# Patient Record
Sex: MALE | Race: WHITE | NOT HISPANIC OR LATINO | Employment: UNEMPLOYED | URBAN - METROPOLITAN AREA
[De-identification: names, ages, dates, MRNs, and addresses within clinical notes are randomized per-mention and may not be internally consistent; named-entity substitution may affect disease eponyms.]

---

## 2017-08-23 ENCOUNTER — ALLSCRIPTS OFFICE VISIT (OUTPATIENT)
Dept: OTHER | Facility: OTHER | Age: 62
End: 2017-08-23

## 2017-10-25 ENCOUNTER — ALLSCRIPTS OFFICE VISIT (OUTPATIENT)
Dept: OTHER | Facility: OTHER | Age: 62
End: 2017-10-25

## 2017-10-26 NOTE — PROGRESS NOTES
Assessment  1  Acquired ankle/foot deformity (736 70) (M21 969)   2  Type 2 diabetes mellitus with diabetic polyneuropathy, with long-term current use of   insulin (250 60,357 2,V58 67) (E11 42,Z79  4)    Plan   · *VB - Foot Exam; Status:Complete;   Done: 50BKK3417 11:44AM   · Follow-up visit in 9 weeks Evaluation and Treatment  Follow-up  Status: Hold For -  Scheduling  Requested for: 73QSC4734   · Diabetes Foot Exam Performed; Status:Complete;   Done: 00DAP8988 11:44AM   · Inspect your feet daily ; Status:Complete;   Done: 22OMV9251 11:44AM   · It is important to take good care of your feet if you have diabetes ; Status:Complete;    Done: 61AUY8443 11:44AM    Discussion/Summary  The patient, patient's family was counseled regarding diagnostic results,-- instructions for management,-- prognosis,-- patient and family education,-- risks and benefits of treatment options,-- importance of compliance with treatment  Patient is able to Self-Care  Possible side effects of new medications were reviewed with the patient/guardian today  The treatment plan was reviewed with the patient/guardian  The patient/guardian understands and agrees with the treatment plan      Chief Complaint  Routine nail care, Right big toe ingrown      History of Present Illness  HPI: Patient is a diabetic who presents for pedal evaluation  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  Patient is concerned with the nails and skin of the right foot  Active Problems  1  Acquired ankle/foot deformity (736 70) (M21 969)   2  Atherosclerosis of arteries of extremities (440 20) (I70 209)   3  Diabetic Ulcer Of The Left Fifth Toe (250 80)   4  Hyperlipidemia (272 4) (E78 5)   5  Intermittent claudication (443 9) (I73 9)   6  Onychomycosis (110 1) (B35 1)   7  Tinea pedis (110 4) (B35 3)   8  Type 2 diabetes mellitus (250 00) (E11 9)   9   Type 2 diabetes mellitus with diabetic polyneuropathy, with long-term current use of insulin (250 60,357 2,V58 67) (E11 42,Z79  4)    Past Medical History   · History of transient cerebral ischemia (V12 54) (Z86 73)   · History of Prostate Cancer (V10 46)   · History of Trigger finger (727 03) (M65 30)    Surgical History   · History of Gallbladder Surgery   · History of Prostate Surgery    The surgical history was reviewed and updated today  Family History  Mother    · Family history of Diabetes Mellitus (V18 0)  Father    · Family history of Colon Cancer (V16 0)   · Family history of Diabetes Mellitus (V18 0)    The family history was reviewed and updated today  Social History   · Never A Smoker  The social history was reviewed and updated today  Current Meds   1  Atorvastatin Calcium 40 MG Oral Tablet; TAKE 1 TABLET AT BEDTIME; Therapy: (Elvia Tatum) to Recorded   2  Ciclopirox Olamine 0 77 % External Cream; APPLY  AND RUB  IN A THIN FILM TO   AFFECTED AREAS TWICE DAILY  (AM AND PM); Therapy: 28Foh2399 to [phone]  Requested for: 24Fce3105; Last   Rx:[id number] Ordered   3  Clopidogrel Bisulfate 75 MG Oral Tablet; TAKE 1 TABLET DAILY; Therapy: (Elvia Diver) to Recorded   4  GlyBURIDE 5 MG Oral Tablet; TAKE 1 TABLET TWICE DAILY BEFORE MEALS; Therapy: (Elvia Diver) to Recorded   5  NovoLIN 70/30 (70-30) 100 UNIT/ML Subcutaneous Suspension; Therapy: (Elvia Diver) to Recorded   6  Vitamin B12 TABS; Therapy: (Recorded:09Jan2014) to Recorded    The medication list was reviewed and updated today  Allergies  1  No Known Drug Allergies    Vitals   Recorded: 32EOD7579 11:26AM   Heart Rate 89   Respiration 17   Systolic 585   Diastolic 83   Height 5 ft 5 in   Weight 197 lb    BMI Calculated 32 78   BSA Calculated 1 97     Physical Exam  Left Foot: Transmetatarsal amputation noted  Right Foot: Appearance: Normal except as noted: excessive pronation  Great toe deformities include a bunion     Left Ankle: ROM: limited ROM in all planes Right Ankle: ROM: limited ROM in all planes   Neurological Exam: performed  Light touch was decreased bilaterally  Response to monofilament test was absent bilaterally  Deep tendon reflexes: achilles reflex absent bilateraly  Vascular Exam: performed Dorsalis pedis pulses were diminished bilaterally  Posterior tibial pulses were diminished bilaterally  Elevation Pallor: present bilaterally  Dependence rubor was present bilaterally  Capillary refill time was Negative digital hair noted  , but-- between 1-3 seconds bilaterally  Edema: mild bilaterally  Toenails: All nails of right foot are mycotic and thickened  Early ingrown toenail fibular groove right hallux noted  Socks and shoes removed, Right Foot Findings: erythematous and dry  The sensory exam showed diminished vibratory sensation at the level of the toes  Diminished tactile sensation with monofilament testing throughout the right foot  Socks and shoes removed, Left Foot Findings: erythematous and dry  The sensory exam showed diminished vibratory sensation at the level of the toes  Diminished tactile sensation with monofilament testing throughout the left foot  Hyperkeratosis: present on the right first toe-- and-- Xerosis of skin consistent with moccasin tinea pedis  Shoe Gear Evaluation: performed ()  Recommendation(s): extra depth diabetic shoes  Procedure  Diabetic foot exam performed  Patient wife educated on condition  Mycotic nails debrided  Patient will be started on topical antifungal  Patient will need new diabetic shoes in the new year  We will order a vascular testing        Signatures   Electronically signed by : Joshua Vidal DPM; Oct 25 2017 11:45AM EST                       (Author)

## 2017-12-27 ENCOUNTER — ALLSCRIPTS OFFICE VISIT (OUTPATIENT)
Dept: OTHER | Facility: OTHER | Age: 62
End: 2017-12-27

## 2017-12-28 NOTE — PROGRESS NOTES
Assessment   1  Acquired ankle/foot deformity (736 70) (M21 969)   2  Atherosclerosis of arteries of extremities (440 20) (I70 209)   3  Onychomycosis (110 1) (B35 1)   4  Type 2 diabetes mellitus with diabetic polyneuropathy, with long-term current use of     insulin (250 60,357 2,V58 67) (E11 42,Z79 4)   5  Callus (700) (L84)    Plan    · *VB - Foot Exam; Status:Complete;   Done: 98ZAI1468 01:57PM   · Follow-up visit in 9 weeks Evaluation and Treatment  Follow-up  Status: Hold For -    Scheduling  Requested for: 79Ari2048   · Diabetes Foot Exam Performed; Status:Complete;   Done: 66ANV6636 01:58PM   · Inspect your feet daily ; Status:Complete;   Done: 57SJX3381 01:58PM   · It is important to take good care of your feet if you have diabetes ; Status:Complete;      Done: 31GVS6039 01:58PM    Discussion/Summary   The patient, patient's family was counseled regarding instructions for management,-- prognosis,-- patient and family education,-- risks and benefits of treatment options,-- importance of compliance with treatment  Patient is able to Self-Care  Possible side effects of new medications were reviewed with the patient/guardian today  The treatment plan was reviewed with the patient/guardian  The patient/guardian understands and agrees with the treatment plan      Chief Complaint   Routine nail care, Right big toe ingrown      History of Present Illness   HPI: Patient is a diabetic who presents for pedal evaluation  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  Patient is concerned with the nails and skin of the right foot  Active Problems   1  Acquired ankle/foot deformity (736 70) (M21 969)   2  Atherosclerosis of arteries of extremities (440 20) (I70 209)   3  Diabetic Ulcer Of The Left Fifth Toe (250 80)   4  Hyperlipidemia (272 4) (E78 5)   5  Intermittent claudication (443 9) (I73 9)   6  Onychomycosis (110 1) (B35 1)   7  Tinea pedis (110 4) (B35 3)   8   Type 2 diabetes mellitus (250 00) (E11 9)   9  Type 2 diabetes mellitus with diabetic polyneuropathy, with long-term current use of     insulin (250 60,357 2,V58 67) (E11 42,Z79  4)    Past Medical History    · History of transient cerebral ischemia (V12 54) (Z86 73)   · History of Prostate Cancer (V10 46)   · History of Trigger finger (727 03) (M65 30)    Surgical History    · History of Gallbladder Surgery   · History of Prostate Surgery     The surgical history was reviewed and updated today  Family History   Mother    · Family history of Diabetes Mellitus (V18 0)  Father    · Family history of Colon Cancer (V16 0)   · Family history of Diabetes Mellitus (V18 0)     The family history was reviewed and updated today  Social History    · Never A Smoker  The social history was reviewed and updated today  Current Meds    1  Atorvastatin Calcium 40 MG Oral Tablet; TAKE 1 TABLET AT BEDTIME; Therapy: (Mounika Batista) to Recorded   2  Ciclopirox Olamine 0 77 % External Cream; APPLY  AND RUB  IN A THIN FILM TO     AFFECTED AREAS TWICE DAILY  (AM AND PM); Therapy: 32Vdr7720 to ([phone])  Requested for: 09Onl4419; Last     Rx:[id number] Ordered   3  Clopidogrel Bisulfate 75 MG Oral Tablet; TAKE 1 TABLET DAILY; Therapy: (Mounika Batista) to Recorded   4  GlyBURIDE 5 MG Oral Tablet; TAKE 1 TABLET TWICE DAILY BEFORE MEALS; Therapy: (Mounika Batista) to Recorded   5  NovoLIN 70/30 (70-30) 100 UNIT/ML Subcutaneous Suspension; Therapy: (Mounika Batista) to Recorded   6  Vitamin B12 TABS; Therapy: (Recorded:09Jan2014) to Recorded     The medication list was reviewed and updated today  Allergies   1  No Known Drug Allergies    Vitals    Recorded: 84YMA6980 01:46PM   Heart Rate 84   Respiration 16   Systolic 330   Diastolic 87   Height 5 ft 5 in   Weight 197 lb    BMI Calculated 32 78   BSA Calculated 1 97     Physical Exam   Left Foot: Transmetatarsal amputation noted      Right Foot: Appearance: Normal except as noted: excessive pronation  Great toe deformities include a bunion  Left Ankle: ROM: limited ROM in all planes    Right Ankle: ROM: limited ROM in all planes    Neurological Exam: performed  Light touch was decreased bilaterally  Response to monofilament test was absent bilaterally  Deep tendon reflexes: achilles reflex absent bilateraly  Vascular Exam: performed Dorsalis pedis pulses were diminished bilaterally  Posterior tibial pulses were diminished bilaterally  Elevation Pallor: present bilaterally  Dependence rubor was present bilaterally  Capillary refill time was Negative digital hair noted  , but-- between 1-3 seconds bilaterally  Edema: mild bilaterally  Toenails: All nails of right foot are mycotic and thickened  Early ingrown toenail fibular groove right hallux noted  Socks and shoes removed, Right Foot Findings: erythematous and dry  The sensory exam showed diminished vibratory sensation at the level of the toes  Diminished tactile sensation with monofilament testing throughout the right foot  Socks and shoes removed, Left Foot Findings: erythematous and dry  The sensory exam showed diminished vibratory sensation at the level of the toes  Diminished tactile sensation with monofilament testing throughout the left foot  Hyperkeratosis: present on the right first toe-- and-- Xerosis of skin consistent with moccasin tinea pedis  Shoe Gear Evaluation: performed ()  Recommendation(s): extra depth diabetic shoes  Procedure   Diabetic foot exam performed  Patient wife educated on condition  Mycotic nails debrided  Patient will be started on topical antifungal  Patient will need new diabetic shoes in the new year  We will order a vascular testing        Signatures    Electronically signed by : Jovan Plasencia DPM; Dec 27 2017  1:58PM EST                       (Author)

## 2018-01-12 VITALS
SYSTOLIC BLOOD PRESSURE: 132 MMHG | DIASTOLIC BLOOD PRESSURE: 86 MMHG | WEIGHT: 197 LBS | HEIGHT: 65 IN | HEART RATE: 78 BPM | RESPIRATION RATE: 16 BRPM | BODY MASS INDEX: 32.82 KG/M2

## 2018-01-14 VITALS
HEART RATE: 89 BPM | WEIGHT: 197 LBS | HEIGHT: 65 IN | BODY MASS INDEX: 32.82 KG/M2 | SYSTOLIC BLOOD PRESSURE: 141 MMHG | DIASTOLIC BLOOD PRESSURE: 83 MMHG | RESPIRATION RATE: 17 BRPM

## 2018-01-22 VITALS
BODY MASS INDEX: 32.82 KG/M2 | WEIGHT: 197 LBS | DIASTOLIC BLOOD PRESSURE: 87 MMHG | HEART RATE: 84 BPM | SYSTOLIC BLOOD PRESSURE: 129 MMHG | RESPIRATION RATE: 16 BRPM | HEIGHT: 65 IN

## 2018-02-15 ENCOUNTER — OFFICE VISIT (OUTPATIENT)
Dept: PODIATRY | Facility: CLINIC | Age: 63
End: 2018-02-15
Payer: COMMERCIAL

## 2018-02-15 VITALS — BODY MASS INDEX: 31.99 KG/M2 | HEIGHT: 65 IN | RESPIRATION RATE: 17 BRPM | WEIGHT: 192 LBS

## 2018-02-15 DIAGNOSIS — M79.671 PAIN IN BOTH FEET: ICD-10-CM

## 2018-02-15 DIAGNOSIS — E11.42 DIABETIC POLYNEUROPATHY ASSOCIATED WITH TYPE 2 DIABETES MELLITUS (HCC): ICD-10-CM

## 2018-02-15 DIAGNOSIS — M21.969 ACQUIRED DEFORMITY OF FOOT, UNSPECIFIED LATERALITY: Primary | ICD-10-CM

## 2018-02-15 DIAGNOSIS — I70.209 PERIPHERAL ARTERIOSCLEROSIS (HCC): ICD-10-CM

## 2018-02-15 DIAGNOSIS — M79.672 PAIN IN BOTH FEET: ICD-10-CM

## 2018-02-15 PROCEDURE — 99213 OFFICE O/P EST LOW 20 MIN: CPT | Performed by: PODIATRIST

## 2018-02-15 RX ORDER — UBIDECARENONE 75 MG
CAPSULE ORAL
COMMUNITY
End: 2020-12-11

## 2018-02-15 RX ORDER — GLYBURIDE 5 MG/1
1 TABLET ORAL 2 TIMES DAILY
COMMUNITY
End: 2020-12-11

## 2018-02-15 RX ORDER — CLOPIDOGREL BISULFATE 75 MG/1
1 TABLET ORAL DAILY
COMMUNITY
End: 2020-10-08

## 2018-02-15 RX ORDER — ATORVASTATIN CALCIUM 40 MG/1
1 TABLET, FILM COATED ORAL
COMMUNITY
End: 2021-01-17 | Stop reason: SDUPTHER

## 2018-02-15 RX ORDER — OXYCODONE HYDROCHLORIDE AND ACETAMINOPHEN 5; 325 MG/1; MG/1
TABLET ORAL
COMMUNITY
Start: 2018-01-17 | End: 2020-05-05 | Stop reason: SDUPTHER

## 2018-02-15 NOTE — PROGRESS NOTES
Assessment/Plan:   Patient is diabetic with foot deformity  Patient has pain upon ambulation  Plan  Diabetic foot exam performed  Left foot plantar callus debrided  Mycotic nails of right foot debrided  Patient urged to get vascular testing as directed    There are no diagnoses linked to this encounter  Subjective:     Patient ID: Malissa Garcia is a 60 y o  male  HPI    Review of Systems      Objective:  Discussion/Summary   The patient, patient's family was counseled regarding instructions for management,-- prognosis,-- patient and family education,-- risks and benefits of treatment options,-- importance of compliance with treatment  Patient is able to Self-Care  Possible side effects of new medications were reviewed with the patient/guardian today  The treatment plan was reviewed with the patient/guardian  The patient/guardian understands and agrees with the treatment plan      Chief Complaint   Routine nail care, Right big toe ingrown      History of Present Illness   HPI: Patient is a diabetic who presents for pedal evaluation  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  Patient is concerned with the nails and skin of the right foot  Active Problems   1  Acquired ankle/foot deformity (736 70) (M21 969)   2  Atherosclerosis of arteries of extremities (440 20) (I70 209)   3  Diabetic Ulcer Of The Left Fifth Toe (250 80)   4  Hyperlipidemia (272 4) (E78 5)   5  Intermittent claudication (443 9) (I73 9)   6  Onychomycosis (110 1) (B35 1)   7  Tinea pedis (110 4) (B35 3)   8  Type 2 diabetes mellitus (250 00) (E11 9)   9  Type 2 diabetes mellitus with diabetic polyneuropathy, with long-term current use of     insulin (250 60,357 2,V58 67) (E11 42,Z79  4)     Past Medical History    · History of transient cerebral ischemia (V12 54) (Z86 73)   · History of Prostate Cancer (V10 46)   · History of Trigger finger (727 03) (M65 30)     Surgical History    · History of Gallbladder Surgery   · History of Prostate Surgery     The surgical history was reviewed and updated today  Family History   Mother    · Family history of Diabetes Mellitus (V18 0)  Father    · Family history of Colon Cancer (V16 0)   · Family history of Diabetes Mellitus (V18 0)     The family history was reviewed and updated today  Social History    · Never A Smoker  The social history was reviewed and updated today  Current Meds    1  Atorvastatin Calcium 40 MG Oral Tablet; TAKE 1 TABLET AT BEDTIME; Therapy: (Ivonne Fuchs) to Recorded   2  Ciclopirox Olamine 0 77 % External Cream; APPLY  AND RUB  IN A THIN FILM TO     AFFECTED AREAS TWICE DAILY  (AM AND PM); Therapy: 31Zto1977 to [phone])  Requested for: 23Aug2017; Last     Rx:[id number] Ordered   3  Clopidogrel Bisulfate 75 MG Oral Tablet; TAKE 1 TABLET DAILY; Therapy: (Ivonne Fuchs) to Recorded   4  GlyBURIDE 5 MG Oral Tablet; TAKE 1 TABLET TWICE DAILY BEFORE MEALS; Therapy: (Ivonne Fuchs) to Recorded   5  NovoLIN 70/30 (70-30) 100 UNIT/ML Subcutaneous Suspension; Therapy: (Ivonne Fuchs) to Recorded   6  Vitamin B12 TABS; Therapy: (Recorded:09Jan2014) to Recorded     The medication list was reviewed and updated today  Allergies   1  No Known Drug Allergies     Vitals     Recorded: 63JNE8192 01:46PM   Heart Rate 84   Respiration 16   Systolic 366   Diastolic 87   Height 5 ft 5 in   Weight 197 lb    BMI Calculated 32 78   BSA Calculated 1 97      Physical Exam   Left Foot: Transmetatarsal amputation noted  Right Foot: Appearance: Normal except as noted: excessive pronation  Great toe deformities include a bunion  Left Ankle: ROM: limited ROM in all planes    Right Ankle: ROM: limited ROM in all planes    Neurological Exam: performed  Light touch was decreased bilaterally  Response to monofilament test was absent bilaterally   Deep tendon reflexes: achilles reflex absent bilateraly  Vascular Exam: performed Dorsalis pedis pulses were diminished bilaterally  Posterior tibial pulses were diminished bilaterally  Elevation Pallor: present bilaterally  Dependence rubor was present bilaterally  Capillary refill time was Negative digital hair noted  , but-- between 1-3 seconds bilaterally  Edema: mild bilaterally  Toenails: All nails of right foot are mycotic and thickened  Early ingrown toenail fibular groove right hallux noted  Socks and shoes removed, Right Foot Findings: erythematous and dry  The sensory exam showed diminished vibratory sensation at the level of the toes  Diminished tactile sensation with monofilament testing throughout the right foot  Socks and shoes removed, Left Foot Findings: erythematous and dry  The sensory exam showed diminished vibratory sensation at the level of the toes  Diminished tactile sensation with monofilament testing throughout the left foot  Hyperkeratosis: present on the right first toe-- and-- Xerosis of skin consistent with moccasin tinea pedis  Shoe Gear Evaluation: performed ()  Recommendation(s): extra depth diabetic shoes  Procedure   Diabetic foot exam performed  Patient wife educated on condition  Mycotic nails debrided  Patient will be started on topical antifungal  Patient will need new diabetic shoes in the new year   We will order a vascular testing     Foot ExamPhysical Exam

## 2018-03-15 ENCOUNTER — OFFICE VISIT (OUTPATIENT)
Dept: PODIATRY | Facility: CLINIC | Age: 63
End: 2018-03-15
Payer: COMMERCIAL

## 2018-03-15 VITALS
SYSTOLIC BLOOD PRESSURE: 152 MMHG | BODY MASS INDEX: 31.99 KG/M2 | WEIGHT: 192 LBS | DIASTOLIC BLOOD PRESSURE: 88 MMHG | HEIGHT: 65 IN

## 2018-03-15 DIAGNOSIS — B35.1 ONYCHOMYCOSIS: ICD-10-CM

## 2018-03-15 DIAGNOSIS — M21.969 ACQUIRED DEFORMITY OF FOOT, UNSPECIFIED LATERALITY: Primary | ICD-10-CM

## 2018-03-15 DIAGNOSIS — E11.42 DIABETIC POLYNEUROPATHY ASSOCIATED WITH TYPE 2 DIABETES MELLITUS (HCC): ICD-10-CM

## 2018-03-15 DIAGNOSIS — I70.209 PERIPHERAL ARTERIOSCLEROSIS (HCC): ICD-10-CM

## 2018-03-15 DIAGNOSIS — M79.672 PAIN IN BOTH FEET: ICD-10-CM

## 2018-03-15 DIAGNOSIS — M79.671 PAIN IN BOTH FEET: ICD-10-CM

## 2018-03-15 DIAGNOSIS — B35.3 TINEA PEDIS OF BOTH FEET: ICD-10-CM

## 2018-03-15 PROCEDURE — 99213 OFFICE O/P EST LOW 20 MIN: CPT | Performed by: PODIATRIST

## 2018-03-15 NOTE — PROGRESS NOTES
Assessment/Plan:    No problem-specific Assessment & Plan notes found for this encounter  Assessment/Plan:   Patient is diabetic with foot deformity  Patient has pain upon ambulation      Plan  Diabetic foot exam performed  Left foot plantar callus debrided  Mycotic nails of right foot debrided  Patient urged to get vascular testing as directed    There are no diagnoses linked to this encounter        Subjective:      Patient ID: Lalitha Wheat is a 60 y o  male      HPI     Review of Systems       Objective:  Discussion/Summary   The patient, patient's family was counseled regarding instructions for management,-- prognosis,-- patient and family education,-- risks and benefits of treatment options,-- importance of compliance with treatment     Patient is able to Self-Care     Possible side effects of new medications were reviewed with the patient/guardian today  The treatment plan was reviewed with the patient/guardian  The patient/guardian understands and agrees with the treatment plan      Chief Complaint   Routine nail care, Right big toe ingrown      History of Present Illness   HPI: Patient is a diabetic who presents for pedal evaluation  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  Patient is concerned with the nails and skin of the right foot       Active Problems   1  Acquired ankle/foot deformity (736 70) (M21 969)   2  Atherosclerosis of arteries of extremities (440 20) (I70 209)   3  Diabetic Ulcer Of The Left Fifth Toe (250 80)   4  Hyperlipidemia (272 4) (E78 5)   5  Intermittent claudication (443 9) (I73 9)   6  Onychomycosis (110 1) (B35 1)   7  Tinea pedis (110 4) (B35 3)   8  Type 2 diabetes mellitus (250 00) (E11 9)   9  Type 2 diabetes mellitus with diabetic polyneuropathy, with long-term current use of     insulin (250 60,357 2,V58 67) (E11 42,Z79  4)     Past Medical History    · History of transient cerebral ischemia (V12 54) (Z86 73)   · History of Prostate Cancer (V10 46)   · History of Trigger finger (727 03) (M65 30)     Surgical History    · History of Gallbladder Surgery   · History of Prostate Surgery     The surgical history was reviewed and updated today        Family History   Mother    · Family history of Diabetes Mellitus (V18 0)  Father    · Family history of Colon Cancer (V16 0)   · Family history of Diabetes Mellitus (V18 0)     The family history was reviewed and updated today        Social History    · Never A Smoker  The social history was reviewed and updated today       Current Meds    1  Atorvastatin Calcium 40 MG Oral Tablet; TAKE 1 TABLET AT BEDTIME;     Therapy: (Recorded:31Jce7192) to Recorded   2  Ciclopirox Olamine 0 77 % External Cream; APPLY  AND RUB  IN A THIN FILM TO     AFFECTED AREAS TWICE DAILY  (AM AND PM);     Therapy: 71Ptw3728 to [phone])  Requested for: 95Fwg0418; Last     Rx:[id number] Ordered   3  Clopidogrel Bisulfate 75 MG Oral Tablet; TAKE 1 TABLET DAILY;     Therapy: (Recorded:91Ucr4938) to Recorded   4  GlyBURIDE 5 MG Oral Tablet; TAKE 1 TABLET TWICE DAILY BEFORE MEALS;     Therapy: (Josiane Boateng) to Recorded   5  NovoLIN 70/30 (70-30) 100 UNIT/ML Subcutaneous Suspension;     Therapy: (Recorded:56Oxg6009) to Recorded   6  Vitamin B12 TABS;     Therapy: (Recorded:09Jan2014) to Recorded     The medication list was reviewed and updated today        Allergies   1  No Known Drug Allergies     Vitals     Recorded: 75Nnv3899 01:46PM   Heart Rate 84   Respiration 16   Systolic 597   Diastolic 87   Height 5 ft 5 in   Weight 197 lb    BMI Calculated 32 78   BSA Calculated 1 97      Physical Exam   Left Foot: Transmetatarsal amputation noted     Right Foot: Appearance: Normal except as noted: excessive pronation  Great toe deformities include a bunion     Left Ankle: ROM: limited ROM in all planes    Right Ankle: ROM: limited ROM in all planes    Neurological Exam: performed  Light touch was decreased bilaterally   Response to monofilament test was absent bilaterally  Deep tendon reflexes: achilles reflex absent bilateraly     Vascular Exam: performed Dorsalis pedis pulses were diminished bilaterally  Posterior tibial pulses were diminished bilaterally  Elevation Pallor: present bilaterally  Dependence rubor was present bilaterally  Capillary refill time was Negative digital hair noted  , but-- between 1-3 seconds bilaterally  Edema: mild bilaterally     Toenails: All nails of right foot are mycotic and thickened  Early ingrown toenail fibular groove right hallux noted          Socks and shoes removed, Right Foot Findings: erythematous and dry       The sensory exam showed diminished vibratory sensation at the level of the toes  Diminished tactile sensation with monofilament testing throughout the right foot       Socks and shoes removed, Left Foot Findings: erythematous and dry       The sensory exam showed diminished vibratory sensation at the level of the toes  Diminished tactile sensation with monofilament testing throughout the left foot     Hyperkeratosis: present on the right first toe-- and-- Xerosis of skin consistent with moccasin tinea pedis     Shoe Gear Evaluation: performed ()  Recommendation(s): extra depth diabetic shoes       Procedure   Diabetic foot exam performed  Patient wife educated on condition  Mycotic nails debrided  Patient will be started on topical antifungal         There are no diagnoses linked to this encounter  Subjective:      Patient ID: Luly Dover is a 60 y o  male  HPI    The following portions of the patient's history were reviewed and updated as appropriate: allergies, current medications, past family history, past medical history, past social history, past surgical history and problem list     Review of Systems      Objective:      Foot Exam    Right Foot/Ankle     Inspection and Palpation  Skin Exam: callus and dry skin;     Neurovascular  Dorsalis pedis: 0  Posterior tibial: 0      Left Foot/Ankle      Inspection and Palpation  Skin Exam: callus and dry skin;     Neurovascular  Dorsalis pedis: 0  Posterior tibial: 0        Physical Exam   Cardiovascular: Pulses are weak pulses  Pulses:       Dorsalis pedis pulses are 0 on the right side, and 0 on the left side  Posterior tibial pulses are 0 on the right side, and 0 on the left side  Feet:   Right Foot:   Skin Integrity: Positive for callus and dry skin  Left Foot:   Skin Integrity: Positive for callus and dry skin  Patient's shoes and socks removed  Right Foot/Ankle   Right Foot Inspection  Skin Exam: dry skin, callus and callus                          Toe Exam: swelling and erythema  Sensory   Vibration: absent  Proprioception: absent   Monofilament testing: absent  Vascular  Capillary refills: elevated  The right DP pulse is 0  The right PT pulse is 0  Left Foot/Ankle  Left Foot Inspection  Skin Exam: dry skin and callus                                         Sensory   Vibration: absent  Proprioception: absent  Monofilament: absent  Vascular  Capillary refills: elevated  The left DP pulse is 0  The left PT pulse is 0  Assign Risk Category:  Deformity present;  Loss of protective sensation; Weak pulses       Risk: 2

## 2018-03-15 NOTE — PATIENT INSTRUCTIONS
Patient has severe peripheral artery disease  He is being seen by vascular surgery  He will be undergoing arteriogram with possible stenting  He is aware of this  He will watch for signs of infection

## 2018-05-09 ENCOUNTER — OFFICE VISIT (OUTPATIENT)
Dept: PODIATRY | Facility: CLINIC | Age: 63
End: 2018-05-09
Payer: COMMERCIAL

## 2018-05-09 VITALS
RESPIRATION RATE: 17 BRPM | DIASTOLIC BLOOD PRESSURE: 85 MMHG | BODY MASS INDEX: 31.99 KG/M2 | HEART RATE: 78 BPM | HEIGHT: 65 IN | WEIGHT: 192 LBS | SYSTOLIC BLOOD PRESSURE: 133 MMHG

## 2018-05-09 DIAGNOSIS — B35.1 ONYCHOMYCOSIS: ICD-10-CM

## 2018-05-09 DIAGNOSIS — B35.3 TINEA PEDIS OF BOTH FEET: ICD-10-CM

## 2018-05-09 DIAGNOSIS — M79.672 PAIN IN BOTH FEET: ICD-10-CM

## 2018-05-09 DIAGNOSIS — M79.671 PAIN IN BOTH FEET: ICD-10-CM

## 2018-05-09 DIAGNOSIS — I70.209 PERIPHERAL ARTERIOSCLEROSIS (HCC): ICD-10-CM

## 2018-05-09 DIAGNOSIS — M21.969 ACQUIRED DEFORMITY OF FOOT, UNSPECIFIED LATERALITY: Primary | ICD-10-CM

## 2018-05-09 DIAGNOSIS — E11.42 DIABETIC POLYNEUROPATHY ASSOCIATED WITH TYPE 2 DIABETES MELLITUS (HCC): ICD-10-CM

## 2018-05-09 PROCEDURE — 11720 DEBRIDE NAIL 1-5: CPT | Performed by: PODIATRIST

## 2018-05-09 PROCEDURE — 99212 OFFICE O/P EST SF 10 MIN: CPT | Performed by: PODIATRIST

## 2018-05-09 RX ORDER — TERBINAFINE HYDROCHLORIDE 250 MG/1
TABLET ORAL
Qty: 15 TABLET | Refills: 0 | Status: SHIPPED | OUTPATIENT
Start: 2018-05-09 | End: 2018-06-09

## 2018-05-09 NOTE — PROGRESS NOTES
Assessment/Plan:  Pain  Paronychia  Mycosis  Diabetic neuropathy  Foot deformity  Plan  Nails debrided  Terbinafine ordered  Feet measured  Patient will need diabetic shoes  Heel take medication as prescribed  He will watch for signs of infection  Diabetic foot exam performed    No problem-specific Assessment & Plan notes found for this encounter  Patient is diabetic with foot deformity  Patient has pain upon ambulation      Plan   Diabetic foot exam performed   Left foot plantar callus debrided   Mycotic nails of right foot debrided   Patient urged to get vascular testing as directed    There are no diagnoses linked to this encounter        Subjective:      Patient ID: Kan Juan is a 60 y  o  male      HPI     Review of Systems       Objective:  Discussion/Summary   The patient, patient's family was counseled regarding instructions for management,-- prognosis,-- patient and family education,-- risks and benefits of treatment options,-- importance of compliance with treatment     Patient is able to Self-Care     Possible side effects of new medications were reviewed with the patient/guardian today  The treatment plan was reviewed with the patient/guardian  The patient/guardian understands and agrees with the treatment plan      Chief Complaint   Routine nail care, Right big toe ingrown      History of Present Illness   HPI: Patient is a diabetic who presents for pedal evaluation  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   Patient is concerned with the nails and skin of the right foot       Active Problems   1  Acquired ankle/foot deformity (736 70) (M21 969)   2  Atherosclerosis of arteries of extremities (440 20) (I70 209)   3  Diabetic Ulcer Of The Left Fifth Toe (250 80)   4  Hyperlipidemia (272 4) (E78 5)   5  Intermittent claudication (443 9) (I73 9)   6  Onychomycosis (110 1) (B35 1)   7  Tinea pedis (110 4) (B35 3)   8  Type 2 diabetes mellitus (250 00) (E11 9)   9  Type 2 diabetes mellitus with diabetic polyneuropathy, with long-term current use of     insulin (250 60,357 2,V58 67) (E11 42,Z79  4)     Past Medical History    · History of transient cerebral ischemia (V12 54) (Z86 73)   · History of Prostate Cancer (V10 46)   · History of Trigger finger (727 03) (M65 30)     Surgical History    · History of Gallbladder Surgery   · History of Prostate Surgery     The surgical history was reviewed and updated today        Family History   Mother    · Family history of Diabetes Mellitus (V18 0)  Father    · Family history of Colon Cancer (V16 0)   · Family history of Diabetes Mellitus (V18 0)     The family history was reviewed and updated today        Social History    · Never A Smoker  The social history was reviewed and updated today       Current Meds    1  Atorvastatin Calcium 40 MG Oral Tablet; TAKE 1 TABLET AT BEDTIME;     Therapy: (Recorded:[id number]) to Recorded   2  Ciclopirox Olamine 0 77 % External Cream; APPLY  AND RUB  IN A THIN FILM TO     AFFECTED AREAS TWICE DAILY  (AM AND PM);     Therapy: [id number] to ([id number])  Requested for: [id number]; Last     Rx:[id number] Ordered   3  Clopidogrel Bisulfate 75 MG Oral Tablet; TAKE 1 TABLET DAILY;     Therapy: (Recorded:59Pfe6148) to Recorded   4  GlyBURIDE 5 MG Oral Tablet; TAKE 1 TABLET TWICE DAILY BEFORE MEALS;     Therapy: (Hai Jacobs) to Recorded   5  NovoLIN 70/30 (70-30) 100 UNIT/ML Subcutaneous Suspension;     Therapy: (Recorded:58Koo6819) to Recorded   6  Vitamin B12 TABS;     Therapy: (Recorded:09Jan2014) to Recorded     The medication list was reviewed and updated today        Allergies   1  No Known Drug Allergies     Vitals        Heart Rate 84   Respiration 16   Systolic 655   Diastolic 87   Height 5 ft 5 in   Weight 197 lb    BMI Calculated 32 78   BSA Calculated 1 97      Physical Exam   Left Foot: Transmetatarsal amputation noted     Right Foot: Appearance: Normal except as noted: excessive pronation  Great toe deformities include a bunion     Left Ankle: ROM: limited ROM in all planes    Right Ankle: ROM: limited ROM in all planes    Neurological Exam: performed  Light touch was decreased bilaterally  Response to monofilament test was absent bilaterally  Deep tendon reflexes: achilles reflex absent bilateraly     Vascular Exam: performed Dorsalis pedis pulses were diminished bilaterally  Posterior tibial pulses were diminished bilaterally  Elevation Pallor: present bilaterally  Dependence rubor was present bilaterally  Capillary refill time was Negative digital hair noted  , but-- between 1-3 seconds bilaterally  Edema: mild bilaterally     Toenails: All nails of right foot are mycotic and thickened  Early ingrown toenail fibular groove right hallux noted          Socks and shoes removed, Right Foot Findings: erythematous and dry       The sensory exam showed diminished vibratory sensation at the level of the toes  Diminished tactile sensation with monofilament testing throughout the right foot       Socks and shoes removed, Left Foot Findings: erythematous and dry       The sensory exam showed diminished vibratory sensation at the level of the toes  Diminished tactile sensation with monofilament testing throughout the left foot     Hyperkeratosis: present on the right first toe-- and-- Xerosis of skin consistent with moccasin tinea pedis     Shoe Gear Evaluation: performed ()  Recommendation(s): extra depth diabetic shoes       Procedure   Diabetic foot exam performed  Patient wife educated on condition  Mycotic nails debrided   Patient will be started on topical antifungal           There are no diagnoses linked to this encounter        Subjective:       Patient ID: Deysi Fatima is a 60 y o  male      HPI     The following portions of the patient's history were reviewed and updated as appropriate: allergies, current medications, past family history, past medical history, past social history, past surgical history and problem list      Review of Systems       Objective:      Foot Exam     Right Foot/Ankle      Inspection and Palpation  Skin Exam: callus and dry skin;      Neurovascular  Dorsalis pedis: 0  Posterior tibial: 0        Left Foot/Ankle       Inspection and Palpation  Skin Exam: callus and dry skin;      Neurovascular  Dorsalis pedis: 0  Posterior tibial: 0        Physical Exam   Cardiovascular: Pulses are weak pulses  Pulses:       Dorsalis pedis pulses are 0 on the right side, and 0 on the left side  Posterior tibial pulses are 0 on the right side, and 0 on the left side  Feet:   Right Foot:   Skin Integrity: Positive for callus and dry skin  Left Foot:   Skin Integrity: Positive for callus and dry skin  Patient's shoes and socks removed  Right Foot/Ankle   Right Foot Inspection  Skin Exam: dry skin, callus and callus                           Toe Exam: swelling and erythema  Sensory   Vibration: absent  Proprioception: absent   Monofilament testing: absent  Vascular  Capillary refills: elevated  The right DP pulse is 0  The right PT pulse is 0       Left Foot/Ankle  Left Foot Inspection  Skin Exam: dry skin and callus                                          Sensory   Vibration: absent  Proprioception: absent  Monofilament: absent  Vascular  Capillary refills: elevated  The left DP pulse is 0  The left PT pulse is 0  Assign Risk Category:  Deformity present; Loss of protective sensation; Weak pulses       Risk: 2       There are no diagnoses linked to this encounter  Subjective:      Patient ID: Dane Perez is a 60 y o  male  HPI    The following portions of the patient's history were reviewed and updated as appropriate: allergies, current medications, past family history, past medical history, past social history, past surgical history and problem list     Review of Systems      Objective:      Foot ExamPhysical Exam

## 2018-07-11 ENCOUNTER — OFFICE VISIT (OUTPATIENT)
Dept: PODIATRY | Facility: CLINIC | Age: 63
End: 2018-07-11
Payer: COMMERCIAL

## 2018-07-11 VITALS
BODY MASS INDEX: 31.99 KG/M2 | HEIGHT: 65 IN | RESPIRATION RATE: 17 BRPM | HEART RATE: 67 BPM | WEIGHT: 192 LBS | SYSTOLIC BLOOD PRESSURE: 132 MMHG | DIASTOLIC BLOOD PRESSURE: 77 MMHG

## 2018-07-11 DIAGNOSIS — E11.42 DIABETIC POLYNEUROPATHY ASSOCIATED WITH TYPE 2 DIABETES MELLITUS (HCC): ICD-10-CM

## 2018-07-11 DIAGNOSIS — B35.1 ONYCHOMYCOSIS: ICD-10-CM

## 2018-07-11 DIAGNOSIS — M21.969 ACQUIRED DEFORMITY OF FOOT, UNSPECIFIED LATERALITY: ICD-10-CM

## 2018-07-11 DIAGNOSIS — I70.209 PERIPHERAL ARTERIOSCLEROSIS (HCC): Primary | ICD-10-CM

## 2018-07-11 DIAGNOSIS — B35.9 DERMATOPHYTOSIS: ICD-10-CM

## 2018-07-11 DIAGNOSIS — M79.672 PAIN IN BOTH FEET: ICD-10-CM

## 2018-07-11 DIAGNOSIS — M79.671 PAIN IN BOTH FEET: ICD-10-CM

## 2018-07-11 PROCEDURE — 99213 OFFICE O/P EST LOW 20 MIN: CPT | Performed by: PODIATRIST

## 2018-07-11 NOTE — PROGRESS NOTES
Procedures   Foot Exam     Assessment/Plan:  Pain  Paronychia  Mycosis  Diabetic neuropathy  Foot deformity      Plan  Nails debrided  Terbinafine ordered  Feet measured  Patient will need diabetic shoes  Heel take medication as prescribed  He will watch for signs of infection  Diabetic foot exam performed     No problem-specific Assessment & Plan notes found for this encounter  Patient is diabetic with foot deformity  Patient has pain upon ambulation      Plan   Diabetic foot exam performed   Left foot plantar callus debrided   Mycotic nails of right foot debrided   Patient urged to get vascular testing as directed    There are no diagnoses linked to this encounter        Subjective:      Patient ID: Kan Juan is a 60 y  o  male      HPI     Review of Systems       Objective:  Discussion/Summary   The patient, patient's family was counseled regarding instructions for management,-- prognosis,-- patient and family education,-- risks and benefits of treatment options,-- importance of compliance with treatment     Patient is able to Self-Care     Possible side effects of new medications were reviewed with the patient/guardian today  The treatment plan was reviewed with the patient/guardian  The patient/guardian understands and agrees with the treatment plan      Chief Complaint   Routine nail care, Right big toe ingrown      History of Present Illness   HPI: Patient is a diabetic who presents for pedal evaluation  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   Patient is concerned with the nails and skin of the right foot       Active Problems   1  Acquired ankle/foot deformity (736 70) (M21 969)   2  Atherosclerosis of arteries of extremities (440 20) (I70 209)   3  Diabetic Ulcer Of The Left Fifth Toe (250 80)   4  Hyperlipidemia (272 4) (E78 5)   5  Intermittent claudication (443 9) (I73 9)   6  Onychomycosis (110 1) (B35 1)   7  Tinea pedis (110 4) (B35 3)   8  Type 2 diabetes mellitus (250 00) (E11 9)   9  Type 2 diabetes mellitus with diabetic polyneuropathy, with long-term current use of     insulin (250 60,357 2,V58 67) (E11 42,Z79  4)     Past Medical History    · History of transient cerebral ischemia (V12 54) (Z86 73)   · History of Prostate Cancer (V10 46)   · History of Trigger finger (727 03) (M65 30)     Surgical History    · History of Gallbladder Surgery   · History of Prostate Surgery     The surgical history was reviewed and updated today        Family History   Mother    · Family history of Diabetes Mellitus (V18 0)  Father    · Family history of Colon Cancer (V16 0)   · Family history of Diabetes Mellitus (V18 0)     The family history was reviewed and updated today        Social History    · Never A Smoker  The social history was reviewed and updated today       Current Meds    1  Atorvastatin Calcium 40 MG Oral Tablet; TAKE 1 TABLET AT BEDTIME;     Therapy: (Recorded:18Lkz3055) to Recorded   2  Ciclopirox Olamine 0 77 % External Cream; APPLY  AND RUB  IN A THIN FILM TO     AFFECTED AREAS TWICE DAILY  (AM AND PM);     Therapy: [id number] to ([id number])  Requested for: 48Zww2830; Last     Rx:[id number] Ordered   3  Clopidogrel Bisulfate 75 MG Oral Tablet; TAKE 1 TABLET DAILY;     Therapy: (Recorded:74Mai0715) to Recorded   4  GlyBURIDE 5 MG Oral Tablet; TAKE 1 TABLET TWICE DAILY BEFORE MEALS;     Therapy: (Amita Murrieta) to Recorded   5  NovoLIN 70/30 (70-30) 100 UNIT/ML Subcutaneous Suspension;     Therapy: (Recorded:53Rda1991) to Recorded   6  Vitamin B12 TABS;     Therapy: (Recorded:09Jan2014) to Recorded     The medication list was reviewed and updated today        Allergies   1  No Known Drug Allergies     Vitals         Heart Rate 84   Respiration 16   Systolic 446   Diastolic 87   Height 5 ft 5 in   Weight 197 lb    BMI Calculated 32 78   BSA Calculated 1 97      Physical Exam   Left Foot: Transmetatarsal amputation noted     Right Foot: Appearance: Normal except as noted: excessive pronation  Great toe deformities include a bunion     Left Ankle: ROM: limited ROM in all planes    Right Ankle: ROM: limited ROM in all planes    Neurological Exam: performed  Light touch was decreased bilaterally  Response to monofilament test was absent bilaterally  Deep tendon reflexes: achilles reflex absent bilateraly     Vascular Exam: performed Dorsalis pedis pulses were diminished bilaterally  Posterior tibial pulses were diminished bilaterally  Elevation Pallor: present bilaterally  Dependence rubor was present bilaterally  Capillary refill time was Negative digital hair noted  , but-- between 1-3 seconds bilaterally  Edema: mild bilaterally     Toenails: All nails of right foot are mycotic and thickened  Early ingrown toenail fibular groove right hallux noted          Socks and shoes removed, Right Foot Findings: erythematous and dry       The sensory exam showed diminished vibratory sensation at the level of the toes  Diminished tactile sensation with monofilament testing throughout the right foot       Socks and shoes removed, Left Foot Findings: erythematous and dry       The sensory exam showed diminished vibratory sensation at the level of the toes  Diminished tactile sensation with monofilament testing throughout the left foot     Hyperkeratosis: present on the right first toe-- and-- Xerosis of skin consistent with moccasin tinea pedis     Shoe Gear Evaluation: performed ()  Recommendation(s): extra depth diabetic shoes       Procedure   Diabetic foot exam performed  Patient wife educated on condition  Mycotic nails debrided  Patient will be started on topical antifungal           There are no diagnoses linked to this encounter        Subjective:   Patient is diabetic  He has pain in his feet with ambulation  Presents for evaluation  No history of trauma      Patient ID: Kan Juan is a 60 y  o  male         Objective:      Foot Exam     Right Foot/Ankle      Inspection and Palpation  Skin Exam: callus and dry skin;      Neurovascular  Dorsalis pedis: 0  Posterior tibial: 0        Left Foot/Ankle       Inspection and Palpation  Skin Exam: callus and dry skin;      Neurovascular  Dorsalis pedis: 0  Posterior tibial: 0        Physical Exam   Cardiovascular: Pulses are weak pulses  Pulses:       Dorsalis pedis pulses are 0 on the right side, and 0 on the left side         Posterior tibial pulses are 0 on the right side, and 0 on the left side  Feet:   Right Foot:   Skin Integrity: Positive for callus and dry skin  Left Foot:   Skin Integrity: Positive for callus and dry skin  Patient's shoes and socks removed  Right Foot/Ankle   Right Foot Inspection  Skin Exam: dry skin, callus and callus               Toe Exam: swelling and erythema  Sensory   Vibration: absent  Proprioception: absent   Monofilament testing: absent  Vascular  Capillary refills: elevated  The right DP pulse is 0  The right PT pulse is 0       Left Foot/Ankle  Left Foot Inspection  Skin Exam: dry skin and callus                 Sensory   Vibration: absent  Proprioception: absent  Monofilament: absent  Vascular  Capillary refills: elevated  The left DP pulse is 0  The left PT pulse is 0  Assign Risk Category:  Deformity present;  Loss of protective sensation; Weak pulses       Risk: 2

## 2018-07-26 ENCOUNTER — OFFICE VISIT (OUTPATIENT)
Dept: PODIATRY | Facility: CLINIC | Age: 63
End: 2018-07-26
Payer: COMMERCIAL

## 2018-07-26 VITALS
WEIGHT: 192 LBS | RESPIRATION RATE: 16 BRPM | HEART RATE: 83 BPM | HEIGHT: 65 IN | SYSTOLIC BLOOD PRESSURE: 139 MMHG | DIASTOLIC BLOOD PRESSURE: 83 MMHG | BODY MASS INDEX: 31.99 KG/M2

## 2018-07-26 DIAGNOSIS — M79.672 PAIN IN BOTH FEET: ICD-10-CM

## 2018-07-26 DIAGNOSIS — I70.209 PERIPHERAL ARTERIOSCLEROSIS (HCC): Primary | ICD-10-CM

## 2018-07-26 DIAGNOSIS — L60.0 INGROWN TOENAIL: ICD-10-CM

## 2018-07-26 DIAGNOSIS — M79.671 PAIN IN BOTH FEET: ICD-10-CM

## 2018-07-26 DIAGNOSIS — E11.42 DIABETIC POLYNEUROPATHY ASSOCIATED WITH TYPE 2 DIABETES MELLITUS (HCC): ICD-10-CM

## 2018-07-26 DIAGNOSIS — L03.031 PARONYCHIA OF TOENAIL OF RIGHT FOOT: ICD-10-CM

## 2018-07-26 PROCEDURE — 99213 OFFICE O/P EST LOW 20 MIN: CPT | Performed by: PODIATRIST

## 2018-07-26 NOTE — PROGRESS NOTES
Assessment/Plan:  Pain  Paronychia  Ingrown toenail  Peripheral artery disease  Diabetic neuropathy  Plan  Partial nail avulsion done of right hallux  Patient may need nail procedure  We will prescribe topical antibiotic     There are no diagnoses linked to this encounter  Subjective:  Patient has pain in his right big toe  Patient ID: Nicolle Rosas is a 60 y o  male  No past medical history on file  No past surgical history on file  Allergies   Allergen Reactions    Shellfish-Derived Products GI Intolerance    Sulfamethoxazole-Trimethoprim Rash         Current Outpatient Prescriptions:     atorvastatin (LIPITOR) 40 mg tablet, Take 1 tablet by mouth, Disp: , Rfl:     ciclopirox (LOPROX) 0 77 % cream, Apply topically Twice daily, Disp: , Rfl:     ciclopirox (LOPROX) 0 77 % cream, Apply topically 2 (two) times a day for 20 days, Disp: 45 g, Rfl: 0    clopidogrel (PLAVIX) 75 mg tablet, Take 1 tablet by mouth daily, Disp: , Rfl:     cyanocobalamin (VITAMIN B-12) 100 mcg tablet, Take by mouth, Disp: , Rfl:     glyBURIDE (DIABETA) 5 mg tablet, Take 1 tablet by mouth Twice daily, Disp: , Rfl:     insulin NPH-insulin regular (NOVOLIN 70/30) 100 units/mL subcutaneous injection, Inject under the skin, Disp: , Rfl:     oxyCODONE-acetaminophen (PERCOCET) 5-325 mg per tablet, , Disp: , Rfl:     Patient Active Problem List   Diagnosis    Acquired deformity of foot    Diabetic polyneuropathy associated with type 2 diabetes mellitus (HCC)    Pain in both feet    Peripheral arteriosclerosis (HCC)    Onychomycosis    Tinea pedis of both feet    Dermatophytosis       HPI    The following portions of the patient's history were reviewed and updated as appropriate: allergies, current medications, past family history, past medical history, past social history, past surgical history and problem list     Review of Systems      Historical Information   No past medical history on file    No past surgical history on file  Social History   History   Alcohol use Not on file     History   Drug use: Unknown     Family History: No family history on file  Meds/Allergies   Allergies   Allergen Reactions    Shellfish-Derived Products GI Intolerance    Sulfamethoxazole-Trimethoprim Rash       Current Outpatient Prescriptions on File Prior to Visit   Medication Sig Dispense Refill    atorvastatin (LIPITOR) 40 mg tablet Take 1 tablet by mouth      ciclopirox (LOPROX) 0 77 % cream Apply topically Twice daily      ciclopirox (LOPROX) 0 77 % cream Apply topically 2 (two) times a day for 20 days 45 g 0    clopidogrel (PLAVIX) 75 mg tablet Take 1 tablet by mouth daily      cyanocobalamin (VITAMIN B-12) 100 mcg tablet Take by mouth      glyBURIDE (DIABETA) 5 mg tablet Take 1 tablet by mouth Twice daily      insulin NPH-insulin regular (NOVOLIN 70/30) 100 units/mL subcutaneous injection Inject under the skin      oxyCODONE-acetaminophen (PERCOCET) 5-325 mg per tablet        No current facility-administered medications on file prior to visit  Objective:  Patient's shoes and socks removed  Foot ExamPhysical Exam   Constitutional: He appears well-developed and well-nourished  Cardiovascular: Normal rate and regular rhythm  Skin:   Right hallux demonstrates paronychia of the fibular nail groove  Incurvated toenail noted              Physical Exam   Left Foot: Transmetatarsal amputation noted     Right Foot: Appearance: Normal except as noted: excessive pronation  Great toe deformities include a bunion     Left Ankle: ROM: limited ROM in all planes    Right Ankle: ROM: limited ROM in all planes    Neurological Exam: performed  Light touch was decreased bilaterally  Response to monofilament test was absent bilaterally  Deep tendon reflexes: achilles reflex absent bilateraly     Vascular Exam: performed Dorsalis pedis pulses were diminished bilaterally   Posterior tibial pulses were diminished bilaterally  Elevation Pallor: present bilaterally  Dependence rubor was present bilaterally  Capillary refill time was Negative digital hair noted  , but-- between 1-3 seconds bilaterally  Edema: mild bilaterally     Toenails: All nails of right foot are mycotic and thickened  Early ingrown toenail fibular groove right hallux noted          Socks and shoes removed, Right Foot Findings: erythematous and dry       The sensory exam showed diminished vibratory sensation at the level of the toes  Diminished tactile sensation with monofilament testing throughout the right foot       Socks and shoes removed, Left Foot Findings: erythematous and dry       The sensory exam showed diminished vibratory sensation at the level of the toes  Diminished tactile sensation with monofilament testing throughout the left foot     Hyperkeratosis: present on the right first toe-- and-- Xerosis of skin consistent with moccasin tinea pedis     Shoe Gear Evaluation: performed ()  Recommendation(s): extra depth diabetic shoes

## 2018-09-20 ENCOUNTER — OFFICE VISIT (OUTPATIENT)
Dept: PODIATRY | Facility: CLINIC | Age: 63
End: 2018-09-20
Payer: COMMERCIAL

## 2018-09-20 VITALS
HEIGHT: 65 IN | DIASTOLIC BLOOD PRESSURE: 91 MMHG | WEIGHT: 192 LBS | SYSTOLIC BLOOD PRESSURE: 165 MMHG | BODY MASS INDEX: 31.99 KG/M2

## 2018-09-20 DIAGNOSIS — M79.672 PAIN IN BOTH FEET: ICD-10-CM

## 2018-09-20 DIAGNOSIS — M79.671 PAIN IN BOTH FEET: ICD-10-CM

## 2018-09-20 DIAGNOSIS — E11.42 DIABETIC POLYNEUROPATHY ASSOCIATED WITH TYPE 2 DIABETES MELLITUS (HCC): ICD-10-CM

## 2018-09-20 DIAGNOSIS — I70.209 PERIPHERAL ARTERIOSCLEROSIS (HCC): Primary | ICD-10-CM

## 2018-09-20 DIAGNOSIS — L03.031 PARONYCHIA OF TOENAIL OF RIGHT FOOT: ICD-10-CM

## 2018-09-20 DIAGNOSIS — M21.969 ACQUIRED DEFORMITY OF FOOT, UNSPECIFIED LATERALITY: ICD-10-CM

## 2018-09-20 PROCEDURE — 99213 OFFICE O/P EST LOW 20 MIN: CPT | Performed by: PODIATRIST

## 2018-09-20 NOTE — PROGRESS NOTES
Assessment/Plan:   Diabetic neuropathy  Status post transmetatarsal amputation left foot  Mycosis of nail  Pain upon ambulation  Callus formation  Tinea pedis  Plan  Diabetic foot exam performed  All mycotic nails debrided without pain or complication  Skin debrided  We will add topical antifungal      There are no diagnoses linked to this encounter  Subjective:  Patient is diabetic  He presents for foot care  He is concerned with dry scaly skin of his feet   Patient ID: Stacey Wilder is a 60 y o  male  No past medical history on file  No past surgical history on file      Allergies   Allergen Reactions    Shellfish-Derived Products GI Intolerance    Sulfamethoxazole-Trimethoprim Rash         Current Outpatient Prescriptions:     atorvastatin (LIPITOR) 40 mg tablet, Take 1 tablet by mouth, Disp: , Rfl:     ciclopirox (LOPROX) 0 77 % cream, Apply topically Twice daily, Disp: , Rfl:     ciclopirox (LOPROX) 0 77 % cream, Apply topically 2 (two) times a day for 20 days, Disp: 45 g, Rfl: 0    clopidogrel (PLAVIX) 75 mg tablet, Take 1 tablet by mouth daily, Disp: , Rfl:     cyanocobalamin (VITAMIN B-12) 100 mcg tablet, Take by mouth, Disp: , Rfl:     glyBURIDE (DIABETA) 5 mg tablet, Take 1 tablet by mouth Twice daily, Disp: , Rfl:     insulin NPH-insulin regular (NOVOLIN 70/30) 100 units/mL subcutaneous injection, Inject under the skin, Disp: , Rfl:     neomycin-colistin-hydrocortisone-thonzonium (CORTISPORIN TC) 0 33-0 3-1-0 05 % otic suspension, Administer 3 drops to the right ear 4 (four) times a day for 10 days Applied as directed with bandage, Disp: 10 mL, Rfl: 0    oxyCODONE-acetaminophen (PERCOCET) 5-325 mg per tablet, , Disp: , Rfl:     Patient Active Problem List   Diagnosis    Acquired deformity of foot    Diabetic polyneuropathy associated with type 2 diabetes mellitus (HCC)    Pain in both feet    Peripheral arteriosclerosis (HCC)    Onychomycosis    Tinea pedis of both feet    Dermatophytosis    Ingrown toenail    Paronychia of toenail of right foot       HPI    The following portions of the patient's history were reviewed and updated as appropriate: allergies, current medications, past family history, past medical history, past social history, past surgical history and problem list     Review of Systems      Historical Information   No past medical history on file  No past surgical history on file  Social History   History   Alcohol use Not on file     History   Drug use: Unknown     Family History: No family history on file  Meds/Allergies   Allergies   Allergen Reactions    Shellfish-Derived Products GI Intolerance    Sulfamethoxazole-Trimethoprim Rash       Current Outpatient Prescriptions on File Prior to Visit   Medication Sig Dispense Refill    atorvastatin (LIPITOR) 40 mg tablet Take 1 tablet by mouth      ciclopirox (LOPROX) 0 77 % cream Apply topically Twice daily      ciclopirox (LOPROX) 0 77 % cream Apply topically 2 (two) times a day for 20 days 45 g 0    clopidogrel (PLAVIX) 75 mg tablet Take 1 tablet by mouth daily      cyanocobalamin (VITAMIN B-12) 100 mcg tablet Take by mouth      glyBURIDE (DIABETA) 5 mg tablet Take 1 tablet by mouth Twice daily      insulin NPH-insulin regular (NOVOLIN 70/30) 100 units/mL subcutaneous injection Inject under the skin      neomycin-colistin-hydrocortisone-thonzonium (CORTISPORIN TC) 0 33-0 3-1-0 05 % otic suspension Administer 3 drops to the right ear 4 (four) times a day for 10 days Applied as directed with bandage 10 mL 0    oxyCODONE-acetaminophen (PERCOCET) 5-325 mg per tablet        No current facility-administered medications on file prior to visit  Objective:            Physical Exam   Left Foot: Transmetatarsal amputation noted     Right Foot: Appearance: Normal except as noted: excessive pronation   Great toe deformities include a bunion     Left Ankle: ROM: limited ROM in all planes    Right Ankle: ROM: limited ROM in all planes    Neurological Exam: performed  Light touch was decreased bilaterally  Response to monofilament test was absent bilaterally  Deep tendon reflexes: achilles reflex absent bilateraly     Vascular Exam: performed Dorsalis pedis pulses were diminished bilaterally  Posterior tibial pulses were diminished bilaterally  Elevation Pallor: present bilaterally  Dependence rubor was present bilaterally  Capillary refill time was Negative digital hair noted  , but-- between 1-3 seconds bilaterally  Edema: mild bilaterally     Toenails: All nails of right foot are mycotic and thickened  Early ingrown toenail fibular groove right hallux noted          Socks and shoes removed, Right Foot Findings: erythematous and dry       The sensory exam showed diminished vibratory sensation at the level of the toes  Diminished tactile sensation with monofilament testing throughout the right foot       Socks and shoes removed, Left Foot Findings: erythematous and dry       The sensory exam showed diminished vibratory sensation at the level of the toes  Diminished tactile sensation with monofilament testing throughout the left foot     Hyperkeratosis: present on the right first toe-- and-- Xerosis of skin consistent with moccasin tinea pedis     Shoe Gear Evaluation: performed ()  Recommendation(s): extra depth diabetic shoes  Right Foot/Ankle   Right Foot Inspection  Skin Exam: dry skin, callus, pre-ulcer and callus                          Toe Exam: swelling and erythema  Sensory   Vibration: diminished  Proprioception: diminished   Monofilament testing: diminished  Vascular    The right DP pulse is 1+  The right PT pulse is 1+       Left Foot/Ankle  Left Foot Inspection  Skin Exam: dry skin, pre-ulcer and callus                         Toe Exam: swelling and erythema                   Sensory   Vibration: diminished  Proprioception: diminished  Monofilament: diminished  Vascular  Capillary refills: elevated  The left DP pulse is 1+  The left PT pulse is 1+  Assign Risk Category:  Deformity present; Loss of protective sensation; Weak pulses       Risk: 2   diabetic    Patient's shoes and socks removed  Foot Exam    Right Foot/Ankle     Inspection and Palpation  Skin Exam: callus and dry skin;     Neurovascular  Dorsalis pedis: 1+  Posterior tibial: 1+      Left Foot/Ankle      Inspection and Palpation  Skin Exam: callus and dry skin;     Neurovascular  Dorsalis pedis: 1+  Posterior tibial: 1+        Physical Exam   Constitutional: He appears well-developed and well-nourished  Cardiovascular: Normal rate and regular rhythm  Pulses are weak pulses  Pulses:       Dorsalis pedis pulses are 1+ on the right side, and 1+ on the left side  Posterior tibial pulses are 1+ on the right side, and 1+ on the left side  Feet:   Right Foot:   Skin Integrity: Positive for callus and dry skin  Left Foot:   Skin Integrity: Positive for callus and dry skin  Skin:   Right foot demonstrates mycotic toenail  All nails thickened and demonstrate debride  Plantar keratosis submetatarsal 3 left foot  Bilateral moccasin tinea pedis with dry scaly skin    Dorsum minor fissures on the plantar aspect of the foot bilateral

## 2018-11-06 ENCOUNTER — OFFICE VISIT (OUTPATIENT)
Dept: PODIATRY | Facility: CLINIC | Age: 63
End: 2018-11-06
Payer: COMMERCIAL

## 2018-11-06 VITALS
HEART RATE: 74 BPM | WEIGHT: 192 LBS | DIASTOLIC BLOOD PRESSURE: 88 MMHG | RESPIRATION RATE: 17 BRPM | SYSTOLIC BLOOD PRESSURE: 138 MMHG | BODY MASS INDEX: 31.99 KG/M2 | HEIGHT: 65 IN

## 2018-11-06 DIAGNOSIS — B35.1 ONYCHOMYCOSIS: ICD-10-CM

## 2018-11-06 DIAGNOSIS — E11.42 DIABETIC POLYNEUROPATHY ASSOCIATED WITH TYPE 2 DIABETES MELLITUS (HCC): ICD-10-CM

## 2018-11-06 DIAGNOSIS — B35.3 TINEA PEDIS OF BOTH FEET: ICD-10-CM

## 2018-11-06 DIAGNOSIS — M79.671 PAIN IN BOTH FEET: ICD-10-CM

## 2018-11-06 DIAGNOSIS — I70.209 PERIPHERAL ARTERIOSCLEROSIS (HCC): ICD-10-CM

## 2018-11-06 DIAGNOSIS — M21.969 ACQUIRED DEFORMITY OF FOOT, UNSPECIFIED LATERALITY: Primary | ICD-10-CM

## 2018-11-06 DIAGNOSIS — M79.672 PAIN IN BOTH FEET: ICD-10-CM

## 2018-11-06 PROCEDURE — 99213 OFFICE O/P EST LOW 20 MIN: CPT | Performed by: PODIATRIST

## 2018-11-06 NOTE — PROGRESS NOTES
Procedures   Foot Exam       Assessment/Plan:   Diabetic neuropathy  Status post transmetatarsal amputation left foot  Mycosis of nail  Pain upon ambulation  Callus formation  Tinea pedis      Plan  Diabetic foot exam performed  All mycotic nails debrided without pain or complication  Skin debrided  We will add topical antifungal       There are no diagnoses linked to this encounter        Subjective:  Patient is diabetic  He presents for foot care    He is concerned with dry scaly skin of his feet   Patient ID: Malissa Garcia is a 60 y o  male      No past medical history on file      No past surgical history on file           Allergies   Allergen Reactions    Shellfish-Derived Products GI Intolerance    Sulfamethoxazole-Trimethoprim Rash            Current Outpatient Prescriptions:     atorvastatin (LIPITOR) 40 mg tablet, Take 1 tablet by mouth, Disp: , Rfl:     ciclopirox (LOPROX) 0 77 % cream, Apply topically Twice daily, Disp: , Rfl:     ciclopirox (LOPROX) 0 77 % cream, Apply topically 2 (two) times a day for 20 days, Disp: 45 g, Rfl: 0    clopidogrel (PLAVIX) 75 mg tablet, Take 1 tablet by mouth daily, Disp: , Rfl:     cyanocobalamin (VITAMIN B-12) 100 mcg tablet, Take by mouth, Disp: , Rfl:     glyBURIDE (DIABETA) 5 mg tablet, Take 1 tablet by mouth Twice daily, Disp: , Rfl:     insulin NPH-insulin regular (NOVOLIN 70/30) 100 units/mL subcutaneous injection, Inject under the skin, Disp: , Rfl:     neomycin-colistin-hydrocortisone-thonzonium (CORTISPORIN TC) 0 33-0 3-1-0 05 % otic suspension, Administer 3 drops to the right ear 4 (four) times a day for 10 days Applied as directed with bandage, Disp: 10 mL, Rfl: 0    oxyCODONE-acetaminophen (PERCOCET) 5-325 mg per tablet, , Disp: , Rfl:          Patient Active Problem List   Diagnosis    Acquired deformity of foot    Diabetic polyneuropathy associated with type 2 diabetes mellitus (HCC)    Pain in both feet    Peripheral arteriosclerosis ([address] )    Onychomycosis    Tinea pedis of both feet    Dermatophytosis    Ingrown toenail    Paronychia of toenail of right foot         HPI     The following portions of the patient's history were reviewed and updated as appropriate: allergies, current medications, past family history, past medical history, past social history, past surgical history and problem list      Review of Systems       Historical Information   No past medical history on file  No past surgical history on file  Social History       History   Alcohol use Not on file      History   Drug use: Unknown      Family History: No family history on file      Meds/Allergies        Allergies   Allergen Reactions    Shellfish-Derived Products GI Intolerance    Sulfamethoxazole-Trimethoprim Rash                Current Outpatient Prescriptions on File Prior to Visit   Medication Sig Dispense Refill    atorvastatin (LIPITOR) 40 mg tablet Take 1 tablet by mouth        ciclopirox (LOPROX) 0 77 % cream Apply topically Twice daily        ciclopirox (LOPROX) 0 77 % cream Apply topically 2 (two) times a day for 20 days 45 g 0    clopidogrel (PLAVIX) 75 mg tablet Take 1 tablet by mouth daily        cyanocobalamin (VITAMIN B-12) 100 mcg tablet Take by mouth        glyBURIDE (DIABETA) 5 mg tablet Take 1 tablet by mouth Twice daily        insulin NPH-insulin regular (NOVOLIN 70/30) 100 units/mL subcutaneous injection Inject under the skin        neomycin-colistin-hydrocortisone-thonzonium (CORTISPORIN TC) 0 33-0 3-1-0 05 % otic suspension Administer 3 drops to the right ear 4 (four) times a day for 10 days Applied as directed with bandage 10 mL 0    oxyCODONE-acetaminophen (PERCOCET) 5-325 mg per tablet            No current facility-administered medications on file prior to visit                 Objective:              Physical Exam   Left Foot: Transmetatarsal amputation noted     Right Foot: Appearance: Normal except as noted: excessive pronation  Great toe deformities include a bunion     Left Ankle: ROM: limited ROM in all planes    Right Ankle: ROM: limited ROM in all planes    Neurological Exam: performed  Light touch was decreased bilaterally  Response to monofilament test was absent bilaterally  Deep tendon reflexes: achilles reflex absent bilateraly     Vascular Exam: performed Dorsalis pedis pulses were diminished bilaterally  Posterior tibial pulses were diminished bilaterally  Elevation Pallor: present bilaterally  Dependence rubor was present bilaterally  Capillary refill time was Negative digital hair noted  , but-- between 1-3 seconds bilaterally  Edema: mild bilaterally     Toenails: All nails of right foot are mycotic and thickened  Early ingrown toenail fibular groove right hallux noted          Socks and shoes removed, Right Foot Findings: erythematous and dry       The sensory exam showed diminished vibratory sensation at the level of the toes  Diminished tactile sensation with monofilament testing throughout the right foot       Socks and shoes removed, Left Foot Findings: erythematous and dry       The sensory exam showed diminished vibratory sensation at the level of the toes  Diminished tactile sensation with monofilament testing throughout the left foot     Hyperkeratosis: present on the right first toe-- and-- Xerosis of skin consistent with moccasin tinea pedis     Shoe Gear Evaluation: performed ()  Recommendation(s): extra depth diabetic shoes  Right Foot/Ankle   Right Foot Inspection  Skin Exam: dry skin, callus, pre-ulcer and callus                           Toe Exam: swelling and erythema  Sensory   Vibration: diminished  Proprioception: diminished   Monofilament testing: diminished  Vascular     The right DP pulse is 1+   The right PT pulse is 1+       Left Foot/Ankle  Left Foot Inspection  Skin Exam: dry skin, pre-ulcer and callus                          Toe Exam: swelling and erythema                   Sensory Vibration: diminished  Proprioception: diminished  Monofilament: diminished  Vascular  Capillary refills: elevated  The left DP pulse is 1+  The left PT pulse is 1+  Assign Risk Category:  Deformity present; Loss of protective sensation; Weak pulses       Risk: 2   diabetic     Patient's shoes and socks removed  Foot Exam     Right Foot/Ankle      Inspection and Palpation  Skin Exam: callus and dry skin;      Neurovascular  Dorsalis pedis: 1+  Posterior tibial: 1+        Left Foot/Ankle       Inspection and Palpation  Skin Exam: callus and dry skin;      Neurovascular  Dorsalis pedis: 1+  Posterior tibial: 1+        Physical Exam   Constitutional: He appears well-developed and well-nourished  Cardiovascular: Normal rate and regular rhythm  Pulses are weak pulses  Pulses:       Dorsalis pedis pulses are 1+ on the right side, and 1+ on the left side  Posterior tibial pulses are 1+ on the right side, and 1+ on the left side  Feet:   Right Foot:   Skin Integrity: Positive for callus and dry skin  Left Foot:   Skin Integrity: Positive for callus and dry skin  Skin:   Right foot demonstrates mycotic toenail  All nails thickened and demonstrate debride  Plantar keratosis submetatarsal 3 left foot  Bilateral moccasin tinea pedis with dry scaly skin    Dorsum minor fissures on the plantar aspect of the foot bilateral

## 2018-12-17 ENCOUNTER — OFFICE VISIT (OUTPATIENT)
Dept: PODIATRY | Facility: CLINIC | Age: 63
End: 2018-12-17
Payer: COMMERCIAL

## 2018-12-17 VITALS
WEIGHT: 192 LBS | HEIGHT: 65 IN | SYSTOLIC BLOOD PRESSURE: 142 MMHG | BODY MASS INDEX: 31.99 KG/M2 | DIASTOLIC BLOOD PRESSURE: 87 MMHG | RESPIRATION RATE: 16 BRPM

## 2018-12-17 DIAGNOSIS — L03.031 PARONYCHIA OF TOENAIL OF RIGHT FOOT: Primary | ICD-10-CM

## 2018-12-17 DIAGNOSIS — L60.0 INGROWN TOENAIL: ICD-10-CM

## 2018-12-17 DIAGNOSIS — B35.3 TINEA PEDIS OF BOTH FEET: ICD-10-CM

## 2018-12-17 DIAGNOSIS — I70.209 PERIPHERAL ARTERIOSCLEROSIS (HCC): ICD-10-CM

## 2018-12-17 DIAGNOSIS — E11.42 DIABETIC POLYNEUROPATHY ASSOCIATED WITH TYPE 2 DIABETES MELLITUS (HCC): ICD-10-CM

## 2018-12-17 PROCEDURE — 99213 OFFICE O/P EST LOW 20 MIN: CPT | Performed by: PODIATRIST

## 2018-12-17 NOTE — PROGRESS NOTES
Assessment/Plan:  Paronychia  Pain  Diabetic neuropathy  Plan  Right hallux debrided  Nail groove packed  Patient will start item wet to dry  No problem-specific Assessment & Plan notes found for this encounter  There are no diagnoses linked to this encounter  Subjective:  Urgent visit  Patient has pain in his right big toe  It has been this way for several days  No history of trauma  Patient is diabetic    No past medical history on file  No past surgical history on file      Allergies   Allergen Reactions    Shellfish-Derived Products GI Intolerance    Sulfamethoxazole-Trimethoprim Rash         Current Outpatient Prescriptions:     atorvastatin (LIPITOR) 40 mg tablet, Take 1 tablet by mouth, Disp: , Rfl:     ciclopirox (LOPROX) 0 77 % cream, Apply topically Twice daily, Disp: , Rfl:     ciclopirox (LOPROX) 0 77 % cream, Apply topically 2 (two) times a day for 20 days, Disp: 45 g, Rfl: 0    ciclopirox (LOPROX) 0 77 % cream, Apply topically 2 (two) times a day for 20 days, Disp: 45 g, Rfl: 0    clopidogrel (PLAVIX) 75 mg tablet, Take 1 tablet by mouth daily, Disp: , Rfl:     cyanocobalamin (VITAMIN B-12) 100 mcg tablet, Take by mouth, Disp: , Rfl:     glyBURIDE (DIABETA) 5 mg tablet, Take 1 tablet by mouth Twice daily, Disp: , Rfl:     insulin NPH-insulin regular (NOVOLIN 70/30) 100 units/mL subcutaneous injection, Inject under the skin, Disp: , Rfl:     neomycin-colistin-hydrocortisone-thonzonium (CORTISPORIN TC) 0 33-0 3-1-0 05 % otic suspension, Administer 3 drops to the right ear 4 (four) times a day for 10 days Applied as directed with bandage, Disp: 10 mL, Rfl: 0    oxyCODONE-acetaminophen (PERCOCET) 5-325 mg per tablet, , Disp: , Rfl:     Patient Active Problem List   Diagnosis    Acquired deformity of foot    Diabetic polyneuropathy associated with type 2 diabetes mellitus (HCC)    Pain in both feet    Peripheral arteriosclerosis (HCC)    Onychomycosis    Tinea pedis of both feet    Dermatophytosis    Ingrown toenail    Paronychia of toenail of right foot          Patient ID: Robert Walker is a 58 y o  male  HPI    The following portions of the patient's history were reviewed and updated as appropriate: allergies, current medications, past family history, past medical history, past social history, past surgical history and problem list     Review of Systems      Objective:  Patient's shoes and socks removed  Foot ExamPhysical Exam   Skin:   Patient has paronychia of the right hallux  Fibular nail groove demonstrates onychogryphosis  Mild moccasin tinea pedis noted in the area as well  Negative occult ingrown toenail  No evidence of pus               Physical Exam   Left Foot: Transmetatarsal amputation noted     Right Foot: Appearance: Normal except as noted: excessive pronation  Great toe deformities include a bunion     Left Ankle: ROM: limited ROM in all planes    Right Ankle: ROM: limited ROM in all planes    Neurological Exam: performed  Light touch was decreased bilaterally  Response to monofilament test was absent bilaterally  Deep tendon reflexes: achilles reflex absent bilateraly     Vascular Exam: performed Dorsalis pedis pulses were diminished bilaterally  Posterior tibial pulses were diminished bilaterally  Elevation Pallor: present bilaterally  Dependence rubor was present bilaterally  Capillary refill time was Negative digital hair noted  , but-- between 1-3 seconds bilaterally  Edema: mild bilaterally     Toenails: All nails of right foot are mycotic and thickened  Early ingrown toenail fibular groove right hallux noted          Socks and shoes removed, Right Foot Findings: erythematous and dry       The sensory exam showed diminished vibratory sensation at the level of the toes   Diminished tactile sensation with monofilament testing throughout the right foot       Socks and shoes removed, Left Foot Findings: erythematous and dry       The sensory exam showed diminished vibratory sensation at the level of the toes  Diminished tactile sensation with monofilament testing throughout the left foot     Hyperkeratosis: present on the right first toe-- and-- Xerosis of skin consistent with moccasin tinea pedis     Shoe Gear Evaluation: performed ()  Recommendation(s): extra depth diabetic shoes  Right Foot/Ankle   Right Foot Inspection  Skin Exam: dry skin, callus, pre-ulcer and callus               Toe Exam: swelling and erythema  Sensory   Vibration: diminished  Proprioception: diminished   Monofilament testing: diminished  Vascular     The right DP pulse is 1+  The right PT pulse is 1+       Left Foot/Ankle  Left Foot Inspection  Skin Exam: dry skin, pre-ulcer and callus              Toe Exam: swelling and erythema                   Sensory   Vibration: diminished  Proprioception: diminished  Monofilament: diminished  Vascular  Capillary refills: elevated  The left DP pulse is 1+  The left PT pulse is 1+  Assign Risk Category:  Deformity present; Loss of protective sensation; Weak pulses       Risk: 2   diabetic     Patient's shoes and socks removed    Foot Exam     Right Foot/Ankle      Inspection and Palpation  Skin Exam: callus and dry skin;      Neurovascular  Dorsalis pedis: 1+  Posterior tibial: 1+        Left Foot/Ankle       Inspection and Palpation  Skin Exam: callus and dry skin;      Neurovascular  Dorsalis pedis: 1+  Posterior tibial: 1+        Physical Exam   Constitutional: He appears well-developed and well-nourished  Cardiovascular: Normal rate and regular rhythm   Pulses are weak pulses  Pulses:       Dorsalis pedis pulses are 1+ on the right side, and 1+ on the left side         Posterior tibial pulses are 1+ on the right side, and 1+ on the left side  Feet:   Right Foot:   Skin Integrity: Positive for callus and dry skin  Left Foot:   Skin Integrity: Positive for callus and dry skin     Skin:   Right foot demonstrates mycotic toenail   All nails thickened and demonstrate debride  Plantar keratosis submetatarsal 3 left foot      Bilateral moccasin tinea pedis with dry scaly skin   Dorsum minor fissures on the plantar aspect of the foot bilateral

## 2019-01-03 ENCOUNTER — OFFICE VISIT (OUTPATIENT)
Dept: PODIATRY | Facility: CLINIC | Age: 64
End: 2019-01-03
Payer: COMMERCIAL

## 2019-01-03 VITALS
DIASTOLIC BLOOD PRESSURE: 81 MMHG | HEART RATE: 79 BPM | WEIGHT: 192 LBS | HEIGHT: 65 IN | BODY MASS INDEX: 31.99 KG/M2 | RESPIRATION RATE: 17 BRPM | SYSTOLIC BLOOD PRESSURE: 139 MMHG

## 2019-01-03 DIAGNOSIS — L03.031 PARONYCHIA OF TOENAIL OF RIGHT FOOT: ICD-10-CM

## 2019-01-03 DIAGNOSIS — E11.42 DIABETIC POLYNEUROPATHY ASSOCIATED WITH TYPE 2 DIABETES MELLITUS (HCC): ICD-10-CM

## 2019-01-03 DIAGNOSIS — L60.0 INGROWN TOENAIL: Primary | ICD-10-CM

## 2019-01-03 PROCEDURE — 11730 AVULSION NAIL PLATE SIMPLE 1: CPT | Performed by: PODIATRIST

## 2019-01-03 RX ORDER — CLINDAMYCIN HYDROCHLORIDE 300 MG/1
300 CAPSULE ORAL 4 TIMES DAILY
Qty: 40 CAPSULE | Refills: 0 | Status: SHIPPED | OUTPATIENT
Start: 2019-01-03 | End: 2019-01-13

## 2019-01-03 NOTE — PROGRESS NOTES
Nail removal  Date/Time: 1/3/2019 6:18 PM  Performed by: Freddy Martin by: Wanda Beauchamp     Patient location:  Clinic  Other Assisting Provider: No    Consent:     Consent obtained:  Verbal    Consent given by:  Patient    Risks discussed:  Bleeding, incomplete removal, infection, pain and permanent nail deformity    Alternatives discussed:  No treatment, delayed treatment and alternative treatment  Universal protocol:     Procedure explained and questions answered to patient or proxy's satisfaction: yes      Site/side marked: yes      Immediately prior to procedure a time out was called: yes      Patient identity confirmed:  Verbally with patient  Location:     Foot:  L big toe  Pre-procedure details:     Skin preparation:  Betadine  Anesthesia (see MAR for exact dosages): Anesthesia method: Digital nerve block consisting of 6 cc of 2% lidocaine plain  Nail Removal:     Nail removed:  1/5    Nail bed sutured: no      Removed nail replaced and anchored: no    Trephination:     Subungual hematoma drained: no    Ingrown nail:     Wedge excision of skin: no      Nail matrix removed or ablated:  None  Post-procedure details:     Dressing:  4x4 sterile gauze, antibiotic ointment and gauze roll    Patient tolerance of procedure: Tolerated well, no immediate complications       Foot Exam       Patient has chronic in continued pain in the right big toe  It hurts when he applies sheet and shoe pressure  No history of trauma  0  Right hallux demonstrates wide incurvated toenail  There is irritation of the fibular nail groove  No evidence of occult pus or cellulitis  Nail plate appears to be impinging upon the skin  Plan  Partial nail avulsion  We will add oral antibiotic  Patient has been given written instructions

## 2019-01-24 ENCOUNTER — OFFICE VISIT (OUTPATIENT)
Dept: PODIATRY | Facility: CLINIC | Age: 64
End: 2019-01-24
Payer: COMMERCIAL

## 2019-01-24 VITALS
HEART RATE: 87 BPM | SYSTOLIC BLOOD PRESSURE: 145 MMHG | HEIGHT: 65 IN | RESPIRATION RATE: 16 BRPM | DIASTOLIC BLOOD PRESSURE: 64 MMHG | WEIGHT: 192 LBS | BODY MASS INDEX: 31.99 KG/M2

## 2019-01-24 DIAGNOSIS — S91.209A WOUND OF TOENAIL, INITIAL ENCOUNTER: Primary | ICD-10-CM

## 2019-01-24 DIAGNOSIS — E11.42 DIABETIC POLYNEUROPATHY ASSOCIATED WITH TYPE 2 DIABETES MELLITUS (HCC): ICD-10-CM

## 2019-01-24 DIAGNOSIS — M21.969 ACQUIRED DEFORMITY OF FOOT, UNSPECIFIED LATERALITY: ICD-10-CM

## 2019-01-24 DIAGNOSIS — I70.209 PERIPHERAL ARTERIOSCLEROSIS (HCC): ICD-10-CM

## 2019-01-24 DIAGNOSIS — B35.9 DERMATOPHYTOSIS: ICD-10-CM

## 2019-01-24 PROCEDURE — 99213 OFFICE O/P EST LOW 20 MIN: CPT | Performed by: PODIATRIST

## 2019-01-24 NOTE — PROGRESS NOTES
Assessment/Plan:  Paronychia  Pain right big toe  Peripheral artery disease  Diabetic neuropathy  Plan  Wound debrided  Gentian violet applied  Patient will bandage daily  He will finish antibiotic  No problem-specific Assessment & Plan notes found for this encounter  There are no diagnoses linked to this encounter  Subjective:   Patient is doing better status post procedure  Patient has not bandaging wound as directed  Patient is taking antibiotic  No past medical history on file  No past surgical history on file      Allergies   Allergen Reactions    Shellfish-Derived Products GI Intolerance    Sulfamethoxazole-Trimethoprim Rash         Current Outpatient Prescriptions:     atorvastatin (LIPITOR) 40 mg tablet, Take 1 tablet by mouth, Disp: , Rfl:     ciclopirox (LOPROX) 0 77 % cream, Apply topically Twice daily, Disp: , Rfl:     ciclopirox (LOPROX) 0 77 % cream, Apply topically 2 (two) times a day for 20 days, Disp: 45 g, Rfl: 0    ciclopirox (LOPROX) 0 77 % cream, Apply topically 2 (two) times a day for 20 days, Disp: 45 g, Rfl: 0    clopidogrel (PLAVIX) 75 mg tablet, Take 1 tablet by mouth daily, Disp: , Rfl:     cyanocobalamin (VITAMIN B-12) 100 mcg tablet, Take by mouth, Disp: , Rfl:     glyBURIDE (DIABETA) 5 mg tablet, Take 1 tablet by mouth Twice daily, Disp: , Rfl:     insulin NPH-insulin regular (NOVOLIN 70/30) 100 units/mL subcutaneous injection, Inject under the skin, Disp: , Rfl:     neomycin-colistin-hydrocortisone-thonzonium (CORTISPORIN TC) 0 33-0 3-1-0 05 % otic suspension, Administer 3 drops to the right ear 4 (four) times a day for 10 days Applied as directed with bandage, Disp: 10 mL, Rfl: 0    oxyCODONE-acetaminophen (PERCOCET) 5-325 mg per tablet, , Disp: , Rfl:     Patient Active Problem List   Diagnosis    Acquired deformity of foot    Diabetic polyneuropathy associated with type 2 diabetes mellitus (HCC)    Pain in both feet    Peripheral arteriosclerosis ([address] )    Onychomycosis    Tinea pedis of both feet    Dermatophytosis    Ingrown toenail    Paronychia of toenail of right foot          Patient ID: Annalise Hurt is a 58 y o  male  HPI    The following portions of the patient's history were reviewed and updated as appropriate: allergies, current medications, past family history, past medical history, past social history, past surgical history and problem list     Review of Systems      Objective:  Patient's shoes and socks removed  Right Foot/Ankle   Right Foot Inspection  Skin Exam: dry skin, callus and callus                            Sensory   Vibration: diminished  Proprioception: diminished     Vascular  Capillary refills: elevated  The right DP pulse is 1+  The right PT pulse is 2+  Right Toe  - Comprehensive Exam  Ecchymosis: none  Arch: pes planus  Hammertoes: fifth toe  Claw Toes: absent  Hallux valgus: no  Hallux limitus: yes  Swelling: dorsum and metatarsals         Left Foot/Ankle  Left Foot Inspection  Skin Exam: dry skin and callus                                         Sensory   Vibration: diminished  Proprioception: diminished    Vascular    The left DP pulse is 1+  The left PT pulse is 2+  Left Toe  - Comprehensive Exam  Ecchymosis: none  Swelling: dorsum and metatarsals       Assign Risk Category:  Deformity present; Loss of protective sensation; Weak pulses       Risk: 2    Patient's shoes and socks removed     Foot Exam    General  General Appearance: appears stated age and healthy   Orientation: alert and oriented to person, place, and time   Affect: appropriate       Right Foot/Ankle     Inspection and Palpation  Ecchymosis: none  Swelling: dorsum and metatarsals   Arch: pes planus  Hammertoes: fifth toe  Claw Toes: absent  Hallux valgus: no  Hallux limitus: yes  Skin Exam: callus and dry skin;     Neurovascular  Dorsalis pedis: 1+  Posterior tibial: 2+  Superficial peroneal nerve sensation: diminished  Deep peroneal nerve sensation: diminished  Sural nerve sensation: diminished  Achilles reflex: 1+      Left Foot/Ankle      Inspection and Palpation  Ecchymosis: none  Swelling: dorsum and metatarsals   Skin Exam: callus and dry skin;     Neurovascular  Dorsalis pedis: 1+  Posterior tibial: 2+  Superficial peroneal nerve sensation: diminished  Deep peroneal nerve sensation: diminished  Sural nerve sensation: diminished  Achilles reflex: 1+        Physical Exam   Constitutional: He appears well-developed and well-nourished  Cardiovascular: Normal rate and regular rhythm  Pulses are weak pulses  Pulses:       Dorsalis pedis pulses are 1+ on the right side, and 1+ on the left side  Posterior tibial pulses are 2+ on the right side, and 2+ on the left side  Feet:   Right Foot:   Skin Integrity: Positive for callus and dry skin  Left Foot:   Skin Integrity: Positive for callus and dry skin  Neurological:   Reflex Scores:       Achilles reflexes are 1+ on the right side and 1+ on the left side  Skin:     Right hallux nail plate is not bandage  There is foreign debris in fibular nail groove  This was debrided  Mild paronychia noted  No evidence of sinus or ascending  Cellulitis  Vitals reviewed

## 2019-03-13 ENCOUNTER — OFFICE VISIT (OUTPATIENT)
Dept: PODIATRY | Facility: CLINIC | Age: 64
End: 2019-03-13
Payer: COMMERCIAL

## 2019-03-13 VITALS
WEIGHT: 192 LBS | HEART RATE: 83 BPM | SYSTOLIC BLOOD PRESSURE: 143 MMHG | HEIGHT: 65 IN | RESPIRATION RATE: 17 BRPM | BODY MASS INDEX: 31.99 KG/M2 | DIASTOLIC BLOOD PRESSURE: 79 MMHG

## 2019-03-13 DIAGNOSIS — E11.42 DIABETIC POLYNEUROPATHY ASSOCIATED WITH TYPE 2 DIABETES MELLITUS (HCC): ICD-10-CM

## 2019-03-13 DIAGNOSIS — I70.209 PERIPHERAL ARTERIOSCLEROSIS (HCC): ICD-10-CM

## 2019-03-13 DIAGNOSIS — L97.511 DIABETIC ULCER OF TOE OF RIGHT FOOT ASSOCIATED WITH TYPE 2 DIABETES MELLITUS, LIMITED TO BREAKDOWN OF SKIN (HCC): Primary | ICD-10-CM

## 2019-03-13 DIAGNOSIS — M89.8X9 BONY EXOSTOSIS: ICD-10-CM

## 2019-03-13 DIAGNOSIS — E11.621 DIABETIC ULCER OF TOE OF RIGHT FOOT ASSOCIATED WITH TYPE 2 DIABETES MELLITUS, LIMITED TO BREAKDOWN OF SKIN (HCC): Primary | ICD-10-CM

## 2019-03-13 PROCEDURE — 99214 OFFICE O/P EST MOD 30 MIN: CPT | Performed by: PODIATRIST

## 2019-03-13 PROCEDURE — 73620 X-RAY EXAM OF FOOT: CPT | Performed by: PODIATRIST

## 2019-03-13 RX ORDER — CLINDAMYCIN HYDROCHLORIDE 300 MG/1
300 CAPSULE ORAL 4 TIMES DAILY
Qty: 40 CAPSULE | Refills: 0 | Status: SHIPPED | OUTPATIENT
Start: 2019-03-13 | End: 2019-03-23

## 2019-03-13 NOTE — PROGRESS NOTES
Assessment/Plan:  Wound right hallux  Early cellulitis right hallux  Diabetic neuropathy  Peripheral artery disease  Plan  X-rays taken  Foot exam performed  Culture sensitivity of wound done  Patient be started on antibiotic  We will add topical antibiotic  He will bandage daily  He will return for follow-up  No problem-specific Assessment & Plan notes found for this encounter  There are no diagnoses linked to this encounter  Subjective:  Urgent visit  Patient has sore spot on the inside of his right big toe  It has been there for approximately a week  No history of trauma  No past medical history on file  No past surgical history on file      Allergies   Allergen Reactions    Shellfish-Derived Products GI Intolerance    Sulfamethoxazole-Trimethoprim Rash         Current Outpatient Medications:     atorvastatin (LIPITOR) 40 mg tablet, Take 1 tablet by mouth, Disp: , Rfl:     ciclopirox (LOPROX) 0 77 % cream, Apply topically Twice daily, Disp: , Rfl:     ciclopirox (LOPROX) 0 77 % cream, Apply topically 2 (two) times a day for 20 days, Disp: 45 g, Rfl: 0    ciclopirox (LOPROX) 0 77 % cream, Apply topically 2 (two) times a day for 20 days, Disp: 45 g, Rfl: 0    clopidogrel (PLAVIX) 75 mg tablet, Take 1 tablet by mouth daily, Disp: , Rfl:     cyanocobalamin (VITAMIN B-12) 100 mcg tablet, Take by mouth, Disp: , Rfl:     glyBURIDE (DIABETA) 5 mg tablet, Take 1 tablet by mouth Twice daily, Disp: , Rfl:     insulin NPH-insulin regular (NOVOLIN 70/30) 100 units/mL subcutaneous injection, Inject under the skin, Disp: , Rfl:     neomycin-colistin-hydrocortisone-thonzonium (CORTISPORIN TC) 0 33-0 3-1-0 05 % otic suspension, Administer 3 drops to the right ear 4 (four) times a day for 10 days Applied as directed with bandage, Disp: 10 mL, Rfl: 0    oxyCODONE-acetaminophen (PERCOCET) 5-325 mg per tablet, , Disp: , Rfl:     Patient Active Problem List   Diagnosis    Acquired deformity of foot    Diabetic polyneuropathy associated with type 2 diabetes mellitus (HCC)    Pain in both feet    Peripheral arteriosclerosis (HCC)    Onychomycosis    Tinea pedis of both feet    Dermatophytosis    Ingrown toenail    Paronychia of toenail of right foot          Patient ID: Jessica Crocker is a 58 y o  male  HPI    The following portions of the patient's history were reviewed and updated as appropriate: He  has no past medical history on file  He   Patient Active Problem List    Diagnosis Date Noted    Ingrown toenail 07/26/2018    Paronychia of toenail of right foot 07/26/2018    Dermatophytosis 07/11/2018    Onychomycosis 03/15/2018    Tinea pedis of both feet 03/15/2018    Acquired deformity of foot 02/15/2018    Diabetic polyneuropathy associated with type 2 diabetes mellitus ([facility] [address] ) 02/15/2018    Pain in both feet 02/15/2018    Peripheral arteriosclerosis ([facility] ) 02/15/2018     He  has no past surgical history on file  His family history is not on file  He  reports that he has never smoked  He has never used smokeless tobacco  His alcohol and drug histories are not on file    Current Outpatient Medications   Medication Sig Dispense Refill    atorvastatin (LIPITOR) 40 mg tablet Take 1 tablet by mouth      ciclopirox (LOPROX) 0 77 % cream Apply topically Twice daily      ciclopirox (LOPROX) 0 77 % cream Apply topically 2 (two) times a day for 20 days 45 g 0    ciclopirox (LOPROX) 0 77 % cream Apply topically 2 (two) times a day for 20 days 45 g 0    clopidogrel (PLAVIX) 75 mg tablet Take 1 tablet by mouth daily      cyanocobalamin (VITAMIN B-12) 100 mcg tablet Take by mouth      glyBURIDE (DIABETA) 5 mg tablet Take 1 tablet by mouth Twice daily      insulin NPH-insulin regular (NOVOLIN 70/30) 100 units/mL subcutaneous injection Inject under the skin      neomycin-colistin-hydrocortisone-thonzonium (CORTISPORIN TC) 0 33-0 3-1-0 05 % otic suspension Administer 3 drops to the right ear 4 (four) times a day for 10 days Applied as directed with bandage 10 mL 0    oxyCODONE-acetaminophen (PERCOCET) 5-325 mg per tablet        No current facility-administered medications for this visit  Current Outpatient Medications on File Prior to Visit   Medication Sig    atorvastatin (LIPITOR) 40 mg tablet Take 1 tablet by mouth    ciclopirox (LOPROX) 0 77 % cream Apply topically Twice daily    ciclopirox (LOPROX) 0 77 % cream Apply topically 2 (two) times a day for 20 days    ciclopirox (LOPROX) 0 77 % cream Apply topically 2 (two) times a day for 20 days    clopidogrel (PLAVIX) 75 mg tablet Take 1 tablet by mouth daily    cyanocobalamin (VITAMIN B-12) 100 mcg tablet Take by mouth    glyBURIDE (DIABETA) 5 mg tablet Take 1 tablet by mouth Twice daily    insulin NPH-insulin regular (NOVOLIN 70/30) 100 units/mL subcutaneous injection Inject under the skin    neomycin-colistin-hydrocortisone-thonzonium (CORTISPORIN TC) 0 33-0 3-1-0 05 % otic suspension Administer 3 drops to the right ear 4 (four) times a day for 10 days Applied as directed with bandage    oxyCODONE-acetaminophen (PERCOCET) 5-325 mg per tablet      No current facility-administered medications on file prior to visit  He is allergic to shellfish-derived products and sulfamethoxazole-trimethoprim       Vitals:    03/13/19 1659   BP: 143/79   Pulse: 83   Resp: 17       Review of Systems      Objective:  Patient's shoes and socks removed  Foot ExamPhysical Exam   Cardiovascular: Pulses are weak pulses     Feet:   Left Foot: amputated         Foot Exam     General  General Appearance: appears stated age and healthy   Orientation: alert and oriented to person, place, and time   Affect: appropriate         Right Foot/Ankle      Inspection and Palpation  Ecchymosis: none  Swelling: dorsum and metatarsals   Arch: pes planus  Hammertoes: fifth toe  Claw Toes: absent  Hallux valgus: no  Hallux limitus: yes  Skin Exam: callus and dry skin;      Neurovascular  Dorsalis pedis: 1+  Posterior tibial: 2+  Superficial peroneal nerve sensation: diminished  Deep peroneal nerve sensation: diminished  Sural nerve sensation: diminished  Achilles reflex: 1+        Left Foot/Ankle       Inspection and Palpation  Ecchymosis: none  Swelling: dorsum and metatarsals   Skin Exam: callus and dry skin;      Neurovascular  Dorsalis pedis: 1+  Posterior tibial: 2+  Superficial peroneal nerve sensation: diminished  Deep peroneal nerve sensation: diminished  Sural nerve sensation: diminished  Achilles reflex: 1+           Physical Exam   Constitutional: He appears well-developed and well-nourished  Cardiovascular: Normal rate and regular rhythm  Pulses are weak pulses  Pulses:       Dorsalis pedis pulses are 1+ on the right side, and 1+ on the left side  Posterior tibial pulses are 2+ on the right side, and 2+ on the left side  Feet:   Right Foot:   Skin Integrity: Positive for callus and dry skin  Left Foot:   Skin Integrity: Positive for callus and dry skin  Neurological:   Reflex Scores:       Achilles reflexes are 1+ on the right side and 1+ on the left side  Skin:  medial aspect right hallux IPJ demonstrates superficial ulcer  Is approximately 0 5 centimeter squared  Mild surrounding erythema  Negative occult pus  X-ray  No evidence of osteomyelitis of right hallux however there is a very large medial tubercle of the distal phalanx  This would be consistent with the area of the wound  Patient's shoes and socks removed  Right Foot/Ankle   Right Foot Inspection      Sensory   Vibration: absent  Proprioception: absent   Monofilament testing: diminished  Vascular  Capillary refills: elevated      Left Foot/Ankle  Left Foot Inspection                         Amputation: amputation left foot                       Assign Risk Category:  Deformity present;  Loss of protective sensation; Weak pulses       Risk: 2

## 2019-03-20 ENCOUNTER — OFFICE VISIT (OUTPATIENT)
Dept: PODIATRY | Facility: CLINIC | Age: 64
End: 2019-03-20
Payer: COMMERCIAL

## 2019-03-20 VITALS
WEIGHT: 192 LBS | BODY MASS INDEX: 31.99 KG/M2 | HEIGHT: 65 IN | DIASTOLIC BLOOD PRESSURE: 79 MMHG | SYSTOLIC BLOOD PRESSURE: 143 MMHG | HEART RATE: 83 BPM | RESPIRATION RATE: 17 BRPM

## 2019-03-20 DIAGNOSIS — L97.511 DIABETIC ULCER OF TOE OF RIGHT FOOT ASSOCIATED WITH TYPE 2 DIABETES MELLITUS, LIMITED TO BREAKDOWN OF SKIN (HCC): Primary | ICD-10-CM

## 2019-03-20 DIAGNOSIS — M89.8X9 BONY EXOSTOSIS: ICD-10-CM

## 2019-03-20 DIAGNOSIS — E11.621 DIABETIC ULCER OF TOE OF RIGHT FOOT ASSOCIATED WITH TYPE 2 DIABETES MELLITUS, LIMITED TO BREAKDOWN OF SKIN (HCC): Primary | ICD-10-CM

## 2019-03-20 DIAGNOSIS — E11.42 DIABETIC POLYNEUROPATHY ASSOCIATED WITH TYPE 2 DIABETES MELLITUS (HCC): ICD-10-CM

## 2019-03-20 DIAGNOSIS — I70.209 PERIPHERAL ARTERIOSCLEROSIS (HCC): ICD-10-CM

## 2019-03-20 PROCEDURE — 99213 OFFICE O/P EST LOW 20 MIN: CPT | Performed by: PODIATRIST

## 2019-03-20 NOTE — PROGRESS NOTES
Procedures   Foot Exam       Signed  Encounter Date: 3/13/2019   Assessment/Plan:  Wound right hallux  healing without infection  Diabetic neuropathy  Peripheral artery disease      Plan  Foot exam performed  We will add topical antibiotic  He will bandage daily  He will return for follow-up  Wound debrided in bandage  Patient will bandage daily for 1 week  He will watch for signs of infection     No problem-specific Assessment & Plan notes found for this encounter          There are no diagnoses linked to this encounter        Subjective:  Urgent visit  Patient has sore spot on the inside of his right big toe  It has been there for approximately a week    No history of trauma      No past medical history on file      No past surgical history on file           Allergies   Allergen Reactions    Shellfish-Derived Products GI Intolerance    Sulfamethoxazole-Trimethoprim Rash            Current Outpatient Medications:     atorvastatin (LIPITOR) 40 mg tablet, Take 1 tablet by mouth, Disp: , Rfl:     ciclopirox (LOPROX) 0 77 % cream, Apply topically Twice daily, Disp: , Rfl:     ciclopirox (LOPROX) 0 77 % cream, Apply topically 2 (two) times a day for 20 days, Disp: 45 g, Rfl: 0    ciclopirox (LOPROX) 0 77 % cream, Apply topically 2 (two) times a day for 20 days, Disp: 45 g, Rfl: 0    clopidogrel (PLAVIX) 75 mg tablet, Take 1 tablet by mouth daily, Disp: , Rfl:     cyanocobalamin (VITAMIN B-12) 100 mcg tablet, Take by mouth, Disp: , Rfl:     glyBURIDE (DIABETA) 5 mg tablet, Take 1 tablet by mouth Twice daily, Disp: , Rfl:     insulin NPH-insulin regular (NOVOLIN 70/30) 100 units/mL subcutaneous injection, Inject under the skin, Disp: , Rfl:     neomycin-colistin-hydrocortisone-thonzonium (CORTISPORIN TC) 0 33-0 3-1-0 05 % otic suspension, Administer 3 drops to the right ear 4 (four) times a day for 10 days Applied as directed with bandage, Disp: 10 mL, Rfl: 0    oxyCODONE-acetaminophen (PERCOCET) 5-325 mg per tablet, , Disp: , Rfl:          Patient Active Problem List   Diagnosis    Acquired deformity of foot    Diabetic polyneuropathy associated with type 2 diabetes mellitus ([facility] [facility]    Pain in both feet    Peripheral arteriosclerosis ([facility]    Onychomycosis    Tinea pedis of both feet    Dermatophytosis    Ingrown toenail    Paronychia of toenail of right foot             Patient ID: Indy Victor is a 58 y o  male      HPI     The following portions of the patient's history were reviewed and updated as appropriate: He  has no past medical history on file  He        Patient Active Problem List     Diagnosis Date Noted    Ingrown toenail 07/26/2018    Paronychia of toenail of right foot 07/26/2018    Dermatophytosis 07/11/2018    Onychomycosis 03/15/2018    Tinea pedis of both feet 03/15/2018    Acquired deformity of foot 02/15/2018    Diabetic polyneuropathy associated with type 2 diabetes mellitus ([facility] ) 02/15/2018    Pain in both feet 02/15/2018    Peripheral arteriosclerosis ([facility] ) 02/15/2018      He  has no past surgical history on file  His family history is not on file  He  reports that he has never smoked  He has never used smokeless tobacco  His alcohol and drug histories are not on file           Current Outpatient Medications   Medication Sig Dispense Refill    atorvastatin (LIPITOR) 40 mg tablet Take 1 tablet by mouth        ciclopirox (LOPROX) 0 77 % cream Apply topically Twice daily        ciclopirox (LOPROX) 0 77 % cream Apply topically 2 (two) times a day for 20 days 45 g 0    ciclopirox (LOPROX) 0 77 % cream Apply topically 2 (two) times a day for 20 days 45 g 0    clopidogrel (PLAVIX) 75 mg tablet Take 1 tablet by mouth daily        cyanocobalamin (VITAMIN B-12) 100 mcg tablet Take by mouth        glyBURIDE (DIABETA) 5 mg tablet Take 1 tablet by mouth Twice daily        insulin NPH-insulin regular (NOVOLIN 70/30) 100 units/mL subcutaneous injection Inject under the skin        neomycin-colistin-hydrocortisone-thonzonium (CORTISPORIN TC) 0 33-0 3-1-0 05 % otic suspension Administer 3 drops to the right ear 4 (four) times a day for 10 days Applied as directed with bandage 10 mL 0    oxyCODONE-acetaminophen (PERCOCET) 5-325 mg per tablet            No current facility-administered medications for this visit             Current Outpatient Medications on File Prior to Visit   Medication Sig    atorvastatin (LIPITOR) 40 mg tablet Take 1 tablet by mouth    ciclopirox (LOPROX) 0 77 % cream Apply topically Twice daily    ciclopirox (LOPROX) 0 77 % cream Apply topically 2 (two) times a day for 20 days    ciclopirox (LOPROX) 0 77 % cream Apply topically 2 (two) times a day for 20 days    clopidogrel (PLAVIX) 75 mg tablet Take 1 tablet by mouth daily    cyanocobalamin (VITAMIN B-12) 100 mcg tablet Take by mouth    glyBURIDE (DIABETA) 5 mg tablet Take 1 tablet by mouth Twice daily    insulin NPH-insulin regular (NOVOLIN 70/30) 100 units/mL subcutaneous injection Inject under the skin    neomycin-colistin-hydrocortisone-thonzonium (CORTISPORIN TC) 0 33-0 3-1-0 05 % otic suspension Administer 3 drops to the right ear 4 (four) times a day for 10 days Applied as directed with bandage    oxyCODONE-acetaminophen (PERCOCET) 5-325 mg per tablet        No current facility-administered medications on file prior to visit        He is allergic to shellfish-derived products and sulfamethoxazole-trimethoprim            Vitals:     03/13/19 1659   BP: 143/79   Pulse: 83   Resp: 17         Review of Systems       Objective:  Patient's shoes and socks removed  Foot ExamPhysical Exam   Cardiovascular: Pulses are weak pulses     Feet:   Left Foot: amputated          Foot Exam     General  General Appearance: appears stated age and healthy   Orientation: alert and oriented to person, place, and time   Affect: appropriate         Right Foot/Ankle      Inspection and Palpation  Ecchymosis: none  Swelling: dorsum and metatarsals   Arch: pes planus  Hammertoes: fifth toe  Claw Toes: absent  Hallux valgus: no  Hallux limitus: yes  Skin Exam: callus and dry skin;      Neurovascular  Dorsalis pedis: 1+  Posterior tibial: 2+  Superficial peroneal nerve sensation: diminished  Deep peroneal nerve sensation: diminished  Sural nerve sensation: diminished  Achilles reflex: 1+        Left Foot/Ankle       Inspection and Palpation  Ecchymosis: none  Swelling: dorsum and metatarsals   Skin Exam: callus and dry skin;      Neurovascular  Dorsalis pedis: 1+  Posterior tibial: 2+  Superficial peroneal nerve sensation: diminished  Deep peroneal nerve sensation: diminished  Sural nerve sensation: diminished  Achilles reflex: 1+           Physical Exam   Constitutional: He appears well-developed and well-nourished  Cardiovascular: Normal rate and regular rhythm   Pulses are weak pulses  Pulses:       Dorsalis pedis pulses are 1+ on the right side, and 1+ on the left side         Posterior tibial pulses are 2+ on the right side, and 2+ on the left side  Feet:   Right Foot:   Skin Integrity: Positive for callus and dry skin  Left Foot:   Skin Integrity: Positive for callus and dry skin  Neurological:   Reflex Scores:       Achilles reflexes are 1+ on the right side and 1+ on the left side  Skin:  medial aspect right hallux IPJ demonstrates superficial ulcer  Is approximately 0 5 centimeter squared  Mild surrounding erythema  Negative occult pus      X-ray  No evidence of osteomyelitis of right hallux however there is a very large medial tubercle of the distal phalanx  This would be consistent with the area of the wound       Patient's shoes and socks removed  Right Foot/Ankle   Right Foot Inspection        Sensory   Vibration: absent  Proprioception: absent   Monofilament testing: diminished  Vascular  Capillary refills: elevated        Left Foot/Ankle  Left Foot Inspection    Amputation: amputation left foot                         Assign Risk Category:  Deformity present;  Loss of protective sensation; Weak pulses       Risk: 2

## 2020-05-05 DIAGNOSIS — M54.9 CHRONIC BACK PAIN, UNSPECIFIED BACK LOCATION, UNSPECIFIED BACK PAIN LATERALITY: Primary | ICD-10-CM

## 2020-05-05 DIAGNOSIS — G89.29 CHRONIC BACK PAIN, UNSPECIFIED BACK LOCATION, UNSPECIFIED BACK PAIN LATERALITY: Primary | ICD-10-CM

## 2020-05-05 RX ORDER — OXYCODONE HYDROCHLORIDE AND ACETAMINOPHEN 5; 325 MG/1; MG/1
1 TABLET ORAL EVERY 6 HOURS PRN
Qty: 120 TABLET | Refills: 0 | Status: SHIPPED | OUTPATIENT
Start: 2020-05-05 | End: 2020-05-11 | Stop reason: SDUPTHER

## 2020-05-11 RX ORDER — OXYCODONE HYDROCHLORIDE AND ACETAMINOPHEN 5; 325 MG/1; MG/1
1 TABLET ORAL EVERY 6 HOURS PRN
Qty: 120 TABLET | Refills: 0 | Status: SHIPPED | OUTPATIENT
Start: 2020-05-11 | End: 2020-06-11 | Stop reason: SDUPTHER

## 2020-05-18 ENCOUNTER — OFFICE VISIT (OUTPATIENT)
Dept: PODIATRY | Facility: CLINIC | Age: 65
End: 2020-05-18
Payer: COMMERCIAL

## 2020-05-18 ENCOUNTER — LAB REQUISITION (OUTPATIENT)
Dept: LAB | Facility: HOSPITAL | Age: 65
End: 2020-05-18
Payer: COMMERCIAL

## 2020-05-18 DIAGNOSIS — B35.1 ONYCHOMYCOSIS: ICD-10-CM

## 2020-05-18 DIAGNOSIS — E11.42 DIABETIC POLYNEUROPATHY ASSOCIATED WITH TYPE 2 DIABETES MELLITUS (HCC): ICD-10-CM

## 2020-05-18 DIAGNOSIS — M79.671 PAIN IN BOTH FEET: ICD-10-CM

## 2020-05-18 DIAGNOSIS — L97.516 DIABETIC ULCER OF TOE OF RIGHT FOOT ASSOCIATED WITH TYPE 2 DIABETES MELLITUS, WITH BONE INVOLVEMENT WITHOUT EVIDENCE OF NECROSIS (HCC): Primary | ICD-10-CM

## 2020-05-18 DIAGNOSIS — L97.511 NON-PRESSURE CHRONIC ULCER OF OTHER PART OF RIGHT FOOT LIMITED TO BREAKDOWN OF SKIN (HCC): ICD-10-CM

## 2020-05-18 DIAGNOSIS — E11.621 TYPE 2 DIABETES MELLITUS WITH FOOT ULCER (CODE) (HCC): ICD-10-CM

## 2020-05-18 DIAGNOSIS — M79.672 PAIN IN BOTH FEET: ICD-10-CM

## 2020-05-18 DIAGNOSIS — E11.621 DIABETIC ULCER OF TOE OF RIGHT FOOT ASSOCIATED WITH TYPE 2 DIABETES MELLITUS, WITH BONE INVOLVEMENT WITHOUT EVIDENCE OF NECROSIS (HCC): Primary | ICD-10-CM

## 2020-05-18 DIAGNOSIS — M89.8X9 BONY EXOSTOSIS: ICD-10-CM

## 2020-05-18 DIAGNOSIS — I70.209 PERIPHERAL ARTERIOSCLEROSIS (HCC): ICD-10-CM

## 2020-05-18 PROCEDURE — 11042 DBRDMT SUBQ TIS 1ST 20SQCM/<: CPT | Performed by: PODIATRIST

## 2020-05-18 PROCEDURE — 87070 CULTURE OTHR SPECIMN AEROBIC: CPT | Performed by: PODIATRIST

## 2020-05-18 PROCEDURE — 87205 SMEAR GRAM STAIN: CPT | Performed by: PODIATRIST

## 2020-05-18 PROCEDURE — 73630 X-RAY EXAM OF FOOT: CPT | Performed by: PODIATRIST

## 2020-05-18 PROCEDURE — 11720 DEBRIDE NAIL 1-5: CPT | Performed by: PODIATRIST

## 2020-05-18 RX ORDER — CLINDAMYCIN HYDROCHLORIDE 300 MG/1
300 CAPSULE ORAL 4 TIMES DAILY
Qty: 40 CAPSULE | Refills: 0 | Status: SHIPPED | OUTPATIENT
Start: 2020-05-18 | End: 2020-06-11 | Stop reason: SDUPTHER

## 2020-05-21 LAB
BACTERIA WND AEROBE CULT: ABNORMAL
GRAM STN SPEC: ABNORMAL

## 2020-05-28 ENCOUNTER — OFFICE VISIT (OUTPATIENT)
Dept: PODIATRY | Facility: CLINIC | Age: 65
End: 2020-05-28
Payer: COMMERCIAL

## 2020-05-28 VITALS
RESPIRATION RATE: 17 BRPM | HEART RATE: 70 BPM | BODY MASS INDEX: 31.58 KG/M2 | SYSTOLIC BLOOD PRESSURE: 120 MMHG | HEIGHT: 64 IN | DIASTOLIC BLOOD PRESSURE: 50 MMHG | WEIGHT: 185 LBS

## 2020-05-28 DIAGNOSIS — E11.42 DIABETIC POLYNEUROPATHY ASSOCIATED WITH TYPE 2 DIABETES MELLITUS (HCC): ICD-10-CM

## 2020-05-28 DIAGNOSIS — M86.271 SUBACUTE OSTEOMYELITIS OF RIGHT FOOT (HCC): ICD-10-CM

## 2020-05-28 DIAGNOSIS — L97.516 DIABETIC ULCER OF TOE OF RIGHT FOOT ASSOCIATED WITH TYPE 2 DIABETES MELLITUS, WITH BONE INVOLVEMENT WITHOUT EVIDENCE OF NECROSIS (HCC): Primary | ICD-10-CM

## 2020-05-28 DIAGNOSIS — E11.621 DIABETIC ULCER OF TOE OF RIGHT FOOT ASSOCIATED WITH TYPE 2 DIABETES MELLITUS, WITH BONE INVOLVEMENT WITHOUT EVIDENCE OF NECROSIS (HCC): Primary | ICD-10-CM

## 2020-05-28 DIAGNOSIS — M89.8X9 BONY EXOSTOSIS: ICD-10-CM

## 2020-05-28 PROCEDURE — 97597 DBRDMT OPN WND 1ST 20 CM/<: CPT | Performed by: PODIATRIST

## 2020-05-28 PROCEDURE — 99213 OFFICE O/P EST LOW 20 MIN: CPT | Performed by: PODIATRIST

## 2020-05-28 PROCEDURE — 73620 X-RAY EXAM OF FOOT: CPT | Performed by: PODIATRIST

## 2020-06-08 ENCOUNTER — TELEPHONE (OUTPATIENT)
Dept: FAMILY MEDICINE CLINIC | Facility: CLINIC | Age: 65
End: 2020-06-08

## 2020-06-08 DIAGNOSIS — L30.9 DERMATITIS: Primary | ICD-10-CM

## 2020-06-08 DIAGNOSIS — M54.9 CHRONIC BACK PAIN, UNSPECIFIED BACK LOCATION, UNSPECIFIED BACK PAIN LATERALITY: ICD-10-CM

## 2020-06-08 DIAGNOSIS — G89.29 CHRONIC BACK PAIN, UNSPECIFIED BACK LOCATION, UNSPECIFIED BACK PAIN LATERALITY: ICD-10-CM

## 2020-06-08 RX ORDER — OXYCODONE HYDROCHLORIDE AND ACETAMINOPHEN 5; 325 MG/1; MG/1
1 TABLET ORAL EVERY 6 HOURS PRN
Qty: 120 TABLET | Refills: 0 | Status: CANCELLED | OUTPATIENT
Start: 2020-06-08

## 2020-06-11 ENCOUNTER — OFFICE VISIT (OUTPATIENT)
Dept: PODIATRY | Facility: CLINIC | Age: 65
End: 2020-06-11
Payer: COMMERCIAL

## 2020-06-11 VITALS
DIASTOLIC BLOOD PRESSURE: 98 MMHG | RESPIRATION RATE: 16 BRPM | WEIGHT: 185 LBS | HEART RATE: 74 BPM | HEIGHT: 64 IN | BODY MASS INDEX: 31.58 KG/M2 | SYSTOLIC BLOOD PRESSURE: 181 MMHG

## 2020-06-11 DIAGNOSIS — L97.516 DIABETIC ULCER OF TOE OF RIGHT FOOT ASSOCIATED WITH TYPE 2 DIABETES MELLITUS, WITH BONE INVOLVEMENT WITHOUT EVIDENCE OF NECROSIS (HCC): ICD-10-CM

## 2020-06-11 DIAGNOSIS — E11.42 DIABETIC POLYNEUROPATHY ASSOCIATED WITH TYPE 2 DIABETES MELLITUS (HCC): ICD-10-CM

## 2020-06-11 DIAGNOSIS — E11.621 DIABETIC ULCER OF TOE OF RIGHT FOOT ASSOCIATED WITH TYPE 2 DIABETES MELLITUS, WITH BONE INVOLVEMENT WITHOUT EVIDENCE OF NECROSIS (HCC): ICD-10-CM

## 2020-06-11 DIAGNOSIS — M86.271 SUBACUTE OSTEOMYELITIS OF RIGHT FOOT (HCC): ICD-10-CM

## 2020-06-11 DIAGNOSIS — M89.8X9 BONY EXOSTOSIS: Primary | ICD-10-CM

## 2020-06-11 DIAGNOSIS — I70.209 PERIPHERAL ARTERIOSCLEROSIS (HCC): ICD-10-CM

## 2020-06-11 PROCEDURE — 99213 OFFICE O/P EST LOW 20 MIN: CPT | Performed by: PODIATRIST

## 2020-06-11 PROCEDURE — 97597 DBRDMT OPN WND 1ST 20 CM/<: CPT | Performed by: PODIATRIST

## 2020-06-11 RX ORDER — CLINDAMYCIN HYDROCHLORIDE 300 MG/1
300 CAPSULE ORAL 4 TIMES DAILY
Qty: 40 CAPSULE | Refills: 0 | Status: SHIPPED | OUTPATIENT
Start: 2020-06-11 | End: 2020-06-19 | Stop reason: SDUPTHER

## 2020-06-11 RX ORDER — OXYCODONE HYDROCHLORIDE AND ACETAMINOPHEN 5; 325 MG/1; MG/1
1 TABLET ORAL EVERY 6 HOURS PRN
Qty: 120 TABLET | Refills: 0 | Status: SHIPPED | OUTPATIENT
Start: 2020-06-11 | End: 2020-07-22 | Stop reason: SDUPTHER

## 2020-06-12 ENCOUNTER — PREP FOR PROCEDURE (OUTPATIENT)
Dept: PODIATRY | Facility: CLINIC | Age: 65
End: 2020-06-12

## 2020-06-12 DIAGNOSIS — M89.8X9 BONY EXOSTOSIS: Primary | ICD-10-CM

## 2020-06-15 ENCOUNTER — TELEPHONE (OUTPATIENT)
Dept: FAMILY MEDICINE CLINIC | Facility: CLINIC | Age: 65
End: 2020-06-15

## 2020-06-19 ENCOUNTER — OFFICE VISIT (OUTPATIENT)
Dept: PODIATRY | Facility: CLINIC | Age: 65
End: 2020-06-19
Payer: COMMERCIAL

## 2020-06-19 ENCOUNTER — OFFICE VISIT (OUTPATIENT)
Dept: FAMILY MEDICINE CLINIC | Facility: CLINIC | Age: 65
End: 2020-06-19
Payer: COMMERCIAL

## 2020-06-19 VITALS
DIASTOLIC BLOOD PRESSURE: 76 MMHG | OXYGEN SATURATION: 98 % | RESPIRATION RATE: 18 BRPM | SYSTOLIC BLOOD PRESSURE: 130 MMHG | HEIGHT: 64 IN | TEMPERATURE: 98.8 F | HEART RATE: 95 BPM | WEIGHT: 190 LBS | BODY MASS INDEX: 32.44 KG/M2

## 2020-06-19 VITALS — RESPIRATION RATE: 17 BRPM | HEIGHT: 64 IN | BODY MASS INDEX: 31.58 KG/M2 | WEIGHT: 185 LBS

## 2020-06-19 DIAGNOSIS — E11.42 DIABETIC POLYNEUROPATHY ASSOCIATED WITH TYPE 2 DIABETES MELLITUS (HCC): ICD-10-CM

## 2020-06-19 DIAGNOSIS — M86.271 SUBACUTE OSTEOMYELITIS OF RIGHT FOOT (HCC): ICD-10-CM

## 2020-06-19 DIAGNOSIS — L30.9 DERMATITIS: Primary | ICD-10-CM

## 2020-06-19 DIAGNOSIS — M89.8X9 BONY EXOSTOSIS: ICD-10-CM

## 2020-06-19 DIAGNOSIS — E11.621 DIABETIC ULCER OF TOE OF RIGHT FOOT ASSOCIATED WITH TYPE 2 DIABETES MELLITUS, WITH BONE INVOLVEMENT WITHOUT EVIDENCE OF NECROSIS (HCC): Primary | ICD-10-CM

## 2020-06-19 DIAGNOSIS — L97.516 DIABETIC ULCER OF TOE OF RIGHT FOOT ASSOCIATED WITH TYPE 2 DIABETES MELLITUS, WITH BONE INVOLVEMENT WITHOUT EVIDENCE OF NECROSIS (HCC): Primary | ICD-10-CM

## 2020-06-19 DIAGNOSIS — I70.209 PERIPHERAL ARTERIOSCLEROSIS (HCC): ICD-10-CM

## 2020-06-19 PROCEDURE — 99213 OFFICE O/P EST LOW 20 MIN: CPT | Performed by: FAMILY MEDICINE

## 2020-06-19 PROCEDURE — 3288F FALL RISK ASSESSMENT DOCD: CPT | Performed by: FAMILY MEDICINE

## 2020-06-19 PROCEDURE — 73620 X-RAY EXAM OF FOOT: CPT | Performed by: PODIATRIST

## 2020-06-19 PROCEDURE — 1160F RVW MEDS BY RX/DR IN RCRD: CPT | Performed by: FAMILY MEDICINE

## 2020-06-19 PROCEDURE — 99213 OFFICE O/P EST LOW 20 MIN: CPT | Performed by: PODIATRIST

## 2020-06-19 PROCEDURE — 1101F PT FALLS ASSESS-DOCD LE1/YR: CPT | Performed by: FAMILY MEDICINE

## 2020-06-19 PROCEDURE — 3008F BODY MASS INDEX DOCD: CPT | Performed by: FAMILY MEDICINE

## 2020-06-19 PROCEDURE — 1036F TOBACCO NON-USER: CPT | Performed by: FAMILY MEDICINE

## 2020-06-19 RX ORDER — HYDROXYZINE HYDROCHLORIDE 25 MG/1
25 TABLET, FILM COATED ORAL 3 TIMES DAILY
Qty: 9 TABLET | Refills: 0 | Status: ON HOLD | OUTPATIENT
Start: 2020-06-19 | End: 2020-06-26 | Stop reason: ALTCHOICE

## 2020-06-19 RX ORDER — CLINDAMYCIN HYDROCHLORIDE 300 MG/1
300 CAPSULE ORAL 4 TIMES DAILY
Qty: 40 CAPSULE | Refills: 0 | Status: SHIPPED | OUTPATIENT
Start: 2020-06-19 | End: 2020-06-29

## 2020-06-19 RX ORDER — OXYCODONE HYDROCHLORIDE AND ACETAMINOPHEN 5; 325 MG/1; MG/1
1 TABLET ORAL EVERY 8 HOURS PRN
Qty: 15 TABLET | Refills: 0 | Status: SHIPPED | OUTPATIENT
Start: 2020-06-19 | End: 2020-06-24

## 2020-06-22 DIAGNOSIS — M89.8X9 BONY EXOSTOSIS: ICD-10-CM

## 2020-06-22 PROCEDURE — U0003 INFECTIOUS AGENT DETECTION BY NUCLEIC ACID (DNA OR RNA); SEVERE ACUTE RESPIRATORY SYNDROME CORONAVIRUS 2 (SARS-COV-2) (CORONAVIRUS DISEASE [COVID-19]), AMPLIFIED PROBE TECHNIQUE, MAKING USE OF HIGH THROUGHPUT TECHNOLOGIES AS DESCRIBED BY CMS-2020-01-R: HCPCS

## 2020-06-23 LAB — SARS-COV-2 RNA SPEC QL NAA+PROBE: NOT DETECTED

## 2020-06-24 ENCOUNTER — TELEPHONE (OUTPATIENT)
Dept: FAMILY MEDICINE CLINIC | Facility: CLINIC | Age: 65
End: 2020-06-24

## 2020-06-25 ENCOUNTER — TRANSCRIBE ORDERS (OUTPATIENT)
Dept: ADMINISTRATIVE | Facility: HOSPITAL | Age: 65
End: 2020-06-25

## 2020-06-25 ENCOUNTER — HOSPITAL ENCOUNTER (OUTPATIENT)
Dept: RADIOLOGY | Facility: HOSPITAL | Age: 65
Discharge: HOME | End: 2020-06-25
Attending: PODIATRIST
Payer: COMMERCIAL

## 2020-06-25 ENCOUNTER — ANESTHESIA EVENT (OUTPATIENT)
Dept: PERIOP | Facility: HOSPITAL | Age: 65
End: 2020-06-25
Payer: COMMERCIAL

## 2020-06-25 ENCOUNTER — APPOINTMENT (OUTPATIENT)
Dept: LAB | Facility: HOSPITAL | Age: 65
End: 2020-06-25
Attending: PODIATRIST
Payer: COMMERCIAL

## 2020-06-25 DIAGNOSIS — Z01.818 PRE-OP TESTING: ICD-10-CM

## 2020-06-25 DIAGNOSIS — Z01.818 PRE-OP TESTING: Primary | ICD-10-CM

## 2020-06-25 LAB
ANION GAP SERPL CALCULATED.3IONS-SCNC: 9 MMOL/L (ref 4–13)
BASOPHILS # BLD AUTO: 0.01 THOUSANDS/ΜL (ref 0–0.1)
BASOPHILS NFR BLD AUTO: 0 % (ref 0–1)
BUN SERPL-MCNC: 20 MG/DL (ref 5–25)
CALCIUM SERPL-MCNC: 8.8 MG/DL (ref 8.3–10.1)
CHLORIDE SERPL-SCNC: 98 MMOL/L (ref 100–108)
CO2 SERPL-SCNC: 28 MMOL/L (ref 21–32)
CREAT SERPL-MCNC: 1.05 MG/DL (ref 0.6–1.3)
EOSINOPHIL # BLD AUTO: 0.12 THOUSAND/ΜL (ref 0–0.61)
EOSINOPHIL NFR BLD AUTO: 2 % (ref 0–6)
ERYTHROCYTE [DISTWIDTH] IN BLOOD BY AUTOMATED COUNT: 12 % (ref 11.6–15.1)
GFR SERPL CREATININE-BSD FRML MDRD: 74 ML/MIN/1.73SQ M
GLUCOSE SERPL-MCNC: 284 MG/DL (ref 65–140)
HCT VFR BLD AUTO: 50.4 % (ref 36.5–49.3)
HGB BLD-MCNC: 17 G/DL (ref 12–17)
IMM GRANULOCYTES # BLD AUTO: 0.03 THOUSAND/UL (ref 0–0.2)
IMM GRANULOCYTES NFR BLD AUTO: 0 % (ref 0–2)
LYMPHOCYTES # BLD AUTO: 2.04 THOUSANDS/ΜL (ref 0.6–4.47)
LYMPHOCYTES NFR BLD AUTO: 26 % (ref 14–44)
MCH RBC QN AUTO: 32.4 PG (ref 26.8–34.3)
MCHC RBC AUTO-ENTMCNC: 33.7 G/DL (ref 31.4–37.4)
MCV RBC AUTO: 96 FL (ref 82–98)
MONOCYTES # BLD AUTO: 0.54 THOUSAND/ΜL (ref 0.17–1.22)
MONOCYTES NFR BLD AUTO: 7 % (ref 4–12)
NEUTROPHILS # BLD AUTO: 5.21 THOUSANDS/ΜL (ref 1.85–7.62)
NEUTS SEG NFR BLD AUTO: 65 % (ref 43–75)
NRBC BLD AUTO-RTO: 0 /100 WBCS
PLATELET # BLD AUTO: 154 THOUSANDS/UL (ref 149–390)
PMV BLD AUTO: 10.5 FL (ref 8.9–12.7)
POTASSIUM SERPL-SCNC: 5 MMOL/L (ref 3.5–5.3)
RBC # BLD AUTO: 5.25 MILLION/UL (ref 3.88–5.62)
SODIUM SERPL-SCNC: 135 MMOL/L (ref 136–145)
WBC # BLD AUTO: 7.95 THOUSAND/UL (ref 4.31–10.16)

## 2020-06-25 PROCEDURE — 85025 COMPLETE CBC W/AUTO DIFF WBC: CPT

## 2020-06-25 PROCEDURE — 93005 ELECTROCARDIOGRAM TRACING: CPT

## 2020-06-25 PROCEDURE — 71046 X-RAY EXAM CHEST 2 VIEWS: CPT

## 2020-06-25 PROCEDURE — 36415 COLL VENOUS BLD VENIPUNCTURE: CPT

## 2020-06-25 PROCEDURE — 80048 BASIC METABOLIC PNL TOTAL CA: CPT

## 2020-06-25 NOTE — PRE-PROCEDURE INSTRUCTIONS
My Surgical Experience    The following information was developed to assist you to prepare for your operation  What do I need to do before coming to the hospital?   Arrange for a responsible person to drive you to and from the hospital    Arrange care for your children at home  Children are not allowed in the recovery areas of the hospital   Plan to wear clothing that is easy to put on and take off  If you are having shoulder surgery, wear a shirt that buttons or zippers in the front  Bathing  o Shower the evening before and the morning of your surgery with an antibacterial soap  Please refer to the Pre Op Showering Instructions for Surgery Patients Sheet   o Remove nail polish and all body piercing jewelry  o Do not shave any body part for at least 24 hours before surgery-this includes face, arms, legs and upper body  Food  o Nothing to eat or drink after midnight the night before your surgery  This includes candy and chewing gum  o Exception: If your surgery is after 12:00pm (noon), you may have clear liquids such as 7-Up®, ginger ale, apple or cranberry juice, Jell-O®, water, or clear broth until 8:00 am  o Do not drink milk or juice with pulp on the morning before surgery  o Do not drink alcohol 24 hours before surgery  Medicine  o Follow instructions you received from your surgeon about which medicines you may take on the day of surgery  o If instructed to take medicine on the morning of surgery, take pills with just a small sip of water  Call your prescribing doctor for specific infroamtion on what to do if you take insulin    What should I bring to the hospital?    Bring:  Lincoln Conception or a walker, if you have them, for foot or knee surgery   A list of the daily medicines, vitamins, minerals, herbals and nutritional supplements you take   Include the dosages of medicines and the time you take them each day   Glasses, dentures or hearing aids   Minimal clothing; you will be wearing hospital sleepwear   Photo ID; required to verify your identity   If you have a Living Will or Power of , bring a copy of the documents   If you have an ostomy, bring an extra pouch and any supplies you use    Do not bring   Medicines or inhalers   Money, valuables or jewelry    What other information should I know about the day of surgery?  Notify your surgeons if you develop a cold, sore throat, cough, fever, rash or any other illness   Report to the Ambulatory Surgical/Same Day Surgery Unit   You will be instructed to stop at Registration only if you have not been pre-registered   Inform your  fi they do not stay that they will be asked by the staff to leave a phone number where they can be reached   Be available to be reached before surgery  In the event the operating room schedule changes, you may be asked to come in earlier or later than expected    *It is important to tell your doctor and others involved in your health care if you are taking or have been taking any non-prescription drugs, vitamins, minerals, herbals or other nutritional supplements  Any of these may interact with some food or medicines and cause a reaction      Pre-Surgery Instructions:   Medication Instructions    atorvastatin (LIPITOR) 40 mg tablet Instructed patient per Anesthesia Guidelines   ciclopirox (LOPROX) 0 77 % cream Instructed patient per Anesthesia Guidelines   clindamycin (CLEOCIN) 300 MG capsule Instructed patient per Anesthesia Guidelines   clopidogrel (PLAVIX) 75 mg tablet Instructed patient per Anesthesia Guidelines   glyBURIDE (DIABETA) 5 mg tablet Instructed patient per Anesthesia Guidelines   halobetasol (ULTRAVATE) 0 05 % cream Instructed patient per Anesthesia Guidelines   hydrOXYzine HCL (ATARAX) 25 mg tablet Instructed patient per Anesthesia Guidelines   insulin NPH-insulin regular (NOVOLIN 70/30) 100 units/mL subcutaneous injection Instructed patient per Anesthesia Guidelines   insulin regular (HumuLIN R,NovoLIN R) 100 units/mL injection Instructed patient per Anesthesia Guidelines   mupirocin (BACTROBAN) 2 % ointment Instructed patient per Anesthesia Guidelines   oxyCODONE-acetaminophen (PERCOCET) 5-325 mg per tablet Instructed patient per Anesthesia Guidelines   rivaroxaban (Xarelto) 2 5 mg tablet Instructed patient per Anesthesia Guidelines

## 2020-06-26 ENCOUNTER — HOSPITAL ENCOUNTER (OUTPATIENT)
Facility: HOSPITAL | Age: 65
Setting detail: OUTPATIENT SURGERY
Discharge: HOME | End: 2020-06-26
Attending: PODIATRIST | Admitting: PODIATRIST
Payer: COMMERCIAL

## 2020-06-26 ENCOUNTER — ANESTHESIA (OUTPATIENT)
Dept: PERIOP | Facility: HOSPITAL | Age: 65
End: 2020-06-26
Payer: COMMERCIAL

## 2020-06-26 VITALS
OXYGEN SATURATION: 95 % | BODY MASS INDEX: 31.1 KG/M2 | TEMPERATURE: 97.3 F | HEART RATE: 74 BPM | RESPIRATION RATE: 18 BRPM | WEIGHT: 182.2 LBS | HEIGHT: 64 IN | DIASTOLIC BLOOD PRESSURE: 70 MMHG | SYSTOLIC BLOOD PRESSURE: 120 MMHG

## 2020-06-26 DIAGNOSIS — M89.8X9 BONY EXOSTOSIS: ICD-10-CM

## 2020-06-26 DIAGNOSIS — M21.961 ACQUIRED DEFORMITY OF RIGHT FOOT: Primary | ICD-10-CM

## 2020-06-26 LAB — GLUCOSE SERPL-MCNC: 271 MG/DL (ref 65–140)

## 2020-06-26 PROCEDURE — 88311 DECALCIFY TISSUE: CPT | Performed by: PATHOLOGY

## 2020-06-26 PROCEDURE — 87186 SC STD MICRODIL/AGAR DIL: CPT | Performed by: PODIATRIST

## 2020-06-26 PROCEDURE — 88305 TISSUE EXAM BY PATHOLOGIST: CPT | Performed by: PATHOLOGY

## 2020-06-26 PROCEDURE — 82948 REAGENT STRIP/BLOOD GLUCOSE: CPT

## 2020-06-26 PROCEDURE — 28124 PARTIAL REMOVAL OF TOE: CPT | Performed by: PODIATRIST

## 2020-06-26 PROCEDURE — 87075 CULTR BACTERIA EXCEPT BLOOD: CPT | Performed by: PODIATRIST

## 2020-06-26 PROCEDURE — 87205 SMEAR GRAM STAIN: CPT | Performed by: PODIATRIST

## 2020-06-26 PROCEDURE — 87106 FUNGI IDENTIFICATION YEAST: CPT | Performed by: PODIATRIST

## 2020-06-26 PROCEDURE — 87070 CULTURE OTHR SPECIMN AEROBIC: CPT | Performed by: PODIATRIST

## 2020-06-26 RX ORDER — CLINDAMYCIN PHOSPHATE 600 MG/50ML
600 INJECTION INTRAVENOUS ONCE
Status: COMPLETED | OUTPATIENT
Start: 2020-06-26 | End: 2020-06-26

## 2020-06-26 RX ORDER — LIDOCAINE HYDROCHLORIDE 10 MG/ML
INJECTION, SOLUTION EPIDURAL; INFILTRATION; INTRACAUDAL; PERINEURAL AS NEEDED
Status: DISCONTINUED | OUTPATIENT
Start: 2020-06-26 | End: 2020-06-26 | Stop reason: SURG

## 2020-06-26 RX ORDER — PROPOFOL 10 MG/ML
INJECTION, EMULSION INTRAVENOUS CONTINUOUS PRN
Status: DISCONTINUED | OUTPATIENT
Start: 2020-06-26 | End: 2020-06-26 | Stop reason: SURG

## 2020-06-26 RX ORDER — ONDANSETRON 2 MG/ML
4 INJECTION INTRAMUSCULAR; INTRAVENOUS ONCE AS NEEDED
Status: DISCONTINUED | OUTPATIENT
Start: 2020-06-26 | End: 2020-06-26 | Stop reason: HOSPADM

## 2020-06-26 RX ORDER — SODIUM CHLORIDE, SODIUM LACTATE, POTASSIUM CHLORIDE, CALCIUM CHLORIDE 600; 310; 30; 20 MG/100ML; MG/100ML; MG/100ML; MG/100ML
75 INJECTION, SOLUTION INTRAVENOUS CONTINUOUS
Status: DISCONTINUED | OUTPATIENT
Start: 2020-06-26 | End: 2020-06-26 | Stop reason: HOSPADM

## 2020-06-26 RX ORDER — MAGNESIUM HYDROXIDE 1200 MG/15ML
LIQUID ORAL AS NEEDED
Status: DISCONTINUED | OUTPATIENT
Start: 2020-06-26 | End: 2020-06-26 | Stop reason: HOSPADM

## 2020-06-26 RX ORDER — PROPOFOL 10 MG/ML
INJECTION, EMULSION INTRAVENOUS AS NEEDED
Status: DISCONTINUED | OUTPATIENT
Start: 2020-06-26 | End: 2020-06-26 | Stop reason: SURG

## 2020-06-26 RX ORDER — SODIUM CHLORIDE, SODIUM LACTATE, POTASSIUM CHLORIDE, CALCIUM CHLORIDE 600; 310; 30; 20 MG/100ML; MG/100ML; MG/100ML; MG/100ML
125 INJECTION, SOLUTION INTRAVENOUS CONTINUOUS
Status: DISCONTINUED | OUTPATIENT
Start: 2020-06-26 | End: 2020-06-26

## 2020-06-26 RX ORDER — MIDAZOLAM HYDROCHLORIDE 2 MG/2ML
INJECTION, SOLUTION INTRAMUSCULAR; INTRAVENOUS AS NEEDED
Status: DISCONTINUED | OUTPATIENT
Start: 2020-06-26 | End: 2020-06-26 | Stop reason: SURG

## 2020-06-26 RX ORDER — FENTANYL CITRATE 50 UG/ML
INJECTION, SOLUTION INTRAMUSCULAR; INTRAVENOUS AS NEEDED
Status: DISCONTINUED | OUTPATIENT
Start: 2020-06-26 | End: 2020-06-26 | Stop reason: SURG

## 2020-06-26 RX ADMIN — SODIUM CHLORIDE, SODIUM LACTATE, POTASSIUM CHLORIDE, AND CALCIUM CHLORIDE 125 ML/HR: .6; .31; .03; .02 INJECTION, SOLUTION INTRAVENOUS at 08:37

## 2020-06-26 RX ADMIN — LIDOCAINE HYDROCHLORIDE 50 MG: 10 INJECTION, SOLUTION EPIDURAL; INFILTRATION; INTRACAUDAL; PERINEURAL at 09:57

## 2020-06-26 RX ADMIN — CLINDAMYCIN PHOSPHATE 600 MG: 600 INJECTION, SOLUTION INTRAVENOUS at 09:57

## 2020-06-26 RX ADMIN — FENTANYL CITRATE 50 MCG: 50 INJECTION, SOLUTION INTRAMUSCULAR; INTRAVENOUS at 09:57

## 2020-06-26 RX ADMIN — PROPOFOL 50 MCG/KG/MIN: 10 INJECTION, EMULSION INTRAVENOUS at 09:57

## 2020-06-26 RX ADMIN — FENTANYL CITRATE 50 MCG: 50 INJECTION, SOLUTION INTRAMUSCULAR; INTRAVENOUS at 10:12

## 2020-06-26 RX ADMIN — PROPOFOL 50 MG: 10 INJECTION, EMULSION INTRAVENOUS at 09:57

## 2020-06-26 RX ADMIN — MIDAZOLAM HYDROCHLORIDE 2 MG: 1 INJECTION, SOLUTION INTRAMUSCULAR; INTRAVENOUS at 09:50

## 2020-06-26 NOTE — DISCHARGE INSTRUCTIONS
PODIATRY DISCHARGE INSTRUCTIONS  DR Arias [address]es [address] Instructions    · No driving or operating machinery for 24 hours or while taking pain medication  · No alcoholic beverages for 24 hours or while taking pain medication  · DO NOT make any important personal or business decisions for 24 hours  Diet:  · Begin with liquids and light food and progress to your normal diet unless you are nauseated or otherwise instructed by your physician  Medication:  · Begin taking pain medication as directed and remember that narcotic medications may be constipating  Consider starting over the counter stool softeners  If constipation persists begin taking over the counter laxative  Dressing:  · Keep dressing dry   Do not remove dressing until seen by Dr Rudy Oneal:  · Apply ice to affected area 20 minutes on and 20 minutes off unless otherwise         Instructed     Elevation:  · Keep affected foot elevated on pillows    Weight Bearing status:  ___________________________  Wear blue surgical shoe when walking  Use crutches, cane or walker as directed    Keep regular scheduled appointment with Dr Fabi Michel    Call Dr Fabi Michel with any problems or questions at [phone]

## 2020-06-26 NOTE — OP NOTE
OPERATIVE REPORT  PATIENT NAME: Doug Nascimento    :  1955  MRN: [id number]  Pt Location: [facility] OR ROOM 03    SURGERY DATE: 2020    Surgeon(s) and Role:     Cyn Jade DPM - Primary    Preop Diagnosis:  Bony exostosis [M89 8X9]    Post-Op Diagnosis Codes:     * Bony exostosis [M89 8X9]    Procedure(s) (LRB):  exostosis of right big toe (Right)    Specimen(s):  ID Type Source Tests Collected by Time Destination   1 : BONE OF RIGHT GREAT TOE Tissue Bone TISSUE EXAM Vandana Segura DPM 2020 1006    A : BONE OF RIGHT GREAT TOE WOUND Wound Wound ANAEROBIC CULTURE AND GRAM STAIN, WOUND CULTURE Vandana Segura DPM 2020 1008    B : BONE OF RIGHT GREAT TOE WOUND Wound Wound WOUND CULTURE Vandana Segura DPM 2020 1009        Estimated Blood Loss:   Minimal    Drains:  * No LDAs found *    Anesthesia Type:   IV Sedation with Anesthesia    Operative Indications:  Bony exostosis [M89 8X9]  Chronic wound of right hallux  Wound on the medial aspect of the IPJ secondary to a large exostosis of the distal phalanx  Patient is diabetic with neuropathy and known peripheral artery disease  Patient has had wound for quite some time now  It would not heal   The wound was found to be in the medial aspect of the right hallux  It corresponded to area of exostosis  Enlarged medial tubercle distal phalanx noted on x-ray  Question of osteomyelitis  Patient newly need surgery to get rid of the nidus of pressure  He agreed to surgery understanding all the possible risks benefits alternatives and complications understand no guarantees have been given  Understands this may be a staged procedure whereby he may have chronic wound or need for treatment of osteomyelitis  He had been given preoperative clearance by vascular surgery that was circulation is borderline we proceeded  He understands that he may need vascular surgery intervention in the future as well as wound care  He is amenable to this      Operative Findings:  Cristina grade 3 ulcer medial aspect right hallux  Wound is approximately 0 5 centimeter squared in size  Wound is secondary to an enlarged pressure lesion/bone exostosis of the distal phalanx of the right hallux  Complications:   None    Procedure and Technique:  Patient was brought to the OR placed on the operating table in a supine position  After check wing for adequate anesthesia and without the use of any hemostasis device, attention was then directed to the medial aspect of the right hallux where by 2 semi elliptical converging incisions of approximately 2 cm in length were performed  They were done to circumscribe the ulcer which would allow for its excision  The bone wedge created was removed at this time  Bleeding was noted within the area  It became immediately obvious that there was a large exostosis tear  The bone did not appear to be destroyed  There was evidence of pus in the area  No evidence of undermining or abscess noted as well  Utilizing a rongeur or the bone exostosis was excised in toto  It was removed at this time  It was sectioned into which would allow for biopsy as well as culture and sensitivity  There was no pus noted within the bone  At this point the bone appeared to be grossly within normal limits  In the sharp edges were debrided at this time  Deep cultures performed  Both a robotic and anaerobic were done  It was felt that adequate reduction of deformity was done and therefore closure could be obtained  After copiously flushing large amounts of sterile saline closure was done  Should be noted that at all points within the procedure bleeding was noted within the area  Skin was reapproximated using with 3040 nylon  Steri-Strips applied  Dry sterile dressing applied  Patient returned recovery room with vital signs stable and neurovascular status intact  Patient is to fall written instructions and take prescribed medication    He will remain on antibiotic as directed  We will await biopsy results to see if patient needs referral to Infectious Disease  We will watch for healing and see if patient needs further intervention by vascular surgery     I was present for the entire procedure    Patient Disposition:  PACU     SIGNATURE: Rebecca Brothers DPM  DATE: June 26, 2020  TIME: 10:25 AM

## 2020-06-26 NOTE — PERIOPERATIVE NURSING NOTE
Padmini PARDO aware of patient   He will make Dr Angelica Rey aware  Patient also has necklace on and upper dentures that he refused to remove  Jewelry removal form in folder  Made OR aware

## 2020-06-26 NOTE — INTERIM OP NOTE
exostosis of right big toe  Postoperative Note  PATIENT NAME: Jessica Crocker  : 1955  MRN: [id number]  [address] [place] [address]    Surgery Date: 2020    Preop Diagnosis:  Bony exostosis [M89 8X9]    Post-Op Diagnosis Codes:     * Bony exostosis [M89 8X9]    Procedure(s) (LRB):  exostosis of right big toe (Right)    Surgeon(s) and Role:     Xiomara Elliott DPM - Primary    Specimens:  ID Type Source Tests Collected by Time Destination   1 : BONE OF RIGHT GREAT TOE Tissue Bone TISSUE EXAM Sowmya Serrato, [facility] 2020 1006    A : BONE OF RIGHT GREAT TOE WOUND Wound Wound ANAEROBIC CULTURE AND GRAM STAIN, WOUND CULTURE Sowmya Serrato, [facility] 2020 1008    B : BONE OF RIGHT GREAT TOE WOUND Wound Wound WOUND CULTURE Sowmya Serrato, [facility] 2020 1009        Estimated Blood Loss:   Minimal    Anesthesia Type:   IV Sedation with Anesthesia     Findings:    Exostosis medial aspect right hallux distal phalanx  Rule out osteomyelitis    Chronic wound right hallux  Complications:   None    SIGNATURE: Sowmya Serrato DPM   DATE: 2020   TIME: 10:21 AM

## 2020-06-27 LAB
ATRIAL RATE: 71 BPM
P AXIS: 8 DEGREES
PR INTERVAL: 200 MS
QRS AXIS: 0 DEGREES
QRSD INTERVAL: 82 MS
QT INTERVAL: 398 MS
QTC INTERVAL: 432 MS
T WAVE AXIS: 53 DEGREES
VENTRICULAR RATE: 71 BPM

## 2020-06-27 PROCEDURE — 93010 ELECTROCARDIOGRAM REPORT: CPT | Performed by: INTERNAL MEDICINE

## 2020-06-28 LAB — BACTERIA SPEC ANAEROBE CULT: NO GROWTH

## 2020-06-29 LAB
BACTERIA WND AEROBE CULT: ABNORMAL
GRAM STN SPEC: ABNORMAL

## 2020-06-30 ENCOUNTER — OFFICE VISIT (OUTPATIENT)
Dept: PODIATRY | Facility: CLINIC | Age: 65
End: 2020-06-30
Payer: COMMERCIAL

## 2020-06-30 VITALS
HEIGHT: 64 IN | BODY MASS INDEX: 31.07 KG/M2 | HEART RATE: 74 BPM | RESPIRATION RATE: 17 BRPM | WEIGHT: 182 LBS | SYSTOLIC BLOOD PRESSURE: 120 MMHG | DIASTOLIC BLOOD PRESSURE: 70 MMHG

## 2020-06-30 DIAGNOSIS — B35.9 DERMATOPHYTOSIS: Primary | ICD-10-CM

## 2020-06-30 DIAGNOSIS — E11.42 DIABETIC POLYNEUROPATHY ASSOCIATED WITH TYPE 2 DIABETES MELLITUS (HCC): ICD-10-CM

## 2020-06-30 PROCEDURE — 99212 OFFICE O/P EST SF 10 MIN: CPT | Performed by: PODIATRIST

## 2020-06-30 RX ORDER — TERBINAFINE HYDROCHLORIDE 250 MG/1
250 TABLET ORAL DAILY
Qty: 20 TABLET | Refills: 0 | Status: SHIPPED | OUTPATIENT
Start: 2020-06-30 | End: 2020-07-20

## 2020-07-08 ENCOUNTER — OFFICE VISIT (OUTPATIENT)
Dept: PODIATRY | Facility: CLINIC | Age: 65
End: 2020-07-08
Payer: COMMERCIAL

## 2020-07-08 VITALS
HEIGHT: 64 IN | WEIGHT: 182 LBS | RESPIRATION RATE: 17 BRPM | SYSTOLIC BLOOD PRESSURE: 120 MMHG | BODY MASS INDEX: 31.07 KG/M2 | HEART RATE: 74 BPM | DIASTOLIC BLOOD PRESSURE: 70 MMHG

## 2020-07-08 DIAGNOSIS — M89.8X9 BONY EXOSTOSIS: Primary | ICD-10-CM

## 2020-07-08 PROCEDURE — 73620 X-RAY EXAM OF FOOT: CPT | Performed by: PODIATRIST

## 2020-07-08 PROCEDURE — 99024 POSTOP FOLLOW-UP VISIT: CPT | Performed by: PODIATRIST

## 2020-07-08 NOTE — PROGRESS NOTES
Patient is doing well status post surgery  He now complains of pain in his leg at night  We will get relief of pain is by putting hang off the edge of the bed  Neurovascular status without change  Profound hypoesthesia of right foot noted  Positive peripheral artery disease  Right hallux demonstrates healing and coaptation of incision  No evidence of cellulitis  Of note, there is deep tissue injury on the lateral aspect of the right 5th MPJ  This is stage I  No evidence of infection  X-ray  Right hallux is within normal limits  No evidence of osteomyelitis or fracture    Plan  Patient can now get foot wet  He will bandage daily  He will watch for signs of infection  X-rays were taken today  He will return for stitch removal   He will continue to use crutches as directed  He will remain out of work

## 2020-07-09 ENCOUNTER — TELEPHONE (OUTPATIENT)
Dept: FAMILY MEDICINE CLINIC | Facility: CLINIC | Age: 65
End: 2020-07-09

## 2020-07-09 DIAGNOSIS — K21.9 GASTROESOPHAGEAL REFLUX DISEASE WITHOUT ESOPHAGITIS: Primary | ICD-10-CM

## 2020-07-09 RX ORDER — OMEPRAZOLE 40 MG/1
40 CAPSULE, DELAYED RELEASE ORAL
Qty: 30 CAPSULE | Refills: 0 | Status: SHIPPED | OUTPATIENT
Start: 2020-07-09 | End: 2020-08-10 | Stop reason: SDUPTHER

## 2020-07-09 NOTE — TELEPHONE ENCOUNTER
Pt  Called, he said he has been having some indigestion and his wifes Omeprazole seems to be working, he would like to know if you can prescribe some for him, he uses [facility] [address]

## 2020-07-15 ENCOUNTER — OFFICE VISIT (OUTPATIENT)
Dept: PODIATRY | Facility: CLINIC | Age: 65
End: 2020-07-15
Payer: COMMERCIAL

## 2020-07-15 VITALS
WEIGHT: 182 LBS | HEIGHT: 64 IN | SYSTOLIC BLOOD PRESSURE: 120 MMHG | HEART RATE: 74 BPM | RESPIRATION RATE: 17 BRPM | BODY MASS INDEX: 31.07 KG/M2 | DIASTOLIC BLOOD PRESSURE: 70 MMHG

## 2020-07-15 DIAGNOSIS — L97.411 DIABETIC ULCER OF RIGHT MIDFOOT ASSOCIATED WITH TYPE 2 DIABETES MELLITUS, LIMITED TO BREAKDOWN OF SKIN (HCC): Primary | ICD-10-CM

## 2020-07-15 DIAGNOSIS — E11.42 DIABETIC POLYNEUROPATHY ASSOCIATED WITH TYPE 2 DIABETES MELLITUS (HCC): ICD-10-CM

## 2020-07-15 DIAGNOSIS — E11.621 DIABETIC ULCER OF RIGHT MIDFOOT ASSOCIATED WITH TYPE 2 DIABETES MELLITUS, LIMITED TO BREAKDOWN OF SKIN (HCC): Primary | ICD-10-CM

## 2020-07-15 DIAGNOSIS — M89.8X9 BONY EXOSTOSIS: ICD-10-CM

## 2020-07-15 PROCEDURE — 73620 X-RAY EXAM OF FOOT: CPT | Performed by: PODIATRIST

## 2020-07-15 PROCEDURE — 99213 OFFICE O/P EST LOW 20 MIN: CPT | Performed by: PODIATRIST

## 2020-07-15 NOTE — PROGRESS NOTES
Assessment/Plan:  Healing incision in surgical site of right hallux  New diabetic foot wound  Lateral aspect 5th MPJ demonstrates superficial ulcer  No evidence of infection at this time  Daniel's kelli right foot  Peripheral artery disease  Diabetic neuropathy  Plan  All wounds debrided  Sutures removed from right hallux  X-rays taken  Right foot dressed  Maxorb Ag applied to 5th MPJ ulcer  Derm a g applied to the surgical incision of right hallux  Dry sterile dressing applied  Patient will leave this intact for 5 days  He will return for follow-up  Diagnoses and all orders for this visit:    Diabetic ulcer of right midfoot associated with type 2 diabetes mellitus, limited to breakdown of skin ([facility] [address] )  -     Ambulatory referral to Wound Care; Future    Diabetic polyneuropathy associated with type 2 diabetes mellitus (HCC)    Bony exostosis          Subjective:   Patient is diabetic  He had to undergo urgent angioplasty of his right lower extremity within the last 2 weeks  Patient has history of peripheral artery disease as required angioplasty  He has pain in the right foot  This is not the surgical site  This is on the outside of the right foot    Allergies   Allergen Reactions    Shellfish-Derived Products GI Intolerance    Sulfamethoxazole-Trimethoprim Rash         Current Outpatient Medications:     atorvastatin (LIPITOR) 40 mg tablet, Take 1 tablet by mouth, Disp: , Rfl:     ciclopirox (LOPROX) 0 77 % cream, Apply topically Twice daily, Disp: , Rfl:     clopidogrel (PLAVIX) 75 mg tablet, Take 1 tablet by mouth daily Last dose 6/21/20, Disp: , Rfl:     cyanocobalamin (VITAMIN B-12) 100 mcg tablet, Take by mouth, Disp: , Rfl:     glyBURIDE (DIABETA) 5 mg tablet, Take 1 tablet by mouth Twice daily, Disp: , Rfl:     halobetasol (ULTRAVATE) 0 05 % cream, Apply topically 2 (two) times a day, Disp: 50 g, Rfl: 5    insulin NPH-insulin regular (NOVOLIN 70/30) 100 units/mL subcutaneous injection, Inject under the skin Usually 20 units  A m , Disp: , Rfl:     insulin regular (HumuLIN R,NovoLIN R) 100 units/mL injection, Inject under the skin once According to scale, Disp: , Rfl:     mupirocin (BACTROBAN) 2 % ointment, Apply topically 3 (three) times a day, Disp: 22 g, Rfl: 0    omeprazole (PriLOSEC) 40 MG capsule, Take 1 capsule (40 mg total) by mouth daily before breakfast, Disp: 30 capsule, Rfl: 0    oxyCODONE-acetaminophen (PERCOCET) 5-325 mg per tablet, Take 1 tablet by mouth every 6 (six) hours as needed for moderate painMax Daily Amount: 4 tablets, Disp: 120 tablet, Rfl: 0    rivaroxaban (Xarelto) 2 5 mg tablet, Take 2 5 mg by mouth Last dose 6/21/20, Disp: , Rfl:     terbinafine (LamISIL) 250 mg tablet, Take 1 tablet (250 mg total) by mouth daily for 20 days, Disp: 20 tablet, Rfl: 0    Patient Active Problem List   Diagnosis    Acquired deformity of foot    Diabetic polyneuropathy associated with type 2 diabetes mellitus (HCC)    Pain in both feet    Peripheral arteriosclerosis (HCC)    Onychomycosis    Tinea pedis of both feet    Dermatophytosis    Ingrown toenail    Paronychia of toenail of right foot    Diabetic ulcer of toe of right foot associated with type 2 diabetes mellitus, limited to breakdown of skin ([facility] [address] )    Bony exostosis          Patient ID: Shiloh López is a 71 y o  male  HPI    The following portions of the patient's history were reviewed and updated as appropriate:     family history is not on file  reports that he has never smoked  He has never used smokeless tobacco  He reports that he drinks alcohol  He reports that he does not use drugs  Vitals:    07/15/20 1347   BP: 120/70   Pulse: 74   Resp: 17       Review of Systems      Objective:  Patient's shoes and socks removed     Foot Exam    General  General Appearance: appears stated age and healthy   Orientation: alert and oriented to person, place, and time   Affect: appropriate Gait: antalgic       Right Foot/Ankle     Inspection and Palpation  Tenderness: bony tenderness and lesser metatarsophalangeal joints   Swelling: dorsum   Arch: pes planus  Hallux limitus: yes  Skin Exam: dry skin, skin changes and ulcer;     Neurovascular  Dorsalis pedis: absent  Posterior tibial: absent  Saphenous nerve sensation: diminished  Tibial nerve sensation: diminished  Superficial peroneal nerve sensation: diminished  Deep peroneal nerve sensation: diminished  Sural nerve sensation: diminished  Achilles reflex: 0      Left Foot/Ankle      Inspection and Palpation  Swelling: dorsum   Arch: pes planus  Hallux limitus: yes    Neurovascular  Dorsalis pedis: 1+  Posterior tibial: 1+  Saphenous nerve sensation: diminished  Tibial nerve sensation: diminished  Superficial peroneal nerve sensation: diminished  Deep peroneal nerve sensation: diminished  Achilles reflex: 0        Physical Exam   Constitutional: He is oriented to person, place, and time  He appears well-developed and well-nourished  Cardiovascular: Normal rate and regular rhythm  Pulses:       Dorsalis pedis pulses are Absent on the right side, and 1+ on the left side  Posterior tibial pulses are Absent on the right side, and 1+ on the left side  Musculoskeletal: He exhibits tenderness and deformity  Right foot: There is bony tenderness  Feet:   Right Foot:   Skin Integrity: Positive for ulcer and dry skin  Neurological: He is alert and oriented to person, place, and time  Reflex Scores:       Achilles reflexes are 0 on the right side and 0 on the left side  Skin: Skin is warm and dry  Capillary refill takes less than 2 seconds  Incision of right hallux healed  X-ray  Hallux is stable without signs of osteomyelitis  5th ray demonstrates tailor's bunion  Superficial ulcer over lateral aspect 5th MPJ  No evidence of cellulitis at this time  Psychiatric: He has a normal mood and affect   His behavior is normal  Judgment and thought content normal    Vitals reviewed

## 2020-07-20 ENCOUNTER — OFFICE VISIT (OUTPATIENT)
Dept: PODIATRY | Facility: CLINIC | Age: 65
End: 2020-07-20
Payer: COMMERCIAL

## 2020-07-20 VITALS
SYSTOLIC BLOOD PRESSURE: 120 MMHG | HEIGHT: 64 IN | WEIGHT: 182 LBS | DIASTOLIC BLOOD PRESSURE: 70 MMHG | RESPIRATION RATE: 17 BRPM | HEART RATE: 74 BPM | BODY MASS INDEX: 31.07 KG/M2

## 2020-07-20 DIAGNOSIS — I70.209 PERIPHERAL ARTERIOSCLEROSIS (HCC): ICD-10-CM

## 2020-07-20 DIAGNOSIS — L97.411 DIABETIC ULCER OF RIGHT MIDFOOT ASSOCIATED WITH TYPE 2 DIABETES MELLITUS, LIMITED TO BREAKDOWN OF SKIN (HCC): Primary | ICD-10-CM

## 2020-07-20 DIAGNOSIS — E11.42 DIABETIC POLYNEUROPATHY ASSOCIATED WITH TYPE 2 DIABETES MELLITUS (HCC): ICD-10-CM

## 2020-07-20 DIAGNOSIS — E11.621 DIABETIC ULCER OF RIGHT MIDFOOT ASSOCIATED WITH TYPE 2 DIABETES MELLITUS, LIMITED TO BREAKDOWN OF SKIN (HCC): Primary | ICD-10-CM

## 2020-07-20 PROCEDURE — 99213 OFFICE O/P EST LOW 20 MIN: CPT | Performed by: PODIATRIST

## 2020-07-20 NOTE — PROGRESS NOTES
Assessment/Plan:  Incision of right hallux is healed  Patient has diabetic neuropathy  He has profound peripheral artery disease  Superficial ulcer lateral aspect right foot  No evidence of cellulitis  Plan  Continue wound care  Wound stress today with hydrophilic bandage  Patient has been referred to the wound healing Center  He will watch for signs of infection  He will use crutches remain nonweightbearing on the right side  Diagnoses and all orders for this visit:    Diabetic ulcer of right midfoot associated with type 2 diabetes mellitus, limited to breakdown of skin ([facility] [address] )    Diabetic polyneuropathy associated with type 2 diabetes mellitus ([facility] [address] )    Peripheral arteriosclerosis ([facility] [address] )          Subjective:  Patient has no pain at this time  Patient has history of wound of right foot  He recently had arterial stenting  He is aware that his circulation is not within normal limits      Allergies   Allergen Reactions    Shellfish-Derived Products GI Intolerance    Sulfamethoxazole-Trimethoprim Rash         Current Outpatient Medications:     atorvastatin (LIPITOR) 40 mg tablet, Take 1 tablet by mouth, Disp: , Rfl:     ciclopirox (LOPROX) 0 77 % cream, Apply topically Twice daily, Disp: , Rfl:     clopidogrel (PLAVIX) 75 mg tablet, Take 1 tablet by mouth daily Last dose 6/21/20, Disp: , Rfl:     cyanocobalamin (VITAMIN B-12) 100 mcg tablet, Take by mouth, Disp: , Rfl:     glyBURIDE (DIABETA) 5 mg tablet, Take 1 tablet by mouth Twice daily, Disp: , Rfl:     halobetasol (ULTRAVATE) 0 05 % cream, Apply topically 2 (two) times a day, Disp: 50 g, Rfl: 5    insulin NPH-insulin regular (NOVOLIN 70/30) 100 units/mL subcutaneous injection, Inject under the skin Usually 20 units  A m , Disp: , Rfl:     insulin regular (HumuLIN R,NovoLIN R) 100 units/mL injection, Inject under the skin once According to scale, Disp: , Rfl:     mupirocin (BACTROBAN) 2 % ointment, Apply topically 3 (three) times a day, Disp: 22 g, Rfl: 0    omeprazole (PriLOSEC) 40 MG capsule, Take 1 capsule (40 mg total) by mouth daily before breakfast, Disp: 30 capsule, Rfl: 0    oxyCODONE-acetaminophen (PERCOCET) 5-325 mg per tablet, Take 1 tablet by mouth every 6 (six) hours as needed for moderate painMax Daily Amount: 4 tablets, Disp: 120 tablet, Rfl: 0    rivaroxaban (Xarelto) 2 5 mg tablet, Take 2 5 mg by mouth Last dose 6/21/20, Disp: , Rfl:     terbinafine (LamISIL) 250 mg tablet, Take 1 tablet (250 mg total) by mouth daily for 20 days, Disp: 20 tablet, Rfl: 0    Patient Active Problem List   Diagnosis    Acquired deformity of foot    Diabetic polyneuropathy associated with type 2 diabetes mellitus (HCC)    Pain in both feet    Peripheral arteriosclerosis (HCC)    Onychomycosis    Tinea pedis of both feet    Dermatophytosis    Ingrown toenail    Paronychia of toenail of right foot    Diabetic ulcer of toe of right foot associated with type [facility] mellitus[facility]    Bony exostosis          Patient ID: Dimple Velasquez is a 71 y o  male  HPI    The following portions of the patient's history were reviewed and updated as appropriate:     family history is not on file  reports that he has never smoked  He has never used smokeless tobacco  He reports that he drinks alcohol  He reports that he does not use drugs  Vitals:    07/20/20 1204   BP: 120/70   Pulse: 74   Resp: 17       Review of Systems      Objective:  Patient's shoes and socks removed  Foot ExamPhysical Exam      Patient's shoes and socks removed     Foot Exam     General  General Appearance: appears stated age and healthy   Orientation: alert and oriented to person, place, and time   Affect: appropriate   Gait: antalgic         Right Foot/Ankle      Inspection and Palpation  Tenderness: bony tenderness and lesser metatarsophalangeal joints   Swelling: dorsum   Arch: pes planus  Hallux limitus: yes  Skin Exam: dry skin, skin changes and ulcer;      Neurovascular  Dorsalis pedis: absent  Posterior tibial: absent  Saphenous nerve sensation: diminished  Tibial nerve sensation: diminished  Superficial peroneal nerve sensation: diminished  Deep peroneal nerve sensation: diminished  Sural nerve sensation: diminished  Achilles reflex: 0        Left Foot/Ankle       Inspection and Palpation  Swelling: dorsum   Arch: pes planus  Hallux limitus: yes     Neurovascular  Dorsalis pedis: 1+  Posterior tibial: 1+  Saphenous nerve sensation: diminished  Tibial nerve sensation: diminished  Superficial peroneal nerve sensation: diminished  Deep peroneal nerve sensation: diminished  Achilles reflex: 0           Physical Exam   Constitutional: He is oriented to person, place, and time  He appears well-developed and well-nourished  Cardiovascular: Normal rate and regular rhythm  Pulses:       Dorsalis pedis pulses are Absent on the right side, and 1+ on the left side  Posterior tibial pulses are Absent on the right side, and 1+ on the left side  Musculoskeletal: He exhibits tenderness and deformity  Right foot: There is bony tenderness  Feet:   Right Foot:   Skin Integrity: Positive for ulcer and dry skin  Neurological: He is alert and oriented to person, place, and time  Reflex Scores:       Achilles reflexes are 0 on the right side and 0 on the left side  Skin: Skin is warm and dry  Capillary refill takes less than 2 seconds  Incision of right hallux healed  X-ray  Hallux is stable without signs of osteomyelitis  5th ray demonstrates tailor's bunion  Superficial ulcer over lateral aspect 5th MPJ  No evidence of cellulitis at this time

## 2020-07-22 ENCOUNTER — TELEPHONE (OUTPATIENT)
Dept: FAMILY MEDICINE CLINIC | Facility: CLINIC | Age: 65
End: 2020-07-22

## 2020-07-22 DIAGNOSIS — M54.9 CHRONIC BACK PAIN, UNSPECIFIED BACK LOCATION, UNSPECIFIED BACK PAIN LATERALITY: ICD-10-CM

## 2020-07-22 DIAGNOSIS — G89.29 CHRONIC BACK PAIN, UNSPECIFIED BACK LOCATION, UNSPECIFIED BACK PAIN LATERALITY: ICD-10-CM

## 2020-07-22 RX ORDER — OXYCODONE HYDROCHLORIDE AND ACETAMINOPHEN 5; 325 MG/1; MG/1
1 TABLET ORAL EVERY 6 HOURS PRN
Qty: 120 TABLET | Refills: 0 | Status: SHIPPED | OUTPATIENT
Start: 2020-07-22 | End: 2020-08-10 | Stop reason: SDUPTHER

## 2020-07-22 NOTE — TELEPHONE ENCOUNTER
Pt called stated he needs refill on his       oxyCODONE-acetaminophen (PERCOCET) 5-325 mg per tablet 1 tablet, Every 6 hours PRN       0 ordered                   Please call into [facility] on [facility]  Thank you [facility]

## 2020-07-24 ENCOUNTER — APPOINTMENT (OUTPATIENT)
Dept: WOUND CARE | Facility: HOSPITAL | Age: 65
End: 2020-07-24
Payer: COMMERCIAL

## 2020-07-24 DIAGNOSIS — E11.621 DIABETIC ULCER OF RIGHT MIDFOOT ASSOCIATED WITH TYPE 2 DIABETES MELLITUS, LIMITED TO BREAKDOWN OF SKIN (HCC): Primary | ICD-10-CM

## 2020-07-24 DIAGNOSIS — L97.411 DIABETIC ULCER OF RIGHT MIDFOOT ASSOCIATED WITH TYPE 2 DIABETES MELLITUS, LIMITED TO BREAKDOWN OF SKIN (HCC): Primary | ICD-10-CM

## 2020-07-24 PROCEDURE — 99213 OFFICE O/P EST LOW 20 MIN: CPT

## 2020-07-28 ENCOUNTER — HOSPITAL ENCOUNTER (OUTPATIENT)
Dept: RADIOLOGY | Facility: HOSPITAL | Age: 65
Discharge: HOME | End: 2020-07-28
Attending: PODIATRIST
Payer: COMMERCIAL

## 2020-07-28 ENCOUNTER — TRANSCRIBE ORDERS (OUTPATIENT)
Dept: ADMINISTRATIVE | Facility: HOSPITAL | Age: 65
End: 2020-07-28

## 2020-07-28 DIAGNOSIS — L97.411 DIABETIC ULCER OF RIGHT MIDFOOT ASSOCIATED WITH TYPE 2 DIABETES MELLITUS, LIMITED TO BREAKDOWN OF SKIN (HCC): ICD-10-CM

## 2020-07-28 DIAGNOSIS — E11.621 DIABETIC ULCER OF RIGHT MIDFOOT ASSOCIATED WITH TYPE 2 DIABETES MELLITUS, LIMITED TO BREAKDOWN OF SKIN (HCC): ICD-10-CM

## 2020-07-28 PROCEDURE — 73630 X-RAY EXAM OF FOOT: CPT

## 2020-07-31 ENCOUNTER — TRANSCRIBE ORDERS (OUTPATIENT)
Dept: ADMINISTRATIVE | Facility: HOSPITAL | Age: 65
End: 2020-07-31

## 2020-07-31 ENCOUNTER — APPOINTMENT (OUTPATIENT)
Dept: WOUND CARE | Facility: HOSPITAL | Age: 65
End: 2020-07-31
Payer: COMMERCIAL

## 2020-07-31 PROCEDURE — 97597 DBRDMT OPN WND 1ST 20 CM/<: CPT

## 2020-08-07 ENCOUNTER — OFFICE VISIT (OUTPATIENT)
Dept: WOUND CARE | Facility: HOSPITAL | Age: 65
End: 2020-08-07
Payer: COMMERCIAL

## 2020-08-07 DIAGNOSIS — I70.209 PERIPHERAL ARTERIOSCLEROSIS (HCC): Primary | ICD-10-CM

## 2020-08-07 PROCEDURE — 99213 OFFICE O/P EST LOW 20 MIN: CPT

## 2020-08-10 DIAGNOSIS — M54.9 CHRONIC BACK PAIN, UNSPECIFIED BACK LOCATION, UNSPECIFIED BACK PAIN LATERALITY: ICD-10-CM

## 2020-08-10 DIAGNOSIS — G89.29 CHRONIC BACK PAIN, UNSPECIFIED BACK LOCATION, UNSPECIFIED BACK PAIN LATERALITY: ICD-10-CM

## 2020-08-10 DIAGNOSIS — K21.9 GASTROESOPHAGEAL REFLUX DISEASE WITHOUT ESOPHAGITIS: ICD-10-CM

## 2020-08-10 RX ORDER — OMEPRAZOLE 40 MG/1
40 CAPSULE, DELAYED RELEASE ORAL
Qty: 30 CAPSULE | Refills: 2 | Status: SHIPPED | OUTPATIENT
Start: 2020-08-10 | End: 2020-11-04

## 2020-08-10 RX ORDER — OXYCODONE HYDROCHLORIDE AND ACETAMINOPHEN 5; 325 MG/1; MG/1
1 TABLET ORAL EVERY 6 HOURS PRN
Qty: 120 TABLET | Refills: 0 | Status: SHIPPED | OUTPATIENT
Start: 2020-08-10 | End: 2020-09-25 | Stop reason: SDUPTHER

## 2020-08-10 NOTE — TELEPHONE ENCOUNTER
Refill requested for omeprazole (PriLOSEC) 40 MG capsule and oxyCODONE-acetaminophen (PERCOCET) 5-325 mg per tablet to be sent to the [facility] in [place]

## 2020-08-12 ENCOUNTER — HOSPITAL ENCOUNTER (OUTPATIENT)
Dept: RADIOLOGY | Facility: MEDICAL CENTER | Age: 65
Discharge: HOME | End: 2020-08-12
Attending: PODIATRIST
Payer: COMMERCIAL

## 2020-08-12 DIAGNOSIS — E11.621 DIABETIC ULCER OF RIGHT MIDFOOT ASSOCIATED WITH TYPE 2 DIABETES MELLITUS, LIMITED TO BREAKDOWN OF SKIN (HCC): ICD-10-CM

## 2020-08-12 DIAGNOSIS — L97.411 DIABETIC ULCER OF RIGHT MIDFOOT ASSOCIATED WITH TYPE 2 DIABETES MELLITUS, LIMITED TO BREAKDOWN OF SKIN (HCC): ICD-10-CM

## 2020-08-12 PROCEDURE — 93923 UPR/LXTR ART STDY 3+ LVLS: CPT

## 2020-08-12 PROCEDURE — 93925 LOWER EXTREMITY STUDY: CPT

## 2020-08-13 PROCEDURE — 93925 LOWER EXTREMITY STUDY: CPT | Performed by: SURGERY

## 2020-08-13 PROCEDURE — 93922 UPR/L XTREMITY ART 2 LEVELS: CPT | Performed by: SURGERY

## 2020-08-14 ENCOUNTER — OFFICE VISIT (OUTPATIENT)
Dept: WOUND CARE | Facility: HOSPITAL | Age: 65
End: 2020-08-14
Payer: COMMERCIAL

## 2020-08-14 VITALS — DIASTOLIC BLOOD PRESSURE: 79 MMHG | SYSTOLIC BLOOD PRESSURE: 146 MMHG | TEMPERATURE: 98.6 F | HEART RATE: 76 BPM

## 2020-08-14 DIAGNOSIS — E11.8 DIABETIC FOOT (HCC): ICD-10-CM

## 2020-08-14 DIAGNOSIS — L97.512 DIABETIC ULCER OF TOE OF RIGHT FOOT ASSOCIATED WITH TYPE 2 DIABETES MELLITUS, WITH FAT LAYER EXPOSED (HCC): ICD-10-CM

## 2020-08-14 DIAGNOSIS — E11.42 DIABETIC POLYNEUROPATHY ASSOCIATED WITH TYPE 2 DIABETES MELLITUS (HCC): ICD-10-CM

## 2020-08-14 DIAGNOSIS — L97.512 RIGHT FOOT ULCER, WITH FAT LAYER EXPOSED (HCC): ICD-10-CM

## 2020-08-14 DIAGNOSIS — E11.621 DIABETIC ULCER OF TOE OF RIGHT FOOT ASSOCIATED WITH TYPE 2 DIABETES MELLITUS, WITH FAT LAYER EXPOSED (HCC): ICD-10-CM

## 2020-08-14 DIAGNOSIS — I73.9 PERIPHERAL VASCULAR DISEASE, UNSPECIFIED (HCC): Primary | ICD-10-CM

## 2020-08-14 PROCEDURE — 99213 OFFICE O/P EST LOW 20 MIN: CPT | Performed by: PODIATRIST

## 2020-08-14 PROCEDURE — 1036F TOBACCO NON-USER: CPT | Performed by: PODIATRIST

## 2020-08-14 RX ORDER — DOXYCYCLINE HYCLATE 100 MG/1
100 CAPSULE ORAL EVERY 12 HOURS SCHEDULED
Qty: 20 CAPSULE | Refills: 0 | Status: SHIPPED | OUTPATIENT
Start: 2020-08-14 | End: 2020-08-24

## 2020-08-14 NOTE — PATIENT INSTRUCTIONS
Orders Placed This Encounter   Procedures    Wound cleansing and dressings     Medial, Right Great Toe    Wash your hands with soap and water  Remove old dressing, discard into plastic bag and place in trash  Cleanse the wound with soap and water prior to applying a clean dressing  Do not use tissue or cotton balls  Do not scrub the wound  Pat dry using gauze  Shower NO  Apply skin prep to skin surrounding wound  Apply offloading egg  (Cut center of egg to shape of wound)  Apply Iodosorb Gel to the wound  Cover with gauze  Secure with sof-emily and medipore tape  Change dressing every other day  Lateral, Right Foot  Wash your hands with soap and water  Remove old dressing, discard into plastic bag and place in trash  Cleanse the wound with soap and water prior to applying a clean dressing  Do not use tissue or cotton balls  Do not scrub the wound  Pat dry using gauze  Shower NO  Apply skin prep to skin surrounding wound  Apply offloading egg  (Cut center of egg to shape of wound)  Apply Iodosorb Gel to the wound  Cover with gauze  Secure with sof-emily and medipore tape  Change dressing every other day  This was done today     Standing Status:   Future     Standing Expiration Date:   8/14/2021    Wound off loading     Right, Great Toe  Off-loading Instructions:    Keep weight and pressure off wound at all times  Apply offloading egg as directed  Lateral, Right Foot  Keep weight and pressure off wound at all times  Apply offloading egg as directed  This was done today       Standing Status:   Future     Standing Expiration Date:   8/14/2021

## 2020-08-14 NOTE — PROGRESS NOTES
Patient ID: Matteo Rivera is a 71 y o  male Date of Birth 1955     Chief Complaint  Chief Complaint   Patient presents with    Follow Up Wound Care Visit       Allergies  Shellfish-derived products and Sulfamethoxazole-trimethoprim    Assessment:         Diagnoses and all orders for this visit:    Peripheral vascular disease, unspecified ([address] )    Right foot ulcer, with fat layer exposed ([address] )    Diabetic foot ([facility] )    Diabetic ulcer of toe of right foot associated with type 2 diabetes mellitus, with fat layer exposed ([address] )    Diabetic polyneuropathy associated with type 2 diabetes mellitus ([address] )    Other orders  -     Wound cleansing and dressings; Future  -     Wound off loading; Future              Procedures    Plan:     Wound 07/24/20 Toe (Comment  which one) Anterior;Right;Medial (Active)       Wound 07/24/20 Foot Right;Lateral (Active)   Patient continue current dressing change regimen, patient to follow up with vascular surgery regarding the management of the severe PVD, unable to perform wound debridement due to the high risk of nonhealing, discussed with the patient the ABIs results, and the poor prognosis if there is no vascular intervention, patient continue offloading right foot wounds, patient to follow up at the [facility] in 2 weeks, ABX ordered for prophylaxis    Subjective:        Diabetic patient with chronic history of severe PVD presents to the office for the management of right foot ulceration, patient is compliant dressing changes using Iodosorb, patient had ABIs performed last week, patient scarring on the results, patient has appointment with vascular surgery       The following portions of the patient's history were reviewed and updated as appropriate: allergies, current medications, past family history, past medical history, past social history, past surgical history, and problem list     Review of Systems   Constitutional: Negative  Respiratory: Negative  Cardiovascular: Negative  Musculoskeletal: Positive for arthralgias  Skin: Positive for wound  Objective:       Wound 07/24/20 Toe (Comment  which one) Anterior;Right;Medial (Active)   Wound Image Images linked 08/14/20 1443   Wound Description Dry;Dark edges;Brown 08/14/20 1439   Dominga-wound Assessment Dry; Other (Comment) (callus) 08/14/20 1439   Wound Length (cm) 0 7 cm 08/14/20 1439   Wound Width (cm) 0 2 cm 08/14/20 1439   Wound Depth (cm) 0 cm (eschar covered) 08/14/20 1439   Wound Surface Area (cm^2) 0 14 cm^2 08/14/20 1439   Wound Volume (cm^3) 0 cm^3 08/14/20 1439   Calculated Wound Volume (cm^3) 0 cm^3 08/14/20 1439   Drainage Amount None 08/14/20 1439   Treatments Irrigation with NSS 08/14/20 1439       Wound 07/24/20 Foot Right;Lateral (Active)   Wound Image Images linked 08/14/20 1443   Wound Description Dark edges; Black 08/14/20 1434   Dominga-wound Assessment Dry;Erythema;Brown; Other (Comment) (callus) 08/14/20 1434   Wound Length (cm) 1 1 cm 08/14/20 1434   Wound Width (cm) 0 7 cm 08/14/20 1434   Wound Depth (cm) 0 cm (eschar cover) 08/14/20 1434   Wound Surface Area (cm^2) 0 77 cm^2 08/14/20 1434   Wound Volume (cm^3) 0 cm^3 08/14/20 1434   Calculated Wound Volume (cm^3) 0 cm^3 08/14/20 1434   Drainage Amount None 08/14/20 1434   Treatments Cleansed;Irrigation with NSS 08/14/20 1434       /79 (BP Location: Left arm, Patient Position: Sitting, Cuff Size: Standard)   Pulse 76   Temp 98 6 °F (37 °C) (Temporal)     Physical Exam  Constitutional:       General: He is not in acute distress  Appearance: He is well-developed  He is not ill-appearing, toxic-appearing or diaphoretic  HENT:      Head: Normocephalic  Cardiovascular:      Pulses:           Dorsalis pedis pulses are 0 on the right side and 0 on the left side  Posterior tibial pulses are 0 on the right side and 0 on the left side        Comments: Nonpalpable pedal pulse bilateral, temperature gradient within normal limit, a trophic skin changes noted with skin thinning in shiny, patient report occasional claudication to bilateral calf, localized edema foot and ankle Q8  Pulmonary:      Effort: Pulmonary effort is normal    Musculoskeletal:         General: Tenderness and deformity present  Comments: Transmetatarsal amputation left foot   Skin:     General: Skin is dry  Capillary Refill: Capillary refill takes 2 to 3 seconds  Comments: Wound descriptions and wound analysis   Neurological:      Mental Status: He is alert and oriented to person, place, and time  Sensory: Sensory deficit present  Motor: Atrophy present  Deep Tendon Reflexes: Reflexes abnormal            Wound Instructions:  No orders of the defined types were placed in this encounter

## 2020-08-14 NOTE — LETTER
[facility]  Aris Montes [address] [place] [id number]  Phone#  [phone]  Fax#  [phone]    Patient:  Kellen Hunt  YOB: 1955  Phone:  [phone]  Date of Visit:  8/14/2020    Orders Placed This Encounter   Procedures    Wound cleansing and dressings     Medial, Right Great Toe    Wash your hands with soap and water  Remove old dressing, discard into plastic bag and place in trash  Cleanse the wound with soap and water prior to applying a clean dressing  Do not use tissue or cotton balls  Do not scrub the wound  Pat dry using gauze  Shower NO  Apply skin prep to skin surrounding wound  Apply offloading egg  (Cut center of egg to shape of wound)  Apply Iodosorb Gel to the wound  Cover with gauze  Secure with sof-emily and medipore tape  Change dressing every other day  Lateral, Right Foot  Wash your hands with soap and water  Remove old dressing, discard into plastic bag and place in trash  Cleanse the wound with soap and water prior to applying a clean dressing  Do not use tissue or cotton balls  Do not scrub the wound  Pat dry using gauze  Shower NO  Apply skin prep to skin surrounding wound  Apply offloading egg  (Cut center of egg to shape of wound)  Apply Iodosorb Gel to the wound  Cover with gauze  Secure with sof-emily and medipore tape  Change dressing every other day  This was done today     Standing Status:   Future     Standing Expiration Date:   8/14/2021    Wound off loading     Right, Great Toe  Off-loading Instructions:    Keep weight and pressure off wound at all times  Apply offloading egg as directed  Lateral, Right Foot  Keep weight and pressure off wound at all times  Apply offloading egg as directed  This was done today       Standing Status:   Future     Standing Expiration Date:   8/14/2021         Electronically signed by Slava Lr DPM

## 2020-08-14 NOTE — LETTER
[facility]  [facility] [address] [place] [address]  Phone#  [phone]  Fax#  [phone]    Patient:  Xavier Schwartz  YOB: 1955  Phone:  [phone]  Date of Visit:  8/14/2020    Orders Placed This Encounter   Procedures    Wound cleansing and dressings     Medial, Right Great Toe    Wash your hands with soap and water  Remove old dressing, discard into plastic bag and place in trash  Cleanse the wound with soap and water prior to applying a clean dressing  Do not use tissue or cotton balls  Do not scrub the wound  Pat dry using gauze  Shower NO  Apply skin prep to skin surrounding wound  Apply offloading egg  (Cut center of egg to shape of wound)  Apply Iodosorb Gel to the wound  Cover with gauze  Secure with sof-emily and medipore tape  Change dressing every other day  Lateral, Right Foot  Wash your hands with soap and water  Remove old dressing, discard into plastic bag and place in trash  Cleanse the wound with soap and water prior to applying a clean dressing  Do not use tissue or cotton balls  Do not scrub the wound  Pat dry using gauze  Shower NO  Apply skin prep to skin surrounding wound  Apply offloading egg  (Cut center of egg to shape of wound)  Apply Iodosorb Gel to the wound  Cover with gauze  Secure with sof-emily and medipore tape  Change dressing every other day  This was done today     Standing Status:   Future     Standing Expiration Date:   8/14/2021    Wound off loading     Right, Great Toe  Off-loading Instructions:    Keep weight and pressure off wound at all times  Apply offloading egg as directed  Lateral, Right Foot  Keep weight and pressure off wound at all times  Apply offloading egg as directed  This was done today       Standing Status:   Future     Standing Expiration Date:   8/14/2021         Electronically signed by Sandra Baca DPM

## 2020-08-21 ENCOUNTER — OFFICE VISIT (OUTPATIENT)
Dept: PODIATRY | Facility: CLINIC | Age: 65
End: 2020-08-21
Payer: COMMERCIAL

## 2020-08-21 VITALS
WEIGHT: 182 LBS | RESPIRATION RATE: 17 BRPM | SYSTOLIC BLOOD PRESSURE: 146 MMHG | BODY MASS INDEX: 31.07 KG/M2 | DIASTOLIC BLOOD PRESSURE: 79 MMHG | HEART RATE: 76 BPM | HEIGHT: 64 IN

## 2020-08-21 DIAGNOSIS — I70.209 PERIPHERAL ARTERIOSCLEROSIS (HCC): Primary | ICD-10-CM

## 2020-08-21 DIAGNOSIS — Z89.432 HISTORY OF AMPUTATION OF LEFT FOOT THROUGH METATARSAL BONE (HCC): ICD-10-CM

## 2020-08-21 DIAGNOSIS — M79.672 PAIN IN BOTH FEET: ICD-10-CM

## 2020-08-21 DIAGNOSIS — E11.42 DIABETIC POLYNEUROPATHY ASSOCIATED WITH TYPE 2 DIABETES MELLITUS (HCC): ICD-10-CM

## 2020-08-21 DIAGNOSIS — E11.621 DIABETIC ULCER OF RIGHT MIDFOOT ASSOCIATED WITH TYPE 2 DIABETES MELLITUS, WITH FAT LAYER EXPOSED (HCC): ICD-10-CM

## 2020-08-21 DIAGNOSIS — B35.1 ONYCHOMYCOSIS: ICD-10-CM

## 2020-08-21 DIAGNOSIS — L97.412 DIABETIC ULCER OF RIGHT MIDFOOT ASSOCIATED WITH TYPE 2 DIABETES MELLITUS, WITH FAT LAYER EXPOSED (HCC): ICD-10-CM

## 2020-08-21 DIAGNOSIS — M79.671 PAIN IN BOTH FEET: ICD-10-CM

## 2020-08-21 PROCEDURE — 97597 DBRDMT OPN WND 1ST 20 CM/<: CPT | Performed by: PODIATRIST

## 2020-08-28 ENCOUNTER — OFFICE VISIT (OUTPATIENT)
Dept: WOUND CARE | Facility: HOSPITAL | Age: 65
End: 2020-08-28
Payer: COMMERCIAL

## 2020-08-28 VITALS
SYSTOLIC BLOOD PRESSURE: 146 MMHG | DIASTOLIC BLOOD PRESSURE: 82 MMHG | TEMPERATURE: 98.4 F | RESPIRATION RATE: 22 BRPM | HEART RATE: 81 BPM

## 2020-08-28 DIAGNOSIS — E11.8 DIABETIC FOOT (HCC): ICD-10-CM

## 2020-08-28 DIAGNOSIS — L97.512 DIABETIC ULCER OF TOE OF RIGHT FOOT ASSOCIATED WITH TYPE 2 DIABETES MELLITUS, WITH FAT LAYER EXPOSED (HCC): ICD-10-CM

## 2020-08-28 DIAGNOSIS — L97.512 RIGHT FOOT ULCER, WITH FAT LAYER EXPOSED (HCC): ICD-10-CM

## 2020-08-28 DIAGNOSIS — E11.42 DIABETIC POLYNEUROPATHY ASSOCIATED WITH TYPE 2 DIABETES MELLITUS (HCC): Primary | ICD-10-CM

## 2020-08-28 DIAGNOSIS — E11.621 DIABETIC ULCER OF TOE OF RIGHT FOOT ASSOCIATED WITH TYPE 2 DIABETES MELLITUS, WITH FAT LAYER EXPOSED (HCC): ICD-10-CM

## 2020-08-28 DIAGNOSIS — I73.9 PERIPHERAL VASCULAR DISEASE, UNSPECIFIED (HCC): ICD-10-CM

## 2020-08-28 PROCEDURE — 99213 OFFICE O/P EST LOW 20 MIN: CPT | Performed by: PODIATRIST

## 2020-08-28 PROCEDURE — 97597 DBRDMT OPN WND 1ST 20 CM/<: CPT | Performed by: PODIATRIST

## 2020-08-28 NOTE — PATIENT INSTRUCTIONS
Orders Placed This Encounter   Procedures    Wound cleansing and dressings     Right lateral foot wound  Wash your hands with soap and water  Remove old dressing, discard into plastic bag and place in trash  Cleanse the wound with soap and water prior to applying a clean dressing  Do not use tissue or cotton balls  Do not scrub the wound  Pat dry using gauze  Shower yes   Apply santyl to the base of wound    Hydrogel today Cover with gauze and foam egg  Secure with emily  Change dressing daily  This was done today     Standing Status:   Future     Standing Expiration Date:   8/28/2021

## 2020-08-28 NOTE — PROGRESS NOTES
Patient ID: Jeovanny Mitchell is a 71 y o  male Date of Birth 1955     Chief Complaint  Chief Complaint   Patient presents with    Follow Up Wound Care Visit     right foot wounds       Allergies  Shellfish-derived products and Sulfamethoxazole-trimethoprim    Assessment/ Plan    Patient evaluated, treatment options discussed with the patient patient continue follow-up with his vascular physician regarding his vascular status possible re-intervention of right lower extremity,   Patient to start Santyl therapy  Patient to ambulate in the forefoot offloading shoe gear  Pain management for the management of neuropathy  Excision debridement of wound using 15  Blade   Diagnoses and all orders for this visit:    Right foot ulcer, with fat layer exposed ([facility] )  -     Wound cleansing and dressings; Future    Peripheral vascular disease, unspecified ([facility] )    Diabetic polyneuropathy associated with type 2 diabetes mellitus ([facility] )    Diabetic foot ([facility] )    Diabetic ulcer of toe of right foot associated with type 2 diabetes mellitus, with fat layer exposed ([facility] )              Debridement   Wound 07/24/20 Foot Right;Lateral    Consent obtained? verbal  Consent given by: patient  Risks discussed? procedural risks discussed  Debridement type: selective    Post-debridement measurements  Length (cm): 0 7  Width (cm): 0 7  Depth (cm): 0 2  Percent debrided: 100%  Surface Area (cm^2): 0 49  Area debrided (cm^2): 0 49  Volume (cm^3): 0 1  Devitalized tissue debrided: biofilm, callus, clots, exudate, fibrin, necrotic debris and slough  Instrument(s) utilized: blade and curette  Bleeding: none  Procedural pain (0-10): 0  Post-procedural pain: 2   Response to treatment: procedure was tolerated well            Wound 07/24/20 Foot Right;Lateral (Active)       Subjective:            Diabetic patient, with history PVD, at the wound [facility] follow-up on right toe extremity ulceration, patient patient report improvement wound size, patient is compliant with dressing changes patient nitroglycerin to his foot      The following portions of the patient's history were reviewed and updated as appropriate: allergies, current medications, past family history, past medical history, past social history, past surgical history, and problem list     Review of Systems   Constitutional: Negative  Respiratory: Negative  Cardiovascular: Positive for leg swelling  Musculoskeletal: Positive for arthralgias and gait problem  Skin: Positive for rash and wound  Objective:       Wound 07/24/20 Foot Right;Lateral (Active)   Wound Image Images linked 08/28/20 1407   Wound Description Clean; Intact;Edema 08/28/20 1413   Dominga-wound Assessment Clean; Intact;Edema 08/28/20 1413   Wound Length (cm) 0 7 cm 08/28/20 1413   Wound Width (cm) 0 7 cm 08/28/20 1413   Wound Depth (cm) 0 cm 08/28/20 1413   Wound Surface Area (cm^2) 0 49 cm^2 08/28/20 1413   Wound Volume (cm^3) 0 cm^3 08/28/20 1413   Calculated Wound Volume (cm^3) 0 cm^3 08/28/20 1413       /82   Pulse 81   Temp 98 4 °F (36 9 °C)   Resp 22     Physical Exam  Constitutional:       General: He is not in acute distress  Appearance: He is well-developed  He is not ill-appearing, toxic-appearing or diaphoretic  HENT:      Head: Normocephalic  Cardiovascular:      Pulses:           Dorsalis pedis pulses are 0 on the right side and 1+ on the left side  Posterior tibial pulses are 0 on the right side and 0 on the left side  Comments: Nonpalpable pedal pulse, CFT is less than 3 seconds, a trophic skin changes noted with skin thinning in shiny, patient report occasional claudication to bilateral calf, localized edema foot and ankle Q9  Pulmonary:      Effort: Pulmonary effort is normal    Musculoskeletal:         General: Tenderness and deformity present        Comments: Pes planus type foot pain on palpation and range of motion of the 1st MPJ, metatarsal heads bilateral foot, pain on palpation on range of motion of the ST joint, crepitus noted in midfoot joint  Skin:     General: Skin is dry  Capillary Refill: Capillary refill takes 2 to 3 seconds  Comments: Wound description in wound analysis   Neurological:      Mental Status: He is alert and oriented to person, place, and time  Sensory: Sensory deficit present  Motor: Atrophy present  Deep Tendon Reflexes: Reflexes abnormal            Wound Instructions:  Orders Placed This Encounter   Procedures    Wound cleansing and dressings     Right lateral foot wound  Wash your hands with soap and water  Remove old dressing, discard into plastic bag and place in trash  Cleanse the wound with soap and water prior to applying a clean dressing  Do not use tissue or cotton balls  Do not scrub the wound  Pat dry using gauze  Shower yes   Apply santyl to the base of wound    Hydrogel today Cover with gauze and foam egg  Secure with emily  Change dressing daily  This was done today     Standing Status:   Future     Standing Expiration Date:   8/28/2021

## 2020-08-31 DIAGNOSIS — I48.91 ATRIAL FIBRILLATION, UNSPECIFIED TYPE (HCC): Primary | ICD-10-CM

## 2020-09-04 ENCOUNTER — OFFICE VISIT (OUTPATIENT)
Dept: WOUND CARE | Facility: HOSPITAL | Age: 65
End: 2020-09-04
Payer: COMMERCIAL

## 2020-09-04 VITALS — DIASTOLIC BLOOD PRESSURE: 85 MMHG | HEART RATE: 85 BPM | SYSTOLIC BLOOD PRESSURE: 173 MMHG | TEMPERATURE: 97.6 F

## 2020-09-04 DIAGNOSIS — L97.512 DIABETIC ULCER OF TOE OF RIGHT FOOT ASSOCIATED WITH TYPE 2 DIABETES MELLITUS, WITH FAT LAYER EXPOSED (HCC): ICD-10-CM

## 2020-09-04 DIAGNOSIS — E11.8 DIABETIC FOOT (HCC): ICD-10-CM

## 2020-09-04 DIAGNOSIS — I73.9 PERIPHERAL VASCULAR DISEASE, UNSPECIFIED (HCC): Primary | ICD-10-CM

## 2020-09-04 DIAGNOSIS — E11.42 DIABETIC POLYNEUROPATHY ASSOCIATED WITH TYPE 2 DIABETES MELLITUS (HCC): ICD-10-CM

## 2020-09-04 DIAGNOSIS — L97.512 RIGHT FOOT ULCER, WITH FAT LAYER EXPOSED (HCC): ICD-10-CM

## 2020-09-04 DIAGNOSIS — L97.512 NON-PRESSURE CHRONIC ULCER OF OTHER PART OF RIGHT FOOT WITH FAT LAYER EXPOSED: ICD-10-CM

## 2020-09-04 DIAGNOSIS — E11.621 DIABETIC ULCER OF TOE OF RIGHT FOOT ASSOCIATED WITH TYPE 2 DIABETES MELLITUS, WITH FAT LAYER EXPOSED (HCC): ICD-10-CM

## 2020-09-04 PROCEDURE — 99213 OFFICE O/P EST LOW 20 MIN: CPT | Performed by: PODIATRIST

## 2020-09-04 PROCEDURE — 97597 DBRDMT OPN WND 1ST 20 CM/<: CPT | Performed by: PODIATRIST

## 2020-09-04 RX ORDER — LIDOCAINE HYDROCHLORIDE 40 MG/ML
5 SOLUTION TOPICAL ONCE
Status: COMPLETED | OUTPATIENT
Start: 2020-09-04 | End: 2020-09-04

## 2020-09-04 RX ADMIN — LIDOCAINE HYDROCHLORIDE 5 ML: 40 SOLUTION TOPICAL at 14:56

## 2020-09-04 NOTE — PATIENT INSTRUCTIONS
Orders Placed This Encounter   Procedures    Wound cleansing and dressings     Wound cleansing and dressings   Right lateral foot wound  Wash your hands with soap and water  Remove old dressing, discard into plastic bag and place in trash  Cleanse the wound with soap and water prior to applying a clean dressing  Do not use tissue or cotton balls  Do not scrub the wound  Pat dry using gauze  Shower yes   Apply santyl to the base of wound  Hydrogel today Cover with gauze and foam egg  Secure with emily  Change dressing daily  This was done today  Standing Status:   Future     Standing Expiration Date:   9/4/2021    Wound off loading     Off-loading Instructions:    Keep weight and pressure off wound at all times      Use offloading egg to quita-wound area     Standing Status:   Future     Standing Expiration Date:   9/4/2021

## 2020-09-09 NOTE — PROGRESS NOTES
Patient ID: Robert Pineda is a 71 y o  male Date of Birth 1955     Chief Complaint  Chief Complaint   Patient presents with    Follow Up Wound Care Visit     Right foot       Allergies  Shellfish-derived products and Sulfamethoxazole-trimethoprim    Assessment/ Plan    Patient evaluated, treatment options discussed with the patient patient continue follow-up with his vascular physician regarding his vascular status possible re-intervention of right lower extremity,   Patient to continue Santyl therapy  Patient to ambulate in the forefoot offloading shoe gear  Pain management for the management of neuropathy  Excision debridement of wound using 15  Blade   Diagnoses and all orders for this visit:    Right foot ulcer, with fat layer exposed ([facility] )  -     lidocaine (XYLOCAINE) 4 % topical solution 5 mL  -     Wound cleansing and dressings; Future  -     Wound off loading; Future    Peripheral [facility] disease, unspecified [facility] )    Diabetic polyneuropathy associated with type 2 diabetes mellitus ([facility] )    Diabetic foot ([facility] )    Diabetic ulcer of toe of right foot associated with type 2 diabetes mellitus, with fat layer exposed ([facility] )    Other orders  -     nitroglycerin (NITRO-BID) 2 % ointment; Apply 1 inch topically once              Debridement   Performed by: physician  Debridement type: selective    Pre-debridement measurements  Length (cm): 1 1  Width (cm): 0 7  Depth (cm): 0 1  Surface Area (cm^2): 0 77  Volume (cm^3): 0 08    Post-debridement measurements  Length (cm): 1 2  Width (cm): 0 7  Depth (cm): 0 1  Percent debrided: 90%  Surface Area (cm^2): 0 84  Area debrided (cm^2): 0 76  Volume (cm^3): 0 08  Devitalized tissue debrided: biofilm, callus, clots, exudate and fibrin  Instrument(s) utilized: blade and curette  Procedural pain (0-10): 0  Post-procedural pain: 0   Response to treatment: procedure was tolerated well                 Subjective:            Diabetic patient, with history PVD, [facility] on right toe extremity ulceration, patient patient report improvement wound size, patient is compliant with dressing changes patient nitroglycerin to his foot    9/4 improvement in wound condition,       The following portions of the patient's history were reviewed and updated as appropriate: allergies, current medications, past family history, past medical history, past social history, past surgical history, and problem list     Review of Systems   Constitutional: Negative  Respiratory: Negative  Cardiovascular: Positive for leg swelling  Musculoskeletal: Positive for arthralgias and gait problem  Skin: Positive for rash and wound  Objective:            BP (!) 173/85 (BP Location: Left arm, Patient Position: Sitting, Cuff Size: Standard)   Pulse 85   Temp 97 6 °F (36 4 °C) (Temporal)     Physical Exam  Constitutional:       General: He is not in acute distress  Appearance: He is well-developed  He is not ill-appearing, toxic-appearing or diaphoretic  HENT:      Head: Normocephalic  Cardiovascular:      Pulses:           Dorsalis pedis pulses are 0 on the right side and 1+ on the left side  Posterior tibial pulses are 0 on the right side and 0 on the left side  Comments: Nonpalpable pedal pulse, CFT is less than 3 seconds, a trophic skin changes noted with skin thinning in shiny, patient report occasional claudication to bilateral calf, localized edema foot and ankle Q9  Pulmonary:      Effort: Pulmonary effort is normal    Musculoskeletal:         General: Tenderness and deformity present  Comments: Pes planus type foot pain on palpation and range of motion of the 1st MPJ, metatarsal heads bilateral foot, pain on palpation on range of motion of the ST joint, crepitus noted in midfoot joint  Skin:     General: Skin is dry  Capillary Refill: Capillary refill takes 2 to 3 seconds        Comments: Wound description in wound analysis Neurological:      Mental Status: He is alert and oriented to person, place, and time  Sensory: Sensory deficit present  Motor: Atrophy present  Deep Tendon Reflexes: Reflexes abnormal            Wound Instructions:  Orders Placed This Encounter   Procedures    Wound cleansing and dressings     Wound cleansing and dressings   Right lateral foot wound  Wash your hands with soap and water  Remove old dressing, discard into plastic bag and place in trash  Cleanse the wound with soap and water prior to applying a clean dressing  Do not use tissue or cotton balls  Do not scrub the wound  Pat dry using gauze  Shower yes   Apply santyl to the base of wound  Hydrogel today Cover with gauze and foam egg  Secure with emily  Change dressing daily  This was done today  Standing Status:   Future     Standing Expiration Date:   9/4/2021    Wound off loading     Off-loading Instructions:    Keep weight and pressure off wound at all times      Use offloading egg to quita-wound area     Standing Status:   Future     Standing Expiration Date:   9/4/2021

## 2020-09-10 ENCOUNTER — OFFICE VISIT (OUTPATIENT)
Dept: PODIATRY | Facility: CLINIC | Age: 65
End: 2020-09-10
Payer: COMMERCIAL

## 2020-09-10 VITALS — RESPIRATION RATE: 17 BRPM | BODY MASS INDEX: 31.07 KG/M2 | HEIGHT: 64 IN | WEIGHT: 182 LBS

## 2020-09-10 DIAGNOSIS — I70.209 PERIPHERAL ARTERIOSCLEROSIS (HCC): ICD-10-CM

## 2020-09-10 DIAGNOSIS — E11.621 DIABETIC ULCER OF RIGHT MIDFOOT ASSOCIATED WITH TYPE 2 DIABETES MELLITUS, WITH FAT LAYER EXPOSED (HCC): Primary | ICD-10-CM

## 2020-09-10 DIAGNOSIS — Z89.432 HISTORY OF AMPUTATION OF LEFT FOOT THROUGH METATARSAL BONE (HCC): ICD-10-CM

## 2020-09-10 DIAGNOSIS — L97.412 DIABETIC ULCER OF RIGHT MIDFOOT ASSOCIATED WITH TYPE 2 DIABETES MELLITUS, WITH FAT LAYER EXPOSED (HCC): Primary | ICD-10-CM

## 2020-09-10 PROCEDURE — 87070 CULTURE OTHR SPECIMN AEROBIC: CPT | Performed by: PODIATRIST

## 2020-09-10 PROCEDURE — 87147 CULTURE TYPE IMMUNOLOGIC: CPT | Performed by: PODIATRIST

## 2020-09-10 PROCEDURE — 99213 OFFICE O/P EST LOW 20 MIN: CPT | Performed by: PODIATRIST

## 2020-09-10 PROCEDURE — 87205 SMEAR GRAM STAIN: CPT | Performed by: PODIATRIST

## 2020-09-10 RX ORDER — DOXYCYCLINE HYCLATE 100 MG/1
100 CAPSULE ORAL EVERY 12 HOURS SCHEDULED
Qty: 20 CAPSULE | Refills: 0 | Status: SHIPPED | OUTPATIENT
Start: 2020-09-10 | End: 2020-09-20

## 2020-09-11 ENCOUNTER — LAB REQUISITION (OUTPATIENT)
Dept: LAB | Facility: HOSPITAL | Age: 65
End: 2020-09-11
Payer: COMMERCIAL

## 2020-09-11 DIAGNOSIS — L97.412 NON-PRESSURE CHRONIC ULCER OF RIGHT HEEL AND MIDFOOT WITH FAT LAYER EXPOSED (HCC): ICD-10-CM

## 2020-09-13 LAB
BACTERIA WND AEROBE CULT: ABNORMAL
GRAM STN SPEC: ABNORMAL
GRAM STN SPEC: ABNORMAL

## 2020-09-17 RX ORDER — CLOPIDOGREL BISULFATE 75 MG/1
75 TABLET ORAL DAILY
Qty: 90 TABLET | Refills: 0 | OUTPATIENT
Start: 2020-09-17

## 2020-09-18 ENCOUNTER — OFFICE VISIT (OUTPATIENT)
Dept: WOUND CARE | Facility: HOSPITAL | Age: 65
End: 2020-09-18
Payer: COMMERCIAL

## 2020-09-18 VITALS
TEMPERATURE: 98.1 F | SYSTOLIC BLOOD PRESSURE: 166 MMHG | DIASTOLIC BLOOD PRESSURE: 78 MMHG | HEART RATE: 78 BPM | RESPIRATION RATE: 18 BRPM

## 2020-09-18 DIAGNOSIS — E11.621 DIABETIC ULCER OF TOE OF RIGHT FOOT ASSOCIATED WITH TYPE 2 DIABETES MELLITUS, WITH FAT LAYER EXPOSED (HCC): ICD-10-CM

## 2020-09-18 DIAGNOSIS — I73.9 PERIPHERAL VASCULAR DISEASE, UNSPECIFIED (HCC): ICD-10-CM

## 2020-09-18 DIAGNOSIS — E11.42 DIABETIC POLYNEUROPATHY ASSOCIATED WITH TYPE 2 DIABETES MELLITUS (HCC): ICD-10-CM

## 2020-09-18 DIAGNOSIS — E11.8 DIABETIC FOOT (HCC): ICD-10-CM

## 2020-09-18 DIAGNOSIS — L97.512 DIABETIC ULCER OF TOE OF RIGHT FOOT ASSOCIATED WITH TYPE 2 DIABETES MELLITUS, WITH FAT LAYER EXPOSED (HCC): ICD-10-CM

## 2020-09-18 DIAGNOSIS — L97.512 RIGHT FOOT ULCER, WITH FAT LAYER EXPOSED (HCC): Primary | ICD-10-CM

## 2020-09-18 PROCEDURE — 11042 DBRDMT SUBQ TIS 1ST 20SQCM/<: CPT | Performed by: PODIATRIST

## 2020-09-18 RX ORDER — LIDOCAINE HYDROCHLORIDE 40 MG/ML
5 SOLUTION TOPICAL ONCE
Status: COMPLETED | OUTPATIENT
Start: 2020-09-18 | End: 2020-09-18

## 2020-09-18 RX ADMIN — LIDOCAINE HYDROCHLORIDE 5 ML: 40 SOLUTION TOPICAL at 13:28

## 2020-09-18 NOTE — PATIENT INSTRUCTIONS
Orders Placed This Encounter   Procedures    Wound cleansing and dressings     Right foot wound  Wash your hands with soap and water  Remove old dressing, discard into plastic bag and place in trash  Cleanse the wound with soap and water prior to applying a clean dressing  Do not use tissue or cotton balls  Do not scrub the wound  Pat dry using gauze  Shower yes with protection  Apply foam Venetie IRA to skin surrounding wound  Apply santyl to the  wound  Cover with gauze  Secure with emily and tape  Change dressing daily  This was done today    If Santyl starts to bother you , call Dr Gaby Sarmiento office to notify him and use betadine instead  Standing Status:   Future     Standing Expiration Date:   9/18/2021    Wound off loading     Off-loading Instructions:    Keep weight and pressure off wound at all times  , Wear off-loading device as directed by your physician  (Post Op Shoe) Put on immediately when rising in the morning and remove when going to bed  and Other - foam Venetie IRA around wound       Standing Status:   Future     Standing Expiration Date:   9/18/2021

## 2020-09-21 NOTE — PROGRESS NOTES
Patient ID: Lorna Arshad is a 71 y o  male Date of Birth 1955     Chief Complaint  Chief Complaint   Patient presents with    Follow Up Wound Care Visit     Right foot wound       Allergies  Shellfish-derived products and Sulfamethoxazole-trimethoprim    Assessment/ Plan    Patient evaluated, treatment options discussed with the patient patient continue follow-up with his vascular physician   Continue daily dressing changes  Hyperbaric oxygen consult the  Patient to ambulate in the forefoot offloading shoe gear  Pain management for the management of neuropathy  Excision debridement of wound using 15  Blade   Diagnoses and all orders for this visit:    Right foot ulcer, with fat layer exposed ([facility] )  -     lidocaine (XYLOCAINE) 4 % topical solution 5 mL  -     Wound cleansing and dressings; Future  -     Wound off loading; Future    Peripheral vascular disease, unspecified ([facility] )    Diabetic polyneuropathy associated with type 2 diabetes mellitus ([facility] )    Diabetic foot ([facility] )    Diabetic ulcer of toe of right foot associated with type 2 diabetes mellitus, with fat layer exposed ([facility] )              Debridement   Consent obtained? verbal  Consent given by: patient  Risks discussed? procedural risks discussed  Performed by: physician  Debridement type: surgical  Level of debridement: subcutaneous tissue    Post-debridement measurements  Length (cm): 0 6  Width (cm): 0 4  Depth (cm): 0 2  Percent debrided: 100%  Surface Area (cm^2): 0 24  Area debrided (cm^2): 0 24  Volume (cm^3): 0 05  Tissue and other material debrided: hypergranulation and subcutaneous tissue  Devitalized tissue debrided: biofilm, exudate, fibrin and slough  Instrument(s) utilized: blade and curette  Bleeding: small  Procedural pain (0-10): insensate  Post-procedural pain: 2   Response to treatment: procedure was tolerated well                 Subjective:            Diabetic patient, with history PVD, at the wound [facility] follow-up on right toe extremity ulceration, patient patient report improvement wound size, patient is compliant with dressing changes patient nitroglycerin to his foot    9/4 improvement in wound condition,     9/14 follow up       The following portions of the patient's history were reviewed and updated as appropriate: allergies, current medications, past family history, past medical history, past social history, past surgical history, and problem list     Review of Systems   Constitutional: Negative  Respiratory: Negative  Cardiovascular: Positive for leg swelling  Musculoskeletal: Positive for arthralgias and gait problem  Skin: Positive for rash and wound  Objective:            /78   Pulse 78   Temp 98 1 °F (36 7 °C)   Resp 18     Physical Exam  Constitutional:       General: He is not in acute distress  Appearance: He is well-developed  He is not ill-appearing, toxic-appearing or diaphoretic  HENT:      Head: Normocephalic  Cardiovascular:      Pulses:           Dorsalis pedis pulses are 0 on the right side and 1+ on the left side  Posterior tibial pulses are 0 on the right side and 0 on the left side  Comments: Nonpalpable pedal pulse, CFT is less than 3 seconds, a trophic skin changes noted with skin thinning in shiny, patient report occasional claudication to bilateral calf, localized edema foot and ankle Q9  Pulmonary:      Effort: Pulmonary effort is normal    Musculoskeletal:         General: Tenderness and deformity present  Comments: Pes planus type foot pain on palpation and range of motion of the 1st MPJ, metatarsal heads bilateral foot, pain on palpation on range of motion of the ST joint, crepitus noted in midfoot joint  Skin:     General: Skin is dry  Capillary Refill: Capillary refill takes 2 to 3 seconds  Comments: Wound description in wound analysis   Neurological:      Mental Status: He is alert and oriented to person, place, and time        Sensory: Sensory deficit present  Motor: Atrophy present  Deep Tendon Reflexes: Reflexes abnormal            Wound 07/24/20 Foot Right;Lateral (Active)   Wound Image   09/18/20 1344   Wound Description Slough;Brown;Black 09/18/20 1321   Dominga-wound Assessment Dry 09/18/20 1321   Wound Length (cm) 0 6 cm 09/18/20 1321   Wound Width (cm) 0 4 cm 09/18/20 1321   Wound Depth (cm) 0 cm 09/18/20 1321   Wound Surface Area (cm^2) 0 24 cm^2 09/18/20 1321   Wound Volume (cm^3) 0 cm^3 09/18/20 1321   Calculated Wound Volume (cm^3) 0 cm^3 09/18/20 1321   Drainage Amount None 09/18/20 1321   Non-staged Wound Description Full thickness 09/18/20 1321   Treatments Cleansed 09/18/20 1321   Dressing Status Dry; Intact 09/18/20 1321                       Wound Instructions:  Orders Placed This Encounter   Procedures    Wound cleansing and dressings     Right foot wound  Wash your hands with soap and water  Remove old dressing, discard into plastic bag and place in trash  Cleanse the wound with soap and water prior to applying a clean dressing  Do not use tissue or cotton balls  Do not scrub the wound  Pat dry using gauze  Shower yes with protection  Apply foam [place] to skin surrounding wound  Apply santyl to the  wound  Cover with gauze  Secure with emily and tape  Change dressing daily  This was done today    If Santyl starts to bother you , call Dr Jodi Oneil office to notify him and use betadine instead  Standing Status:   Future     Standing Expiration Date:   9/18/2021    Wound off loading     Off-loading Instructions:    Keep weight and pressure off wound at all times  , Wear off-loading device as directed by your physician  (Post Op Shoe) Put on immediately when rising in the morning and remove when going to bed  and Other - foam [place] around wound       Standing Status:   Future     Standing Expiration Date:   9/18/2021

## 2020-09-23 ENCOUNTER — OFFICE VISIT (OUTPATIENT)
Dept: WOUND CARE | Facility: HOSPITAL | Age: 65
End: 2020-09-23
Payer: COMMERCIAL

## 2020-09-23 VITALS
SYSTOLIC BLOOD PRESSURE: 161 MMHG | HEART RATE: 87 BPM | RESPIRATION RATE: 18 BRPM | TEMPERATURE: 98.7 F | DIASTOLIC BLOOD PRESSURE: 85 MMHG

## 2020-09-23 DIAGNOSIS — L97.512 RIGHT FOOT ULCER, WITH FAT LAYER EXPOSED (HCC): Primary | ICD-10-CM

## 2020-09-23 DIAGNOSIS — E11.42 DIABETIC POLYNEUROPATHY ASSOCIATED WITH TYPE 2 DIABETES MELLITUS (HCC): ICD-10-CM

## 2020-09-23 DIAGNOSIS — E11.8 DIABETIC FOOT (HCC): ICD-10-CM

## 2020-09-23 DIAGNOSIS — I73.9 PERIPHERAL VASCULAR DISEASE, UNSPECIFIED (HCC): ICD-10-CM

## 2020-09-23 PROCEDURE — 99215 OFFICE O/P EST HI 40 MIN: CPT | Performed by: PREVENTIVE MEDICINE

## 2020-09-23 PROCEDURE — 99213 OFFICE O/P EST LOW 20 MIN: CPT | Performed by: PREVENTIVE MEDICINE

## 2020-09-24 NOTE — PROGRESS NOTES
Patient ID: Cady Hussein is a 71 y o  male Date of Birth 1955       Chief Complaint   Patient presents with    HBO Consult       Allergies  Shellfish-derived products and Sulfamethoxazole-trimethoprim    Diagnosis:  1  Right foot ulcer, with fat layer exposed ([address] )    2  Diabetic polyneuropathy associated with type 2 diabetes mellitus ([address] )    3  Diabetic foot ([address] )    4  Peripheral vascular disease, unspecified ([address] )           Assessment & Plan:  Reviewed patients history  His DFU of R lateral foot is a Cristina 1 and has been since the start of care  He has also responded to treatment with appropriate dressings, infection control, offloading, and debridement  Has been optimized recently from a vascular standpoint  Wound continue current care  If he would become a Cristina 3 or 4, HBO would be an option, he has no contraindications  Subjective:   Presents for HBO consult from Dr Vj Powell  Patient states has been treated with HBO in the past for L DFU, tolerated well  Currently no pain  Follows with vascular surgery from [facility] and states had procedures on both legs over the last few weeks  No fever  Wearing offloading shoe         The following portions of the patient's history were reviewed and updated as appropriate:   Patient Active Problem List   Diagnosis    Acquired deformity of foot    Diabetic polyneuropathy associated with type 2 diabetes mellitus ([address] )    Pain in both feet    Peripheral arteriosclerosis ([address] )    Onychomycosis    Tinea pedis of both feet    Dermatophytosis    Ingrown toenail    Paronychia of toenail of right foot    Diabetic ulcer of toe of right foot associated with type 2 diabetes mellitus, limited to breakdown of skin ([address] )    Bony exostosis     Past Medical History:   Diagnosis Date    Diabetes mellitus ([address] )     Hypertension     PVD (peripheral vascular disease) ([address] )      Past Surgical History:   Procedure Laterality Date    CHOLECYSTECTOMY      COLONOSCOPY      REPLANTATION THUMB      TOE AMPUTATION      per [employer]    TOE OSTEOTOMY Right 2020    Procedure: exostosis of right big toe;  Surgeon: Wily Arrington DPM;  Location: [address] [place] [address];  Service: Podiatry   [address]      right     Social History     Socioeconomic History    Marital status: /Civil Union     Spouse name: None    Number of children: None    Years of education: None    Highest education level: None   Occupational History    Occupation: [employer]   Social Needs    Financial resource strain: None    Food insecurity     Worry: None     Inability: None    Transportation needs     Medical: None     Non-medical: None   Tobacco Use    Smoking status: Never Smoker    Smokeless tobacco: Never Used   Substance and Sexual Activity    Alcohol use: Yes     Frequency: Monthly or less     Drinks per session: 1 or 2     Binge frequency: Never     Comment: occasional    Drug use: Never    Sexual activity: None   Lifestyle    Physical activity     Days per week: None     Minutes per session: None    Stress: None   Relationships    Social connections     Talks on phone: None     Gets together: None     Attends [employer] witness service: None     Active member of club or organization: None     Attends meetings of clubs or organizations: None     Relationship status: None    Intimate partner violence     Fear of current or ex partner: None     Emotionally abused: None     Physically abused: None     Forced sexual activity: None   Other Topics Concern    None   Social History Narrative    · Most recent tobacco use screenin2019      · Do you currently or have you served in the [employer]:   No      · Were you activated, into active duty, as a member of the [employer] or as a Reservist:   No      · Diet:   Regular      · Alcohol intake:   Occasional      · Caffeine intake:   Occasional      · Seat belts used routinely:   Yes      · Smoke alarm in home:   Yes         Current Outpatient Medications:     atorvastatin (LIPITOR) 40 mg tablet, Take 1 tablet by mouth, Disp: , Rfl:     cadexomer iodine (IODOSORB) 0 9 % gel, Apply 1 application topically daily as needed for wound care, Disp: 40 g, Rfl: 0    ciclopirox (LOPROX) 0 77 % cream, Apply topically Twice daily, Disp: , Rfl:     clopidogrel (PLAVIX) 75 mg tablet, Take 1 tablet by mouth daily Last dose 6/21/20, Disp: , Rfl:     collagenase (SANTYL) ointment, Apply topically daily, Disp: 30 g, Rfl: 0    cyanocobalamin (VITAMIN B-12) 100 mcg tablet, Take by mouth, Disp: , Rfl:     glyBURIDE (DIABETA) 5 mg tablet, Take 1 tablet by mouth Twice daily, Disp: , Rfl:     halobetasol (ULTRAVATE) 0 05 % cream, Apply topically 2 (two) times a day, Disp: 50 g, Rfl: 5    insulin NPH-insulin regular (NOVOLIN 70/30) 100 units/mL subcutaneous injection, Inject under the skin Usually 20 units  A m , Disp: , Rfl:     insulin regular (HumuLIN R,NovoLIN R) 100 units/mL injection, Inject under the skin once According to scale, Disp: , Rfl:     mupirocin (BACTROBAN) 2 % ointment, Apply topically 3 (three) times a day, Disp: 22 g, Rfl: 0    nitroglycerin (NITRO-BID) 2 % ointment, Apply 1 inch topically once, Disp: , Rfl:     omeprazole (PriLOSEC) 40 MG capsule, Take 1 capsule (40 mg total) by mouth daily before breakfast, Disp: 30 capsule, Rfl: 2    oxyCODONE-acetaminophen (PERCOCET) 5-325 mg per tablet, Take 1 tablet by mouth every 6 (six) hours as needed for moderate painMax Daily Amount: 4 tablets, Disp: 120 tablet, Rfl: 0    rivaroxaban (Xarelto) 2 5 mg tablet, Take 1 tablet (2 5 mg total) by mouth daily with breakfast Last dose 6/21/20, Disp: 30 tablet, Rfl: 2    Review of Systems   Constitutional: Negative  HENT: Negative  Eyes: Negative  Respiratory: Negative  Cardiovascular: Negative  Gastrointestinal: Negative  Endocrine: Negative  Musculoskeletal: Negative  Skin: Negative for rash  Allergic/Immunologic: Negative  Neurological: Negative  Hematological: Negative  Psychiatric/Behavioral: Negative  Objective:  /85   Pulse 87   Temp 98 7 °F (37 1 °C)   Resp 18     Physical Exam  Vitals signs reviewed  Constitutional:       General: He is not in acute distress  Appearance: Normal appearance  HENT:      Head: Normocephalic and atraumatic  Right Ear: Tympanic membrane, ear canal and external ear normal       Left Ear: Tympanic membrane, ear canal and external ear normal    Eyes:      Extraocular Movements: Extraocular movements intact  Pupils: Pupils are equal, round, and reactive to light  Neck:      Musculoskeletal: Normal range of motion  Cardiovascular:      Rate and Rhythm: Normal rate and regular rhythm  Pulses: Normal pulses  Pulmonary:      Effort: Pulmonary effort is normal       Breath sounds: Normal breath sounds  Musculoskeletal: Normal range of motion  Skin:     General: Skin is warm and dry  Capillary Refill: Capillary refill takes less than 2 seconds  Neurological:      General: No focal deficit present  Mental Status: He is alert and oriented to person, place, and time  Gait: Gait abnormal    Psychiatric:         Mood and Affect: Mood normal          Behavior: Behavior normal          Thought Content: Thought content normal          Judgment: Judgment normal               Procedures     Results from last 6 Months   Lab Units 09/10/20  1700   WOUND CULTURE  Growth in Broth culture only Staphylococcus coagulase negative*      hbo  HBO Qualification & Assessment     Je Alejandra presents today for a consultation for HBO treatment      HBO Indication    Diabetic Lower Extremity Ulcer    Diabetes mellitus is documented as the primary or secondary diagnosis {YES/NO:[id number]    Lower extremity ulcer location R lateral foot    Cristina grade 1    Cristina grade sustaniated by: exam     Debridement has been performed and is documented Yes    Received standard wound care for 30 days prior to HBO Yes    Blood glucose optimized for this patient Yes    Assessment of patient vascular status assessed by     Offloading has been addressed/ordered Yes    Nutritional status optimized Yes     Infection addresses and treated Yes    Use of appropriate moist dressings to maintain clean wound Yes    Despite appropriate wound care, diabetic foot ulcer did not show healing in 4 consecutive weeks  Based on the information included Treva Roman has a medical necessity for HBO and meets the conditions for adjunctive treatment to wound care    No    Brief history of co-morbidities that may affect HBO treatment    Previously treated with No chemotherapy or medications which are contraindications to HBO    Patient reports s/s of No acute infections    Eyes    Patient has  No history of Myopia, Cataracts or Optic Nerve    Ears    Patient has a history of No ear abnormalities or conditions    Ears assessed for tympanic membrane or middle are abnormalities  wnl    Patient instructed on how to clear ears and demonstrate proper technique No    Right ear baseline TEEDS Grade 0    Left ear baseline TEEDS Grade 0    ENT consult for Myringotomy tube insertion due to No consult is needed    Cerumen removal performed N/A  ears clear of cerumen    Neurological    Patient torres a history of seizures No    Cardiovascular    Patient has a history of No cardiac history requiring work-up prior to hyperbaric therapy    EKG ordered No    Echocardiogram ordered No    Ejection fraction     Cardiovascular consult ordered No    Smoking  Social History     Tobacco Use   Smoking Status Never Smoker   Smokeless Tobacco Never Used       Respiratory    Patient has a history of pneumothorax No    Patient has a history of No respiratory conditions requiring further work-up prior to HBO    Lungs clear bilaterally Yes    Pulse ox     Chest x-ray ordered No    Psychiatric    Patient has confinement anxiety No    Patient education and consent    I discussed with and educated the patient on the risks and benefits of HBO, different treatment options, potential adverse side effects and side effects, HBO procedure, smoking/drug/alcohol policy, and itesm not allowed in the hyperbaric chamber  Yes    Written consent for HBO obtained No    A trained emergency response team and ICU is available in this facility to assist with complications if required  Yes    HBO treatment goal      Wound Instructions:  No orders of the defined types were placed in this encounter  Leigh Ann Levine DO    Portions of the record may have been created with voice recognition software  Occasional wrong word or "sound a like" substitutions may have occurred due to the inherent limitations of voice recognition software  Read the chart carefully and recognize, using context, where substitutions have occurred

## 2020-09-25 ENCOUNTER — OFFICE VISIT (OUTPATIENT)
Dept: WOUND CARE | Facility: HOSPITAL | Age: 65
End: 2020-09-25
Payer: COMMERCIAL

## 2020-09-25 VITALS
DIASTOLIC BLOOD PRESSURE: 89 MMHG | HEART RATE: 84 BPM | TEMPERATURE: 98.8 F | SYSTOLIC BLOOD PRESSURE: 137 MMHG | RESPIRATION RATE: 18 BRPM

## 2020-09-25 DIAGNOSIS — E11.42 DIABETIC POLYNEUROPATHY ASSOCIATED WITH TYPE 2 DIABETES MELLITUS (HCC): ICD-10-CM

## 2020-09-25 DIAGNOSIS — E11.8 DIABETIC FOOT (HCC): ICD-10-CM

## 2020-09-25 DIAGNOSIS — M54.9 CHRONIC BACK PAIN, UNSPECIFIED BACK LOCATION, UNSPECIFIED BACK PAIN LATERALITY: ICD-10-CM

## 2020-09-25 DIAGNOSIS — G89.29 CHRONIC BACK PAIN, UNSPECIFIED BACK LOCATION, UNSPECIFIED BACK PAIN LATERALITY: ICD-10-CM

## 2020-09-25 DIAGNOSIS — I73.9 PERIPHERAL VASCULAR DISEASE, UNSPECIFIED (HCC): ICD-10-CM

## 2020-09-25 DIAGNOSIS — L97.512 RIGHT FOOT ULCER, WITH FAT LAYER EXPOSED (HCC): Primary | ICD-10-CM

## 2020-09-25 PROCEDURE — 11042 DBRDMT SUBQ TIS 1ST 20SQCM/<: CPT | Performed by: PODIATRIST

## 2020-09-25 RX ORDER — OXYCODONE HYDROCHLORIDE AND ACETAMINOPHEN 5; 325 MG/1; MG/1
1 TABLET ORAL EVERY 6 HOURS PRN
Qty: 120 TABLET | Refills: 0 | Status: SHIPPED | OUTPATIENT
Start: 2020-09-25 | End: 2020-10-22 | Stop reason: SDUPTHER

## 2020-09-25 RX ADMIN — LIDOCAINE HYDROCHLORIDE 5 ML: 40 SOLUTION TOPICAL at 13:30

## 2020-09-25 NOTE — PATIENT INSTRUCTIONS
Orders Placed This Encounter   Procedures    Wound cleansing and dressings     Wash your hands with soap and water  Remove old dressing, discard into plastic bag and place in trash  Cleanse the wound with soap and water prior to applying a clean dressing  Do not use tissue or cotton balls  Do not scrub the wound  Pat dry using gauze  Shower yes   Apply lotion to skin surrounding wound  Apply medihoney today santyl at home  to the inside of the  wound    Cover with foam egg [place]  Secure with emily  Change dressing daily  This was done today     Standing Status:   Future     Standing Expiration Date:   9/25/2021

## 2020-09-28 RX ORDER — LIDOCAINE HYDROCHLORIDE 40 MG/ML
5 SOLUTION TOPICAL ONCE
Status: COMPLETED | OUTPATIENT
Start: 2020-09-28 | End: 2020-09-25

## 2020-09-28 NOTE — PROGRESS NOTES
Patient ID: Josh Chi is a 71 y o  male Date of Birth 1955     Chief Complaint  Chief Complaint   Patient presents with    Follow Up Wound Care Visit     right foot diabetic ulcer       Allergies  Shellfish-derived products and Sulfamethoxazole-trimethoprim    Assessment/ Plan    Patient evaluated, treatment options discussed with the patient patient continue follow-up with his vascular physician   Continue daily dressing changes  Hyperbaric oxygen consult noted, patient is not a candidate due to insurance coverage criteria  Patient to ambulate in the forefoot offloading shoe gear  Pain management for the management of neuropathy  Excision debridement of wound using 15  Blade   Diagnoses and all orders for this visit:    Right foot ulcer, with fat layer exposed ([facility] )  -     lidocaine (XYLOCAINE) 4 % topical solution 5 mL  -     Wound cleansing and dressings; Future    Diabetic polyneuropathy associated with type 2 diabetes mellitus ([facility] )    Diabetic foot ([facility] )    Peripheral vascular disease, unspecified ([place] [address] )              Debridement   Performed by: physician  Debridement type: surgical  Level of debridement: subcutaneous tissue    Post-debridement measurements  Length (cm): 0 5  Width (cm): 0 4  Depth (cm): 0 3  Percent debrided: 100%  Surface Area (cm^2): 0 2  Area debrided (cm^2): 0 2  Volume (cm^3): 0 06  Tissue and other material debrided: subcutaneous tissue  Devitalized tissue debrided: biofilm, callus, clots, exudate and fibrin  Instrument(s) utilized: blade and curette  Bleeding: medium  Hemostasis obtained with: pressure  Procedural pain (0-10): 0  Post-procedural pain: 2   Response to treatment: procedure was tolerated well                 Subjective:            Diabetic patient, with history PVD, at the [facility] follow-up on right toe extremity ulceration, patient patient report improvement wound size, patient is compliant with dressing changes patient nitroglycerin to his foot    9/4 improvement in wound condition,     9/14 follow up       The following portions of the patient's history were reviewed and updated as appropriate: allergies, current medications, past family history, past medical history, past social history, past surgical history, and problem list     Review of Systems   Constitutional: Negative  Respiratory: Negative  Cardiovascular: Positive for leg swelling  Musculoskeletal: Positive for arthralgias and gait problem  Skin: Positive for rash and wound  Objective:            /89   Pulse 84   Temp 98 8 °F (37 1 °C)   Resp 18     Physical Exam  Constitutional:       General: He is not in acute distress  Appearance: He is well-developed  He is not ill-appearing, toxic-appearing or diaphoretic  HENT:      Head: Normocephalic  Cardiovascular:      Pulses:           Dorsalis pedis pulses are 0 on the right side and 1+ on the left side  Posterior tibial pulses are 0 on the right side and 0 on the left side  Comments: Nonpalpable pedal pulse, CFT is less than 3 seconds, a trophic skin changes noted with skin thinning in shiny, patient report occasional claudication to bilateral calf, localized edema foot and ankle Q9  Pulmonary:      Effort: Pulmonary effort is normal    Musculoskeletal:         General: Tenderness and deformity present  Comments: Pes planus type foot pain on palpation and range of motion of the 1st MPJ, metatarsal heads bilateral foot, pain on palpation on range of motion of the ST joint, crepitus noted in midfoot joint  Skin:     General: Skin is dry  Capillary Refill: Capillary refill takes 2 to 3 seconds  Comments: Wound description in wound analysis   Neurological:      Mental Status: He is alert and oriented to person, place, and time  Sensory: Sensory deficit present  Motor: Atrophy present        Deep Tendon Reflexes: Reflexes abnormal            Wound 07/24/20 Foot Right;Lateral (Active) Wound Image   09/25/20 1335   Wound Description Slough;Granulation tissue 09/25/20 1335   Dominga-wound Assessment Intact;Dry;Clean 09/25/20 1335   Wound Length (cm) 0 5 cm 09/25/20 1335   Wound Width (cm) 0 4 cm 09/25/20 1335   Wound Depth (cm) 0 3 cm 09/25/20 1335   Wound Surface Area (cm^2) 0 2 cm^2 09/25/20 1335   Wound Volume (cm^3) 0 06 cm^3 09/25/20 1335   Calculated Wound Volume (cm^3) 0 06 cm^3 09/25/20 1335   Drainage Amount Small 09/25/20 1335   Drainage Description Clear 09/25/20 1335   Non-staged Wound Description Full thickness 09/25/20 1335   Treatments Cleansed 09/25/20 1335   Wound packed? No 09/25/20 1335   Patient Tolerance Tolerated well 09/25/20 1335   Dressing Status Dry; Intact 09/18/20 1321                       Wound 07/24/20 Foot Right;Lateral (Active)   Wound Image   09/18/20 1344   Wound Description Slough;Brown;Black 09/18/20 1321   Dominga-wound Assessment Dry 09/18/20 1321   Wound Length (cm) 0 6 cm 09/18/20 1321   Wound Width (cm) 0 4 cm 09/18/20 1321   Wound Depth (cm) 0 cm 09/18/20 1321   Wound Surface Area (cm^2) 0 24 cm^2 09/18/20 1321   Wound Volume (cm^3) 0 cm^3 09/18/20 1321   Calculated Wound Volume (cm^3) 0 cm^3 09/18/20 1321   Drainage Amount None 09/18/20 1321   Non-staged Wound Description Full thickness 09/18/20 1321   Treatments Cleansed 09/18/20 1321   Dressing Status Dry; Intact 09/18/20 1321                       Wound Instructions:  Orders Placed This Encounter   Procedures    Wound cleansing and dressings     Wash your hands with soap and water  Remove old dressing, discard into plastic bag and place in trash  Cleanse the wound with soap and water prior to applying a clean dressing  Do not use tissue or cotton balls  Do not scrub the wound  Pat dry using gauze  Shower yes   Apply lotion to skin surrounding wound  Apply medihoney today santyl at home  to the inside of the  wound    Cover with foam egg [place]  Secure with emily  Change dressing daily  This was done today     Standing Status:   Future     Standing Expiration Date:   9/25/2021

## 2020-10-02 ENCOUNTER — OFFICE VISIT (OUTPATIENT)
Dept: WOUND CARE | Facility: HOSPITAL | Age: 65
End: 2020-10-02
Payer: COMMERCIAL

## 2020-10-02 VITALS
RESPIRATION RATE: 18 BRPM | SYSTOLIC BLOOD PRESSURE: 168 MMHG | TEMPERATURE: 98.3 F | HEART RATE: 85 BPM | DIASTOLIC BLOOD PRESSURE: 87 MMHG

## 2020-10-02 DIAGNOSIS — E11.621 DIABETIC ULCER OF TOE OF RIGHT FOOT ASSOCIATED WITH TYPE 2 DIABETES MELLITUS, WITH FAT LAYER EXPOSED (HCC): ICD-10-CM

## 2020-10-02 DIAGNOSIS — I73.9 PERIPHERAL VASCULAR DISEASE, UNSPECIFIED (HCC): ICD-10-CM

## 2020-10-02 DIAGNOSIS — L97.512 RIGHT FOOT ULCER, WITH FAT LAYER EXPOSED (HCC): ICD-10-CM

## 2020-10-02 DIAGNOSIS — E11.8 DIABETIC FOOT (HCC): ICD-10-CM

## 2020-10-02 DIAGNOSIS — M77.51 CAPSULITIS OF METATARSOPHALANGEAL (MTP) JOINT OF RIGHT FOOT: Primary | ICD-10-CM

## 2020-10-02 DIAGNOSIS — E11.42 DIABETIC POLYNEUROPATHY ASSOCIATED WITH TYPE 2 DIABETES MELLITUS (HCC): ICD-10-CM

## 2020-10-02 DIAGNOSIS — L97.512 DIABETIC ULCER OF TOE OF RIGHT FOOT ASSOCIATED WITH TYPE 2 DIABETES MELLITUS, WITH FAT LAYER EXPOSED (HCC): ICD-10-CM

## 2020-10-02 PROCEDURE — 11042 DBRDMT SUBQ TIS 1ST 20SQCM/<: CPT | Performed by: PODIATRIST

## 2020-10-02 PROCEDURE — 99213 OFFICE O/P EST LOW 20 MIN: CPT | Performed by: PODIATRIST

## 2020-10-02 RX ORDER — LIDOCAINE HYDROCHLORIDE 40 MG/ML
5 SOLUTION TOPICAL ONCE
Status: COMPLETED | OUTPATIENT
Start: 2020-10-02 | End: 2020-10-02

## 2020-10-02 RX ADMIN — LIDOCAINE HYDROCHLORIDE 5 ML: 40 SOLUTION TOPICAL at 14:26

## 2020-10-08 DIAGNOSIS — I48.91 ATRIAL FIBRILLATION, UNSPECIFIED TYPE (HCC): Primary | ICD-10-CM

## 2020-10-08 DIAGNOSIS — I73.9 PVD (PERIPHERAL VASCULAR DISEASE) (HCC): ICD-10-CM

## 2020-10-08 RX ORDER — CLOPIDOGREL BISULFATE 75 MG/1
TABLET ORAL
Qty: 90 TABLET | Refills: 0 | Status: SHIPPED | OUTPATIENT
Start: 2020-10-08 | End: 2021-01-07 | Stop reason: SDUPTHER

## 2020-10-13 ENCOUNTER — TELEPHONE (OUTPATIENT)
Dept: PODIATRY | Facility: CLINIC | Age: 65
End: 2020-10-13

## 2020-10-13 DIAGNOSIS — L03.119 CELLULITIS OF FOOT: Primary | ICD-10-CM

## 2020-10-13 RX ORDER — CLINDAMYCIN HYDROCHLORIDE 300 MG/1
300 CAPSULE ORAL 4 TIMES DAILY
Qty: 40 CAPSULE | Refills: 0 | Status: SHIPPED | OUTPATIENT
Start: 2020-10-13 | End: 2020-10-23

## 2020-10-15 ENCOUNTER — OFFICE VISIT (OUTPATIENT)
Dept: PODIATRY | Facility: CLINIC | Age: 65
End: 2020-10-15
Payer: COMMERCIAL

## 2020-10-15 VITALS — HEIGHT: 64 IN | BODY MASS INDEX: 32.44 KG/M2 | RESPIRATION RATE: 16 BRPM | WEIGHT: 190 LBS

## 2020-10-15 DIAGNOSIS — L03.119 CELLULITIS OF FOOT: Primary | ICD-10-CM

## 2020-10-15 DIAGNOSIS — I70.209 PERIPHERAL ARTERIOSCLEROSIS (HCC): ICD-10-CM

## 2020-10-15 DIAGNOSIS — E11.42 DIABETIC POLYNEUROPATHY ASSOCIATED WITH TYPE 2 DIABETES MELLITUS (HCC): ICD-10-CM

## 2020-10-15 PROCEDURE — 99213 OFFICE O/P EST LOW 20 MIN: CPT | Performed by: PODIATRIST

## 2020-10-16 ENCOUNTER — OFFICE VISIT (OUTPATIENT)
Dept: WOUND CARE | Facility: HOSPITAL | Age: 65
End: 2020-10-16
Payer: COMMERCIAL

## 2020-10-16 VITALS
TEMPERATURE: 98.7 F | SYSTOLIC BLOOD PRESSURE: 151 MMHG | RESPIRATION RATE: 16 BRPM | HEART RATE: 84 BPM | DIASTOLIC BLOOD PRESSURE: 80 MMHG

## 2020-10-16 DIAGNOSIS — E11.8 DIABETIC FOOT (HCC): Primary | ICD-10-CM

## 2020-10-16 DIAGNOSIS — I73.9 PERIPHERAL VASCULAR DISEASE, UNSPECIFIED (HCC): ICD-10-CM

## 2020-10-16 DIAGNOSIS — E11.42 DIABETIC POLYNEUROPATHY ASSOCIATED WITH TYPE 2 DIABETES MELLITUS (HCC): ICD-10-CM

## 2020-10-16 DIAGNOSIS — L97.512 RIGHT FOOT ULCER, WITH FAT LAYER EXPOSED (HCC): ICD-10-CM

## 2020-10-16 DIAGNOSIS — L03.115 CELLULITIS OF RIGHT FOOT: ICD-10-CM

## 2020-10-16 PROCEDURE — 99213 OFFICE O/P EST LOW 20 MIN: CPT | Performed by: PODIATRIST

## 2020-10-16 RX ORDER — LIDOCAINE HYDROCHLORIDE 40 MG/ML
5 SOLUTION TOPICAL ONCE
Status: COMPLETED | OUTPATIENT
Start: 2020-10-16 | End: 2020-10-16

## 2020-10-16 RX ADMIN — LIDOCAINE HYDROCHLORIDE 5 ML: 40 SOLUTION TOPICAL at 13:54

## 2020-10-18 LAB
BACTERIA SPEC AEROBE CULT: ABNORMAL
Lab: ABNORMAL
SL AMB ANTIMICROBIAL SUSCEPTIBILITY: ABNORMAL

## 2020-10-20 ENCOUNTER — TELEPHONE (OUTPATIENT)
Dept: FAMILY MEDICINE CLINIC | Facility: CLINIC | Age: 65
End: 2020-10-20

## 2020-10-22 DIAGNOSIS — G89.29 CHRONIC BACK PAIN, UNSPECIFIED BACK LOCATION, UNSPECIFIED BACK PAIN LATERALITY: ICD-10-CM

## 2020-10-22 DIAGNOSIS — M54.9 CHRONIC BACK PAIN, UNSPECIFIED BACK LOCATION, UNSPECIFIED BACK PAIN LATERALITY: ICD-10-CM

## 2020-10-22 RX ORDER — OXYCODONE HYDROCHLORIDE AND ACETAMINOPHEN 5; 325 MG/1; MG/1
1 TABLET ORAL EVERY 6 HOURS PRN
Qty: 120 TABLET | Refills: 0 | Status: SHIPPED | OUTPATIENT
Start: 2020-10-22 | End: 2020-11-17 | Stop reason: SDUPTHER

## 2020-10-23 ENCOUNTER — OFFICE VISIT (OUTPATIENT)
Dept: WOUND CARE | Facility: HOSPITAL | Age: 65
End: 2020-10-23
Payer: COMMERCIAL

## 2020-10-23 VITALS
TEMPERATURE: 98.4 F | SYSTOLIC BLOOD PRESSURE: 168 MMHG | DIASTOLIC BLOOD PRESSURE: 89 MMHG | RESPIRATION RATE: 16 BRPM | HEART RATE: 85 BPM

## 2020-10-23 DIAGNOSIS — L97.512 RIGHT FOOT ULCER, WITH FAT LAYER EXPOSED (HCC): Primary | ICD-10-CM

## 2020-10-23 DIAGNOSIS — I73.9 PERIPHERAL VASCULAR DISEASE, UNSPECIFIED (HCC): ICD-10-CM

## 2020-10-23 DIAGNOSIS — E11.42 DIABETIC POLYNEUROPATHY ASSOCIATED WITH TYPE 2 DIABETES MELLITUS (HCC): ICD-10-CM

## 2020-10-23 DIAGNOSIS — L03.115 CELLULITIS OF RIGHT FOOT: ICD-10-CM

## 2020-10-23 PROCEDURE — 87070 CULTURE OTHR SPECIMN AEROBIC: CPT | Performed by: PODIATRIST

## 2020-10-23 PROCEDURE — 87205 SMEAR GRAM STAIN: CPT | Performed by: PODIATRIST

## 2020-10-23 PROCEDURE — 99213 OFFICE O/P EST LOW 20 MIN: CPT | Performed by: PODIATRIST

## 2020-10-23 PROCEDURE — 87147 CULTURE TYPE IMMUNOLOGIC: CPT | Performed by: PODIATRIST

## 2020-10-23 PROCEDURE — 87186 SC STD MICRODIL/AGAR DIL: CPT | Performed by: PODIATRIST

## 2020-10-23 RX ORDER — LIDOCAINE HYDROCHLORIDE 40 MG/ML
5 SOLUTION TOPICAL ONCE
Status: COMPLETED | OUTPATIENT
Start: 2020-10-23 | End: 2020-10-23

## 2020-10-23 RX ADMIN — LIDOCAINE HYDROCHLORIDE 5 ML: 40 SOLUTION TOPICAL at 13:42

## 2020-10-26 LAB
BACTERIA WND AEROBE CULT: ABNORMAL
GRAM STN SPEC: ABNORMAL

## 2020-11-04 DIAGNOSIS — K21.9 GASTROESOPHAGEAL REFLUX DISEASE WITHOUT ESOPHAGITIS: Primary | ICD-10-CM

## 2020-11-04 RX ORDER — PANTOPRAZOLE SODIUM 40 MG/1
40 TABLET, DELAYED RELEASE ORAL DAILY
Qty: 90 TABLET | Refills: 1 | Status: SHIPPED | OUTPATIENT
Start: 2020-11-04 | End: 2021-10-20 | Stop reason: SDUPTHER

## 2020-11-05 ENCOUNTER — TELEPHONE (OUTPATIENT)
Dept: FAMILY MEDICINE CLINIC | Facility: CLINIC | Age: 65
End: 2020-11-05

## 2020-11-05 ENCOUNTER — IMMUNIZATIONS (OUTPATIENT)
Dept: FAMILY MEDICINE CLINIC | Facility: CLINIC | Age: 65
End: 2020-11-05
Payer: COMMERCIAL

## 2020-11-05 VITALS
HEIGHT: 64 IN | BODY MASS INDEX: 33.12 KG/M2 | TEMPERATURE: 98.6 F | SYSTOLIC BLOOD PRESSURE: 160 MMHG | HEART RATE: 78 BPM | WEIGHT: 194 LBS | DIASTOLIC BLOOD PRESSURE: 80 MMHG | RESPIRATION RATE: 14 BRPM | OXYGEN SATURATION: 98 %

## 2020-11-05 DIAGNOSIS — Z23 ENCOUNTER FOR IMMUNIZATION: Primary | ICD-10-CM

## 2020-11-05 DIAGNOSIS — E11.52 TYPE 2 DIABETES MELLITUS WITH DIABETIC PERIPHERAL ANGIOPATHY AND GANGRENE, WITH LONG-TERM CURRENT USE OF INSULIN (HCC): Primary | ICD-10-CM

## 2020-11-05 DIAGNOSIS — Z79.4 TYPE 2 DIABETES MELLITUS WITH DIABETIC PERIPHERAL ANGIOPATHY AND GANGRENE, WITH LONG-TERM CURRENT USE OF INSULIN (HCC): Primary | ICD-10-CM

## 2020-11-05 PROCEDURE — 90662 IIV NO PRSV INCREASED AG IM: CPT

## 2020-11-05 PROCEDURE — G0008 ADMIN INFLUENZA VIRUS VAC: HCPCS

## 2020-11-05 RX ORDER — SYRINGE AND NEEDLE,INSULIN,1ML 31 GX5/16"
SYRINGE, EMPTY DISPOSABLE MISCELLANEOUS
COMMUNITY
Start: 2020-10-28 | End: 2021-06-02 | Stop reason: SDUPTHER

## 2020-11-05 RX ORDER — BLOOD-GLUCOSE METER
EACH MISCELLANEOUS 2 TIMES DAILY
Qty: 1 KIT | Refills: 1 | Status: SHIPPED | OUTPATIENT
Start: 2020-11-05

## 2020-11-05 RX ORDER — BLOOD SUGAR DIAGNOSTIC
STRIP MISCELLANEOUS
Qty: 100 EACH | Refills: 3 | Status: SHIPPED | OUTPATIENT
Start: 2020-11-05 | End: 2020-12-16 | Stop reason: SDUPTHER

## 2020-11-05 RX ORDER — LANCETS 30 GAUGE
1 EACH MISCELLANEOUS 2 TIMES DAILY
Qty: 100 EACH | Refills: 2 | Status: SHIPPED | OUTPATIENT
Start: 2020-11-05

## 2020-11-06 ENCOUNTER — OFFICE VISIT (OUTPATIENT)
Dept: WOUND CARE | Facility: HOSPITAL | Age: 65
End: 2020-11-06
Payer: COMMERCIAL

## 2020-11-06 VITALS
SYSTOLIC BLOOD PRESSURE: 163 MMHG | DIASTOLIC BLOOD PRESSURE: 80 MMHG | HEART RATE: 88 BPM | RESPIRATION RATE: 18 BRPM | TEMPERATURE: 99 F

## 2020-11-06 DIAGNOSIS — I73.9 PERIPHERAL VASCULAR DISEASE, UNSPECIFIED (HCC): ICD-10-CM

## 2020-11-06 DIAGNOSIS — L97.512 RIGHT FOOT ULCER, WITH FAT LAYER EXPOSED (HCC): Primary | ICD-10-CM

## 2020-11-06 DIAGNOSIS — E11.42 DIABETIC POLYNEUROPATHY ASSOCIATED WITH TYPE 2 DIABETES MELLITUS (HCC): ICD-10-CM

## 2020-11-06 PROCEDURE — 97597 DBRDMT OPN WND 1ST 20 CM/<: CPT | Performed by: PODIATRIST

## 2020-11-06 RX ORDER — LIDOCAINE HYDROCHLORIDE 40 MG/ML
5 SOLUTION TOPICAL ONCE
Status: COMPLETED | OUTPATIENT
Start: 2020-11-06 | End: 2020-11-06

## 2020-11-06 RX ADMIN — LIDOCAINE HYDROCHLORIDE 5 ML: 40 SOLUTION TOPICAL at 13:59

## 2020-11-16 ENCOUNTER — TRANSCRIBE ORDERS (OUTPATIENT)
Dept: ADMINISTRATIVE | Facility: HOSPITAL | Age: 65
End: 2020-11-16

## 2020-11-16 ENCOUNTER — HOSPITAL ENCOUNTER (OUTPATIENT)
Dept: RADIOLOGY | Facility: HOSPITAL | Age: 65
Discharge: HOME | End: 2020-11-16
Attending: PODIATRIST
Payer: COMMERCIAL

## 2020-11-16 DIAGNOSIS — L97.512 RIGHT FOOT ULCER, WITH FAT LAYER EXPOSED (HCC): ICD-10-CM

## 2020-11-16 PROCEDURE — 73630 X-RAY EXAM OF FOOT: CPT

## 2020-11-17 DIAGNOSIS — M54.9 CHRONIC BACK PAIN, UNSPECIFIED BACK LOCATION, UNSPECIFIED BACK PAIN LATERALITY: ICD-10-CM

## 2020-11-17 DIAGNOSIS — G89.29 CHRONIC BACK PAIN, UNSPECIFIED BACK LOCATION, UNSPECIFIED BACK PAIN LATERALITY: ICD-10-CM

## 2020-11-18 RX ORDER — OXYCODONE HYDROCHLORIDE AND ACETAMINOPHEN 5; 325 MG/1; MG/1
1 TABLET ORAL EVERY 6 HOURS PRN
Qty: 120 TABLET | Refills: 0 | Status: SHIPPED | OUTPATIENT
Start: 2020-11-18 | End: 2020-12-16 | Stop reason: SDUPTHER

## 2020-11-20 ENCOUNTER — OFFICE VISIT (OUTPATIENT)
Dept: WOUND CARE | Facility: HOSPITAL | Age: 65
End: 2020-11-20
Payer: COMMERCIAL

## 2020-11-20 VITALS
RESPIRATION RATE: 16 BRPM | TEMPERATURE: 97.4 F | HEART RATE: 77 BPM | DIASTOLIC BLOOD PRESSURE: 77 MMHG | SYSTOLIC BLOOD PRESSURE: 157 MMHG

## 2020-11-20 DIAGNOSIS — L97.512 RIGHT FOOT ULCER, WITH FAT LAYER EXPOSED (HCC): Primary | ICD-10-CM

## 2020-11-20 DIAGNOSIS — E11.8 DIABETIC FOOT (HCC): ICD-10-CM

## 2020-11-20 DIAGNOSIS — E11.42 DIABETIC POLYNEUROPATHY ASSOCIATED WITH TYPE 2 DIABETES MELLITUS (HCC): ICD-10-CM

## 2020-11-20 DIAGNOSIS — I73.9 PERIPHERAL VASCULAR DISEASE, UNSPECIFIED (HCC): ICD-10-CM

## 2020-11-20 PROCEDURE — 97597 DBRDMT OPN WND 1ST 20 CM/<: CPT | Performed by: PODIATRIST

## 2020-11-20 RX ORDER — LIDOCAINE HYDROCHLORIDE 40 MG/ML
5 SOLUTION TOPICAL ONCE
Status: COMPLETED | OUTPATIENT
Start: 2020-11-20 | End: 2020-11-20

## 2020-11-20 RX ADMIN — LIDOCAINE HYDROCHLORIDE 5 ML: 40 SOLUTION TOPICAL at 13:20

## 2020-12-04 ENCOUNTER — OFFICE VISIT (OUTPATIENT)
Dept: WOUND CARE | Facility: HOSPITAL | Age: 65
End: 2020-12-04
Payer: COMMERCIAL

## 2020-12-04 VITALS
DIASTOLIC BLOOD PRESSURE: 81 MMHG | RESPIRATION RATE: 17 BRPM | TEMPERATURE: 97.5 F | SYSTOLIC BLOOD PRESSURE: 156 MMHG | HEART RATE: 87 BPM

## 2020-12-04 DIAGNOSIS — E11.42 DIABETIC POLYNEUROPATHY ASSOCIATED WITH TYPE 2 DIABETES MELLITUS (HCC): ICD-10-CM

## 2020-12-04 DIAGNOSIS — L97.512 RIGHT FOOT ULCER, WITH FAT LAYER EXPOSED (HCC): Primary | ICD-10-CM

## 2020-12-04 DIAGNOSIS — I73.9 PERIPHERAL VASCULAR DISEASE, UNSPECIFIED (HCC): ICD-10-CM

## 2020-12-04 PROCEDURE — 97597 DBRDMT OPN WND 1ST 20 CM/<: CPT | Performed by: PODIATRIST

## 2020-12-04 PROCEDURE — 99212 OFFICE O/P EST SF 10 MIN: CPT | Performed by: PODIATRIST

## 2020-12-04 RX ORDER — LIDOCAINE HYDROCHLORIDE 40 MG/ML
5 SOLUTION TOPICAL ONCE
Status: COMPLETED | OUTPATIENT
Start: 2020-12-04 | End: 2020-12-04

## 2020-12-04 RX ADMIN — LIDOCAINE HYDROCHLORIDE 5 ML: 40 SOLUTION TOPICAL at 13:33

## 2020-12-08 ENCOUNTER — VBI (OUTPATIENT)
Dept: ADMINISTRATIVE | Facility: OTHER | Age: 65
End: 2020-12-08

## 2020-12-09 ENCOUNTER — VBI (OUTPATIENT)
Dept: ADMINISTRATIVE | Facility: OTHER | Age: 65
End: 2020-12-09

## 2020-12-10 ENCOUNTER — PREP FOR PROCEDURE (OUTPATIENT)
Dept: PODIATRY | Facility: CLINIC | Age: 65
End: 2020-12-10

## 2020-12-10 ENCOUNTER — TELEPHONE (OUTPATIENT)
Dept: FAMILY MEDICINE CLINIC | Facility: CLINIC | Age: 65
End: 2020-12-10

## 2020-12-10 DIAGNOSIS — L97.512 RIGHT FOOT ULCER, WITH FAT LAYER EXPOSED (HCC): Primary | ICD-10-CM

## 2020-12-11 ENCOUNTER — OFFICE VISIT (OUTPATIENT)
Dept: LAB | Facility: HOSPITAL | Age: 65
End: 2020-12-11
Attending: PODIATRIST
Payer: COMMERCIAL

## 2020-12-11 ENCOUNTER — HOSPITAL ENCOUNTER (OUTPATIENT)
Dept: RADIOLOGY | Facility: HOSPITAL | Age: 65
Discharge: HOME | End: 2020-12-11
Attending: PODIATRIST
Payer: COMMERCIAL

## 2020-12-11 ENCOUNTER — OFFICE VISIT (OUTPATIENT)
Dept: PODIATRY | Facility: CLINIC | Age: 65
End: 2020-12-11
Payer: COMMERCIAL

## 2020-12-11 ENCOUNTER — TRANSCRIBE ORDERS (OUTPATIENT)
Dept: ADMINISTRATIVE | Facility: HOSPITAL | Age: 65
End: 2020-12-11

## 2020-12-11 ENCOUNTER — APPOINTMENT (OUTPATIENT)
Dept: LAB | Facility: HOSPITAL | Age: 65
End: 2020-12-11
Attending: PODIATRIST
Payer: COMMERCIAL

## 2020-12-11 VITALS
BODY MASS INDEX: 31.92 KG/M2 | SYSTOLIC BLOOD PRESSURE: 144 MMHG | DIASTOLIC BLOOD PRESSURE: 85 MMHG | WEIGHT: 187 LBS | HEART RATE: 80 BPM | RESPIRATION RATE: 16 BRPM | HEIGHT: 64 IN

## 2020-12-11 DIAGNOSIS — L97.512 RIGHT FOOT ULCER, WITH FAT LAYER EXPOSED (HCC): ICD-10-CM

## 2020-12-11 DIAGNOSIS — I70.209 PERIPHERAL ARTERIOSCLEROSIS (HCC): ICD-10-CM

## 2020-12-11 DIAGNOSIS — E11.621 DIABETIC ULCER OF RIGHT MIDFOOT ASSOCIATED WITH TYPE 2 DIABETES MELLITUS, WITH FAT LAYER EXPOSED (HCC): Primary | ICD-10-CM

## 2020-12-11 DIAGNOSIS — M79.671 RIGHT FOOT PAIN: ICD-10-CM

## 2020-12-11 DIAGNOSIS — L97.412 DIABETIC ULCER OF RIGHT MIDFOOT ASSOCIATED WITH TYPE 2 DIABETES MELLITUS, WITH FAT LAYER EXPOSED (HCC): Primary | ICD-10-CM

## 2020-12-11 DIAGNOSIS — Z89.432 HISTORY OF AMPUTATION OF LEFT FOOT THROUGH METATARSAL BONE (HCC): ICD-10-CM

## 2020-12-11 LAB
ALBUMIN SERPL BCP-MCNC: 3.9 G/DL (ref 3.5–5)
ALP SERPL-CCNC: 65 U/L (ref 46–116)
ALT SERPL W P-5'-P-CCNC: 45 U/L (ref 12–78)
ANION GAP SERPL CALCULATED.3IONS-SCNC: 8 MMOL/L (ref 4–13)
APTT PPP: 33 SECONDS (ref 23–37)
AST SERPL W P-5'-P-CCNC: 10 U/L (ref 5–45)
BASOPHILS # BLD AUTO: 0.01 THOUSANDS/ΜL (ref 0–0.1)
BASOPHILS NFR BLD AUTO: 0 % (ref 0–1)
BILIRUB SERPL-MCNC: 0.4 MG/DL (ref 0.2–1)
BUN SERPL-MCNC: 12 MG/DL (ref 5–25)
CALCIUM SERPL-MCNC: 9 MG/DL (ref 8.3–10.1)
CHLORIDE SERPL-SCNC: 99 MMOL/L (ref 100–108)
CO2 SERPL-SCNC: 30 MMOL/L (ref 21–32)
CREAT SERPL-MCNC: 0.98 MG/DL (ref 0.6–1.3)
EOSINOPHIL # BLD AUTO: 0.15 THOUSAND/ΜL (ref 0–0.61)
EOSINOPHIL NFR BLD AUTO: 3 % (ref 0–6)
ERYTHROCYTE [DISTWIDTH] IN BLOOD BY AUTOMATED COUNT: 12.1 % (ref 11.6–15.1)
ERYTHROCYTE [SEDIMENTATION RATE] IN BLOOD: 7 MM/HOUR (ref 0–19)
GFR SERPL CREATININE-BSD FRML MDRD: 80 ML/MIN/1.73SQ M
GLUCOSE SERPL-MCNC: 206 MG/DL (ref 65–140)
HCT VFR BLD AUTO: 52.4 % (ref 36.5–49.3)
HGB BLD-MCNC: 16.9 G/DL (ref 12–17)
IMM GRANULOCYTES # BLD AUTO: 0.03 THOUSAND/UL (ref 0–0.2)
IMM GRANULOCYTES NFR BLD AUTO: 1 % (ref 0–2)
INR PPP: 1.07 (ref 0.84–1.19)
LYMPHOCYTES # BLD AUTO: 2.08 THOUSANDS/ΜL (ref 0.6–4.47)
LYMPHOCYTES NFR BLD AUTO: 37 % (ref 14–44)
MCH RBC QN AUTO: 31.1 PG (ref 26.8–34.3)
MCHC RBC AUTO-ENTMCNC: 32.3 G/DL (ref 31.4–37.4)
MCV RBC AUTO: 96 FL (ref 82–98)
MONOCYTES # BLD AUTO: 0.43 THOUSAND/ΜL (ref 0.17–1.22)
MONOCYTES NFR BLD AUTO: 8 % (ref 4–12)
NEUTROPHILS # BLD AUTO: 2.98 THOUSANDS/ΜL (ref 1.85–7.62)
NEUTS SEG NFR BLD AUTO: 51 % (ref 43–75)
NRBC BLD AUTO-RTO: 0 /100 WBCS
PLATELET # BLD AUTO: 174 THOUSANDS/UL (ref 149–390)
PMV BLD AUTO: 10.3 FL (ref 8.9–12.7)
POTASSIUM SERPL-SCNC: 4.5 MMOL/L (ref 3.5–5.3)
PROT SERPL-MCNC: 8.2 G/DL (ref 6.4–8.2)
PROTHROMBIN TIME: 13.8 SECONDS (ref 11.6–14.5)
RBC # BLD AUTO: 5.44 MILLION/UL (ref 3.88–5.62)
SODIUM SERPL-SCNC: 137 MMOL/L (ref 136–145)
WBC # BLD AUTO: 5.68 THOUSAND/UL (ref 4.31–10.16)

## 2020-12-11 PROCEDURE — 85610 PROTHROMBIN TIME: CPT

## 2020-12-11 PROCEDURE — 93005 ELECTROCARDIOGRAM TRACING: CPT

## 2020-12-11 PROCEDURE — 85652 RBC SED RATE AUTOMATED: CPT

## 2020-12-11 PROCEDURE — 71046 X-RAY EXAM CHEST 2 VIEWS: CPT

## 2020-12-11 PROCEDURE — U0003 INFECTIOUS AGENT DETECTION BY NUCLEIC ACID (DNA OR RNA); SEVERE ACUTE RESPIRATORY SYNDROME CORONAVIRUS 2 (SARS-COV-2) (CORONAVIRUS DISEASE [COVID-19]), AMPLIFIED PROBE TECHNIQUE, MAKING USE OF HIGH THROUGHPUT TECHNOLOGIES AS DESCRIBED BY CMS-2020-01-R: HCPCS | Performed by: PODIATRIST

## 2020-12-11 PROCEDURE — 36415 COLL VENOUS BLD VENIPUNCTURE: CPT

## 2020-12-11 PROCEDURE — 85025 COMPLETE CBC W/AUTO DIFF WBC: CPT

## 2020-12-11 PROCEDURE — 85730 THROMBOPLASTIN TIME PARTIAL: CPT

## 2020-12-11 PROCEDURE — 80053 COMPREHEN METABOLIC PANEL: CPT

## 2020-12-11 PROCEDURE — 99213 OFFICE O/P EST LOW 20 MIN: CPT | Performed by: PODIATRIST

## 2020-12-12 LAB — SARS-COV-2 RNA SPEC QL NAA+PROBE: NOT DETECTED

## 2020-12-14 ENCOUNTER — PREP FOR PROCEDURE (OUTPATIENT)
Dept: PODIATRY | Facility: CLINIC | Age: 65
End: 2020-12-14

## 2020-12-14 DIAGNOSIS — L97.512 RIGHT FOOT ULCER, WITH FAT LAYER EXPOSED (HCC): Primary | ICD-10-CM

## 2020-12-14 DIAGNOSIS — L97.512 RIGHT FOOT ULCER, WITH FAT LAYER EXPOSED (HCC): ICD-10-CM

## 2020-12-14 LAB
ATRIAL RATE: 69 BPM
P AXIS: 21 DEGREES
PR INTERVAL: 220 MS
QRS AXIS: 0 DEGREES
QRSD INTERVAL: 84 MS
QT INTERVAL: 408 MS
QTC INTERVAL: 437 MS
T WAVE AXIS: 71 DEGREES
VENTRICULAR RATE: 69 BPM

## 2020-12-14 PROCEDURE — U0003 INFECTIOUS AGENT DETECTION BY NUCLEIC ACID (DNA OR RNA); SEVERE ACUTE RESPIRATORY SYNDROME CORONAVIRUS 2 (SARS-COV-2) (CORONAVIRUS DISEASE [COVID-19]), AMPLIFIED PROBE TECHNIQUE, MAKING USE OF HIGH THROUGHPUT TECHNOLOGIES AS DESCRIBED BY CMS-2020-01-R: HCPCS | Performed by: PODIATRIST

## 2020-12-15 LAB — SARS-COV-2 RNA SPEC QL NAA+PROBE: NOT DETECTED

## 2020-12-16 ENCOUNTER — OFFICE VISIT (OUTPATIENT)
Dept: FAMILY MEDICINE CLINIC | Facility: CLINIC | Age: 65
End: 2020-12-16
Payer: COMMERCIAL

## 2020-12-16 VITALS
OXYGEN SATURATION: 98 % | HEART RATE: 74 BPM | SYSTOLIC BLOOD PRESSURE: 124 MMHG | HEIGHT: 64 IN | BODY MASS INDEX: 33.97 KG/M2 | RESPIRATION RATE: 16 BRPM | TEMPERATURE: 98 F | WEIGHT: 199 LBS | DIASTOLIC BLOOD PRESSURE: 78 MMHG

## 2020-12-16 DIAGNOSIS — G89.29 CHRONIC BACK PAIN, UNSPECIFIED BACK LOCATION, UNSPECIFIED BACK PAIN LATERALITY: ICD-10-CM

## 2020-12-16 DIAGNOSIS — M54.9 CHRONIC BACK PAIN, UNSPECIFIED BACK LOCATION, UNSPECIFIED BACK PAIN LATERALITY: ICD-10-CM

## 2020-12-16 DIAGNOSIS — E11.42 DIABETIC POLYNEUROPATHY ASSOCIATED WITH TYPE 2 DIABETES MELLITUS (HCC): Primary | ICD-10-CM

## 2020-12-16 DIAGNOSIS — M79.672 PAIN IN BOTH FEET: ICD-10-CM

## 2020-12-16 DIAGNOSIS — Z79.4 TYPE 2 DIABETES MELLITUS WITH DIABETIC PERIPHERAL ANGIOPATHY AND GANGRENE, WITH LONG-TERM CURRENT USE OF INSULIN (HCC): ICD-10-CM

## 2020-12-16 DIAGNOSIS — M79.671 PAIN IN BOTH FEET: ICD-10-CM

## 2020-12-16 DIAGNOSIS — E11.52 TYPE 2 DIABETES MELLITUS WITH DIABETIC PERIPHERAL ANGIOPATHY AND GANGRENE, WITH LONG-TERM CURRENT USE OF INSULIN (HCC): ICD-10-CM

## 2020-12-16 DIAGNOSIS — I70.209 PERIPHERAL ARTERIOSCLEROSIS (HCC): ICD-10-CM

## 2020-12-16 PROCEDURE — 99214 OFFICE O/P EST MOD 30 MIN: CPT | Performed by: FAMILY MEDICINE

## 2020-12-16 PROCEDURE — 1160F RVW MEDS BY RX/DR IN RCRD: CPT | Performed by: FAMILY MEDICINE

## 2020-12-16 PROCEDURE — 3008F BODY MASS INDEX DOCD: CPT | Performed by: FAMILY MEDICINE

## 2020-12-16 PROCEDURE — 1036F TOBACCO NON-USER: CPT | Performed by: FAMILY MEDICINE

## 2020-12-16 RX ORDER — BLOOD SUGAR DIAGNOSTIC
STRIP MISCELLANEOUS
Qty: 400 EACH | Refills: 3 | Status: SHIPPED | OUTPATIENT
Start: 2020-12-16 | End: 2022-04-08 | Stop reason: SDUPTHER

## 2020-12-16 RX ORDER — OXYCODONE HYDROCHLORIDE AND ACETAMINOPHEN 5; 325 MG/1; MG/1
1 TABLET ORAL EVERY 6 HOURS PRN
Qty: 120 TABLET | Refills: 0 | Status: SHIPPED | OUTPATIENT
Start: 2020-12-16 | End: 2021-01-11 | Stop reason: SDUPTHER

## 2020-12-17 ENCOUNTER — ANESTHESIA EVENT (OUTPATIENT)
Dept: PERIOP | Facility: HOSPITAL | Age: 65
End: 2020-12-17
Payer: COMMERCIAL

## 2020-12-18 ENCOUNTER — HOSPITAL ENCOUNTER (OUTPATIENT)
Facility: HOSPITAL | Age: 65
Setting detail: OUTPATIENT SURGERY
Discharge: HOME | End: 2020-12-18
Attending: PODIATRIST | Admitting: PODIATRIST
Payer: COMMERCIAL

## 2020-12-18 ENCOUNTER — ANESTHESIA (OUTPATIENT)
Dept: PERIOP | Facility: HOSPITAL | Age: 65
End: 2020-12-18
Payer: COMMERCIAL

## 2020-12-18 VITALS
TEMPERATURE: 97.8 F | RESPIRATION RATE: 18 BRPM | SYSTOLIC BLOOD PRESSURE: 123 MMHG | BODY MASS INDEX: 32.44 KG/M2 | DIASTOLIC BLOOD PRESSURE: 71 MMHG | HEART RATE: 70 BPM | OXYGEN SATURATION: 95 % | HEIGHT: 64 IN | WEIGHT: 190 LBS

## 2020-12-18 VITALS — HEART RATE: 74 BPM

## 2020-12-18 DIAGNOSIS — L97.512 RIGHT FOOT ULCER, WITH FAT LAYER EXPOSED (HCC): ICD-10-CM

## 2020-12-18 LAB — GLUCOSE SERPL-MCNC: 151 MG/DL (ref 65–140)

## 2020-12-18 PROCEDURE — 15275 SKIN SUB GRAFT FACE/NK/HF/G: CPT | Performed by: PODIATRIST

## 2020-12-18 PROCEDURE — 88312 SPECIAL STAINS GROUP 1: CPT | Performed by: PATHOLOGY

## 2020-12-18 PROCEDURE — 88313 SPECIAL STAINS GROUP 2: CPT | Performed by: PATHOLOGY

## 2020-12-18 PROCEDURE — 88304 TISSUE EXAM BY PATHOLOGIST: CPT | Performed by: PATHOLOGY

## 2020-12-18 PROCEDURE — 82948 REAGENT STRIP/BLOOD GLUCOSE: CPT

## 2020-12-18 DEVICE — INTEGRA® MESHED BILAYER WOUND MATRIX 2 IN*2 IN (5 CM*5 CM)
Type: IMPLANTABLE DEVICE | Status: FUNCTIONAL
Brand: INTEGRA®

## 2020-12-18 RX ORDER — FENTANYL CITRATE 50 UG/ML
INJECTION, SOLUTION INTRAMUSCULAR; INTRAVENOUS AS NEEDED
Status: DISCONTINUED | OUTPATIENT
Start: 2020-12-18 | End: 2020-12-18

## 2020-12-18 RX ORDER — FENTANYL CITRATE/PF 50 MCG/ML
25 SYRINGE (ML) INJECTION
Status: DISCONTINUED | OUTPATIENT
Start: 2020-12-18 | End: 2020-12-18 | Stop reason: HOSPADM

## 2020-12-18 RX ORDER — MIDAZOLAM HYDROCHLORIDE 2 MG/2ML
INJECTION, SOLUTION INTRAMUSCULAR; INTRAVENOUS AS NEEDED
Status: DISCONTINUED | OUTPATIENT
Start: 2020-12-18 | End: 2020-12-18

## 2020-12-18 RX ORDER — LIDOCAINE HYDROCHLORIDE 10 MG/ML
INJECTION, SOLUTION EPIDURAL; INFILTRATION; INTRACAUDAL; PERINEURAL AS NEEDED
Status: DISCONTINUED | OUTPATIENT
Start: 2020-12-18 | End: 2020-12-18

## 2020-12-18 RX ORDER — ONDANSETRON 2 MG/ML
4 INJECTION INTRAMUSCULAR; INTRAVENOUS ONCE AS NEEDED
Status: DISCONTINUED | OUTPATIENT
Start: 2020-12-18 | End: 2020-12-18 | Stop reason: HOSPADM

## 2020-12-18 RX ORDER — MEPERIDINE HYDROCHLORIDE 25 MG/ML
12.5 INJECTION INTRAMUSCULAR; INTRAVENOUS; SUBCUTANEOUS
Status: DISCONTINUED | OUTPATIENT
Start: 2020-12-18 | End: 2020-12-18 | Stop reason: HOSPADM

## 2020-12-18 RX ORDER — AZITHROMYCIN 250 MG/1
TABLET, FILM COATED ORAL
Qty: 6 TABLET | Refills: 0 | Status: SHIPPED | OUTPATIENT
Start: 2020-12-18 | End: 2020-12-22

## 2020-12-18 RX ORDER — LIDOCAINE HYDROCHLORIDE 20 MG/ML
INJECTION, SOLUTION EPIDURAL; INFILTRATION; INTRACAUDAL; PERINEURAL AS NEEDED
Status: DISCONTINUED | OUTPATIENT
Start: 2020-12-18 | End: 2020-12-18 | Stop reason: HOSPADM

## 2020-12-18 RX ORDER — PROPOFOL 10 MG/ML
INJECTION, EMULSION INTRAVENOUS CONTINUOUS PRN
Status: DISCONTINUED | OUTPATIENT
Start: 2020-12-18 | End: 2020-12-18

## 2020-12-18 RX ORDER — PROPOFOL 10 MG/ML
INJECTION, EMULSION INTRAVENOUS AS NEEDED
Status: DISCONTINUED | OUTPATIENT
Start: 2020-12-18 | End: 2020-12-18

## 2020-12-18 RX ORDER — CEFAZOLIN SODIUM 2 G/50ML
2000 SOLUTION INTRAVENOUS EVERY 8 HOURS
Status: DISCONTINUED | OUTPATIENT
Start: 2020-12-18 | End: 2020-12-18 | Stop reason: HOSPADM

## 2020-12-18 RX ORDER — HYDROMORPHONE HCL/PF 1 MG/ML
0.5 SYRINGE (ML) INJECTION
Status: DISCONTINUED | OUTPATIENT
Start: 2020-12-18 | End: 2020-12-18 | Stop reason: HOSPADM

## 2020-12-18 RX ORDER — SODIUM CHLORIDE, SODIUM LACTATE, POTASSIUM CHLORIDE, CALCIUM CHLORIDE 600; 310; 30; 20 MG/100ML; MG/100ML; MG/100ML; MG/100ML
125 INJECTION, SOLUTION INTRAVENOUS CONTINUOUS
Status: DISCONTINUED | OUTPATIENT
Start: 2020-12-18 | End: 2020-12-18 | Stop reason: HOSPADM

## 2020-12-18 RX ORDER — MAGNESIUM HYDROXIDE 1200 MG/15ML
LIQUID ORAL AS NEEDED
Status: DISCONTINUED | OUTPATIENT
Start: 2020-12-18 | End: 2020-12-18 | Stop reason: HOSPADM

## 2020-12-18 RX ADMIN — PROPOFOL 70 MG: 10 INJECTION, EMULSION INTRAVENOUS at 11:02

## 2020-12-18 RX ADMIN — LIDOCAINE HYDROCHLORIDE 50 MG: 10 INJECTION, SOLUTION EPIDURAL; INFILTRATION; INTRACAUDAL; PERINEURAL at 11:02

## 2020-12-18 RX ADMIN — PROPOFOL 100 MCG/KG/MIN: 10 INJECTION, EMULSION INTRAVENOUS at 11:02

## 2020-12-18 RX ADMIN — MIDAZOLAM 2 MG: 1 INJECTION INTRAMUSCULAR; INTRAVENOUS at 10:54

## 2020-12-18 RX ADMIN — SODIUM CHLORIDE, SODIUM LACTATE, POTASSIUM CHLORIDE, AND CALCIUM CHLORIDE: .6; .31; .03; .02 INJECTION, SOLUTION INTRAVENOUS at 10:54

## 2020-12-18 RX ADMIN — FENTANYL CITRATE 50 MCG: 50 INJECTION, SOLUTION INTRAMUSCULAR; INTRAVENOUS at 11:04

## 2020-12-24 ENCOUNTER — OFFICE VISIT (OUTPATIENT)
Dept: PODIATRY | Facility: CLINIC | Age: 65
End: 2020-12-24
Payer: COMMERCIAL

## 2020-12-24 VITALS
HEIGHT: 64 IN | WEIGHT: 190 LBS | HEART RATE: 90 BPM | RESPIRATION RATE: 18 BRPM | SYSTOLIC BLOOD PRESSURE: 155 MMHG | DIASTOLIC BLOOD PRESSURE: 84 MMHG | BODY MASS INDEX: 32.44 KG/M2

## 2020-12-24 DIAGNOSIS — E11.621 DIABETIC ULCER OF RIGHT MIDFOOT ASSOCIATED WITH TYPE 2 DIABETES MELLITUS, WITH FAT LAYER EXPOSED (HCC): Primary | ICD-10-CM

## 2020-12-24 DIAGNOSIS — L97.412 DIABETIC ULCER OF RIGHT MIDFOOT ASSOCIATED WITH TYPE 2 DIABETES MELLITUS, WITH FAT LAYER EXPOSED (HCC): Primary | ICD-10-CM

## 2020-12-24 DIAGNOSIS — I70.209 PERIPHERAL ARTERIOSCLEROSIS (HCC): ICD-10-CM

## 2020-12-24 DIAGNOSIS — E11.42 DIABETIC POLYNEUROPATHY ASSOCIATED WITH TYPE 2 DIABETES MELLITUS (HCC): ICD-10-CM

## 2020-12-24 PROCEDURE — 99212 OFFICE O/P EST SF 10 MIN: CPT | Performed by: PODIATRIST

## 2020-12-24 RX ORDER — OXYCODONE HYDROCHLORIDE AND ACETAMINOPHEN 5; 325 MG/1; MG/1
1 TABLET ORAL EVERY 4 HOURS PRN
Qty: 18 TABLET | Refills: 0 | Status: SHIPPED | OUTPATIENT
Start: 2020-12-24 | End: 2020-12-27

## 2020-12-24 RX ORDER — AMOXICILLIN AND CLAVULANATE POTASSIUM 875; 125 MG/1; MG/1
1 TABLET, FILM COATED ORAL EVERY 12 HOURS SCHEDULED
Qty: 20 TABLET | Refills: 0 | Status: SHIPPED | OUTPATIENT
Start: 2020-12-24 | End: 2021-01-03

## 2020-12-28 DIAGNOSIS — M79.89 RIGHT LEG SWELLING: Primary | ICD-10-CM

## 2020-12-29 ENCOUNTER — HOSPITAL ENCOUNTER (OUTPATIENT)
Dept: RADIOLOGY | Facility: HOSPITAL | Age: 65
Discharge: HOME | End: 2020-12-29
Payer: COMMERCIAL

## 2020-12-29 ENCOUNTER — HOSPITAL ENCOUNTER (OUTPATIENT)
Dept: VASCULAR ULTRASOUND | Facility: HOSPITAL | Age: 65
Discharge: HOME | End: 2020-12-29
Payer: COMMERCIAL

## 2020-12-29 ENCOUNTER — OFFICE VISIT (OUTPATIENT)
Dept: PODIATRY | Facility: CLINIC | Age: 65
End: 2020-12-29
Payer: COMMERCIAL

## 2020-12-29 VITALS
WEIGHT: 190 LBS | SYSTOLIC BLOOD PRESSURE: 157 MMHG | DIASTOLIC BLOOD PRESSURE: 89 MMHG | HEIGHT: 64 IN | BODY MASS INDEX: 32.44 KG/M2 | RESPIRATION RATE: 17 BRPM | HEART RATE: 84 BPM | TEMPERATURE: 98 F

## 2020-12-29 DIAGNOSIS — L03.115 CELLULITIS OF RIGHT LOWER EXTREMITY: ICD-10-CM

## 2020-12-29 DIAGNOSIS — I82.451 ACUTE DEEP VEIN THROMBOSIS (DVT) OF RIGHT PERONEAL VEIN (HCC): ICD-10-CM

## 2020-12-29 DIAGNOSIS — M86.171 OTHER ACUTE OSTEOMYELITIS OF RIGHT FOOT (HCC): ICD-10-CM

## 2020-12-29 DIAGNOSIS — I82.451 ACUTE DEEP VEIN THROMBOSIS (DVT) OF RIGHT PERONEAL VEIN (HCC): Primary | ICD-10-CM

## 2020-12-29 PROCEDURE — 93971 EXTREMITY STUDY: CPT | Performed by: SURGERY

## 2020-12-29 PROCEDURE — 93971 EXTREMITY STUDY: CPT

## 2020-12-29 PROCEDURE — 73630 X-RAY EXAM OF FOOT: CPT

## 2020-12-29 PROCEDURE — 99213 OFFICE O/P EST LOW 20 MIN: CPT | Performed by: PODIATRIST

## 2020-12-31 ENCOUNTER — OFFICE VISIT (OUTPATIENT)
Dept: PODIATRY | Facility: CLINIC | Age: 65
End: 2020-12-31
Payer: COMMERCIAL

## 2020-12-31 VITALS
HEIGHT: 64 IN | HEART RATE: 83 BPM | SYSTOLIC BLOOD PRESSURE: 161 MMHG | BODY MASS INDEX: 32.78 KG/M2 | RESPIRATION RATE: 17 BRPM | WEIGHT: 192 LBS | DIASTOLIC BLOOD PRESSURE: 82 MMHG

## 2020-12-31 DIAGNOSIS — Z89.432 HISTORY OF AMPUTATION OF LEFT FOOT THROUGH METATARSAL BONE (HCC): ICD-10-CM

## 2020-12-31 DIAGNOSIS — M79.89 RIGHT LEG SWELLING: ICD-10-CM

## 2020-12-31 DIAGNOSIS — E11.621 DIABETIC ULCER OF RIGHT MIDFOOT ASSOCIATED WITH TYPE 2 DIABETES MELLITUS, WITH FAT LAYER EXPOSED (HCC): ICD-10-CM

## 2020-12-31 DIAGNOSIS — L97.412 DIABETIC ULCER OF RIGHT MIDFOOT ASSOCIATED WITH TYPE 2 DIABETES MELLITUS, WITH FAT LAYER EXPOSED (HCC): ICD-10-CM

## 2020-12-31 DIAGNOSIS — I73.9 PERIPHERAL VASCULAR DISEASE, UNSPECIFIED (HCC): Primary | ICD-10-CM

## 2020-12-31 PROCEDURE — 97597 DBRDMT OPN WND 1ST 20 CM/<: CPT | Performed by: PODIATRIST

## 2020-12-31 RX ORDER — GABAPENTIN 300 MG/1
300 CAPSULE ORAL
Qty: 30 CAPSULE | Refills: 0 | Status: SHIPPED | OUTPATIENT
Start: 2020-12-31 | End: 2021-02-15 | Stop reason: SDUPTHER

## 2020-12-31 RX ORDER — OXYCODONE HYDROCHLORIDE AND ACETAMINOPHEN 5; 325 MG/1; MG/1
1 TABLET ORAL EVERY 4 HOURS PRN
Qty: 18 TABLET | Refills: 0 | Status: SHIPPED | OUTPATIENT
Start: 2020-12-31 | End: 2021-01-03

## 2021-01-01 LAB
BACTERIA SPEC AEROBE CULT: NORMAL
Lab: NORMAL

## 2021-01-03 LAB
BACTERIA SPEC AEROBE CULT: ABNORMAL
Lab: ABNORMAL
Lab: ABNORMAL

## 2021-01-05 DIAGNOSIS — L97.412 DIABETIC ULCER OF RIGHT MIDFOOT ASSOCIATED WITH TYPE 2 DIABETES MELLITUS, WITH FAT LAYER EXPOSED (HCC): Primary | ICD-10-CM

## 2021-01-05 DIAGNOSIS — E11.621 DIABETIC ULCER OF RIGHT MIDFOOT ASSOCIATED WITH TYPE 2 DIABETES MELLITUS, WITH FAT LAYER EXPOSED (HCC): Primary | ICD-10-CM

## 2021-01-05 RX ORDER — SODIUM HYPOCHLORITE 2.5 MG/ML
1 SOLUTION TOPICAL ONCE
Qty: 473 ML | Refills: 0 | Status: SHIPPED | OUTPATIENT
Start: 2021-01-05 | End: 2021-01-05

## 2021-01-07 DIAGNOSIS — I48.91 ATRIAL FIBRILLATION, UNSPECIFIED TYPE (HCC): ICD-10-CM

## 2021-01-07 DIAGNOSIS — I73.9 PVD (PERIPHERAL VASCULAR DISEASE) (HCC): ICD-10-CM

## 2021-01-08 ENCOUNTER — OFFICE VISIT (OUTPATIENT)
Dept: PODIATRY | Facility: CLINIC | Age: 66
End: 2021-01-08
Payer: COMMERCIAL

## 2021-01-08 VITALS
DIASTOLIC BLOOD PRESSURE: 90 MMHG | RESPIRATION RATE: 17 BRPM | WEIGHT: 192 LBS | HEART RATE: 95 BPM | SYSTOLIC BLOOD PRESSURE: 167 MMHG | HEIGHT: 64 IN | BODY MASS INDEX: 32.78 KG/M2

## 2021-01-08 DIAGNOSIS — I73.9 PERIPHERAL VASCULAR DISEASE, UNSPECIFIED (HCC): ICD-10-CM

## 2021-01-08 DIAGNOSIS — B37.2 SKIN YEAST INFECTION: Primary | ICD-10-CM

## 2021-01-08 DIAGNOSIS — L03.115 CELLULITIS OF RIGHT LOWER EXTREMITY: ICD-10-CM

## 2021-01-08 DIAGNOSIS — E11.621 DIABETIC ULCER OF RIGHT MIDFOOT ASSOCIATED WITH TYPE 2 DIABETES MELLITUS, WITH FAT LAYER EXPOSED (HCC): ICD-10-CM

## 2021-01-08 DIAGNOSIS — L97.412 DIABETIC ULCER OF RIGHT MIDFOOT ASSOCIATED WITH TYPE 2 DIABETES MELLITUS, WITH FAT LAYER EXPOSED (HCC): ICD-10-CM

## 2021-01-08 PROCEDURE — 97597 DBRDMT OPN WND 1ST 20 CM/<: CPT | Performed by: PODIATRIST

## 2021-01-08 PROCEDURE — 99213 OFFICE O/P EST LOW 20 MIN: CPT | Performed by: PODIATRIST

## 2021-01-08 RX ORDER — CLOPIDOGREL BISULFATE 75 MG/1
75 TABLET ORAL DAILY
Qty: 90 TABLET | Refills: 0 | Status: SHIPPED | OUTPATIENT
Start: 2021-01-08 | End: 2021-05-25 | Stop reason: SDUPTHER

## 2021-01-08 RX ORDER — FLUCONAZOLE 150 MG/1
150 TABLET ORAL EVERY OTHER DAY
Qty: 3 TABLET | Refills: 0 | Status: SHIPPED | OUTPATIENT
Start: 2021-01-08 | End: 2021-01-13

## 2021-01-11 ENCOUNTER — TELEPHONE (OUTPATIENT)
Dept: NON INVASIVE DIAGNOSTICS | Facility: CLINIC | Age: 66
End: 2021-01-11

## 2021-01-11 DIAGNOSIS — M54.9 CHRONIC BACK PAIN, UNSPECIFIED BACK LOCATION, UNSPECIFIED BACK PAIN LATERALITY: ICD-10-CM

## 2021-01-11 DIAGNOSIS — G89.29 CHRONIC BACK PAIN, UNSPECIFIED BACK LOCATION, UNSPECIFIED BACK PAIN LATERALITY: ICD-10-CM

## 2021-01-12 ENCOUNTER — CONSULT (OUTPATIENT)
Dept: VASCULAR SURGERY | Facility: CLINIC | Age: 66
End: 2021-01-12
Payer: COMMERCIAL

## 2021-01-12 VITALS
SYSTOLIC BLOOD PRESSURE: 152 MMHG | RESPIRATION RATE: 18 BRPM | HEART RATE: 82 BPM | BODY MASS INDEX: 32.96 KG/M2 | HEIGHT: 64 IN | DIASTOLIC BLOOD PRESSURE: 88 MMHG

## 2021-01-12 DIAGNOSIS — L97.511 DIABETIC ULCER OF TOE OF RIGHT FOOT ASSOCIATED WITH TYPE 2 DIABETES MELLITUS, LIMITED TO BREAKDOWN OF SKIN (HCC): ICD-10-CM

## 2021-01-12 DIAGNOSIS — I73.9 PERIPHERAL VASCULAR DISEASE, UNSPECIFIED (HCC): Primary | ICD-10-CM

## 2021-01-12 DIAGNOSIS — E11.621 DIABETIC ULCER OF TOE OF RIGHT FOOT ASSOCIATED WITH TYPE 2 DIABETES MELLITUS, LIMITED TO BREAKDOWN OF SKIN (HCC): ICD-10-CM

## 2021-01-12 PROCEDURE — 99204 OFFICE O/P NEW MOD 45 MIN: CPT | Performed by: SURGERY

## 2021-01-12 RX ORDER — OXYCODONE HYDROCHLORIDE AND ACETAMINOPHEN 5; 325 MG/1; MG/1
1 TABLET ORAL EVERY 6 HOURS PRN
Qty: 120 TABLET | Refills: 0 | Status: ON HOLD | OUTPATIENT
Start: 2021-01-12 | End: 2021-02-03 | Stop reason: SDUPTHER

## 2021-01-12 RX ORDER — NITROGLYCERIN 2 %
OINTMENT (GRAM) TRANSDERMAL
COMMUNITY
Start: 2021-01-06 | End: 2021-07-20 | Stop reason: HOSPADM

## 2021-01-12 NOTE — PROGRESS NOTES
Assessment/Plan:    Diabetic ulcer of toe of right foot associated with type 2 diabetes mellitus, limited to breakdown of skin ([facility] [address] )    Lab Results   Component Value Date    HGBA1C 8 1 (H) 02/09/2014       42KJ M with PMHx PAD, DM with previous Left TMA and active lateral foot wound for the past 6 months  Reports that he has undergone multiple angiograms over the last 6 months with Dr Valeria Harmon with persistence of his wound  Also sees podiatry and wound care  Most recently is s/p agram on 1/5/20 with sfa stent angioplasty and arthrectomy/PTA of tibial vessels, distal AT/DP was noted to be occluded  Presents for a second opinion  On exam has biphasic AT/PT  DP signals    Recommend obtaining post-procedure LEADs to determine if circulation is adequate for wound healing  If it is will benefit from surgical debridement with Podiatry  F/u in 1 week to discuss results  Problem List Items Addressed This Visit        Endocrine    Diabetic ulcer of toe of right foot associated with type 2 diabetes mellitus, limited to breakdown of skin ([facility] )       Lab Results   Component Value Date    HGBA1C 8 1 (H) 02/09/2014       44RP M with PMHx PAD, DM with previous Left TMA and active lateral foot wound for the past 6 months  Reports that he has undergone multiple angiograms over the last 6 months with Dr Valeria Harmon with persistence of his wound  Also sees podiatry and wound care  Most recently is s/p agram on 1/5/20 with sfa stent angioplasty and arthrectomy/PTA of tibial vessels, distal AT/DP was noted to be occluded  Presents for a second opinion  On exam has biphasic AT/PT  DP signals    Recommend obtaining post-procedure LEADs to determine if circulation is adequate for wound healing  If it is will benefit from surgical debridement with Podiatry  F/u in 1 week to discuss results              Other Visit Diagnoses     Peripheral vascular disease, unspecified ([facility] )    -  Primary    Relevant Orders VAS lower limb arterial duplex, limited/unilateral            Subjective:      Patient ID: Angel Luis Roberson is a 76 y o  male  Patient is here for a second option for a non-healing wound on the Rt  Foot  Pt has had the wound for the past 6 months  Pt had an Agram w/ RLE runoff by Dr Jose Armstrong on 1/5/21  Pt is experiencing pain, numbness, and tingling in the RLE  Pt states the pain is worse when elevating his legs  Pt also has pain when walking about 1 block with crutches  Pt is on Xarelto, Plavix, and a statin (unknow which one)  The following portions of the patient's history were reviewed and updated as appropriate: allergies, current medications, past family history, past medical history, past social history, past surgical history and problem list     Review of Systems   Constitutional: Negative  HENT: Negative  Eyes: Negative  Respiratory: Negative  Cardiovascular: Positive for leg swelling  Gastrointestinal: Negative  Endocrine: Negative  Genitourinary: Negative  Musculoskeletal: Positive for gait problem  Leg pain when walking  Leg pain at night   Skin: Positive for color change and wound  Allergic/Immunologic: Negative  Neurological: Positive for numbness  Negative for dizziness, speech difficulty and weakness  Hematological: Negative  Psychiatric/Behavioral: Negative  I have reviewed and updated the ROS as appropriate  Objective:      /88 (BP Location: Right arm, Patient Position: Sitting)   Pulse 82   Resp 18   Ht 5' 4" (1 626 m)   BMI 32 96 kg/m²          Physical Exam  Constitutional:       Appearance: Normal appearance  HENT:      Head: Normocephalic and atraumatic  Nose: Nose normal       Mouth/Throat:      Mouth: Mucous membranes are dry  Eyes:      Extraocular Movements: Extraocular movements intact  Pupils: Pupils are equal, round, and reactive to light     Neck:      Musculoskeletal: Normal range of motion and neck supple  Cardiovascular:      Rate and Rhythm: Normal rate and regular rhythm  Heart sounds: Normal heart sounds  Comments: +biphasic DP/PT/AT signal on the right    Left foot with TMA   Pulmonary:      Effort: Pulmonary effort is normal       Breath sounds: Normal breath sounds  Abdominal:      General: Abdomen is flat  Palpations: Abdomen is soft  Musculoskeletal: Normal range of motion  General: No swelling or tenderness  Skin:     General: Skin is warm and dry  Capillary Refill: Capillary refill takes less than 2 seconds  Neurological:      General: No focal deficit present  Mental Status: He is alert and oriented to person, place, and time  Psychiatric:         Mood and Affect: Mood normal          Behavior: Behavior normal          Thought Content:  Thought content normal          Judgment: Judgment normal

## 2021-01-12 NOTE — ASSESSMENT & PLAN NOTE
Lab Results   Component Value Date    HGBA1C 8 1 (H) 02/09/2014       81YA M with PMHx PAD, DM with previous Left TMA and active lateral foot wound for the past 6 months  Reports that he has undergone multiple angiograms over the last 6 months with Dr Didier Mosley with persistence of his wound  Also sees podiatry and wound care  Most recently is s/p agram on 1/5/20 with sfa stent angioplasty and arthrectomy/PTA of tibial vessels, distal AT/DP was noted to be occluded  Presents for a second opinion  On exam has biphasic AT/PT  DP signals    Recommend obtaining post-procedure LEADs to determine if circulation is adequate for wound healing  If it is will benefit from surgical debridement with Podiatry  F/u in 1 week to discuss results

## 2021-01-13 ENCOUNTER — HOSPITAL ENCOUNTER (OUTPATIENT)
Dept: NON INVASIVE DIAGNOSTICS | Facility: CLINIC | Age: 66
Discharge: HOME | End: 2021-01-13
Payer: COMMERCIAL

## 2021-01-13 DIAGNOSIS — I73.9 PERIPHERAL VASCULAR DISEASE, UNSPECIFIED (HCC): ICD-10-CM

## 2021-01-13 PROCEDURE — 93926 LOWER EXTREMITY STUDY: CPT

## 2021-01-14 PROCEDURE — 93922 UPR/L XTREMITY ART 2 LEVELS: CPT | Performed by: SURGERY

## 2021-01-14 PROCEDURE — 93926 LOWER EXTREMITY STUDY: CPT | Performed by: SURGERY

## 2021-01-15 ENCOUNTER — TELEPHONE (OUTPATIENT)
Dept: FAMILY MEDICINE CLINIC | Facility: CLINIC | Age: 66
End: 2021-01-15

## 2021-01-15 NOTE — TELEPHONE ENCOUNTER
Call pt I will reorder for him  Ask him what dosage he was taking, how many (30 or 90) and which pharmacy?

## 2021-01-15 NOTE — TELEPHONE ENCOUNTER
Patient called found his Atorvastatin bottle empty--cannot remember the last time he took it--is he even still on this--please review and advise as he wants a call back Thank you [phone]

## 2021-01-15 NOTE — TELEPHONE ENCOUNTER
Patient was taking   atorvastatin 40 mg tablet   Take 1 tablet every day by oral route  Please call in 90 day supply  Please Call into [employer] Thank you [facility]

## 2021-01-17 DIAGNOSIS — E78.2 MIXED HYPERLIPIDEMIA: Primary | ICD-10-CM

## 2021-01-17 RX ORDER — ATORVASTATIN CALCIUM 40 MG/1
40 TABLET, FILM COATED ORAL
Qty: 90 TABLET | Refills: 1 | Status: SHIPPED | OUTPATIENT
Start: 2021-01-17

## 2021-01-18 ENCOUNTER — TELEPHONE (OUTPATIENT)
Dept: ADMINISTRATIVE | Facility: HOSPITAL | Age: 66
End: 2021-01-18

## 2021-01-19 ENCOUNTER — TELEPHONE (OUTPATIENT)
Dept: VASCULAR SURGERY | Facility: CLINIC | Age: 66
End: 2021-01-19

## 2021-01-19 ENCOUNTER — TELEPHONE (OUTPATIENT)
Dept: FAMILY MEDICINE CLINIC | Facility: CLINIC | Age: 66
End: 2021-01-19

## 2021-01-19 ENCOUNTER — OFFICE VISIT (OUTPATIENT)
Dept: VASCULAR SURGERY | Facility: CLINIC | Age: 66
End: 2021-01-19
Payer: COMMERCIAL

## 2021-01-19 VITALS
SYSTOLIC BLOOD PRESSURE: 130 MMHG | BODY MASS INDEX: 31.99 KG/M2 | TEMPERATURE: 98.4 F | HEIGHT: 65 IN | HEART RATE: 82 BPM | DIASTOLIC BLOOD PRESSURE: 80 MMHG | WEIGHT: 192 LBS | RESPIRATION RATE: 17 BRPM

## 2021-01-19 DIAGNOSIS — I73.9 PERIPHERAL VASCULAR DISEASE, UNSPECIFIED (HCC): Primary | ICD-10-CM

## 2021-01-19 DIAGNOSIS — L97.511 DIABETIC ULCER OF TOE OF RIGHT FOOT ASSOCIATED WITH TYPE 2 DIABETES MELLITUS, LIMITED TO BREAKDOWN OF SKIN (HCC): ICD-10-CM

## 2021-01-19 DIAGNOSIS — E11.621 DIABETIC ULCER OF TOE OF RIGHT FOOT ASSOCIATED WITH TYPE 2 DIABETES MELLITUS, LIMITED TO BREAKDOWN OF SKIN (HCC): ICD-10-CM

## 2021-01-19 DIAGNOSIS — I70.209 PERIPHERAL ARTERIOSCLEROSIS (HCC): ICD-10-CM

## 2021-01-19 PROCEDURE — 1160F RVW MEDS BY RX/DR IN RCRD: CPT | Performed by: SURGERY

## 2021-01-19 PROCEDURE — 3008F BODY MASS INDEX DOCD: CPT | Performed by: SURGERY

## 2021-01-19 PROCEDURE — 99215 OFFICE O/P EST HI 40 MIN: CPT | Performed by: SURGERY

## 2021-01-19 PROCEDURE — 1036F TOBACCO NON-USER: CPT | Performed by: SURGERY

## 2021-01-19 RX ORDER — HYDROCODONE BITARTRATE AND ACETAMINOPHEN 5; 325 MG/1; MG/1
1 TABLET ORAL EVERY 6 HOURS PRN
Qty: 20 TABLET | Refills: 0 | Status: SHIPPED | OUTPATIENT
Start: 2021-01-19 | End: 2021-07-20 | Stop reason: HOSPADM

## 2021-01-19 RX ORDER — AMOXICILLIN AND CLAVULANATE POTASSIUM 875; 125 MG/1; MG/1
1 TABLET, FILM COATED ORAL EVERY 12 HOURS SCHEDULED
Qty: 14 TABLET | Refills: 0 | Status: SHIPPED | OUTPATIENT
Start: 2021-01-19 | End: 2021-01-26

## 2021-01-19 NOTE — PROGRESS NOTES
Assessment/Plan:    Diabetic ulcer of toe of right foot associated with type 2 diabetes mellitus, limited to breakdown of skin ([address] )    Lab Results   Component Value Date    HGBA1C 8 1 (H) 02/09/2014        Dillan STOUT with PMHx PAD, DM with previous Left TMA and active lateral foot wound for the past 6 months  Reports that he has undergone multiple angiograms over the last 6 months with Dr Valeria Harmon with persistence of his wound  Also sees podiatry and wound care       Most recently is s/p agram on 1/5/20 with sfa stent angioplasty and arthrectomy/PTA of tibial vessels, distal AT/DP was noted to be occluded    On exam has biphasic AT/PT  DP signals     Post-procedure LEADs demonstrate inadequate perfusion pressure for healing with met pressure of 33mg HG and great toe pressure of 13mmHG     Plan for urgent repeat RLE angiogram with possible intervention  Will admit patient with plans for operative debridement of the wound with podiatry (if successful revasc) versus may require surgical bypass  Had an extensive risks/benefits alternatives discussion  Including access site complication, atheroembolization, and the patient consented to proceed  Wound has mild drainage with surrounding erythema, will initiate antibiotics   Angiogram in upcoming days           Problem List Items Addressed This Visit        Endocrine    Diabetic ulcer of toe of right foot associated with type 2 diabetes mellitus, limited to breakdown of skin ([address] )       Lab Results   Component Value Date    HGBA1C 8 1 (H) 02/09/2014        Dillan STOUT with PMHx PAD, DM with previous Left TMA and active lateral foot wound for the past 6 months  Reports that he has undergone multiple angiograms over the last 6 months with Dr Valeria Harmon with persistence of his wound   Also sees podiatry and wound care       Most recently is s/p agram on 1/5/20 with sfa stent angioplasty and arthrectomy/PTA of tibial vessels, distal AT/DP was noted to be occluded    On exam has biphasic AT/PT  DP signals     Post-procedure LEADs demonstrate inadequate perfusion pressure for healing with met pressure of 33mg HG and great toe pressure of 13mmHG     Plan for urgent repeat RLE angiogram with possible intervention  Will admit patient with plans for operative debridement of the wound with podiatry (if successful revasc) versus may require surgical bypass  Had an extensive risks/benefits alternatives discussion  Including access site complication, atheroembolization, and the patient consented to proceed  Wound has mild drainage with surrounding erythema, will initiate antibiotics   Angiogram in upcoming days              Cardiovascular and Mediastinum    Peripheral arteriosclerosis ([facility] [address] )      Other Visit Diagnoses     Peripheral vascular disease, unspecified ([facility] [address] )    -  Primary    Relevant Orders    IR aortagram with run-off    Basic metabolic panel            Subjective:      Patient ID: Laurie Waite is a 76 y o  male  Patient presents in office today of results of GABBY done on 01/13/21  Patient is s/p of an Agram done 01/05/2021  Pt c/o of right foot pain and sore of the great toe  Patient describes the pain as being as being sharp that started about 5 months ago  Patient reports having discoloration in right toes that comes and goes  Patient is currently taking Atorvastatin, Plavix and Xarelto       HPI    The following portions of the patient's history were reviewed and updated as appropriate: allergies, current medications, past family history, past medical history, past social history, past surgical history and problem list     Review of Systems   Constitutional: Negative  HENT: Negative  Eyes: Negative  Respiratory: Negative  Cardiovascular: Negative  Gastrointestinal: Negative  Endocrine: Negative  Genitourinary: Negative  Musculoskeletal: Positive for gait problem  Skin: Positive for wound  Allergic/Immunologic: Negative  Hematological: Negative  Psychiatric/Behavioral: Negative  I have reviewed and updated the ROS as appropriate  Objective:      /80 (BP Location: Left arm, Patient Position: Sitting, Cuff Size: Adult)   Pulse 82   Temp 98 4 °F (36 9 °C) (Tympanic)   Resp 17   Ht 5' 5" (1 651 m)   Wt 87 1 kg (192 lb)   BMI 31 95 kg/m²          Physical Exam  Constitutional:       Appearance: Normal appearance  HENT:      Head: Normocephalic and atraumatic  Eyes:      Extraocular Movements: Extraocular movements intact  Pupils: Pupils are equal, round, and reactive to light  Cardiovascular:      Pulses:           Dorsalis pedis pulses are detected w/ Doppler on the right side  Posterior tibial pulses are detected w/ Doppler on the right side  Comments: Biphasic PT/DP  Pulmonary:      Effort: Pulmonary effort is normal       Breath sounds: Normal breath sounds  Abdominal:      General: Abdomen is flat  Bowel sounds are normal       Palpations: Abdomen is soft  Musculoskeletal:         General: Swelling, tenderness and deformity present  Skin:     General: Skin is warm and dry  Capillary Refill: Capillary refill takes 2 to 3 seconds  Neurological:      Mental Status: He is alert and oriented to person, place, and time  Mental status is at baseline  Sensory: Sensory deficit present  Motor: No weakness  Psychiatric:         Mood and Affect: Mood normal          Behavior: Behavior normal          Thought Content:  Thought content normal          Judgment: Judgment normal        Operative Scheduling Information:    Hospital:  [facility] or [facility]    Physician:  Me    Surgery: RLE angiogram with possible intervention, Surgery Admit    Urgency:  Urgent: ASAP    Level:  Level 2: Outpatients to be scheduled for surgery with time dependent medical necessity within 2 weeks   Within 1 week if possible    Case Length:  2 hours    Post-op Bed:  Floor without tele    OR Table:  IR    Equipment Needs:  Rep: [employer], possible arthrectomy    Medication Instructions:  Plavix:  Continue (do not hold)  Xarelto Hold 48 hours prior    Hydration:  No

## 2021-01-19 NOTE — TELEPHONE ENCOUNTER
BRANNON--Angelina from [facility] to inform pt will be requesting refill prior to needing it per your script---See Below---  From Dr Nan Becerra office    Josh Fenton pharmacist from [facility] called ? norco rx sent to pharmacy today by Dr Nan Becerra  She ? If he is aware pt is currently taking percocet, he was prescribed 120 tablets 1/12/21 by pcp  S/w Dr Nan Becerra and informed of same, he will cx norco rx, he request I instruct pt he may take 2 tablets of percocet q6h as needed for pain  Josh Fenton informed of same, she will cx norco rx and make note re: dose adjustment on percocet  I s/w pt and informed of same, pt verbalized understanding       Thank you [phone]

## 2021-01-19 NOTE — ASSESSMENT & PLAN NOTE
Lab Results   Component Value Date    HGBA1C 8 1 (H) 02/09/2014        Eben STOUT with PMHx PAD, DM with previous Left TMA and active lateral foot wound for the past 6 months  Reports that he has undergone multiple angiograms over the last 6 months with Dr Susanne Hough with persistence of his wound  Also sees podiatry and wound care       Most recently is s/p agram on 1/5/20 with sfa stent angioplasty and arthrectomy/PTA of tibial vessels, distal AT/DP was noted to be occluded    On exam has biphasic AT/PT  DP signals     Post-procedure LEADs demonstrate inadequate perfusion pressure for healing with met pressure of 33mg HG and great toe pressure of 13mmHG     Plan for urgent repeat RLE angiogram with possible intervention  Will admit patient with plans for operative debridement of the wound with podiatry (if successful revasc) versus may require surgical bypass  Had an extensive risks/benefits alternatives discussion  Including access site complication, atheroembolization, and the patient consented to proceed       Wound has mild drainage with surrounding erythema, will initiate antibiotics   Angiogram in upcoming days

## 2021-01-19 NOTE — TELEPHONE ENCOUNTER
Check out staff: Silva Kaur: Under Reason For Call, comments MUST be formatted as:   (Surgeon's Initials) / (Procedure)    PHYSICIAN / HOSPITAL: Arti Lopez (NPI: [id number]) / [facility] (Tax: [id number] / NPI: [id number])    PROCEDURE: RLE angiogram with possible intervention, Surgery Admit    LEVEL: 1    REP: Yes, [employer], possible arthrectomy  ASSISTANT SURGEON: No    ALLERGIES: Shellfish-derived products and Sulfamethoxazole-trimethoprim    INSTRUCTIONS GIVEN: AGRAM INSTRUCTIONS    BLOOD THINNERS / MED HOLD: Do not hold Rx - Plavix (Clopidogrel)   Hold Xarelto 48 hours prior to procedure     HYDRATION REQUIRED: Patient does not require hydration  DIALYSIS: Patient is not on dialysis  *Please ROUTE this encounter to [facility] Surgery Coordinator   AND   Send COMPLETE CONSENT to [facility] Surgery Schedulers e-mail group*    I certify that patient has signed, printed, timed, and dated their surgery consent  I certify that BOTH sides of the completed surgery consent have been scanned into the patient's Epic chart by myself on 1/19/2021  *Please ROUTE this encounter to [facility] Clearance Pool   AND   Send CLEARANCE FORM(S) to Vascular Nursing e-mail group*    Patient does not require any pre operative clearance

## 2021-01-19 NOTE — TELEPHONE ENCOUNTER
Angelina pharmacist from [facility] called ? norco rx sent to pharmacy today by Dr Nate Burnett  She ? If he is aware pt is currently taking percocet, he was prescribed 120 tablets 1/12/21 by pcp  S/w Dr Nate Burnett and informed of same, he will cx norco rx, he request I instruct pt he may take 2 tablets of percocet q6h as needed for pain  Sho Burnett informed of same, she will cx norco rx and make note re: dose adjustment on percocet  I s/w pt and informed of same, pt verbalized understanding

## 2021-01-21 ENCOUNTER — TELEPHONE (OUTPATIENT)
Dept: RADIOLOGY | Facility: HOSPITAL | Age: 66
End: 2021-01-21

## 2021-01-21 NOTE — PRE-PROCEDURE INSTRUCTIONS
Pre-procedure instructions reviewed with patient  NPO status confirmed, COVID screening negative at this time, Xarelto hold for 2 days beginning Monday,1/25/2021, Pt to obtain lab work this weekend, arrival time and location confirmed, as well as no visitation policy once admitted  Call back number given for any further questions

## 2021-01-22 ENCOUNTER — LAB (OUTPATIENT)
Dept: LAB | Facility: MEDICAL CENTER | Age: 66
End: 2021-01-22
Payer: COMMERCIAL

## 2021-01-22 DIAGNOSIS — I73.9 PERIPHERAL VASCULAR DISEASE, UNSPECIFIED (HCC): ICD-10-CM

## 2021-01-22 LAB
ANION GAP SERPL CALCULATED.3IONS-SCNC: 3 MMOL/L (ref 4–13)
BUN SERPL-MCNC: 9 MG/DL (ref 5–25)
CALCIUM SERPL-MCNC: 9.7 MG/DL (ref 8.3–10.1)
CHLORIDE SERPL-SCNC: 102 MMOL/L (ref 100–108)
CO2 SERPL-SCNC: 32 MMOL/L (ref 21–32)
CREAT SERPL-MCNC: 0.92 MG/DL (ref 0.6–1.3)
GFR SERPL CREATININE-BSD FRML MDRD: 86 ML/MIN/1.73SQ M
GLUCOSE P FAST SERPL-MCNC: 185 MG/DL (ref 65–99)
POTASSIUM SERPL-SCNC: 4 MMOL/L (ref 3.5–5.3)
SODIUM SERPL-SCNC: 137 MMOL/L (ref 136–145)

## 2021-01-22 PROCEDURE — 80048 BASIC METABOLIC PNL TOTAL CA: CPT

## 2021-01-22 PROCEDURE — 36415 COLL VENOUS BLD VENIPUNCTURE: CPT

## 2021-01-25 NOTE — TELEPHONE ENCOUNTER
Left message to patient to remind him that today is his last dose of Xarelto before his procedure on Thursday with Dr Jewels ARTHUR

## 2021-01-27 ENCOUNTER — TELEPHONE (OUTPATIENT)
Dept: INPATIENT UNIT | Facility: HOSPITAL | Age: 66
End: 2021-01-27

## 2021-01-28 ENCOUNTER — HOSPITAL ENCOUNTER (INPATIENT)
Dept: RADIOLOGY | Facility: HOSPITAL | Age: 66
LOS: 9 days | Discharge: HOME HEALTH | DRG: 240 | End: 2021-02-06
Attending: SURGERY | Admitting: SURGERY
Payer: COMMERCIAL

## 2021-01-28 DIAGNOSIS — I73.9 PAD (PERIPHERAL ARTERY DISEASE) (HCC): ICD-10-CM

## 2021-01-28 DIAGNOSIS — I73.9 PERIPHERAL VASCULAR DISEASE, UNSPECIFIED (HCC): ICD-10-CM

## 2021-01-28 DIAGNOSIS — E11.621 TYPE 2 DIABETES MELLITUS WITH FOOT ULCER, WITH LONG-TERM CURRENT USE OF INSULIN (HCC): ICD-10-CM

## 2021-01-28 DIAGNOSIS — L97.509 TYPE 2 DIABETES MELLITUS WITH FOOT ULCER, WITH LONG-TERM CURRENT USE OF INSULIN (HCC): ICD-10-CM

## 2021-01-28 DIAGNOSIS — E11.42 DIABETIC POLYNEUROPATHY ASSOCIATED WITH TYPE 2 DIABETES MELLITUS (HCC): ICD-10-CM

## 2021-01-28 DIAGNOSIS — E11.621 DIABETIC ULCER OF TOE OF RIGHT FOOT ASSOCIATED WITH TYPE 2 DIABETES MELLITUS, LIMITED TO BREAKDOWN OF SKIN (HCC): Primary | ICD-10-CM

## 2021-01-28 DIAGNOSIS — Z79.4 TYPE 2 DIABETES MELLITUS WITH FOOT ULCER, WITH LONG-TERM CURRENT USE OF INSULIN (HCC): ICD-10-CM

## 2021-01-28 DIAGNOSIS — L97.511 DIABETIC ULCER OF TOE OF RIGHT FOOT ASSOCIATED WITH TYPE 2 DIABETES MELLITUS, LIMITED TO BREAKDOWN OF SKIN (HCC): Primary | ICD-10-CM

## 2021-01-28 LAB
ATRIAL RATE: 77 BPM
GLUCOSE SERPL-MCNC: 149 MG/DL (ref 65–140)
GLUCOSE SERPL-MCNC: 178 MG/DL (ref 65–140)
GLUCOSE SERPL-MCNC: 195 MG/DL (ref 65–140)
P AXIS: 15 DEGREES
PR INTERVAL: 202 MS
QRS AXIS: 11 DEGREES
QRSD INTERVAL: 78 MS
QT INTERVAL: 400 MS
QTC INTERVAL: 452 MS
T WAVE AXIS: 27 DEGREES
VENTRICULAR RATE: 77 BPM

## 2021-01-28 PROCEDURE — 76937 US GUIDE VASCULAR ACCESS: CPT | Performed by: SURGERY

## 2021-01-28 PROCEDURE — C1769 GUIDE WIRE: HCPCS

## 2021-01-28 PROCEDURE — G9198 NO ORDER FOR CEPH NO REASON: HCPCS | Performed by: SURGERY

## 2021-01-28 PROCEDURE — 047P3ZZ DILATION OF RIGHT ANTERIOR TIBIAL ARTERY, PERCUTANEOUS APPROACH: ICD-10-PCS | Performed by: SURGERY

## 2021-01-28 PROCEDURE — 37228 HB TIB/PER REVASC W/TLA: CPT

## 2021-01-28 PROCEDURE — C1725 CATH, TRANSLUMIN NON-LASER: HCPCS

## 2021-01-28 PROCEDURE — 93010 ELECTROCARDIOGRAM REPORT: CPT | Performed by: INTERNAL MEDICINE

## 2021-01-28 PROCEDURE — 99152 MOD SED SAME PHYS/QHP 5/>YRS: CPT

## 2021-01-28 PROCEDURE — C1887 CATHETER, GUIDING: HCPCS

## 2021-01-28 PROCEDURE — 75710 ARTERY X-RAYS ARM/LEG: CPT | Performed by: SURGERY

## 2021-01-28 PROCEDURE — C1894 INTRO/SHEATH, NON-LASER: HCPCS

## 2021-01-28 PROCEDURE — 82948 REAGENT STRIP/BLOOD GLUCOSE: CPT

## 2021-01-28 PROCEDURE — 76937 US GUIDE VASCULAR ACCESS: CPT

## 2021-01-28 PROCEDURE — C1760 CLOSURE DEV, VASC: HCPCS

## 2021-01-28 PROCEDURE — 047V3ZZ DILATION OF RIGHT FOOT ARTERY, PERCUTANEOUS APPROACH: ICD-10-PCS | Performed by: SURGERY

## 2021-01-28 PROCEDURE — 99153 MOD SED SAME PHYS/QHP EA: CPT

## 2021-01-28 PROCEDURE — 99152 MOD SED SAME PHYS/QHP 5/>YRS: CPT | Performed by: SURGERY

## 2021-01-28 PROCEDURE — 75710 ARTERY X-RAYS ARM/LEG: CPT

## 2021-01-28 PROCEDURE — 99222 1ST HOSP IP/OBS MODERATE 55: CPT | Performed by: PODIATRIST

## 2021-01-28 PROCEDURE — 75630 X-RAY AORTA LEG ARTERIES: CPT

## 2021-01-28 PROCEDURE — 93005 ELECTROCARDIOGRAM TRACING: CPT

## 2021-01-28 PROCEDURE — NC001 PR NO CHARGE: Performed by: SURGERY

## 2021-01-28 PROCEDURE — 37228 PR REVASCULARIZE TIBIAL/PERON ARTERY,ANGIOPLASTY INITIAL: CPT | Performed by: SURGERY

## 2021-01-28 RX ORDER — ATORVASTATIN CALCIUM 40 MG/1
40 TABLET, FILM COATED ORAL
Status: DISCONTINUED | OUTPATIENT
Start: 2021-01-28 | End: 2021-02-06 | Stop reason: HOSPADM

## 2021-01-28 RX ORDER — MIDAZOLAM HYDROCHLORIDE 2 MG/2ML
INJECTION, SOLUTION INTRAMUSCULAR; INTRAVENOUS CODE/TRAUMA/SEDATION MEDICATION
Status: COMPLETED | OUTPATIENT
Start: 2021-01-28 | End: 2021-01-28

## 2021-01-28 RX ORDER — OXYCODONE HYDROCHLORIDE 10 MG/1
10 TABLET ORAL EVERY 4 HOURS PRN
Status: DISCONTINUED | OUTPATIENT
Start: 2021-01-28 | End: 2021-02-06 | Stop reason: HOSPADM

## 2021-01-28 RX ORDER — CLOPIDOGREL BISULFATE 75 MG/1
75 TABLET ORAL DAILY
Status: DISCONTINUED | OUTPATIENT
Start: 2021-01-29 | End: 2021-02-06 | Stop reason: HOSPADM

## 2021-01-28 RX ORDER — SODIUM CHLORIDE, SODIUM LACTATE, POTASSIUM CHLORIDE, CALCIUM CHLORIDE 600; 310; 30; 20 MG/100ML; MG/100ML; MG/100ML; MG/100ML
100 INJECTION, SOLUTION INTRAVENOUS CONTINUOUS
Status: DISCONTINUED | OUTPATIENT
Start: 2021-01-28 | End: 2021-01-29

## 2021-01-28 RX ORDER — PANTOPRAZOLE SODIUM 40 MG/1
40 TABLET, DELAYED RELEASE ORAL DAILY
Status: DISCONTINUED | OUTPATIENT
Start: 2021-01-28 | End: 2021-02-06 | Stop reason: HOSPADM

## 2021-01-28 RX ORDER — HEPARIN SODIUM 1000 [USP'U]/ML
INJECTION, SOLUTION INTRAVENOUS; SUBCUTANEOUS CODE/TRAUMA/SEDATION MEDICATION
Status: COMPLETED | OUTPATIENT
Start: 2021-01-28 | End: 2021-01-28

## 2021-01-28 RX ORDER — DOCUSATE SODIUM 100 MG/1
100 CAPSULE, LIQUID FILLED ORAL 2 TIMES DAILY
Status: DISCONTINUED | OUTPATIENT
Start: 2021-01-28 | End: 2021-02-06 | Stop reason: HOSPADM

## 2021-01-28 RX ORDER — ACETAMINOPHEN 325 MG/1
650 TABLET ORAL EVERY 6 HOURS PRN
Status: DISCONTINUED | OUTPATIENT
Start: 2021-01-28 | End: 2021-02-03

## 2021-01-28 RX ORDER — DIAZEPAM 5 MG/ML
INJECTION, SOLUTION INTRAMUSCULAR; INTRAVENOUS CODE/TRAUMA/SEDATION MEDICATION
Status: COMPLETED | OUTPATIENT
Start: 2021-01-28 | End: 2021-01-28

## 2021-01-28 RX ORDER — GABAPENTIN 300 MG/1
300 CAPSULE ORAL
Status: DISCONTINUED | OUTPATIENT
Start: 2021-01-28 | End: 2021-02-06 | Stop reason: HOSPADM

## 2021-01-28 RX ORDER — HYDROMORPHONE HYDROCHLORIDE 4 MG/ML
INJECTION, SOLUTION INTRAMUSCULAR; INTRAVENOUS; SUBCUTANEOUS CODE/TRAUMA/SEDATION MEDICATION
Status: COMPLETED | OUTPATIENT
Start: 2021-01-28 | End: 2021-01-28

## 2021-01-28 RX ORDER — SENNOSIDES 8.6 MG
1 TABLET ORAL DAILY
Status: DISCONTINUED | OUTPATIENT
Start: 2021-01-29 | End: 2021-02-06 | Stop reason: HOSPADM

## 2021-01-28 RX ORDER — FENTANYL CITRATE 50 UG/ML
INJECTION, SOLUTION INTRAMUSCULAR; INTRAVENOUS CODE/TRAUMA/SEDATION MEDICATION
Status: COMPLETED | OUTPATIENT
Start: 2021-01-28 | End: 2021-01-28

## 2021-01-28 RX ORDER — OXYCODONE HYDROCHLORIDE 5 MG/1
5 TABLET ORAL EVERY 4 HOURS PRN
Status: DISCONTINUED | OUTPATIENT
Start: 2021-01-28 | End: 2021-02-06 | Stop reason: HOSPADM

## 2021-01-28 RX ADMIN — COLLAGENASE SANTYL: 250 OINTMENT TOPICAL at 15:20

## 2021-01-28 RX ADMIN — FENTANYL CITRATE 50 MCG: 50 INJECTION INTRAMUSCULAR; INTRAVENOUS at 08:59

## 2021-01-28 RX ADMIN — FENTANYL CITRATE 50 MCG: 50 INJECTION INTRAMUSCULAR; INTRAVENOUS at 10:01

## 2021-01-28 RX ADMIN — ATORVASTATIN CALCIUM 40 MG: 40 TABLET, FILM COATED ORAL at 23:13

## 2021-01-28 RX ADMIN — RIVAROXABAN 2.5 MG: 2.5 TABLET, FILM COATED ORAL at 17:56

## 2021-01-28 RX ADMIN — OXYCODONE HYDROCHLORIDE 10 MG: 10 TABLET ORAL at 18:01

## 2021-01-28 RX ADMIN — HEPARIN SODIUM 6000 UNITS: 1000 INJECTION INTRAVENOUS; SUBCUTANEOUS at 09:03

## 2021-01-28 RX ADMIN — IODIXANOL 86 ML: 320 INJECTION, SOLUTION INTRAVASCULAR at 10:31

## 2021-01-28 RX ADMIN — HYDROMORPHONE HYDROCHLORIDE 0.5 MG: 4 INJECTION, SOLUTION INTRAMUSCULAR; INTRAVENOUS; SUBCUTANEOUS at 09:18

## 2021-01-28 RX ADMIN — HEPARIN SODIUM 2000 UNITS: 1000 INJECTION INTRAVENOUS; SUBCUTANEOUS at 10:02

## 2021-01-28 RX ADMIN — DIAZEPAM 5 MG: 10 INJECTION, SOLUTION INTRAMUSCULAR; INTRAVENOUS at 09:59

## 2021-01-28 RX ADMIN — FENTANYL CITRATE 50 MCG: 50 INJECTION INTRAMUSCULAR; INTRAVENOUS at 08:36

## 2021-01-28 RX ADMIN — FENTANYL CITRATE 50 MCG: 50 INJECTION INTRAMUSCULAR; INTRAVENOUS at 08:50

## 2021-01-28 RX ADMIN — INSULIN LISPRO 2 UNITS: 100 INJECTION, SOLUTION INTRAVENOUS; SUBCUTANEOUS at 17:56

## 2021-01-28 RX ADMIN — HYDROMORPHONE HYDROCHLORIDE 0.5 MG: 4 INJECTION, SOLUTION INTRAMUSCULAR; INTRAVENOUS; SUBCUTANEOUS at 09:47

## 2021-01-28 RX ADMIN — MIDAZOLAM 1 MG: 1 INJECTION INTRAMUSCULAR; INTRAVENOUS at 08:36

## 2021-01-28 RX ADMIN — PROTAMINE SULFATE 30 MG: 10 INJECTION, SOLUTION INTRAVENOUS at 10:27

## 2021-01-28 RX ADMIN — NITROGLYCERIN 100 MCG: 20 INJECTION INTRAVENOUS at 10:06

## 2021-01-28 RX ADMIN — MIDAZOLAM 1 MG: 1 INJECTION INTRAMUSCULAR; INTRAVENOUS at 08:50

## 2021-01-28 RX ADMIN — OXYCODONE HYDROCHLORIDE 10 MG: 10 TABLET ORAL at 23:13

## 2021-01-28 RX ADMIN — DIAZEPAM 5 MG: 10 INJECTION, SOLUTION INTRAMUSCULAR; INTRAVENOUS at 09:29

## 2021-01-28 RX ADMIN — SODIUM CHLORIDE, SODIUM LACTATE, POTASSIUM CHLORIDE, AND CALCIUM CHLORIDE 100 ML/HR: .6; .31; .03; .02 INJECTION, SOLUTION INTRAVENOUS at 16:07

## 2021-01-28 RX ADMIN — GABAPENTIN 300 MG: 300 CAPSULE ORAL at 23:13

## 2021-01-28 NOTE — QUICK NOTE
Post Op Check:    Saw patient at the bedside once they arrived to the floor  Patients pain is well controlled  They denied any nausea, chest pain, or shortness of breath  GEN: NAD, A&O  Chest: Normal work of breathing, no respiratory distress  Abd: S, ND, NT  Vasc: palpable R DP, motor and sensory intact  L groin access site dressing c/d/i no bleeding or hematoma         Plan:  Diet Regular; Regular House   Follow up podiatry recommendations in regards to foot wound  Continue to monitor  Pain and nausea control PRN    John Garcia DO  Surgery, PGY-1

## 2021-01-28 NOTE — SEDATION DOCUMENTATION
RLE arteriogram completed by Dr Mayela Duval and Dr Austin Duke without complications  Left groin access, closed with MYNX closure device  VSS throughout, tolerated well by patient  IR Procedure Bedrest Start Time is 1030 for 2hours  Pt to return to short stay

## 2021-01-28 NOTE — PROCEDURES
Procedures  OPERATIVE REPORT  PATIENT NAME: Boris Chao  :  1955  MRN: [id number]      PROCEDURE:   Ultrasound guided access of the Left CFA   Catheter Placement in the Aorta   3rd order selective catheter placement in the RLE   Distal AT and DP angioplasty 1 5x40mm PTA  [id number] and 2x20mm       ANESTHESIA: Conscious Sedation    ESTIMATED BLOOD LOSS: Minimal      INDICATION: 70yo Male with significant PAD and non-healing lateral foot ulcer x6 months with inadequate met and toe pressures  DESCRIPTION OF PROCEDURE: The patient's consent was verified  The patient was brought to the angiosuite and conscious sedation was established  Bilateral groins were prepped and draped in the usual sterile fashion  DIAGNOSTIC TECHNIQUE:  Retrograde access of the left common femoral artery was obtained utilizing ultrasound-guidance  A micropuncture kit was initially utilized to gain access which was then upsized to a short 5 [place] sheath  Diagnostic imaging was performed with an Omni Flush catheter placed in the aorta and then advanced to the right common femoral artery to obtain images of the lower extremity  Interventions were performed as described below  The conclusion of the case all wires catheters and sheaths were removed and hemostasis was obtained utilizing a [employer] closure device  DIAGNOSTIC SUPERVISION AND INTERPRETATION:  The terminal aorta bilateral DIOR, EIA, and IIA were patent without significant stenosis  The R CFA proximal SFA and profunda were patent  There was a previous SFA pop stent without significant stenosis  Below the knee the PT was occluded at its origin without reconstitution  The peroneal was patent proximally and occluded in the distal calf  Runoff was via the AT, there was a proximal stent within the AT which was patent  Distally the AT occluded at the level of the ankle, the DP reconstituted via collaterals  Patient had a near complete plantar arch       INTERVENTION:  The patient was heparinized with 6000 units of heparin  A 6 Cuban 90 cm destination sheath was placed and over to contralateral popliteal artery  We were able to successfully cross the lesion using a CXI catheter and an 0 014 GWA  Luminal position within the distal DP was confirmed with angiography  The lesion was treated with a 1 [id number] PTA balloon  Subsequent angiography demonstrated >50% residual stenosis  So we treated with a 2 x 100mm PTA balloon  100mcg Nitroglycerin was administered directly into the AT  Subsequent angiography demonstrated recanalization with improved flow  There was a very focal area of residual stenosis >70% which was successfully treated with a 2x20mm PTA balloon  A completion angiogram was now obtained, demonstrated excellent recanalization of the AT/DP without any residual stenosis with preserved runoff  The catheters, sheaths and wires were withdrawn as mentioned above  IMPRESSION: Successful percutaneous angioplasty of a distal R AT/DP occlusion for tissue loss       I was present for the entire duration of the procedure     Veronica Arnold MD 1/28/2021 1:45 PM

## 2021-01-28 NOTE — DISCHARGE INSTRUCTIONS
Insulin instructions: Insulin NPH twice daily, 4 units regular insulin with lunch, 8 units regular insulin at dinner  Check your blood sugar as instructed and keep a log  See your endocrinologist within a week  VNA instructions: See podiatry instructions at bottom of instructions           Moderate Sedation   WHAT YOU NEED TO KNOW:   Moderate sedation, or conscious sedation, is medicine used during procedures to help you feel relaxed and calm  You will be awake and able to follow directions without anxiety or pain  You will remember little to none of the procedure  You may feel tired, weak, or unsteady on your feet after you get sedation  You may also have trouble concentrating or short-term memory loss  These symptoms should go away in 24 hours or less  DISCHARGE INSTRUCTIONS:   Call 911 or have someone else call for any of the following:   · You have sudden trouble breathing  · You cannot be woken  Seek care immediately if:   · You have a severe headache or dizziness  · Your heart is beating faster than usual   Contact your healthcare provider if:   · You have a fever  · You have nausea or are vomiting for more than 8 hours after the procedure  · Your skin is itchy, swollen, or you have a rash  · You have questions or concerns about your condition or care  Self-care:   · Have someone stay with you for 24 hours  This person can drive you to errands and help you do things around the house  This person can also watch for problems  · Rest and do quiet activities for 24 hours  Do not exercise, ride a bike, or play sports  Stand up slowly to prevent dizziness and falls  Take short walks around the house with another person  Slowly return to your usual activities the next day  · Do not drive or use dangerous machines or tools for 24 hours  You may injure yourself or others  Examples include a lawnmower, saw, or drill   Do not return to work for 24 hours if you use dangerous machines or tools for work  · Do not make important decisions for 24 hours  For example, do not sign important papers or invest money  · Drink liquids as directed  Liquids help flush the sedation medicine out of your body  Ask how much liquid to drink each day and which liquids are best for you  · Eat small, frequent meals to prevent nausea and vomiting  Start with clear liquids such as juice or broth  If you do not vomit after clear liquids, you can eat your usual foods  · Do not drink alcohol or take medicines that make you drowsy  This includes medicines that help you sleep and anxiety medicines  Ask your healthcare provider if it is safe for you to take pain medicine  Follow up with your healthcare provider as directed: Write down your questions so you remember to ask them during your visits  © 2017 [facility] Information is for End User's use only and may not be sold, redistributed or otherwise used for commercial purposes  All illustrations and images included in CareNotes® are the copyrighted property of [employer]  or [facility]  The above information is an  only  It is not intended as medical advice for individual conditions or treatments  Talk to your doctor, nurse or pharmacist before following any medical regimen to see if it is safe and effective for you  ARTERIOGRAM    WHAT YOU SHOULD KNOW:   An angiogram is a procedure to look at arteries in your body  Arteries are the blood vessels that carry blood from your heart to your body  AFTER YOU LEAVE:     Self-care:   · Limit activity: Rest for the remainder of the day of your procedure  Have some one with you until the next morning  Keep your arm or leg straight as much as possible  Rest as much as possible, sitting lying or reclining  Walk only to go to the bathroom, to bed or to eat  If the angiogram catheter was put in your leg, use the stairs as little as possible  No driving  · Keep your wound clean and dry  You may shower 24 hours after your procedure  The bandage you have on should fall off in 2-3 days  If there is any drainage from the puncture site, you should put on a clean bandage  · Watch for bleeding and bruising: It is normal to have a bruise and soreness where the angiogram catheter went in  · Diet:   · You may resume your regular diet, Sips of flat soda will help with mild nausea  · Drink more liquids than usual for the next 24 hours      · IMMEDIATELY Contact Interventional Radiology at [phone] [place] PATIENTS: Contact Interventional Radiology at [phone]) Neenala Marcie PATIENTS: Contact Interventional Radiology at [phone]) if any of the following occur:  · If your bruise gets larger or if you notice any active bleeding  APPLY DIRECT PRESSURE TO THE BLEEDING SITE  · If you notice increased swelling or have increased pain at the puncture site   · If you have any numbness or pain in the extremity of the puncture site   · If that extremity seems cold or pale  · You have fever greater than 101  · Persistent nausea or vomitting    Follow up with your primary healthcare provider  as directed: Write down your questions so you remember to ask them during your visits      Discharge Instructions - Podiatry    Weight Bearing Status: weight-bearing as tolerated in a surgical shoe                       Pain: Continue analgesics as directed    Follow-up appointment instructions: Please make an appointment within one week of discharge with Dr Izaiah Ward at [facility]  Contact sooner if any increase in pain, or signs of infection occur    Wound Care: Please actiocat, 4x4 gauze to incision on right foot then wrap with kerlex every 3 days

## 2021-01-28 NOTE — CONSULTS
Podiatry - Consultation    Patient Information:   Gloria Jackson 76 y o  male MRN: [id number]  Unit/Bed#: [facility] 07-01 Encounter: [id number]  PCP: Tereza Emmanuel MD  Date of Admission:  1/28/2021  Date of Consultation: 01/28/21  Requesting Physician: Girma Campbell MD      ASSESSMENT:    Gloria Jackson is a 76 y o  male with:    1  Right lateral foot ulceration - Cristina 2 - POA  2  Left foot superficial fissures - POA  3  Hx of left foot lisfranc amputation - POA  4  PAD  5  T2DM    PLAN:    · Local wound care consisting of betadine, DSD applied to right foot  Jaquita Katelynn, DSD to the left foot at locations of fissures  Will begin daily wound care with santyl starting today  Wound care instructions placed  · Will continue to monitor wound closely  Patient may need future OR vs bedside debridement of right foot wound pending response to local wound care and results of x-ray  If x-ray is negative for osteomyelitis, this could likely be performed as an outpatient  · X-ray ordered and pending  · Rest of care per primary team   · Will discuss this plan with my attending and update as needed  Weightbearing status: WBAT    SUBJECTIVE:    History of Present Illness:    Gloria Jackson is a 76 y o  male who is originally admitted 1/28/2021 for a RLE arteriogram  Patient has a past medical history of chronic lower extremity ulcerations, T2DM, PAD  We are consulted for the ulceration on the patient's right foot  Patient states that this wound has been present for approximately 6-months  He states that the area is painful and about a 7/10 pain  He states that he sees a podiatrist named Dr Nithya Espinosa as an outpatient who has been performing serial debridements to the area  The patient endorses using a variety of products on his right foot ulceration site in the past including santyl, collagen powder, and betadine   He also mentions that the fissures to his left foot have been present for about 2-weeks and states that they cause him discomfort upon ambulation  He states that he has been applying moisturizer to the area at home  Review of Systems:    Constitutional: Negative  HENT: Negative  Eyes: Negative  Respiratory: Negative  Cardiovascular: Negative  Gastrointestinal: Negative  Musculoskeletal: bilateral foot pain, Hx of lisfranc amputation on left foot   Skin:ulcer right foot, fissures left heel   Neurological: peripheral neuropathy   Psych: Negative       Past Medical and Surgical History:     Past Medical History:   Diagnosis Date    Arthritis     fingers    [facility] mellitus [facility] )     First degree AV block     Full dentures     [facility]     Wound, open     right foot- by the 5th toe       Past Surgical History:   Procedure Laterality Date    CHOLECYSTECTOMY      COLONOSCOPY      DE DEBRIDEMENT, SKIN, SUB-Q TISSUE,MUSCLE,BONE,=<20 SQ CM Right 12/18/2020    Procedure: DEBRIDMENT OF ULCER , REMOVAL OF DEVITALIZED SKIN, SOFT TISSUE, BONE AND APPLICATION OF INTEGRA GRAFT RIGHT FOOT ;  Surgeon: Bg Aggarwal DPM;  Location: [address] [place] [address];  Service: Podiatry    REPLANTATION THUMB Right     right tip reconnected    SKIN GRAFT      right thumb    TOE AMPUTATION      left foot all 5 toes removed    TOE OSTEOTOMY Right 6/26/2020    Procedure: exostosis of right big toe;  Surgeon: Vandana Crook DPM;  Location: [facility];  Service: Podiatry    TRIGGER FINGER RELEASE      bilateral hands    VEIN LIGATION      right       Meds/Allergies:    Medications Prior to Admission   Medication    atorvastatin (LIPITOR) 40 mg tablet    gabapentin (NEURONTIN) 300 mg capsule    insulin NPH (HumuLIN N,NovoLIN N) 100 Units/mL subcutaneous injection    oxyCODONE-acetaminophen (PERCOCET) 5-325 mg per tablet    pantoprazole (PROTONIX) 40 mg tablet    Blood Glucose Monitoring Suppl (OneTouch Verio) w/Device KIT    clopidogrel (PLAVIX) 75 mg tablet    glucose blood (OneTouch Verio) test strip   Benjamin Luan halobetasol (ULTRAVATE) 0 05 % cream    HYDROcodone-acetaminophen (NORCO) 5-325 mg per tablet    Lancets (OneTouch Delica Plus BGIPLC74T) MISC    Nitro-Bid 2 % ointment    rivaroxaban (Xarelto) 2 5 mg tablet    UltiCare Insulin Syringe 31G X 5/16" 1 ML MISC       Allergies   Allergen Reactions    Shellfish-Derived Products Anaphylaxis     Throat closes    Sulfamethoxazole-Trimethoprim Rash       Social History:     Marital Status: /Civil Union    Substance Use History:   Social History     Substance and Sexual Activity   Alcohol Use Yes    Frequency: Monthly or less    Drinks per session: 1 or 2    Binge frequency: Never    Comment: occasional     Social History     Tobacco Use   Smoking Status Never Smoker   Smokeless Tobacco Never Used     Social History     Substance and Sexual Activity   Drug Use Never       Family History:    Family History   Problem Relation Age of Onset    Arthritis Mother     Cancer Father         colon    Alzheimer's disease Father          OBJECTIVE:    Vitals:   Blood Pressure: 141/73 (01/28/21 1230)  Pulse: 78 (01/28/21 1230)  Temperature: 98 7 °F (37 1 °C) (01/28/21 0700)  Respirations: 16 (01/28/21 1018)  SpO2: 99 % (01/28/21 1018)    Physical Exam:    General Appearance: Alert, cooperative, no distress  HEENT: Head normocephalic, atraumatic, without obvious abnormality  Heart: Normal rate and rhythm  Lungs: Non-labored breathing  No respiratory distress  Abdomen: Without distension  Psychiatric: AAOx3  Lower Extremity:  Vascular:   Right DP pulse palpable and PT pulse is monophasic by Doppler  Left DP and PT pulses are absent  CRT < 3 seconds at the digits  +1/4 edema noted at bilateral lower extremities  Pedal hair is absent  Skin temperature is WNL bilaterally  Musculoskeletal:  MMT is 5/5 in all muscle compartments bilaterally  ROM at the 1st MPJ and ankle joint are decreased at the right lower extremity   Pain on palpation of the patient's right forefoot near the location of his wound, as well as his left heel  Dermatological:  Lower extremity wound(s) as noted below:    Wound #: 1  Location: right lateral foot  Length 1 2cm: Width 1 6cm: Depth 0 5cm:   Deepest Tissue Noted in Base: subcutaneous tissue  Probe to Bone: No  Peripheral Skin Description: Attached  Granulation: 0% Fibrotic Tissue: 60% Necrotic Tissue: 40%   Drainage Amount: minimal, serosanguinous  Signs of Infection: No        Also of note, small superficial fissures are present to the patient's left heel  They are free from any local signs of infection and are superficial to the level of the dermis with no drainage present  Neurological:  Gross sensation is intact  Protective sensation is intact  Patient Reports numbness and/or paresthesias  Additional data:     Lab Results: I have personally reviewed pertinent labs including:        Results from last 7 days   Lab Units 01/22/21  1127   POTASSIUM mmol/L 4 0   CHLORIDE mmol/L 102   CO2 mmol/L 32   BUN mg/dL 9   CREATININE mg/dL 0 92   CALCIUM mg/dL 9 7           Cultures: I have personally reviewed pertinent cultures including:              Imaging: I have personally reviewed pertinent reports in PACS  EKG, Pathology, and Other Studies: I have personally reviewed pertinent reports  ** Please Note: Portions of the record may have been created with voice recognition software  Occasional wrong word or "sound a like" substitutions may have occurred due to the inherent limitations of voice recognition software  Read the chart carefully and recognize, using context, where substitutions have occurred   **

## 2021-01-29 LAB
ANION GAP SERPL CALCULATED.3IONS-SCNC: 3 MMOL/L (ref 4–13)
BUN SERPL-MCNC: 18 MG/DL (ref 5–25)
CALCIUM SERPL-MCNC: 9 MG/DL (ref 8.3–10.1)
CHLORIDE SERPL-SCNC: 109 MMOL/L (ref 100–108)
CO2 SERPL-SCNC: 28 MMOL/L (ref 21–32)
CREAT SERPL-MCNC: 0.78 MG/DL (ref 0.6–1.3)
ERYTHROCYTE [DISTWIDTH] IN BLOOD BY AUTOMATED COUNT: 12 % (ref 11.6–15.1)
EST. AVERAGE GLUCOSE BLD GHB EST-MCNC: 203 MG/DL
GFR SERPL CREATININE-BSD FRML MDRD: 94 ML/MIN/1.73SQ M
GLUCOSE SERPL-MCNC: 153 MG/DL (ref 65–140)
GLUCOSE SERPL-MCNC: 178 MG/DL (ref 65–140)
GLUCOSE SERPL-MCNC: 179 MG/DL (ref 65–140)
GLUCOSE SERPL-MCNC: 181 MG/DL (ref 65–140)
GLUCOSE SERPL-MCNC: 228 MG/DL (ref 65–140)
HBA1C MFR BLD: 8.7 %
HCT VFR BLD AUTO: 39.9 % (ref 36.5–49.3)
HGB BLD-MCNC: 13.1 G/DL (ref 12–17)
MCH RBC QN AUTO: 31.5 PG (ref 26.8–34.3)
MCHC RBC AUTO-ENTMCNC: 32.8 G/DL (ref 31.4–37.4)
MCV RBC AUTO: 96 FL (ref 82–98)
PLATELET # BLD AUTO: 143 THOUSANDS/UL (ref 149–390)
PMV BLD AUTO: 10.4 FL (ref 8.9–12.7)
POTASSIUM SERPL-SCNC: 4.1 MMOL/L (ref 3.5–5.3)
RBC # BLD AUTO: 4.16 MILLION/UL (ref 3.88–5.62)
SODIUM SERPL-SCNC: 140 MMOL/L (ref 136–145)
WBC # BLD AUTO: 7.12 THOUSAND/UL (ref 4.31–10.16)

## 2021-01-29 PROCEDURE — 99232 SBSQ HOSP IP/OBS MODERATE 35: CPT | Performed by: SURGERY

## 2021-01-29 PROCEDURE — 83036 HEMOGLOBIN GLYCOSYLATED A1C: CPT | Performed by: PHYSICIAN ASSISTANT

## 2021-01-29 PROCEDURE — 99232 SBSQ HOSP IP/OBS MODERATE 35: CPT | Performed by: INTERNAL MEDICINE

## 2021-01-29 PROCEDURE — 3052F HG A1C>EQUAL 8.0%<EQUAL 9.0%: CPT | Performed by: SURGERY

## 2021-01-29 PROCEDURE — 82948 REAGENT STRIP/BLOOD GLUCOSE: CPT

## 2021-01-29 PROCEDURE — NC001 PR NO CHARGE: Performed by: SURGERY

## 2021-01-29 PROCEDURE — 85027 COMPLETE CBC AUTOMATED: CPT | Performed by: STUDENT IN AN ORGANIZED HEALTH CARE EDUCATION/TRAINING PROGRAM

## 2021-01-29 PROCEDURE — 80048 BASIC METABOLIC PNL TOTAL CA: CPT | Performed by: STUDENT IN AN ORGANIZED HEALTH CARE EDUCATION/TRAINING PROGRAM

## 2021-01-29 PROCEDURE — 99232 SBSQ HOSP IP/OBS MODERATE 35: CPT | Performed by: PODIATRIST

## 2021-01-29 RX ORDER — INSULIN GLARGINE 100 [IU]/ML
20 INJECTION, SOLUTION SUBCUTANEOUS
Status: DISCONTINUED | OUTPATIENT
Start: 2021-01-29 | End: 2021-02-01

## 2021-01-29 RX ORDER — HEPARIN SODIUM 5000 [USP'U]/ML
5000 INJECTION, SOLUTION INTRAVENOUS; SUBCUTANEOUS EVERY 8 HOURS SCHEDULED
Status: DISCONTINUED | OUTPATIENT
Start: 2021-01-29 | End: 2021-02-03

## 2021-01-29 RX ADMIN — ACETAMINOPHEN 650 MG: 325 TABLET, FILM COATED ORAL at 10:24

## 2021-01-29 RX ADMIN — CLOPIDOGREL BISULFATE 75 MG: 75 TABLET ORAL at 08:51

## 2021-01-29 RX ADMIN — OXYCODONE HYDROCHLORIDE 10 MG: 10 TABLET ORAL at 05:48

## 2021-01-29 RX ADMIN — GABAPENTIN 300 MG: 300 CAPSULE ORAL at 21:29

## 2021-01-29 RX ADMIN — INSULIN GLARGINE 20 UNITS: 100 INJECTION, SOLUTION SUBCUTANEOUS at 21:29

## 2021-01-29 RX ADMIN — COLLAGENASE SANTYL: 250 OINTMENT TOPICAL at 10:26

## 2021-01-29 RX ADMIN — OXYCODONE HYDROCHLORIDE 10 MG: 10 TABLET ORAL at 16:15

## 2021-01-29 RX ADMIN — ACETAMINOPHEN 650 MG: 325 TABLET, FILM COATED ORAL at 21:32

## 2021-01-29 RX ADMIN — INSULIN LISPRO 1 UNITS: 100 INJECTION, SOLUTION INTRAVENOUS; SUBCUTANEOUS at 16:16

## 2021-01-29 RX ADMIN — INSULIN LISPRO 1 UNITS: 100 INJECTION, SOLUTION INTRAVENOUS; SUBCUTANEOUS at 08:26

## 2021-01-29 RX ADMIN — HEPARIN SODIUM 5000 UNITS: 5000 INJECTION INTRAVENOUS; SUBCUTANEOUS at 21:29

## 2021-01-29 RX ADMIN — ATORVASTATIN CALCIUM 40 MG: 40 TABLET, FILM COATED ORAL at 21:29

## 2021-01-29 RX ADMIN — PANTOPRAZOLE SODIUM 40 MG: 40 TABLET, DELAYED RELEASE ORAL at 08:50

## 2021-01-29 RX ADMIN — OXYCODONE HYDROCHLORIDE 10 MG: 10 TABLET ORAL at 10:23

## 2021-01-29 RX ADMIN — OXYCODONE HYDROCHLORIDE 10 MG: 10 TABLET ORAL at 21:29

## 2021-01-29 RX ADMIN — HEPARIN SODIUM 5000 UNITS: 5000 INJECTION INTRAVENOUS; SUBCUTANEOUS at 13:45

## 2021-01-29 RX ADMIN — INSULIN LISPRO 2 UNITS: 100 INJECTION, SOLUTION INTRAVENOUS; SUBCUTANEOUS at 12:12

## 2021-01-29 RX ADMIN — INSULIN LISPRO 7 UNITS: 100 INJECTION, SOLUTION INTRAVENOUS; SUBCUTANEOUS at 17:39

## 2021-01-29 RX ADMIN — RIVAROXABAN 2.5 MG: 2.5 TABLET, FILM COATED ORAL at 05:48

## 2021-01-29 NOTE — UTILIZATION REVIEW
Initial Clinical Review    Admission: Date/Time/Statement:   Admission Orders (From admission, onward)     Ordered        01/28/21 1048  INPATIENT ADMISSION  Once                   Orders Placed This Encounter   Procedures    INPATIENT ADMISSION     Standing Status:   Standing     Number of Occurrences:   1     Order Specific Question:   Level of Care     Answer:   Med Surg [16]     Order Specific Question:   Estimated length of stay     Answer:   More than 2 Midnights     Order Specific Question:   Certification     Answer:   I certify that inpatient services are medically necessary for this patient for a duration of greater than two midnights  See H&P and MD Progress Notes for additional information about the patient's course of treatment  ED Arrival Information     Patient not seen in ED                       Assessment/Plan: 75 yo male Admitted as Direct Inpatient admission for angiogram w/ R DP PTA  PMHx hx of prior interventions T2DM, of R pop stent, PAD, nonhealing R 5th met wound x 7 mos  Consult Podiatry due to painful  ulceration on right foot persisting for 6 months  OP serial debridements ongoing; w a variety of product on right ulcer incl santyl, collagen powder, & betadine  Notes fissure on left foot prent of 2 weeks causing discomfort w ambulation  Work up post OP Osteomyelitis   1/28/2021 PODIATRY Vascular:   Right DP pulse palpable and PT pulse is monophasic by Doppler  Left DP and PT pulses are absent  CRT < 3 seconds at the digits  +1/4 edema noted at bilateral lower extremities  Pedal hair is absent  Skin temperature is WNL bilaterally  Musculoskeletal:  MMT is 5/5 in all muscle compartments bilaterally  ROM at the 1st MPJ and ankle joint are decreased at the right lower extremity  Pain on palpation of the patient's right forefoot near the location of his wound, as well as his left heel     Dermatological:  Lower extremity wound(s) as noted below:   Wound #: 1  Location: right lateral foot  Length 1 2cm: Width 1 6cm: Depth 0 5cm:   Deepest Tissue Noted in Base: subcutaneous tissue  Probe to Bone: No  Peripheral Skin Description: Attached  Granulation: 0% Fibrotic Tissue: 60% Necrotic Tissue: 40%   Drainage Amount: minimal, serosanguinous  Signs of Infection: No  Also of note, small superficial fissures are present to the patient's left heel  They are free from any local signs of infection and are superficial to the level of the dermis with no drainage present     Neurological:  Gross sensation is intact  Protective sensation is intact  Patient Reports numbness and/or paresthesias  1/28/2021 OPERATIVE REPORT  PROCEDURE:   Ultrasound guided access of the Left CFA   Catheter Placement in the Aorta   3rd order selective catheter placement in the RLE   Distal AT and DP angioplasty 1 5x40mm PTA  1y420rx and 2x20mm     ANESTHESIA: Conscious Sedation   IMPRESSION: Successful percutaneous angioplasty of a distal R AT/DP occlusion for tissue loss  1/28/2021 Vascular Surgery PM   Post Op Check:   Patients pain is well controlled; Follow up podiatry recommendations in regards to foot wound  Continue to monitor  Pain and nausea control PRN  1/29/2021 Vascular Surgery   nonhealing R 5th met wound x 7 mos, now S/P Arteriogram, Right AT PTA 1/28  R foot warm, pink, motor intact  L fem access site C/D/I, no hematoma or ecchymosis  perfusion is optimized for podiatric intervention  -awaiting results of MRI to evaluate for Osteomyelitis  Cont ppx Xarelto, plavix, statin  Xarelto if needed Podiatric surgery  1/29/2021 PODIATRY  1  Right lateral foot ulceration - Cristina 2 - POA  2  Left foot superficial fissures - POA  3  Hx of left foot lisfranc amputation - POA  4  PAD  5  T2DM     PLAN:  · Dressings to bilateral feet left clean, dry, and intact with no strikethrough noted  Wound care instructions placed for daily dressing changes to right foot with HALIE Stoll     · X-ray reviewed: Possible focal cortical loss laterally at the 5th metatarsal head  · MRI ordered and pending to definitively rule out osteomyelitis  Will plan surgical vs conservative treatment pending result  · Patient optimized from a vascular point of view, appreciate their communication     · Elevation on green foam wedges or pillows when non-ambulatory    ED Triage Vitals   Temperature Pulse Respirations Blood Pressure SpO2   01/28/21 0700 01/28/21 0700 01/28/21 0700 01/28/21 0700 01/28/21 0700   98 7 °F (37 1 °C) 87 18 153/75 98 %      Temp Source Heart Rate Source Patient Position - Orthostatic VS BP Location FiO2 (%)   01/28/21 1500 -- 01/28/21 1500 01/28/21 1500 --   Oral  Lying Left arm       Pain Score       01/28/21 1524       4          Wt Readings from Last 1 Encounters:   01/19/21 87 1 kg (192 lb)     Additional Vital Signs:   Date/Time  Temp  Pulse  Resp  BP  MAP (mmHg)  SpO2  Calculated FIO2 (%) - Nasal Cannula  Nasal Cannula O2 Flow Rate (L/min)  O2 Device  Patient Position - Orthostatic VS   01/29/21 0700  98 °F (36 7 °C)  72  12  151/67  95  97 %      None (Room air)  Lying   01/28/21 2331  98 2 °F (36 8 °C)  76  16  122/58    93 %      None (Room air)  Lying   01/28/21 1901                  None (Room air)     01/28/21 1600                  None (Room air)     01/28/21 1500  99 °F (37 2 °C)  70  17  146/76  100  99 %      None (Room air)  Lying   01/28/21 1330    67    125/63               01/28/21 1230    78    141/73               01/28/21 1200    77    126/68               01/28/21 1130    76    118/78               01/28/21 1100    77    136/72               01/28/21 1018    79  16  152/68  98  99 %  28  2 L/min  Nasal cannula     01/28/21 1014    74  16  123/62  86  98 %           01/28/21 1008    76  16  150/60  93  99 %           01/28/21 1003    75  16  144/75  102  100 %  28  2 L/min  Nasal cannula     01/28/21 0958    76  16  138/71 98  100 %  28  2 L/min  Nasal cannula     01/28/21 0955    79  16  156/64  101  99 %  28  2 L/min  Nasal cannula     01/28/21 0953    82  16  184/79Abnormal   114  100 %  28  2 L/min  Nasal cannula     01/28/21 0948    80  16  150/72  104  99 %  28  2 L/min  Nasal cannula     01/28/21 0943    74  16  147/69  101  100 %  28  2 L/min  Nasal cannula     01/28/21 0938    80  16  168/79  116  100 %  28  2 L/min  Nasal cannula     01/28/21 0933    76  16  178/83Abnormal   119  100 %  28  2 L/min  Nasal cannula     01/28/21 0929    79  16  158/74  107  100 %  28  2 L/min  Nasal cannula     01/28/21 0923    76  16  158/72  108  100 %  28  2 L/min  Nasal cannula     01/28/21 0919    79  16  177/80Abnormal   115  100 %  28  2 L/min  Nasal cannula     01/28/21 0914    78  16  157/73  105  100 %  28  2 L/min  Nasal cannula     01/28/21 0908    74  16  145/65  99  100 %           01/28/21 0903    78  16  154/72  104  97 %  28  2 L/min  Nasal cannula     01/28/21 0858    79  16  146/77  103  96 %           01/28/21 0853    75  16  160/80  114  99 %           01/28/21 0848    79  18  174/82Abnormal   118  100 %      None (Room air)     01/28/21 0843    80  18  162/80  114  100 %           01/28/21 0838    78  18  164/80  114  100 %           01/28/21 0836    78  18  176/76Abnormal   118  99 %           01/28/21 0835    80  18  200/85Abnormal   122  99 %      None (Room air)     01/28/21 0700  98 7 °F (37 1 °C)  87  18  153/75    98 %              Weights (last 14 days)    Date/Time  Height   01/28/21 1524  5' 4" (1 626 m)       Pertinent Labs/Diagnostic Test Results:       Results from last 7 days   Lab Units 01/29/21  0559   WBC Thousand/uL 7 12   HEMOGLOBIN g/dL 13 1   HEMATOCRIT % 39 9   PLATELETS Thousands/uL 143*         Results from last 7 days   Lab Units 01/29/21  0559   SODIUM mmol/L 140   POTASSIUM mmol/L 4 1   CHLORIDE mmol/L 109*   CO2 mmol/L 28   ANION GAP mmol/L 3*   BUN mg/dL 18   CREATININE mg/dL 0 78   EGFR ml/min/1 73sq m 94   CALCIUM mg/dL 9 0         Results from last 7 days   Lab Units 01/29/21  1050 01/29/21  0635 01/28/21  2040 01/28/21  1607 01/28/21  1203   POC GLUCOSE mg/dl 228* 178* 178* 195* 149*     Results from last 7 days   Lab Units 01/29/21  0559   GLUCOSE RANDOM mg/dL 179*         Results from last 7 days   Lab Units 01/29/21  0559   HEMOGLOBIN A1C % 8 7*   EAG mg/dl 203                    Past Medical History:   Diagnosis Date    Arthritis     fingers    [facility] ([facility] )     First degree AV block     Full dentures     [facility]     [facility]VD [facility]peripheral vascular disease) [facility]     Wound, open     right foot- by the 5th toe     Present on Admission:   Diabetic ulcer of toe of right foot associated with type 2 diabetes mellitus, limited to breakdown of skin ([facility]   PAD (peripheral artery disease) ([facility])      Admitting Diagnosis: Peripheral vascular disease, unspecified ([place] [address] ) [I73 9]  Age/Sex: 76 y o  male  Admission Orders:  Mri  Neurovascular checks q4hr  IR aortagram    Scheduled Medications:  atorvastatin, 40 mg, Oral, HS  clopidogrel, 75 mg, Oral, Daily  collagenase, , Topical, Daily  docusate sodium, 100 mg, Oral, BID  gabapentin, 300 mg, Oral, HS  heparin (porcine), 5,000 Units, Subcutaneous, Q8H ALEXEI  insulin lispro, 1-6 Units, Subcutaneous, TID AC  pantoprazole, 40 mg, Oral, Daily  senna, 1 tablet, Oral, Daily    Continuous IV Infusions:     PRN Meds:  acetaminophen, 650 mg, Oral, Q6H PRN  oxyCODONE, 10 mg, Oral, Q4H PRN  oxyCODONE, 5 mg, Oral, Q4H PRN      IP CONSULT TO PODIATRY  IP CONSULT TO ENDOCRINOLOGY  Network Utilization Review Department  ATTENTION: Please call with any questions or concerns to [phone] and carefully listen to the prompts so that you are directed to the right person   All voicemails are confidential   Yue Kurtis all requests for admission clinical reviews, approved or denied determinations and any other requests to dedicated fax number below belonging to the campus where the patient is receiving treatment   List of dedicated fax numbers for the Facilities:  [address] [place] [address] DENIALS (Administrative/Medical Necessity) [phone]   [address] (Maternity/NICU/Pediatrics) [phone] [address] [place] [address] Dr Jazmin Leal [phone] (  [facility]) [address] [address] [place] [address] [place] [id number] [phone]

## 2021-01-29 NOTE — PLAN OF CARE
Problem: PAIN - ADULT  Goal: Verbalizes/displays adequate comfort level or baseline comfort level  Description: Interventions:  - Encourage patient to monitor pain and request assistance  - Assess pain using appropriate pain scale  - Administer analgesics based on type and severity of pain and evaluate response  - Implement non-pharmacological measures as appropriate and evaluate response  - Consider cultural and social influences on pain and pain management  - Notify physician/advanced practitioner if interventions unsuccessful or patient reports new pain  Outcome: Progressing     Problem: INFECTION - ADULT  Goal: Absence or prevention of progression during hospitalization  Description: INTERVENTIONS:  - Assess and monitor for signs and symptoms of infection  - Monitor lab/diagnostic results  - Monitor all insertion sites, i e  indwelling lines, tubes, and drains  - Monitor endotracheal if appropriate and nasal secretions for changes in amount and color  - [place] appropriate cooling/warming therapies per order  - Administer medications as ordered  - Instruct and encourage patient and family to use good hand hygiene technique  - Identify and instruct in appropriate isolation precautions for identified infection/condition  Outcome: Progressing  Goal: Absence of fever/infection during neutropenic period  Description: INTERVENTIONS:  - Monitor WBC    Outcome: Progressing     Problem: SAFETY ADULT  Goal: Patient will remain free of falls  Description: INTERVENTIONS:  - Assess patient frequently for physical needs  -  Identify cognitive and physical deficits and behaviors that affect risk of falls    -  [place] fall precautions as indicated by assessment   - Educate patient/family on patient safety including physical limitations  - Instruct patient to call for assistance with activity based on assessment  - Modify environment to reduce risk of injury  - Consider OT/PT consult to assist with strengthening/mobility  Outcome: Progressing  Goal: Maintain or return to baseline ADL function  Description: INTERVENTIONS:  -  Assess patient's ability to carry out ADLs; assess patient's baseline for ADL function and identify physical deficits which impact ability to perform ADLs (bathing, care of mouth/teeth, toileting, grooming, dressing, etc )  - Assess/evaluate cause of self-care deficits   - Assess range of motion  - Assess patient's mobility; develop plan if impaired  - Assess patient's need for assistive devices and provide as appropriate  - Encourage maximum independence but intervene and supervise when necessary  - Involve family in performance of ADLs  - Assess for home care needs following discharge   - Consider OT consult to assist with ADL evaluation and planning for discharge  - Provide patient education as appropriate  Outcome: Progressing  Goal: Maintain or return mobility status to optimal level  Description: INTERVENTIONS:  - Assess patient's baseline mobility status (ambulation, transfers, stairs, etc )    - Identify cognitive and physical deficits and behaviors that affect mobility  - Identify mobility aids required to assist with transfers and/or ambulation (gait belt, sit-to-stand, lift, walker, cane, etc )  - [place] fall precautions as indicated by assessment  - Record patient progress and toleration of activity level on Mobility SBAR; progress patient to next Phase/Stage  - Instruct patient to call for assistance with activity based on assessment  - Consider rehabilitation consult to assist with strengthening/weightbearing, etc   Outcome: Progressing     Problem: DISCHARGE PLANNING  Goal: Discharge to home or other facility with appropriate resources  Description: INTERVENTIONS:  - Identify barriers to discharge w/patient and caregiver  - Arrange for needed discharge resources and transportation as appropriate  - Identify discharge learning needs (meds, wound care, etc )  - Arrange for interpretive services to assist at discharge as needed  - Refer to Case Management Department for coordinating discharge planning if the patient needs post-hospital services based on physician/advanced practitioner order or complex needs related to functional status, cognitive ability, or social support system  Outcome: Progressing     Problem: Knowledge Deficit  Goal: Patient/family/caregiver demonstrates understanding of disease process, treatment plan, medications, and discharge instructions  Description: Complete learning assessment and assess knowledge base  Interventions:  - Provide teaching at level of understanding  - Provide teaching via preferred learning methods  Outcome: Progressing     Problem: Potential for Falls  Goal: Patient will remain free of falls  Description: INTERVENTIONS:  - Assess patient frequently for physical needs  -  Identify cognitive and physical deficits and behaviors that affect risk of falls  -  [place] fall precautions as indicated by assessment   - Educate patient/family on patient safety including physical limitations  - Instruct patient to call for assistance with activity based on assessment  - Modify environment to reduce risk of injury  - Consider OT/PT consult to assist with strengthening/mobility  Outcome: Progressing     Problem: Nutrition/Hydration-ADULT  Goal: Nutrient/Hydration intake appropriate for improving, restoring or maintaining nutritional needs  Description: Monitor and assess patient's nutrition/hydration status for malnutrition  Collaborate with interdisciplinary team and initiate plan and interventions as ordered  Monitor patient's weight and dietary intake as ordered or per policy  Utilize nutrition screening tool and intervene as necessary  Determine patient's food preferences and provide high-protein, high-caloric foods as appropriate       INTERVENTIONS:  - Monitor oral intake, urinary output, labs, and treatment plans  - Assess nutrition and hydration status and recommend course of action  - Evaluate amount of meals eaten  - Assist patient with eating if necessary   - Allow adequate time for meals  - Recommend/ encourage appropriate diets, oral nutritional supplements, and vitamin/mineral supplements  - Order, calculate, and assess calorie counts as needed  - Recommend, monitor, and adjust tube feedings and TPN/PPN based on assessed needs  - Assess need for intravenous fluids  - Provide specific nutrition/hydration education as appropriate  - Include patient/family/caregiver in decisions related to nutrition  Outcome: Progressing

## 2021-01-29 NOTE — PLAN OF CARE
IP Medical Nutrition Therapy  Suggest diet change to CCD-1, add supplements: Glucerna/strawberry flavor at Breakfast & dinner meals, gelatein/orange @ lunch meal      Problem: Nutrition/Hydration-ADULT  Goal: Nutrient/Hydration intake appropriate for improving, restoring or maintaining nutritional needs  Description: Monitor and assess patient's nutrition/hydration status for malnutrition  Collaborate with interdisciplinary team and initiate plan and interventions as ordered  Monitor patient's weight and dietary intake as ordered or per policy  Utilize nutrition screening tool and intervene as necessary  Determine patient's food preferences and provide high-protein, high-caloric foods as appropriate       INTERVENTIONS:  - Monitor oral intake, urinary output, labs, and treatment plans  - Assess nutrition and hydration status and recommend course of action  - Evaluate amount of meals eaten  - Assist patient with eating if necessary   - Allow adequate time for meals  - Recommend/ encourage appropriate diets, oral nutritional supplements, and vitamin/mineral supplements  - Order, calculate, and assess calorie counts as needed  - Recommend, monitor, and adjust tube feedings and TPN/PPN based on assessed needs  - Assess need for intravenous fluids  - Provide specific nutrition/hydration education as appropriate  - Include patient/family/caregiver in decisions related to nutrition  Outcome: Progressing

## 2021-01-29 NOTE — PROGRESS NOTES
Vascular Surgery    Progress Note - Greg Booth 1955, 76 y o  male MRN: [id number]    Unit/Bed#: [facility] 324-01 Encounter: [id number]    Primary Care Provider: Yazan Hammonds MD   Date and time admitted to hospital: 1/28/2021  7:09 AM      * PAD (peripheral artery disease) [facility])  Assessment & Plan  77 y/o M w/ PAD and Right foot tissue loss    S/P Arteriogram, Right AT PTA 1/28    Plan:  Continue Plavix, Xarelto, Lipitor  Podiatry eval in progress r/o OM, MRI pending, may require debridement  Continue local wound care      Subjective:  Pt doing well, no events overnight    Vitals:  /58 (BP Location: Right arm)   Pulse 76   Temp 98 2 °F (36 8 °C) (Oral)   Resp 16   Ht 5' 4" (1 626 m)   SpO2 93%   BMI 32 96 kg/m²     I/Os:  No intake/output data recorded  No intake/output data recorded      Lab Results and Cultures:   Lab Results   Component Value Date    WBC 5 68 12/11/2020    HGB 16 9 12/11/2020    HCT 52 4 (H) 12/11/2020    MCV 96 12/11/2020     12/11/2020     Lab Results   Component Value Date    GLUCOSE 135 02/10/2014    CALCIUM 9 0 01/29/2021     02/10/2014    K 4 1 01/29/2021    CO2 28 01/29/2021     (H) 01/29/2021    BUN 18 01/29/2021    CREATININE 0 78 01/29/2021     Lab Results   Component Value Date    INR 1 07 12/11/2020    INR 1 11 01/10/2014    PROTIME 13 8 12/11/2020    PROTIME 13 8 01/10/2014        Blood Culture: No results found for: BLOODCX,   Urinalysis: No results found for: Katharine Brandyn, SPECGRAV, PHUR, LEUKOCYTESUR, NITRITE, PROTEINUA, GLUCOSEU, KETONESU, BILIRUBINUR, BLOODU,   Urine Culture: No results found for: URINECX,   Wound Culure:   Lab Results   Component Value Date    WOUNDCULT 1 colony Staphylococcus aureus (A) 10/23/2020       Medications:  Current Facility-Administered Medications   Medication Dose Route Frequency    acetaminophen (TYLENOL) tablet 650 mg  650 mg Oral Q6H PRN    atorvastatin (LIPITOR) tablet 40 mg  40 mg Oral HS    clopidogrel (PLAVIX) tablet 75 mg  75 mg Oral Daily    collagenase (SANTYL) ointment   Topical Daily    docusate sodium (COLACE) capsule 100 mg  100 mg Oral BID    gabapentin (NEURONTIN) capsule 300 mg  300 mg Oral HS    insulin lispro (HumaLOG) 100 units/mL subcutaneous injection 1-6 Units  1-6 Units Subcutaneous TID AC    oxyCODONE (ROXICODONE) immediate release tablet 10 mg  10 mg Oral Q4H PRN    oxyCODONE (ROXICODONE) IR tablet 5 mg  5 mg Oral Q4H PRN    pantoprazole (PROTONIX) EC tablet 40 mg  40 mg Oral Daily    rivaroxaban (XARELTO) tablet 2 5 mg  2 5 mg Oral BID With Meals    senna (SENOKOT) tablet 8 6 mg  1 tablet Oral Daily       Physical Exam:    General appearance: alert and oriented, in no acute distress  Lungs: clear to auscultation bilaterally  Heart: regular rate and rhythm, S1, S2 normal, no murmur, click, rub or gallop  Abdomen: soft, non-tender; bowel sounds normal; no masses,  no organomegaly  Extremities: Right foot warm, pink, M/S intact  Left foot warm, M/S intact, TMA      Wound/Incision:  Left groin dressing clean, dry, and intact, no hematoma    Pulse exam:  DP: Right: 2+       Lázaro Borja PA-C  1/29/2021

## 2021-01-29 NOTE — ASSESSMENT & PLAN NOTE
75 y/o M w/ PAD and Right foot tissue loss    S/P Arteriogram, Right AT PTA 1/28    Plan:  Continue Plavix, Xarelto, Lipitor  Podiatry eval in progress r/o OM, MRI pending, may require debridement  Continue local wound care

## 2021-01-29 NOTE — CASE MANAGEMENT
PT IS NOT A 30 DAYS READMISSION  PT IS NOT A BUNDLE  CM met with pt to discuss DCP and cm role  Pt lives with spouse in a 2sh with 3ste  Prior to admission pt used crutches with ambulation and was independent with ADLS  No prior hx of VNA  Pt's preference for pharmacy is [facility] in [place] [employer]  Pt denies any hx of drugs/ETOH or inpt psych stays  CM reviewed d/c planning process including the following: identifying help at home, patient preference for d/c planning needs, Discharge Lounge, [facility] Meds to Bed program, availability of treatment team to discuss questions or concerns patient and/or family may have regarding understanding medications and recognizing signs and symptoms once discharged  CM also encouraged patient to follow up with all recommended appointments after discharge  Patient advised of importance for patient and family to participate in managing patients medical well being

## 2021-01-29 NOTE — H&P
Please refer to progress note by TAQUERIA Ramey from 1/29/2021 @ 7:07am and use as H&P for this admission        Luba Bahena PA-C  1/29/2021

## 2021-01-29 NOTE — PROGRESS NOTES
Podiatry - Progress Note  Patient: Zaynab Lucas 76 y o  male   MRN: [id number]  PCP: Chey Figueroa MD  Unit/Bed#: [facility] 324-01 Encounter: [id number]  Date Of Visit: 21    ASSESSMENT:    Zaynab Lucas is a 76 y o  male with:    1  Right lateral foot ulceration - Cristina 2 - POA  2  Left foot superficial fissures - POA  3  Hx of left foot lisfranc amputation - POA  4  PAD  5  T2DM    PLAN:    · Dressings to bilateral feet left clean, dry, and intact with no strikethrough noted  Wound care instructions placed for daily dressing changes to right foot with Santyl, DSD  · X-ray reviewed: Possible focal cortical loss laterally at the 5th metatarsal head  · MRI ordered and pending to definitively rule out osteomyelitis  Will plan surgical vs conservative treatment pending result  · Patient optimized from a vascular point of view, appreciate their communication  · Elevation on green foam wedges or pillows when non-ambulatory  · Rest of care per primary team      Weightbearing status: WBAT    SUBJECTIVE:     The patient was seen, evaluated, and assessed at bedside today  The patient was awake, alert, and in no acute distress  No acute events overnight  The patient reports mild pain and tenderness to his right foot and left heel  Patient denies N/V/F/chills/SOB/CP  OBJECTIVE:     Vitals:   /67 (BP Location: Right arm)   Pulse 72   Temp 98 °F (36 7 °C) (Oral)   Resp 12   Ht 5' 4" (1 626 m)   SpO2 97%   BMI 32 96 kg/m²   Temp (24hrs), Av 4 °F (36 9 °C), Min:98 °F (36 7 °C), Max:99 °F (37 2 °C)    Physical Exam:     General: Alert, cooperative and no distress  Lungs: Non labored breathing  Abdomen: Soft, non-tender  Lower extremity exam:  Cardiovascular status at baseline  Neurological status at baseline  Musculoskeletal status at baseline  No calf tenderness noted  Dressings to bilateral feet clean, dry, and intact with no strikethrough noted       Additional Data:     Labs:    Results from last 7 days   Lab Units 01/29/21  0559   WBC Thousand/uL 7 12   HEMOGLOBIN g/dL 13 1   HEMATOCRIT % 39 9   PLATELETS Thousands/uL 143*     Results from last 7 days   Lab Units 01/29/21  0559   POTASSIUM mmol/L 4 1   CHLORIDE mmol/L 109*   CO2 mmol/L 28   BUN mg/dL 18   CREATININE mg/dL 0 78   CALCIUM mg/dL 9 0           * I Have Reviewed All Lab Data Listed Above  Recent Cultures (last 7 days):               Imaging: I have personally reviewed pertinent films in PACS  EKG, Pathology, and Other Studies: I have personally reviewed pertinent reports  ** Please Note: Portions of the record may have been created with voice recognition software  Occasional wrong word or "sound a like" substitutions may have occurred due to the inherent limitations of voice recognition software  Read the chart carefully and recognize, using context, where substitutions have occurred   **

## 2021-01-29 NOTE — CONSULTS
Consultation - [address] [place] [address] Endocrinology    Patient Information: Gabbi Dee 76 y o  male MRN: [id number]  Unit/Bed#: [facility] 324-01 Encounter: [id number]  Admitting Physician: Brien Mcrae DO  PCP: Derick Tracy MD  Date of Consultation:  01/29/21     ASSESSMENT/PLAN:    1  Type 2 Diabetes Mellitus  Complicated by chronic lower extremity ulcers  Hemoglobin A1c 8 7%  Patient's total daily insulin requirement appears to be approximately 44 units  Will start Lantus 20 units qHS and Humalog 7 units TID AC with correctional scale insulin  Patient will need outpatient follow-up and will likely consider oral hypogylcemic agents at discharge  Reason for consultation: Type 2 Diabetes Mellitus Hgb A1c 8 7%    History of Present Illness:    Gabbi Dee is a 76 y o  male with a past medical history significant for peripheral artery disease, type 2 diabetes mellitus hemoglobin A1c 8 7% on correctional scale insulin in the outpatient setting who initially presented for right lower extremity arteriogram in the setting of diabetic foot ulcers  Podiatric intervention may be performed during this hospitalization     The patient has a history of foot ulcerations and is status post left foot Lisfranc amputation  Endocrinology was thus consulted for management of blood glucose levels in the setting of uncontrolled type 2 diabetes mellitus complicated by diabetic neuropathy  The patient is not on any oral hypoglycemic agents at home  He has been managing his type 2 diabetes mellitus with correctional scale insulin alone  Blood glucose levels have ranged from 149-228 so far during hospitalization  He has required 5 units of correctional scale insulin since admission  The patient self discontinued his oral hypoglycemic agents as he believes they have many side effects  His blood glucose levels at home range from 145-175  Review of Systems:    Review of Systems   Constitutional: Negative for chills and fever     HENT: Negative for ear pain and sore throat  Eyes: Negative for pain and visual disturbance  Respiratory: Negative for cough and shortness of breath  Cardiovascular: Negative for chest pain and palpitations  Gastrointestinal: Negative for abdominal pain and vomiting  Genitourinary: Negative for dysuria and hematuria  Musculoskeletal: Negative for arthralgias and back pain  Skin: Negative for color change and rash  Neurological: Negative for seizures and syncope  All other systems reviewed and are negative  Past Medical and Surgical History:     Past Medical History:   Diagnosis Date    Arthritis     fingers    Diabetes mellitus ([place] [address] )     First degree AV block     Full dentures     PAD (peripheral artery disease) ([place] [address] )     [facility]     Wound, open     right foot- by the 5th toe       Past Surgical History:   Procedure Laterality Date    CHOLECYSTECTOMY      COLONOSCOPY      ND DEBRIDEMENT, SKIN, SUB-Q TISSUE,MUSCLE,BONE,=<20 SQ CM Right 12/18/2020    Procedure: DEBRIDMENT OF ULCER , REMOVAL OF DEVITALIZED SKIN, SOFT TISSUE, BONE AND APPLICATION OF INTEGRA GRAFT RIGHT FOOT ;  Surgeon: Alfredo Diaz DPM;  Location: [address] [place] [address];  Service: Podiatry    REPLANTATION THUMB Right     right tip reconnected    SKIN GRAFT      right thumb    TOE AMPUTATION      left foot all 5 toes removed    TOE OSTEOTOMY Right 6/26/2020    Procedure: exostosis of right big toe;  Surgeon: Luís Del Rio DPM;  Location: [facility];  Service: Podiatry    [facility]      bilateral hands    VEIN LIGATION      right       Meds/Allergies:    all medications and allergies reviewed    Allergies:    Allergies   Allergen Reactions    Shellfish-Derived Products Anaphylaxis     Throat closes    Sulfamethoxazole-Trimethoprim Rash     History:     Marital Status: /Civil Union   Occupation:   Substance Use History:   Social History     Substance and Sexual Activity   Alcohol Use Yes    Frequency: Monthly or less    Drinks per session: 1 or 2    Binge frequency: Never    Comment: occasional     Social History     Tobacco Use   Smoking Status Never Smoker   Smokeless Tobacco Never Used     Social History     Substance and Sexual Activity   Drug Use Never       Family History:    non-contributory    Physical Exam:     Vitals:   Blood Pressure: 151/67 (01/29/21 0700)  Pulse: 72 (01/29/21 0700)  Temperature: 98 °F (36 7 °C) (01/29/21 0700)  Temp Source: Oral (01/29/21 0700)  Respirations: 12 (01/29/21 0700)  Height: 5' 4" (162 6 cm) (01/28/21 1524)  SpO2: 97 % (01/29/21 0700)    Physical Exam      Lab and Imaging Results: I have personally reviewed pertinent reports        Results from last 7 days   Lab Units 01/29/21  0559   WBC Thousand/uL 7 12   HEMOGLOBIN g/dL 13 1   HEMATOCRIT % 39 9   PLATELETS Thousands/uL 143*     Results from last 7 days   Lab Units 01/29/21  0559   POTASSIUM mmol/L 4 1   CHLORIDE mmol/L 109*   CO2 mmol/L 28   BUN mg/dL 18   CREATININE mg/dL 0 78   CALCIUM mg/dL 9 0         POC Glucose (mg/dl)   Date Value   01/29/2021 228 (H)   01/29/2021 178 (H)   01/28/2021 178 (H)   01/28/2021 195 (H)   01/28/2021 149 (H)   12/18/2020 151 (H)   06/26/2020 271 (H)

## 2021-01-29 NOTE — NUTRITION
Suggest diet change to CCD-1, add supplements Glucerna BID strawberry flavor @ Breakfast & dinner   Also add orange gelatein @ lunch meal

## 2021-01-30 LAB
ANION GAP SERPL CALCULATED.3IONS-SCNC: 2 MMOL/L (ref 4–13)
BUN SERPL-MCNC: 17 MG/DL (ref 5–25)
CALCIUM SERPL-MCNC: 9.9 MG/DL (ref 8.3–10.1)
CHLORIDE SERPL-SCNC: 105 MMOL/L (ref 100–108)
CO2 SERPL-SCNC: 31 MMOL/L (ref 21–32)
CREAT SERPL-MCNC: 0.98 MG/DL (ref 0.6–1.3)
ERYTHROCYTE [DISTWIDTH] IN BLOOD BY AUTOMATED COUNT: 12 % (ref 11.6–15.1)
GFR SERPL CREATININE-BSD FRML MDRD: 80 ML/MIN/1.73SQ M
GLUCOSE SERPL-MCNC: 160 MG/DL (ref 65–140)
GLUCOSE SERPL-MCNC: 185 MG/DL (ref 65–140)
GLUCOSE SERPL-MCNC: 187 MG/DL (ref 65–140)
GLUCOSE SERPL-MCNC: 196 MG/DL (ref 65–140)
GLUCOSE SERPL-MCNC: 196 MG/DL (ref 65–140)
HCT VFR BLD AUTO: 45.3 % (ref 36.5–49.3)
HGB BLD-MCNC: 14.7 G/DL (ref 12–17)
MCH RBC QN AUTO: 31.1 PG (ref 26.8–34.3)
MCHC RBC AUTO-ENTMCNC: 32.5 G/DL (ref 31.4–37.4)
MCV RBC AUTO: 96 FL (ref 82–98)
PLATELET # BLD AUTO: 171 THOUSANDS/UL (ref 149–390)
PMV BLD AUTO: 10.2 FL (ref 8.9–12.7)
POTASSIUM SERPL-SCNC: 4 MMOL/L (ref 3.5–5.3)
RBC # BLD AUTO: 4.73 MILLION/UL (ref 3.88–5.62)
SODIUM SERPL-SCNC: 138 MMOL/L (ref 136–145)
WBC # BLD AUTO: 6.5 THOUSAND/UL (ref 4.31–10.16)

## 2021-01-30 PROCEDURE — 80048 BASIC METABOLIC PNL TOTAL CA: CPT | Performed by: PHYSICIAN ASSISTANT

## 2021-01-30 PROCEDURE — 85027 COMPLETE CBC AUTOMATED: CPT | Performed by: PHYSICIAN ASSISTANT

## 2021-01-30 PROCEDURE — 82948 REAGENT STRIP/BLOOD GLUCOSE: CPT

## 2021-01-30 PROCEDURE — 99232 SBSQ HOSP IP/OBS MODERATE 35: CPT | Performed by: SURGERY

## 2021-01-30 RX ORDER — HYDROMORPHONE HCL/PF 1 MG/ML
0.5 SYRINGE (ML) INJECTION
Status: DISCONTINUED | OUTPATIENT
Start: 2021-01-30 | End: 2021-02-06 | Stop reason: HOSPADM

## 2021-01-30 RX ADMIN — OXYCODONE HYDROCHLORIDE 10 MG: 10 TABLET ORAL at 18:33

## 2021-01-30 RX ADMIN — INSULIN LISPRO 7 UNITS: 100 INJECTION, SOLUTION INTRAVENOUS; SUBCUTANEOUS at 17:51

## 2021-01-30 RX ADMIN — PANTOPRAZOLE SODIUM 40 MG: 40 TABLET, DELAYED RELEASE ORAL at 07:21

## 2021-01-30 RX ADMIN — INSULIN LISPRO 2 UNITS: 100 INJECTION, SOLUTION INTRAVENOUS; SUBCUTANEOUS at 17:51

## 2021-01-30 RX ADMIN — INSULIN LISPRO 7 UNITS: 100 INJECTION, SOLUTION INTRAVENOUS; SUBCUTANEOUS at 12:16

## 2021-01-30 RX ADMIN — INSULIN LISPRO 1 UNITS: 100 INJECTION, SOLUTION INTRAVENOUS; SUBCUTANEOUS at 08:17

## 2021-01-30 RX ADMIN — HYDROMORPHONE HYDROCHLORIDE 0.5 MG: 1 INJECTION, SOLUTION INTRAMUSCULAR; INTRAVENOUS; SUBCUTANEOUS at 21:12

## 2021-01-30 RX ADMIN — HEPARIN SODIUM 5000 UNITS: 5000 INJECTION INTRAVENOUS; SUBCUTANEOUS at 07:21

## 2021-01-30 RX ADMIN — INSULIN GLARGINE 20 UNITS: 100 INJECTION, SOLUTION SUBCUTANEOUS at 21:13

## 2021-01-30 RX ADMIN — OXYCODONE HYDROCHLORIDE 10 MG: 10 TABLET ORAL at 08:41

## 2021-01-30 RX ADMIN — GABAPENTIN 300 MG: 300 CAPSULE ORAL at 21:12

## 2021-01-30 RX ADMIN — HYDROMORPHONE HYDROCHLORIDE 0.5 MG: 1 INJECTION, SOLUTION INTRAMUSCULAR; INTRAVENOUS; SUBCUTANEOUS at 16:53

## 2021-01-30 RX ADMIN — INSULIN LISPRO 2 UNITS: 100 INJECTION, SOLUTION INTRAVENOUS; SUBCUTANEOUS at 12:16

## 2021-01-30 RX ADMIN — OXYCODONE HYDROCHLORIDE 10 MG: 10 TABLET ORAL at 13:34

## 2021-01-30 RX ADMIN — ATORVASTATIN CALCIUM 40 MG: 40 TABLET, FILM COATED ORAL at 21:12

## 2021-01-30 RX ADMIN — CLOPIDOGREL BISULFATE 75 MG: 75 TABLET ORAL at 08:38

## 2021-01-30 RX ADMIN — OXYCODONE HYDROCHLORIDE 10 MG: 10 TABLET ORAL at 02:41

## 2021-01-30 RX ADMIN — HEPARIN SODIUM 5000 UNITS: 5000 INJECTION INTRAVENOUS; SUBCUTANEOUS at 21:12

## 2021-01-30 RX ADMIN — HEPARIN SODIUM 5000 UNITS: 5000 INJECTION INTRAVENOUS; SUBCUTANEOUS at 13:34

## 2021-01-30 RX ADMIN — COLLAGENASE SANTYL: 250 OINTMENT TOPICAL at 08:40

## 2021-01-30 RX ADMIN — INSULIN LISPRO 7 UNITS: 100 INJECTION, SOLUTION INTRAVENOUS; SUBCUTANEOUS at 08:18

## 2021-01-30 NOTE — PROGRESS NOTES
Progress Note -    Mayra Alegria 76 y o  male MRN: [id number]  Unit/Bed#: [facility] 324-01 Encounter: [id number]    Assessment:  65-yr old male with PAD and non-healing R 5th metatarsal wound (x7 months)  Now s/p Agram with R DP PTA  Palpable DP B/L  Awaiting R foot MRI to evaluate for OM  Stable VS in room air  PLAN:  - local wound care  - endocrinology recs on blood sugar control appreciated  - follow up MRI  - for continued podiatry follow up  Subjective:   - no new complaints  - no acute overnight events  Objective:     Vitals: Temp:  [97 5 °F (36 4 °C)-97 7 °F (36 5 °C)] 97 5 °F (36 4 °C)  HR:  [59-61] 59  Resp:  [15-16] 16  BP: (109)/(60-65) 109/60  Body mass index is 32 96 kg/m²  I/O       01/28 0701 - 01/29 0700 01/29 0701 - 01/30 0700 01/30 0701 - 01/31 0700    P  O   237     Total Intake  237     Net  +237            Unmeasured Urine Occurrence 1 x            Physical Exam:  GEN: NAD  HEENT: atraumatic  CV: RRR  Lung: Normal effort  Ab: Soft, NT/ND  Extrem: palp fem B/L  Palp DP B/L   L groin soft; no hematoma  Neuro: A+Ox3    Lab, Imaging and other studies: I have personally reviewed pertinent reports      VTE Pharmacologic Prophylaxis: Heparin  VTE Mechanical Prophylaxis: sequential compression device

## 2021-01-30 NOTE — PROGRESS NOTES
Patient reported his right foot feeling cold and thought his dressing from yesterday morning was too tight; denied numbness/tingling  Patient has feeling in foot and can wiggle toes  Weak palpable pulse felt; +1 non-pitting edema  RN encouraged him to prop foot on pillow to reduce swelling, patient refused  Will continue to monitor

## 2021-01-31 LAB
BASOPHILS # BLD AUTO: 0.01 THOUSANDS/ΜL (ref 0–0.1)
BASOPHILS NFR BLD AUTO: 0 % (ref 0–1)
EOSINOPHIL # BLD AUTO: 0.29 THOUSAND/ΜL (ref 0–0.61)
EOSINOPHIL NFR BLD AUTO: 4 % (ref 0–6)
ERYTHROCYTE [DISTWIDTH] IN BLOOD BY AUTOMATED COUNT: 11.9 % (ref 11.6–15.1)
GLUCOSE SERPL-MCNC: 130 MG/DL (ref 65–140)
GLUCOSE SERPL-MCNC: 133 MG/DL (ref 65–140)
GLUCOSE SERPL-MCNC: 157 MG/DL (ref 65–140)
GLUCOSE SERPL-MCNC: 163 MG/DL (ref 65–140)
GLUCOSE SERPL-MCNC: 183 MG/DL (ref 65–140)
HCT VFR BLD AUTO: 41.5 % (ref 36.5–49.3)
HGB BLD-MCNC: 13.7 G/DL (ref 12–17)
IMM GRANULOCYTES # BLD AUTO: 0.03 THOUSAND/UL (ref 0–0.2)
IMM GRANULOCYTES NFR BLD AUTO: 0 % (ref 0–2)
LYMPHOCYTES # BLD AUTO: 2.97 THOUSANDS/ΜL (ref 0.6–4.47)
LYMPHOCYTES NFR BLD AUTO: 39 % (ref 14–44)
MCH RBC QN AUTO: 31.2 PG (ref 26.8–34.3)
MCHC RBC AUTO-ENTMCNC: 33 G/DL (ref 31.4–37.4)
MCV RBC AUTO: 95 FL (ref 82–98)
MONOCYTES # BLD AUTO: 0.58 THOUSAND/ΜL (ref 0.17–1.22)
MONOCYTES NFR BLD AUTO: 8 % (ref 4–12)
NEUTROPHILS # BLD AUTO: 3.78 THOUSANDS/ΜL (ref 1.85–7.62)
NEUTS SEG NFR BLD AUTO: 49 % (ref 43–75)
NRBC BLD AUTO-RTO: 0 /100 WBCS
PLATELET # BLD AUTO: 156 THOUSANDS/UL (ref 149–390)
PMV BLD AUTO: 10.3 FL (ref 8.9–12.7)
RBC # BLD AUTO: 4.39 MILLION/UL (ref 3.88–5.62)
WBC # BLD AUTO: 7.66 THOUSAND/UL (ref 4.31–10.16)

## 2021-01-31 PROCEDURE — 82948 REAGENT STRIP/BLOOD GLUCOSE: CPT

## 2021-01-31 PROCEDURE — 99232 SBSQ HOSP IP/OBS MODERATE 35: CPT | Performed by: INTERNAL MEDICINE

## 2021-01-31 PROCEDURE — 85025 COMPLETE CBC W/AUTO DIFF WBC: CPT | Performed by: SURGERY

## 2021-01-31 PROCEDURE — 99232 SBSQ HOSP IP/OBS MODERATE 35: CPT | Performed by: SURGERY

## 2021-01-31 PROCEDURE — 99232 SBSQ HOSP IP/OBS MODERATE 35: CPT | Performed by: PODIATRIST

## 2021-01-31 RX ADMIN — OXYCODONE HYDROCHLORIDE 10 MG: 10 TABLET ORAL at 10:32

## 2021-01-31 RX ADMIN — CLOPIDOGREL BISULFATE 75 MG: 75 TABLET ORAL at 08:04

## 2021-01-31 RX ADMIN — OXYCODONE HYDROCHLORIDE 10 MG: 10 TABLET ORAL at 00:14

## 2021-01-31 RX ADMIN — HEPARIN SODIUM 5000 UNITS: 5000 INJECTION INTRAVENOUS; SUBCUTANEOUS at 21:32

## 2021-01-31 RX ADMIN — HEPARIN SODIUM 5000 UNITS: 5000 INJECTION INTRAVENOUS; SUBCUTANEOUS at 13:05

## 2021-01-31 RX ADMIN — ACETAMINOPHEN 650 MG: 325 TABLET, FILM COATED ORAL at 05:35

## 2021-01-31 RX ADMIN — HEPARIN SODIUM 5000 UNITS: 5000 INJECTION INTRAVENOUS; SUBCUTANEOUS at 05:35

## 2021-01-31 RX ADMIN — INSULIN LISPRO 7 UNITS: 100 INJECTION, SOLUTION INTRAVENOUS; SUBCUTANEOUS at 08:05

## 2021-01-31 RX ADMIN — HYDROMORPHONE HYDROCHLORIDE 0.5 MG: 1 INJECTION, SOLUTION INTRAMUSCULAR; INTRAVENOUS; SUBCUTANEOUS at 01:18

## 2021-01-31 RX ADMIN — INSULIN LISPRO 1 UNITS: 100 INJECTION, SOLUTION INTRAVENOUS; SUBCUTANEOUS at 21:33

## 2021-01-31 RX ADMIN — OXYCODONE HYDROCHLORIDE 10 MG: 10 TABLET ORAL at 05:35

## 2021-01-31 RX ADMIN — ATORVASTATIN CALCIUM 40 MG: 40 TABLET, FILM COATED ORAL at 21:32

## 2021-01-31 RX ADMIN — INSULIN LISPRO 1 UNITS: 100 INJECTION, SOLUTION INTRAVENOUS; SUBCUTANEOUS at 08:05

## 2021-01-31 RX ADMIN — ACETAMINOPHEN 650 MG: 325 TABLET, FILM COATED ORAL at 00:14

## 2021-01-31 RX ADMIN — COLLAGENASE SANTYL: 250 OINTMENT TOPICAL at 08:05

## 2021-01-31 RX ADMIN — HYDROMORPHONE HYDROCHLORIDE 0.5 MG: 1 INJECTION, SOLUTION INTRAMUSCULAR; INTRAVENOUS; SUBCUTANEOUS at 06:53

## 2021-01-31 RX ADMIN — PANTOPRAZOLE SODIUM 40 MG: 40 TABLET, DELAYED RELEASE ORAL at 05:35

## 2021-01-31 RX ADMIN — HYDROMORPHONE HYDROCHLORIDE 0.5 MG: 1 INJECTION, SOLUTION INTRAMUSCULAR; INTRAVENOUS; SUBCUTANEOUS at 13:03

## 2021-01-31 RX ADMIN — INSULIN GLARGINE 20 UNITS: 100 INJECTION, SOLUTION SUBCUTANEOUS at 21:32

## 2021-01-31 RX ADMIN — GABAPENTIN 300 MG: 300 CAPSULE ORAL at 21:32

## 2021-01-31 RX ADMIN — HYDROMORPHONE HYDROCHLORIDE 0.5 MG: 1 INJECTION, SOLUTION INTRAMUSCULAR; INTRAVENOUS; SUBCUTANEOUS at 20:22

## 2021-01-31 NOTE — PLAN OF CARE
Problem: PAIN - ADULT  Goal: Verbalizes/displays adequate comfort level or baseline comfort level  Description: Interventions:  - Encourage patient to monitor pain and request assistance  - Assess pain using appropriate pain scale  - Administer analgesics based on type and severity of pain and evaluate response  - Implement non-pharmacological measures as appropriate and evaluate response  - Consider cultural and social influences on pain and pain management  - Notify physician/advanced practitioner if interventions unsuccessful or patient reports new pain  Outcome: Progressing     Problem: INFECTION - ADULT  Goal: Absence or prevention of progression during hospitalization  Description: INTERVENTIONS:  - Assess and monitor for signs and symptoms of infection  - Monitor lab/diagnostic results  - Monitor all insertion sites, i e  indwelling lines, tubes, and drains  - Monitor endotracheal if appropriate and nasal secretions for changes in amount and color  - [place] appropriate cooling/warming therapies per order  - Administer medications as ordered  - Instruct and encourage patient and family to use good hand hygiene technique  - Identify and instruct in appropriate isolation precautions for identified infection/condition  Outcome: Progressing  Goal: Absence of fever/infection during neutropenic period  Description: INTERVENTIONS:  - Monitor WBC    Outcome: Progressing     Problem: SAFETY ADULT  Goal: Patient will remain free of falls  Description: INTERVENTIONS:  - Assess patient frequently for physical needs  -  Identify cognitive and physical deficits and behaviors that affect risk of falls    -  [place] fall precautions as indicated by assessment   - Educate patient/family on patient safety including physical limitations  - Instruct patient to call for assistance with activity based on assessment  - Modify environment to reduce risk of injury  - Consider OT/PT consult to assist with strengthening/mobility  Outcome: Progressing  Goal: Maintain or return to baseline ADL function  Description: INTERVENTIONS:  -  Assess patient's ability to carry out ADLs; assess patient's baseline for ADL function and identify physical deficits which impact ability to perform ADLs (bathing, care of mouth/teeth, toileting, grooming, dressing, etc )  - Assess/evaluate cause of self-care deficits   - Assess range of motion  - Assess patient's mobility; develop plan if impaired  - Assess patient's need for assistive devices and provide as appropriate  - Encourage maximum independence but intervene and supervise when necessary  - Involve family in performance of ADLs  - Assess for home care needs following discharge   - Consider OT consult to assist with ADL evaluation and planning for discharge  - Provide patient education as appropriate  Outcome: Progressing  Goal: Maintain or return mobility status to optimal level  Description: INTERVENTIONS:  - Assess patient's baseline mobility status (ambulation, transfers, stairs, etc )    - Identify cognitive and physical deficits and behaviors that affect mobility  - Identify mobility aids required to assist with transfers and/or ambulation (gait belt, sit-to-stand, lift, walker, cane, etc )  - [place] fall precautions as indicated by assessment  - Record patient progress and toleration of activity level on Mobility SBAR; progress patient to next Phase/Stage  - Instruct patient to call for assistance with activity based on assessment  - Consider rehabilitation consult to assist with strengthening/weightbearing, etc   Outcome: Progressing     Problem: DISCHARGE PLANNING  Goal: Discharge to home or other facility with appropriate resources  Description: INTERVENTIONS:  - Identify barriers to discharge w/patient and caregiver  - Arrange for needed discharge resources and transportation as appropriate  - Identify discharge learning needs (meds, wound care, etc )  - Arrange for interpretive services to assist at discharge as needed  - Refer to Case Management Department for coordinating discharge planning if the patient needs post-hospital services based on physician/advanced practitioner order or complex needs related to functional status, cognitive ability, or social support system  Outcome: Progressing     Problem: Knowledge Deficit  Goal: Patient/family/caregiver demonstrates understanding of disease process, treatment plan, medications, and discharge instructions  Description: Complete learning assessment and assess knowledge base  Interventions:  - Provide teaching at level of understanding  - Provide teaching via preferred learning methods  Outcome: Progressing     Problem: Potential for Falls  Goal: Patient will remain free of falls  Description: INTERVENTIONS:  - Assess patient frequently for physical needs  -  Identify cognitive and physical deficits and behaviors that affect risk of falls  -  [place] fall precautions as indicated by assessment   - Educate patient/family on patient safety including physical limitations  - Instruct patient to call for assistance with activity based on assessment  - Modify environment to reduce risk of injury  - Consider OT/PT consult to assist with strengthening/mobility  Outcome: Progressing     Problem: Nutrition/Hydration-ADULT  Goal: Nutrient/Hydration intake appropriate for improving, restoring or maintaining nutritional needs  Description: Monitor and assess patient's nutrition/hydration status for malnutrition  Collaborate with interdisciplinary team and initiate plan and interventions as ordered  Monitor patient's weight and dietary intake as ordered or per policy  Utilize nutrition screening tool and intervene as necessary  Determine patient's food preferences and provide high-protein, high-caloric foods as appropriate       INTERVENTIONS:  - Monitor oral intake, urinary output, labs, and treatment plans  - Assess nutrition and hydration status and recommend course of action  - Evaluate amount of meals eaten  - Assist patient with eating if necessary   - Allow adequate time for meals  - Recommend/ encourage appropriate diets, oral nutritional supplements, and vitamin/mineral supplements  - Order, calculate, and assess calorie counts as needed  - Recommend, monitor, and adjust tube feedings and TPN/PPN based on assessed needs  - Assess need for intravenous fluids  - Provide specific nutrition/hydration education as appropriate  - Include patient/family/caregiver in decisions related to nutrition  Outcome: Progressing

## 2021-01-31 NOTE — PLAN OF CARE
Problem: PAIN - ADULT  Goal: Verbalizes/displays adequate comfort level or baseline comfort level  Description: Interventions:  - Encourage patient to monitor pain and request assistance  - Assess pain using appropriate pain scale  - Administer analgesics based on type and severity of pain and evaluate response  - Implement non-pharmacological measures as appropriate and evaluate response  - Consider cultural and social influences on pain and pain management  - Notify physician/advanced practitioner if interventions unsuccessful or patient reports new pain  Outcome: Progressing     Problem: INFECTION - ADULT  Goal: Absence or prevention of progression during hospitalization  Description: INTERVENTIONS:  - Assess and monitor for signs and symptoms of infection  - Monitor lab/diagnostic results  - Monitor all insertion sites, i e  indwelling lines, tubes, and drains  - Monitor endotracheal if appropriate and nasal secretions for changes in amount and color  - [place] appropriate cooling/warming therapies per order  - Administer medications as ordered  - Instruct and encourage patient and family to use good hand hygiene technique  - Identify and instruct in appropriate isolation precautions for identified infection/condition  Outcome: Progressing  Goal: Absence of fever/infection during neutropenic period  Description: INTERVENTIONS:  - Monitor WBC    Outcome: Progressing     Problem: SAFETY ADULT  Goal: Patient will remain free of falls  Description: INTERVENTIONS:  - Assess patient frequently for physical needs  -  Identify cognitive and physical deficits and behaviors that affect risk of falls    -  [place] fall precautions as indicated by assessment   - Educate patient/family on patient safety including physical limitations  - Instruct patient to call for assistance with activity based on assessment  - Modify environment to reduce risk of injury  - Consider OT/PT consult to assist with strengthening/mobility  Outcome: Progressing  Goal: Maintain or return to baseline ADL function  Description: INTERVENTIONS:  -  Assess patient's ability to carry out ADLs; assess patient's baseline for ADL function and identify physical deficits which impact ability to perform ADLs (bathing, care of mouth/teeth, toileting, grooming, dressing, etc )  - Assess/evaluate cause of self-care deficits   - Assess range of motion  - Assess patient's mobility; develop plan if impaired  - Assess patient's need for assistive devices and provide as appropriate  - Encourage maximum independence but intervene and supervise when necessary  - Involve family in performance of ADLs  - Assess for home care needs following discharge   - Consider OT consult to assist with ADL evaluation and planning for discharge  - Provide patient education as appropriate  Outcome: Progressing  Goal: Maintain or return mobility status to optimal level  Description: INTERVENTIONS:  - Assess patient's baseline mobility status (ambulation, transfers, stairs, etc )    - Identify cognitive and physical deficits and behaviors that affect mobility  - Identify mobility aids required to assist with transfers and/or ambulation (gait belt, sit-to-stand, lift, walker, cane, etc )  - [place] fall precautions as indicated by assessment  - Record patient progress and toleration of activity level on Mobility SBAR; progress patient to next Phase/Stage  - Instruct patient to call for assistance with activity based on assessment  - Consider rehabilitation consult to assist with strengthening/weightbearing, etc   Outcome: Progressing     Problem: DISCHARGE PLANNING  Goal: Discharge to home or other facility with appropriate resources  Description: INTERVENTIONS:  - Identify barriers to discharge w/patient and caregiver  - Arrange for needed discharge resources and transportation as appropriate  - Identify discharge learning needs (meds, wound care, etc )  - Arrange for interpretive services to assist at discharge as needed  - Refer to Case Management Department for coordinating discharge planning if the patient needs post-hospital services based on physician/advanced practitioner order or complex needs related to functional status, cognitive ability, or social support system  Outcome: Progressing     Problem: Knowledge Deficit  Goal: Patient/family/caregiver demonstrates understanding of disease process, treatment plan, medications, and discharge instructions  Description: Complete learning assessment and assess knowledge base  Interventions:  - Provide teaching at level of understanding  - Provide teaching via preferred learning methods  Outcome: Progressing     Problem: Potential for Falls  Goal: Patient will remain free of falls  Description: INTERVENTIONS:  - Assess patient frequently for physical needs  -  Identify cognitive and physical deficits and behaviors that affect risk of falls  -  [place] fall precautions as indicated by assessment   - Educate patient/family on patient safety including physical limitations  - Instruct patient to call for assistance with activity based on assessment  - Modify environment to reduce risk of injury  - Consider OT/PT consult to assist with strengthening/mobility  Outcome: Progressing     Problem: Nutrition/Hydration-ADULT  Goal: Nutrient/Hydration intake appropriate for improving, restoring or maintaining nutritional needs  Description: Monitor and assess patient's nutrition/hydration status for malnutrition  Collaborate with interdisciplinary team and initiate plan and interventions as ordered  Monitor patient's weight and dietary intake as ordered or per policy  Utilize nutrition screening tool and intervene as necessary  Determine patient's food preferences and provide high-protein, high-caloric foods as appropriate       INTERVENTIONS:  - Monitor oral intake, urinary output, labs, and treatment plans  - Assess nutrition and hydration status and recommend course of action  - Evaluate amount of meals eaten  - Assist patient with eating if necessary   - Allow adequate time for meals  - Recommend/ encourage appropriate diets, oral nutritional supplements, and vitamin/mineral supplements  - Order, calculate, and assess calorie counts as needed  - Recommend, monitor, and adjust tube feedings and TPN/PPN based on assessed needs  - Assess need for intravenous fluids  - Provide specific nutrition/hydration education as appropriate  - Include patient/family/caregiver in decisions related to nutrition  Outcome: Progressing

## 2021-01-31 NOTE — PROGRESS NOTES
Progress Note -    Esther Moy 76 y o  male MRN: [id number]  Unit/Bed#: [facility] 324-01 Encounter: [id number]    Assessment:  65-yr old male with PAD and non-healing R 5th metatarsal wound (x7 months)  Now s/p Agram with R DP PTA  Bilaterally palpable DP pulses  Stable VS in room air  Foot MRI could not be done yesterday as it was found out that he had an angioseal implant device (MRI safe per label)  He now has details of the implant device which is available to be shown to the MRI team     PLAN:  - MRI of the right foot today as device is MRI safe  - continued local wound care  Subjective:   - c/o; foot pain with slight exacerbation yesterday; good response to parental analgesics  Objective:     Vitals: Temp:  [97 8 °F (36 6 °C)-98 5 °F (36 9 °C)] 98 5 °F (36 9 °C)  HR:  [73-81] 73  Resp:  [16-18] 18  BP: (131-161)/(65-74) 161/74  Body mass index is 32 96 kg/m²  I/O       01/29 0701 - 01/30 0700 01/30 0701 - 01/31 0700    P  O  237     Total Intake 237     Net +237           Unmeasured Urine Occurrence  2 x          Physical Exam:  GEN: NAD  HEENT: atraumatic  CV: RRR  Lung: Normal effort  Ab: Soft, NT/ND  Extrem: L groin soft; no hematoma  L: palpable fem/DP  Foot pink and warm  R: palpable fem/DP  Foot pink and warm  Neuro: A+Ox3    Lab, Imaging and other studies: I have personally reviewed pertinent reports      VTE Pharmacologic Prophylaxis: Heparin  VTE Mechanical Prophylaxis: sequential compression device

## 2021-01-31 NOTE — PROGRESS NOTES
Podiatry - Progress Note  Patient: Maribel Godoy 76 y o  male   MRN: [id number]  PCP: Latosha Somers MD  Unit/Bed#: [facility] 324-01 Encounter: [id number]  Date Of Visit: 21    ASSESSMENT:    Maribel Godoy is a 76 y o  male with:    1  Right lateral foot ulceration - Cristina 2 - POA  2  Left foot superficial fissures - POA  3  Hx of left foot lisfranc amputation - POA  4  PAD  5  T2DM     PLAN:    · Local wound care consisting of betadine paint to right foot wound followed by maxorb dsd  Discontinue Santyl at this time  Wound care instructions placed  · F/u MRI  · Right DP pulse seems to have decreased since last time was assessed by Podiatry 20 communicated finding with vascular team    · Elevation on green foam wedges or pillows when non-ambulatory  · Rest of care per primary team      Weightbearing status: WBAT    SUBJECTIVE:     The patient was seen, evaluated, and assessed at bedside today  The patient was awake, alert, and in no acute distress  No acute events overnight  The patient reports mild tenderness to right lower extremity  Patient denies N/V/F/chills/SOB/CP  OBJECTIVE:     Vitals:   /75   Pulse 73   Temp 97 7 °F (36 5 °C) (Oral)   Resp 18   Ht 5' 4" (1 626 m)   SpO2 97%   BMI 32 96 kg/m²     Temp (24hrs), Av °F (36 7 °C), Min:97 7 °F (36 5 °C), Max:98 5 °F (36 9 °C)      Physical Exam:     General: Alert, cooperative and no distress  Lungs: Non labored breathing  Abdomen: Soft, non-tender  Lower extremity exam:  Cardiovascular status at baseline  Neurological status at baseline  Musculoskeletal status at baseline  No calf tenderness noted      Lower extremity wound(s) as noted below:  Wound #: 1  Location: right lateral foot  Length 1 2cm: Width 1 6cm: Depth 0 5cm:   Deepest Tissue Noted in Base: subq  Probe to Bone: No  Peripheral Skin Description: Attached  Granulation: 0% Fibrotic Tissue: 100% Necrotic Tissue: 0%   Drainage Amount: minimal has increased    Wound edges are macerated, no crepitus, no purulence noted at this time  Right lower extremity DP pulse has  since last time was assessed by Podiatry 20  Clinical Images 21: Additional Data:     Labs:    Results from last 7 days   Lab Units 21  0450   WBC Thousand/uL 7 66   HEMOGLOBIN g/dL 13 7   HEMATOCRIT % 41 5   PLATELETS Thousands/uL 156   NEUTROS PCT % 49   LYMPHS PCT % 39   MONOS PCT % 8   EOS PCT % 4     Results from last 7 days   Lab Units 21  0708   POTASSIUM mmol/L 4 0   CHLORIDE mmol/L 105   CO2 mmol/L 31   BUN mg/dL 17   CREATININE mg/dL 0 98   CALCIUM mg/dL 9 9           * I Have Reviewed All Lab Data Listed Above  Recent Cultures (last 7 days):               Imaging: I have personally reviewed pertinent films in PACS  EKG, Pathology, and Other Studies: I have personally reviewed pertinent reports  ** Please Note: Portions of the record may have been created with voice recognition software  Occasional wrong word or "sound a like" substitutions may have occurred due to the inherent limitations of voice recognition software  Read the chart carefully and recognize, using context, where substitutions have occurred   **

## 2021-01-31 NOTE — PROGRESS NOTES
Progress Note - Zaynab Lucas 76 y o  male MRN: [id number]    [facility] Encounter: [id number]      CC: diabetes f/u    Subjective:   Zaynab Lucas is a 76y o  year old male with type 2 diabetes  Feels well  No complaints  No hypoglycemia  Objective:     Vitals: Blood pressure 152/75, pulse 73, temperature 97 7 °F (36 5 °C), temperature source Oral, resp  rate 18, height 5' 4" (1 626 m), SpO2 97 %  ,Body mass index is 32 96 kg/m²  Intake/Output Summary (Last 24 hours) at 1/31/2021 1350  Last data filed at 1/31/2021 0817  Gross per 24 hour   Intake 180 ml   Output    Net 180 ml       Physical Exam:  General Appearance: awake, appears stated age and cooperative  Head: Normocephalic, without obvious abnormality, atraumatic  Extremities: moves all extremities  Skin: Skin color and temperature normal    Pulm: no labored breathing    Lab, Imaging and other studies: Reviewed    POC Glucose (mg/dl)   Date Value   01/31/2021 133   01/31/2021 163 (H)   01/31/2021 157 (H)   01/30/2021 187 (H)   01/30/2021 196 (H)   01/30/2021 196 (H)   01/30/2021 160 (H)   01/29/2021 153 (H)   01/29/2021 181 (H)   01/29/2021 228 (H)       Assessment:  Type 2 DM  PAD  Non-healing R 5th metatarsal wound s/p a-gram    Plan:  Type 2 DM - A1C 8 7%, continue lantus 20 units, increase humalog to 8 units before meals + ISS and diabetic diet  Portions of the record may have been created with voice recognition software

## 2021-02-01 ENCOUNTER — APPOINTMENT (INPATIENT)
Dept: RADIOLOGY | Facility: HOSPITAL | Age: 66
DRG: 240 | End: 2021-02-01
Payer: COMMERCIAL

## 2021-02-01 LAB
GLUCOSE SERPL-MCNC: 111 MG/DL (ref 65–140)
GLUCOSE SERPL-MCNC: 163 MG/DL (ref 65–140)
GLUCOSE SERPL-MCNC: 176 MG/DL (ref 65–140)
GLUCOSE SERPL-MCNC: 244 MG/DL (ref 65–140)

## 2021-02-01 PROCEDURE — 73718 MRI LOWER EXTREMITY W/O DYE: CPT

## 2021-02-01 PROCEDURE — 99231 SBSQ HOSP IP/OBS SF/LOW 25: CPT | Performed by: SURGERY

## 2021-02-01 PROCEDURE — G1004 CDSM NDSC: HCPCS

## 2021-02-01 PROCEDURE — 82948 REAGENT STRIP/BLOOD GLUCOSE: CPT

## 2021-02-01 RX ORDER — INSULIN GLARGINE 100 [IU]/ML
24 INJECTION, SOLUTION SUBCUTANEOUS
Status: DISCONTINUED | OUTPATIENT
Start: 2021-02-01 | End: 2021-02-06 | Stop reason: HOSPADM

## 2021-02-01 RX ADMIN — INSULIN LISPRO 1 UNITS: 100 INJECTION, SOLUTION INTRAVENOUS; SUBCUTANEOUS at 08:33

## 2021-02-01 RX ADMIN — HEPARIN SODIUM 5000 UNITS: 5000 INJECTION INTRAVENOUS; SUBCUTANEOUS at 21:57

## 2021-02-01 RX ADMIN — CLOPIDOGREL BISULFATE 75 MG: 75 TABLET ORAL at 08:32

## 2021-02-01 RX ADMIN — HYDROMORPHONE HYDROCHLORIDE 0.5 MG: 1 INJECTION, SOLUTION INTRAMUSCULAR; INTRAVENOUS; SUBCUTANEOUS at 05:27

## 2021-02-01 RX ADMIN — PANTOPRAZOLE SODIUM 40 MG: 40 TABLET, DELAYED RELEASE ORAL at 05:30

## 2021-02-01 RX ADMIN — HEPARIN SODIUM 5000 UNITS: 5000 INJECTION INTRAVENOUS; SUBCUTANEOUS at 13:25

## 2021-02-01 RX ADMIN — HYDROMORPHONE HYDROCHLORIDE 0.5 MG: 1 INJECTION, SOLUTION INTRAMUSCULAR; INTRAVENOUS; SUBCUTANEOUS at 11:47

## 2021-02-01 RX ADMIN — HYDROMORPHONE HYDROCHLORIDE 0.5 MG: 1 INJECTION, SOLUTION INTRAMUSCULAR; INTRAVENOUS; SUBCUTANEOUS at 20:33

## 2021-02-01 RX ADMIN — OXYCODONE HYDROCHLORIDE 10 MG: 10 TABLET ORAL at 04:26

## 2021-02-01 RX ADMIN — OXYCODONE HYDROCHLORIDE 10 MG: 10 TABLET ORAL at 08:34

## 2021-02-01 RX ADMIN — HEPARIN SODIUM 5000 UNITS: 5000 INJECTION INTRAVENOUS; SUBCUTANEOUS at 05:27

## 2021-02-01 RX ADMIN — ATORVASTATIN CALCIUM 40 MG: 40 TABLET, FILM COATED ORAL at 21:57

## 2021-02-01 RX ADMIN — OXYCODONE HYDROCHLORIDE 10 MG: 10 TABLET ORAL at 16:54

## 2021-02-01 RX ADMIN — INSULIN GLARGINE 24 UNITS: 100 INJECTION, SOLUTION SUBCUTANEOUS at 21:58

## 2021-02-01 RX ADMIN — INSULIN LISPRO 2 UNITS: 100 INJECTION, SOLUTION INTRAVENOUS; SUBCUTANEOUS at 11:49

## 2021-02-01 RX ADMIN — INSULIN LISPRO 1 UNITS: 100 INJECTION, SOLUTION INTRAVENOUS; SUBCUTANEOUS at 21:59

## 2021-02-01 RX ADMIN — HYDROMORPHONE HYDROCHLORIDE 0.5 MG: 1 INJECTION, SOLUTION INTRAMUSCULAR; INTRAVENOUS; SUBCUTANEOUS at 00:14

## 2021-02-01 RX ADMIN — HYDROMORPHONE HYDROCHLORIDE 0.5 MG: 1 INJECTION, SOLUTION INTRAMUSCULAR; INTRAVENOUS; SUBCUTANEOUS at 23:51

## 2021-02-01 RX ADMIN — GABAPENTIN 300 MG: 300 CAPSULE ORAL at 21:57

## 2021-02-01 RX ADMIN — OXYCODONE HYDROCHLORIDE 10 MG: 10 TABLET ORAL at 21:58

## 2021-02-01 NOTE — PROGRESS NOTES
Progress Note -    Brady Fox 76 y o  male MRN: [id number]  Unit/Bed#: [facility] 324-01 Encounter: [id number]    Assessment:  65-yr old male with PAD and non-healing R 5th metatarsal wound (x7 months)  Now s/p Agram with R DP PTA  Bilaterally palpable DP pulses  Stable VS in room air  Foot MRI could not be done so far as it was found out that he had an angioseal implant device (MRI safe per label)  He now has details of the implant device which is available to be shown to the MRI team     PLAN:  - MRI of the right foot today as device is MRI safe  - continued local wound care  Subjective:   - c/o groin and foot pain this am; foot pain with slight exacerbation yesterday; good response to analgesics  Objective:     Vitals: Temp:  [97 7 °F (36 5 °C)-98 °F (36 7 °C)] 98 °F (36 7 °C)  HR:  [66-75] 75  Resp:  [18] 18  BP: (134-152)/(67-75) 143/74  Body mass index is 32 96 kg/m²  I/O       01/29 0701 - 01/30 0700 01/30 0701 - 01/31 0700    P  O  237     Total Intake 237     Net +237           Unmeasured Urine Occurrence  2 x          Physical Exam:  GEN: NAD  HEENT: atraumatic  CV: RRR  Lung: Normal effort  Ab: Soft, NT/ND  Extrem: L groin soft; no hematoma  L: palpable fem/DP  Foot pink and warm  R: palpable fem/DP  Foot pink and warm  Neuro: A+Ox3    Lab, Imaging and other studies: I have personally reviewed pertinent reports      VTE Pharmacologic Prophylaxis: Heparin  VTE Mechanical Prophylaxis: sequential compression device

## 2021-02-01 NOTE — QUICK NOTE
Pt was not seen or examined personally  Chart was reviewed  76 yr male with T2DM and PAD with concern for osteomyelitis waiting MRI  Mild fasting hyperglycemia  Plan:  Increase Lantus 24u and continue humalog 8u tidac plus correction

## 2021-02-02 ENCOUNTER — ANESTHESIA (INPATIENT)
Dept: PERIOP | Facility: HOSPITAL | Age: 66
DRG: 240 | End: 2021-02-02
Payer: COMMERCIAL

## 2021-02-02 ENCOUNTER — ANESTHESIA EVENT (INPATIENT)
Dept: PERIOP | Facility: HOSPITAL | Age: 66
DRG: 240 | End: 2021-02-02
Payer: COMMERCIAL

## 2021-02-02 VITALS — HEART RATE: 86 BPM

## 2021-02-02 LAB
GLUCOSE SERPL-MCNC: 104 MG/DL (ref 65–140)
GLUCOSE SERPL-MCNC: 123 MG/DL (ref 65–140)
GLUCOSE SERPL-MCNC: 124 MG/DL (ref 65–140)
GLUCOSE SERPL-MCNC: 157 MG/DL (ref 65–140)
GLUCOSE SERPL-MCNC: 163 MG/DL (ref 65–140)
GLUCOSE SERPL-MCNC: 196 MG/DL (ref 65–140)
GLUCOSE SERPL-MCNC: 65 MG/DL (ref 65–140)

## 2021-02-02 PROCEDURE — 87077 CULTURE AEROBIC IDENTIFY: CPT | Performed by: PODIATRIST

## 2021-02-02 PROCEDURE — 28810 AMPUTATION TOE & METATARSAL: CPT | Performed by: PODIATRIST

## 2021-02-02 PROCEDURE — 87176 TISSUE HOMOGENIZATION CULTR: CPT | Performed by: PODIATRIST

## 2021-02-02 PROCEDURE — 88311 DECALCIFY TISSUE: CPT | Performed by: CLINICAL MEDICAL LABORATORY

## 2021-02-02 PROCEDURE — 88305 TISSUE EXAM BY PATHOLOGIST: CPT | Performed by: CLINICAL MEDICAL LABORATORY

## 2021-02-02 PROCEDURE — 87205 SMEAR GRAM STAIN: CPT | Performed by: PODIATRIST

## 2021-02-02 PROCEDURE — 0Y6M0ZF DETACHMENT AT RIGHT FOOT, PARTIAL 5TH RAY, OPEN APPROACH: ICD-10-PCS | Performed by: PODIATRIST

## 2021-02-02 PROCEDURE — 87075 CULTR BACTERIA EXCEPT BLOOD: CPT | Performed by: PODIATRIST

## 2021-02-02 PROCEDURE — 82948 REAGENT STRIP/BLOOD GLUCOSE: CPT

## 2021-02-02 PROCEDURE — 99231 SBSQ HOSP IP/OBS SF/LOW 25: CPT | Performed by: SURGERY

## 2021-02-02 PROCEDURE — NC001 PR NO CHARGE: Performed by: PODIATRIST

## 2021-02-02 PROCEDURE — 87070 CULTURE OTHR SPECIMN AEROBIC: CPT | Performed by: PODIATRIST

## 2021-02-02 RX ORDER — PROPOFOL 10 MG/ML
INJECTION, EMULSION INTRAVENOUS AS NEEDED
Status: DISCONTINUED | OUTPATIENT
Start: 2021-02-02 | End: 2021-02-02

## 2021-02-02 RX ORDER — HYDROMORPHONE HCL/PF 1 MG/ML
0.5 SYRINGE (ML) INJECTION ONCE
Status: COMPLETED | OUTPATIENT
Start: 2021-02-02 | End: 2021-02-02

## 2021-02-02 RX ORDER — ONDANSETRON 2 MG/ML
4 INJECTION INTRAMUSCULAR; INTRAVENOUS ONCE AS NEEDED
Status: DISCONTINUED | OUTPATIENT
Start: 2021-02-02 | End: 2021-02-02 | Stop reason: HOSPADM

## 2021-02-02 RX ORDER — HYDROMORPHONE HCL/PF 1 MG/ML
0.2 SYRINGE (ML) INJECTION
Status: DISCONTINUED | OUTPATIENT
Start: 2021-02-02 | End: 2021-02-02 | Stop reason: HOSPADM

## 2021-02-02 RX ORDER — FENTANYL CITRATE/PF 50 MCG/ML
25 SYRINGE (ML) INJECTION
Status: DISCONTINUED | OUTPATIENT
Start: 2021-02-02 | End: 2021-02-02 | Stop reason: HOSPADM

## 2021-02-02 RX ORDER — MAGNESIUM HYDROXIDE 1200 MG/15ML
LIQUID ORAL AS NEEDED
Status: DISCONTINUED | OUTPATIENT
Start: 2021-02-02 | End: 2021-02-02 | Stop reason: HOSPADM

## 2021-02-02 RX ORDER — CEFAZOLIN SODIUM 2 G/50ML
2000 SOLUTION INTRAVENOUS
Status: DISCONTINUED | OUTPATIENT
Start: 2021-02-02 | End: 2021-02-02 | Stop reason: HOSPADM

## 2021-02-02 RX ORDER — SODIUM CHLORIDE, SODIUM LACTATE, POTASSIUM CHLORIDE, CALCIUM CHLORIDE 600; 310; 30; 20 MG/100ML; MG/100ML; MG/100ML; MG/100ML
INJECTION, SOLUTION INTRAVENOUS CONTINUOUS PRN
Status: DISCONTINUED | OUTPATIENT
Start: 2021-02-02 | End: 2021-02-02

## 2021-02-02 RX ORDER — SODIUM CHLORIDE, SODIUM LACTATE, POTASSIUM CHLORIDE, CALCIUM CHLORIDE 600; 310; 30; 20 MG/100ML; MG/100ML; MG/100ML; MG/100ML
10 INJECTION, SOLUTION INTRAVENOUS CONTINUOUS
Status: DISCONTINUED | OUTPATIENT
Start: 2021-02-02 | End: 2021-02-03

## 2021-02-02 RX ORDER — PROPOFOL 10 MG/ML
INJECTION, EMULSION INTRAVENOUS CONTINUOUS PRN
Status: DISCONTINUED | OUTPATIENT
Start: 2021-02-02 | End: 2021-02-02

## 2021-02-02 RX ORDER — METOCLOPRAMIDE HYDROCHLORIDE 5 MG/ML
INJECTION INTRAMUSCULAR; INTRAVENOUS AS NEEDED
Status: DISCONTINUED | OUTPATIENT
Start: 2021-02-02 | End: 2021-02-02

## 2021-02-02 RX ADMIN — PROPOFOL 30 MG: 10 INJECTION, EMULSION INTRAVENOUS at 16:44

## 2021-02-02 RX ADMIN — HEPARIN SODIUM 5000 UNITS: 5000 INJECTION INTRAVENOUS; SUBCUTANEOUS at 21:35

## 2021-02-02 RX ADMIN — SODIUM CHLORIDE, SODIUM LACTATE, POTASSIUM CHLORIDE, AND CALCIUM CHLORIDE: .6; .31; .03; .02 INJECTION, SOLUTION INTRAVENOUS at 16:35

## 2021-02-02 RX ADMIN — VANCOMYCIN HYDROCHLORIDE 1750 MG: 1 INJECTION, POWDER, LYOPHILIZED, FOR SOLUTION INTRAVENOUS at 21:35

## 2021-02-02 RX ADMIN — HYDROMORPHONE HYDROCHLORIDE 0.5 MG: 1 INJECTION, SOLUTION INTRAMUSCULAR; INTRAVENOUS; SUBCUTANEOUS at 14:18

## 2021-02-02 RX ADMIN — PANTOPRAZOLE SODIUM 40 MG: 40 TABLET, DELAYED RELEASE ORAL at 06:19

## 2021-02-02 RX ADMIN — SENNOSIDES 8.6 MG: 8.6 TABLET, FILM COATED ORAL at 08:28

## 2021-02-02 RX ADMIN — INSULIN LISPRO 1 UNITS: 100 INJECTION, SOLUTION INTRAVENOUS; SUBCUTANEOUS at 08:27

## 2021-02-02 RX ADMIN — PHENYLEPHRINE HYDROCHLORIDE 200 MCG: 10 INJECTION INTRAVENOUS at 17:25

## 2021-02-02 RX ADMIN — PROPOFOL 50 MCG/KG/MIN: 10 INJECTION, EMULSION INTRAVENOUS at 16:44

## 2021-02-02 RX ADMIN — PHENYLEPHRINE HYDROCHLORIDE 100 MCG: 10 INJECTION INTRAVENOUS at 17:00

## 2021-02-02 RX ADMIN — PHENYLEPHRINE HYDROCHLORIDE 200 MCG: 10 INJECTION INTRAVENOUS at 17:05

## 2021-02-02 RX ADMIN — HYDROMORPHONE HYDROCHLORIDE 0.5 MG: 1 INJECTION, SOLUTION INTRAMUSCULAR; INTRAVENOUS; SUBCUTANEOUS at 20:33

## 2021-02-02 RX ADMIN — HEPARIN SODIUM 5000 UNITS: 5000 INJECTION INTRAVENOUS; SUBCUTANEOUS at 06:19

## 2021-02-02 RX ADMIN — HYDROMORPHONE HYDROCHLORIDE 0.5 MG: 1 INJECTION, SOLUTION INTRAMUSCULAR; INTRAVENOUS; SUBCUTANEOUS at 05:16

## 2021-02-02 RX ADMIN — OXYCODONE HYDROCHLORIDE 10 MG: 10 TABLET ORAL at 12:05

## 2021-02-02 RX ADMIN — METOCLOPRAMIDE 10 MG: 5 INJECTION, SOLUTION INTRAMUSCULAR; INTRAVENOUS at 16:50

## 2021-02-02 RX ADMIN — DOCUSATE SODIUM 100 MG: 100 CAPSULE, LIQUID FILLED ORAL at 08:28

## 2021-02-02 RX ADMIN — ATORVASTATIN CALCIUM 40 MG: 40 TABLET, FILM COATED ORAL at 21:35

## 2021-02-02 RX ADMIN — OXYCODONE HYDROCHLORIDE 10 MG: 10 TABLET ORAL at 19:47

## 2021-02-02 RX ADMIN — OXYCODONE HYDROCHLORIDE 10 MG: 10 TABLET ORAL at 03:58

## 2021-02-02 RX ADMIN — HYDROMORPHONE HYDROCHLORIDE 0.5 MG: 1 INJECTION, SOLUTION INTRAMUSCULAR; INTRAVENOUS; SUBCUTANEOUS at 23:26

## 2021-02-02 RX ADMIN — INSULIN GLARGINE 24 UNITS: 100 INJECTION, SOLUTION SUBCUTANEOUS at 21:35

## 2021-02-02 RX ADMIN — CLOPIDOGREL BISULFATE 75 MG: 75 TABLET ORAL at 08:28

## 2021-02-02 RX ADMIN — INSULIN LISPRO 1 UNITS: 100 INJECTION, SOLUTION INTRAVENOUS; SUBCUTANEOUS at 10:42

## 2021-02-02 RX ADMIN — GABAPENTIN 300 MG: 300 CAPSULE ORAL at 21:35

## 2021-02-02 RX ADMIN — METOCLOPRAMIDE 10 MG: 5 INJECTION, SOLUTION INTRAMUSCULAR; INTRAVENOUS at 16:44

## 2021-02-02 RX ADMIN — PHENYLEPHRINE HYDROCHLORIDE 200 MCG: 10 INJECTION INTRAVENOUS at 17:15

## 2021-02-02 RX ADMIN — CEFAZOLIN SODIUM 2000 MG: 2 SOLUTION INTRAVENOUS at 16:40

## 2021-02-02 NOTE — PROGRESS NOTES
Assessment/Plan:   , fibrous tissue noted to the base of the ulceration, no drainage, very scant amount of bleeding upon debridement, dressed with Betadine soaked gauze, patient educated daily dressing changes using Iodosorb, patient to start Diflucan therapy, patient advised on reporting to the emergency room for admission on IV antibiotics, patient opted out that this time as he does not have any constitutional symptoms, patient report marked pain to the ulceration site, vascular study evaluated evidence of arterial occlusive disease,  patient referred for vascular surgeon for possible intervention  Ulcer was debrided using 15  Blade down to bleeding subcutaneous tissue, , fibrous tissue, biofilm, devitalized skin were removed       Diagnoses and all orders for this visit:    Skin yeast infection  -     fluconazole (DIFLUCAN) 150 mg tablet; Take 1 tablet (150 mg total) by mouth every other day for 3 doses    Peripheral vascular disease, unspecified ([facility] )  -     Ambulatory referral to Vascular Surgery; Future    Diabetic ulcer of right midfoot associated with type 2 diabetes mellitus, with fat layer exposed ([facility] )    Cellulitis of right lower extremity          Subjective:  Patient has pain and swelling the right lower extremity  This has been ongoing for 2 days  He was recently placed on antibiotic but still has pain and swelling  No history of fever or night sweats  Patient is diabetic status post graft application of the right foot  Patient has known peripheral vascular disease  Allergies   Allergen Reactions    Shellfish-Derived Products Anaphylaxis     Throat closes    Sulfamethoxazole-Trimethoprim Rash       No current facility-administered medications for this visit  No current outpatient medications on file      Facility-Administered Medications Ordered in Other Visits:     acetaminophen (TYLENOL) tablet 650 mg, 650 mg, Oral, Q6H PRN, Mervin Santoyo DO, 650 mg at 01/31/21 0581   atorvastatin (LIPITOR) tablet 40 mg, 40 mg, Oral, HS, Kelly Jerilyn, DO, 40 mg at 01/31/21 2132    clopidogrel (PLAVIX) tablet 75 mg, 75 mg, Oral, Daily, Kelly Jerilyn, DO, 75 mg at 02/01/21 3788    docusate sodium (COLACE) capsule 100 mg, 100 mg, Oral, BID, Kelly Jerilyn, DO    gabapentin (NEURONTIN) capsule 300 mg, 300 mg, Oral, HS, Kelly Jerilyn, DO, 300 mg at 01/31/21 2132    heparin (porcine) subcutaneous injection 5,000 Units, 5,000 Units, Subcutaneous, Q8H [address], Tip Ross PA-C, 5,000 Units at 02/01/21 1325    HYDROmorphone (DILAUDID) injection 0 5 mg, 0 5 mg, Intravenous, Q3H PRN, Ivy Chau MD, 0 5 mg at 02/01/21 1147    insulin glargine (LANTUS) subcutaneous injection 24 Units 0 24 mL, 24 Units, Subcutaneous, HS, Zachery Garcia MD    insulin lispro (HumaLOG) 100 units/mL subcutaneous injection 1-5 Units, 1-5 Units, Subcutaneous, TID AC, 2 Units at 02/01/21 1149 **AND** Fingerstick Glucose (POCT), , , TID AC, Samira Horan MD    insulin lispro (HumaLOG) 100 units/mL subcutaneous injection 1-5 Units, 1-5 Units, Subcutaneous, HS, Samira Horan MD, 1 Units at 01/31/21 2133    insulin lispro (HumaLOG) 100 units/mL subcutaneous injection 8 Units, 8 Units, Subcutaneous, TID With Meals, Samira Horan MD, 8 Units at 02/01/21 1653    oxyCODONE (ROXICODONE) immediate release tablet 10 mg, 10 mg, Oral, Q4H PRN, Yoshi Santoyo DO, 10 mg at 02/01/21 1654    oxyCODONE (ROXICODONE) IR tablet 5 mg, 5 mg, Oral, Q4H PRN, Rafael Limon DO    pantoprazole (PROTONIX) EC tablet 40 mg, 40 mg, Oral, Daily, Kelly Jean-Baptiste DO, 40 mg at 02/01/21 0530    senna (SENOKOT) tablet 8 6 mg, 1 tablet, Oral, Daily, Kelly Jean-Baptiste DO    Patient Active Problem List   Diagnosis    Acquired deformity of foot    Diabetic polyneuropathy associated with type 2 diabetes mellitus ([facility] )    Pain in both feet    [facility]    Onychomycosis    Tinea pedis of both feet  Dermatophytosis    Ingrown toenail    Paronychia of toenail of right foot    Diabetic ulcer of toe of right foot associated with type 2 diabetes mellitus, limited to breakdown of skin (HCC)    Bony exostosis    Right foot ulcer, with fat layer exposed ([facility] )    PAD (peripheral artery disease) ([facility] )          Patient ID: Luz Hopkins is a 76 y o  male  Follow-up on right foot cellulitis, patient report report mild improvement, patient is inquiring about culture results      The following portions of the patient's history were reviewed and updated as appropriate:     family history includes Alzheimer's disease in his father; Arthritis in his mother; Cancer in his father  reports that he has never smoked  He has never used smokeless tobacco  He reports current alcohol use  He reports that he does not use drugs  Vitals:    01/08/21 1124   BP: 167/90   Pulse: 95   Resp: 17       Review of Systems   Constitutional: Negative  Respiratory: Negative  Cardiovascular: Positive for leg swelling  Musculoskeletal: Positive for arthralgias  Skin: Positive for color change and wound  Neurological: Positive for numbness  Objective:  Patient's shoes and socks removed  Foot ExamPhysical Exam  Vitals signs and nursing note reviewed  Constitutional:       Appearance: Normal appearance  Cardiovascular:      Comments: 4/4 pitting edema right lower extremity  There is pain with palpation of the calf  Dorsal lateral aspect of the right foot demonstrates non blanchable and blanchable erythema   No evidence of abscess at this time  No evidence of subcutaneous gas  Musculoskeletal:      Comments: Improvement of erythema and edema of the right foot    No evidence of subcutaneous gas or fluid collection at this time   Skin:     Comments: Ulcer right foot lateral 5th MPJ, fibrotic, dry necrotic borders, no probe to bone at this time, no drainage, patchy skin discoloration dorsal foot, culture reviewed, used infection   Neurological:      Mental Status: He is alert  Psychiatric:         Mood and Affect: Mood normal          Behavior: Behavior normal          Thought Content:  Thought content normal          Judgment: Judgment normal

## 2021-02-02 NOTE — ANESTHESIA POSTPROCEDURE EVALUATION
Post-Op Assessment Note    CV Status:  Stable  Pain Score: 0    Pain management: adequate     Mental Status:  Alert and awake   Hydration Status:  Euvolemic   PONV Controlled:  Controlled   Airway Patency:  Patent      Post Op Vitals Reviewed: Yes      Staff: CRNA         No complications documented      BP   111/59   Temp   97 6   Pulse  80   Resp   16   SpO2   99% 2LO2nc

## 2021-02-02 NOTE — UTILIZATION REVIEW
Continued Stay Review    Date: 02-02-21                          Current Patient Class: inpatient  Current Level of Care: medical    HPI:65 y o  male initially admitted on 01-28-21 for PAD and non healing R 5th metatarsal wound x 7 months       Assessment/Plan: Bilaterally palpable DP pulses successfully revascularized s/p agram MRI 02-01-21 showed right 5th metatarsal osteomyelitis, extending into the diaphysis Plan NPO with plans for  Right partial 5th ray amputation  02-02-21    Pertinent Labs/Diagnostic Results:       Results from last 7 days   Lab Units 01/31/21  0450 01/30/21  0708 01/29/21  0559   WBC Thousand/uL 7 66 6 50 7 12   HEMOGLOBIN g/dL 13 7 14 7 13 1   HEMATOCRIT % 41 5 45 3 39 9   PLATELETS Thousands/uL 156 171 143*   NEUTROS ABS Thousands/µL 3 78  --   --          Results from last 7 days   Lab Units 01/30/21  0708 01/29/21  0559   SODIUM mmol/L 138 140   POTASSIUM mmol/L 4 0 4 1   CHLORIDE mmol/L 105 109*   CO2 mmol/L 31 28   ANION GAP mmol/L 2* 3*   BUN mg/dL 17 18   CREATININE mg/dL 0 98 0 78   EGFR ml/min/1 73sq m 80 94   CALCIUM mg/dL 9 9 9 0         Results from last 7 days   Lab Units 02/02/21  1040 02/02/21  0747 02/02/21  0644 02/01/21  2108 02/01/21  1630 02/01/21  1057 02/01/21  0724 01/31/21  2047 01/31/21  1625 01/31/21  1114 01/31/21  0859 01/31/21  0628   POC GLUCOSE mg/dl 196* 157* 163* 176* 111 244* 163* 183* 130 133 163* 157*     Results from last 7 days   Lab Units 01/30/21  0708 01/29/21  0559   GLUCOSE RANDOM mg/dL 185* 179*         Results from last 7 days   Lab Units 01/29/21  0559   HEMOGLOBIN A1C % 8 7*   EAG mg/dl 203       Vital Signs:   Date/Time  Temp  Pulse  Resp  BP  MAP (mmHg)  SpO2  O2 Device  Patient Position - Orthostatic VS   02/02/21 0756  98 °F (36 7 °C)  72  16  124/66  --  --  --  Sitting   02/01/21 2111  98 4 °F (36 9 °C)  77  16  148/70  101  99 %  None (Room air)  Lying   02/01/21 0803  98 3 °F (36 8 °C)  85  18  161/74  --  --  --  Sitting   01/31/21 1500 98 °F (36 7 °C)  75  18  143/74  98  95 %  None (Room air)  Sitting   01/31/21 0903  --  73  --  152/75  --  --  --  --   01/31/21 0729  97 7 °F (36 5 °C)  66  18  134/67  95  97 %  None (Room air)         Medications:   Scheduled Medications:  atorvastatin, 40 mg, Oral, HS  cefazolin, 2,000 mg, Intravenous, 30 min pre-procedure  clopidogrel, 75 mg, Oral, Daily  docusate sodium, 100 mg, Oral, BID  gabapentin, 300 mg, Oral, HS  heparin (porcine), 5,000 Units, Subcutaneous, Q8H ALEXEI  insulin glargine, 24 Units, Subcutaneous, HS  insulin lispro, 1-5 Units, Subcutaneous, TID AC  insulin lispro, 1-5 Units, Subcutaneous, HS  insulin lispro, 8 Units, Subcutaneous, TID With Meals  pantoprazole, 40 mg, Oral, Daily  senna, 1 tablet, Oral, Daily      Continuous IV Infusions:     PRN Meds:  TBD    Network Utilization Review Department  ATTENTION: Please call with any questions or concerns to [phone] and carefully listen to the prompts so that you are directed to the right person  All voicemails are confidential   Alyson Rose all requests for admission clinical reviews, approved or denied determinations and any other requests to dedicated fax number below belonging to the campus where the patient is receiving treatment  List of dedicated fax numbers for the Facilities:  [address] DENIALS (Administrative/Medical Necessity) [phone]   [address] (Maternity/NICU/Pediatrics) [phone]   [address] Jazmin Leal 65[address] (  Carola Lawson "Luba" 103) [address] [place] [address] [address]81 [phone]   [address] [place] [address]   [address] [place], [place] [address] [phone]

## 2021-02-02 NOTE — PLAN OF CARE
Problem: PAIN - ADULT  Goal: Verbalizes/displays adequate comfort level or baseline comfort level  Description: Interventions:  - Encourage patient to monitor pain and request assistance  - Assess pain using appropriate pain scale  - Administer analgesics based on type and severity of pain and evaluate response  - Implement non-pharmacological measures as appropriate and evaluate response  - Consider cultural and social influences on pain and pain management  - Notify physician/advanced practitioner if interventions unsuccessful or patient reports new pain  Outcome: Progressing     Problem: INFECTION - ADULT  Goal: Absence or prevention of progression during hospitalization  Description: INTERVENTIONS:  - Assess and monitor for signs and symptoms of infection  - Monitor lab/diagnostic results  - Monitor all insertion sites, i e  indwelling lines, tubes, and drains  - Monitor endotracheal if appropriate and nasal secretions for changes in amount and color  - [place] appropriate cooling/warming therapies per order  - Administer medications as ordered  - Instruct and encourage patient and family to use good hand hygiene technique  - Identify and instruct in appropriate isolation precautions for identified infection/condition  Outcome: Progressing  Goal: Absence of fever/infection during neutropenic period  Description: INTERVENTIONS:  - Monitor WBC    Outcome: Progressing     Problem: SAFETY ADULT  Goal: Patient will remain free of falls  Description: INTERVENTIONS:  - Assess patient frequently for physical needs  -  Identify cognitive and physical deficits and behaviors that affect risk of falls    -  [place] fall precautions as indicated by assessment   - Educate patient/family on patient safety including physical limitations  - Instruct patient to call for assistance with activity based on assessment  - Modify environment to reduce risk of injury  - Consider OT/PT consult to assist with strengthening/mobility  Outcome: Progressing  Goal: Maintain or return to baseline ADL function  Description: INTERVENTIONS:  -  Assess patient's ability to carry out ADLs; assess patient's baseline for ADL function and identify physical deficits which impact ability to perform ADLs (bathing, care of mouth/teeth, toileting, grooming, dressing, etc )  - Assess/evaluate cause of self-care deficits   - Assess range of motion  - Assess patient's mobility; develop plan if impaired  - Assess patient's need for assistive devices and provide as appropriate  - Encourage maximum independence but intervene and supervise when necessary  - Involve family in performance of ADLs  - Assess for home care needs following discharge   - Consider OT consult to assist with ADL evaluation and planning for discharge  - Provide patient education as appropriate  Outcome: Progressing  Goal: Maintain or return mobility status to optimal level  Description: INTERVENTIONS:  - Assess patient's baseline mobility status (ambulation, transfers, stairs, etc )    - Identify cognitive and physical deficits and behaviors that affect mobility  - Identify mobility aids required to assist with transfers and/or ambulation (gait belt, sit-to-stand, lift, walker, cane, etc )  - [place] fall precautions as indicated by assessment  - Record patient progress and toleration of activity level on Mobility SBAR; progress patient to next Phase/Stage  - Instruct patient to call for assistance with activity based on assessment  - Consider rehabilitation consult to assist with strengthening/weightbearing, etc   Outcome: Progressing     Problem: DISCHARGE PLANNING  Goal: Discharge to home or other facility with appropriate resources  Description: INTERVENTIONS:  - Identify barriers to discharge w/patient and caregiver  - Arrange for needed discharge resources and transportation as appropriate  - Identify discharge learning needs (meds, wound care, etc )  - Arrange for interpretive services to assist at discharge as needed  - Refer to Case Management Department for coordinating discharge planning if the patient needs post-hospital services based on physician/advanced practitioner order or complex needs related to functional status, cognitive ability, or social support system  Outcome: Progressing     Problem: Knowledge Deficit  Goal: Patient/family/caregiver demonstrates understanding of disease process, treatment plan, medications, and discharge instructions  Description: Complete learning assessment and assess knowledge base  Interventions:  - Provide teaching at level of understanding  - Provide teaching via preferred learning methods  Outcome: Progressing     Problem: Potential for Falls  Goal: Patient will remain free of falls  Description: INTERVENTIONS:  - Assess patient frequently for physical needs  -  Identify cognitive and physical deficits and behaviors that affect risk of falls  -  [place] fall precautions as indicated by assessment   - Educate patient/family on patient safety including physical limitations  - Instruct patient to call for assistance with activity based on assessment  - Modify environment to reduce risk of injury  - Consider OT/PT consult to assist with strengthening/mobility  Outcome: Progressing     Problem: Nutrition/Hydration-ADULT  Goal: Nutrient/Hydration intake appropriate for improving, restoring or maintaining nutritional needs  Description: Monitor and assess patient's nutrition/hydration status for malnutrition  Collaborate with interdisciplinary team and initiate plan and interventions as ordered  Monitor patient's weight and dietary intake as ordered or per policy  Utilize nutrition screening tool and intervene as necessary  Determine patient's food preferences and provide high-protein, high-caloric foods as appropriate       INTERVENTIONS:  - Monitor oral intake, urinary output, labs, and treatment plans  - Assess nutrition and hydration status and recommend course of action  - Evaluate amount of meals eaten  - Assist patient with eating if necessary   - Allow adequate time for meals  - Recommend/ encourage appropriate diets, oral nutritional supplements, and vitamin/mineral supplements  - Order, calculate, and assess calorie counts as needed  - Recommend, monitor, and adjust tube feedings and TPN/PPN based on assessed needs  - Assess need for intravenous fluids  - Provide specific nutrition/hydration education as appropriate  - Include patient/family/caregiver in decisions related to nutrition  Outcome: Progressing

## 2021-02-02 NOTE — PROGRESS NOTES
PRE-Operative Note - Podiatry  Angel Luis Roberson 76 y o  male MRN: [id number]  Unit/Bed#: [facility] 324-01 Encounter: [id number]    Assessment:     Angel Luis Roberson is a 76 y o  male with:     1  Right lateral foot ulceration w/ Mri confirming distal 5th ray osteomyelitis (2/1/21) - Cristina 3 - POA  2  Left foot superficial fissures - POA  3  Hx of left foot lisfranc amputation - POA  4  PAD  - s/p RLE agram, PTA of DP (DOS 1/28/21)  5  T2DM    Plan:  1  Consent signed and in chart: Right foot partial 5th ray amputation w/ poss wv    2  Confirmed NPO status: last ate 7:30am 2/2 per nurse  3  H&P reviewed, no changes  4  Alternatives, risks, and complications discussed with patient  5  All questions answered  No guarantees given to outcome of procedure  6  Ancef IV one dose 30 minutes pre-op  Discussed case with vascular surgery team: Patient is successfully revascularized s/p agram  [address] [address] for podiatry surgical intervention  MRI 2/1/21 reviewed: Osteomyelitis present to 5th metatarsal head extending proximally 2 4cm from tip, and osteomyelitis proximal phalanx 5th toe    Subjective/Objective   Chief Complaint: No chief complaint on file  Subjective: he is amenable to proceeding with surgery and is adament he would only like 1 procedure to be performed  No pain at the time of examination  Blood pressure 124/66, pulse 72, temperature 98 °F (36 7 °C), temperature source Oral, resp  rate 16, height 5' 4" (1 626 m), SpO2 99 %  ,Body mass index is 32 96 kg/m²  Invasive Devices     Peripheral Intravenous Line            Peripheral IV 02/01/21 Right Forearm less than 1 day                Physical Exam:   General appearance: alert, cooperative and no distress    Extremity: Dressing to right foot C/D/I  Pain controlled  Gross msk and neuro status baseline  RLE warm                Lab, Imaging and other studies:   Admission on 01/28/2021   Component Date Value    POC Glucose 01/28/2021 149*    Ventricular Rate 01/28/2021 77  Atrial Rate 01/28/2021 77     AL Interval 01/28/2021 202     QRSD Interval 01/28/2021 78     QT Interval 01/28/2021 400     QTC Interval 01/28/2021 452     P Axis 01/28/2021 15     QRS Axis 01/28/2021 11     T Wave Axis 01/28/2021 27     POC Glucose 01/28/2021 195*    POC Glucose 01/28/2021 178*    Sodium 01/29/2021 140     Potassium 01/29/2021 4 1     Chloride 01/29/2021 109*    CO2 01/29/2021 28     ANION GAP 01/29/2021 3*    BUN 01/29/2021 18     Creatinine 01/29/2021 0 78     Glucose 01/29/2021 179*    Calcium 01/29/2021 9 0     eGFR 01/29/2021 94     WBC 01/29/2021 7 12     RBC 01/29/2021 4 16     Hemoglobin 01/29/2021 13 1     Hematocrit 01/29/2021 39 9     MCV 01/29/2021 96     MCH 01/29/2021 31 5     MCHC 01/29/2021 32 8     RDW 01/29/2021 12 0     Platelets 72/62/9958 143*    MPV 01/29/2021 10 4     Hemoglobin A1C 01/29/2021 8 7*    EAG 01/29/2021 203     POC Glucose 01/29/2021 178*    POC Glucose 01/29/2021 228*    POC Glucose 01/29/2021 181*    POC Glucose 01/29/2021 153*    WBC 01/30/2021 6 50     RBC 01/30/2021 4 73     Hemoglobin 01/30/2021 14 7     Hematocrit 01/30/2021 45 3     MCV 01/30/2021 96     MCH 01/30/2021 31 1     MCHC 01/30/2021 32 5     RDW 01/30/2021 12 0     Platelets 44/05/7816 171     MPV 01/30/2021 10 2     Sodium 01/30/2021 138     Potassium 01/30/2021 4 0     Chloride 01/30/2021 105     CO2 01/30/2021 31     ANION GAP 01/30/2021 2*    BUN 01/30/2021 17     Creatinine 01/30/2021 0 98     Glucose 01/30/2021 185*    Calcium 01/30/2021 9 9     eGFR 01/30/2021 80     POC Glucose 01/30/2021 160*    POC Glucose 01/30/2021 196*    POC Glucose 01/30/2021 196*    POC Glucose 01/30/2021 187*    WBC 01/31/2021 7 66     RBC 01/31/2021 4 39     Hemoglobin 01/31/2021 13 7     Hematocrit 01/31/2021 41 5     MCV 01/31/2021 95     MCH 01/31/2021 31 2     MCHC 01/31/2021 33 0     RDW 01/31/2021 11 9     MPV 01/31/2021 10 3     Platelets 94/04/5558 156     nRBC 01/31/2021 0     Neutrophils Relative 01/31/2021 49     Immat GRANS % 01/31/2021 0     Lymphocytes Relative 01/31/2021 39     Monocytes Relative 01/31/2021 8     Eosinophils Relative 01/31/2021 4     Basophils Relative 01/31/2021 0     Neutrophils Absolute 01/31/2021 3 78     Immature Grans Absolute 01/31/2021 0 03     Lymphocytes Absolute 01/31/2021 2 97     Monocytes Absolute 01/31/2021 0 58     Eosinophils Absolute 01/31/2021 0 29     Basophils Absolute 01/31/2021 0 01     POC Glucose 01/31/2021 157*    POC Glucose 01/31/2021 163*    POC Glucose 01/31/2021 133     POC Glucose 01/31/2021 130     POC Glucose 01/31/2021 183*    POC Glucose 02/01/2021 163*    POC Glucose 02/01/2021 244*    POC Glucose 02/01/2021 111     POC Glucose 02/01/2021 176*    POC Glucose 02/02/2021 163*    POC Glucose 02/02/2021 157*     Imaging: I have personally reviewed pertinent films in PACS

## 2021-02-02 NOTE — OP NOTE
OPERATIVE REPORT - Podiatry  PATIENT NAME: Mariana Beauchamp    :  1955  MRN: [id number]  Pt Location: [facility] OR ROOM 04    SURGERY DATE: 2021    Surgeon(s) and Role:     * Joseph Carrillo DPM - Primary     * Neeru Riley DPM - Assisting    Pre-op Diagnosis:  Diabetic ulcer of toe of right foot associated with type 2 diabetes mellitus, limited to breakdown of skin ([place] [address] ) [O25 020, L97 511]  PAD (peripheral artery disease) ([place] [address] ) [I73 9]    Post-Op Diagnosis Codes:     * Diabetic ulcer of toe of right foot associated with type 2 diabetes mellitus, limited to breakdown of skin ([place] [address] ) [J69 094, L97 511]     * PAD (peripheral artery disease) ([place] [address] ) [I73 9]    Procedure(s) (LRB):  Right partial 5th ray amputation (Right)        Specimen(s):  ID Type Source Tests Collected by Time Destination   1 : 5th metatarsal right foot Tissue Foot, Right TISSUE EXAM Neeru Riley DPM 2021 1706    A : 5th metatarsal right foot Tissue Foot, Right ANAEROBIC CULTURE AND GRAM STAIN, CULTURE, TISSUE AND GRAM STAIN Joseph Carrillo DPM 2021 1711        Estimated Blood Loss:   Minimal    Drains:  * No LDAs found *    Implants:  * No implants in log *    Anesthesia Type:   Choice with 20 ml of 1% Lidocaine and 0 5% Bupivacaine in a 1:1 mixture    Hemostasis:  Surgical dissection  No tourniquet    Materials:  Nylon suture    Operative Findings:  Bone was white and hard to the touch  Purulence noted of the 5th MTPJ  No sinus tracts noted    Complications:   None    Procedure and Technique:     Under mild sedation, the patient was brought into the operating room and placed on stretcher in the supine position  A time out was performed to confirm the correct patient, procedure and site with all parties in agreement  Following IV sedation, a reverse Hernandez block was performed consisting of 20 ml of 1% Lidocaine and 0 5% Bupivacaine in a 1:1 mixture  The foot was then scrubbed, prepped and draped in the usual aseptic manner       Approximately 6 cm linear incision was created over the lateral 5th metatarsal   Sharp dissection was carried directly down to the bone  Approximately 3 cm from 5th metatarsal head was exposed and dissection carried through partial metatarsal shaft  Soft tissues were removed from the entirety of the metatarsal and digital bones were removed leaving just skin  A Key elevator was utilized to remove periosteum from the bone  A sagittal saw was utilized to make a cut in the bone shaft diaphysis in a dorsal distal to proximal plantar direction  Bone was noted to be white and hard to the touch with no visible signs of infection  5th metatarsal shaft was removed with the digital phalanges and placed on the back table  Utilizing clean rongeur an infected culture was taken from 5th metatarsal head  It was decided that the skin from the 5th digit was no longer being needed for closure and the incision was remodeled to allow for primary closure  The surgical incision was irrigated with copious amounts of normal sterile saline  Skin edges were reapproximated and closure was obtained utilizing interrupted retention sutures utilizing 3-0  Nylon  The foot was then cleansed and dried  The incision site was dressed with Betadine soaked adaptic, 4x4 gauze, and ABD  This was then covered with a Kerlix   The patient tolerated the procedure and anesthesia well and was transported to the PACU with vital signs stable  As with many limb salvage procedures, we contemplate the possibility of performing further stages to this procedure  Procedures may include debridements, delayed closure, plastic surgery techniques, or more proximal amputations  This procedure may be considered part of a multi-staged limb salvage treatment plan  Dr Chivo Luna was present during the entire procedure and participated in all key aspects      SIGNATURE: Jonna Hines DPM  DATE: February 2, 2021  TIME: 5:45 PM      Portions of the record may have been created with voice recognition software  Occasional wrong word or "sound a like" substitutions may have occurred due to the inherent limitations of voice recognition software  Read the chart carefully and recognize, using context, where substitutions have occurred

## 2021-02-02 NOTE — PROGRESS NOTES
Progress Note -    Greg Booth 76 y o  male MRN: [id number]  Unit/Bed#: [facility] 324-01 Encounter: [id number]    Assessment:  65-yr old male with PAD and non-healing R 5th metatarsal wound (x7 months)  Now s/p Agram with R DP PTA  Bilaterally palpable DP pulses  Stable VS on room air  Foot MRI showed right 5th metatarsal osteomyelitis, extending into the diaphysis  PLAN:  - appreciate Podiatry recs  - continued local wound care  - continue clopidogrel    Subjective:   - pain controlled; good response to analgesics  Objective:     Vitals: Temp:  [98 3 °F (36 8 °C)-98 4 °F (36 9 °C)] 98 4 °F (36 9 °C)  HR:  [77-85] 77  Resp:  [16-18] 16  BP: (148-161)/(70-74) 148/70  Body mass index is 32 96 kg/m²  I/O       01/29 0701 - 01/30 0700 01/30 0701 - 01/31 0700    P  O  237     Total Intake 237     Net +237           Unmeasured Urine Occurrence  2 x          Physical Exam:  GEN: NAD  HEENT: atraumatic  CV: RRR  Lung: Normal effort  Ab: Soft, NT/ND  Extrem: L groin soft; no hematoma  L: palpable fem/DP  Foot pink and warm  R: palpable fem/DP  Foot pink and warm  Neuro: A+Ox3    Lab, Imaging and other studies: I have personally reviewed pertinent reports      VTE Pharmacologic Prophylaxis: Heparin  VTE Mechanical Prophylaxis: sequential compression device

## 2021-02-02 NOTE — ANESTHESIA PREPROCEDURE EVALUATION
Procedure:  Right partial 5th ray amputation (Right Toe)     FULL BREAKFAST @0800 , REPORTS VERY HUNGRY PRESENTLY  NO GASTROPARESIS  Relevant Problems   CARDIO   (+) PAD (peripheral artery disease) ([facility])      NEURO/PSYCH   (+) Diabetic polyneuropathy associated with type 2 diabetes mellitus ([facility])   FBS-104 @1553     Physical Exam    Airway    Mallampati score: II  TM Distance: >3 FB  Neck ROM: full     Dental   Comment: MULTIPLE MISSING TEETH, upper dentures,     Cardiovascular      Pulmonary      Other Findings        Anesthesia Plan  ASA Score- 3     Anesthesia Type- IV sedation with anesthesia with ASA Monitors  Additional Monitors:   Airway Plan:           Plan Factors-Exercise tolerance (METS): <4 METS  Chart reviewed  EKG reviewed  Existing labs reviewed  Patient is not a current smoker  Patient not instructed to abstain from smoking on day of procedure  Patient did not smoke on day of surgery  Induction- intravenous  Postoperative Plan-     Informed Consent- Anesthetic plan and risks discussed with patient  I personally reviewed this patient with the CRNA  Discussed and agreed on the Anesthesia Plan with the CRNA               Lab Results   Component Value Date    GLUC 185 (H) 01/30/2021    GLUF 185 (H) 01/22/2021    ALT 45 12/11/2020    AST 10 12/11/2020    BUN 17 01/30/2021    CALCIUM 9 9 01/30/2021     01/30/2021    CO2 31 01/30/2021    CREATININE 0 98 01/30/2021    INR 1 07 12/11/2020    HCT 41 5 01/31/2021    HGB 13 7 01/31/2021    HGBA1C 8 7 (H) 01/29/2021     01/31/2021    K 4 0 01/30/2021     02/10/2014    WBC 7 66 01/31/2021

## 2021-02-03 DIAGNOSIS — M54.9 CHRONIC BACK PAIN, UNSPECIFIED BACK LOCATION, UNSPECIFIED BACK PAIN LATERALITY: ICD-10-CM

## 2021-02-03 DIAGNOSIS — G89.29 CHRONIC BACK PAIN, UNSPECIFIED BACK LOCATION, UNSPECIFIED BACK PAIN LATERALITY: ICD-10-CM

## 2021-02-03 LAB
GLUCOSE SERPL-MCNC: 131 MG/DL (ref 65–140)
GLUCOSE SERPL-MCNC: 195 MG/DL (ref 65–140)
GLUCOSE SERPL-MCNC: 246 MG/DL (ref 65–140)

## 2021-02-03 PROCEDURE — 97535 SELF CARE MNGMENT TRAINING: CPT

## 2021-02-03 PROCEDURE — 97163 PT EVAL HIGH COMPLEX 45 MIN: CPT

## 2021-02-03 PROCEDURE — 82948 REAGENT STRIP/BLOOD GLUCOSE: CPT

## 2021-02-03 PROCEDURE — 99231 SBSQ HOSP IP/OBS SF/LOW 25: CPT | Performed by: SURGERY

## 2021-02-03 PROCEDURE — 97167 OT EVAL HIGH COMPLEX 60 MIN: CPT

## 2021-02-03 PROCEDURE — 99232 SBSQ HOSP IP/OBS MODERATE 35: CPT | Performed by: INTERNAL MEDICINE

## 2021-02-03 PROCEDURE — NC001 PR NO CHARGE: Performed by: PODIATRIST

## 2021-02-03 RX ORDER — ACETAMINOPHEN 325 MG/1
650 TABLET ORAL EVERY 6 HOURS SCHEDULED
Status: DISCONTINUED | OUTPATIENT
Start: 2021-02-03 | End: 2021-02-03

## 2021-02-03 RX ORDER — GABAPENTIN 100 MG/1
100 CAPSULE ORAL 2 TIMES DAILY
Status: DISCONTINUED | OUTPATIENT
Start: 2021-02-03 | End: 2021-02-06 | Stop reason: HOSPADM

## 2021-02-03 RX ORDER — ACETAMINOPHEN 325 MG/1
975 TABLET ORAL EVERY 8 HOURS SCHEDULED
Status: DISCONTINUED | OUTPATIENT
Start: 2021-02-03 | End: 2021-02-06 | Stop reason: HOSPADM

## 2021-02-03 RX ADMIN — HEPARIN SODIUM 5000 UNITS: 5000 INJECTION INTRAVENOUS; SUBCUTANEOUS at 05:44

## 2021-02-03 RX ADMIN — GABAPENTIN 300 MG: 300 CAPSULE ORAL at 21:48

## 2021-02-03 RX ADMIN — HYDROMORPHONE HYDROCHLORIDE 0.5 MG: 1 INJECTION, SOLUTION INTRAMUSCULAR; INTRAVENOUS; SUBCUTANEOUS at 06:08

## 2021-02-03 RX ADMIN — GABAPENTIN 100 MG: 100 CAPSULE ORAL at 12:08

## 2021-02-03 RX ADMIN — PANTOPRAZOLE SODIUM 40 MG: 40 TABLET, DELAYED RELEASE ORAL at 06:08

## 2021-02-03 RX ADMIN — HYDROMORPHONE HYDROCHLORIDE 0.5 MG: 1 INJECTION, SOLUTION INTRAMUSCULAR; INTRAVENOUS; SUBCUTANEOUS at 16:13

## 2021-02-03 RX ADMIN — GABAPENTIN 100 MG: 100 CAPSULE ORAL at 17:47

## 2021-02-03 RX ADMIN — DOCUSATE SODIUM 100 MG: 100 CAPSULE, LIQUID FILLED ORAL at 17:47

## 2021-02-03 RX ADMIN — HYDROMORPHONE HYDROCHLORIDE 0.5 MG: 1 INJECTION, SOLUTION INTRAMUSCULAR; INTRAVENOUS; SUBCUTANEOUS at 12:08

## 2021-02-03 RX ADMIN — HYDROMORPHONE HYDROCHLORIDE 0.5 MG: 1 INJECTION, SOLUTION INTRAMUSCULAR; INTRAVENOUS; SUBCUTANEOUS at 09:04

## 2021-02-03 RX ADMIN — OXYCODONE HYDROCHLORIDE 10 MG: 10 TABLET ORAL at 05:44

## 2021-02-03 RX ADMIN — INSULIN LISPRO 1 UNITS: 100 INJECTION, SOLUTION INTRAVENOUS; SUBCUTANEOUS at 11:21

## 2021-02-03 RX ADMIN — OXYCODONE HYDROCHLORIDE 10 MG: 10 TABLET ORAL at 15:15

## 2021-02-03 RX ADMIN — VANCOMYCIN HYDROCHLORIDE 1500 MG: 10 INJECTION, POWDER, LYOPHILIZED, FOR SOLUTION INTRAVENOUS at 09:13

## 2021-02-03 RX ADMIN — CLOPIDOGREL BISULFATE 75 MG: 75 TABLET ORAL at 09:04

## 2021-02-03 RX ADMIN — VANCOMYCIN HYDROCHLORIDE 1500 MG: 10 INJECTION, POWDER, LYOPHILIZED, FOR SOLUTION INTRAVENOUS at 21:52

## 2021-02-03 RX ADMIN — RIVAROXABAN 2.5 MG: 2.5 TABLET, FILM COATED ORAL at 11:41

## 2021-02-03 RX ADMIN — ACETAMINOPHEN 975 MG: 325 TABLET, FILM COATED ORAL at 21:48

## 2021-02-03 RX ADMIN — OXYCODONE HYDROCHLORIDE 10 MG: 10 TABLET ORAL at 11:21

## 2021-02-03 RX ADMIN — ACETAMINOPHEN 650 MG: 325 TABLET, FILM COATED ORAL at 06:14

## 2021-02-03 RX ADMIN — ACETAMINOPHEN 650 MG: 325 TABLET ORAL at 11:21

## 2021-02-03 RX ADMIN — ATORVASTATIN CALCIUM 40 MG: 40 TABLET, FILM COATED ORAL at 21:48

## 2021-02-03 RX ADMIN — INSULIN LISPRO 2 UNITS: 100 INJECTION, SOLUTION INTRAVENOUS; SUBCUTANEOUS at 16:14

## 2021-02-03 RX ADMIN — HYDROMORPHONE HYDROCHLORIDE 0.5 MG: 1 INJECTION, SOLUTION INTRAMUSCULAR; INTRAVENOUS; SUBCUTANEOUS at 20:22

## 2021-02-03 RX ADMIN — OXYCODONE HYDROCHLORIDE 10 MG: 10 TABLET ORAL at 19:21

## 2021-02-03 RX ADMIN — OXYCODONE HYDROCHLORIDE 10 MG: 10 TABLET ORAL at 01:36

## 2021-02-03 RX ADMIN — INSULIN GLARGINE 24 UNITS: 100 INJECTION, SOLUTION SUBCUTANEOUS at 21:47

## 2021-02-03 NOTE — QUICK NOTE
Post- OP Note - Vascular Surgery   Kelsea Beavers 76 y o  male MRN: [id number]  Unit/Bed#: [facility] 324-01 Encounter: [id number]    Assessment:  65-yr old male with PAD and non-healing R 5th metatarsal wound (x7 months)  Now s/p Agram with R DP PTA  S/p R 5th ray amputation with podiatry 2/2    R Palp DP    Plan:  Diet as tolerated   Pain/ Nausea control  Wound care per podiatry    Subjective/Objective     Subjective:   Patient alert and oriented  No N/V  Complaining of right foot  No chest pains or shortness of breath  Objective:    Blood pressure 158/70, pulse 84, temperature 98 6 °F (37 °C), temperature source Oral, resp  rate 16, height 5' 4" (1 626 m), SpO2 93 %  ,Body mass index is 32 96 kg/m²  Physical Exam:   Gen: NAD  HEENT: MMM  CV:  well perfused  Lungs: Normal respiratory effort  Abd: soft, nontender  Extremities: dressings cdi, R DP palpable  Skin: warm/ dry  Neuro:  AxO x3    VTE Pharmacologic Prophylaxis: Heparin  VTE Mechanical Prophylaxis: sequential compression device

## 2021-02-03 NOTE — PLAN OF CARE
Problem: OCCUPATIONAL THERAPY ADULT  Goal: Performs self-care activities at highest level of function for planned discharge setting  See evaluation for individualized goals  Description: Treatment Interventions: ADL retraining, Functional transfer training, UE strengthening/ROM, Endurance training, Patient/family training, Equipment evaluation/education, Compensatory technique education, Energy conservation, Activityengagement          See flowsheet documentation for full assessment, interventions and recommendations  Note: Limitation: Decreased ADL status, Decreased UE strength, Decreased Safe judgement during ADL, Decreased endurance, Decreased self-care trans, Decreased high-level ADLs  Prognosis: Fair  Assessment: Pt is a 76 y o  male admitted to [facility] on 1/28/2021 w/ Peripheral Artery Disease  Comorbidities include a h/o Diabetic Ulcer of Toe of Right Foot Associated with DM II  Pt presents s/p Right partial 5th ray amputation with NWB orders to RLE  Pt with active OT orders on 2/2/21  Pt resides in a [facility] with 3STE with his wife who is home all the time  Pt will be staying on the first floor with full bathroom + recliner  Full bathroom equipped with tub/shower +GB and standard toilet  Pt was I w/ ADLS and IADLS, & required use of crutches PTA - also owns RW  Pt reports his wife had "a heart attack about a year ago" but that she is able to assist with some ADLs and IADLs  Currently pt requires Min A LB ADLs, Min A toileting, Min A bed mobility, Min A functional transfers and Min A functional mobility with crutches  Pt is limited at this time 2*: pain, endurance, activity tolerance, functional mobility, balance, functional standing tolerance, decreased I w/ ADLS/IADLS and strength  The following Occupational Performance Areas to address include: bathing/shower, toilet hygiene, dressing, health maintenance, functional mobility and clothing management  Pt scored overall  55/100 on the Barthel Index   The patient's raw score on the AM-PAC Daily Activity inpatient short form is 21, standardized score is 44 27, greater than 39 4  Patients at this level are likely to benefit from a discharge home with home therapy - however due to complexity of medical and functional abilities pt may benefit from post acute rehabilitation services  Please refer to the recommendation of the Occupational Therapist for safe DC planning  Based on the aforementioned OT evaluation, functional performance deficits, and assessments, pt has been identified as a high complexity evaluation  From OT standpoint, anticipate d/c post acute rehabilitation services vs home with home therapy pending progress  Pt to continue to benefit from acute immediate OT services to address the following goals 3-5x/week to  w/in 10-14 days:        OT Discharge Recommendation: Post-Acute Rehabilitation Services(vs home with skilled therapy pending progress)

## 2021-02-03 NOTE — PROGRESS NOTES
[facility] Pod[facility] Progress Note  Patient: Kelsea Beavers 76 y o  male   MRN: [id number]  PCP: Marbin Chavarria MD  Unit/Bed#: [facility] 324-01 Encounter: [id number]  Date Of Visit: 21    Assessment:        Podiatric complaint:   Post operative day #1 s/p right partial 5th ray resection   PAD s/p right At and DP angioplasty, palpable DP       Plan:    · pt is POD #1, with UMA occurring overnight  · dressing was left intact, noted to be dry and clean without strike through  · will plan on changing dressing next tomorrow with attending to evaluate surgical site  · patient is to be NWB to  right extremity  · Current plan for discharge following clean culture  Anticipate outpatient antibioitics for possible residual cellulitis  ·  His pain has been difficult to control with po pain medications, current pain regimine appears adequate       Subjective     Kelsea Beavers is a 76 y o  male currently one day s/p surgical intervention with UMA occurring overnight  They have been able to move their bowels and bladder since the procedure  Their nausea has been well controlled  Kelsea Beavers denies Nausea, Vomiting, Fever, Chills, Shortness of breath at this time  Objective:     Vitals:   Temp (24hrs), Av 3 °F (36 8 °C), Min:97 4 °F (36 3 °C), Max:98 7 °F (37 1 °C)    Temp:  [97 4 °F (36 3 °C)-98 7 °F (37 1 °C)] 98 4 °F (36 9 °C)  HR:  [65-93] 80  Resp:  [11-22] 18  BP: (108-167)/(59-98) 154/71  SpO2:  [93 %-99 %] 96 %  Body mass index is 32 96 kg/m²  Foot Exam    General: Alert, cooperative and no distress  Lungs: Non labored breathing  Abdomen: Soft, non-tender  Extremity:     NVS at baseline B/l  MSK function at baseline B/l  No calf tenderness noted B/l  Dressing C/D/I           Additional Data:     Labs:    Results from last 7 days   Lab Units 21  0450   WBC Thousand/uL 7 66   HEMOGLOBIN g/dL 13 7   HEMATOCRIT % 41 5   PLATELETS Thousands/uL 156   NEUTROS PCT % 49   LYMPHS PCT % 39   MONOS PCT % 8   EOS PCT % 4     Results from last 7 days   Lab Units 01/30/21  0708   POTASSIUM mmol/L 4 0   CHLORIDE mmol/L 105   CO2 mmol/L 31   BUN mg/dL 17   CREATININE mg/dL 0 98   CALCIUM mg/dL 9 9           * I Have Reviewed All Lab Data Listed Above  * Additional Pertinent Lab Tests Reviewed:  All Labs Within Last 24 Hours Reviewed    Imaging:      Imaging Personally Reviewed by Myself Includes:  I have personally reviewed pertinent films in PACS    Recent Cultures (last 7 days):     Results from last 7 days   Lab Units 02/02/21  1711   GRAM STAIN RESULT  3+ Gram positive rods*  Rare Gram positive cocci in pairs*  2+ Polys*       Last 24 Hours Medication List:   Current Facility-Administered Medications   Medication Dose Route Frequency Provider Last Rate    acetaminophen  650 mg Oral Q6H [address] Girish Armas MD      atorvastatin  40 mg Oral HS Analilia Endo, DPM      clopidogrel  75 mg Oral Daily Analilia Endo, DPM      docusate sodium  100 mg Oral BID Analilia Endo, DPM      gabapentin  300 mg Oral HS Analilia Endo, DPM      HYDROmorphone  0 5 mg Intravenous Q3H PRN Analilia Endo, DPM      insulin glargine  24 Units Subcutaneous HS Analilia Endo, DPM      insulin lispro  1-5 Units Subcutaneous TID AC Analilia Endo, DPM      insulin lispro  1-5 Units Subcutaneous HS Analilia Endo, DPM      insulin lispro  10 Units Subcutaneous TID With Meals Analilia Endo, DPM      lactated ringers  10 mL/hr Intravenous Continuous Damion Brent, CRNA      oxyCODONE  10 mg Oral Q4H PRN Analilia Endo, DPM      oxyCODONE  5 mg Oral Q4H PRN Analilia Endo, DPM      pantoprazole  40 mg Oral Daily Analilia Endo, DPM      rivaroxaban  2 5 mg Oral Daily With Breakfast Daniel Goodman PA-C      senna  1 tablet Oral Daily Analilia Endo, DPGIRISH      vancomycin  17 5 mg/kg Intravenous Q12H Analilia Duque, DPM 1,500 mg (02/03/21 0913)        Today, Patient Was Seen By: Analilia Duque DPM    ** Please Note: This note has been constructed using a voice recognition system   **

## 2021-02-03 NOTE — NURSING NOTE
Pt unable to follow thru with the NWB to RLE while using crutches  Pt has a previous left TMA which he wears a special sneaker which he did not apply  No available W/C available to have him do sit pivots & transfers with this equipment to BR

## 2021-02-03 NOTE — PROGRESS NOTES
Vancomycin IV Pharmacy-to-Dose Consultation    Nicanor Langley is a 76 y o  male who is currently receiving Vancomycin IV with management by the Pharmacy Consult service  Assessment/Plan:  The patient was reviewed  Renal function is stable and no signs or symptoms of nephrotoxicity and/or infusion reactions were documented in the chart  Based on todays assessment, continue current vancomycin (day # 2) dosing of 1500mg every 12 hours, with a plan for trough to be drawn at  08 30 on 02/04/21    We will continue to follow the patients culture results and clinical progress daily      Abran Weinberg, Pharmacist

## 2021-02-03 NOTE — PROGRESS NOTES
Progress Note - Vascular Surgery   Damion Lamb 76 y o  male MRN: [id number]  Unit/Bed#: [facility] 324-01 Encounter: [id number]    Assessment:  65-yr old male with PAD and non-healing R 5th metatarsal wound (x7 months)  Now s/p Agram with R DP PTA  S/p R 5th ray amputation with podiatry 2/2     R Palp DP, PT     Plan:  Diet as tolerated   Pain/ Nausea control  Wound care per podiatry     Subjective/Objective     Subjective: With pain this AM  Had difficulty sleeping due to pain  Received extra dose of dilaudid  Pertinent review of systems as above  All other review of systems negative  Objective:    Blood pressure 139/65, pulse 73, temperature 98 4 °F (36 9 °C), temperature source Oral, resp  rate 18, height 5' 4" (1 626 m), SpO2 94 %  ,Body mass index is 32 96 kg/m²  Intake/Output Summary (Last 24 hours) at 2/3/2021 7004  Last data filed at 2/2/2021 2135  Gross per 24 hour   Intake 1320 ml   Output --   Net 1320 ml       Invasive Devices     Peripheral Intravenous Line            Peripheral IV 02/01/21 Right Forearm 1 day                Physical Exam:   Gen:  NAD  HEENT: NCAT  MMM  CV: well perfused, pulses palpable  Lungs: Normal respiratory effort  Abd: soft, nt/nd  Skin: warm/ dry  Extremities: no peripheral edema, no clubbing or cyanosis  Dressings cdi  Neuro: AxO x3      Results from last 7 days   Lab Units 01/31/21  0450 01/30/21  0708 01/29/21  0559   WBC Thousand/uL 7 66 6 50 7 12   HEMOGLOBIN g/dL 13 7 14 7 13 1   HEMATOCRIT % 41 5 45 3 39 9   PLATELETS Thousands/uL 156 171 143*     Results from last 7 days   Lab Units 01/30/21  0708 01/29/21  0559   POTASSIUM mmol/L 4 0 4 1   CHLORIDE mmol/L 105 109*   CO2 mmol/L 31 28   BUN mg/dL 17 18   CREATININE mg/dL 0 98 0 78   CALCIUM mg/dL 9 9 9 0            I have personally reviewed pertinent films in PACS      Medications:   Scheduled Meds:  Current Facility-Administered Medications   Medication Dose Route Frequency Provider Last Rate    acetaminophen 650 mg Oral Q6H PRN Teodora Silk, DPM      atorvastatin  40 mg Oral HS Teodora Silk, DPM      clopidogrel  75 mg Oral Daily [facility] [place]      docusate sodium  100 mg Oral BID Teodora Silk, DPM      gabapentin  300 mg Oral HS Teodora Silk, DPM      heparin (porcine)  5,000 Units Subcutaneous [facility] [place]      HYDROmorphone  0 5 mg Intravenous Q3H PRN Teodora Silk, DPM      insulin glargine  24 Units Subcutaneous HS Teodora Silk, DPM      insulin lispro  1-5 Units Subcutaneous TID AC Teodora Silk, DPM      insulin lispro  1-5 Units Subcutaneous HS Teodora Silk, DPM      insulin lispro  10 Units Subcutaneous TID With Meals Teodora Silk, DPM      lactated ringers  10 mL/hr Intravenous Continuous Naveed Ferraris, CRNA      oxyCODONE  10 mg Oral Q4H PRN Teodora Silk, DPM      oxyCODONE  5 mg Oral Q4H PRN Teodora Silk, DPM      pantoprazole  40 mg Oral Daily Teodora Silk, DPM      senna  1 tablet Oral Daily Teodora Silk, DP      vancomycin  17 5 mg/kg Intravenous Q12H Teodora Silk, DPM       Continuous Infusions:lactated ringers, 10 mL/hr      PRN Meds:  acetaminophen, 650 mg, Q6H PRN  HYDROmorphone, 0 5 mg, Q3H PRN  oxyCODONE, 10 mg, Q4H PRN  oxyCODONE, 5 mg, Q4H PRN      VTE Pharmacologic Prophylaxis: Heparin  VTE Mechanical Prophylaxis: sequential compression device

## 2021-02-03 NOTE — DISCHARGE INSTR - AVS FIRST PAGE
INSULIN DOSAGE INSTRUCTIONS    Name: Caitie Sawyer                        : 1955  MRN #: [id number]    Your Current Insulin  and dose is: Before Breakfast Before Lunch Before Evening Meal Bedtime   Regular Insulin               NPH Insulin         Additional Instructions:   Please test your blood sugar:  _4_ Times per day  X_ Before Breakfast                _ Alternate Testing  X_ Before Lunch                _ 2 Hours After  Meal  X_ Before Evening Meal               _ 3 a m   x_ Before Bedtime Snack     Target Blood sugar range _70_to _140__  Call if your Celio Cedeno MD  blood sugar is less than _60_ or greater than _400__      Today's Date: 2/3/2021

## 2021-02-03 NOTE — PROGRESS NOTES
Endocrinology Progress Note  Unit/Bed#: [facility] 324-01 Encounter: [id number]      Jovani Chavira 76 y o  male [id number]    Hospital Stay Days: 6      Assessment/Plan:    1  Type 2 Diabetes Mellitus  Complicated by chronic lower extremity ulcers now POD #1 s/p right partial 5th metatarsal resection  Hemoglobin A1c 8 7%  Patient's total daily insulin requirement appears to be approximately 44 units  Blood glucose levels have been within 140-180  Will continue Lantus 24 units qHS and Humalog 10 units TID AC with correctional scale insulin  Patient will need close outpatient follow-up as insulin regimen prior to hospitalization appeared inappropriate as patient seemed unsure of how to properly administer his insulin  Will likely consider oral hypogylcemic agents at discharge as well  Subjective:   Patient seen and examined at bedside  No acute events overnight  Denies chest pain, SOB, diaphoresis, nausea/vomiting/diarrhea, fevers/chills  POD #1 s/p right partial 5th metatarsal resection  Tolerated surgery well and is now eating  Blood glucose levels appear to be within 140-180  Vitals: Temp (24hrs), Av 3 °F (36 8 °C), Min:97 4 °F (36 3 °C), Max:98 7 °F (37 1 °C)  Current: Temperature: 98 4 °F (36 9 °C)  Vitals:    / 2200 21 2300 21 0120 // 0800   BP: 158/70 162/98 139/65 154/71   BP Location: Left arm Left arm Left arm Left arm   Pulse: 84 87 73 80   Resp: 16 16 18 18   Temp: 98 6 °F (37 °C) 98 4 °F (36 9 °C) 98 4 °F (36 9 °C) 98 4 °F (36 9 °C)   TempSrc: Oral Oral Oral Oral   SpO2: 93% 97% 94% 96%   Height:        Body mass index is 32 96 kg/m²  I/O last 24 hours: In: 1320 [P O :120;  I V :700; IV Piggyback:500]  Out: -       Physical Exam: /71 (BP Location: Left arm)   Pulse 80   Temp 98 4 °F (36 9 °C) (Oral)   Resp 18   Ht 5' 4" (1 626 m)   SpO2 96%   BMI 32 96 kg/m²     General Appearance:    Alert, cooperative, no distress, appears stated age   Head: Normocephalic, without obvious abnormality, atraumatic   Eyes:    PERRL, conjunctiva/corneas clear, EOM's intact, fundi     benign, both eyes        Ears:    Normal TM's and external ear canals, both ears   Nose:   Nares normal, septum midline, mucosa normal, no drainage    or sinus tenderness   Throat:   Lips, mucosa, and tongue normal; teeth and gums normal   Neck:   Supple, symmetrical, trachea midline, no adenopathy;        thyroid:  No enlargement/tenderness/nodules; no carotid    bruit or JVD   Back:     Symmetric, no curvature, ROM normal, no CVA tenderness   Lungs:     Clear to auscultation bilaterally, respirations unlabored   Chest wall:    No tenderness or deformity   Heart:    Regular rate and rhythm, S1 and S2 normal, no murmur, rub   or gallop   Abdomen:     Soft, non-tender, bowel sounds active all four quadrants,     no masses, no organomegaly   Genitalia:    Normal male without lesion, discharge or tenderness   Rectal:    Normal tone, normal prostate, no masses or tenderness;    guaiac negative stool   Extremities:   Extremities normal, atraumatic, no cyanosis or edema   Pulses:   2+ and symmetric all extremities   Skin:   Skin color, texture, turgor normal, no rashes or lesions   Lymph nodes:   Cervical, supraclavicular, and axillary nodes normal   Neurologic:   CNII-XII intact   Normal strength, sensation and reflexes       throughout        Invasive Devices     Peripheral Intravenous Line            Peripheral IV 02/01/21 Right Forearm 1 day                          Labs:   Recent Results (from the past 24 hour(s))   Fingerstick Glucose (POCT)    Collection Time: 02/02/21  3:53 PM   Result Value Ref Range    POC Glucose 104 65 - 140 mg/dl   Culture, tissue and Gram stain    Collection Time: 02/02/21  5:11 PM    Specimen: Foot, Right; Tissue   Result Value Ref Range    Gram Stain Result 3+ Gram positive rods (A)     Gram Stain Result Rare Gram positive cocci in pairs (A)     Gram Stain Result 2+ Polys (A)    Fingerstick Glucose (POCT)    Collection Time: 02/02/21  5:45 PM   Result Value Ref Range    POC Glucose 65 65 - 140 mg/dl   Fingerstick Glucose (POCT)    Collection Time: 02/02/21  6:21 PM   Result Value Ref Range    POC Glucose 124 65 - 140 mg/dl   Fingerstick Glucose (POCT)    Collection Time: 02/02/21  9:08 PM   Result Value Ref Range    POC Glucose 123 65 - 140 mg/dl   Fingerstick Glucose (POCT)    Collection Time: 02/03/21  9:02 AM   Result Value Ref Range    POC Glucose 131 65 - 140 mg/dl   Fingerstick Glucose (POCT)    Collection Time: 02/03/21 11:14 AM   Result Value Ref Range    POC Glucose 195 (H) 65 - 140 mg/dl       Radiology Results: I have personally reviewed pertinent reports  Other Diagnostic Testing:   I have personally reviewed pertinent reports          Active Meds:   Current Facility-Administered Medications   Medication Dose Route Frequency    acetaminophen (TYLENOL) tablet 975 mg  975 mg Oral Q8H [facility]    atorvastatin (LIPITOR) tablet 40 mg  40 mg Oral HS    clopidogrel (PLAVIX) tablet 75 mg  75 mg Oral Daily    docusate sodium (COLACE) capsule 100 mg  100 mg Oral BID    gabapentin (NEURONTIN) capsule 100 mg  100 mg Oral BID    gabapentin (NEURONTIN) capsule 300 mg  300 mg Oral HS    HYDROmorphone (DILAUDID) injection 0 5 mg  0 5 mg Intravenous Q3H PRN    insulin glargine (LANTUS) subcutaneous injection 24 Units 0 24 mL  24 Units Subcutaneous HS    insulin lispro (HumaLOG) 100 units/mL subcutaneous injection 1-5 Units  1-5 Units Subcutaneous TID AC    insulin lispro (HumaLOG) 100 units/mL subcutaneous injection 1-5 Units  1-5 Units Subcutaneous HS    insulin lispro (HumaLOG) 100 units/mL subcutaneous injection 10 Units  10 Units Subcutaneous TID With Meals    oxyCODONE (ROXICODONE) immediate release tablet 10 mg  10 mg Oral Q4H PRN    oxyCODONE (ROXICODONE) IR tablet 5 mg  5 mg Oral Q4H PRN    pantoprazole (PROTONIX) EC tablet 40 mg  40 mg Oral Daily    rivaroxaban (XARELTO) tablet 2 5 mg  2 5 mg Oral Daily With Breakfast    senna (SENOKOT) tablet 8 6 mg  1 tablet Oral Daily    vancomycin (VANCOCIN) 1,500 mg in sodium chloride 0 9 % 250 mL IVPB  17 5 mg/kg Intravenous Q12H       Sydni Gordillo DO

## 2021-02-03 NOTE — OCCUPATIONAL THERAPY NOTE
Occupational Therapy Evaluation and Treatment Note     Patient Name: Sandi Amanda  Today's Date: 2/3/2021  Problem List  Principal Problem:    PAD (peripheral artery disease) ([facility] )  Active Problems:    Diabetic ulcer of toe of right foot associated with type 2 diabetes mellitus, limited to breakdown of skin [facility])    Past Medical History  Past Medical History:   Diagnosis Date    Arthritis     fingers    Diabetes mellitus ([facility] )     First degree AV block     Full dentures     PAD [facility]peripheral artery disease) ([facility] )     PVD (peripheral vascular disease) ([facility] )     Wound, open     right foot- by the 5th toe     Past Surgical History  Past Surgical History:   Procedure Laterality Date    CHOLECYSTECTOMY      COLONOSCOPY      IR AORTAGRAM WITH RUN-OFF  1/28/2021    ND DEBRIDEMENT, SKIN, SUB-Q TISSUE,MUSCLE,BONE,=<20 SQ CM Right 12/18/2020    Procedure: DEBRIDMENT OF ULCER , REMOVAL OF DEVITALIZED SKIN, SOFT TISSUE, BONE AND APPLICATION OF INTEGRA GRAFT RIGHT FOOT ;  Surgeon: Cade Leija DPM;  Location: [address] [place] [address];  Service: Podiatry    REPLANTATION THUMB Right     right tip reconnected    SKIN GRAFT      right thumb    TOE AMPUTATION      left foot all 5 toes removed    TOE AMPUTATION Right 2/2/2021    Procedure: Right partial 5th ray amputation;  Surgeon: Astrid Woods DPM;  [facility]: [facility];  Service: Podiatry    TOE OSTEOTOMY Right 6/26/2020    Procedure: exostosis of right big toe;  Surgeon: Robin Servin DPM;  Location: [address] [place] [address];  Service: Podiatry    TRIGGER FINGER RELEASE      bilateral hands    VEIN LIGATION      right           02/03/21 0831   OT Last Visit   OT Visit Date 02/03/21   Note Type   Note type Evaluation   Restrictions/Precautions   Weight Bearing Precautions Per Order Yes   RLE Weight Bearing Per Order NWB   Other Precautions WBS; Multiple lines; Fall Risk;Pain  (L TMA)   Pain Assessment   Pain Assessment Tool 0-10   Pain Score Worst Possible Pain   Pain Location/Orientation Orientation: Right;Location: Foot   Patient's Stated Pain Goal No pain   Hospital Pain Intervention(s) Repositioned; Ambulation/increased activity; Emotional support   Home Living   Type of [address] Two level;Stairs to enter with rails; Able to live on main level with bedroom/bathroom  (3STE - FFSU with full bath +recliner)   Bathroom Shower/Tub Tub/shower unit   Bathroom Toilet Standard   Bathroom Equipment Grab bars in [facility];Crutches   Additional Comments Pt reports living in a [facility] with 3STE with a FFSU with full bathroom +recliner  Reports using crutches pta   Prior Function   Level of [place] Independent with ADLs and functional mobility   Lives With Spouse   Receives Help From Family   ADL Assistance Independent   IADLs Independent   Falls in the last 6 months 0   Comments Pt reports his spouse is home all day with him and can assist with ADLs and IADLs upon discharge   Lifestyle   Autonomy Pt reports being IND with ADLs and IADLs pta with crutches   Reciprocal Relationships Pt lives with his wife who he reports had "a heart attack a year ago" but that she is able to assist with some ADLs and IADLs   Service to Others Pt is not currently working   Intrinsic Gratification Pt is mostly sedentary   ADL   Eating Assistance 7  Independent   Grooming Assistance 7  [address] 4  [facility]  4  Minimal Assistance   Bed Mobility   Supine to Sit 5  Supervision   Additional items HOB elevated; Bedrails; Increased time required   Sit to Supine Unable to assess   Additional Comments Pt left seated EOB at end of evaluation eating breakfast   Transfers   Sit to Stand 4  Minimal assistance   Additional items Assist x 1; Increased time required;Verbal cues   Stand to Sit 4  Minimal assistance   Additional items Assist x 1; Increased time required;Verbal cues   Additional Comments Transfers with crutches with VCs for technique and safety and to adhere to WBS  Pt with difficulty maintaining NWB to RLE with STS despite cues and visual demonstration for technique  Unable to located RW for pt during evaluation but suspect pt may perform better with RW at next session   Functional Mobility   Functional Mobility 4  Minimal assistance   Additional Comments [facility] <> bathroom   Additional items Crutches   Balance   Static Sitting Fair +   Dynamic Sitting Fair   Static Standing Fair -   Dynamic Standing Poor +   Activity Tolerance   Activity Tolerance Patient limited by pain; Patient limited by fatigue   Medical Staff Made Aware PT Avelino Push   Nurse Made Aware RN gave clearance for OT evaluation   RUE Assessment   RUE Assessment WFL   LUE Assessment   LUE Assessment WFL   Hand Function   Gross Motor Coordination Functional   Fine Motor Coordination Functional   Sensation   Light Touch No apparent deficits   Proprioception   Proprioception No apparent deficits   Cognition   Overall Cognitive Status WFL   Arousal/Participation Alert; Cooperative   Attention Within functional limits   Orientation Level Oriented X4   Memory Within functional limits   Following Commands Follows all commands and directions without difficulty   Comments Pt pleasant and cooperative to work with therapy this AM    Assessment   Limitation Decreased ADL status; Decreased UE strength;Decreased Safe judgement during ADL;Decreased endurance;Decreased self-care trans;Decreased high-level ADLs   Prognosis Fair   Assessment Pt is a 76 y o  male admitted to [facility] on 1/28/2021 w/ Peripheral Artery Disease  Comorbidities include a h/o Diabetic Ulcer of Toe of Right Foot Associated with DM II  Pt presents s/p Right partial 5th ray amputation with NWB orders to RLE  Pt with active OT orders on 2/2/21  Pt resides in a [facility] with 3STE with his wife who is home all the time   Pt will be staying on the first floor with full bathroom + recliner  Full bathroom equipped with tub/shower +GB and standard toilet  Pt was I w/ ADLS and IADLS, & required use of crutches PTA - also owns RW  Pt reports his wife had "a heart attack about a year ago" but that she is able to assist with some ADLs and IADLs  Currently pt requires Min A LB ADLs, Min A toileting, Min A bed mobility, Min A functional transfers and Min A functional mobility with crutches  Pt is limited at this time 2*: pain, endurance, activity tolerance, functional mobility, balance, functional standing tolerance, decreased I w/ ADLS/IADLS and strength  The following Occupational Performance Areas to address include: bathing/shower, toilet hygiene, dressing, health maintenance, functional mobility and clothing management  Pt scored overall  55/100 on the Barthel Index  The patient's raw score on the AM-PAC Daily Activity inpatient short form is 21, standardized score is 44 27, greater than 39 4  Patients at this level are likely to benefit from a discharge home with home therapy - however due to complexity of medical and functional abilities pt may benefit from post acute rehabilitation services  Please refer to the recommendation of the Occupational Therapist for safe DC planning  Based on the aforementioned OT evaluation, functional performance deficits, and assessments, pt has been identified as a high complexity evaluation  From OT standpoint, anticipate d/c post acute rehabilitation services vs home with home therapy pending progress  Pt to continue to benefit from acute immediate OT services to address the following goals 3-5x/week to  w/in 10-14 days: Yosef Mireles Goals   Patient Goals to have less pain   Plan   Treatment Interventions ADL retraining;Functional transfer training;UE strengthening/ROM; Endurance training;Patient/family training;Equipment evaluation/education; Compensatory technique education; Energy conservation; Activityengagement   Goal Expiration Date 02/17/21   OT Treatment Day 1   OT Frequency 3-5x/wk   Additional Treatment Session   Start Time 2135   End Time 0831   Treatment Assessment Treatment following evaluation focused on functional transfers, functional mobility, toileting, endurance training and pt education  No RWs were available during evaluation so pt's personal crutches were used  Pt req Min A STS from bed with crutches with VCs and visual demo for technique in order to follow NWB to RLE  Pt with difficulty following WBS during mobility this session despite cues  Pt ambulated short distance <> bathroom with min A with crutches  Pt completed toileting - urinated in stance at toilet with SUP with crutch and wall GB for support  Pt completed hand hygiene in stance at sink with SUP with crutches for support  Pt demo P+ dynamic balance and 1 LOB with mobility and req A to correct  Pt returned to sitting EOB to eat breakfast  Left with all needs met and call bell in reach  Pt would continue to benefit from acute OT services while in the hospital to maximize independence and safety with ADLs and functional mobility in order for patient to safely discharge to the next level of care  A discharge recommendation of post acute rehabilitation services - pending progress - has been made  Proceed with POC      Recommendation   OT Discharge Recommendation Post-Acute Rehabilitation Services  (vs home with skilled therapy pending progress)   AM-PAC Daily Activity Inpatient   Lower Body Dressing 3   Bathing 3   Toileting 3   Upper Body Dressing 4   Grooming 4   Eating 4   Daily Activity Raw Score 21   Daily Activity Standardized Score (Calc for Raw Score >=11) 44 27   Barthel Index   Feeding 10   Bathing 0   Grooming Score 5   Dressing Score 5   Bladder Score 10   Bowels Score 10   Toilet Use Score 5   Transfers (Bed/Chair) Score 10   Mobility (Level Surface) Score 0   Stairs Score 0   Barthel Index Score 55 GOALS    1) Pt will increase activity tolerance to G for 30 min txment sessions    2) Pt will complete LB dressing/self care w/ SUP using adaptive device and DME as needed    3) Pt will complete bathing w/ SUP w/ use of AE and DME as needed    4) Pt will complete toileting w/ mod I w/ G hygiene/thoroughness using DME as needed    5) Pt will improve functional transfers to SUP on/off all surfaces using DME as needed w/ G balance/safety     6) Pt will improve functional mobility during ADL/IADL/leisure tasks to SUP using DME as needed w/ G balance/safety     7) Pt will demonstrate G carryover of pt/caregiver education and training as appropriate  8) Pt will follow NWB to % of the time with ADLs and functional mobility      Rabia Mishra MOT,OTR/L

## 2021-02-03 NOTE — PLAN OF CARE
Problem: PHYSICAL THERAPY ADULT  Goal: Performs mobility at highest level of function for planned discharge setting  See evaluation for individualized goals  Description: Treatment/Interventions: Functional transfer training, LE strengthening/ROM, Elevations, Therapeutic exercise, Endurance training, Patient/family training, Equipment eval/education, Bed mobility, Gait training, Spoke to nursing  Equipment Recommended: (TBD)       See flowsheet documentation for full assessment, interventions and recommendations  Note: Prognosis: Good  Problem List: Decreased strength, Decreased range of motion, Decreased endurance, Impaired balance, Decreased mobility, Pain  Assessment: Pt is 76 y o  male seen for PT evaluation s/p admit to [facility] on 1/28/2021 w/ PAD (peripheral artery disease) ([facility] )  Pt is Agram with R DP PTA and R 5th ray amputation with podiatry 2/2  Per podiatry, pt is to be NWB on RLE  PT consulted to assess pt's functional mobility and d/c needs  Order placed for PT eval and tx, w/ up w/ A order  Comorbidities affecting pt's physical performance at time of assessment include: Type 2 DM, PAD, R foot ulcer, B/L foot pain  PTA, pt was independent w/ all functional mobility w/ no AD and lives w/ spouse in two level house w/ 3 [place] and FF to bed/bath on 2nd floor  Pt reports being IND w/ ADLs    Personal factors affecting pt at time of IE include: inaccessible home environment, lives in two story house, stairs to enter home, inability to navigate community distances, limited home support and inability to perform IADLs  Please find objective findings from PT assessment regarding body systems outlined above with impairments and limitations including weakness, decreased ROM, impaired balance, decreased endurance, gait deviations, pain, decreased activity tolerance, decreased functional mobility tolerance and fall risk  Pt performed bed mobility at supervision   Pt performed STS at [address] [place] [address] adherence to NWB on RLE w/ crutches  Attempted to find RW- however, could not find one on the floor  Anticipate better adherence to NWB on RLE w/ RW  The following objective measures performed on IE also reveal limitations: AM-PAC 6-Clicks: 36/86  Pt's clinical presentation is currently unstable/unpredictable seen in pt's presentation of recent admission for PAD requiring medical attention, recent decline in function as compared to baseline, multiple lines, fall risk, pain  Pt to benefit from continued PT tx to address deficits as defined above and maximize level of functional independent mobility and consistency  The patient's AM-[facility] Basic Mobility Inpatient Short Form Raw Score is 17, Standardized Score is 39 67  A standardized score less than 42 9 suggests the patient may benefit from discharge to post-acute rehabilitation services  Please also refer to the recommendation of the Physical Therapist for safe discharge planning  From PT/mobility standpoint, recommendation at time of d/c would be STR pending progress in order to facilitate return to PLOF  Barriers to Discharge: Inaccessible home environment, Decreased caregiver support     PT Discharge Recommendation: Post-Acute Rehabilitation Services(pending progress)     PT - OK to Discharge: (yes if to rehab, no if to home at this time)    See flowsheet documentation for full assessment

## 2021-02-03 NOTE — PROGRESS NOTES
Vancomycin Assessment    Alec Dancer is a 76 y o  male who is currently receiving vancomycin 1250 mg iv q 24 hours for skin-soft tissue infection, other bone/joint   Relevant clinical data and objective history reviewed:  Creatinine   Date Value Ref Range Status   01/30/2021 0 98 0 60 - 1 30 mg/dL Final     Comment:     Standardized to IDMS reference method   01/29/2021 0 78 0 60 - 1 30 mg/dL Final     Comment:     Standardized to IDMS reference method   01/22/2021 0 92 0 60 - 1 30 mg/dL Final     Comment:     Standardized to IDMS reference method   02/10/2014 0 89 0 5 - 1 5 mg/dL Final     Comment:     Standardized to IDMS reference method   02/06/2014 0 93 0 60 - 1 30 mg/dL Final     Comment:     Standardized to IDMS reference method   01/10/2014 1 14 0 60 - 1 30 mg/dL Final     Comment:     Standardized to IDMS reference method     /70 (BP Location: Left arm)   Pulse 81   Temp 98 6 °F (37 °C) (Oral)   Resp 12   Ht 5' 4" (1 626 m)   SpO2 96%   BMI 32 96 kg/m²   I/O last 3 completed shifts: In: 36 [P O :120; I V :700]  Out: -   Lab Results   Component Value Date/Time    BUN 17 01/30/2021 07:08 AM    BUN 20 02/10/2014 04:50 AM    WBC 7 66 01/31/2021 04:50 AM    WBC 7 71 02/10/2014 04:50 AM    HGB 13 7 01/31/2021 04:50 AM    HGB 14 4 02/10/2014 04:50 AM    HCT 41 5 01/31/2021 04:50 AM    HCT 43 0 02/10/2014 04:50 AM    MCV 95 01/31/2021 04:50 AM    MCV 94 02/10/2014 04:50 AM     01/31/2021 04:50 AM     02/10/2014 04:50 AM     Temp Readings from Last 3 Encounters:   02/02/21 98 6 °F (37 °C) (Oral)   01/19/21 98 4 °F (36 9 °C) (Tympanic)   12/29/20 98 °F (36 7 °C)     Vancomycin Days of Therapy: 1    Assessment/Plan  The patient is currently on vancomycin utilizing scheduled dosing based on actual body weight  Baseline risks associated with therapy include: pre-existing renal impairment, concomitant nephrotoxic medications, advanced age, and dehydration    The patient is currently receiving 1250 mg iv q 24 hours and after clinical evaluation will be changed to 1750 mg iv once then 1500 mg iv q 12 hours  Pharmacy will also follow closely for s/sx of nephrotoxicity, infusion reactions, and appropriateness of therapy  BMP and CBC will be ordered per protocol  Plan for trough as patient approaches steady state, prior to the 4th  dose at approximately 08:30 on 2/04/2021  Due to infection severity, will target a trough of 15-20 (appropriate for most indications)   Pharmacy will continue to follow the patients culture results and clinical progress daily      Ailyn Meyer, Pharmacist

## 2021-02-03 NOTE — PHYSICAL THERAPY NOTE
Physical Therapy Evaluation     Patient's Name: Hina Rausch    Admitting Diagnosis  Peripheral vascular disease, unspecified ([facility] 75 ) [I73 9]    Problem List  Patient Active Problem List   Diagnosis    Acquired deformity of foot    Diabetic polyneuropathy associated with type 2 diabetes mellitus ([facility] )    Pain in both feet    Peripheral arteriosclerosis ([facility] )    Onychomycosis    Tinea pedis of both feet    Dermatophytosis    Ingrown toenail    Paronychia of toenail of right foot    Diabetic ulcer of toe of right foot associated with type 2 diabetes mellitus, limited to breakdown of skin ([facility] )    Bony exostosis    Right foot ulcer, with fat layer exposed ([place] [address] )    PAD (peripheral artery disease) ([place] [address] )       Past Medical History  Past Medical History:   Diagnosis Date    Arthritis     fingers    Diabetes mellitus ([facility] )     First degree AV block     Full dentures     PAD (peripheral artery disease) ([facility] )     PVD (peripheral vascular disease) ([place] [address] )     Wound, open     right foot- by the 5th toe       Past Surgical History  Past Surgical History:   Procedure Laterality Date    CHOLECYSTECTOMY      COLONOSCOPY      IR AORTAGRAM WITH RUN-OFF  1/28/2021    FL DEBRIDEMENT, SKIN, SUB-Q TISSUE,MUSCLE,BONE,=<20 SQ CM Right 12/18/2020    Procedure: DEBRIDMENT OF ULCER , REMOVAL OF DEVITALIZED SKIN, SOFT TISSUE, BONE AND APPLICATION OF INTEGRA GRAFT RIGHT FOOT ;  Surgeon: Aracelis Rosas DPM;  Location: [address] [place] [address];  Service: Podiatry    REPLANTATION THUMB Right     right tip reconnected    SKIN GRAFT      right thumb    TOE AMPUTATION      left foot all 5 toes removed    TOE AMPUTATION Right 2/2/2021    Procedure: Right partial 5th ray amputation;  Surgeon: Shira Almendarez DPM;  Location:  MAIN OR;  Service: Podiatry    TOE OSTEOTOMY Right 6/26/2020    Procedure: exostosis of right big toe;  Surgeon: Swapna Dean DPM;  Location: [place] [facility];  Service: Podiatry    TRIGGER FINGER RELEASE      bilateral hands    VEIN LIGATION      right        02/03/21 0974   PT Last Visit   PT Visit Date 02/03/21   Note Type   Note type Evaluation   Pain Assessment   Pain Assessment Tool 0-10   Pain Score Worst Possible Pain   Pain Location/Orientation Orientation: Right;Location: [facility] Pain Intervention(s) Repositioned; Ambulation/increased activity; Emotional support   Home Living   Type of [address] Two level;Bed/bath upstairs; Able to live on main level with bedroom/bathroom;Stairs to enter with rails   Home Equipment Walker;Crutches   Additional Comments Pt resides in [facility] w/ 3 SIOMARA and FF to bed/bath on 2nd floor  Pt reports full bath on 1st floor and FFSU if needed  Pt reports ambulating w/out AD PTA   Prior Function   Level of [place] Independent with ADLs and functional mobility   Lives With Spouse   Receives Help From Family   ADL Assistance Independent   IADLs Independent   Falls in the last 6 months 0   Comments Pt reports spouse "had a heart attack a year ago" and unsure how much increased assistance pt's spouse could provide   Restrictions/Precautions   Weight Bearing Precautions Per Order Yes   RLE Weight Bearing Per Order NWB   Other Precautions WBS; Multiple lines; Fall Risk;Pain  (L TMA)   General   Family/Caregiver Present No   Cognition   Overall Cognitive Status WFL   Arousal/Participation Alert   Orientation Level Oriented X4   Memory Decreased recall of precautions   Following Commands Follows one step commands without difficulty   Comments Pt overall pleasant and cooperative to work w/ therapy   RLE Assessment   RLE Assessment WFL   LLE Assessment   LLE Assessment WFL   Coordination   Movements are Fluid and Coordinated 0   Coordination and Movement Description slow, guarded, antalgic   Bed Mobility   Supine to Sit 5  Supervision   Additional items HOB elevated; Increased time required   Sit to Supine Unable to assess   Additional Comments Pt lying supine upon PT arrival  Pt returned seated EOB at end of session w/ all needs within reach   Transfers   Sit to Stand 4  Minimal assistance   Additional items Assist x 1; Increased time required;Verbal cues   Stand to Sit 4  Minimal assistance   Additional items Assist x 1; Increased time required;Verbal cues   Additional Comments Pt w/ crutches in room from his house  Attempted transfers/mobility w/ crutches- poor ability to maintain NWB on RLE as pt has TMA on LLE and difficulty balancing only on LLE  Attempted to find RW; however, searched entire floor and unable to locate one at this time  Anticipate pt will be able to adhere better to NWB w/ RW    Ambulation/Elevation   Gait pattern Excessively slow; Short stride; Inconsistent irene   Gait Assistance 4  Minimal assist   Additional items Assist x 1;Verbal cues   Assistive Device Crutches   Distance 8ft x2   Stair Management Assistance Not tested   Balance   Static Sitting Fair   Dynamic Sitting Fair -   Static Standing Poor +   Dynamic Standing Poor +   Ambulatory Poor +   Endurance Deficit   Endurance Deficit Yes   Endurance Deficit Description weakness, fatigue, pain   Activity Tolerance   Activity Tolerance Patient limited by fatigue;Patient limited by pain   Medical Staff Made Aware OT Rabia   Nurse Made Aware yes   Assessment   Prognosis Good   Problem List Decreased strength;Decreased range of motion;Decreased endurance; Impaired balance;Decreased mobility;Pain   Assessment Pt is 76 y o  male seen for PT evaluation s/p admit to [facility] on 1/28/2021 w/ PAD (peripheral artery disease) ([facility] )  Pt is Agram with R DP PTA and R 5th ray amputation with podiatry 2/2  Per podiatry, pt is to be NWB on RLE  PT consulted to assess pt's functional mobility and d/c needs  Order placed for PT eval and tx, w/ up w/ A order  Comorbidities affecting pt's physical performance at time of assessment include: Type 2 DM, PAD, R foot ulcer, B/L foot pain   PTA, pt was independent w/ all functional mobility w/ no AD and lives w/ spouse in two level house w/ 3 [address] and FF to bed/bath on 2nd floor  Pt reports being IND w/ ADLs    Personal factors affecting pt at time of IE include: inaccessible home environment, lives in two story house, stairs to enter home, inability to navigate community distances, limited home support and inability to perform IADLs  Please find objective findings from PT assessment regarding body systems outlined above with impairments and limitations including weakness, decreased ROM, impaired balance, decreased endurance, gait deviations, pain, decreased activity tolerance, decreased functional mobility tolerance and fall risk  Pt performed bed mobility at supervision  Pt performed STS at [address] [place] [address]- poor adherence to NWB on RLE w/ crutches  Attempted to find RW- however, could not find one on the floor  Anticipate better adherence to NWB on RLE w/ RW  The following objective measures performed on IE also reveal limitations: AM-PAC 6-Clicks: 92/40  Pt's clinical presentation is currently unstable/unpredictable seen in pt's presentation of recent admission for PAD requiring medical attention, recent decline in function as compared to baseline, multiple lines, fall risk, pain  Pt to benefit from continued PT tx to address deficits as defined above and maximize level of functional independent mobility and consistency  The patient's AM-PAC Basic Mobility Inpatient Short Form Raw Score is 17, Standardized Score is 39 67  A standardized score less than 42 9 suggests the patient may benefit from discharge to post-acute rehabilitation services  Please also refer to the recommendation of the Physical Therapist for safe discharge planning  From PT/mobility standpoint, recommendation at time of d/c would be STR pending progress in order to facilitate return to PLOF     Barriers to Discharge Inaccessible home environment;Decreased caregiver support   Goals   Patient Goals to have less pain   STG Expiration Date 02/17/21 Short Term Goal #1 1  Pt will demonstrate the ability to perform all aspects of bed mobility at Mod I in onder to maximize functional independence and decrease burden on caregivers  2 Pt will demonstrate the ability to perform all functional transfers at Mod I in order to maximize functional independence and decrease burden on caregivers  3 Pt will demonstrate the ability to ambulate at least 50ft with least restrictive device at Mod I in order to maximize functional independence  4 Pt will demonstrate the ability to negotiate at least 3 steps at Mod I in order to return to household/community mobility  5 Pt will demonstrate improved balance by one grade in order to decrease risk of falls  6 Pt will increase b/l LE strength by 1 grade in order to increase ease of functional mobility and transfers  7 Pt will demonstrate the ability to propel w/c at least 150ft at Mod I for improved household/community mobility   PT Treatment Day 0   Plan   Treatment/Interventions Functional transfer training;LE strengthening/ROM; Elevations; Therapeutic exercise; Endurance training;Patient/family training;Equipment eval/education; Bed mobility;Gait training;Spoke to nursing   PT Frequency   (3-5x/week)   Recommendation   PT Discharge Recommendation Post-Acute Rehabilitation Services  (pending progress w/ ambulation, adherence to NWB, and stair trial)   Equipment Recommended   (TBD)   PT - OK to Discharge   (yes if to rehab, no if to home at this time)   Gabriel 8 in Bed Without Bedrails 3   Lying on Back to Sitting on Edge of Flat Bed 3   Moving Bed to Chair 3   Standing Up From Chair 3   Walk in Room 3   Climb 3-5 Stairs 2   Basic Mobility Inpatient Raw Score 17   Basic Mobility Standardized Score 39 67   Modified Caswell Scale   Modified Caswell Scale 4     Kelly Parada, PT, DPT

## 2021-02-04 LAB
ANION GAP SERPL CALCULATED.3IONS-SCNC: 5 MMOL/L (ref 4–13)
BUN SERPL-MCNC: 14 MG/DL (ref 5–25)
CALCIUM SERPL-MCNC: 9.3 MG/DL (ref 8.3–10.1)
CHLORIDE SERPL-SCNC: 107 MMOL/L (ref 100–108)
CO2 SERPL-SCNC: 28 MMOL/L (ref 21–32)
CREAT SERPL-MCNC: 0.73 MG/DL (ref 0.6–1.3)
ERYTHROCYTE [DISTWIDTH] IN BLOOD BY AUTOMATED COUNT: 12.1 % (ref 11.6–15.1)
GFR SERPL CREATININE-BSD FRML MDRD: 97 ML/MIN/1.73SQ M
GLUCOSE SERPL-MCNC: 127 MG/DL (ref 65–140)
GLUCOSE SERPL-MCNC: 156 MG/DL (ref 65–140)
GLUCOSE SERPL-MCNC: 166 MG/DL (ref 65–140)
GLUCOSE SERPL-MCNC: 210 MG/DL (ref 65–140)
GLUCOSE SERPL-MCNC: 216 MG/DL (ref 65–140)
HCT VFR BLD AUTO: 41.5 % (ref 36.5–49.3)
HGB BLD-MCNC: 13.6 G/DL (ref 12–17)
MCH RBC QN AUTO: 31.5 PG (ref 26.8–34.3)
MCHC RBC AUTO-ENTMCNC: 32.8 G/DL (ref 31.4–37.4)
MCV RBC AUTO: 96 FL (ref 82–98)
PLATELET # BLD AUTO: 154 THOUSANDS/UL (ref 149–390)
PMV BLD AUTO: 11 FL (ref 8.9–12.7)
POTASSIUM SERPL-SCNC: 3.7 MMOL/L (ref 3.5–5.3)
RBC # BLD AUTO: 4.32 MILLION/UL (ref 3.88–5.62)
SODIUM SERPL-SCNC: 140 MMOL/L (ref 136–145)
VANCOMYCIN TROUGH SERPL-MCNC: 13.6 UG/ML (ref 10–20)
WBC # BLD AUTO: 7.99 THOUSAND/UL (ref 4.31–10.16)

## 2021-02-04 PROCEDURE — 80202 ASSAY OF VANCOMYCIN: CPT | Performed by: PODIATRIST

## 2021-02-04 PROCEDURE — 99024 POSTOP FOLLOW-UP VISIT: CPT | Performed by: SURGERY

## 2021-02-04 PROCEDURE — 80048 BASIC METABOLIC PNL TOTAL CA: CPT | Performed by: PHYSICIAN ASSISTANT

## 2021-02-04 PROCEDURE — 82948 REAGENT STRIP/BLOOD GLUCOSE: CPT

## 2021-02-04 PROCEDURE — 99024 POSTOP FOLLOW-UP VISIT: CPT | Performed by: PODIATRIST

## 2021-02-04 PROCEDURE — NC001 PR NO CHARGE: Performed by: INTERNAL MEDICINE

## 2021-02-04 PROCEDURE — 97530 THERAPEUTIC ACTIVITIES: CPT

## 2021-02-04 PROCEDURE — 97116 GAIT TRAINING THERAPY: CPT

## 2021-02-04 PROCEDURE — 85027 COMPLETE CBC AUTOMATED: CPT | Performed by: PHYSICIAN ASSISTANT

## 2021-02-04 PROCEDURE — 97112 NEUROMUSCULAR REEDUCATION: CPT

## 2021-02-04 RX ORDER — OXYCODONE HYDROCHLORIDE AND ACETAMINOPHEN 5; 325 MG/1; MG/1
1 TABLET ORAL EVERY 6 HOURS PRN
Qty: 120 TABLET | Refills: 0 | Status: SHIPPED | OUTPATIENT
Start: 2021-02-04 | End: 2021-02-23 | Stop reason: SDUPTHER

## 2021-02-04 RX ORDER — LANOLIN ALCOHOL/MO/W.PET/CERES
6 CREAM (GRAM) TOPICAL
Status: DISCONTINUED | OUTPATIENT
Start: 2021-02-04 | End: 2021-02-06 | Stop reason: HOSPADM

## 2021-02-04 RX ORDER — VANCOMYCIN HYDROCHLORIDE 1 G/200ML
1000 INJECTION, SOLUTION INTRAVENOUS EVERY 8 HOURS
Status: DISCONTINUED | OUTPATIENT
Start: 2021-02-04 | End: 2021-02-06 | Stop reason: HOSPADM

## 2021-02-04 RX ADMIN — INSULIN LISPRO 1 UNITS: 100 INJECTION, SOLUTION INTRAVENOUS; SUBCUTANEOUS at 12:06

## 2021-02-04 RX ADMIN — INSULIN LISPRO 2 UNITS: 100 INJECTION, SOLUTION INTRAVENOUS; SUBCUTANEOUS at 16:46

## 2021-02-04 RX ADMIN — OXYCODONE HYDROCHLORIDE 10 MG: 10 TABLET ORAL at 23:54

## 2021-02-04 RX ADMIN — OXYCODONE HYDROCHLORIDE 10 MG: 10 TABLET ORAL at 16:51

## 2021-02-04 RX ADMIN — ACETAMINOPHEN 975 MG: 325 TABLET, FILM COATED ORAL at 21:12

## 2021-02-04 RX ADMIN — MELATONIN TAB 3 MG 6 MG: 3 TAB at 23:50

## 2021-02-04 RX ADMIN — PANTOPRAZOLE SODIUM 40 MG: 40 TABLET, DELAYED RELEASE ORAL at 05:06

## 2021-02-04 RX ADMIN — GABAPENTIN 100 MG: 100 CAPSULE ORAL at 18:07

## 2021-02-04 RX ADMIN — INSULIN GLARGINE 24 UNITS: 100 INJECTION, SOLUTION SUBCUTANEOUS at 21:13

## 2021-02-04 RX ADMIN — VANCOMYCIN HYDROCHLORIDE 1500 MG: 10 INJECTION, POWDER, LYOPHILIZED, FOR SOLUTION INTRAVENOUS at 10:57

## 2021-02-04 RX ADMIN — HYDROMORPHONE HYDROCHLORIDE 0.5 MG: 1 INJECTION, SOLUTION INTRAMUSCULAR; INTRAVENOUS; SUBCUTANEOUS at 12:09

## 2021-02-04 RX ADMIN — HYDROMORPHONE HYDROCHLORIDE 0.5 MG: 1 INJECTION, SOLUTION INTRAMUSCULAR; INTRAVENOUS; SUBCUTANEOUS at 03:47

## 2021-02-04 RX ADMIN — ACETAMINOPHEN 975 MG: 325 TABLET, FILM COATED ORAL at 05:06

## 2021-02-04 RX ADMIN — OXYCODONE HYDROCHLORIDE 10 MG: 10 TABLET ORAL at 10:19

## 2021-02-04 RX ADMIN — RIVAROXABAN 2.5 MG: 2.5 TABLET, FILM COATED ORAL at 10:19

## 2021-02-04 RX ADMIN — GABAPENTIN 100 MG: 100 CAPSULE ORAL at 10:18

## 2021-02-04 RX ADMIN — CLOPIDOGREL BISULFATE 75 MG: 75 TABLET ORAL at 10:18

## 2021-02-04 RX ADMIN — ACETAMINOPHEN 975 MG: 325 TABLET, FILM COATED ORAL at 14:38

## 2021-02-04 RX ADMIN — INSULIN LISPRO 1 UNITS: 100 INJECTION, SOLUTION INTRAVENOUS; SUBCUTANEOUS at 21:13

## 2021-02-04 RX ADMIN — GABAPENTIN 300 MG: 300 CAPSULE ORAL at 21:12

## 2021-02-04 RX ADMIN — ATORVASTATIN CALCIUM 40 MG: 40 TABLET, FILM COATED ORAL at 21:12

## 2021-02-04 RX ADMIN — VANCOMYCIN HYDROCHLORIDE 1000 MG: 1 INJECTION, SOLUTION INTRAVENOUS at 18:07

## 2021-02-04 RX ADMIN — OXYCODONE HYDROCHLORIDE 10 MG: 10 TABLET ORAL at 02:21

## 2021-02-04 NOTE — PHYSICAL THERAPY NOTE
Physical Therapy Progress Note     02/04/21 0945   PT Last Visit   PT Visit Date 02/04/21   Pain Assessment   Pain Assessment Tool 0-10   Pain Score 6   Pain Location/Orientation Orientation: Right;Location: Foot   Hospital Pain Intervention(s) Repositioned; Ambulation/increased activity; Emotional support   Restrictions/Precautions   RLE Weight Bearing Per Order NWB   Braces or Orthoses Other (Comment)  (Donned pt's left shoe )   Other Precautions Pain; Fall Risk;WBS   Subjective   Subjective The pt  states that he continues to have pain, and that he knows that he is NWB RLE  Bed Mobility   Supine to Sit 5  Supervision   Sit to Supine 5  Supervision   Transfers   Sit to Stand 5  Supervision   Stand to Sit 5  Supervision   Ambulation/Elevation   Gait pattern Excessively slow; Short stride; Inconsistent irene   Gait Assistance 5  Supervision   Additional items Verbal cues   Assistive Device Rolling walker   Distance 40 feet  Balance   Static Sitting Good   Dynamic Sitting Fair +   Static Standing Fair   Ambulatory Fair -   Activity Tolerance   Activity Tolerance Patient tolerated treatment well   Nurse [address] RN  Assessment   Prognosis Good   Problem List Decreased range of motion;Decreased endurance; Impaired balance;Decreased mobility;Pain   Assessment The pt  was provided a rolling walker and donned his left shoe with 100% adherence to NWB RLE  The pt  notes that he is unsure if he has a rolling walker at home  He took appropriate steps during the session, and had improved gait with his shoe on  He had no loss of balance, and he was able to perform static standing and turns without any assistance  He did note continued pain during the session, but no increaesed pain with his leg dependent  Barriers to Discharge Inaccessible home environment   Goals   Patient Goals To get better     STG Expiration Date 02/17/21   PT Treatment Day 1   Plan   Treatment/Interventions Functional transfer training;STEWART strengthening/ROM; Therapeutic exercise; Endurance training;Patient/family training;Bed mobility;Gait training;Elevations   Progress Progressing toward goals   PT Frequency   (3-5x a week )   Recommendation   PT Discharge Recommendation Post-Acute Rehabilitation Services  (Pending continued progress )   Equipment Recommended Walker  (Rolling walker  )   AM-PAC Basic Mobility Inpatient   Turning in Bed Without Bedrails 4   Lying on Back to Sitting on Edge of Flat Bed 4   Moving Bed to Chair 3   Standing Up From Chair 3   Walk in Room 3   Climb 3-5 Stairs 2   Basic Mobility Inpatient Raw Score 19   Basic Mobility Standardized Score 42 48     Sandrita Crawford PTA    The patient's AM-PAC Basic Mobility Inpatient Short Form Raw Score is 19, Standardized Score is 42 48  A standardized score less than 42 9 suggests the patient may benefit from discharge to post-acute rehabilitation services  Please also refer to the recommendation of the Physical Therapist for safe discharge planning

## 2021-02-04 NOTE — PROGRESS NOTES
Vancomycin IV Pharmacy-to-Dose Consultation    Angel Luis Roberson is a 76 y o  male who is currently receiving Vancomycin IV with management by the Pharmacy Consult service  Relevant clinical data and objective history reviewed:  Temp Readings from Last 3 Encounters:   02/04/21 98 5 °F (36 9 °C) (Oral)   01/19/21 98 4 °F (36 9 °C) (Tympanic)   12/29/20 98 °F (36 7 °C)     /71 (BP Location: Left arm)   Pulse 82   Temp 98 5 °F (36 9 °C) (Oral)   Resp 18   Ht 5' 4" (1 626 m)   SpO2 98%   BMI 32 96 kg/m²     I/O last 3 completed shifts:   In: 987 [P O :237; IV Piggyback:750]  Out: 500 [Urine:500]    Lab Results   Component Value Date/Time    BUN 14 02/04/2021 05:58 AM    BUN 20 02/10/2014 04:50 AM    WBC 7 99 02/04/2021 05:58 AM    WBC 7 71 02/10/2014 04:50 AM    HGB 13 6 02/04/2021 05:58 AM    HGB 14 4 02/10/2014 04:50 AM    HCT 41 5 02/04/2021 05:58 AM    HCT 43 0 02/10/2014 04:50 AM    MCV 96 02/04/2021 05:58 AM    MCV 94 02/10/2014 04:50 AM     02/04/2021 05:58 AM     02/10/2014 04:50 AM     Creatinine   Date Value Ref Range Status   02/04/2021 0 73 0 60 - 1 30 mg/dL Final     Comment:     Standardized to IDMS reference method   01/30/2021 0 98 0 60 - 1 30 mg/dL Final     Comment:     Standardized to IDMS reference method   02/10/2014 0 89 0 5 - 1 5 mg/dL Final     Comment:     Standardized to IDMS reference method   02/06/2014 0 93 0 60 - 1 30 mg/dL Final     Comment:     Standardized to IDMS reference method     Vancomycin Tr   Date Value Ref Range Status   02/04/2021 13 6 10 0 - 20 0 ug/mL Final         Vancomycin Assessment:  Indication: skin-soft tissue infection, other bone/joint  Micro: prelim tissue culture and gram stain: 3 + GPR; GPC in pairs; 2+ polys  Renal Function: stable;Scr (2/4)= 0 73   Potential Nephrotoxicity Factors:  Medications: none  Patient-Factors: elderly  Days of Therapy: 3  Current Dose: 1500mg IV q12h (dose prior to level:1500mg on 2/3 @2152)  Goal Trough: 15-20 (appropriate for most indications)   Goal AUC(24h): 400-600  Last Level: 2/4 @0958 = 13 6  Pharmacokinetic Analysis: half-life= 7 4hrs      Vancomycin Plan:  New Dosing: change to 1000mg IV Q8h (next dose: 2/4 @1800)  Predicted Trough / AUC: 16 2/661  Next Level: 2/5 @1730  Renal Function Monitoring: Scr      Pharmacy will continue to follow closely for s/sx of nephrotoxicity, infusion reactions and appropriateness of therapy  BMP and CBC will be ordered per protocol  We will continue to follow the patient's culture results and clinical progress daily         Hernán Nunez, pharmacist

## 2021-02-04 NOTE — PROGRESS NOTES
#Type 2 Diabetes Mellitus  Complicated by chronic lower extremity ulcers now POD #2 s/p right partial 5th metatarsal resection  Hemoglobin A1c 8 7%  Blood glucose levels have been within 140-180  Will continue Lantus 24 units qHS and Humalog 5 units TID AC with correctional scale insulin  May need to increase mealtime insulin to 10 units TID AC once appetite improves however patient still not eating as he does at home  Patient will need close outpatient follow-up as insulin regimen prior to hospitalization appeared inappropriate as patient seemed unsure of how to properly administer his insulin  Will use NPH twice daily and regular insulin with meals upon discharge

## 2021-02-04 NOTE — PROGRESS NOTES
Progress Note - Vascular Surgery   Cristal Harvey 76 y o  male MRN: [id number]  Unit/Bed#: [facility] 324-01 Encounter: [id number]    Assessment:  65-yr old male with PAD and non-healing R 5th metatarsal wound (x7 months)  Now s/p Agram with R DP PTA  S/p R 5th ray amputation with podiatry 2/2     R Palp DP     Plan:  Diet as tolerated   Pain/ Nausea control  Wound care per podiatry, dressing taken down and wound chcked with Podiatry at bedside  Abx recs for post discharge abx per Podiatry    Subjective/Objective     Subjective:   No complaints this am      Pertinent review of systems as above  All other review of systems negative  Objective:    Blood pressure 136/71, pulse 69, temperature 98 4 °F (36 9 °C), temperature source Oral, resp  rate 18, height 5' 4" (1 626 m), SpO2 96 %  ,Body mass index is 32 96 kg/m²  Intake/Output Summary (Last 24 hours) at 2/4/2021 0844  Last data filed at 2/4/2021 0105  Gross per 24 hour   Intake 487 ml   Output 500 ml   Net -13 ml       Invasive Devices     Peripheral Intravenous Line            Peripheral IV 02/01/21 Right Forearm 2 days                Physical Exam:   Gen:  NAD  HEENT: NCAT  MMM  CV: well perfused, pulses palpable  Lungs: Normal respiratory effort  Abd: soft, nt/nd  Skin: warm/ dry  Extremities: no peripheral edema, no clubbing or cyanosis  Dressings cdi  Rt foot wound looks intact  Neuro: AxO x3      Results from last 7 days   Lab Units 02/04/21  0558 01/31/21  0450 01/30/21  0708   WBC Thousand/uL 7 99 7 66 6 50   HEMOGLOBIN g/dL 13 6 13 7 14 7   HEMATOCRIT % 41 5 41 5 45 3   PLATELETS Thousands/uL 154 156 171     Results from last 7 days   Lab Units 02/04/21  0558 01/30/21  0708 01/29/21  0559   POTASSIUM mmol/L 3 7 4 0 4 1   CHLORIDE mmol/L 107 105 109*   CO2 mmol/L 28 31 28   BUN mg/dL 14 17 18   CREATININE mg/dL 0 73 0 98 0 78   CALCIUM mg/dL 9 3 9 9 9 0            I have personally reviewed pertinent films in PACS      Medications:   Scheduled Meds:  Current Facility-Administered Medications   Medication Dose Route Frequency Provider Last Rate    acetaminophen  975 mg Oral [facility] Natasha Claros PA-C      atorvastatin  40 mg Oral HS Judith Cain DPM      clopidogrel  75 mg Oral Daily Judith Cain DPM      docusate sodium  100 mg Oral BID Judith Cain DPM      gabapentin  100 mg Oral BID Natasha Claros PA-C      gabapentin  300 mg Oral HS Judith Cain DPM      HYDROmorphone  0 5 mg Intravenous Q3H PRN Judith Cain DPM      insulin glargine  24 Units Subcutaneous HS Judith Cain DPM      insulin lispro  1-5 Units Subcutaneous TID AC Judith Cain DPM      insulin lispro  1-5 Units Subcutaneous HS Judith Cain DPM      insulin lispro  10 Units Subcutaneous TID With Meals Judith Cain DPM      oxyCODONE  10 mg Oral Q4H PRN Judith Cain DPM      oxyCODONE  5 mg Oral Q4H PRN Judith Cain DPM      pantoprazole  40 mg Oral Daily Judith Cain DPM      rivaroxaban  2 5 mg Oral Daily With Breakfast Madelin KURT Reyes      senna  1 tablet Oral Daily Judith Cain DPM      vancomycin  17 5 mg/kg Intravenous Q12H Judith Cain DPM Stopped (02/03/21 2074)     Continuous Infusions:   PRN Meds:  HYDROmorphone, 0 5 mg, Q3H PRN  oxyCODONE, 10 mg, Q4H PRN  oxyCODONE, 5 mg, Q4H PRN      VTE Pharmacologic Prophylaxis: Heparin  VTE Mechanical Prophylaxis: sequential compression device

## 2021-02-04 NOTE — PLAN OF CARE
Problem: PHYSICAL THERAPY ADULT  Goal: Performs mobility at highest level of function for planned discharge setting  See evaluation for individualized goals  Description: Treatment/Interventions: Functional transfer training, LE strengthening/ROM, Elevations, Therapeutic exercise, Endurance training, Patient/family training, Equipment eval/education, Bed mobility, Gait training, Spoke to nursing  Equipment Recommended: (TBD)       See flowsheet documentation for full assessment, interventions and recommendations  Outcome: Progressing  Note: Prognosis: Good  Problem List: Decreased range of motion, Decreased endurance, Impaired balance, Decreased mobility, Pain  Assessment: The pt  was provided a rolling walker and donned his left shoe with 100% adherence to NWB RLE  The pt  notes that he is unsure if he has a rolling walker at home  He took appropriate steps during the session, and had improved gait with his shoe on  He had no loss of balance, and he was able to perform static standing and turns without any assistance  He did note continued pain during the session, but no increaesed pain with his leg dependent  Barriers to Discharge: Inaccessible home environment     PT Discharge Recommendation: Post-Acute Rehabilitation Services(Pending continued progress  )     PT - OK to Discharge: (yes if to rehab, no if to home at this time)    See flowsheet documentation for full assessment

## 2021-02-05 ENCOUNTER — TELEPHONE (OUTPATIENT)
Dept: VASCULAR SURGERY | Facility: CLINIC | Age: 66
End: 2021-02-05

## 2021-02-05 LAB
BACTERIA SPEC ANAEROBE CULT: NORMAL
GLUCOSE SERPL-MCNC: 133 MG/DL (ref 65–140)
GLUCOSE SERPL-MCNC: 218 MG/DL (ref 65–140)
GLUCOSE SERPL-MCNC: 237 MG/DL (ref 65–140)
GLUCOSE SERPL-MCNC: 242 MG/DL (ref 65–140)
VANCOMYCIN TROUGH SERPL-MCNC: 19.2 UG/ML (ref 10–20)

## 2021-02-05 PROCEDURE — 80202 ASSAY OF VANCOMYCIN: CPT | Performed by: PODIATRIST

## 2021-02-05 PROCEDURE — 99024 POSTOP FOLLOW-UP VISIT: CPT | Performed by: PODIATRIST

## 2021-02-05 PROCEDURE — 99231 SBSQ HOSP IP/OBS SF/LOW 25: CPT | Performed by: SURGERY

## 2021-02-05 PROCEDURE — 99232 SBSQ HOSP IP/OBS MODERATE 35: CPT | Performed by: INTERNAL MEDICINE

## 2021-02-05 PROCEDURE — 82948 REAGENT STRIP/BLOOD GLUCOSE: CPT

## 2021-02-05 RX ADMIN — OXYCODONE HYDROCHLORIDE 10 MG: 10 TABLET ORAL at 16:55

## 2021-02-05 RX ADMIN — GABAPENTIN 100 MG: 100 CAPSULE ORAL at 16:55

## 2021-02-05 RX ADMIN — INSULIN LISPRO 2 UNITS: 100 INJECTION, SOLUTION INTRAVENOUS; SUBCUTANEOUS at 22:16

## 2021-02-05 RX ADMIN — INSULIN LISPRO 2 UNITS: 100 INJECTION, SOLUTION INTRAVENOUS; SUBCUTANEOUS at 12:51

## 2021-02-05 RX ADMIN — HYDROMORPHONE HYDROCHLORIDE 0.5 MG: 1 INJECTION, SOLUTION INTRAMUSCULAR; INTRAVENOUS; SUBCUTANEOUS at 10:02

## 2021-02-05 RX ADMIN — ACETAMINOPHEN 975 MG: 325 TABLET, FILM COATED ORAL at 15:00

## 2021-02-05 RX ADMIN — GABAPENTIN 100 MG: 100 CAPSULE ORAL at 08:32

## 2021-02-05 RX ADMIN — VANCOMYCIN HYDROCHLORIDE 1000 MG: 1 INJECTION, SOLUTION INTRAVENOUS at 02:29

## 2021-02-05 RX ADMIN — INSULIN GLARGINE 24 UNITS: 100 INJECTION, SOLUTION SUBCUTANEOUS at 22:15

## 2021-02-05 RX ADMIN — GABAPENTIN 300 MG: 300 CAPSULE ORAL at 22:15

## 2021-02-05 RX ADMIN — HYDROMORPHONE HYDROCHLORIDE 0.5 MG: 1 INJECTION, SOLUTION INTRAMUSCULAR; INTRAVENOUS; SUBCUTANEOUS at 23:02

## 2021-02-05 RX ADMIN — ACETAMINOPHEN 975 MG: 325 TABLET, FILM COATED ORAL at 22:15

## 2021-02-05 RX ADMIN — INSULIN LISPRO 2 UNITS: 100 INJECTION, SOLUTION INTRAVENOUS; SUBCUTANEOUS at 17:53

## 2021-02-05 RX ADMIN — VANCOMYCIN HYDROCHLORIDE 1000 MG: 1 INJECTION, SOLUTION INTRAVENOUS at 09:54

## 2021-02-05 RX ADMIN — OXYCODONE HYDROCHLORIDE 10 MG: 10 TABLET ORAL at 22:16

## 2021-02-05 RX ADMIN — PANTOPRAZOLE SODIUM 40 MG: 40 TABLET, DELAYED RELEASE ORAL at 06:06

## 2021-02-05 RX ADMIN — OXYCODONE HYDROCHLORIDE 10 MG: 10 TABLET ORAL at 08:36

## 2021-02-05 RX ADMIN — HYDROMORPHONE HYDROCHLORIDE 0.5 MG: 1 INJECTION, SOLUTION INTRAMUSCULAR; INTRAVENOUS; SUBCUTANEOUS at 17:59

## 2021-02-05 RX ADMIN — ATORVASTATIN CALCIUM 40 MG: 40 TABLET, FILM COATED ORAL at 22:15

## 2021-02-05 RX ADMIN — HYDROMORPHONE HYDROCHLORIDE 0.5 MG: 1 INJECTION, SOLUTION INTRAMUSCULAR; INTRAVENOUS; SUBCUTANEOUS at 02:41

## 2021-02-05 RX ADMIN — MELATONIN TAB 3 MG 6 MG: 3 TAB at 22:16

## 2021-02-05 RX ADMIN — ACETAMINOPHEN 975 MG: 325 TABLET, FILM COATED ORAL at 05:56

## 2021-02-05 RX ADMIN — CLOPIDOGREL BISULFATE 75 MG: 75 TABLET ORAL at 08:32

## 2021-02-05 RX ADMIN — RIVAROXABAN 2.5 MG: 2.5 TABLET, FILM COATED ORAL at 08:32

## 2021-02-05 NOTE — PLAN OF CARE
Problem: PAIN - ADULT  Goal: Verbalizes/displays adequate comfort level or baseline comfort level  Description: Interventions:  - Encourage patient to monitor pain and request assistance  - Assess pain using appropriate pain scale  - Administer analgesics based on type and severity of pain and evaluate response  - Implement non-pharmacological measures as appropriate and evaluate response  - Consider cultural and social influences on pain and pain management  - Notify physician/advanced practitioner if interventions unsuccessful or patient reports new pain  Outcome: Progressing     Problem: INFECTION - ADULT  Goal: Absence or prevention of progression during hospitalization  Description: INTERVENTIONS:  - Assess and monitor for signs and symptoms of infection  - Monitor lab/diagnostic results  - Monitor all insertion sites, i e  indwelling lines, tubes, and drains  - Monitor endotracheal if appropriate and nasal secretions for changes in amount and color  - [place] appropriate cooling/warming therapies per order  - Administer medications as ordered  - Instruct and encourage patient and family to use good hand hygiene technique  - Identify and instruct in appropriate isolation precautions for identified infection/condition  Outcome: Progressing  Goal: Absence of fever/infection during neutropenic period  Description: INTERVENTIONS:  - Monitor WBC    Outcome: Progressing     Problem: SAFETY ADULT  Goal: Patient will remain free of falls  Description: INTERVENTIONS:  - Assess patient frequently for physical needs  -  Identify cognitive and physical deficits and behaviors that affect risk of falls    -  [place] fall precautions as indicated by assessment   - Educate patient/family on patient safety including physical limitations  - Instruct patient to call for assistance with activity based on assessment  - Modify environment to reduce risk of injury  - Consider OT/PT consult to assist with strengthening/mobility  Outcome: Progressing  Goal: Maintain or return to baseline ADL function  Description: INTERVENTIONS:  -  Assess patient's ability to carry out ADLs; assess patient's baseline for ADL function and identify physical deficits which impact ability to perform ADLs (bathing, care of mouth/teeth, toileting, grooming, dressing, etc )  - Assess/evaluate cause of self-care deficits   - Assess range of motion  - Assess patient's mobility; develop plan if impaired  - Assess patient's need for assistive devices and provide as appropriate  - Encourage maximum independence but intervene and supervise when necessary  - Involve family in performance of ADLs  - Assess for home care needs following discharge   - Consider OT consult to assist with ADL evaluation and planning for discharge  - Provide patient education as appropriate  Outcome: Progressing  Goal: Maintain or return mobility status to optimal level  Description: INTERVENTIONS:  - Assess patient's baseline mobility status (ambulation, transfers, stairs, etc )    - Identify cognitive and physical deficits and behaviors that affect mobility  - Identify mobility aids required to assist with transfers and/or ambulation (gait belt, sit-to-stand, lift, walker, cane, etc )  - [place] fall precautions as indicated by assessment  - Record patient progress and toleration of activity level on Mobility SBAR; progress patient to next Phase/Stage  - Instruct patient to call for assistance with activity based on assessment  - Consider rehabilitation consult to assist with strengthening/weightbearing, etc   Outcome: Progressing     Problem: DISCHARGE PLANNING  Goal: Discharge to home or other facility with appropriate resources  Description: INTERVENTIONS:  - Identify barriers to discharge w/patient and caregiver  - Arrange for needed discharge resources and transportation as appropriate  - Identify discharge learning needs (meds, wound care, etc )  - Arrange for interpretive services to assist at discharge as needed  - Refer to Case Management Department for coordinating discharge planning if the patient needs post-hospital services based on physician/advanced practitioner order or complex needs related to functional status, cognitive ability, or social support system  Outcome: Progressing     Problem: Knowledge Deficit  Goal: Patient/family/caregiver demonstrates understanding of disease process, treatment plan, medications, and discharge instructions  Description: Complete learning assessment and assess knowledge base  Interventions:  - Provide teaching at level of understanding  - Provide teaching via preferred learning methods  Outcome: Progressing     Problem: Potential for Falls  Goal: Patient will remain free of falls  Description: INTERVENTIONS:  - Assess patient frequently for physical needs  -  Identify cognitive and physical deficits and behaviors that affect risk of falls  -  [place] fall precautions as indicated by assessment   - Educate patient/family on patient safety including physical limitations  - Instruct patient to call for assistance with activity based on assessment  - Modify environment to reduce risk of injury  - Consider OT/PT consult to assist with strengthening/mobility  Outcome: Progressing     Problem: Nutrition/Hydration-ADULT  Goal: Nutrient/Hydration intake appropriate for improving, restoring or maintaining nutritional needs  Description: Monitor and assess patient's nutrition/hydration status for malnutrition  Collaborate with interdisciplinary team and initiate plan and interventions as ordered  Monitor patient's weight and dietary intake as ordered or per policy  Utilize nutrition screening tool and intervene as necessary  Determine patient's food preferences and provide high-protein, high-caloric foods as appropriate       INTERVENTIONS:  - Monitor oral intake, urinary output, labs, and treatment plans  - Assess nutrition and hydration status and recommend course of action  - Evaluate amount of meals eaten  - Assist patient with eating if necessary   - Allow adequate time for meals  - Recommend/ encourage appropriate diets, oral nutritional supplements, and vitamin/mineral supplements  - Order, calculate, and assess calorie counts as needed  - Recommend, monitor, and adjust tube feedings and TPN/PPN based on assessed needs  - Assess need for intravenous fluids  - Provide specific nutrition/hydration education as appropriate  - Include patient/family/caregiver in decisions related to nutrition  Outcome: Progressing

## 2021-02-05 NOTE — PROGRESS NOTES
Endocrinology Progress Note  Unit/Bed#: [facility] 324-01 Encounter: [id number]      Sandi Amanda 76 y o  male [id number]    Hospital Stay Days: 8      Assessment/Plan:    1  Type 2 Diabetes Mellitus  Complicated by chronic lower extremity ulcers now POD #3 s/p right partial 5th metatarsal resection  Hemoglobin A1c 8 7%  Will continue Lantus 24 units qHS and Humalog 10 units TID AC with correctional scale insulin  Patient will need close outpatient follow-up as insulin regimen prior to hospitalization appeared inappropriate as patient seemed unsure of how to properly administer his insulin  Since patient has been utilizing NPH and regular insulin at home, we will recommend NPH 12 units 2 times daily as well as regular insulin 4 units with lunch and 8 units with dinner (patient's biggest meal of the day) upon discharge  Subjective:   Patient seen and examined at bedside  No acute events overnight  Denies chest pain, SOB, diaphoresis, nausea/vomiting/diarrhea, fevers/chills  POD #3 s/p right partial 5th metatarsal resection  Tolerated surgery well and is now eating  Vitals: Temp (24hrs), Av 4 °F (36 9 °C), Min:98 °F (36 7 °C), Max:98 7 °F (37 1 °C)  Current: Temperature: 98 3 °F (36 8 °C)  Vitals:    02// 0300 // 0700 02// 0841 // 1136   BP: 134/68 131/69  121/67   BP Location:  Left arm  Left arm   Pulse: 71 71  72   Resp: 20 18  16   Temp: 98 6 °F (37 °C) 98 °F (36 7 °C)  98 3 °F (36 8 °C)   TempSrc: Oral Oral  Oral   SpO2: 96% 97% 97% 95%   Height:        Body mass index is 32 96 kg/m²  I/O last 24 hours:   In: 80 [P O :360; IV Piggyback:450]  Out: 400 [Urine:400]      Physical Exam: /67 (BP Location: Left arm)   Pulse 72   Temp 98 3 °F (36 8 °C) (Oral)   Resp 16   Ht 5' 4" (1 626 m)   SpO2 95%   BMI 32 96 kg/m²     General Appearance:    Alert, cooperative, no distress, appears stated age   Head:    Normocephalic, without obvious abnormality, atraumatic   Eyes: PERRL, conjunctiva/corneas clear, EOM's intact, fundi     benign, both eyes        Ears:    Normal TM's and external ear canals, both ears   Nose:   Nares normal, septum midline, mucosa normal, no drainage    or sinus tenderness   Throat:   Lips, mucosa, and tongue normal; teeth and gums normal   Neck:   Supple, symmetrical, trachea midline, no adenopathy;        thyroid:  No enlargement/tenderness/nodules; no carotid    bruit or JVD   Back:     Symmetric, no curvature, ROM normal, no CVA tenderness   Lungs:     Clear to auscultation bilaterally, respirations unlabored   Chest wall:    No tenderness or deformity   Heart:    Regular rate and rhythm, S1 and S2 normal, no murmur, rub   or gallop   Abdomen:     Soft, non-tender, bowel sounds active all four quadrants,     no masses, no organomegaly   Genitalia:    Normal male without lesion, discharge or tenderness   Rectal:    Normal tone, normal prostate, no masses or tenderness;    guaiac negative stool   Extremities:   Extremities normal, atraumatic, no cyanosis or edema   Pulses:   2+ and symmetric all extremities   Skin:   Skin color, texture, turgor normal, no rashes or lesions   Lymph nodes:   Cervical, supraclavicular, and axillary nodes normal   Neurologic:   CNII-XII intact   Normal strength, sensation and reflexes       throughout        Invasive Devices     Peripheral Intravenous Line            Peripheral IV 02/05/21 Left Antecubital less than 1 day                          Labs:   Recent Results (from the past 24 hour(s))   Fingerstick Glucose (POCT)    Collection Time: 02/04/21  4:28 PM   Result Value Ref Range    POC Glucose 216 (H) 65 - 140 mg/dl   Fingerstick Glucose (POCT)    Collection Time: 02/04/21  9:09 PM   Result Value Ref Range    POC Glucose 210 (H) 65 - 140 mg/dl   Fingerstick Glucose (POCT)    Collection Time: 02/05/21  5:55 AM   Result Value Ref Range    POC Glucose 133 65 - 140 mg/dl   Fingerstick Glucose (POCT)    Collection Time: 02/05/21 10:35 AM   Result Value Ref Range    POC Glucose 242 (H) 65 - 140 mg/dl       Radiology Results: I have personally reviewed pertinent reports  Other Diagnostic Testing:   I have personally reviewed pertinent reports          Active Meds:   Current Facility-Administered Medications   Medication Dose Route Frequency    acetaminophen (TYLENOL) tablet 975 mg  975 mg Oral Q8H [facility]    atorvastatin (LIPITOR) tablet 40 mg  40 mg Oral HS    clopidogrel (PLAVIX) tablet 75 mg  75 mg Oral Daily    docusate sodium (COLACE) capsule 100 mg  100 mg Oral BID    gabapentin (NEURONTIN) capsule 100 mg  100 mg Oral BID    gabapentin (NEURONTIN) capsule 300 mg  300 mg Oral HS    HYDROmorphone (DILAUDID) injection 0 5 mg  0 5 mg Intravenous Q3H PRN    insulin glargine (LANTUS) subcutaneous injection 24 Units 0 24 mL  24 Units Subcutaneous HS    insulin lispro (HumaLOG) 100 units/mL subcutaneous injection 1-5 Units  1-5 Units Subcutaneous TID AC    insulin lispro (HumaLOG) 100 units/mL subcutaneous injection 1-5 Units  1-5 Units Subcutaneous HS    insulin lispro (HumaLOG) 100 units/mL subcutaneous injection 10 Units  10 Units Subcutaneous TID With Meals    melatonin tablet 6 mg  6 mg Oral HS    oxyCODONE (ROXICODONE) immediate release tablet 10 mg  10 mg Oral Q4H PRN    oxyCODONE (ROXICODONE) IR tablet 5 mg  5 mg Oral Q4H PRN    pantoprazole (PROTONIX) EC tablet 40 mg  40 mg Oral Daily    rivaroxaban (XARELTO) tablet 2 5 mg  2 5 mg Oral Daily With Breakfast    senna (SENOKOT) tablet 8 6 mg  1 tablet Oral Daily    vancomycin (VANCOCIN) IVPB (premix in dextrose) 1,000 mg 200 mL  1,000 mg Intravenous Q8H       Poppy Farnsworth DO

## 2021-02-05 NOTE — PROGRESS NOTES
Progress Note - Vascular Surgery   Sandi Amanda 76 y o  male MRN: [id number]  Unit/Bed#: [facility] 324-01 Encounter: [id number]    Assessment:  76 y o  male with PAD, s/p R partial ray amputation       Plan:  Follow up podiatry abx recs    Subjective/Objective     Subjective: No complaints      Objective:     Vitals: Temp:  [98 4 °F (36 9 °C)-98 7 °F (37 1 °C)] 98 6 °F (37 °C)  HR:  [69-82] 71  Resp:  [16-20] 20  BP: (118-152)/(67-77) 134/68  Body mass index is 32 96 kg/m²  I/O       02/03 0931 - 02/04 0700 02/04 0701 - 02/05 0700 02/05 0701 - 02/06 0700    P  O  237      I V        IV Piggyback 250 450     Total Intake 487 450     Urine 500 400     Stool 0 0     Total Output 500 400     Net -13 +50            Unmeasured Urine Occurrence 2 x 2 x     Unmeasured Stool Occurrence 1 x 1 x           Physical Exam:  GEN: NAD  HEENT: MMM  CV: RRR  Lung: Normal effort  Ab: Soft, ND/NT   Extrem: L foot partial amputation, R foot wrapped by podiatry  Neuro: A+Ox3    R pop multiphasic, L PT multiphasic  Motor/sensation intact bilateral feet    Lab, Imaging and other studies: I have personally reviewed pertinent reports    , CBC with diff: No results found for: WBC, HGB, HCT, MCV, PLT, ADJUSTEDWBC, MCH, MCHC, RDW, MPV, NRBC, BMP/CMP: No results found for: SODIUM, K, CL, CO2, ANIONGAP, BUN, CREATININE, GLUCOSE, CALCIUM, AST, ALT, ALKPHOS, PROT, BILITOT, EGFR  VTE Pharmacologic Prophylaxis: Sequential compression device (Venodyne)   VTE Mechanical Prophylaxis: sequential compression device        Cheryl Cabello MD  2/5/2021 7:31 AM

## 2021-02-05 NOTE — TELEPHONE ENCOUNTER
Per s/o from Dr Aliyah Hinton, pt will need outpatient f/u in 1-2 weeks for PAD once he is discharged from the hospital

## 2021-02-05 NOTE — PROGRESS NOTES
Podiatry - Progress Note  Patient: Jesse Taylor 76 y o  male   MRN: [id number]  PCP: Bertha Britt MD  Unit/Bed#: [facility] 324-01 Encounter: [id number]  Date Of Visit: 21    ASSESSMENT:    Jesse Taylor is a 76 y o  male with:    1  Right lateral foot wound with fifth ray OM - Cristina 3 - POA  - S/p right partial 5th ray amputation; presumed surgical cure (DOS 20)  1  Left foot superficial fissures - POA  2  Hx of left foot lisfranc amputation - POA  3  PAD  - s/p RLE agram, PTA of DP (DOS 21)  5  T2DM    PLAN:    · Local wound care consisting of Acticoat, DSD  Patient will require at home VNA for dressing changes between wound care appointments  · Await final wound culture for discharge antibiotic recommendations  · Elevation on green foam wedges or pillows when non-ambulatory  · Rest of care per primary team      Weightbearing status:  Nonweightbearing to RLE    SUBJECTIVE:     The patient was seen, evaluated, and assessed at bedside today  The patient was awake, alert, and in no acute distress  No acute events overnight  The patient reports no pedal complaints at this time, states he would like to go home today  Patient denies N/V/F/chills/SOB/CP  OBJECTIVE:     Vitals:   /69 (BP Location: Left arm)   Pulse 71   Temp 98 °F (36 7 °C) (Oral)   Resp 18   Ht 5' 4" (1 626 m)   SpO2 97%   BMI 32 96 kg/m²     Temp (24hrs), Av 5 °F (36 9 °C), Min:98 °F (36 7 °C), Max:98 7 °F (37 1 °C)      Physical Exam:     General: Alert, cooperative and no distress  Lungs: Non labored breathing  Abdomen: Soft, non-tender  Lower extremity exam:  Cardiovascular status at baseline  Neurological status at baseline  Musculoskeletal status at baseline  No calf tenderness noted  Right lateral foot incision appears well coapting with sutures intact without signs of active infection: no purulence, no malodor, no ascending erythema, no crepitus, no fluctuance      Clinical Images 02/05/21:        Additional Data:     Labs:    Results from last 7 days   Lab Units 02/04/21  0558 01/31/21  0450   WBC Thousand/uL 7 99 7 66   HEMOGLOBIN g/dL 13 6 13 7   HEMATOCRIT % 41 5 41 5   PLATELETS Thousands/uL 154 156   NEUTROS PCT %  --  49   LYMPHS PCT %  --  39   MONOS PCT %  --  8   EOS PCT %  --  4     Results from last 7 days   Lab Units 02/04/21  0558   POTASSIUM mmol/L 3 7   CHLORIDE mmol/L 107   CO2 mmol/L 28   BUN mg/dL 14   CREATININE mg/dL 0 73   CALCIUM mg/dL 9 3           * I Have Reviewed All Lab Data Listed Above  Recent Cultures (last 7 days):     Results from last 7 days   Lab Units 02/02/21  1711   GRAM STAIN RESULT  3+ Gram positive rods*  Rare Gram positive cocci in pairs*  2+ Polys*     Results from last 7 days   Lab Units 02/02/21  1711   ANAEROBIC CULTURE  No anaerobes isolated       Imaging: I have personally reviewed pertinent films in PACS  EKG, Pathology, and Other Studies: I have personally reviewed pertinent reports  ** Please Note: Portions of the record may have been created with voice recognition software  Occasional wrong word or "sound a like" substitutions may have occurred due to the inherent limitations of voice recognition software  Read the chart carefully and recognize, using context, where substitutions have occurred   **

## 2021-02-06 VITALS
TEMPERATURE: 98.4 F | HEIGHT: 64 IN | SYSTOLIC BLOOD PRESSURE: 132 MMHG | RESPIRATION RATE: 18 BRPM | HEART RATE: 71 BPM | DIASTOLIC BLOOD PRESSURE: 66 MMHG | OXYGEN SATURATION: 95 % | BODY MASS INDEX: 32.96 KG/M2

## 2021-02-06 LAB
GLUCOSE SERPL-MCNC: 124 MG/DL (ref 65–140)
GLUCOSE SERPL-MCNC: 171 MG/DL (ref 65–140)
GLUCOSE SERPL-MCNC: 211 MG/DL (ref 65–140)

## 2021-02-06 PROCEDURE — NC001 PR NO CHARGE: Performed by: SURGERY

## 2021-02-06 PROCEDURE — 99232 SBSQ HOSP IP/OBS MODERATE 35: CPT | Performed by: SURGERY

## 2021-02-06 PROCEDURE — 82948 REAGENT STRIP/BLOOD GLUCOSE: CPT

## 2021-02-06 RX ORDER — DOXYCYCLINE HYCLATE 100 MG/1
100 CAPSULE ORAL ONCE
Status: COMPLETED | OUTPATIENT
Start: 2021-02-06 | End: 2021-02-06

## 2021-02-06 RX ORDER — DOXYCYCLINE 100 MG/1
100 TABLET ORAL 2 TIMES DAILY
Qty: 14 TABLET | Refills: 0 | Status: SHIPPED | OUTPATIENT
Start: 2021-02-06 | End: 2021-02-13

## 2021-02-06 RX ADMIN — GABAPENTIN 100 MG: 100 CAPSULE ORAL at 08:00

## 2021-02-06 RX ADMIN — INSULIN LISPRO 2 UNITS: 100 INJECTION, SOLUTION INTRAVENOUS; SUBCUTANEOUS at 17:25

## 2021-02-06 RX ADMIN — INSULIN LISPRO 1 UNITS: 100 INJECTION, SOLUTION INTRAVENOUS; SUBCUTANEOUS at 12:08

## 2021-02-06 RX ADMIN — OXYCODONE HYDROCHLORIDE 10 MG: 10 TABLET ORAL at 17:31

## 2021-02-06 RX ADMIN — HYDROMORPHONE HYDROCHLORIDE 0.5 MG: 1 INJECTION, SOLUTION INTRAMUSCULAR; INTRAVENOUS; SUBCUTANEOUS at 12:08

## 2021-02-06 RX ADMIN — RIVAROXABAN 2.5 MG: 2.5 TABLET, FILM COATED ORAL at 07:59

## 2021-02-06 RX ADMIN — OXYCODONE HYDROCHLORIDE 10 MG: 10 TABLET ORAL at 11:18

## 2021-02-06 RX ADMIN — VANCOMYCIN HYDROCHLORIDE 1000 MG: 1 INJECTION, SOLUTION INTRAVENOUS at 10:03

## 2021-02-06 RX ADMIN — GABAPENTIN 100 MG: 100 CAPSULE ORAL at 17:24

## 2021-02-06 RX ADMIN — CLOPIDOGREL BISULFATE 75 MG: 75 TABLET ORAL at 08:00

## 2021-02-06 RX ADMIN — HYDROMORPHONE HYDROCHLORIDE 0.5 MG: 1 INJECTION, SOLUTION INTRAMUSCULAR; INTRAVENOUS; SUBCUTANEOUS at 07:29

## 2021-02-06 RX ADMIN — ACETAMINOPHEN 975 MG: 325 TABLET, FILM COATED ORAL at 05:25

## 2021-02-06 RX ADMIN — DOXYCYCLINE 100 MG: 100 CAPSULE ORAL at 19:04

## 2021-02-06 RX ADMIN — ACETAMINOPHEN 975 MG: 325 TABLET, FILM COATED ORAL at 13:25

## 2021-02-06 RX ADMIN — OXYCODONE HYDROCHLORIDE 10 MG: 10 TABLET ORAL at 06:42

## 2021-02-06 RX ADMIN — VANCOMYCIN HYDROCHLORIDE 1000 MG: 1 INJECTION, SOLUTION INTRAVENOUS at 02:32

## 2021-02-06 RX ADMIN — PANTOPRAZOLE SODIUM 40 MG: 40 TABLET, DELAYED RELEASE ORAL at 05:25

## 2021-02-06 NOTE — QUICK NOTE
I was informed that patient insisted upon leaving today  I discussed with him that the recommendation is to go to inpatient rehab which he adamantly refused  He understands the risks of refusing inpatient rehab  He agrees to home rehab  I discussed the situation with the hospital supervisor who was able to get radha of a  and instructed me to leave a message so that home PT can be set up for the patient tomorrow (although he wouldn't get home PT until at least Monday probably)  Home VNA has also been set up  I discussed with case with on-call endocrinology fellow who clarified insulin recommendations (NPH 12 BID, regular insulin 4 at lunch and 8 at dinner)  Patient has the proper insulin at home and will use it and keep a log of his blood sugars, and show it to his PCP or endocrinologist when he sees them, preferably within a week of discharge  Podiatry resident recommended doxycycline 100 BID for a week  Patient will be given one dose here before he leaves  Patient states that he has a walker at home from a previous admission which he will use  These instructions were discussed with patient and nurse, who understand

## 2021-02-06 NOTE — PROGRESS NOTES
Vancomycin IV Pharmacy-to-Dose Consultation    Damion Lamb is a 76 y o  male who is currently receiving Vancomycin IV with management by the Pharmacy Consult service  Assessment/Plan:  The patient was reviewed  Renal function is stable and no signs or symptoms of nephrotoxicity and/or infusion reactions were documented in the chart  Potential Nephrotoxicity Factors:  Medications: none identified  Patient Factors: none identified    Based on todays assessment, continue current vancomycin (day # 4) dosing of 1,000 mg q8h, with a plan for trough to be drawn at 0930 on 2/12  We will continue to follow the patients culture results and clinical progress daily      Annmarie Garcia, PharmD, [address] [facility] and Internal Medicine Clinical Pharmacist  [phone] or via [facility]

## 2021-02-06 NOTE — PROGRESS NOTES
Progress Note - Vascular Surgery   Alec Dancer 76 y o  male MRN: [id number]  Unit/Bed#: [facility] 324-01 Encounter: [id number]    Assessment:  76 y o  male with PAD s/p 1/28 RLE angiogram with angioplasty, 2/2 R 5th ray partial amputation  Currently awaiting speciation of wound cultures for discharge antibiotics recs       Plan:  Currently on Xarelto/Plavix/Statin   Discuss addition of aspirin  Lantus BID, 4u regular insulin with lunch and 8 with dinner per endocrinology  Continue vanco while awaiting abx speciation    Subjective/Objective     Subjective: Annoyed that he's still here      Objective:     Vitals: Temp:  [98 °F (36 7 °C)-98 4 °F (36 9 °C)] 98 1 °F (36 7 °C)  HR:  [71-72] 71  Resp:  [16-19] 18  BP: (121-149)/(67-71) 140/71  Body mass index is 32 96 kg/m²  I/O       02/04 0701 - 02/05 0700 02/05 0701 - 02/06 0700    P  O   360    IV Piggyback 450     Total Intake 450 360    Urine 400 350    Stool 0     Total Output 400 350    Net +50 +10          Unmeasured Urine Occurrence 2 x     Unmeasured Stool Occurrence 1 x           Physical Exam:  GEN: NAD  HEENT: MMM  CV: RRR  Lung: Normal effort  Ab: Soft, ND/NT   Extrem: No CCE   Neuro: A+Ox3     R AT multiphasic  L PT monophasic    Motor/sensory intact R foot  Sensory intact L foot    Lab, Imaging and other studies: I have personally reviewed pertinent reports    , CBC with diff: No results found for: WBC, HGB, HCT, MCV, PLT, ADJUSTEDWBC, MCH, MCHC, RDW, MPV, NRBC, BMP/CMP: No results found for: SODIUM, K, CL, CO2, ANIONGAP, BUN, CREATININE, GLUCOSE, CALCIUM, AST, ALT, ALKPHOS, PROT, BILITOT, EGFR  VTE Pharmacologic Prophylaxis: Xarelto  VTE Mechanical Prophylaxis: sequential compression device      Echo Jiménez MD  2/6/2021 4:37 AM

## 2021-02-06 NOTE — DISCHARGE SUMMARY
Discharge Summary - Juventino Valladares 76 y o  male MRN: [id number]    Unit/Bed#: [facility] 324-01 Encounter: [id number]    Admission Date:   Admission Orders (From admission, onward)     Ordered        01/28/21 1048  INPATIENT ADMISSION  Once                     Admitting Diagnosis: Peripheral vascular disease, unspecified ([facility] [address] ) [I73 [id number]]    HPI: Pt is a 75 yo M w/ hx of prior interventions with Dr Nathaniel Plunkett at OSH hx of R pop stent, PAD, nonhealing R 5th met wound x 7 mos, now s/p angiogram w/ R DP PTA    Procedures Performed:   1/28/2021 angiogram with angioplasty of R AT/DP occlusion  2/2/2021 R 5th ray partial amputation    Summary of Hospital Course: Patient was admitted on 1/28 for nonhealing and infected R foot wounds, with angiogram performed the day of admission resulting in successful angioplasty of R AT/DP occlusion  Podiatry was consulted and they ordered an MRI of the R foot which was positive for 5th metatarsal head osteomyelitis  Patient subsequently underwent R partial 5th ray amputation on 2/2  He was kept for several days for wound care, IV antibiotics, and discharge planning  Endocrinology was consulted and they optimized his insulin regimen and provided discharge recommendations based on what insulin he already had at home  He was recommended inpatient PT which he adamantly refused  On 7/6 the decision was made to discharge the patient to home with home PT  We reached out to case management early evening 2/6 and were instructed to leave a message on the answering machine for the case management team to set up home PT and VNA after discharge, which we did  Consultants provided discharge recommendations and the patient was discharged with 7 days of doxycycline and 12u NPH insulin BID, 4u regular insulin with lunch, 8u with dinner  Patient verbalized understanding  All questions were answered to his satisfaction      Significant Findings, Care, Treatment and Services Provided:   1/28/2021 angiogram with angioplasty of R AT/DP occlusion  2/2/2021 R 5th ray partial amputation    Complications: None    Discharge Diagnosis: R 5th ray osteomyelitis, RLE PAD    Resolved Problems  Date Reviewed: 1/29/2021    None          Condition at Discharge: good         Discharge instructions/Information to patient and family:   See after visit summary for information provided to patient and family  Provisions for Follow-Up Care:  See after visit summary for information related to follow-up care and any pertinent home health orders  PCP: Ky Kidd MD    Disposition: Home with home PT to be set up because patient refused inpatient rehab  Planned Readmission: No      Discharge Statement   I spent 25 minutes discharging the patient  This time was spent on the day of discharge  I had direct contact with the patient on the day of discharge  Additional documentation is required if more than 30 minutes were spent on discharge  Discharge Medications:  See after visit summary for reconciled discharge medications provided to patient and family

## 2021-02-06 NOTE — PROGRESS NOTES
Vancomycin Assessment    Angel Luis Roberson is a 76 y o  male who is currently receiving vancomycin 1000 mg iv q 8 hours for skin-soft tissue infection bone/joint   Relevant clinical data and objective history reviewed:  Creatinine   Date Value Ref Range Status   02/04/2021 0 73 0 60 - 1 30 mg/dL Final     Comment:     Standardized to IDMS reference method   01/30/2021 0 98 0 60 - 1 30 mg/dL Final     Comment:     Standardized to IDMS reference method   01/29/2021 0 78 0 60 - 1 30 mg/dL Final     Comment:     Standardized to IDMS reference method   02/10/2014 0 89 0 5 - 1 5 mg/dL Final     Comment:     Standardized to IDMS reference method   02/06/2014 0 93 0 60 - 1 30 mg/dL Final     Comment:     Standardized to IDMS reference method   01/10/2014 1 14 0 60 - 1 30 mg/dL Final     Comment:     Standardized to IDMS reference method     /69 (BP Location: Left arm)   Pulse 72   Temp 98 4 °F (36 9 °C) (Oral)   Resp 19   Ht 5' 4" (1 626 m)   SpO2 97%   BMI 32 96 kg/m²   I/O last 3 completed shifts: In: 700 [IV Piggyback:700]  Out: 700 [Urine:700]  Lab Results   Component Value Date/Time    BUN 14 02/04/2021 05:58 AM    BUN 20 02/10/2014 04:50 AM    WBC 7 99 02/04/2021 05:58 AM    WBC 7 71 02/10/2014 04:50 AM    HGB 13 6 02/04/2021 05:58 AM    HGB 14 4 02/10/2014 04:50 AM    HCT 41 5 02/04/2021 05:58 AM    HCT 43 0 02/10/2014 04:50 AM    MCV 96 02/04/2021 05:58 AM    MCV 94 02/10/2014 04:50 AM     02/04/2021 05:58 AM     02/10/2014 04:50 AM     Temp Readings from Last 3 Encounters:   02/05/21 98 4 °F (36 9 °C) (Oral)   01/19/21 98 4 °F (36 9 °C) (Tympanic)   12/29/20 98 °F (36 7 °C)     Vancomycin Days of Therapy: 3    Assessment/Plan  The patient is currently on vancomycin utilizing scheduled dosing  Baseline risks associated with therapy include: concomitant nephrotoxic medications and advanced age    The patient is receiving 1000 mg iv q 8 hours with the most recent vancomycin level being at steady-state and therapeutic (19 2) based on a goal of 15-20 (appropriate for most indications) ; therefore, is clinically appropriate and dose will be continued   Pharmacy will continue to follow closely for s/sx of nephrotoxicity, infusion reactions, and appropriateness of therapy  BMP and CBC will be ordered per protocol  Plan for trough on 2/12/2021  Pharmacy will continue to follow the patients culture results and clinical progress daily      Gwendolyn Smith, Pharmacist

## 2021-02-07 LAB
BACTERIA TISS AEROBE CULT: ABNORMAL
BACTERIA TISS AEROBE CULT: ABNORMAL
GRAM STN SPEC: ABNORMAL

## 2021-02-07 NOTE — CASE MANAGEMENT
Cm received message from resident Oralia Anaya regarding pt's d/c and need for VNA  Pt is set-up with [facility] for home PT and SN

## 2021-02-08 ENCOUNTER — TRANSITIONAL CARE MANAGEMENT (OUTPATIENT)
Dept: FAMILY MEDICINE CLINIC | Facility: CLINIC | Age: 66
End: 2021-02-08

## 2021-02-12 ENCOUNTER — TELEPHONE (OUTPATIENT)
Dept: VASCULAR SURGERY | Facility: CLINIC | Age: 66
End: 2021-02-12

## 2021-02-12 ENCOUNTER — TELEPHONE (OUTPATIENT)
Dept: OBGYN CLINIC | Facility: HOSPITAL | Age: 66
End: 2021-02-12

## 2021-02-12 NOTE — TELEPHONE ENCOUNTER
Dr Nidia Morin    Patient had surgery on 2/2 on his right foot  Luz Height from [address] [place] [address] is calling to let you know that it is slightly red  He is still on antibiotics  He has a follow up appt on 2/16

## 2021-02-15 ENCOUNTER — TELEPHONE (OUTPATIENT)
Dept: VASCULAR SURGERY | Facility: CLINIC | Age: 66
End: 2021-02-15

## 2021-02-15 DIAGNOSIS — I73.9 PERIPHERAL VASCULAR DISEASE, UNSPECIFIED (HCC): ICD-10-CM

## 2021-02-15 RX ORDER — GABAPENTIN 300 MG/1
300 CAPSULE ORAL
Qty: 30 CAPSULE | Refills: 1 | Status: SHIPPED | OUTPATIENT
Start: 2021-02-15 | End: 2021-04-06

## 2021-02-15 NOTE — TELEPHONE ENCOUNTER
Refill requested for gabapentin (NEURONTIN) 300 mg capsule to be sent to the [facility] in [place]

## 2021-02-16 ENCOUNTER — OFFICE VISIT (OUTPATIENT)
Dept: VASCULAR SURGERY | Facility: CLINIC | Age: 66
End: 2021-02-16
Payer: COMMERCIAL

## 2021-02-16 ENCOUNTER — TELEPHONE (OUTPATIENT)
Dept: PODIATRY | Facility: CLINIC | Age: 66
End: 2021-02-16

## 2021-02-16 VITALS
SYSTOLIC BLOOD PRESSURE: 124 MMHG | HEART RATE: 75 BPM | DIASTOLIC BLOOD PRESSURE: 78 MMHG | WEIGHT: 183 LBS | RESPIRATION RATE: 18 BRPM | BODY MASS INDEX: 31.24 KG/M2 | HEIGHT: 64 IN

## 2021-02-16 DIAGNOSIS — I73.9 PERIPHERAL VASCULAR DISEASE, UNSPECIFIED (HCC): Primary | ICD-10-CM

## 2021-02-16 DIAGNOSIS — I70.209 PERIPHERAL ARTERIOSCLEROSIS (HCC): ICD-10-CM

## 2021-02-16 DIAGNOSIS — I73.9 PAD (PERIPHERAL ARTERY DISEASE) (HCC): Chronic | ICD-10-CM

## 2021-02-16 PROCEDURE — 99214 OFFICE O/P EST MOD 30 MIN: CPT | Performed by: SURGERY

## 2021-02-16 NOTE — PROGRESS NOTES
Assessment/Plan:    PAD (peripheral artery disease) ([facility] )  Adrián Lesches M with PMHx PAD, DM with previous Left TMA and active lateral foot wound for the past 6 months  Reports that he has undergone multiple angiograms over the last 6 months with Dr Mesfin Yoo with persistence of his wound  Also sees podiatry and wound care       Most recently is s/p agram on 1/5/20 with sfa stent angioplasty and arthrectomy/PTA of tibial vessels, distal AT/DP was noted to be occluded    On exam has biphasic AT/PT  DP signals     Post-procedure LEADs demonstrate inadequate perfusion pressure for healing with met pressure of 33mg HG and great toe pressure of 13mmHG        S/p RLE angiogram with successful recanalization of the AT/DP  Underwent 5th ray amputation during the same admission  In clinic today has triphasic AT/DP signal  Amputation site healing well, more proximal lateral area with dry eschar  Sutures still in place  No signs of infection  Continue wound care follow-up as per podiatry  continue plavix and xarelto   1 month f/u with LEADs            Problem List Items Addressed This Visit        Cardiovascular and Mediastinum    PAD (peripheral artery disease) ([facility] ) (Chronic)     Adrián Lesches M with PMHx PAD, DM with previous Left TMA and active lateral foot wound for the past 6 months  Reports that he has undergone multiple angiograms over the last 6 months with Dr Mesfin oYo with persistence of his wound  Also sees podiatry and wound care       Most recently is s/p agram on 1/5/20 with sfa stent angioplasty and arthrectomy/PTA of tibial vessels, distal AT/DP was noted to be occluded    On exam has biphasic AT/PT  DP signals     Post-procedure LEADs demonstrate inadequate perfusion pressure for healing with met pressure of 33mg HG and great toe pressure of 13mmHG        S/p RLE angiogram with successful recanalization of the AT/DP  Underwent 5th ray amputation during the same admission       In clinic today has triphasic AT/DP signal  Amputation site healing well, more proximal lateral area with dry eschar  Sutures still in place  No signs of infection  Continue wound care follow-up as per podiatry  continue plavix and xarelto   1 month f/u with LEADs            Peripheral arteriosclerosis ([facility] )      Other Visit Diagnoses     Peripheral vascular disease, unspecified ([facility] )    -  Primary    Relevant Orders    VAS lower limb arterial duplex, complete bilateral            Subjective:      Patient ID: Neo Hernandez is a 76 y o  male  Patient was in [facility] from 1/28/21 to 2/6/21 for PAD  Pt had an agram w/ RLE runoff on 1/28/21 by Dr Kathleen Tinajero  Pt is having VNA on M-W-F for wound care  Pt states the wounds are healing well  Pt has some Rt foot and calf pain  Pt denies numbness and tingling  Pt is going to see Dr Ling Wing on 2/19/21 for stitches removal   Pt is on Xarelto, Plavix, and Atorvastatin  The following portions of the patient's history were reviewed and updated as appropriate: allergies, current medications, past family history, past medical history, past social history, past surgical history and problem list     Review of Systems   Constitutional: Negative for chills and fever  HENT: Negative  Eyes: Negative  Respiratory: Negative  Cardiovascular: Positive for leg swelling  Gastrointestinal: Negative  Endocrine: Negative  Genitourinary: Negative  Musculoskeletal: Positive for gait problem  Rt calf pain   Skin: Positive for color change and wound  Allergic/Immunologic: Negative  Neurological: Negative for weakness and numbness  Hematological: Negative  Psychiatric/Behavioral: Negative  I have reviewed and updated the ROS as appropriate  Objective:      /78 (BP Location: Left arm, Patient Position: Sitting)   Pulse 75   Resp 18   Ht 5' 4" (1 626 m)   Wt 83 kg (183 lb)   BMI 31 41 kg/m²          Physical Exam  Constitutional:       Appearance: Normal appearance     HENT:      Head: Normocephalic and atraumatic  Eyes:      Extraocular Movements: Extraocular movements intact  Pupils: Pupils are equal, round, and reactive to light  Neck:      Musculoskeletal: Normal range of motion and neck supple  Cardiovascular:      Rate and Rhythm: Normal rate and regular rhythm  Heart sounds: Normal heart sounds  Comments: Triphasic right AT/DP signal  Pulmonary:      Effort: Pulmonary effort is normal       Breath sounds: Normal breath sounds  Abdominal:      General: Abdomen is flat  Palpations: Abdomen is soft  Musculoskeletal: Normal range of motion  Comments: Right 5th ray amputation site, healing as expected   Skin:     General: Skin is warm and dry  Capillary Refill: Capillary refill takes less than 2 seconds  Neurological:      General: No focal deficit present  Mental Status: He is alert and oriented to person, place, and time  Psychiatric:         Mood and Affect: Mood normal          Behavior: Behavior normal          Thought Content:  Thought content normal          Judgment: Judgment normal

## 2021-02-16 NOTE — ASSESSMENT & PLAN NOTE
72yo M with PMHx PAD, DM with previous Left TMA and active lateral foot wound for the past 6 months  Reports that he has undergone multiple angiograms over the last 6 months with Dr Hany Morrison with persistence of his wound  Also sees podiatry and wound care       Most recently is s/p agram on 1/5/20 with sfa stent angioplasty and arthrectomy/PTA of tibial vessels, distal AT/DP was noted to be occluded    On exam has biphasic AT/PT  DP signals     Post-procedure LEADs demonstrate inadequate perfusion pressure for healing with met pressure of 33mg HG and great toe pressure of 13mmHG        S/p RLE angiogram with successful recanalization of the AT/DP  Underwent 5th ray amputation during the same admission  In clinic today has triphasic AT/DP signal  Amputation site healing well, more proximal lateral area with dry eschar  Sutures still in place  No signs of infection  Continue wound care follow-up as per podiatry     continue plavix and xarelto   1 month f/u with LEADs

## 2021-02-16 NOTE — TELEPHONE ENCOUNTER
I called and spoke to Samira Barbosa per Dr Nan Malik  He states that he finished his antibiotics last night, and that the redness is gone and the nurses were in to see him and they had no concerns as far an infection being present in the foot  He has an appt schedules for this Friday, I advised him if anything that were to change to contact the office

## 2021-02-18 ENCOUNTER — TELEMEDICINE (OUTPATIENT)
Dept: FAMILY MEDICINE CLINIC | Facility: CLINIC | Age: 66
End: 2021-02-18
Payer: COMMERCIAL

## 2021-02-18 DIAGNOSIS — I73.9 PAD (PERIPHERAL ARTERY DISEASE) (HCC): Chronic | ICD-10-CM

## 2021-02-18 DIAGNOSIS — E11.42 DIABETIC POLYNEUROPATHY ASSOCIATED WITH TYPE 2 DIABETES MELLITUS (HCC): Primary | ICD-10-CM

## 2021-02-18 DIAGNOSIS — I70.209 PERIPHERAL ARTERIOSCLEROSIS (HCC): ICD-10-CM

## 2021-02-18 DIAGNOSIS — M21.961 ACQUIRED DEFORMITY OF RIGHT FOOT: ICD-10-CM

## 2021-02-18 PROCEDURE — 99213 OFFICE O/P EST LOW 20 MIN: CPT | Performed by: FAMILY MEDICINE

## 2021-02-18 PROCEDURE — 1111F DSCHRG MED/CURRENT MED MERGE: CPT | Performed by: FAMILY MEDICINE

## 2021-02-18 NOTE — PROGRESS NOTES
Virtual Regular Visit      Assessment/Plan:    Problem List Items Addressed This Visit     None          BMI Counseling: There is no height or weight on file to calculate BMI  The BMI is above normal  Nutrition recommendations include decreasing portion sizes, encouraging healthy choices of fruits and vegetables, decreasing fast food intake, consuming healthier snacks, limiting drinks that contain sugar, moderation in carbohydrate intake, increasing intake of lean protein, reducing intake of saturated and trans fat and reducing intake of cholesterol  Exercise recommendations include exercising 3-5 times per week  No pharmacotherapy was ordered  Patient referred to PCP due to patient being overweight  Reason for visit is No chief complaint on file  Encounter provider Hank Serra MD    Provider located at [address] [facility] [address] [phone]      Recent Visits  No visits were found meeting these conditions  Showing recent visits within past 7 days and meeting all other requirements     Today's Visits  Date Type Provider Dept   02/18/21 Telemedicine Hank Serra MD [id number] [facility] today's visits and meeting all other requirements     Future Appointments  No visits were found meeting these conditions  Showing future appointments within next 150 days and meeting all other requirements        The patient was identified by name and date of birth  Tc Brown was informed that this is a telemedicine visit and that the visit is being conducted through [facility] and patient was informed that this is a secure, HIPAA-compliant platform  He agrees to proceed     My office door was closed  No one else was in the room  He acknowledged consent and understanding of privacy and security of the video platform  The patient has agreed to participate and understands they can discontinue the visit at any time      Patient is aware this is a billable service  Subjective  Mariana Beauchamp is a 76 y o  male    This is a 76 YWOM s/p   Amputation  of his  L 5th  Ray    Pt with NIDDM, lipids  HTN and  PVD  Pt  States his  Sugars have been a little on the  High side but has  Coverage  Pt also  Scheduled  For f/u with his surgeon tomorrow pending the  Weather  Pt is OK with hs  meds and they were reviewed  Pt  Does not need any refills  Currently         Past Medical History:   Diagnosis Date    Arthritis     fingers    Diabetes mellitus ([address] )     First degree AV block     Full dentures     [facility]     PVD (peripheral vascular disease) ([facility])     Wound, open     right foot- by the 5th toe       Past Surgical History:   Procedure Laterality Date    CHOLECYSTECTOMY      COLONOSCOPY      IR AORTAGRAM WITH RUN-OFF  1/28/2021    MO DEBRIDEMENT, SKIN, SUB-Q TISSUE,MUSCLE,BONE,=<20 SQ CM Right 12/18/2020    Procedure: DEBRIDMENT OF ULCER , REMOVAL OF DEVITALIZED SKIN, SOFT TISSUE, BONE AND APPLICATION OF INTEGRA GRAFT RIGHT FOOT ;  Surgeon: Marco Grier DPM;  Location: [address] [place] [address];  Service: Podiatry    REPLANTATION THUMB Right     right tip reconnected    SKIN GRAFT      right thumb    TOE AMPUTATION      left foot all 5 toes removed    TOE AMPUTATION Right 2/2/2021    Procedure: Right partial 5th ray amputation;  Surgeon: Joseph Carrillo DPM;  Location:  MAIN OR;  Service: Podiatry    TOE OSTEOTOMY Right 6/26/2020    Procedure: exostosis of right big toe;  Surgeon: Viet Ramírez DPM;  Location: [address] [place] [address];  Service: Podiatry    TRIGGER FINGER RELEASE      bilateral hands    VEIN LIGATION      right       Current Outpatient Medications   Medication Sig Dispense Refill    atorvastatin (LIPITOR) 40 mg tablet Take 1 tablet (40 mg total) by mouth daily at bedtime 90 tablet 1    Blood Glucose Monitoring Suppl (OneTouch Verio) w/Device KIT by Does not apply route 2 (two) times a day Dx E11 9 1 kit 1    clopidogrel (PLAVIX) 75 mg tablet Take 1 tablet (75 mg total) by mouth daily 90 tablet 0    gabapentin (NEURONTIN) 300 mg capsule Take 1 capsule (300 mg total) by mouth daily at bedtime 30 capsule 1    glucose blood (OneTouch Verio) test strip Use as instructed qid Dx E11 9 400 each 3    halobetasol (ULTRAVATE) 0 05 % cream Apply topically 2 (two) times a day 50 g 5    HYDROcodone-acetaminophen (NORCO) 5-325 mg per tablet Take 1 tablet by mouth every 6 (six) hours as needed for painMax Daily Amount: 4 tablets 20 tablet 0    insulin NPH (HumuLIN N,NovoLIN N) 100 Units/mL subcutaneous injection Inject 12 Units under the skin 2 (two) times a day 7 2 mL 0    insulin regular (HumuLIN R,NovoLIN R) 100 units/mL injection 4 units with lunch, 8 units with dinner 10 mL 0    Lancets (OneTouch Delica Plus [id number]) MISC 1 Units by Does not apply route 2 (two) times a day Dx E11 9 100 each 2    Nitro-Bid 2 % ointment       oxyCODONE-acetaminophen (PERCOCET) 5-325 mg per tablet Take 1 tablet by mouth every 6 (six) hours as needed for moderate painMax Daily Amount: 4 tablets 120 tablet 0    pantoprazole (PROTONIX) 40 mg tablet Take 1 tablet (40 mg total) by mouth daily 90 tablet 1    rivaroxaban (Xarelto) 2 5 mg tablet Take 1 tablet (2 5 mg total) by mouth daily with breakfast Last dose 6/21/20 30 tablet 2    UltiCare Insulin Syringe 31G X 5/16" 1 ML MISC        No current facility-administered medications for this visit  Allergies   Allergen Reactions    Shellfish-Derived Products Anaphylaxis     Throat closes    Sulfamethoxazole-Trimethoprim Rash       Review of Systems   Constitutional: Negative for activity change, appetite change, chills, fatigue, fever and unexpected weight change  HENT: Negative for congestion, ear pain, hearing loss, mouth sores, postnasal drip, sinus pressure, sinus pain, sneezing and sore throat  Respiratory: Negative for apnea, cough, shortness of breath and wheezing      Cardiovascular: Negative for chest pain, palpitations and leg swelling  Gastrointestinal: Negative for abdominal pain, constipation, diarrhea, nausea and vomiting  Endocrine: Negative for cold intolerance and heat intolerance  Genitourinary: Negative for dysuria, frequency and hematuria  Musculoskeletal: Negative for arthralgias, back pain, gait problem, joint swelling and neck pain  Skin: Negative for rash  Neurological: Negative for dizziness, weakness and numbness  Hematological: Does not bruise/bleed easily  Psychiatric/Behavioral: Negative for agitation, behavioral problems, confusion, hallucinations and sleep disturbance  The patient is not nervous/anxious  Video Exam    There were no vitals filed for this visit  Physical Exam  Constitutional:       Appearance: He is not ill-appearing  HENT:      Mouth/Throat:      Mouth: Mucous membranes are moist    Pulmonary:      Effort: Pulmonary effort is normal    Skin:     General: Skin is warm and dry  Neurological:      Mental Status: He is alert and oriented to person, place, and time  Psychiatric:         Mood and Affect: Mood normal          Behavior: Behavior normal          Thought Content: Thought content normal          Judgment: Judgment normal           I spent 15 minutes directly with the patient during this visit      VIRTUAL VISIT DISCLAIMER    Esther Moy acknowledges that he has consented to an online visit or consultation  He understands that the online visit is based solely on information provided by him, and that, in the absence of a face-to-face physical evaluation by the physician, the diagnosis he receives is both limited and provisional in terms of accuracy and completeness  This is not intended to replace a full medical face-to-face evaluation by the physician  Esther Moy understands and accepts these terms

## 2021-02-22 ENCOUNTER — TELEPHONE (OUTPATIENT)
Dept: PODIATRY | Facility: CLINIC | Age: 66
End: 2021-02-22

## 2021-02-23 DIAGNOSIS — M54.9 CHRONIC BACK PAIN, UNSPECIFIED BACK LOCATION, UNSPECIFIED BACK PAIN LATERALITY: ICD-10-CM

## 2021-02-23 DIAGNOSIS — G89.29 CHRONIC BACK PAIN, UNSPECIFIED BACK LOCATION, UNSPECIFIED BACK PAIN LATERALITY: ICD-10-CM

## 2021-02-23 RX ORDER — OXYCODONE HYDROCHLORIDE AND ACETAMINOPHEN 5; 325 MG/1; MG/1
1 TABLET ORAL EVERY 6 HOURS PRN
Qty: 120 TABLET | Refills: 0 | Status: SHIPPED | OUTPATIENT
Start: 2021-02-23 | End: 2021-04-02 | Stop reason: SDUPTHER

## 2021-02-26 ENCOUNTER — OFFICE VISIT (OUTPATIENT)
Dept: PODIATRY | Facility: CLINIC | Age: 66
End: 2021-02-26
Payer: COMMERCIAL

## 2021-02-26 VITALS
BODY MASS INDEX: 31.96 KG/M2 | WEIGHT: 187.2 LBS | HEIGHT: 64 IN | DIASTOLIC BLOOD PRESSURE: 78 MMHG | SYSTOLIC BLOOD PRESSURE: 187 MMHG

## 2021-02-26 DIAGNOSIS — B35.1 ONYCHOMYCOSIS: ICD-10-CM

## 2021-02-26 DIAGNOSIS — L97.429 HEEL ULCERATION, LEFT, WITH UNSPECIFIED SEVERITY (HCC): Primary | ICD-10-CM

## 2021-02-26 DIAGNOSIS — Z79.4 TYPE 2 DIABETES MELLITUS WITH DIABETIC NEUROPATHY, WITH LONG-TERM CURRENT USE OF INSULIN (HCC): ICD-10-CM

## 2021-02-26 DIAGNOSIS — I73.9 PERIPHERAL VASCULAR DISEASE, UNSPECIFIED (HCC): ICD-10-CM

## 2021-02-26 DIAGNOSIS — T81.89XA NON-HEALING SURGICAL WOUND, INITIAL ENCOUNTER: ICD-10-CM

## 2021-02-26 DIAGNOSIS — E11.40 TYPE 2 DIABETES MELLITUS WITH DIABETIC NEUROPATHY, WITH LONG-TERM CURRENT USE OF INSULIN (HCC): ICD-10-CM

## 2021-02-26 PROCEDURE — 11720 DEBRIDE NAIL 1-5: CPT | Performed by: PODIATRIST

## 2021-02-26 PROCEDURE — 99213 OFFICE O/P EST LOW 20 MIN: CPT | Performed by: PODIATRIST

## 2021-02-26 PROCEDURE — 1111F DSCHRG MED/CURRENT MED MERGE: CPT | Performed by: PODIATRIST

## 2021-02-26 NOTE — PROGRESS NOTES
PATIENT:  Cecil Driver    1955    ASSESSMENT:     1  Heel ulceration, left, with unspecified severity ([address] )     2  Non-healing surgical wound, initial encounter     3  Type 2 diabetes mellitus with diabetic neuropathy, with long-term current use of insulin ([address] )     4  Peripheral vascular disease, unspecified ([address] )     5  Onychomycosis  Debridement         PLAN:  1  Patient was counseled and educated on the condition and the diagnosis  2  He has wounds on left heel and TMA stump  Start Dermagren gauze  3  Sutures removed right foot  Continue silver alginate  Will leave the eschar intact for now  Heel WB right foot  New [facility] order faxed  4  Will follow him up at wound center in 1 week  Procedure: All mycotic toenails were reduced and debrided in length, width, and girth using a nail nipper and dremel  Patient tolerated procedure(s) well without complications  Subjective:      HPI  The patients presents for follow up on right foot wound  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  Decreased pain  It is still sensitive  He feels well in general   Wounds present on left foot now  No drainage  He complained of thick, elongated toenails right foot  The following portions of the patient's history were reviewed and updated as appropriate: allergies, current medications, past family history, past medical history, past social history, past surgical history and problem list   All pertinent labs and images were reviewed      Past Medical History  Past Medical History:   Diagnosis Date    Arthritis     fingers    Diabetes mellitus ([address] )     First degree AV block     Full dentures     [facility]per[facility]     PVD (peripheral vascular disease) ([facility])     Wound, open     right foot- by the 5th toe       Past Surgical History  Past Surgical History:   Procedure Laterality Date    CHOLECYSTECTOMY      COLONOSCOPY      IR AORTAGRAM WITH RUN-OFF  1/28/2021    [place] DEBRIDEMENT, SKIN, SUB-Q TISSUE,MUSCLE,BONE,=<20 SQ CM Right 12/18/2020    Procedure: DEBRIDMENT OF ULCER , REMOVAL OF DEVITALIZED SKIN, SOFT TISSUE, BONE AND APPLICATION OF INTEGRA GRAFT RIGHT FOOT ;  Surgeon: Raza Boyer DPM;  Location: [address] [place] [address];  Service: Podiatry    REPLANTATION THUMB Right     right tip reconnected    SKIN GRAFT      right thumb    TOE AMPUTATION      left foot all 5 toes removed    TOE AMPUTATION Right 2/2/2021    Procedure: Right partial 5th ray amputation;  Surgeon: Emy Brock DPM;  Location: [facility] OR;  Service: Podiatry    TOE OSTEOTOMY Right 6/26/2020    Procedure: exostosis of right big toe;  Surgeon: Gisella Jackson DPM;  Location: [address] [place] [address];  Service: Podiatry    [address]      bilateral hands    VEIN LIGATION      right        Allergies:  Shellfish-derived products (food allergy) and Sulfamethoxazole-trimethoprim    Medications:  Current Outpatient Medications   Medication Sig Dispense Refill    atorvastatin (LIPITOR) 40 mg tablet Take 1 tablet (40 mg total) by mouth daily at bedtime 90 tablet 1    Blood Glucose Monitoring Suppl (OneTouch Verio) w/Device KIT by Does not apply route 2 (two) times a day Dx E11 9 1 kit 1    clopidogrel (PLAVIX) 75 mg tablet Take 1 tablet (75 mg total) by mouth daily 90 tablet 0    gabapentin (NEURONTIN) 300 mg capsule Take 1 capsule (300 mg total) by mouth daily at bedtime 30 capsule 1    glucose blood (OneTouch Verio) test strip Use as instructed qid Dx E11 9 400 each 3    halobetasol (ULTRAVATE) 0 05 % cream Apply topically 2 (two) times a day 50 g 5    HYDROcodone-acetaminophen (NORCO) 5-325 mg per tablet Take 1 tablet by mouth every 6 (six) hours as needed for painMax Daily Amount: 4 tablets 20 tablet 0    insulin NPH (HumuLIN N,NovoLIN N) 100 Units/mL subcutaneous injection Inject 12 Units under the skin 2 (two) times a day 7 2 mL 0    insulin regular (HumuLIN R,NovoLIN R) 100 units/mL injection 4 units with lunch, 8 units with dinner 10 mL 0    Lancets (OneTouch Delica Plus [id number]) MISC 1 Units by Does not apply route 2 (two) times a day Dx E11 9 100 each 2    Nitro-Bid 2 % ointment       oxyCODONE-acetaminophen (PERCOCET) 5-325 mg per tablet Take 1 tablet by mouth every 6 (six) hours as needed for moderate painMax Daily Amount: 4 tablets 120 tablet 0    pantoprazole (PROTONIX) 40 mg tablet Take 1 tablet (40 mg total) by mouth daily 90 tablet 1    rivaroxaban (Xarelto) 2 5 mg tablet Take 1 tablet (2 5 mg total) by mouth daily with breakfast Last dose 6/21/20 30 tablet 2    UltiCare Insulin Syringe 31G X 5/16" 1 ML MISC        No current facility-administered medications for this visit          Social History:  Social History     Socioeconomic History    Marital status: /Civil Union     Spouse name: None    Number of children: None    Years of education: None    Highest education level: None   Occupational History    Occupation: Cook   Social Needs    Financial resource strain: None    Food insecurity     Worry: None     Inability: None    Transportation needs     Medical: None     Non-medical: None   Tobacco Use    Smoking status: Never Smoker    Smokeless tobacco: Never Used   Substance and Sexual Activity    Alcohol use: Yes     Frequency: Monthly or less     Drinks per session: 1 or 2     Binge frequency: Never     Comment: occasional    Drug use: Never    Sexual activity: None   Lifestyle    Physical activity     Days per week: None     Minutes per session: None    Stress: None   Relationships    Social connections     Talks on phone: None     Gets together: None     Attends Rastafarian service: None     Active member of club or organization: None     Attends meetings of clubs or organizations: None     Relationship status: None    Intimate partner violence     Fear of current or ex partner: None     Emotionally abused: None     Physically abused: None     Forced sexual activity: None   Other Topics Concern    None   Social History Narrative    · Most recent tobacco use screenin2019      · Do you currently or have you served in the [employer]:   No      · Were you activated, into active duty, as a member of the [employer] or as a Reservist:   No      · Diet:   Regular      · Alcohol intake:   Occasional      · Caffeine intake:   Occasional      · Seat belts used routinely:   Yes      · Smoke alarm in home:   Yes         Review of Systems   Constitutional: Negative for appetite change, chills and fever  HENT: Negative for sore throat  Respiratory: Negative for cough and shortness of breath  Cardiovascular: Negative for chest pain  Gastrointestinal: Negative for diarrhea, nausea and vomiting  Objective:      BP (!) 187/78 (BP Location: Left arm)   Ht 5' 4" (1 626 m)   Wt 84 9 kg (187 lb 3 2 oz)   BMI 32 13 kg/m²          Physical Exam  Vitals signs reviewed  Constitutional:       General: He is not in acute distress  Appearance: Normal appearance  He is not toxic-appearing  Cardiovascular:      Rate and Rhythm: Normal rate and regular rhythm  Pulses: Decreased pulses  Dorsalis pedis pulses are 0 on the right side and 0 on the left side  Posterior tibial pulses are 0 on the right side and 0 on the left side  Comments: No ischemia  Pulmonary:      Effort: Pulmonary effort is normal  No respiratory distress  Musculoskeletal:         General: No signs of injury  Comments: TMA left  Right 5th ray amputation  Skin:     General: Skin is warm  Coloration: Skin is not cyanotic or mottled  Findings: Wound present  No abscess, ecchymosis or petechiae  Rash is not purpuric  Nails: There is no clubbing  Comments: Incision is coapted  Eschar presents over the incision right foot  Dry wounds noted left TMA site and heel  No cellulitis or abscess bilaterally  Mycotic nails X 4 with thickening and onycholysis  Neurological:      General: No focal deficit present  Mental Status: He is alert and oriented to person, place, and time  Cranial Nerves: No cranial nerve deficit  Motor: No weakness  Psychiatric:         Mood and Affect: Mood normal          Behavior: Behavior normal          Thought Content:  Thought content normal          Judgment: Judgment normal

## 2021-03-11 ENCOUNTER — OFFICE VISIT (OUTPATIENT)
Dept: WOUND CARE | Facility: HOSPITAL | Age: 66
End: 2021-03-11
Payer: COMMERCIAL

## 2021-03-11 VITALS
RESPIRATION RATE: 16 BRPM | HEART RATE: 76 BPM | WEIGHT: 182 LBS | BODY MASS INDEX: 31.07 KG/M2 | SYSTOLIC BLOOD PRESSURE: 138 MMHG | TEMPERATURE: 97 F | DIASTOLIC BLOOD PRESSURE: 76 MMHG | HEIGHT: 64 IN

## 2021-03-11 DIAGNOSIS — L97.529 ULCER OF LEFT FOOT, UNSPECIFIED ULCER STAGE (HCC): Primary | ICD-10-CM

## 2021-03-11 DIAGNOSIS — E11.42 DIABETIC POLYNEUROPATHY ASSOCIATED WITH TYPE 2 DIABETES MELLITUS (HCC): ICD-10-CM

## 2021-03-11 DIAGNOSIS — L97.512 RIGHT FOOT ULCER, WITH FAT LAYER EXPOSED (HCC): ICD-10-CM

## 2021-03-11 DIAGNOSIS — I73.9 PERIPHERAL VASCULAR DISEASE, UNSPECIFIED (HCC): ICD-10-CM

## 2021-03-11 DIAGNOSIS — L97.429 HEEL ULCERATION, LEFT, WITH UNSPECIFIED SEVERITY (HCC): ICD-10-CM

## 2021-03-11 PROCEDURE — 99213 OFFICE O/P EST LOW 20 MIN: CPT | Performed by: PODIATRIST

## 2021-03-11 PROCEDURE — 99214 OFFICE O/P EST MOD 30 MIN: CPT | Performed by: PODIATRIST

## 2021-03-11 NOTE — PROGRESS NOTES
Patient ID: Mariana Beauchamp is a 76 y o  male Date of Birth 1955     Chief Complaint  Chief Complaint   Patient presents with    New Patient Visit     bilateral feet wounds       Allergies  Shellfish-derived products and Sulfamethoxazole-trimethoprim    Assessment:    No problem-specific Assessment & Plan notes found for this encounter  Diagnoses and all orders for this visit:    Ulcer of left foot, unspecified ulcer stage (HCC)  -     Diabetic Shoe  -     Diabetic Shoe Inserts  -     Wound cleansing and dressings; Future  -     Wound off loading; Future  -     Wound home care; Future  -     Wound miscellaneous orders; Future    Right foot ulcer, with fat layer exposed ([facility] [address] )  -     Diabetic Shoe  -     Diabetic Shoe Inserts  -     Wound cleansing and dressings; Future  -     Wound off loading; Future  -     Wound home care; Future  -     Wound miscellaneous orders; Future    Heel ulceration, left, with unspecified severity (HCC)  -     Diabetic Shoe  -     Diabetic Shoe Inserts  -     Wound cleansing and dressings; Future  -     Wound off loading; Future  -     Wound home care; Future  -     Wound miscellaneous orders; Future    Peripheral vascular disease, unspecified ([address] )  -     Diabetic Shoe  -     Diabetic Shoe Inserts    Diabetic polyneuropathy associated with type 2 diabetes mellitus ([address] )  -     Diabetic Shoe  -     Diabetic Shoe Inserts              Procedures    Plan:  1  Right foot wound is stable  Dry dressing  Okay for full WB to right foot in surgical shoe  2  No significant improvement left foot ulcers  Concern for vascular disease  Awaits repeat arterial study and vascular evaluation next week  3  Continue local care with xeroform left heel and TMA stump ulcer  4  Discussed proper off-loading of left foot ulcer  5  Rx: DM shoes and inserts  6  RA in 2 weeks            Wound 03/11/21 Foot Right;Lateral (Active)   Wound Image Images linked 03/11/21 1106   Wound Description Eschar 03/11/21 1142   Dominga-wound Assessment Dry 03/11/21 1142   Wound Length (cm) 1 2 cm 03/11/21 1142   Wound Width (cm) 6 9 cm 03/11/21 1142   Wound Depth (cm) 0 cm 03/11/21 1142   Wound Surface Area (cm^2) 8 28 cm^2 03/11/21 1142   Wound Volume (cm^3) 0 cm^3 03/11/21 1142   Calculated Wound Volume (cm^3) 0 cm^3 03/11/21 1142   Drainage Amount None 03/11/21 1142   Non-staged Wound Description Full thickness 03/11/21 1142       Wound 03/11/21 Foot Anterior; Left (Active)   Wound Image Images linked 03/11/21 1103   Wound Description Yellow;Slough;Pink 03/11/21 1141   Dominga-wound Assessment Intact 03/11/21 1141   Wound Length (cm) 0 8 cm 03/11/21 1141   Wound Width (cm) 0 9 cm 03/11/21 1141   Wound Depth (cm) 0 2 cm 03/11/21 1141   Wound Surface Area (cm^2) 0 72 cm^2 03/11/21 1141   Wound Volume (cm^3) 0 14 cm^3 03/11/21 1141   Calculated Wound Volume (cm^3) 0 14 cm^3 03/11/21 1141   Drainage Amount Small 03/11/21 1141   Drainage Description Serosanguineous 03/11/21 1141   Non-staged Wound Description Full thickness 03/11/21 1141       Wound 03/11/21 Heel Left (Active)   Wound Image Images linked 03/11/21 1104   Wound Description Eschar;Beefy red 03/11/21 1140   Dominga-wound Assessment Intact 03/11/21 1140   Wound Length (cm) 1 9 cm 03/11/21 1140   Wound Width (cm) 2 4 cm 03/11/21 1140   Wound Depth (cm) 0 cm 03/11/21 1140   Wound Surface Area (cm^2) 4 56 cm^2 03/11/21 1140   Wound Volume (cm^3) 0 cm^3 03/11/21 1140   Calculated Wound Volume (cm^3) 0 cm^3 03/11/21 1140   Drainage Amount None 03/11/21 1140   Non-staged Wound Description Full thickness 03/11/21 1140       Wound 01/28/21 Catheter entry/exit site Groin Left (Active)   Date First Assessed/Time First Assessed: 01/28/21 0909   Traumatic Wound Type: Catheter entry/exit site  Location: Groin  Wound Location Orientation: Left  Wound Description (Comments): angiogram access puncture       Wound 03/11/21 Foot Right;Lateral (Active)   Date First Assessed/Time First Assessed: 03/11/21 1057   Location: Foot  Wound Location Orientation: Right;Lateral       Wound 03/11/21 Foot Anterior; Left (Active)   Date First Assessed: 03/11/21   Location: Foot  Wound Location Orientation: Anterior; Left       Wound 03/11/21 Heel Left (Active)   Date First Assessed/Time First Assessed: 03/11/21 1100   Location: Heel  Wound Location Orientation: Left       [REMOVED] Wound 07/24/20 Diabetic Ulcer Foot Right;Lateral (Removed)   Resolved Date: 03/11/21  Date First Assessed: 07/24/20   Pre-Existing Wound: Yes  Primary Wound Type: Diabetic Ulcer  Location: Foot  Wound Location Orientation: Right;Lateral  Wound Description (Comments): Cristina 1       [REMOVED] Wound 12/18/20 Foot Right (Removed)   Resolved Date: 03/11/21  Date First Assessed/Time First Assessed: 12/18/20 1146   Location: Foot  Wound Location Orientation: Right  Incision's 1st Dressing: DRESSING OIL EMULSION 3 X 3 IN (x1), SPONGE GAUZE 4 X 8 12 PLY STRL LF (x1), BANDAGE WILLARD 3   [REMOVED] Wound 01/29/21 Diabetic Ulcer Foot Lateral;Right (Removed)   Resolved Date: 03/11/21  Date First Assessed/Time First Assessed: 01/29/21 0820   Pre-Existing Wound: Yes  Primary Wound Type: Diabetic Ulcer  Location: Foot  Wound Location Orientation: Lateral;Right       [REMOVED] Wound 02/02/21 Foot Right (Removed)   Resolved Date: 03/11/21  Date First Assessed/Time First Assessed: 02/02/21 1734   Location: Foot  Wound Location Orientation: Right  Incision's 1st Dressing: DRESSING OIL EMULSION 3 X 3 IN (x1), SPONGE GAUZE 4 X 4 16 PLY STRL PLASTIC TRAY LF (x1), JOSI    Subjective:        BULMARO  Kanwal Hope presents for wound care bilateral feet  He feels well  He does not feel left foot ulcer is better  No increased pain  No redness or edema  Right foot wound looks well with no drainage  He is scheduled to have doppler test and vascular follow-up next week          The following portions of the patient's history were reviewed and updated as appropriate: allergies, current medications, past family history, past medical history, past social history, past surgical history and problem list     PAST MEDICAL HISTORY:  Past Medical History:   Diagnosis Date    Arthritis     fingers    [facility]     First degree AV block     Full dentures     PAD (peripheral artery disease) ([facility] [address] )     PVD (peripheral vascular disease) ([place] [address] )     Wound, open     right foot- by the 5th toe       PAST SURGICAL HISTORY:  Past Surgical History:   Procedure Laterality Date    CHOLECYSTECTOMY      COLONOSCOPY      IR AORTAGRAM WITH RUN-OFF  1/28/2021    CA DEBRIDEMENT, SKIN, SUB-Q TISSUE,MUSCLE,BONE,=<20 SQ CM Right 12/18/2020    Procedure: DEBRIDMENT OF ULCER , REMOVAL OF DEVITALIZED SKIN, SOFT TISSUE, BONE AND APPLICATION OF INTEGRA GRAFT RIGHT FOOT ;  Surgeon: Laura Laureano DPM;  Location: [address] [place] [address];  Service: Podiatry    REPLANTATION THUMB Right     right tip reconnected    SKIN GRAFT      right thumb    TOE AMPUTATION      left foot all 5 toes removed    TOE AMPUTATION Right 2/2/2021    Procedure: Right partial 5th ray amputation;  Surgeon: Aiden Sexton DPM;  Location:  MAIN OR;  Service: Podiatry    TOE OSTEOTOMY Right 6/26/2020    Procedure: exostosis of right big toe;  Surgeon: Angelina Luu DPM;  Location: [facility];  Service: Podiatry    TRIGGER FINGER RELEASE      bilateral hands    VEIN LIGATION      right        ALLERGIES:  Shellfish-derived products and Sulfamethoxazole-trimethoprim    MEDICATIONS:  Current Outpatient Medications   Medication Sig Dispense Refill    atorvastatin (LIPITOR) 40 mg tablet Take 1 tablet (40 mg total) by mouth daily at bedtime 90 tablet 1    Blood Glucose Monitoring Suppl (OneTouch Verio) w/Device KIT by Does not apply route 2 (two) times a day Dx E11 9 1 kit 1    clopidogrel (PLAVIX) 75 mg tablet Take 1 tablet (75 mg total) by mouth daily 90 tablet 0    gabapentin (NEURONTIN) 300 mg capsule Take 1 capsule (300 mg total) by mouth daily at bedtime 30 capsule 1    glucose blood (OneTouch Verio) test strip Use as instructed qid Dx E11 9 400 each 3    insulin regular (HumuLIN R,NovoLIN R) 100 units/mL injection 4 units with lunch, 8 units with dinner 10 mL 0    Lancets (OneTouch Delica Plus [id number]) MISC 1 Units by Does not apply route 2 (two) times a day Dx E11 9 100 each 2    pantoprazole (PROTONIX) 40 mg tablet Take 1 tablet (40 mg total) by mouth daily 90 tablet 1    rivaroxaban (Xarelto) 2 5 mg tablet Take 1 tablet (2 5 mg total) by mouth daily with breakfast Last dose 6/21/20 30 tablet 2    UltiCare Insulin Syringe 31G X 5/16" 1 ML MISC       halobetasol (ULTRAVATE) 0 05 % cream Apply topically 2 (two) times a day (Patient not taking: Reported on 3/11/2021) 50 g 5    HYDROcodone-acetaminophen (NORCO) 5-325 mg per tablet Take 1 tablet by mouth every 6 (six) hours as needed for painMax Daily Amount: 4 tablets (Patient not taking: Reported on 3/11/2021) 20 tablet 0    insulin NPH (HumuLIN N,NovoLIN N) 100 Units/mL subcutaneous injection Inject 12 Units under the skin 2 (two) times a day 7 2 mL 0    Nitro-Bid 2 % ointment       oxyCODONE-acetaminophen (PERCOCET) 5-325 mg per tablet Take 1 tablet by mouth every 6 (six) hours as needed for moderate painMax Daily Amount: 4 tablets (Patient not taking: Reported on 3/11/2021) 120 tablet 0     No current facility-administered medications for this visit          SOCIAL HISTORY:  Social History     Socioeconomic History    Marital status: /Civil Union     Spouse name: None    Number of children: None    Years of education: None    Highest education level: None   Occupational History    Occupation: [employer]   Social Needs    Financial resource strain: None    Food insecurity     Worry: None     Inability: None    Transportation needs     Medical: None     Non-medical: None   Tobacco Use    Smoking status: Never Smoker    Smokeless tobacco: Never Used Substance and Sexual Activity    Alcohol use: Yes     Frequency: Monthly or less     Drinks per session: 1 or 2     Binge frequency: Never     Comment: occasional    Drug use: Never    Sexual activity: None   Lifestyle    Physical activity     Days per week: None     Minutes per session: None    Stress: None   Relationships    Social connections     Talks on phone: None     Gets together: None     Attends Rastafari service: None     Active member of club or organization: None     Attends meetings of clubs or organizations: None     Relationship status: None    Intimate partner violence     Fear of current or ex partner: None     Emotionally abused: None     Physically abused: None     Forced sexual activity: None   Other Topics Concern    None   Social History Narrative    · Most recent tobacco use screenin2019      · Do you currently or have you served in the [employer]:   No      · Were you activated, into active duty, as a member of the [employer] or as a Reservist:   No      · Diet:   Regular      · Alcohol intake:   Occasional      · Caffeine intake:   Occasional      · Seat belts used routinely:   Yes      · Smoke alarm in home:   Yes       Review of Systems      Objective:       Wound 21 Foot Right;Lateral (Active)   Wound Image Images linked 21 1106   Wound Description Eschar 21 1142   Dominga-wound Assessment Dry 21 1142   Wound Length (cm) 1 2 cm 21 1142   Wound Width (cm) 6 9 cm 21 1142   Wound Depth (cm) 0 cm 21 1142   Wound Surface Area (cm^2) 8 28 cm^2 21 1142   Wound Volume (cm^3) 0 cm^3 21 1142   Calculated Wound Volume (cm^3) 0 cm^3 21 1142   Drainage Amount None 21 1142   Non-staged Wound Description Full thickness 21 1142       Wound 21 Foot Anterior; Left (Active)   Wound Image Images linked 21 1103   Wound Description Yellow;Slough;Pink 21 1141   Dominga-wound Assessment Intact 03/11/21 1141   Wound Length (cm) 0 8 cm 03/11/21 1141   Wound Width (cm) 0 9 cm 03/11/21 1141   Wound Depth (cm) 0 2 cm 03/11/21 1141   Wound Surface Area (cm^2) 0 72 cm^2 03/11/21 1141   Wound Volume (cm^3) 0 14 cm^3 03/11/21 1141   Calculated Wound Volume (cm^3) 0 14 cm^3 03/11/21 1141   Drainage Amount Small 03/11/21 1141   Drainage Description Serosanguineous 03/11/21 1141   Non-staged Wound Description Full thickness 03/11/21 1141       Wound 03/11/21 Heel Left (Active)   Wound Image Images linked 03/11/21 1104   Wound Description Eschar;Beefy red 03/11/21 1140   Dominga-wound Assessment Intact 03/11/21 1140   Wound Length (cm) 1 9 cm 03/11/21 1140   Wound Width (cm) 2 4 cm 03/11/21 1140   Wound Depth (cm) 0 cm 03/11/21 1140   Wound Surface Area (cm^2) 4 56 cm^2 03/11/21 1140   Wound Volume (cm^3) 0 cm^3 03/11/21 1140   Calculated Wound Volume (cm^3) 0 cm^3 03/11/21 1140   Drainage Amount None 03/11/21 1140   Non-staged Wound Description Full thickness 03/11/21 1140       /76   Pulse 76   Temp (!) 97 °F (36 1 °C)   Resp 16   Ht 5' 4" (1 626 m)   Wt 82 6 kg (182 lb)   BMI 31 24 kg/m²     Physical Exam  Vitals signs and nursing note reviewed  Constitutional:       General: He is not in acute distress  Appearance: Normal appearance  He is not toxic-appearing  Cardiovascular:      Rate and Rhythm: Normal rate and regular rhythm  Pulses:           Dorsalis pedis pulses are 0 on the right side and 0 on the left side  Posterior tibial pulses are 0 on the right side and 0 on the left side  Comments: No ischemia  Pulmonary:      Effort: Pulmonary effort is normal  No respiratory distress  Musculoskeletal:         General: No signs of injury  Comments: TMA left foot  Right 5th ray amputation  Skin:     General: Skin is warm and dry  Coloration: Skin is not cyanotic or mottled  Findings: Wound present  No abscess or erythema  Rash is not purpuric  Nails:  There is no clubbing  Comments: Dry wounds with eschar left heel with no drainage  Stable wound left TMA site  No signs of infection  Superficial eschar over the incision right lateral foot without signs of infection  Neurological:      General: No focal deficit present  Mental Status: He is alert and oriented to person, place, and time  Cranial Nerves: No cranial nerve deficit  Motor: No weakness  Psychiatric:         Mood and Affect: Mood normal          Behavior: Behavior normal          Thought Content: Thought content normal          Judgment: Judgment normal            Wound Instructions:  Orders Placed This Encounter   Procedures    Wound cleansing and dressings     Right foot wound  Wash your hands with soap and water  Remove old dressing, discard into plastic bag and place in trash  Cleanse the wound with normal saline prior to applying a clean dressing  Do not use tissue or cotton balls  Do not scrub the wound  Pat dry using gauze  Shower if covered   Do not get wet   Apply moisturizing lotion to foot  Do not apply to wound  Cover with guaze  Secure with emily and tape   Change dressing 3 times a week   This was performed at wound care center today         Left foot wounds   Wash your hands with soap and water  Remove old dressing, discard into plastic bag and place in trash  Cleanse the wound with normal saline prior to applying a clean dressing  Do not use tissue or cotton balls  Do not scrub the wound  Pat dry using gauze  Shower if covered   Do not get wet   Apply moisturizing lotion to foot  Do not apply to wounds  Apply xeroform to wounds   Cover with guaze  Secure with emily and tape   Change dressing 3 times a week   This was performed at wound care center today     Standing Status:   Future     Standing Expiration Date:   3/11/2022    Wound off loading     Off-loading Instructions:    Keep weight and pressure off wound at all times and Wear off-loading device as directed by your physician (surgical shoe to right foot)  Put on immediately when rising in the morning and remove when going to bed  Use pillow under left calf when sitting in chair with leg raised or while laying bed to offload pressure to left heel     Standing Status:   Future     Standing Expiration Date:   3/11/2022    Wound home care     Continue visiting nurses for dressing changes     Standing Status:   Future     Standing Expiration Date:   3/11/2022    Wound miscellaneous orders     Prescription for diabetic shoes and inserts provided at today visit     Standing Status:   Future     Standing Expiration Date:   3/11/2022    Diabetic Shoe    Diabetic Shoe Inserts     Order Specific Question:   Type     Answer:   Custom Fabricated        Diagnosis ICD-10-CM Associated Orders   1  Ulcer of left foot, unspecified ulcer stage ([facility] [address] )  L97 529 Diabetic Shoe     Diabetic Shoe Inserts     Wound cleansing and dressings     Wound off loading     Wound home care     Wound miscellaneous orders   2  Right foot ulcer, with fat layer exposed ([address] )  L97 512 Diabetic Shoe     Diabetic Shoe Inserts     Wound cleansing and dressings     Wound off loading     Wound home care     Wound miscellaneous orders   3  Heel ulceration, left, with unspecified severity ([address] )  L97 429 Diabetic Shoe     Diabetic Shoe Inserts     Wound cleansing and dressings     Wound off loading     Wound home care     Wound miscellaneous orders   4  Peripheral vascular disease, unspecified ([facility])  I73 9 Diabetic Shoe     Diabetic Shoe Inserts   5   Diabetic polyneuropathy associated with type 2 diabetes mellitus ([facility])  E11 42 Diabetic Shoe     Diabetic Shoe Inserts

## 2021-03-11 NOTE — PATIENT INSTRUCTIONS
Orders Placed This Encounter   Procedures    Wound cleansing and dressings     Right foot wound  Wash your hands with soap and water  Remove old dressing, discard into plastic bag and place in trash  Cleanse the wound with normal saline prior to applying a clean dressing  Do not use tissue or cotton balls  Do not scrub the wound  Pat dry using gauze  Shower if covered   Do not get wet   Apply moisturizing lotion to foot  Do not apply to wound  Cover with guaze  Secure with emily and tape   Change dressing 3 times a week   This was performed at wound care center today         Left foot wounds   Wash your hands with soap and water  Remove old dressing, discard into plastic bag and place in trash  Cleanse the wound with normal saline prior to applying a clean dressing  Do not use tissue or cotton balls  Do not scrub the wound  Pat dry using gauze  Shower if covered   Do not get wet   Apply moisturizing lotion to foot  Do not apply to wounds  Apply xeroform to wounds  Cover with guaze  Secure with emily and tape   Change dressing 3 times a week   This was performed at wound care center today     Standing Status:   Future     Standing Expiration Date:   3/11/2022    Wound off loading     Off-loading Instructions:    Keep weight and pressure off wound at all times and Wear off-loading device as directed by your physician (surgical shoe to right foot)  Put on immediately when rising in the morning and remove when going to bed      Use pillow under left calf when sitting in chair with leg raised or while laying bed to offload pressure to left heel     Standing Status:   Future     Standing Expiration Date:   3/11/2022    Wound home care     Continue visiting nurses for dressing changes     Standing Status:   Future     Standing Expiration Date:   3/11/2022    Wound miscellaneous orders     Prescription for diabetic shoes and inserts provided at today visit     Standing Status:   Future     Standing Expiration Date:   3/11/2022    Diabetic Shoe    Diabetic Shoe Inserts     Order Specific Question:   Type     Answer:   Custom Fabricated

## 2021-03-16 ENCOUNTER — OFFICE VISIT (OUTPATIENT)
Dept: VASCULAR SURGERY | Facility: CLINIC | Age: 66
End: 2021-03-16
Payer: COMMERCIAL

## 2021-03-16 ENCOUNTER — HOSPITAL ENCOUNTER (OUTPATIENT)
Dept: NON INVASIVE DIAGNOSTICS | Facility: CLINIC | Age: 66
Discharge: HOME | End: 2021-03-16
Payer: COMMERCIAL

## 2021-03-16 ENCOUNTER — TELEPHONE (OUTPATIENT)
Dept: VASCULAR SURGERY | Facility: CLINIC | Age: 66
End: 2021-03-16

## 2021-03-16 VITALS
BODY MASS INDEX: 31.24 KG/M2 | TEMPERATURE: 98.8 F | HEART RATE: 80 BPM | DIASTOLIC BLOOD PRESSURE: 78 MMHG | WEIGHT: 183 LBS | HEIGHT: 64 IN | SYSTOLIC BLOOD PRESSURE: 142 MMHG

## 2021-03-16 DIAGNOSIS — I73.9 PERIPHERAL VASCULAR DISEASE, UNSPECIFIED (HCC): Primary | ICD-10-CM

## 2021-03-16 DIAGNOSIS — I73.9 PAD (PERIPHERAL ARTERY DISEASE) (HCC): ICD-10-CM

## 2021-03-16 DIAGNOSIS — I73.9 PERIPHERAL VASCULAR DISEASE, UNSPECIFIED (HCC): ICD-10-CM

## 2021-03-16 PROCEDURE — 99215 OFFICE O/P EST HI 40 MIN: CPT | Performed by: SURGERY

## 2021-03-16 PROCEDURE — 93925 LOWER EXTREMITY STUDY: CPT

## 2021-03-16 PROCEDURE — 93923 UPR/LXTR ART STDY 3+ LVLS: CPT

## 2021-03-16 NOTE — TELEPHONE ENCOUNTER
Check out staff:   REMINDER: Under Reason For Call, comments MUST be formatted as:   (Surgeon's Initials) / (Procedure)    PHYSICIAN / HOSPITAL: Gaby Peck (NPI: [id number]) Mayra Nunez (Tax: [id number] / NPI: [id number])    PROCEDURE: LLE angiogram with possible intervention     LEVEL: 3    REP: Yes, [employer]  ASSISTANT SURGEON: No    ALLERGIES: Shellfish-derived products and Sulfamethoxazole-trimethoprim    INSTRUCTIONS GIVEN: AGRAM INSTRUCTIONS    BLOOD THINNERS / MED HOLD: Hold Rx - Xarelto (Rivaroxaban) , 2 days prior to procedure  HYDRATION REQUIRED: Patient does not require hydration  DIALYSIS: Patient is not on dialysis  *Please ROUTE this encounter to [facility] Surgery Coordinator   AND   Send COMPLETE CONSENT to [facility] Surgery Schedulers e-mail group*    I certify that patient has signed, printed, timed, and dated their surgery consent  I certify that BOTH sides of the completed surgery consent have been scanned into the patient's Epic chart by myself on 3/16/2021  *Please ROUTE this encounter to [facility] Clearance Pool   AND   Send CLEARANCE FORM(S) to Vascular Nursing e-mail group*    Patient does not require any pre operative clearance

## 2021-03-16 NOTE — H&P (VIEW-ONLY)
Assessment/Plan:    PAD (peripheral artery disease) ([facility] [address] )  Patrick STOUT with PMHx PAD, DM with previous Left TMA and active lateral foot wound for the past 6 months  Reports that he has undergone multiple angiograms over the last 6 months with Dr Leon Holland with persistence of his wound  Also sees podiatry and wound care       Most recently is s/p agram on 1/5/20 with sfa stent angioplasty and arthrectomy/PTA of tibial vessels, distal AT/DP was noted to be occluded    On exam has biphasic AT/PT/DP signals     Post-procedure LEADs demonstrated inadequate perfusion pressure for healing with met pressure of 33mg HG and great toe pressure of 13mmHG         S/p RLE angiogram with successful recanalization of the AT/DP  Underwent 5th ray amputation during the same admission       In clinic today has triphasic AT/DP signal  Amputation site healing well, repeat LEADs demonstrate met pressure of 130mmHG (39) and GTP of 70mmHG (19)     Patient has been experiencing increasing left foot/heal pain and on examination has early stage pressure induced heal wounds  He has  Monophasic AT signal and biphasic PT signal  Recommend heal offloading and given tissue loss will proceed with angiography to optimizer perfusion     continue plavix and xarelto   Left LE angiogram in upcomign weeks           Problem List Items Addressed This Visit        Cardiovascular and Mediastinum    PAD (peripheral artery disease) ([facility] [address] ) (Chronic)     Patrick STOUT with PMHx PAD, DM with previous Left TMA and active lateral foot wound for the past 6 months  Reports that he has undergone multiple angiograms over the last 6 months with Dr Leon Holland with persistence of his wound   Also sees podiatry and wound care       Most recently is s/p agram on 1/5/20 with sfa stent angioplasty and arthrectomy/PTA of tibial vessels, distal AT/DP was noted to be occluded    On exam has biphasic AT/PT/DP signals     Post-procedure LEADs demonstrated inadequate perfusion pressure for healing with met pressure of 33mg HG and great toe pressure of 13mmHG         S/p RLE angiogram with successful recanalization of the AT/DP  Underwent 5th ray amputation during the same admission       In clinic today has triphasic AT/DP signal  Amputation site healing well, repeat LEADs demonstrate met pressure of 130mmHG (39) and GTP of 70mmHG (19)     Patient has been experiencing increasing left foot/heal pain and on examination has early stage pressure induced heal wounds  He has  Monophasic AT signal and biphasic PT signal  Recommend heal offloading and given tissue loss will proceed with angiography to optimizer perfusion     continue plavix and xarelto   Left LE angiogram in upcomign weeks           Relevant Orders    IR aortagram with run-off      Other Visit Diagnoses     Peripheral vascular disease, unspecified ([facility] [address] )    -  Primary    Relevant Orders    IR aortagram with run-off            Subjective:      Patient ID: Stacey Sanabria is a 76 y o  male  Patient is here to review LEAD done on 3/16/21  Patient has a heel wound on the left foot that is painful  Patient also recently had a toe amputation on the right foot  Patient is on liptor, plavix, and xarelto  The following portions of the patient's history were reviewed and updated as appropriate: allergies, current medications, past family history, past medical history, past social history, past surgical history and problem list     Review of Systems   Constitutional: Negative  HENT: Negative  Eyes: Negative  Respiratory: Negative  Cardiovascular: Negative  Gastrointestinal: Negative  Endocrine: Negative  Genitourinary: Negative  Musculoskeletal: Negative  Skin: Positive for wound (Left foot)  Allergic/Immunologic: Negative  Hematological: Negative  Psychiatric/Behavioral: Negative  I have reviewed and updated the ROS as appropriate          Objective:      /78 (BP Location: Right arm, Patient Position: Sitting, Cuff Size: Standard)   Pulse 80   Temp 98 8 °F (37 1 °C) (Tympanic)   Ht 5' 4" (1 626 m)   Wt 83 kg (183 lb)   BMI 31 41 kg/m²          Physical Exam  Constitutional:       Appearance: Normal appearance  HENT:      Head: Normocephalic and atraumatic  Mouth/Throat:      Mouth: Mucous membranes are dry  Eyes:      Extraocular Movements: Extraocular movements intact  Pupils: Pupils are equal, round, and reactive to light  Neck:      Musculoskeletal: Normal range of motion and neck supple  Cardiovascular:      Rate and Rhythm: Normal rate and regular rhythm  Pulses:           Femoral pulses are 2+ on the right side and 2+ on the left side  Popliteal pulses are 1+ on the right side and 1+ on the left side  Dorsalis pedis pulses are detected w/ Doppler on the right side and detected w/ Doppler on the left side  Posterior tibial pulses are detected w/ Doppler on the right side and detected w/ Doppler on the left side  Comments: triphasic AT/PT on the right     Monophasic AT and biphasic PT on the left   Pulmonary:      Effort: Pulmonary effort is normal       Breath sounds: Normal breath sounds  Abdominal:      General: Abdomen is flat  Palpations: Abdomen is soft  Musculoskeletal:      Comments: right 5th toe amp site healing, dry overlying eschar no signs of infection     Left heal with early stage pressure ulcer  Tip of TMA also with a small punctate shallow ulcer   Skin:     General: Skin is warm and dry  Capillary Refill: Capillary refill takes 2 to 3 seconds  Neurological:      General: No focal deficit present  Mental Status: He is alert and oriented to person, place, and time  Deep Tendon Reflexes: Abnormal reflex: 2  Psychiatric:         Mood and Affect: Mood normal          Behavior: Behavior normal          Thought Content:  Thought content normal          Judgment: Judgment normal        Operative Scheduling Information:    Hospital:  Any IR/endo suite, patient would like [facility] if possible     Physician:  Me    Surgery: LLE angiogram with possible intervention     Urgency:  Urgent: within 30 days     Level:  Level 3: Outpatients to be scheduled for elective surgery than can be delayed up to 4 weeks without reasonable expectation of detriment to patient    Case Length:  Normal    Post-op Bed:  Outpatient    OR Table:  IR    Equipment Needs:  Rep: [employer]    Medication Instructions:    Plavix:  Continue (do not hold)  Xarelto:  Hold for 2 days prior to procedure    Hydration:  No

## 2021-03-16 NOTE — PROGRESS NOTES
Assessment/Plan:    PAD (peripheral artery disease) ([facility] [address] )  Sumi STOUT with PMHx PAD, DM with previous Left TMA and active lateral foot wound for the past 6 months  Reports that he has undergone multiple angiograms over the last 6 months with Dr Gildardo Johnston with persistence of his wound  Also sees podiatry and wound care       Most recently is s/p agram on 1/5/20 with sfa stent angioplasty and arthrectomy/PTA of tibial vessels, distal AT/DP was noted to be occluded    On exam has biphasic AT/PT/DP signals     Post-procedure LEADs demonstrated inadequate perfusion pressure for healing with met pressure of 33mg HG and great toe pressure of 13mmHG         S/p RLE angiogram with successful recanalization of the AT/DP  Underwent 5th ray amputation during the same admission       In clinic today has triphasic AT/DP signal  Amputation site healing well, repeat LEADs demonstrate met pressure of 130mmHG (39) and GTP of 70mmHG (19)     Patient has been experiencing increasing left foot/heal pain and on examination has early stage pressure induced heal wounds  He has  Monophasic AT signal and biphasic PT signal  Recommend heal offloading and given tissue loss will proceed with angiography to optimizer perfusion     continue plavix and xarelto   Left LE angiogram in upcomign weeks           Problem List Items Addressed This Visit        Cardiovascular and Mediastinum    PAD (peripheral artery disease) ([facility] [address] ) (Chronic)     Sumi STOUT with PMHx PAD, DM with previous Left TMA and active lateral foot wound for the past 6 months  Reports that he has undergone multiple angiograms over the last 6 months with Dr Gildardo Johnston with persistence of his wound   Also sees podiatry and wound care       Most recently is s/p agram on 1/5/20 with sfa stent angioplasty and arthrectomy/PTA of tibial vessels, distal AT/DP was noted to be occluded    On exam has biphasic AT/PT/DP signals     Post-procedure LEADs demonstrated inadequate perfusion pressure for healing with met pressure of 33mg HG and great toe pressure of 13mmHG         S/p RLE angiogram with successful recanalization of the AT/DP  Underwent 5th ray amputation during the same admission       In clinic today has triphasic AT/DP signal  Amputation site healing well, repeat LEADs demonstrate met pressure of 130mmHG (39) and GTP of 70mmHG (19)     Patient has been experiencing increasing left foot/heal pain and on examination has early stage pressure induced heal wounds  He has  Monophasic AT signal and biphasic PT signal  Recommend heal offloading and given tissue loss will proceed with angiography to optimizer perfusion     continue plavix and xarelto   Left LE angiogram in upcomign weeks           Relevant Orders    IR aortagram with run-off      Other Visit Diagnoses     Peripheral vascular disease, unspecified ([facility] [address] )    -  Primary    Relevant Orders    IR aortagram with run-off            Subjective:      Patient ID: Theresa Escudero is a 76 y o  male  Patient is here to review LEAD done on 3/16/21  Patient has a heel wound on the left foot that is painful  Patient also recently had a toe amputation on the right foot  Patient is on liptor, plavix, and xarelto  The following portions of the patient's history were reviewed and updated as appropriate: allergies, current medications, past family history, past medical history, past social history, past surgical history and problem list     Review of Systems   Constitutional: Negative  HENT: Negative  Eyes: Negative  Respiratory: Negative  Cardiovascular: Negative  Gastrointestinal: Negative  Endocrine: Negative  Genitourinary: Negative  Musculoskeletal: Negative  Skin: Positive for wound (Left foot)  Allergic/Immunologic: Negative  Hematological: Negative  Psychiatric/Behavioral: Negative  I have reviewed and updated the ROS as appropriate          Objective:      /78 (BP Location: Right arm, Patient Position: Sitting, Cuff Size: Standard)   Pulse 80   Temp 98 8 °F (37 1 °C) (Tympanic)   Ht 5' 4" (1 626 m)   Wt 83 kg (183 lb)   BMI 31 41 kg/m²          Physical Exam  Constitutional:       Appearance: Normal appearance  HENT:      Head: Normocephalic and atraumatic  Mouth/Throat:      Mouth: Mucous membranes are dry  Eyes:      Extraocular Movements: Extraocular movements intact  Pupils: Pupils are equal, round, and reactive to light  Neck:      Musculoskeletal: Normal range of motion and neck supple  Cardiovascular:      Rate and Rhythm: Normal rate and regular rhythm  Pulses:           Femoral pulses are 2+ on the right side and 2+ on the left side  Popliteal pulses are 1+ on the right side and 1+ on the left side  Dorsalis pedis pulses are detected w/ Doppler on the right side and detected w/ Doppler on the left side  Posterior tibial pulses are detected w/ Doppler on the right side and detected w/ Doppler on the left side  Comments: triphasic AT/PT on the right     Monophasic AT and biphasic PT on the left   Pulmonary:      Effort: Pulmonary effort is normal       Breath sounds: Normal breath sounds  Abdominal:      General: Abdomen is flat  Palpations: Abdomen is soft  Musculoskeletal:      Comments: right 5th toe amp site healing, dry overlying eschar no signs of infection     Left heal with early stage pressure ulcer  Tip of TMA also with a small punctate shallow ulcer   Skin:     General: Skin is warm and dry  Capillary Refill: Capillary refill takes 2 to 3 seconds  Neurological:      General: No focal deficit present  Mental Status: He is alert and oriented to person, place, and time  Deep Tendon Reflexes: Abnormal reflex: 2  Psychiatric:         Mood and Affect: Mood normal          Behavior: Behavior normal          Thought Content:  Thought content normal          Judgment: Judgment normal        Operative Scheduling Information:    Hospital:  Any IR/endo suite, patient would like [facility] if possible     Physician:  Me    Surgery: LLE angiogram with possible intervention     Urgency:  Urgent: within 30 days     Level:  Level 3: Outpatients to be scheduled for elective surgery than can be delayed up to 4 weeks without reasonable expectation of detriment to patient    Case Length:  Normal    Post-op Bed:  Outpatient    OR Table:  IR    Equipment Needs:  Rep: [employer]    Medication Instructions:    Plavix:  Continue (do not hold)  Xarelto:  Hold for 2 days prior to procedure    Hydration:  No

## 2021-03-16 NOTE — ASSESSMENT & PLAN NOTE
72yo M with PMHx PAD, DM with previous Left TMA and active lateral foot wound for the past 6 months  Reports that he has undergone multiple angiograms over the last 6 months with Dr Taj Dickerson with persistence of his wound  Also sees podiatry and wound care       Most recently is s/p agram on 1/5/20 with sfa stent angioplasty and arthrectomy/PTA of tibial vessels, distal AT/DP was noted to be occluded    On exam has biphasic AT/PT/DP signals     Post-procedure LEADs demonstrated inadequate perfusion pressure for healing with met pressure of 33mg HG and great toe pressure of 13mmHG         S/p RLE angiogram with successful recanalization of the AT/DP  Underwent 5th ray amputation during the same admission       In clinic today has triphasic AT/DP signal  Amputation site healing well, repeat LEADs demonstrate met pressure of 130mmHG (39) and GTP of 70mmHG (19)     Patient has been experiencing increasing left foot/heal pain and on examination has early stage pressure induced heal wounds   He has  Monophasic AT signal and biphasic PT signal  Recommend heal offloading and given tissue loss will proceed with angiography to optimizer perfusion     continue plavix and xarelto   Left LE angiogram in upcomign weeks

## 2021-03-17 ENCOUNTER — TELEPHONE (OUTPATIENT)
Dept: PODIATRY | Facility: CLINIC | Age: 66
End: 2021-03-17

## 2021-03-19 ENCOUNTER — TELEPHONE (OUTPATIENT)
Dept: PODIATRY | Facility: CLINIC | Age: 66
End: 2021-03-19

## 2021-03-19 PROCEDURE — 93922 UPR/L XTREMITY ART 2 LEVELS: CPT | Performed by: SURGERY

## 2021-03-19 PROCEDURE — 93925 LOWER EXTREMITY STUDY: CPT | Performed by: SURGERY

## 2021-03-19 NOTE — TELEPHONE ENCOUNTER
Patient is calling confused about his scripts for the diabetic shoe/inserts  He is not sure if it is for both feet or just the left  He called [facility] but they were not sure as well

## 2021-03-23 NOTE — TELEPHONE ENCOUNTER
Per [employer], [facility]ess[facility] Gordillo does not require prior authorization for patient's procedure

## 2021-03-25 ENCOUNTER — OFFICE VISIT (OUTPATIENT)
Dept: WOUND CARE | Facility: HOSPITAL | Age: 66
End: 2021-03-25
Payer: COMMERCIAL

## 2021-03-25 ENCOUNTER — APPOINTMENT (OUTPATIENT)
Dept: LAB | Facility: MEDICAL CENTER | Age: 66
End: 2021-03-25
Payer: COMMERCIAL

## 2021-03-25 VITALS
TEMPERATURE: 96.6 F | DIASTOLIC BLOOD PRESSURE: 58 MMHG | RESPIRATION RATE: 16 BRPM | SYSTOLIC BLOOD PRESSURE: 116 MMHG | HEART RATE: 68 BPM

## 2021-03-25 DIAGNOSIS — L97.429 HEEL ULCERATION, LEFT, WITH UNSPECIFIED SEVERITY (HCC): ICD-10-CM

## 2021-03-25 DIAGNOSIS — I73.9 PERIPHERAL VASCULAR DISEASE, UNSPECIFIED (HCC): ICD-10-CM

## 2021-03-25 DIAGNOSIS — L97.529 ULCER OF LEFT FOOT, UNSPECIFIED ULCER STAGE (HCC): ICD-10-CM

## 2021-03-25 DIAGNOSIS — T81.89XA SURGICAL WOUND, NON HEALING, INITIAL ENCOUNTER: Primary | ICD-10-CM

## 2021-03-25 DIAGNOSIS — E11.42 DIABETIC POLYNEUROPATHY ASSOCIATED WITH TYPE 2 DIABETES MELLITUS (HCC): ICD-10-CM

## 2021-03-25 LAB
ANION GAP SERPL CALCULATED.3IONS-SCNC: 4 MMOL/L (ref 4–13)
BUN SERPL-MCNC: 12 MG/DL (ref 5–25)
CALCIUM SERPL-MCNC: 9.5 MG/DL (ref 8.3–10.1)
CHLORIDE SERPL-SCNC: 103 MMOL/L (ref 100–108)
CO2 SERPL-SCNC: 30 MMOL/L (ref 21–32)
CREAT SERPL-MCNC: 1.03 MG/DL (ref 0.6–1.3)
ERYTHROCYTE [DISTWIDTH] IN BLOOD BY AUTOMATED COUNT: 12.6 % (ref 11.6–15.1)
GFR SERPL CREATININE-BSD FRML MDRD: 75 ML/MIN/1.73SQ M
GLUCOSE P FAST SERPL-MCNC: 254 MG/DL (ref 65–99)
HCT VFR BLD AUTO: 46.6 % (ref 36.5–49.3)
HGB BLD-MCNC: 15.3 G/DL (ref 12–17)
INR PPP: 1.13 (ref 0.84–1.19)
MCH RBC QN AUTO: 31.7 PG (ref 26.8–34.3)
MCHC RBC AUTO-ENTMCNC: 32.8 G/DL (ref 31.4–37.4)
MCV RBC AUTO: 97 FL (ref 82–98)
PLATELET # BLD AUTO: 146 THOUSANDS/UL (ref 149–390)
PMV BLD AUTO: 10.8 FL (ref 8.9–12.7)
POTASSIUM SERPL-SCNC: 4.4 MMOL/L (ref 3.5–5.3)
PROTHROMBIN TIME: 14.5 SECONDS (ref 11.6–14.5)
RBC # BLD AUTO: 4.83 MILLION/UL (ref 3.88–5.62)
SODIUM SERPL-SCNC: 137 MMOL/L (ref 136–145)
WBC # BLD AUTO: 5.87 THOUSAND/UL (ref 4.31–10.16)

## 2021-03-25 PROCEDURE — 80048 BASIC METABOLIC PNL TOTAL CA: CPT

## 2021-03-25 PROCEDURE — 85610 PROTHROMBIN TIME: CPT

## 2021-03-25 PROCEDURE — 11042 DBRDMT SUBQ TIS 1ST 20SQCM/<: CPT | Performed by: PODIATRIST

## 2021-03-25 PROCEDURE — 85027 COMPLETE CBC AUTOMATED: CPT

## 2021-03-25 PROCEDURE — 36415 COLL VENOUS BLD VENIPUNCTURE: CPT

## 2021-03-25 PROCEDURE — 99213 OFFICE O/P EST LOW 20 MIN: CPT | Performed by: PODIATRIST

## 2021-03-25 RX ORDER — LIDOCAINE HYDROCHLORIDE 40 MG/ML
1 SOLUTION TOPICAL ONCE
Status: COMPLETED | OUTPATIENT
Start: 2021-03-25 | End: 2021-03-25

## 2021-03-25 RX ADMIN — LIDOCAINE HYDROCHLORIDE 1 ML: 40 SOLUTION TOPICAL at 10:27

## 2021-03-25 NOTE — PROGRESS NOTES
Patient ID: Gloria Jackson is a 76 y o  male Date of Birth 1955     Chief Complaint  Chief Complaint   Patient presents with    Follow Up Wound Care Visit     bilateral foot wound       Allergies  Shellfish-derived products and Sulfamethoxazole-trimethoprim    Assessment:    No problem-specific Assessment & Plan notes found for this encounter  Diagnoses and all orders for this visit:    Surgical wound, non healing, initial encounter  -     lidocaine (XYLOCAINE) 4 % topical solution 1 mL  -     Wound cleansing and dressings; Future  -     Wound miscellaneous orders; Future  -     Wound off loading; Future  -     Debridement    Heel ulceration, left, with unspecified severity (HCC)  -     lidocaine (XYLOCAINE) 4 % topical solution 1 mL  -     Wound cleansing and dressings; Future  -     Wound miscellaneous orders; Future  -     Wound off loading; Future    Ulcer of left foot, unspecified ulcer stage (HCC)  -     lidocaine (XYLOCAINE) 4 % topical solution 1 mL  -     Wound cleansing and dressings; Future  -     Wound miscellaneous orders; Future  -     Wound off loading; Future    Diabetic polyneuropathy associated with type 2 diabetes mellitus ([facility] )    Peripheral vascular disease, unspecified ([facility] )              Debridement   Wound 03/11/21 Surgical Foot Right;Lateral    Universal Protocol:  Consent: Verbal consent obtained  Risks and benefits: risks, benefits and alternatives were discussed  Consent given by: patient  Time out: Immediately prior to procedure a "time out" was called to verify the correct patient, procedure, equipment, support staff and site/side marked as required    Timeout called at: 3/25/2021 10:38 AM   Patient understanding: patient states understanding of the procedure being performed  Patient identity confirmed: verbally with patient      Performed by: physician  Debridement type: surgical  Level of debridement: subcutaneous tissue  Pain control: lidocaine 4%  Post-debridement measurements  Length (cm): 6  Width (cm): 1 5  Depth (cm): 0 2  Percent debrided: 60%  Surface Area (cm^2): 9  Area debrided (cm^2): 5 4  Volume (cm^3): 1 8  Tissue and other material debrided: dermis, epidermis and subcutaneous tissue  Devitalized tissue debrided: fibrin and slough  Instrument(s) utilized: blade  Bleeding: medium  Hemostasis obtained with: pressure and silver nitrate  Procedural pain (0-10): 0  Post-procedural pain: 0   Response to treatment: procedure was tolerated well          Plan:  1  Eschar prematurely pulled off with stable wound  Serial debridement  Puracol Ag to right foot wound  Continue xeroform on left foot  2  Reviewed / discussed arterial study  Awaits vascular evaluation  3  Discussed proper off-loading  4  RA in 1 week  Wound 03/11/21 Surgical Foot Right;Lateral (Active)   Wound Image Images linked 03/25/21 1051   Wound Description Granulation tissue;Slough;Eschar 03/25/21 1023   Dominga-wound Assessment Intact 03/25/21 1023   Wound Length (cm) 6 cm 03/25/21 1023   Wound Width (cm) 1 5 cm 03/25/21 1023   Wound Depth (cm) 0 2 cm 03/25/21 1023   Wound Surface Area (cm^2) 9 cm^2 03/25/21 1023   Wound Volume (cm^3) 1 8 cm^3 03/25/21 1023   Calculated Wound Volume (cm^3) 1 8 cm^3 03/25/21 1023   Drainage Amount Small 03/25/21 1023   Drainage Description Serosanguineous 03/25/21 1023   Non-staged Wound Description Full thickness 03/25/21 1023   Dressing Status Intact 03/25/21 1023       Wound 03/11/21 Foot Anterior; Left (Active)   Wound Image Images linked 03/25/21 1013   Wound Description Yellow;Slough;Pink 03/25/21 1025   Dominga-wound Assessment Intact 03/25/21 1025   Wound Length (cm) 0 6 cm 03/25/21 1025   Wound Width (cm) 0 2 cm 03/25/21 1025   Wound Depth (cm) 0 1 cm 03/25/21 1025   Wound Surface Area (cm^2) 0 12 cm^2 03/25/21 1025   Wound Volume (cm^3) 0 01 cm^3 03/25/21 1025   Calculated Wound Volume (cm^3) 0 01 cm^3 03/25/21 1025   Change in Wound Size % 92 86 03/25/21 1025   Drainage Amount Scant 03/25/21 1025   Drainage Description Serosanguineous 03/25/21 1025   Non-staged Wound Description Full thickness 03/25/21 1025   Dressing Status Intact 03/25/21 1025       Wound 03/11/21 Heel Left (Active)   Wound Image Images linked 03/25/21 1012   Wound Description Epithelialization;Eschar 03/25/21 1025   Dominga-wound Assessment Intact; Erythema 03/25/21 1025   Wound Length (cm) 2 3 cm 03/25/21 1025   Wound Width (cm) 1 9 cm 03/25/21 1025   Wound Depth (cm) 0 cm 03/25/21 1025   Wound Surface Area (cm^2) 4 37 cm^2 03/25/21 1025   Wound Volume (cm^3) 0 cm^3 03/25/21 1025   Calculated Wound Volume (cm^3) 0 cm^3 03/25/21 1025   Drainage Amount None 03/25/21 1025   Non-staged Wound Description Full thickness 03/25/21 1025   Dressing Status Intact 03/25/21 1025       Wound 03/11/21 Surgical Foot Right;Lateral (Active)   Date First Assessed/Time First Assessed: 03/11/21 1057   Primary Wound Type: Surgical  Location: Foot  Wound Location Orientation: Right;Lateral       Wound 03/11/21 Foot Anterior; Left (Active)   Date First Assessed: 03/11/21   Location: Foot  Wound Location Orientation: Anterior; Left       Wound 03/11/21 Heel Left (Active)   Date First Assessed/Time First Assessed: 03/11/21 1100   Location: Heel  Wound Location Orientation: Left       [REMOVED] Wound 07/24/20 Diabetic Ulcer Foot Right;Lateral (Removed)   Resolved Date: 03/11/21  Date First Assessed: 07/24/20   Pre-Existing Wound: Yes  Primary Wound Type: Diabetic Ulcer  Location: Foot  Wound Location Orientation: Right;Lateral  Wound Description (Comments): Cristina 1       [REMOVED] Wound 12/18/20 Foot Right (Removed)   Resolved Date: 03/11/21  Date First Assessed/Time First Assessed: 12/18/20 1146   Location: Foot  Wound Location Orientation: Right  Incision's 1st Dressing: DRESSING OIL EMULSION 3 X 3 IN (x1), SPONGE GAUZE 4 X 8 12 PLY STRL LF (x1), BANDAGE WILLARD 3          [REMOVED] Wound 01/28/21 Catheter entry/exit site Groin Left (Removed)   Resolved Date/Resolved Time: 03/25/21 1011  Date First Assessed/Time First Assessed: 01/28/21 0909   Traumatic Wound Type: Catheter entry/exit site  Location: Groin  Wound Location Orientation: Left  Wound Description (Comments): angiogram access punc    [REMOVED] Wound 01/29/21 Diabetic Ulcer Foot Lateral;Right (Removed)   Resolved Date: 03/11/21  Date First Assessed/Time First Assessed: 01/29/21 0820   Pre-Existing Wound: Yes  Primary Wound Type: Diabetic Ulcer  Location: Foot  Wound Location Orientation: Lateral;Right       [REMOVED] Wound 02/02/21 Foot Right (Removed)   Resolved Date: 03/11/21  Date First Assessed/Time First Assessed: 02/02/21 1734   Location: Foot  Wound Location Orientation: Right  Incision's 1st Dressing: DRESSING OIL EMULSION 3 X 3 IN (x1), SPONGE GAUZE 4 X 4 16 PLY STRL PLASTIC TRAY LF (x1), JOSI    Subjective:        BULMARO  Cyn Lester presents for wound care bilateral feet  Scab fell off and it started to bleed on right foot  No significant pain  No redness or signs of infection bilaterally          The following portions of the patient's history were reviewed and updated as appropriate: allergies, current medications, past family history, past medical history, past social history, past surgical history and problem list     PAST MEDICAL HISTORY:  Past Medical History:   Diagnosis Date    Arthritis     fingers    Diabetes mellitus ([address] )     First degree AV block     Full dentures     PAD (peripheral artery disease) ([address] )     PVD (peripheral vascular disease) ([facility]     Wound, open     right foot- by the 5th toe       PAST SURGICAL HISTORY:  Past Surgical History:   Procedure Laterality Date    CHOLECYSTECTOMY      COLONOSCOPY      IR AORTAGRAM WITH RUN-OFF  1/28/2021    SC DEBRIDEMENT, SKIN, SUB-Q TISSUE,MUSCLE,BONE,=<20 SQ CM Right 12/18/2020    Procedure: DEBRIDMENT OF ULCER , REMOVAL OF DEVITALIZED SKIN, SOFT TISSUE, BONE AND APPLICATION OF INTEGRA GRAFT RIGHT FOOT ;  Surgeon: Ming Newton DPM;  Location: [address] [place] [address];  Service: Podiatry    REPLANTATION THUMB Right     right tip reconnected    SKIN GRAFT      right thumb    TOE AMPUTATION      left foot all 5 toes removed    TOE AMPUTATION Right 2/2/2021    Procedure: Right partial 5th ray amputation;  Surgeon: Elaine Guillen DPM;  Location:  MAIN OR;  Service: Podiatry    TOE OSTEOTOMY Right 6/26/2020    Procedure: exostosis of right big toe;  Surgeon: Dee Moraes DPM;  Location: [address] [place] [address];  Service: Podiatry    TRIGGER FINGER RELEASE      bilateral hands    VEIN LIGATION      right        ALLERGIES:  Shellfish-derived products and Sulfamethoxazole-trimethoprim    MEDICATIONS:  Current Outpatient Medications   Medication Sig Dispense Refill    atorvastatin (LIPITOR) 40 mg tablet Take 1 tablet (40 mg total) by mouth daily at bedtime 90 tablet 1    Blood Glucose Monitoring Suppl (OneTouch Verio) w/Device KIT by Does not apply route 2 (two) times a day Dx E11 9 1 kit 1    clopidogrel (PLAVIX) 75 mg tablet Take 1 tablet (75 mg total) by mouth daily 90 tablet 0    gabapentin (NEURONTIN) 300 mg capsule Take 1 capsule (300 mg total) by mouth daily at bedtime 30 capsule 1    glucose blood (OneTouch Verio) test strip Use as instructed qid Dx E11 9 400 each 3    halobetasol (ULTRAVATE) 0 05 % cream Apply topically 2 (two) times a day (Patient not taking: Reported on 3/11/2021) 50 g 5    HYDROcodone-acetaminophen (NORCO) 5-325 mg per tablet Take 1 tablet by mouth every 6 (six) hours as needed for painMax Daily Amount: 4 tablets (Patient not taking: Reported on 3/11/2021) 20 tablet 0    insulin NPH (HumuLIN N,NovoLIN N) 100 Units/mL subcutaneous injection Inject 12 Units under the skin 2 (two) times a day 7 2 mL 0    insulin regular (HumuLIN R,NovoLIN R) 100 units/mL injection 4 units with lunch, 8 units with dinner 10 mL 0    Lancets (OneTouch Delica Plus [id number]) MISC 1 Units by Does not apply route 2 (two) times a day Dx E11 9 100 each 2    Nitro-Bid 2 % ointment       oxyCODONE-acetaminophen (PERCOCET) 5-325 mg per tablet Take 1 tablet by mouth every 6 (six) hours as needed for moderate painMax Daily Amount: 4 tablets (Patient not taking: Reported on 3/11/2021) 120 tablet 0    pantoprazole (PROTONIX) 40 mg tablet Take 1 tablet (40 mg total) by mouth daily 90 tablet 1    rivaroxaban (Xarelto) 2 5 mg tablet Take 1 tablet (2 5 mg total) by mouth daily with breakfast Last dose 6/21/20 30 tablet 2    UltiCare Insulin Syringe 31G X 5/16" 1 ML MISC        No current facility-administered medications for this visit          SOCIAL HISTORY:  Social History     Socioeconomic History    Marital status: /Civil Union     Spouse name: None    Number of children: None    Years of education: None    Highest education level: None   Occupational History    Occupation: Cook   Social Needs    Financial resource strain: None    Food insecurity     Worry: None     Inability: None    Transportation needs     Medical: None     Non-medical: None   Tobacco Use    Smoking status: Never Smoker    Smokeless tobacco: Never Used   Substance and Sexual Activity    Alcohol use: Yes     Frequency: Monthly or less     Drinks per session: 1 or 2     Binge frequency: Never     Comment: occasional    Drug use: Never    Sexual activity: None   Lifestyle    Physical activity     Days per week: None     Minutes per session: None    Stress: None   Relationships    Social connections     Talks on phone: None     Gets together: None     Attends Hoahaoism service: None     Active member of club or organization: None     Attends meetings of clubs or organizations: None     Relationship status: None    Intimate partner violence     Fear of current or ex partner: None     Emotionally abused: None     Physically abused: None     Forced sexual activity: None   Other Topics Concern    None   Social History Narrative · Most recent tobacco use screenin2019      · Do you currently or have you served in the [employer]:   No      · Were you activated, into active duty, as a member of the [employer] or as a Reservist:   No      · Diet:   Regular      · Alcohol intake:   Occasional      · Caffeine intake:   Occasional      · Seat belts used routinely:   Yes      · Smoke alarm in home:   Yes       Review of Systems   Constitutional: Negative for chills and fever  HENT: Negative for sore throat  Respiratory: Negative for cough and shortness of breath  Cardiovascular: Negative for chest pain  Gastrointestinal: Negative for diarrhea, nausea and vomiting  Skin: Positive for wound  Objective:       Wound 21 Surgical Foot Right;Lateral (Active)   Wound Image Images linked 21 1051   Wound Description Granulation tissue;Slough;Eschar 21 1023   Dominga-wound Assessment Intact 21 1023   Wound Length (cm) 6 cm 21 1023   Wound Width (cm) 1 5 cm 21 1023   Wound Depth (cm) 0 2 cm 21 1023   Wound Surface Area (cm^2) 9 cm^2 21 1023   Wound Volume (cm^3) 1 8 cm^3 21 1023   Calculated Wound Volume (cm^3) 1 8 cm^3 21 1023   Drainage Amount Small 21 1023   Drainage Description Serosanguineous 21 1023   Non-staged Wound Description Full thickness 21 1023   Dressing Status Intact 21 1023       Wound 21 Foot Anterior; Left (Active)   Wound Image Images linked 21 1013   Wound Description Yellow;Slough;Pink 21 1025   Dominga-wound Assessment Intact 21 1025   Wound Length (cm) 0 6 cm 21 1025   Wound Width (cm) 0 2 cm 21 1025   Wound Depth (cm) 0 1 cm 21 1025   Wound Surface Area (cm^2) 0 12 cm^2 21 1025   Wound Volume (cm^3) 0 01 cm^3 21 1025   Calculated Wound Volume (cm^3) 0 01 cm^3 21 1025   Change in Wound Size % 92 86 21 1025   Drainage Amount Scant 21 1025   Drainage Description Serosanguineous 03/25/21 1025   Non-staged Wound Description Full thickness 03/25/21 1025   Dressing Status Intact 03/25/21 1025       Wound 03/11/21 Heel Left (Active)   Wound Image Images linked 03/25/21 1012   Wound Description Epithelialization;Eschar 03/25/21 1025   Dominga-wound Assessment Intact; Erythema 03/25/21 1025   Wound Length (cm) 2 3 cm 03/25/21 1025   Wound Width (cm) 1 9 cm 03/25/21 1025   Wound Depth (cm) 0 cm 03/25/21 1025   Wound Surface Area (cm^2) 4 37 cm^2 03/25/21 1025   Wound Volume (cm^3) 0 cm^3 03/25/21 1025   Calculated Wound Volume (cm^3) 0 cm^3 03/25/21 1025   Drainage Amount None 03/25/21 1025   Non-staged Wound Description Full thickness 03/25/21 1025   Dressing Status Intact 03/25/21 1025       /58   Pulse 68   Temp (!) 96 6 °F (35 9 °C)   Resp 16     Physical Exam  Vitals signs and nursing note reviewed  Constitutional:       General: He is not in acute distress  Appearance: Normal appearance  He is not toxic-appearing  Cardiovascular:      Rate and Rhythm: Normal rate and regular rhythm  Pulses:           Dorsalis pedis pulses are 0 on the right side and 0 on the left side  Posterior tibial pulses are 0 on the right side and 0 on the left side  Comments: No ischemia  Pulmonary:      Effort: Pulmonary effort is normal  No respiratory distress  Musculoskeletal:         General: No signs of injury  Comments: TMA left foot  Right 5th ray amputation  Skin:     General: Skin is warm and dry  Coloration: Skin is not cyanotic or mottled  Findings: Wound present  No abscess or erythema  Rash is not purpuric  Nails: There is no clubbing  Comments: Dry wounds with eschar left heel with no drainage  Stable wound left TMA site  No signs of infection  Wound presents proximal incision  No deep probing or infection  See wound assessments  Neurological:      General: No focal deficit present        Mental Status: He is alert and oriented to person, place, and time  Cranial Nerves: No cranial nerve deficit  Motor: No weakness  Psychiatric:         Mood and Affect: Mood normal          Behavior: Behavior normal          Thought Content: Thought content normal          Judgment: Judgment normal            Wound Instructions:  Orders Placed This Encounter   Procedures    Wound cleansing and dressings     Right foot wound:  Wound care 3x/wk  Do not get wet with shower water, may use cast covers or sponge bathe  May cleanse with NSS, pat dry  Apply Puracol Ag or equivalent collagen dressing with silver to the open area on the right foot wound  Cover with gauze and secure gently with rolled gauze and tape  The right foot wound was subcutaneously debrided today  Left foot wounds:  Wound care 3x/wk  Do not get wet with shower water, may use cast covers or sponge bathe  Apply xeroform cut to fit each wound  Cover with gauze and secure gently with rolled gauze and tape  The left foot wounds were mechanically debrided with NSS moistened gauze today at the wound center  The above dressings were applied today  Standing Status:   Future     Standing Expiration Date:   3/25/2022    Wound miscellaneous orders     Request for supplies will be faxed to [id number] [place] [address]  Phone: [phone]     Standing Status:   Future     Standing Expiration Date:   3/25/2022    Wound off loading     Limit walking to keep pressure off of the wounds  Ensure adequate nutrition, 3 meals per day that include protein  Standing Status:   Future     Standing Expiration Date:   3/25/2022    Debridement     This order was created via procedure documentation        Diagnosis ICD-10-CM Associated Orders   1   Surgical wound, non healing, initial encounter  [id number] [id number] lidocaine (XYLOCAINE) 4 % topical solution 1 mL     Wound cleansing and dressings     Wound miscellaneous orders     Wound off loading     Debridement 2  Heel ulceration, left, with unspecified severity ([facility])  L97 429 lidocaine (XYLOCAINE) 4 % topical solution 1 mL     Wound cleansing and dressings     Wound miscellaneous orders     Wound off loading   3  Ulcer of left foot, unspecified ulcer stage ([facility])  L97 529 lidocaine (XYLOCAINE) 4 % topical solution 1 mL     Wound cleansing and dressings     Wound miscellaneous orders     Wound off loading   4  Diabetic polyneuropathy associated with type 2 diabetes mellitus ([facility])  E11 42    5   Peripheral vascular disease, unspecified ([facility] )  I73 9

## 2021-03-25 NOTE — PATIENT INSTRUCTIONS
Orders Placed This Encounter   Procedures    Wound cleansing and dressings     Right foot wound:  Wound care 3x/wk  Do not get wet with shower water, may use cast covers or sponge bathe  May cleanse with NSS, pat dry  Apply Puracol Ag or equivalent collagen dressing with silver to the open area on the right foot wound  Cover with gauze and secure gently with rolled gauze and tape  The right foot wound was subcutaneously debrided today  Left foot wounds:  Wound care 3x/wk  Do not get wet with shower water, may use cast covers or sponge bathe  Apply xeroform cut to fit each wound  Cover with gauze and secure gently with rolled gauze and tape  The left foot wounds were mechanically debrided with NSS moistened gauze today at the wound center  The above dressings were applied today  Standing Status:   Future     Standing Expiration Date:   3/25/2022    Wound miscellaneous orders     Request for supplies will be faxed to [email or web address]  Phone: [phone]     Standing Status:   Future     Standing Expiration Date:   3/25/2022    Wound off loading     Limit walking to keep pressure off of the wounds  Ensure adequate nutrition, 3 meals per day that include protein       Standing Status:   Future     Standing Expiration Date:   3/25/2022

## 2021-04-01 ENCOUNTER — OFFICE VISIT (OUTPATIENT)
Dept: WOUND CARE | Facility: HOSPITAL | Age: 66
End: 2021-04-01
Payer: COMMERCIAL

## 2021-04-01 VITALS
RESPIRATION RATE: 18 BRPM | SYSTOLIC BLOOD PRESSURE: 138 MMHG | DIASTOLIC BLOOD PRESSURE: 64 MMHG | HEART RATE: 92 BPM | TEMPERATURE: 95.8 F

## 2021-04-01 DIAGNOSIS — I73.9 PERIPHERAL VASCULAR DISEASE, UNSPECIFIED (HCC): ICD-10-CM

## 2021-04-01 DIAGNOSIS — T81.89XA SURGICAL WOUND, NON HEALING, INITIAL ENCOUNTER: Primary | ICD-10-CM

## 2021-04-01 DIAGNOSIS — L97.529 ULCER OF LEFT FOOT, UNSPECIFIED ULCER STAGE (HCC): ICD-10-CM

## 2021-04-01 DIAGNOSIS — L97.429 HEEL ULCERATION, LEFT, WITH UNSPECIFIED SEVERITY (HCC): ICD-10-CM

## 2021-04-01 DIAGNOSIS — E11.42 DIABETIC POLYNEUROPATHY ASSOCIATED WITH TYPE 2 DIABETES MELLITUS (HCC): ICD-10-CM

## 2021-04-01 PROCEDURE — 11042 DBRDMT SUBQ TIS 1ST 20SQCM/<: CPT | Performed by: PODIATRIST

## 2021-04-01 PROCEDURE — 99213 OFFICE O/P EST LOW 20 MIN: CPT | Performed by: PODIATRIST

## 2021-04-01 RX ORDER — LIDOCAINE HYDROCHLORIDE 40 MG/ML
5 SOLUTION TOPICAL ONCE
Status: COMPLETED | OUTPATIENT
Start: 2021-04-01 | End: 2021-04-01

## 2021-04-01 RX ADMIN — LIDOCAINE HYDROCHLORIDE 1 ML: 40 SOLUTION TOPICAL at 10:30

## 2021-04-01 NOTE — PROGRESS NOTES
Patient ID: Guy Levin is a 76 y o  male Date of Birth 1955     Chief Complaint  Chief Complaint   Patient presents with    Follow Up Wound Care Visit     Right Foot, Left Heel and Left foot wounds       Allergies  Shellfish-derived products - food allergy and Sulfamethoxazole-trimethoprim    Assessment:    No problem-specific Assessment & Plan notes found for this encounter  Diagnoses and all orders for this visit:    Surgical wound, non healing, initial encounter  -     lidocaine (XYLOCAINE) 4 % topical solution 5 mL  -     Wound cleansing and dressings; Future  -     Debridement    Heel ulceration, left, with unspecified severity (HCC)  -     Wound cleansing and dressings; Future    Ulcer of left foot, unspecified ulcer stage (HCC)  -     Wound cleansing and dressings; Future    Diabetic polyneuropathy associated with type 2 diabetes mellitus ([facility] )    Peripheral vascular disease, unspecified ([facility] )              Debridement   Wound 03/11/21 Surgical Foot Right;Lateral    Universal Protocol:  Consent: Verbal consent obtained  Risks and benefits: risks, benefits and alternatives were discussed  Consent given by: patient  Time out: Immediately prior to procedure a "time out" was called to verify the correct patient, procedure, equipment, support staff and site/side marked as required    Timeout called at: 4/1/2021 10:32 AM   Patient understanding: patient states understanding of the procedure being performed  Patient identity confirmed: verbally with patient      Performed by: physician  Debridement type: surgical  Level of debridement: subcutaneous tissue  Pain control: lidocaine 4%  Post-debridement measurements  Length (cm): 6  Width (cm): 1 6  Depth (cm): 0 4  Percent debrided: 70%  Surface Area (cm^2): 9 6  Area debrided (cm^2): 6 72  Volume (cm^3): 3 84  Tissue and other material debrided: dermis, epidermis and subcutaneous tissue  Devitalized tissue debrided: fibrin and slough  Instrument(s) utilized: blade  Bleeding: small  Hemostasis obtained with: pressure  Procedural pain (0-10): 0  Post-procedural pain: 0   Response to treatment: procedure was tolerated well          Plan:  1  Continue serial debridement  Puracol Ag to right foot wound  Continue xeroform on left foot  Calmoseptine cream to the rash  2  Awaits vascular evaluation  Will call the vascular center if there is any appointment sooner  3  Discussed proper off-loading  4  RA in 1 week  Wound 03/11/21 Surgical Foot Right;Lateral (Active)   Wound Image Images linked 04/01/21 1040   Wound Description Eschar;Granulation tissue;Pink;Yellow 04/01/21 1026   Dominga-wound Assessment Rash; Intact;Edema 04/01/21 1026   Wound Length (cm) 6 cm 04/01/21 1026   Wound Width (cm) 1 6 cm 04/01/21 1026   Wound Depth (cm) 0 4 cm 04/01/21 1026   Wound Surface Area (cm^2) 9 6 cm^2 04/01/21 1026   Wound Volume (cm^3) 3 84 cm^3 04/01/21 1026   Calculated Wound Volume (cm^3) 3 84 cm^3 04/01/21 1026   Drainage Amount Small 04/01/21 1026   Drainage Description Serosanguineous 04/01/21 1026   Non-staged Wound Description Full thickness 04/01/21 1026       Wound 03/11/21 Foot Anterior; Left (Active)   Wound Image Images linked 04/01/21 1014   Wound Description Pink 04/01/21 1027   Dominga-wound Assessment Intact;Dry 04/01/21 1027   Wound Length (cm) 0 5 cm 04/01/21 1027   Wound Width (cm) 0 2 cm 04/01/21 1027   Wound Depth (cm) 0 1 cm 04/01/21 1027   Wound Surface Area (cm^2) 0 1 cm^2 04/01/21 1027   Wound Volume (cm^3) 0 01 cm^3 04/01/21 1027   Calculated Wound Volume (cm^3) 0 01 cm^3 04/01/21 1027   Change in Wound Size % 92 86 04/01/21 1027   Drainage Amount Scant 04/01/21 1027   Drainage Description Serosanguineous 04/01/21 1027   Non-staged Wound Description Full thickness 04/01/21 1027       Wound 03/11/21 Heel Left (Active)   Wound Image Images linked 04/01/21 1014   Wound Description Eschar;Epithelialization 04/01/21 1028   Dominga-wound Assessment Dry;Intact 04/01/21 1028   Wound Length (cm) 2 5 cm 04/01/21 1028   Wound Width (cm) 1 7 cm 04/01/21 1028   Wound Depth (cm) 0 cm 04/01/21 1028   Wound Surface Area (cm^2) 4 25 cm^2 04/01/21 1028   Wound Volume (cm^3) 0 cm^3 04/01/21 1028   Calculated Wound Volume (cm^3) 0 cm^3 04/01/21 1028   Drainage Amount None 04/01/21 1028   Non-staged Wound Description Not applicable 50/14/07 1525       Wound 03/11/21 Surgical Foot Right;Lateral (Active)   Date First Assessed/Time First Assessed: 03/11/21 1057   Primary Wound Type: Surgical  Location: Foot  Wound Location Orientation: Right;Lateral       Wound 03/11/21 Foot Anterior; Left (Active)   Date First Assessed: 03/11/21   Location: Foot  Wound Location Orientation: Anterior; Left       Wound 03/11/21 Heel Left (Active)   Date First Assessed/Time First Assessed: 03/11/21 1100   Location: Heel  Wound Location Orientation: Left       [REMOVED] Wound 07/24/20 Diabetic Ulcer Foot Right;Lateral (Removed)   Resolved Date: 03/11/21  Date First Assessed: 07/24/20   Pre-Existing Wound: Yes  Primary Wound Type: Diabetic Ulcer  Location: Foot  Wound Location Orientation: Right;Lateral  Wound Description (Comments): Cristina 1       [REMOVED] Wound 12/18/20 Foot Right (Removed)   Resolved Date: 03/11/21  Date First Assessed/Time First Assessed: 12/18/20 1146   Location: Foot  Wound Location Orientation: Right  Incision's 1st Dressing: DRESSING OIL EMULSION 3 X 3 IN (x1), SPONGE GAUZE 4 X 8 12 PLY STRL LF (x1), BANDAGE WILLARD 3   [REMOVED] Wound 01/28/21 Catheter entry/exit site Groin Left (Removed)   Resolved Date/Resolved Time: 03/25/21 1011  Date First Assessed/Time First Assessed: 01/28/21 0909   Traumatic Wound Type: Catheter entry/exit site  Location: Groin  Wound Location Orientation: Left  Wound Description (Comments): angiogram access punc           [REMOVED] Wound 01/29/21 Diabetic Ulcer Foot Lateral;Right (Removed)   Resolved Date: 03/11/21  Date First Assessed/Time First Assessed: 01/29/21 0820   Pre-Existing Wound: Yes  Primary Wound Type: Diabetic Ulcer  Location: Foot  Wound Location Orientation: Lateral;Right       [REMOVED] Wound 02/02/21 Foot Right (Removed)   Resolved Date: 03/11/21  Date First Assessed/Time First Assessed: 02/02/21 1734   Location: Foot  Wound Location Orientation: Right  Incision's 1st Dressing: DRESSING OIL EMULSION 3 X 3 IN (x1), SPONGE GAUZE 4 X 4 16 PLY STRL PLASTIC TRAY LF (x1), JOSI    Subjective:        HPI  Daren Maori presents for wound care bilateral feet  No redness or signs of infection bilaterally  He noticed some rashes on right dorsal foot  Pain is minimal on right foot  Pain presents left foot, especially at night time  Vascular appointment is still 2 weeks away        The following portions of the patient's history were reviewed and updated as appropriate: allergies, current medications, past family history, past medical history, past social history, past surgical history and problem list     PAST MEDICAL HISTORY:  Past Medical History:   Diagnosis Date    Arthritis     fingers    Diabetes mellitus ([address] )     First degree AV block     Full dentures     PAD (peripheral artery disease) ([address] )     PVD (peripheral vascular disease) ([address] )     Wound, open     right foot- by the 5th toe       PAST SURGICAL HISTORY:  Past Surgical History:   Procedure Laterality Date    CHOLECYSTECTOMY      COLONOSCOPY      IR AORTAGRAM WITH RUN-OFF  1/28/2021    VT DEBRIDEMENT, SKIN, SUB-Q TISSUE,MUSCLE,BONE,=<20 SQ CM Right 12/18/2020    Procedure: DEBRIDMENT OF ULCER , REMOVAL OF DEVITALIZED SKIN, SOFT TISSUE, BONE AND APPLICATION OF INTEGRA GRAFT RIGHT FOOT ;  Surgeon: Nelida Lozada DPM;  Location: [address] [place] [address];  Service: Podiatry    REPLANTATION THUMB Right     right tip reconnected    SKIN GRAFT      right thumb    TOE AMPUTATION      left foot all 5 toes removed    TOE AMPUTATION Right 2/2/2021    Procedure: Right partial 5th ray amputation;  Surgeon: Marty Garcia DPM;  Location: [facility];  Service: Podiatry    TOE OSTEOTOMY Right 6/26/2020    Procedure: exostosis of right big toe;  Surgeon: Rao Butts DPM;  Location: [facility];  Service: Podiatry    [address]      bilateral hands    VEIN LIGATION      right        ALLERGIES:  Shellfish-derived products - food allergy and Sulfamethoxazole-trimethoprim    MEDICATIONS:  Current Outpatient Medications   Medication Sig Dispense Refill    atorvastatin (LIPITOR) 40 mg tablet Take 1 tablet (40 mg total) by mouth daily at bedtime 90 tablet 1    Blood Glucose Monitoring Suppl (OneTouch Verio) w/Device KIT by Does not apply route 2 (two) times a day Dx E11 9 1 kit 1    clopidogrel (PLAVIX) 75 mg tablet Take 1 tablet (75 mg total) by mouth daily 90 tablet 0    gabapentin (NEURONTIN) 300 mg capsule Take 1 capsule (300 mg total) by mouth daily at bedtime 30 capsule 1    glucose blood (OneTouch Verio) test strip Use as instructed qid Dx E11 9 400 each 3    halobetasol (ULTRAVATE) 0 05 % cream Apply topically 2 (two) times a day (Patient not taking: Reported on 3/11/2021) 50 g 5    HYDROcodone-acetaminophen (NORCO) 5-325 mg per tablet Take 1 tablet by mouth every 6 (six) hours as needed for painMax Daily Amount: 4 tablets (Patient not taking: Reported on 3/11/2021) 20 tablet 0    insulin NPH (HumuLIN N,NovoLIN N) 100 Units/mL subcutaneous injection Inject 12 Units under the skin 2 (two) times a day 7 2 mL 0    insulin regular (HumuLIN R,NovoLIN R) 100 units/mL injection 4 units with lunch, 8 units with dinner 10 mL 0    Lancets (OneTouch Delica Plus [id number]) MISC 1 Units by Does not apply route 2 (two) times a day Dx E11 9 100 each 2    Nitro-Bid 2 % ointment       oxyCODONE-acetaminophen (PERCOCET) 5-325 mg per tablet Take 1 tablet by mouth every 6 (six) hours as needed for moderate painMax Daily Amount: 4 tablets (Patient not taking: Reported on 3/11/2021) 120 tablet 0    pantoprazole (PROTONIX) 40 mg tablet Take 1 tablet (40 mg total) by mouth daily 90 tablet 1    rivaroxaban (Xarelto) 2 5 mg tablet Take 1 tablet (2 5 mg total) by mouth daily with breakfast Last dose 20 30 tablet 2    UltiCare Insulin Syringe 31G X " 1 ML MISC        No current facility-administered medications for this visit          SOCIAL HISTORY:  Social History     Socioeconomic History    Marital status: /Civil Union     Spouse name: None    Number of children: None    Years of education: None    Highest education level: None   Occupational History    Occupation: Cook   Social Needs    Financial resource strain: None    Food insecurity     Worry: None     Inability: None    Transportation needs     Medical: None     Non-medical: None   Tobacco Use    Smoking status: Never Smoker    Smokeless tobacco: Never Used   Substance and Sexual Activity    Alcohol use: Yes     Frequency: Monthly or less     Drinks per session: 1 or 2     Binge frequency: Never     Comment: occasional    Drug use: Never    Sexual activity: None   Lifestyle    Physical activity     Days per week: None     Minutes per session: None    Stress: None   Relationships    Social connections     Talks on phone: None     Gets together: None     Attends Religion service: None     Active member of club or organization: None     Attends meetings of clubs or organizations: None     Relationship status: None    Intimate partner violence     Fear of current or ex partner: None     Emotionally abused: None     Physically abused: None     Forced sexual activity: None   Other Topics Concern    None   Social History Narrative    · Most recent tobacco use screenin2019      · Do you currently or have you served in the [email or web address]:   No      · Were you activated, into active duty, as a member of the [employer] or as a Reservist:   No      · Diet:   Regular      · Alcohol intake:   Occasional      · Caffeine intake:   Occasional      · Seat belts used routinely:   Yes      · Smoke alarm in home:   Yes       Review of Systems   Constitutional: Negative for chills and fever  HENT: Negative for sore throat  Respiratory: Negative for cough and shortness of breath  Cardiovascular: Negative for chest pain  Gastrointestinal: Negative for diarrhea, nausea and vomiting  Skin: Positive for wound  Objective:       Wound 03/11/21 Surgical Foot Right;Lateral (Active)   Wound Image Images linked 04/01/21 1040   Wound Description Eschar;Granulation tissue;Pink;Yellow 04/01/21 1026   Dominga-wound Assessment Rash; Intact;Edema 04/01/21 1026   Wound Length (cm) 6 cm 04/01/21 1026   Wound Width (cm) 1 6 cm 04/01/21 1026   Wound Depth (cm) 0 4 cm 04/01/21 1026   Wound Surface Area (cm^2) 9 6 cm^2 04/01/21 1026   Wound Volume (cm^3) 3 84 cm^3 04/01/21 1026   Calculated Wound Volume (cm^3) 3 84 cm^3 04/01/21 1026   Drainage Amount Small 04/01/21 1026   Drainage Description Serosanguineous 04/01/21 1026   Non-staged Wound Description Full thickness 04/01/21 1026       Wound 03/11/21 Foot Anterior; Left (Active)   Wound Image Images linked 04/01/21 1014   Wound Description Pink 04/01/21 1027   Dominga-wound Assessment Intact;Dry 04/01/21 1027   Wound Length (cm) 0 5 cm 04/01/21 1027   Wound Width (cm) 0 2 cm 04/01/21 1027   Wound Depth (cm) 0 1 cm 04/01/21 1027   Wound Surface Area (cm^2) 0 1 cm^2 04/01/21 1027   Wound Volume (cm^3) 0 01 cm^3 04/01/21 1027   Calculated Wound Volume (cm^3) 0 01 cm^3 04/01/21 1027   Change in Wound Size % 92 86 04/01/21 1027   Drainage Amount Scant 04/01/21 1027   Drainage Description Serosanguineous 04/01/21 1027   Non-staged Wound Description Full thickness 04/01/21 1027       Wound 03/11/21 Heel Left (Active)   Wound Image Images linked 04/01/21 1014   Wound Description Eschar;Epithelialization 04/01/21 1028   Dominga-wound Assessment Dry; Intact 04/01/21 1028   Wound Length (cm) 2 5 cm 04/01/21 1028 Wound Width (cm) 1 7 cm 04/01/21 1028   Wound Depth (cm) 0 cm 04/01/21 1028   Wound Surface Area (cm^2) 4 25 cm^2 04/01/21 1028   Wound Volume (cm^3) 0 cm^3 04/01/21 1028   Calculated Wound Volume (cm^3) 0 cm^3 04/01/21 1028   Drainage Amount None 04/01/21 1028   Non-staged Wound Description Not applicable 91/20/96 5266       /64   Pulse 92   Temp (!) 95 8 °F (35 4 °C)   Resp 18     Physical Exam  Vitals signs and nursing note reviewed  Constitutional:       General: He is not in acute distress  Appearance: Normal appearance  He is not toxic-appearing  Cardiovascular:      Rate and Rhythm: Normal rate and regular rhythm  Pulses:           Dorsalis pedis pulses are 0 on the right side and 0 on the left side  Posterior tibial pulses are 0 on the right side and 0 on the left side  Comments: No ischemia  Pulmonary:      Effort: Pulmonary effort is normal  No respiratory distress  Musculoskeletal:         General: No signs of injury  Comments: TMA left foot  Right 5th ray amputation  Skin:     General: Skin is warm and dry  Coloration: Skin is not cyanotic or mottled  Findings: Wound present  No abscess or erythema  Rash is not purpuric  Nails: There is no clubbing  Comments: Dry wounds with eschar left heel with no drainage  Stable wound left TMA site  No signs of infection  Wound presents proximal incision  Increased granular tissues  No deep probing or infection  See wound assessments  Neurological:      General: No focal deficit present  Mental Status: He is alert and oriented to person, place, and time  Cranial Nerves: No cranial nerve deficit  Motor: No weakness  Psychiatric:         Mood and Affect: Mood normal          Behavior: Behavior normal          Thought Content:  Thought content normal          Judgment: Judgment normal            Wound Instructions:  Orders Placed This Encounter   Procedures    Wound cleansing and dressings     Right foot wound:  Wound care 3x/wk  Do not get wet with shower water, may use cast covers or sponge bathe      May cleanse with NSS, pat dry  Apply Calmoseptine to quita wound  Apply Puracol Ag or equivalent collagen dressing with silver to the open area on the right foot wound  Cover with gauze and secure gently with rolled gauze and tape         Left foot wounds:  Wound care 3x/wk  Do not get wet with shower water, may use cast covers or sponge bathe       Apply xeroform cut to fit each wound  Cover with gauze and secure gently with rolled gauze and tape      The left foot wounds were mechanically debrided with NSS moistened gauze today at the wound center  Wound off loading:  Limit walking to keep pressure off of the wounds  Ensure adequate nutrition, 3 meals per day that include protein  Treatments above were completed today at the [facility]    Follow up in 1 week     Standing Status:   Future     Standing Expiration Date:   4/1/2022    Debridement     This order was created via procedure documentation        Diagnosis ICD-10-CM Associated Orders   1  Surgical wound, non healing, initial encounter  [id number] lidocaine (XYLOCAINE) 4 % topical solution 5 mL     Wound cleansing and dressings     Debridement   2  Heel ulceration, left, with unspecified severity ([facility] )  L97 429 Wound cleansing and dressings   3  Ulcer of left foot, unspecified ulcer stage ([facility])  L97 529 Wound cleansing and dressings   4  Diabetic polyneuropathy associated with type 2 diabetes mellitus (HCC)  E11 42    5   Peripheral vascular disease, unspecified ([facility] )  I73 9

## 2021-04-01 NOTE — PATIENT INSTRUCTIONS
Orders Placed This Encounter   Procedures    Wound cleansing and dressings     Right foot wound:  Wound care 3x/wk  Do not get wet with shower water, may use cast covers or sponge bathe      May cleanse with NSS, pat dry  Apply Calmoseptine to quita wound  Apply Puracol Ag or equivalent collagen dressing with silver to the open area on the right foot wound  Cover with gauze and secure gently with rolled gauze and tape         Left foot wounds:  Wound care 3x/wk  Do not get wet with shower water, may use cast covers or sponge bathe       Apply xeroform cut to fit each wound  Cover with gauze and secure gently with rolled gauze and tape      The left foot wounds were mechanically debrided with NSS moistened gauze today at the wound center  Wound off loading:  Limit walking to keep pressure off of the wounds  Ensure adequate nutrition, 3 meals per day that include protein      Treatments above were completed today at the [facility]    Follow up in 1 week     Standing Status:   Future     Standing Expiration Date:   4/1/2022

## 2021-04-02 DIAGNOSIS — M54.9 CHRONIC BACK PAIN, UNSPECIFIED BACK LOCATION, UNSPECIFIED BACK PAIN LATERALITY: ICD-10-CM

## 2021-04-02 DIAGNOSIS — G89.29 CHRONIC BACK PAIN, UNSPECIFIED BACK LOCATION, UNSPECIFIED BACK PAIN LATERALITY: ICD-10-CM

## 2021-04-02 NOTE — TELEPHONE ENCOUNTER
The patient called and s/w Chris Guadalupe in the call center wanting to know if there are any openings earlier for his agram with Dr Duane Minium  I spoke with her and advised there is nothing next week but if something opens up I will certainly reach out to him  She relayed this message to the pt

## 2021-04-03 RX ORDER — OXYCODONE HYDROCHLORIDE AND ACETAMINOPHEN 5; 325 MG/1; MG/1
1 TABLET ORAL EVERY 6 HOURS PRN
Qty: 120 TABLET | Refills: 0 | Status: SHIPPED | OUTPATIENT
Start: 2021-04-03 | End: 2021-04-23 | Stop reason: SDUPTHER

## 2021-04-05 ENCOUNTER — TELEPHONE (OUTPATIENT)
Dept: RADIOLOGY | Facility: HOSPITAL | Age: 66
End: 2021-04-05

## 2021-04-05 DIAGNOSIS — I73.9 PERIPHERAL VASCULAR DISEASE, UNSPECIFIED (HCC): ICD-10-CM

## 2021-04-05 RX ORDER — SODIUM CHLORIDE 9 MG/ML
75 INJECTION, SOLUTION INTRAVENOUS CONTINUOUS
Status: CANCELLED | OUTPATIENT
Start: 2021-04-05

## 2021-04-05 NOTE — PRE-PROCEDURE INSTRUCTIONS
Phone Consult completed:Pre procedure instructions reviewed with verbal understanding  Allergies,meds,NPO, and ride   Approximate arrival time given,SDS phone call evening before procedure COVID vaccine completed

## 2021-04-06 RX ORDER — GABAPENTIN 300 MG/1
CAPSULE ORAL
Qty: 30 CAPSULE | Refills: 0 | Status: SHIPPED | OUTPATIENT
Start: 2021-04-06 | End: 2021-04-27

## 2021-04-08 ENCOUNTER — OFFICE VISIT (OUTPATIENT)
Dept: WOUND CARE | Facility: HOSPITAL | Age: 66
End: 2021-04-08
Payer: COMMERCIAL

## 2021-04-08 VITALS
SYSTOLIC BLOOD PRESSURE: 142 MMHG | HEART RATE: 88 BPM | RESPIRATION RATE: 18 BRPM | TEMPERATURE: 98 F | DIASTOLIC BLOOD PRESSURE: 72 MMHG

## 2021-04-08 DIAGNOSIS — T81.89XA SURGICAL WOUND, NON HEALING, INITIAL ENCOUNTER: Primary | ICD-10-CM

## 2021-04-08 DIAGNOSIS — L97.429 HEEL ULCERATION, LEFT, WITH UNSPECIFIED SEVERITY (HCC): ICD-10-CM

## 2021-04-08 DIAGNOSIS — E11.42 DIABETIC POLYNEUROPATHY ASSOCIATED WITH TYPE 2 DIABETES MELLITUS (HCC): ICD-10-CM

## 2021-04-08 DIAGNOSIS — L97.529 ULCER OF LEFT FOOT, UNSPECIFIED ULCER STAGE (HCC): ICD-10-CM

## 2021-04-08 PROCEDURE — 11042 DBRDMT SUBQ TIS 1ST 20SQCM/<: CPT | Performed by: PODIATRIST

## 2021-04-08 PROCEDURE — 99213 OFFICE O/P EST LOW 20 MIN: CPT | Performed by: PODIATRIST

## 2021-04-08 PROCEDURE — 99214 OFFICE O/P EST MOD 30 MIN: CPT | Performed by: PODIATRIST

## 2021-04-08 RX ORDER — SODIUM HYPOCHLORITE 1.25 MG/ML
1 SOLUTION TOPICAL DAILY
Qty: 473 ML | Refills: 0 | Status: ON HOLD | OUTPATIENT
Start: 2021-04-08 | End: 2021-07-14 | Stop reason: ALTCHOICE

## 2021-04-08 RX ORDER — LIDOCAINE HYDROCHLORIDE 40 MG/ML
5 SOLUTION TOPICAL ONCE
Status: COMPLETED | OUTPATIENT
Start: 2021-04-08 | End: 2021-04-08

## 2021-04-08 RX ADMIN — LIDOCAINE HYDROCHLORIDE 5 ML: 40 SOLUTION TOPICAL at 10:48

## 2021-04-08 NOTE — PATIENT INSTRUCTIONS
Orders Placed This Encounter   Procedures    Wound cleansing and dressings     Right foot wound:  Wound care daily   Do not get wet with shower water, may use cast covers or sponge bathe  Apply Calmoseptine to quita wound  Apply dakins moistened gauze to wound   Cover with gauze and secure gently with rolled gauze and tape         Left foot and heel wounds:  Wound care daily   Do not get wet with shower water, may use cast covers or sponge bathe  Paint wounds with betadine  Cover with gauze and secure gently with rolled gauze and tape       Treatments above were completed today at the [facility]        Standing Status:   Future     Standing Expiration Date:   4/8/2022    Wound off loading        Wound off loading:  Limit walking to keep pressure off of the wounds  Ensure adequate nutrition, 3 meals per day that include protein    If you are unable to limit the amount of ambulating and keeping pressure of both feet, your wounds may not heal     Standing Status:   Future     Standing Expiration Date:   4/8/2022

## 2021-04-08 NOTE — PROGRESS NOTES
Patient ID: Bernarda Harvey is a 76 y o  male Date of Birth 1955     Chief Complaint  Chief Complaint   Patient presents with    Follow Up Wound Care Visit     right and left foot wounds        Allergies  Shellfish-derived products - food allergy and Sulfamethoxazole-trimethoprim    Assessment:    No problem-specific Assessment & Plan notes found for this encounter  Diagnoses and all orders for this visit:    Surgical wound, non healing, initial encounter  -     lidocaine (XYLOCAINE) 4 % topical solution 5 mL  -     Wound cleansing and dressings; Future  -     Wound off loading; Future  -     sodium hypochlorite (DAKIN'S QUARTER-STRENGTH); Irrigate with 1 application as directed daily Apply with dry dressing   -     Debridement    Heel ulceration, left, with unspecified severity (HCC)  -     lidocaine (XYLOCAINE) 4 % topical solution 5 mL  -     Wound cleansing and dressings; Future  -     Wound off loading; Future    Ulcer of left foot, unspecified ulcer stage (HCC)  -     lidocaine (XYLOCAINE) 4 % topical solution 5 mL  -     Wound cleansing and dressings; Future  -     Wound off loading; Future    Diabetic polyneuropathy associated with type 2 diabetes mellitus (HCC)  -     lidocaine (XYLOCAINE) 4 % topical solution 5 mL  -     Wound cleansing and dressings; Future  -     Wound off loading; Future              Debridement   Wound 03/11/21 Surgical Foot Right;Lateral    Universal Protocol:  Consent: Verbal consent obtained  Risks and benefits: risks, benefits and alternatives were discussed  Consent given by: patient  Time out: Immediately prior to procedure a "time out" was called to verify the correct patient, procedure, equipment, support staff and site/side marked as required    Timeout called at: 4/8/2021 11:00 AM   Patient understanding: patient states understanding of the procedure being performed  Patient identity confirmed: verbally with patient      Performed by: physician  Debridement type: surgical  Level of debridement: subcutaneous tissue  Pain control: lidocaine 4%  Post-debridement measurements  Length (cm): 4  Width (cm): 1 5  Depth (cm): 0 2  Percent debrided: 100%  Surface Area (cm^2): 6  Area debrided (cm^2): 6  Volume (cm^3): 1 2  Tissue and other material debrided: dermis, epidermis and subcutaneous tissue  Devitalized tissue debrided: fibrin, slough and eschar  Instrument(s) utilized: blade  Bleeding: small  Hemostasis obtained with: pressure  Procedural pain (0-10): 0  Post-procedural pain: 0   Response to treatment: procedure was tolerated well          Plan:  1  Continue serial debridement  Use Dakin to right foot to reduce bioburden  Betadine to all wounds left foot  Calmoseptine cream to periwound  2  Reviewed IR note  A-gram next week  3  Discussed proper off-loading  4  RA in 1 week  Wound 03/11/21 Surgical Foot Right;Lateral (Active)   Wound Image Images linked 04/08/21 1105   Wound Description Black; Beefy red 04/08/21 1050   Wound Length (cm) 4 cm 04/08/21 1050   Wound Width (cm) 1 3 cm 04/08/21 1050   Wound Depth (cm) 0 2 cm 04/08/21 1050   Wound Surface Area (cm^2) 5 2 cm^2 04/08/21 1050   Wound Volume (cm^3) 1 04 cm^3 04/08/21 1050   Calculated Wound Volume (cm^3) 1 04 cm^3 04/08/21 1050   Drainage Amount Moderate 04/08/21 1050   Drainage Description Serosanguineous;Milky 04/08/21 1050   Non-staged Wound Description Full thickness 04/08/21 1050       Wound 03/11/21 Foot Anterior; Left (Active)   Wound Image Images linked 04/08/21 1042   Wound Description Yellow;Dry 04/08/21 1051   Wound Length (cm) 0 5 cm 04/08/21 1051   Wound Width (cm) 0 2 cm 04/08/21 1051   Wound Depth (cm) 0 1 cm 04/08/21 1051   Wound Surface Area (cm^2) 0 1 cm^2 04/08/21 1051   Wound Volume (cm^3) 0 01 cm^3 04/08/21 1051   Calculated Wound Volume (cm^3) 0 01 cm^3 04/08/21 1051   Change in Wound Size % 92 86 04/08/21 1051   Drainage Amount None 04/08/21 1051       Wound 03/11/21 Heel Left (Active)   Wound Image Images linked 04/08/21 1044   Wound Description Black; Epithelialization;Pink; Other (Comment) (maceration ) 04/08/21 1051   Wound Length (cm) 2 4 cm 04/08/21 1051   Wound Width (cm) 2 1 cm 04/08/21 1051   Wound Depth (cm) 0 1 cm 04/08/21 1051   Wound Surface Area (cm^2) 5 04 cm^2 04/08/21 1051   Wound Volume (cm^3) 0 5 cm^3 04/08/21 1051   Calculated Wound Volume (cm^3) 0 5 cm^3 04/08/21 1051   Drainage Amount Scant 04/08/21 1051   Drainage Description Serosanguineous 04/08/21 1051   Non-staged Wound Description Full thickness 04/08/21 1051       Wound 04/08/21 Foot Anterior; Left (Active)   Wound Image Images linked 04/08/21 1047   Wound Description Yellow;Pink 04/08/21 1053   Wound Length (cm) 0 6 cm 04/08/21 1053   Wound Width (cm) 0 1 cm 04/08/21 1053   Wound Depth (cm) 0 1 cm 04/08/21 1053   Wound Surface Area (cm^2) 0 06 cm^2 04/08/21 1053   Wound Volume (cm^3) 0 01 cm^3 04/08/21 1053   Calculated Wound Volume (cm^3) 0 01 cm^3 04/08/21 1053   Undermining 0 1 04/08/21 1053   Undermining is depth extending from 1-5 oclock 04/08/21 1053   Drainage Amount Scant 04/08/21 1053   Drainage Description Serous 04/08/21 1053   Non-staged Wound Description Full thickness 04/08/21 1053       Wound 03/11/21 Surgical Foot Right;Lateral (Active)   Date First Assessed/Time First Assessed: 03/11/21 1057   Primary Wound Type: Surgical  Location: Foot  Wound Location Orientation: Right;Lateral       Wound 03/11/21 Foot Anterior; Left (Active)   Date First Assessed: 03/11/21   Location: Foot  Wound Location Orientation: Anterior; Left       Wound 03/11/21 Heel Left (Active)   Date First Assessed/Time First Assessed: 03/11/21 1100   Location: Heel  Wound Location Orientation: Left       Wound 04/08/21 Foot Anterior; Left (Active)   Date First Assessed/Time First Assessed: 04/08/21 1046   Location: Foot  Wound Location Orientation: Anterior; Left       Subjective:            BULMARO Adamson presents for wound care bilateral feet  Pain on left foot  Right foot pain is minimal   He noticed some drainage under the scab right foot  Pain is minimal on right foot  No redness         The following portions of the patient's history were reviewed and updated as appropriate: allergies, current medications, past family history, past medical history, past social history, past surgical history and problem list     PAST MEDICAL HISTORY:  Past Medical History:   Diagnosis Date    Arthritis     fingers    [facility]     First degree AV block     Full dentures     [facility]AD [facility]peripheral artery disease) ([facility] )     PVD (peripheral vascular disease) ([place] [address] )     Wound, open     right foot- by the 5th toe       PAST SURGICAL HISTORY:  Past Surgical History:   Procedure Laterality Date    CHOLECYSTECTOMY      COLONOSCOPY      IR AORTAGRAM WITH RUN-OFF  1/28/2021    IL DEBRIDEMENT, SKIN, SUB-Q TISSUE,MUSCLE,BONE,=<20 SQ CM Right 12/18/2020    Procedure: DEBRIDMENT OF ULCER , REMOVAL OF DEVITALIZED SKIN, SOFT TISSUE, BONE AND APPLICATION OF INTEGRA GRAFT RIGHT FOOT ;  Surgeon: Roberto Londono DPM;  Location: [address] [place] [address];  Service: Podiatry    REPLANTATION THUMB Right     right tip reconnected    SKIN GRAFT      right thumb    TOE AMPUTATION      left foot all 5 toes removed    TOE AMPUTATION Right 2/2/2021    Procedure: Right partial 5th ray amputation;  Surgeon: Lester Eubanks DPM;  Location:  MAIN OR;  Service: Podiatry    TOE OSTEOTOMY Right 6/26/2020    Procedure: exostosis of right big toe;  Surgeon: Mariah Nicholson DPM;  Location: [facility];  Service: Podiatry    TRIGGER FINGER RELEASE      bilateral hands    VEIN LIGATION      right        ALLERGIES:  Shellfish-derived products - food allergy and Sulfamethoxazole-trimethoprim    MEDICATIONS:  Current Outpatient Medications   Medication Sig Dispense Refill    atorvastatin (LIPITOR) 40 mg tablet Take 1 tablet (40 mg total) by mouth daily at bedtime 90 tablet 1    Blood Glucose Monitoring Suppl (OneTouch Verio) w/Device KIT by Does not apply route 2 (two) times a day Dx E11 9 1 kit 1    clopidogrel (PLAVIX) 75 mg tablet Take 1 tablet (75 mg total) by mouth daily 90 tablet 0    gabapentin (NEURONTIN) 300 mg capsule TAKE ONE CAPSULE BY MOUTH AT BEDTIME  30 capsule 0    glucose blood (OneTouch Verio) test strip Use as instructed qid Dx E11 9 400 each 3    halobetasol (ULTRAVATE) 0 05 % cream Apply topically 2 (two) times a day (Patient not taking: Reported on 3/11/2021) 50 g 5    HYDROcodone-acetaminophen (NORCO) 5-325 mg per tablet Take 1 tablet by mouth every 6 (six) hours as needed for painMax Daily Amount: 4 tablets (Patient not taking: Reported on 3/11/2021) 20 tablet 0    insulin NPH (HumuLIN N,NovoLIN N) 100 Units/mL subcutaneous injection Inject 12 Units under the skin 2 (two) times a day 7 2 mL 0    insulin regular (HumuLIN R,NovoLIN R) 100 units/mL injection 4 units with lunch, 8 units with dinner 10 mL 0    Lancets (OneTouch Delica Plus [id number]) MISC 1 Units by Does not apply route 2 (two) times a day Dx E11 9 100 each 2    Nitro-Bid 2 % ointment       oxyCODONE-acetaminophen (PERCOCET) 5-325 mg per tablet Take 1 tablet by mouth every 6 (six) hours as needed for moderate painMax Daily Amount: 4 tablets 120 tablet 0    pantoprazole (PROTONIX) 40 mg tablet Take 1 tablet (40 mg total) by mouth daily 90 tablet 1    rivaroxaban (Xarelto) 2 5 mg tablet Take 1 tablet (2 5 mg total) by mouth daily with breakfast Last dose 6/21/20 30 tablet 2    sodium hypochlorite (DAKIN'S QUARTER-STRENGTH) Irrigate with 1 application as directed daily Apply with dry dressing  473 mL 0    UltiCare Insulin Syringe 31G X 5/16" 1 ML MISC        No current facility-administered medications for this visit          SOCIAL HISTORY:  Social History     Socioeconomic History    Marital status: /Civil Union     Spouse name: None    Number of children: None    Years of education: None    Highest education level: None   Occupational History    Occupation: Tim   Social Needs    Financial resource strain: None    Food insecurity     Worry: None     Inability: None    Transportation needs     Medical: None     Non-medical: None   Tobacco Use    Smoking status: Never Smoker    Smokeless tobacco: Never Used   Substance and Sexual Activity    Alcohol use: Yes     Frequency: Monthly or less     Drinks per session: 1 or 2     Binge frequency: Never     Comment: occasional    Drug use: Never    Sexual activity: None   Lifestyle    Physical activity     Days per week: None     Minutes per session: None    Stress: None   Relationships    Social connections     Talks on phone: None     Gets together: None     Attends [employer]ism service: None     Active member of club or organization: None     Attends meetings of clubs or organizations: None     Relationship status: None    Intimate partner violence     Fear of current or ex partner: None     Emotionally abused: None     Physically abused: None     Forced sexual activity: None   Other Topics Concern    None   Social History Narrative    · Most recent tobacco use screenin2019      · Do you currently or have you served in the [employer]:   No      · Were you activated, into active duty, as a member of the [employer] or as a Reservist:   No      · Diet:   Regular      · Alcohol intake:   Occasional      · Caffeine intake:   Occasional      · Seat belts used routinely:   Yes      · Smoke alarm in home:   Yes       Review of Systems   Constitutional: Negative for chills and fever  HENT: Negative for sore throat  Respiratory: Negative for cough and shortness of breath  Cardiovascular: Negative for chest pain  Gastrointestinal: Negative for diarrhea, nausea and vomiting  Skin: Positive for wound           Objective:       Wound 21 Surgical Foot Right;Lateral (Active)   Wound Image Images linked 21 1105   Wound Description Black; Beefy red 04/08/21 1050   Wound Length (cm) 4 cm 04/08/21 1050   Wound Width (cm) 1 3 cm 04/08/21 1050   Wound Depth (cm) 0 2 cm 04/08/21 1050   Wound Surface Area (cm^2) 5 2 cm^2 04/08/21 1050   Wound Volume (cm^3) 1 04 cm^3 04/08/21 1050   Calculated Wound Volume (cm^3) 1 04 cm^3 04/08/21 1050   Drainage Amount Moderate 04/08/21 1050   Drainage Description Serosanguineous;Milky 04/08/21 1050   Non-staged Wound Description Full thickness 04/08/21 1050       Wound 03/11/21 Foot Anterior; Left (Active)   Wound Image Images linked 04/08/21 1042   Wound Description Yellow;Dry 04/08/21 1051   Wound Length (cm) 0 5 cm 04/08/21 1051   Wound Width (cm) 0 2 cm 04/08/21 1051   Wound Depth (cm) 0 1 cm 04/08/21 1051   Wound Surface Area (cm^2) 0 1 cm^2 04/08/21 1051   Wound Volume (cm^3) 0 01 cm^3 04/08/21 1051   Calculated Wound Volume (cm^3) 0 01 cm^3 04/08/21 1051   Change in Wound Size % 92 86 04/08/21 1051   Drainage Amount None 04/08/21 1051       Wound 03/11/21 Heel Left (Active)   Wound Image Images linked 04/08/21 1044   Wound Description Black; Epithelialization;Pink; Other (Comment) (maceration ) 04/08/21 1051   Wound Length (cm) 2 4 cm 04/08/21 1051   Wound Width (cm) 2 1 cm 04/08/21 1051   Wound Depth (cm) 0 1 cm 04/08/21 1051   Wound Surface Area (cm^2) 5 04 cm^2 04/08/21 1051   Wound Volume (cm^3) 0 5 cm^3 04/08/21 1051   Calculated Wound Volume (cm^3) 0 5 cm^3 04/08/21 1051   Drainage Amount Scant 04/08/21 1051   Drainage Description Serosanguineous 04/08/21 1051   Non-staged Wound Description Full thickness 04/08/21 1051       Wound 04/08/21 Foot Anterior; Left (Active)   Wound Image Images linked 04/08/21 1047   Wound Description Yellow;Pink 04/08/21 1053   Wound Length (cm) 0 6 cm 04/08/21 1053   Wound Width (cm) 0 1 cm 04/08/21 1053   Wound Depth (cm) 0 1 cm 04/08/21 1053   Wound Surface Area (cm^2) 0 06 cm^2 04/08/21 1053   Wound Volume (cm^3) 0 01 cm^3 04/08/21 1053   Calculated Wound Volume (cm^3) 0 01 cm^3 04/08/21 1053   Undermining 0 1 04/08/21 1053   Undermining is depth extending from 1-5 oclock 04/08/21 1053   Drainage Amount Scant 04/08/21 1053   Drainage Description Serous 04/08/21 1053   Non-staged Wound Description Full thickness 04/08/21 1053       /72   Pulse 88   Temp 98 °F (36 7 °C)   Resp 18     Physical Exam  Vitals signs and nursing note reviewed  Constitutional:       General: He is not in acute distress  Appearance: Normal appearance  He is not toxic-appearing  Cardiovascular:      Rate and Rhythm: Normal rate and regular rhythm  Pulses:           Dorsalis pedis pulses are 0 on the right side and 0 on the left side  Posterior tibial pulses are 0 on the right side and 0 on the left side  Comments: No ischemia  Pulmonary:      Effort: Pulmonary effort is normal  No respiratory distress  Musculoskeletal:         General: No signs of injury  Comments: TMA left foot  Right 5th ray amputation  Skin:     General: Skin is warm and dry  Coloration: Skin is not cyanotic or mottled  Findings: Wound present  No abscess or erythema  Rash is not purpuric  Nails: There is no clubbing  Comments: Dry wounds with eschar left heel with no drainage  Stable wound left TMA site  New wound distal left foot  Wounds are dry  No signs of infection  Wound presents right foot laterally  Increased granular tissues  Eschar on dorsal aspect is peeling off with some serosanguinous drainage from underneath  No deep probing or infection  See wound assessments  Neurological:      General: No focal deficit present  Mental Status: He is alert and oriented to person, place, and time  Cranial Nerves: No cranial nerve deficit  Motor: No weakness  Psychiatric:         Mood and Affect: Mood normal          Behavior: Behavior normal          Thought Content:  Thought content normal          Judgment: Judgment normal  Wound Instructions:  Orders Placed This Encounter   Procedures    Wound cleansing and dressings     Right foot wound:  Wound care daily   Do not get wet with shower water, may use cast covers or sponge bathe  Apply Calmoseptine to quita wound  Apply dakins moistened gauze to wound   Cover with gauze and secure gently with rolled gauze and tape         Left foot and heel wounds:  Wound care daily   Do not get wet with shower water, may use cast covers or sponge bathe  Paint wounds with betadine  Cover with gauze and secure gently with rolled gauze and tape       Treatments above were completed today at the [facility]        Standing Status:   Future     Standing Expiration Date:   4/8/2022    Wound off loading        Wound off loading:  Limit walking to keep pressure off of the wounds  Ensure adequate nutrition, 3 meals per day that include protein  If you are unable to limit the amount of ambulating and keeping pressure of both feet, your wounds may not heal     Standing Status:   Future     Standing Expiration Date:   4/8/2022    Debridement     This order was created via procedure documentation        Diagnosis ICD-10-CM Associated Orders   1  Surgical wound, non healing, initial encounter  T81 89XA lidocaine (XYLOCAINE) 4 % topical solution 5 mL     Wound cleansing and dressings     Wound off loading     sodium hypochlorite (DAKIN'S QUARTER-STRENGTH)     Debridement   2  Heel ulceration, left, with unspecified severity ([facility])  L97 429 lidocaine (XYLOCAINE) 4 % topical solution 5 mL     Wound cleansing and dressings     Wound off loading   3  Ulcer of left foot, unspecified ulcer stage ([facility])  L97 529 lidocaine (XYLOCAINE) 4 % topical solution 5 mL     Wound cleansing and dressings     Wound off loading   4   Diabetic polyneuropathy associated with type 2 diabetes mellitus ([facility])  E11 42 lidocaine (XYLOCAINE) 4 % topical solution 5 mL     Wound cleansing and dressings     Wound off loading

## 2021-04-09 NOTE — TELEPHONE ENCOUNTER
Spoke to patient to remind him that 4-10-21 is his last dose of his Xarelto prior to his procedure with Dr Alcira Henry  Patient confirmed and understood the instructions   LLF

## 2021-04-12 ENCOUNTER — TELEPHONE (OUTPATIENT)
Dept: SURGERY | Facility: HOSPITAL | Age: 66
End: 2021-04-12

## 2021-04-13 ENCOUNTER — HOSPITAL ENCOUNTER (OUTPATIENT)
Dept: RADIOLOGY | Facility: HOSPITAL | Age: 66
Discharge: HOME | End: 2021-04-13
Attending: SURGERY | Admitting: SURGERY
Payer: COMMERCIAL

## 2021-04-13 VITALS
OXYGEN SATURATION: 96 % | BODY MASS INDEX: 31.41 KG/M2 | RESPIRATION RATE: 18 BRPM | DIASTOLIC BLOOD PRESSURE: 73 MMHG | SYSTOLIC BLOOD PRESSURE: 131 MMHG | WEIGHT: 184 LBS | HEIGHT: 64 IN | HEART RATE: 69 BPM | TEMPERATURE: 98.3 F

## 2021-04-13 DIAGNOSIS — I73.9 PERIPHERAL VASCULAR DISEASE, UNSPECIFIED (HCC): ICD-10-CM

## 2021-04-13 DIAGNOSIS — I73.9 PAD (PERIPHERAL ARTERY DISEASE) (HCC): ICD-10-CM

## 2021-04-13 LAB — GLUCOSE SERPL-MCNC: 121 MG/DL (ref 65–140)

## 2021-04-13 PROCEDURE — 99153 MOD SED SAME PHYS/QHP EA: CPT

## 2021-04-13 PROCEDURE — 82948 REAGENT STRIP/BLOOD GLUCOSE: CPT

## 2021-04-13 PROCEDURE — C1893 INTRO/SHEATH, FIXED,NON-PEEL: HCPCS

## 2021-04-13 PROCEDURE — C1725 CATH, TRANSLUMIN NON-LASER: HCPCS

## 2021-04-13 PROCEDURE — 99152 MOD SED SAME PHYS/QHP 5/>YRS: CPT

## 2021-04-13 PROCEDURE — 37228 HB TIB/PER REVASC W/TLA: CPT

## 2021-04-13 PROCEDURE — 76937 US GUIDE VASCULAR ACCESS: CPT | Performed by: SURGERY

## 2021-04-13 PROCEDURE — C1769 GUIDE WIRE: HCPCS

## 2021-04-13 PROCEDURE — C1887 CATHETER, GUIDING: HCPCS

## 2021-04-13 PROCEDURE — 75710 ARTERY X-RAYS ARM/LEG: CPT

## 2021-04-13 PROCEDURE — 37228 PR REVASCULARIZE TIBIAL/PERON ARTERY,ANGIOPLASTY INITIAL: CPT | Performed by: SURGERY

## 2021-04-13 PROCEDURE — C1760 CLOSURE DEV, VASC: HCPCS

## 2021-04-13 PROCEDURE — 99152 MOD SED SAME PHYS/QHP 5/>YRS: CPT | Performed by: SURGERY

## 2021-04-13 PROCEDURE — C1894 INTRO/SHEATH, NON-LASER: HCPCS

## 2021-04-13 PROCEDURE — 76937 US GUIDE VASCULAR ACCESS: CPT

## 2021-04-13 PROCEDURE — 75710 ARTERY X-RAYS ARM/LEG: CPT | Performed by: SURGERY

## 2021-04-13 RX ORDER — DIAZEPAM 5 MG/ML
INJECTION, SOLUTION INTRAMUSCULAR; INTRAVENOUS CODE/TRAUMA/SEDATION MEDICATION
Status: COMPLETED | OUTPATIENT
Start: 2021-04-13 | End: 2021-04-13

## 2021-04-13 RX ORDER — ACETAMINOPHEN 325 MG/1
650 TABLET ORAL EVERY 4 HOURS PRN
Status: DISCONTINUED | OUTPATIENT
Start: 2021-04-13 | End: 2021-04-13 | Stop reason: HOSPADM

## 2021-04-13 RX ORDER — OXYCODONE HYDROCHLORIDE 5 MG/1
5 TABLET ORAL EVERY 4 HOURS PRN
Status: DISCONTINUED | OUTPATIENT
Start: 2021-04-13 | End: 2021-04-13 | Stop reason: HOSPADM

## 2021-04-13 RX ORDER — MIDAZOLAM HYDROCHLORIDE 2 MG/2ML
INJECTION, SOLUTION INTRAMUSCULAR; INTRAVENOUS CODE/TRAUMA/SEDATION MEDICATION
Status: COMPLETED | OUTPATIENT
Start: 2021-04-13 | End: 2021-04-13

## 2021-04-13 RX ORDER — FENTANYL CITRATE 50 UG/ML
INJECTION, SOLUTION INTRAMUSCULAR; INTRAVENOUS CODE/TRAUMA/SEDATION MEDICATION
Status: COMPLETED | OUTPATIENT
Start: 2021-04-13 | End: 2021-04-13

## 2021-04-13 RX ORDER — SODIUM CHLORIDE 9 MG/ML
75 INJECTION, SOLUTION INTRAVENOUS CONTINUOUS
Status: DISCONTINUED | OUTPATIENT
Start: 2021-04-13 | End: 2021-04-13 | Stop reason: HOSPADM

## 2021-04-13 RX ORDER — LIDOCAINE WITH 8.4% SOD BICARB 0.9%(10ML)
SYRINGE (ML) INJECTION CODE/TRAUMA/SEDATION MEDICATION
Status: COMPLETED | OUTPATIENT
Start: 2021-04-13 | End: 2021-04-13

## 2021-04-13 RX ORDER — HEPARIN SODIUM 1000 [USP'U]/ML
INJECTION, SOLUTION INTRAVENOUS; SUBCUTANEOUS CODE/TRAUMA/SEDATION MEDICATION
Status: COMPLETED | OUTPATIENT
Start: 2021-04-13 | End: 2021-04-13

## 2021-04-13 RX ADMIN — Medication 10 ML: at 08:36

## 2021-04-13 RX ADMIN — FENTANYL CITRATE 50 MCG: 50 INJECTION INTRAMUSCULAR; INTRAVENOUS at 08:35

## 2021-04-13 RX ADMIN — FENTANYL CITRATE 50 MCG: 50 INJECTION INTRAMUSCULAR; INTRAVENOUS at 09:17

## 2021-04-13 RX ADMIN — DIAZEPAM 5 MG: 10 INJECTION, SOLUTION INTRAMUSCULAR; INTRAVENOUS at 09:17

## 2021-04-13 RX ADMIN — FENTANYL CITRATE 50 MCG: 50 INJECTION INTRAMUSCULAR; INTRAVENOUS at 09:07

## 2021-04-13 RX ADMIN — MIDAZOLAM 2 MG: 1 INJECTION INTRAMUSCULAR; INTRAVENOUS at 08:40

## 2021-04-13 RX ADMIN — FENTANYL CITRATE 50 MCG: 50 INJECTION INTRAMUSCULAR; INTRAVENOUS at 08:30

## 2021-04-13 RX ADMIN — IODIXANOL 58 ML: 320 INJECTION, SOLUTION INTRAVASCULAR at 10:16

## 2021-04-13 RX ADMIN — MIDAZOLAM 2 MG: 1 INJECTION INTRAMUSCULAR; INTRAVENOUS at 08:30

## 2021-04-13 RX ADMIN — SODIUM CHLORIDE 75 ML/HR: 0.9 INJECTION, SOLUTION INTRAVENOUS at 07:53

## 2021-04-13 RX ADMIN — NITROGLYCERIN 225 MCG: 20 INJECTION INTRAVENOUS at 09:22

## 2021-04-13 RX ADMIN — HEPARIN SODIUM 6000 UNITS: 1000 INJECTION INTRAVENOUS; SUBCUTANEOUS at 08:48

## 2021-04-13 NOTE — SEDATION DOCUMENTATION
LLE angiogram with angioplasty completed by Dr Marcella Browne without complications  Right CFA access obtained, closed with MYNX closure device  Tolerated well by patient, VSS throughout  IR Procedure Bedrest Start Time is 9855

## 2021-04-13 NOTE — INTERVAL H&P NOTE
Update: (This section must be completed if the H&P was completed greater than 24 hrs to procedure or admission)    H&P reviewed  After examining the patient, I find no changed to the H&P since it had been written  Patient re-evaluated   Accept as history and physical     Debora Steen MD/April 13, 2021/9:48 AM

## 2021-04-13 NOTE — PROCEDURES
Procedures  OPERATIVE REPORT  PATIENT NAME: Annalise Hurt  :  1955  MRN: [id number]      PROCEDURE:   Ultrasound guided access of the Right CFA   Catheter Placement in the Aorta   3rd order selective catheter placement in the Left LE   PTA TPT and PTA peroneal artery      ANESTHESIA: Conscious Sedation    ESTIMATED BLOOD LOSS: Minimal    Flouro time: 15 4min   Contrast: 68cc    INDICATION: Rest pain in the left foot secondary to PAD with multiple previous endovascular interventions     DESCRIPTION OF PROCEDURE: The patient's consent was verified  The patient was brought to the angiosuite and conscious sedation was established  Bilateral groins were prepped and draped in the usual sterile fashion  DIAGNOSTIC TECHNIQUE:  Retrograde access of the Right common femoral artery was obtained utilizing ultrasound-guidance  A micropuncture kit was initially utilized to gain access which was then upsized to a short 5 [place] sheath  Diagnostic imaging was performed with an Omni Flush catheter placed in the aorta and then advanced to the Left common femoral artery to obtain images of the lower extremity  Interventions were performed as described below  The conclusion of the case all wires catheters and sheaths were removed and hemostasis was obtained utilizing a Mynx closure device  DIAGNOSTIC SUPERVISION AND INTERPRETATION: The terminal aorta, bilateral DIOR, EIA and Hypogastric arteries were pateint without significant stenosis  The Left CFA/PFA was patent  The SFA was previously stented in almost a continuous fashion from the origin to the P2 segment  There was no significant in stent stenosis  Distally the PT had a flush occlusion without reconstitution, The AT occluded shortly after its origin without distal reconstitution   The peroneal artery origin was patent, there was a previous peroneal artery stent in the proximal segement which was 100% occluded and the occlussion extended for 5cm distal to the stent  There was reconstitution via collateral of the distal peroneal artery which was the only runoff in the foot  INTERVENTION:  The patient was heparinized with 6000 units of heparin  A 6 [place] 90 cm destination sheath was placed and over to contralateral popliteal artery  I was able to successfully cross the lesion as described above using a 018 glide Advantage wire and 018 [place] catheter  Luminal position within the distal peroneal artery was confirmed with subsequent angiography  Lesion was treated with a 2 mm x 150 mm percutaneous angioplasty balloon distally and a 2 5 mm x 100 mm percutaneous angioplasty balloon proximally  Subsequent angiography demonstrated recanalization with in-line flow reestablished  However there was noted to be some spasm within the treated segments of the peroneal artery  225 mcg of nitroglycerin was administered and the area worse affected was treated with a 2 5 mm balloon again  Subsequent angiography demonstrated excellent recanalization without any residual stenosis and no evidence of persistent spasm  This was felt to be an excellent result  The catheters, sheaths and wires were withdrawn as mentioned above  IMPRESSION: Successful percutaneous angioplasty of the peroneal artery which was noted to have a complete total occlusion for approximately 10-15 cm spanning around a previously placed occluded stent  I was present for the entire duration of the procedure     Sergey Calderón MD   4/13/2021 9:56 AM       Vascular Quality Initiative - Peripheral Vascular Intervention     Urgency: Elective    Functional Status:  Ambulatory and capable of all self care but unable to carry out any work activities  Up and about more than 50% of waking hours     Ambulation: Amb = independently ambulatory    Leg Symptoms    Right: Mild Claudication:  ischemic limb muscle pain that does not limit walking or limits walking only after > 2 blocks (>600 feet or 2 football fields)  Left: Ischemic Rest Pain    COVID Information  COVID Symptoms Pre-Procedure: Asymptomatic    Treatment Delayed by Pandemic: None    Access   Number of Sites: 1     Access Site 1:     Side 1: Right    Site 1: Femoral Retrograde    Access Guidance 1:U/S    Largest Sheath Size 1: 6 Fr  Closure Device 1: MynxGrip      Number of Closure Devices: 1     Closure Device Outcome: Closure device successful         Procedure  Fluoro Time: 15 4 minutes  Contrast Volume: Visipaque 68 ml  DAP: 5701 cGy  cm2  CO2: no  Anticoagulant: Heparin  Protamine: No  If Creatinine is > 1 2 or missing, ASHLEY Prophylaxis none     Treatment Details  Indication: Occlusive Disease,    Completion Assessment  Artery 1 treated: Peroneal  Left                     Was this Site previously treated?: Yes, Stent          TASC Grade: C          Total Treated Length: 25 cm          Total Occluded Length: 15 cm          Calcification: Moderate (calcification on both sides of artery < half length of lesion)          Number of Treatment types (Devices):   1           Device 1          Treatment Type: Plain Balloon            Concomitant: None          Technical result: Successful (stenosis <=30%)      None     Post Procedure  Procedure Complications: No

## 2021-04-13 NOTE — DISCHARGE INSTRUCTIONS
After Angiography   AMBULATORY CARE:   Seek care immediately if:   · The wound starts bleeding and does not stop after you apply firm pressure for 10 minutes  · The bruise where the catheter went in suddenly gets bigger  · You have numbness or tingling in an arm or leg  · You have any of the following signs of an allergic reaction to the contrast liquid:     ? Chest pain or trouble breathing    ? Dizziness or fainting    ? Swelling of your mouth or face    ? Nausea or vomiting    ? Sudden decrease in urination    ? A rash, itching, or swollen skin    Contact your healthcare provider if:   · You have pain or bleeding where the catheter was inserted  · You have signs of an infection, such as redness and swelling at the injection site  · You have questions or concerns about your condition or care  What to expect after angiography: You may have a bruise or swelling where the catheter went into your skin  The area may be sore or tender  These symptoms are normal and should go away in a few days  Wound care:   · Keep the bandage on the catheter site for 1 day  Then you can remove the bandage  If the wound starts bleeding, apply firm pressure for 10 minutes  Use a gauze or a clean towel to apply pressure  · Check the catheter site for signs of infection,  such as redness, swelling, or pus  · Ask when you can bathe after your procedure  Your healthcare provider may tell you to take a shower instead of a bath if the bandage is still in place  Cover the bandage and keep it dry during the shower  Pat your skin dry  Do not rub over the catheter site to dry your skin  · Ice the area  to reduce swelling, tenderness, and pain  Use an ice pack, or put crushed ice in a plastic bag  Wrap a towel around the ice pack or plastic bag  Apply it to the catheter site for 20 minutes every hour, or as directed  Drink liquids as directed:  Liquids will help flush the contrast liquid out of your body   Ask how much liquid to drink after your procedure, and which liquids to drink  Your healthcare provider may tell you to drink extra liquids for 1 or 2 days after your procedure  Activity:  For the first 12 hours, go slowly and be careful  Rest as needed  Do not climb stairs, drive, bend, or lift heavy objects  These activities may put too much pressure on the catheter site and increase your risk for bleeding  Ask your healthcare provider when you can return to your normal activities  Follow up with your healthcare provider as directed:  Write down your questions so you remember to ask them during your visits  © Copyright [address] Information is for End User's use only and may not be sold, redistributed or otherwise used for commercial purposes  All illustrations and images included in CareNotes® are the copyrighted property of [employer]  or [employer]  The above information is an  only  It is not intended as medical advice for individual conditions or treatments  Talk to your doctor, nurse or pharmacist before following any medical regimen to see if it is safe and effective for you  Moderate Sedation   WHAT YOU NEED TO KNOW:   Moderate sedation, or conscious sedation, is medicine used during procedures to help you feel relaxed and calm  You will be awake and able to follow directions without anxiety or pain  You will remember little to none of the procedure  You may feel tired, weak, or unsteady on your feet after you get sedation  You may also have trouble concentrating or short-term memory loss  These symptoms should go away in 24 hours or less  DISCHARGE INSTRUCTIONS:   Call 911 or have someone else call for any of the following:   · You have sudden trouble breathing  · You cannot be woken  Seek care immediately if:   · You have a severe headache or dizziness       · Your heart is beating faster than usual   Contact your healthcare provider if:   · You have a fever      · You have nausea or are vomiting for more than 8 hours after the procedure  · Your skin is itchy, swollen, or you have a rash  · You have questions or concerns about your condition or care  Self-care:   · Have someone stay with you for 24 hours  This person can drive you to errands and help you do things around the house  This person can also watch for problems  · Rest and do quiet activities for 24 hours  Do not exercise, ride a bike, or play sports  Stand up slowly to prevent dizziness and falls  Take short walks around the house with another person  Slowly return to your usual activities the next day  · Do not drive or use dangerous machines or tools for 24 hours  You may injure yourself or others  Examples include a lawnmower, saw, or drill  Do not return to work for 24 hours if you use dangerous machines or tools for work  · Do not make important decisions for 24 hours  For example, do not sign important papers or invest money  · Drink liquids as directed  Liquids help flush the sedation medicine out of your body  Ask how much liquid to drink each day and which liquids are best for you  · Eat small, frequent meals to prevent nausea and vomiting  Start with clear liquids such as juice or broth  If you do not vomit after clear liquids, you can eat your usual foods  · Do not drink alcohol or take medicines that make you drowsy  This includes medicines that help you sleep and anxiety medicines  Ask your healthcare provider if it is safe for you to take pain medicine  Follow up with your healthcare provider as directed: Write down your questions so you remember to ask them during your visits  © 2017 [address] Information is for End User's use only and may not be sold, redistributed or otherwise used for commercial purposes   All illustrations and images included in CareNotes® are the copyrighted property of [employer]  or [employer]  The above information is an  only  It is not intended as medical advice for individual conditions or treatments  Talk to your doctor, nurse or pharmacist before following any medical regimen to see if it is safe and effective for you  ARTERIOGRAM    WHAT YOU SHOULD KNOW:   An angiogram is a procedure to look at arteries in your body  Arteries are the blood vessels that carry blood from your heart to your body  AFTER YOU LEAVE:     Self-care:   · Limit activity: Rest for the remainder of the day of your procedure  Have some one with you until the next morning  Keep your arm or leg straight as much as possible  Rest as much as possible, sitting lying or reclining  Walk only to go to the bathroom, to bed or to eat  If the angiogram catheter was put in your leg, use the stairs as little as possible  No driving  · Keep your wound clean and dry  You may shower 24 hours after your procedure  The bandage you have on should fall off in 2-3 days  If there is any drainage from the puncture site, you should put on a clean bandage  · Watch for bleeding and bruising: It is normal to have a bruise and soreness where the angiogram catheter went in  · Diet:   · You may resume your regular diet, Sips of flat soda will help with mild nausea  · Drink more liquids than usual for the next 24 hours      · IMMEDIATELY Contact Interventional Radiology at [phone] [place] PATIENTS: Contact Interventional Radiology at [phone]) Fatuma Stephensno PATIENTS: Contact Interventional Radiology at [phone]) if any of the following occur:  · If your bruise gets larger or if you notice any active bleeding  APPLY DIRECT PRESSURE TO THE BLEEDING SITE  · If you notice increased swelling or have increased pain at the puncture site   · If you have any numbness or pain in the extremity of the puncture site   · If that extremity seems cold or pale      · You have fever greater than 101  · Persistent nausea or vomitting    Follow up with your primary healthcare provider  as directed: Write down your questions so you remember to ask them during your visits

## 2021-04-15 ENCOUNTER — OFFICE VISIT (OUTPATIENT)
Dept: WOUND CARE | Facility: HOSPITAL | Age: 66
End: 2021-04-15
Payer: COMMERCIAL

## 2021-04-15 DIAGNOSIS — L97.529 ULCER OF LEFT FOOT, UNSPECIFIED ULCER STAGE (HCC): ICD-10-CM

## 2021-04-15 DIAGNOSIS — I73.9 PERIPHERAL VASCULAR DISEASE, UNSPECIFIED (HCC): ICD-10-CM

## 2021-04-15 DIAGNOSIS — T81.89XA SURGICAL WOUND, NON HEALING, INITIAL ENCOUNTER: Primary | ICD-10-CM

## 2021-04-15 DIAGNOSIS — L97.429 HEEL ULCERATION, LEFT, WITH UNSPECIFIED SEVERITY (HCC): ICD-10-CM

## 2021-04-15 DIAGNOSIS — E11.42 DIABETIC POLYNEUROPATHY ASSOCIATED WITH TYPE 2 DIABETES MELLITUS (HCC): ICD-10-CM

## 2021-04-15 PROCEDURE — 11042 DBRDMT SUBQ TIS 1ST 20SQCM/<: CPT | Performed by: PODIATRIST

## 2021-04-15 PROCEDURE — 99213 OFFICE O/P EST LOW 20 MIN: CPT | Performed by: PODIATRIST

## 2021-04-15 RX ORDER — LIDOCAINE HYDROCHLORIDE 40 MG/ML
5 SOLUTION TOPICAL ONCE
Status: COMPLETED | OUTPATIENT
Start: 2021-04-15 | End: 2021-04-15

## 2021-04-15 RX ADMIN — LIDOCAINE HYDROCHLORIDE 5 ML: 40 SOLUTION TOPICAL at 11:44

## 2021-04-15 NOTE — PROGRESS NOTES
Patient ID: Robert Youngblood is a 76 y o  male Date of Birth 1955     Chief Complaint  No chief complaint on file  Allergies  Shellfish-derived products - food allergy and Sulfamethoxazole-trimethoprim    Assessment:    No problem-specific Assessment & Plan notes found for this encounter  Diagnoses and all orders for this visit:    Surgical wound, non healing, initial encounter  -     lidocaine (XYLOCAINE) 4 % topical solution 5 mL  -     Wound cleansing and dressings; Future  -     Wound off loading; Future  -     Debridement    Heel ulceration, left, with unspecified severity (HCC)  -     Wound cleansing and dressings; Future  -     Wound off loading; Future    Ulcer of left foot, unspecified ulcer stage (HCC)  -     Wound cleansing and dressings; Future  -     Wound off loading; Future    Diabetic polyneuropathy associated with type 2 diabetes mellitus ([facility] )    Peripheral vascular disease, unspecified ([facility] )              Debridement   Wound 03/11/21 Surgical Foot Right;Lateral    Universal Protocol:  Consent: Verbal consent obtained    Risks and benefits: risks, benefits and alternatives were discussed  Consent given by: patient  Timeout called at: 4/15/2021 11:30 AM   Patient understanding: patient states understanding of the procedure being performed  Patient identity confirmed: verbally with patient      Performed by: physician  Debridement type: surgical  Level of debridement: subcutaneous tissue  Pain control: lidocaine 4%  Post-debridement measurements  Length (cm): 4 4  Width (cm): 1 5  Depth (cm): 0 2  Percent debrided: 100%  Surface Area (cm^2): 6 6  Area debrided (cm^2): 6 6  Volume (cm^3): 1 32  Tissue and other material debrided: dermis, epidermis and subcutaneous tissue  Devitalized tissue debrided: fibrin and slough  Instrument(s) utilized: blade  Bleeding: medium  Hemostasis obtained with: pressure  Procedural pain (0-10): 0  Post-procedural pain: 0   Response to treatment: procedure was tolerated well          Plan:  1  Continue serial debridement  Return to collagen dressing to right foot  Dermagren to left TMA wounds  Betadine to left heel for now  2  Reviewed A-gram   Continue local care  3  Discussed proper off-loading  4  RA in 1 week  Wound 03/11/21 Surgical Foot Right;Lateral (Active)   Wound Image Images linked 04/15/21 1159   Wound Description Eschar;Yellow;Pink;Granulation tissue; Epithelialization 04/15/21 1130   Dominga-wound Assessment Edema;Dry;Scaly 04/15/21 1130   Wound Length (cm) 4 4 cm 04/15/21 1130   Wound Width (cm) 1 5 cm 04/15/21 1130   Wound Depth (cm) 0 2 cm 04/15/21 1130   Wound Surface Area (cm^2) 6 6 cm^2 04/15/21 1130   Wound Volume (cm^3) 1 32 cm^3 04/15/21 1130   Calculated Wound Volume (cm^3) 1 32 cm^3 04/15/21 1130   Drainage Amount Small 04/15/21 1130   Drainage Description Serosanguineous 04/15/21 1130   Non-staged Wound Description Full thickness 04/15/21 1130       Wound 03/11/21 Foot Anterior;Left; Other (Comment) (Active)   Wound Image Images linked 04/15/21 1133   Wound Description Pink 04/15/21 1145   Dominga-wound Assessment Callus 04/15/21 1145   Wound Length (cm) 0 2 cm 04/15/21 1145   Wound Width (cm) 0 2 cm 04/15/21 1145   Wound Depth (cm) 0 1 cm 04/15/21 1145   Wound Surface Area (cm^2) 0 04 cm^2 04/15/21 1145   Wound Volume (cm^3) 0 cm^3 04/15/21 1145   Calculated Wound Volume (cm^3) 0 cm^3 04/15/21 1145   Change in Wound Size % 100 04/15/21 1145   Drainage Amount Scant 04/15/21 1145   Drainage Description Serous 04/15/21 1145   Non-staged Wound Description Full thickness 04/15/21 1145       Wound 03/11/21 Heel Left (Active)   Wound Image Images linked 04/15/21 1137   Wound Description Epithelialization;Eschar 04/15/21 1147   Dominga-wound Assessment Intact 04/15/21 1147   Wound Length (cm) 2 cm 04/15/21 1147   Wound Width (cm) 2 5 cm 04/15/21 1147   Wound Depth (cm) 0 cm 04/15/21 1147   Wound Surface Area (cm^2) 5 cm^2 04/15/21 1147 Wound Volume (cm^3) 0 cm^3 04/15/21 1147   Calculated Wound Volume (cm^3) 0 cm^3 04/15/21 1147   Drainage Amount None 04/15/21 1147   Non-staged Wound Description Not applicable 62/00/07 5728       Wound 04/08/21 Foot Anterior; Left (Active)   Wound Image Images linked 04/15/21 1133   Wound Description Yellow 04/15/21 1145   Domigna-wound Assessment Callus 04/15/21 1145   Wound Length (cm) 0 4 cm 04/15/21 1145   Wound Width (cm) 0 1 cm 04/15/21 1145   Wound Depth (cm) 0 1 cm 04/15/21 1145   Wound Surface Area (cm^2) 0 04 cm^2 04/15/21 1145   Wound Volume (cm^3) 0 cm^3 04/15/21 1145   Calculated Wound Volume (cm^3) 0 cm^3 04/15/21 1145   Change in Wound Size % 100 04/15/21 1145   Drainage Amount Scant 04/15/21 1145   Drainage Description Serous 04/15/21 1145   Non-staged Wound Description Full thickness 04/15/21 1145       Wound 03/11/21 Surgical Foot Right;Lateral (Active)   Date First Assessed/Time First Assessed: 03/11/21 1057   Primary Wound Type: Surgical  Location: Foot  Wound Location Orientation: Right;Lateral       Wound 03/11/21 Foot Anterior;Left; Other (Comment) (Active)   Date First Assessed: 03/11/21   Location: Foot  Wound Location Orientation: (c) Anterior;Left; Other (Comment)       Wound 03/11/21 Heel Left (Active)   Date First Assessed/Time First Assessed: 03/11/21 1100   Location: Heel  Wound Location Orientation: Left       Wound 04/08/21 Foot Anterior; Left (Active)   Date First Assessed/Time First Assessed: 04/08/21 1046   Location: Foot  Wound Location Orientation: Anterior; Left       Wound 04/13/21 Catheter entry/exit site Groin Right (Active)   Date First Assessed/Time First Assessed: 04/13/21 0843   Traumatic Wound Type: Catheter entry/exit site  Location: Groin  Wound Location Orientation: Right  Wound Description (Comments): angiogram access        Subjective:        HPI  Tristian Benigno presents for wound care bilateral feet  S/P a-gram   He feels much better on left foot post-intervention    Right foot pain is minimal   No redness         The following portions of the patient's history were reviewed and updated as appropriate: allergies, current medications, past family history, past medical history, past social history, past surgical history and problem list     PAST MEDICAL HISTORY:  Past Medical History:   Diagnosis Date    Arthritis     fingers    Diabetes mellitus [facility] )     First degree AV block     Full dentures     PAD (peripheral artery disease) ([facility] )     PVD (peripheral vascular disease) ([place] [id number] )     Wound, open     right foot- by the 5th toe       PAST SURGICAL HISTORY:  Past Surgical History:   Procedure Laterality Date    CHOLECYSTECTOMY      COLONOSCOPY      IR AORTAGRAM WITH RUN-OFF  1/28/2021    IR AORTAGRAM WITH RUN-OFF  4/13/2021    UT DEBRIDEMENT, SKIN, SUB-Q TISSUE,MUSCLE,BONE,=<20 SQ CM Right 12/18/2020    Procedure: DEBRIDMENT OF ULCER , REMOVAL OF DEVITALIZED SKIN, SOFT TISSUE, BONE AND APPLICATION OF INTEGRA GRAFT RIGHT FOOT ;  Surgeon: Faith Moraes DPM;  Location: [address] [place] [address];  Service: Podiatry    REPLANTATION THUMB Right     right tip reconnected    SKIN GRAFT      right thumb    TOE AMPUTATION      left foot all 5 toes removed    TOE AMPUTATION Right 2/2/2021    Procedure: Right partial 5th ray amputation;  Surgeon: Padmini Rojas DPM;  Location:  MAIN OR;  Service: Podiatry    TOE OSTEOTOMY Right 6/26/2020    Procedure: exostosis of right big toe;  Surgeon: Karli Patricia DPM;  Location: [facility];  Service: Podiatry    TRIGGER FINGER RELEASE      bilateral hands    VEIN LIGATION      right        ALLERGIES:  Shellfish-derived products - food allergy and Sulfamethoxazole-trimethoprim    MEDICATIONS:  Current Outpatient Medications   Medication Sig Dispense Refill    atorvastatin (LIPITOR) 40 mg tablet Take 1 tablet (40 mg total) by mouth daily at bedtime 90 tablet 1    Blood Glucose Monitoring Suppl (OneTouch Verio) w/Device KIT by Does not apply route 2 (two) times a day Dx E11 9 1 kit 1    clopidogrel (PLAVIX) 75 mg tablet Take 1 tablet (75 mg total) by mouth daily 90 tablet 0    gabapentin (NEURONTIN) 300 mg capsule TAKE ONE CAPSULE BY MOUTH AT BEDTIME  30 capsule 0    glucose blood (OneTouch Verio) test strip Use as instructed qid Dx E11 9 400 each 3    halobetasol (ULTRAVATE) 0 05 % cream Apply topically 2 (two) times a day (Patient not taking: Reported on 3/11/2021) 50 g 5    HYDROcodone-acetaminophen (NORCO) 5-325 mg per tablet Take 1 tablet by mouth every 6 (six) hours as needed for painMax Daily Amount: 4 tablets (Patient not taking: Reported on 3/11/2021) 20 tablet 0    insulin NPH (HumuLIN N,NovoLIN N) 100 Units/mL subcutaneous injection Inject 12 Units under the skin 2 (two) times a day 7 2 mL 0    insulin regular (HumuLIN R,NovoLIN R) 100 units/mL injection 4 units with lunch, 8 units with dinner 10 mL 0    Lancets (OneTouch Delica Plus [id number]) MISC 1 Units by Does not apply route 2 (two) times a day Dx E11 9 100 each 2    Nitro-Bid 2 % ointment       oxyCODONE-acetaminophen (PERCOCET) 5-325 mg per tablet Take 1 tablet by mouth every 6 (six) hours as needed for moderate painMax Daily Amount: 4 tablets 120 tablet 0    pantoprazole (PROTONIX) 40 mg tablet Take 1 tablet (40 mg total) by mouth daily 90 tablet 1    rivaroxaban (Xarelto) 2 5 mg tablet Take 1 tablet (2 5 mg total) by mouth daily with breakfast Last dose 6/21/20 30 tablet 2    sodium hypochlorite (DAKIN'S QUARTER-STRENGTH) Irrigate with 1 application as directed daily Apply with dry dressing  473 mL 0    UltiCare Insulin Syringe 31G X 5/16" 1 ML MISC        No current facility-administered medications for this visit          SOCIAL HISTORY:  Social History     Socioeconomic History    Marital status: /Civil Union     Spouse name: None    Number of children: None    Years of education: None    Highest education level: None   Occupational History    Occupation: [employer]  Financial resource strain: None    Food insecurity     Worry: None     Inability: None    Transportation needs     Medical: None     Non-medical: None   Tobacco Use    Smoking status: Never Smoker    Smokeless tobacco: Never Used   Substance and Sexual Activity    Alcohol use: Yes     Frequency: Monthly or less     Drinks per session: 1 or 2     Binge frequency: Never     Comment: occasional    Drug use: Never    Sexual activity: None   Lifestyle    Physical activity     Days per week: None     Minutes per session: None    Stress: None   Relationships    Social connections     Talks on phone: None     Gets together: None     Attends [employer] service: None     Active member of club or organization: None     Attends meetings of clubs or organizations: None     Relationship status: None    Intimate partner violence     Fear of current or ex partner: None     Emotionally abused: None     Physically abused: None     Forced sexual activity: None   Other Topics Concern    None   Social History Narrative    · Most recent tobacco use screenin2019      · Do you currently or have you served in the [employer]:   No      · Were you activated, into active duty, as a member of the [employer] or as a Reservist:   No      · Diet:   Regular      · Alcohol intake:   Occasional      · Caffeine intake:   Occasional      · Seat belts used routinely:   Yes      · Smoke alarm in home:   Yes       Review of Systems   Constitutional: Negative for chills and fever  HENT: Negative for sore throat  Respiratory: Negative for cough and shortness of breath  Cardiovascular: Negative for chest pain  Gastrointestinal: Negative for diarrhea, nausea and vomiting  Skin: Positive for wound  Objective:       Wound 21 Surgical Foot Right;Lateral (Active)   Wound Image Images linked 04/15/21 1159   Wound Description Eschar;Yellow;Pink;Granulation tissue; Epithelialization 04/15/21 1130 Dominga-wound Assessment Edema;Dry;Scaly 04/15/21 1130   Wound Length (cm) 4 4 cm 04/15/21 1130   Wound Width (cm) 1 5 cm 04/15/21 1130   Wound Depth (cm) 0 2 cm 04/15/21 1130   Wound Surface Area (cm^2) 6 6 cm^2 04/15/21 1130   Wound Volume (cm^3) 1 32 cm^3 04/15/21 1130   Calculated Wound Volume (cm^3) 1 32 cm^3 04/15/21 1130   Drainage Amount Small 04/15/21 1130   Drainage Description Serosanguineous 04/15/21 1130   Non-staged Wound Description Full thickness 04/15/21 1130       Wound 03/11/21 Foot Anterior;Left; Other (Comment) (Active)   Wound Image Images linked 04/15/21 1133   Wound Description Pink 04/15/21 1145   Dominga-wound Assessment Callus 04/15/21 1145   Wound Length (cm) 0 2 cm 04/15/21 1145   Wound Width (cm) 0 2 cm 04/15/21 1145   Wound Depth (cm) 0 1 cm 04/15/21 1145   Wound Surface Area (cm^2) 0 04 cm^2 04/15/21 1145   Wound Volume (cm^3) 0 cm^3 04/15/21 1145   Calculated Wound Volume (cm^3) 0 cm^3 04/15/21 1145   Change in Wound Size % 100 04/15/21 1145   Drainage Amount Scant 04/15/21 1145   Drainage Description Serous 04/15/21 1145   Non-staged Wound Description Full thickness 04/15/21 1145       Wound 03/11/21 Heel Left (Active)   Wound Image Images linked 04/15/21 1137   Wound Description Epithelialization;Eschar 04/15/21 1147   Dominga-wound Assessment Intact 04/15/21 1147   Wound Length (cm) 2 cm 04/15/21 1147   Wound Width (cm) 2 5 cm 04/15/21 1147   Wound Depth (cm) 0 cm 04/15/21 1147   Wound Surface Area (cm^2) 5 cm^2 04/15/21 1147   Wound Volume (cm^3) 0 cm^3 04/15/21 1147   Calculated Wound Volume (cm^3) 0 cm^3 04/15/21 1147   Drainage Amount None 04/15/21 1147   Non-staged Wound Description Not applicable 29/53/98 5606       Wound 04/08/21 Foot Anterior; Left (Active)   Wound Image Images linked 04/15/21 1133   Wound Description Yellow 04/15/21 1145   Dominga-wound Assessment Callus 04/15/21 1145   Wound Length (cm) 0 4 cm 04/15/21 1145   Wound Width (cm) 0 1 cm 04/15/21 1145   Wound Depth (cm) 0 1 cm 04/15/21 1145   Wound Surface Area (cm^2) 0 04 cm^2 04/15/21 1145   Wound Volume (cm^3) 0 cm^3 04/15/21 1145   Calculated Wound Volume (cm^3) 0 cm^3 04/15/21 1145   Change in Wound Size % 100 04/15/21 1145   Drainage Amount Scant 04/15/21 1145   Drainage Description Serous 04/15/21 1145   Non-staged Wound Description Full thickness 04/15/21 1145       There were no vitals taken for this visit  Physical Exam  Vitals signs and nursing note reviewed  Constitutional:       General: He is not in acute distress  Appearance: Normal appearance  He is not toxic-appearing  Cardiovascular:      Rate and Rhythm: Normal rate and regular rhythm  Pulses:           Dorsalis pedis pulses are 0 on the right side and 0 on the left side  Posterior tibial pulses are 0 on the right side and 0 on the left side  Comments: No ischemia  Pulmonary:      Effort: Pulmonary effort is normal  No respiratory distress  Musculoskeletal:         General: No signs of injury  Comments: TMA left foot  Right 5th ray amputation  Skin:     General: Skin is warm and dry  Coloration: Skin is not cyanotic or mottled  Findings: Wound present  No abscess or erythema  Rash is not purpuric  Nails: There is no clubbing  Comments: Dry wounds with eschar left heel with no drainage  Stable wound left TMA site  Wound presents right foot laterally  Wound looks clean with increased granular tissues  No deep probing or infection bilaterally  See wound assessments  Neurological:      General: No focal deficit present  Mental Status: He is alert and oriented to person, place, and time  Cranial Nerves: No cranial nerve deficit  Motor: No weakness  Psychiatric:         Mood and Affect: Mood normal          Behavior: Behavior normal          Thought Content:  Thought content normal          Judgment: Judgment normal            Wound Instructions:  Orders Placed This Encounter Procedures    Wound cleansing and dressings     Right foot wound:   Do not get wet with shower water, may use cast covers or sponge bathe  Apply Calmoseptine to quita wound  Apply Puracol AG to the wound  Cover with gauze and secure gently with rolled gauze and tape  Change every other day      Left foot wounds:  Do not get wet with shower water, may use cast covers or sponge bathe  Apply Dermagran gauze  Cover with gauze and secure gently with rolled gauze and tape  Change dressing every other day     Left Heel wound  Do not get wet with shower water, may use cast covers or sponge bathe  Paint wound with Betadine  Cover with gauze and secure gently with rolled gauze and tape  Change dressing every other day       Treatments above were completed today at the [facility]       Ensure adequate nutrition, 3 meals per day that include protein  Follow up in 1 week at the wound center     Standing Status:   Future     Standing Expiration Date:   4/15/2022    Wound off loading     Wound off loading:  Limit walking to keep pressure off of the wounds  If you are unable to limit the amount of ambulating and keeping pressure of both feet, your wounds may not heal     Standing Status:   Future     Standing Expiration Date:   4/15/2022    Debridement     This order was created via procedure documentation        Diagnosis ICD-10-CM Associated Orders   1  Surgical wound, non healing, initial encounter  T81 [id number] lidocaine (XYLOCAINE) 4 % topical solution 5 mL     Wound cleansing and dressings     Wound off loading     Debridement   2  Heel ulceration, left, with unspecified severity ([facility])  L97 429 Wound cleansing and dressings     Wound off loading   3  Ulcer of left foot, unspecified ulcer stage ([facility])  L97 529 Wound cleansing and dressings     Wound off loading   4  Diabetic polyneuropathy associated with type 2 diabetes mellitus ([facility])  E11 42    5   Peripheral vascular disease, unspecified ([facility] )  I73 9

## 2021-04-15 NOTE — PATIENT INSTRUCTIONS
Orders Placed This Encounter   Procedures    Wound cleansing and dressings     Right foot wound:   Do not get wet with shower water, may use cast covers or sponge bathe  Apply Calmoseptine to quita wound  Apply Puracol AG to the wound  Cover with gauze and secure gently with rolled gauze and tape  Change every other day      Left foot wounds:  Do not get wet with shower water, may use cast covers or sponge bathe  Apply Dermagran gauze  Cover with gauze and secure gently with rolled gauze and tape  Change dressing every other day     Left Heel wound  Do not get wet with shower water, may use cast covers or sponge bathe  Paint wound with Betadine  Cover with gauze and secure gently with rolled gauze and tape  Change dressing every other day       Treatments above were completed today at the [facility]       Ensure adequate nutrition, 3 meals per day that include protein  Follow up in 1 week at the wound center     Standing Status:   Future     Standing Expiration Date:   4/15/2022    Wound off loading     Wound off loading:  Limit walking to keep pressure off of the wounds       If you are unable to limit the amount of ambulating and keeping pressure of both feet, your wounds may not heal     Standing Status:   Future     Standing Expiration Date:   4/15/2022

## 2021-04-22 ENCOUNTER — OFFICE VISIT (OUTPATIENT)
Dept: WOUND CARE | Facility: HOSPITAL | Age: 66
End: 2021-04-22
Payer: COMMERCIAL

## 2021-04-22 VITALS
DIASTOLIC BLOOD PRESSURE: 68 MMHG | HEART RATE: 88 BPM | TEMPERATURE: 97.6 F | SYSTOLIC BLOOD PRESSURE: 104 MMHG | RESPIRATION RATE: 20 BRPM

## 2021-04-22 DIAGNOSIS — E11.42 DIABETIC POLYNEUROPATHY ASSOCIATED WITH TYPE 2 DIABETES MELLITUS (HCC): ICD-10-CM

## 2021-04-22 DIAGNOSIS — L97.529 ULCER OF LEFT FOOT, UNSPECIFIED ULCER STAGE (HCC): ICD-10-CM

## 2021-04-22 DIAGNOSIS — L97.429 HEEL ULCERATION, LEFT, WITH UNSPECIFIED SEVERITY (HCC): ICD-10-CM

## 2021-04-22 DIAGNOSIS — I73.9 PERIPHERAL VASCULAR DISEASE, UNSPECIFIED (HCC): ICD-10-CM

## 2021-04-22 DIAGNOSIS — T81.89XA SURGICAL WOUND, NON HEALING, INITIAL ENCOUNTER: Primary | ICD-10-CM

## 2021-04-22 PROCEDURE — 11042 DBRDMT SUBQ TIS 1ST 20SQCM/<: CPT | Performed by: PODIATRIST

## 2021-04-22 PROCEDURE — 99213 OFFICE O/P EST LOW 20 MIN: CPT | Performed by: PODIATRIST

## 2021-04-22 RX ORDER — LIDOCAINE HYDROCHLORIDE 40 MG/ML
5 SOLUTION TOPICAL ONCE
Status: COMPLETED | OUTPATIENT
Start: 2021-04-22 | End: 2021-04-22

## 2021-04-22 RX ADMIN — LIDOCAINE HYDROCHLORIDE 5 ML: 40 SOLUTION TOPICAL at 10:32

## 2021-04-22 NOTE — PROGRESS NOTES
Patient ID: Cady Hussein is a 76 y o  male Date of Birth 1955     Chief Complaint  Chief Complaint   Patient presents with    Follow Up Wound Care Visit     BLE wounds       Allergies  Shellfish-derived products - food allergy and Sulfamethoxazole-trimethoprim    Assessment:    No problem-specific Assessment & Plan notes found for this encounter  Diagnoses and all orders for this visit:    Surgical wound, non healing, initial encounter  -     lidocaine (XYLOCAINE) 4 % topical solution 5 mL  -     Cancel: Wound cleansing and dressings; Future  -     Wound cleansing and dressings; Future  -     Debridement    Ulcer of left foot, unspecified ulcer stage (HCC)  -     lidocaine (XYLOCAINE) 4 % topical solution 5 mL  -     Cancel: Wound cleansing and dressings; Future  -     Wound cleansing and dressings; Future    Heel ulceration, left, with unspecified severity (HCC)    Diabetic polyneuropathy associated with type 2 diabetes mellitus (HCC)  -     lidocaine (XYLOCAINE) 4 % topical solution 5 mL  -     Cancel: Wound cleansing and dressings; Future  -     Wound cleansing and dressings; Future    Peripheral [facility] disease, unspecified [facility]              Debridement   Wound 03/11/21 Surgical Foot Right;Lateral    Universal Protocol:  Consent: Verbal consent obtained  Risks and benefits: risks, benefits and alternatives were discussed  Consent given by: patient  Time out: Immediately prior to procedure a "time out" was called to verify the correct patient, procedure, equipment, support staff and site/side marked as required    Timeout called at: 4/22/2021 11:13 AM   Patient understanding: patient states understanding of the procedure being performed  Patient identity confirmed: verbally with patient      Performed by: physician  Debridement type: surgical  Level of debridement: subcutaneous tissue  Pain control: lidocaine 4%  Post-debridement measurements  Length (cm): 4  Width (cm): 0 6  Depth (cm): 0 3  Percent debrided: 100%  Surface Area (cm^2): 2 4  Area debrided (cm^2): 2 4  Volume (cm^3): 0 72  Tissue and other material debrided: dermis, epidermis and subcutaneous tissue  Devitalized tissue debrided: fibrin and slough  Instrument(s) utilized: blade  Bleeding: small  Hemostasis obtained with: pressure  Procedural pain (0-10): 0  Post-procedural pain: 0   Response to treatment: procedure was tolerated well          Plan:  1  Continue serial debridement  Continue collagen dressing to right foot  Dermagren to left TMA wounds  Betadine to left heel for now  2  Awaits vascular follow-up next week  3  Discussed proper off-loading  Surgical shoes on both feet  4  RA in 1 week  Wound 03/11/21 Surgical Foot Right;Lateral (Active)   Wound Image Images linked 04/22/21 1044   Wound Description Eschar;Granulation tissue;Pink;Yellow 04/22/21 1029   Dominga-wound Assessment Rash; Intact;Edema 04/22/21 1029   Wound Length (cm) 4 cm 04/22/21 1029   Wound Width (cm) 0 6 cm 04/22/21 1029   Wound Depth (cm) 0 3 cm 04/22/21 1029   Wound Surface Area (cm^2) 2 4 cm^2 04/22/21 1029   Wound Volume (cm^3) 0 72 cm^3 04/22/21 1029   Calculated Wound Volume (cm^3) 0 72 cm^3 04/22/21 1029   Undermining 0 5 04/22/21 1029   Undermining is depth extending from 6-9 04/22/21 1029   Drainage Amount Moderate 04/22/21 1029   Drainage Description Serosanguineous;Purulent 04/22/21 1029   Non-staged Wound Description Full thickness 04/22/21 1029   Treatments Cleansed 04/22/21 1029   Dressing Changed Changed 04/22/21 1029   Patient Tolerance Tolerated well 04/22/21 1029   Dressing Status Removed 04/22/21 1029       Wound 03/11/21 Foot Left; Other (Comment); Posterior (Active)   Wound Image Images linked 04/22/21 1028   Wound Description Pink 04/22/21 1031   Dominga-wound Assessment Intact;Dry 04/22/21 1031   Wound Length (cm) 0 2 cm 04/22/21 1031   Wound Width (cm) 0 1 cm 04/22/21 1031   Wound Depth (cm) 0 1 cm 04/22/21 1031 Wound Surface Area (cm^2) 0 02 cm^2 04/22/21 1031   Wound Volume (cm^3) 0 cm^3 04/22/21 1031   Calculated Wound Volume (cm^3) 0 cm^3 04/22/21 1031   Change in Wound Size % 100 04/22/21 1031       Wound 03/11/21 Heel Left (Active)   Wound Image Images linked 04/22/21 1027   Wound Description Eschar;Epithelialization 04/22/21 1029   Dominga-wound Assessment Dry; Intact 04/22/21 1029   Wound Length (cm) 2 7 cm 04/22/21 1029   Wound Width (cm) 1 5 cm 04/22/21 1029   Wound Depth (cm) 0 cm 04/22/21 1029   Wound Surface Area (cm^2) 4 05 cm^2 04/22/21 1029   Wound Volume (cm^3) 0 cm^3 04/22/21 1029   Calculated Wound Volume (cm^3) 0 cm^3 04/22/21 1029   Drainage Amount None 04/22/21 1029       Wound 04/08/21 Foot Anterior; Left (Active)   Wound Image Images linked 04/22/21 1028   Wound Description Granulation tissue 04/22/21 1030   Wound Length (cm) 0 4 cm 04/22/21 1030   Wound Width (cm) 0 2 cm 04/22/21 1030   Wound Depth (cm) 0 1 cm 04/22/21 1030   Wound Surface Area (cm^2) 0 08 cm^2 04/22/21 1030   Wound Volume (cm^3) 0 01 cm^3 04/22/21 1030   Calculated Wound Volume (cm^3) 0 01 cm^3 04/22/21 1030   Change in Wound Size % 0 04/22/21 1030   Drainage Amount Small 04/22/21 1030   Drainage Description Serous 04/22/21 1030   Non-staged Wound Description Full thickness 04/22/21 1030   Treatments Cleansed 04/22/21 1030       Wound 03/11/21 Surgical Foot Right;Lateral (Active)   Date First Assessed/Time First Assessed: 03/11/21 1057   Primary Wound Type: Surgical  Location: Foot  Wound Location Orientation: Right;Lateral       Wound 03/11/21 Foot Left; Other (Comment); Posterior (Active)   Date First Assessed: 03/11/21   Location: Foot  Wound Location Orientation: (c) Left; Other (Comment); Posterior       Wound 03/11/21 Heel Left (Active)   Date First Assessed/Time First Assessed: 03/11/21 1100   Location: Heel  Wound Location Orientation: Left       Wound 04/08/21 Foot Anterior; Left (Active)   Date First Assessed/Time First Assessed: 04/08/21 1046   Location: Foot  Wound Location Orientation: Anterior; Left       [REMOVED] Wound 07/24/20 Diabetic Ulcer Foot Right;Lateral (Removed)   Resolved Date: 03/11/21  Date First Assessed: 07/24/20   Pre-Existing Wound: Yes  Primary Wound Type: Diabetic Ulcer  Location: Foot  Wound Location Orientation: Right;Lateral  Wound Description (Comments): Cristina 1       [REMOVED] Wound 12/18/20 Foot Right (Removed)   Resolved Date: 03/11/21  Date First Assessed/Time First Assessed: 12/18/20 1146   Location: Foot  Wound Location Orientation: Right  Incision's 1st Dressing: DRESSING OIL EMULSION 3 X 3 IN (x1), SPONGE GAUZE 4 X 8 12 PLY STRL LF (x1), BANDAGE WILLARD 3   [REMOVED] Wound 01/28/21 Catheter entry/exit site Groin Left (Removed)   Resolved Date/Resolved Time: 03/25/21 1011  Date First Assessed/Time First Assessed: 01/28/21 0909   Traumatic Wound Type: Catheter entry/exit site  Location: Groin  Wound Location Orientation: Left  Wound Description (Comments): angiogram access punc    [REMOVED] Wound 01/29/21 Diabetic Ulcer Foot Lateral;Right (Removed)   Resolved Date: 03/11/21  Date First Assessed/Time First Assessed: 01/29/21 0820   Pre-Existing Wound: Yes  Primary Wound Type: Diabetic Ulcer  Location: Foot  Wound Location Orientation: Lateral;Right       [REMOVED] Wound 02/02/21 Foot Right (Removed)   Resolved Date: 03/11/21  Date First Assessed/Time First Assessed: 02/02/21 1734   Location: Foot  Wound Location Orientation: Right  Incision's 1st Dressing: DRESSING OIL EMULSION 3 X 3 IN (x1), SPONGE GAUZE 4 X 4 16 PLY STRL PLASTIC TRAY LF (x1), JOSI           [REMOVED] Wound 04/13/21 Catheter entry/exit site Groin Right (Removed)   Resolved Date/Resolved Time: 04/22/21 1025  Date First Assessed/Time First Assessed: 04/13/21 0843   Traumatic Wound Type: Catheter entry/exit site  Location: Groin  Wound Location Orientation: Right  Wound Description (Comments): angiogram access        Subjective:    BULMARO Keyes presents for wound care bilateral feet  Right foot pain is minimal   No increased pain left foot  No redness          The following portions of the patient's history were reviewed and updated as appropriate: allergies, current medications, past family history, past medical history, past social history, past surgical history and problem list     PAST MEDICAL HISTORY:  Past Medical History:   Diagnosis Date    Arthritis     fingers    [facility]     First degree AV block     Full dentures     [facility]AD [facility]peripheral artery disease) [facility]     [facility]     Wound, open     right foot- by the 5th toe       PAST SURGICAL HISTORY:  Past Surgical History:   Procedure Laterality Date    CHOLECYSTECTOMY      COLONOSCOPY      IR AORTAGRAM WITH RUN-OFF  1/28/2021    IR AORTAGRAM WITH RUN-OFF  4/13/2021    ID DEBRIDEMENT, SKIN, SUB-Q TISSUE,MUSCLE,BONE,=<20 SQ CM Right 12/18/2020    Procedure: DEBRIDMENT OF ULCER , REMOVAL OF DEVITALIZED SKIN, SOFT TISSUE, BONE AND APPLICATION OF INTEGRA GRAFT RIGHT FOOT ;  Surgeon: Eleanor Gomez DPM;  Location: [address] [place] [address];  Service: Podiatry    REPLANTATION THUMB Right     right tip reconnected    SKIN GRAFT      right thumb    TOE AMPUTATION      left foot all 5 toes removed    TOE AMPUTATION Right 2/2/2021    Procedure: Right partial 5th ray amputation;  Surgeon: Nano Boyd DPM;  Location:  MAIN OR;  Service: Podiatry    TOE OSTEOTOMY Right 6/26/2020    Procedure: exostosis of right big toe;  Surgeon: Magdaleno Ayala DPM;  Location: [facility];  Service: Podiatry    TRIGGER FINGER RELEASE      bilateral hands    VEIN LIGATION      right        ALLERGIES:  Shellfish-derived products - food allergy and Sulfamethoxazole-trimethoprim    MEDICATIONS:  Current Outpatient Medications   Medication Sig Dispense Refill    atorvastatin (LIPITOR) 40 mg tablet Take 1 tablet (40 mg total) by mouth daily at bedtime 90 tablet 1    Blood Glucose Monitoring Suppl (OneTouch Verio) w/Device KIT by Does not apply route 2 (two) times a day Dx E11 9 1 kit 1    clopidogrel (PLAVIX) 75 mg tablet Take 1 tablet (75 mg total) by mouth daily 90 tablet 0    gabapentin (NEURONTIN) 300 mg capsule TAKE ONE CAPSULE BY MOUTH AT BEDTIME  30 capsule 0    glucose blood (OneTouch Verio) test strip Use as instructed qid Dx E11 9 400 each 3    halobetasol (ULTRAVATE) 0 05 % cream Apply topically 2 (two) times a day (Patient not taking: Reported on 3/11/2021) 50 g 5    HYDROcodone-acetaminophen (NORCO) 5-325 mg per tablet Take 1 tablet by mouth every 6 (six) hours as needed for painMax Daily Amount: 4 tablets (Patient not taking: Reported on 3/11/2021) 20 tablet 0    insulin NPH (HumuLIN N,NovoLIN N) 100 Units/mL subcutaneous injection Inject 12 Units under the skin 2 (two) times a day 7 2 mL 0    insulin regular (HumuLIN R,NovoLIN R) 100 units/mL injection 4 units with lunch, 8 units with dinner 10 mL 0    Lancets (OneTouch Delica Plus [id number]) MISC 1 Units by Does not apply route 2 (two) times a day Dx E11 9 100 each 2    Nitro-Bid 2 % ointment       oxyCODONE-acetaminophen (PERCOCET) 5-325 mg per tablet Take 1 tablet by mouth every 6 (six) hours as needed for moderate painMax Daily Amount: 4 tablets 120 tablet 0    pantoprazole (PROTONIX) 40 mg tablet Take 1 tablet (40 mg total) by mouth daily 90 tablet 1    rivaroxaban (Xarelto) 2 5 mg tablet Take 1 tablet (2 5 mg total) by mouth daily with breakfast Last dose 6/21/20 30 tablet 2    sodium hypochlorite (DAKIN'S QUARTER-STRENGTH) Irrigate with 1 application as directed daily Apply with dry dressing  473 mL 0    UltiCare Insulin Syringe 31G X 5/16" 1 ML MISC        No current facility-administered medications for this visit          SOCIAL HISTORY:  Social History     Socioeconomic History    Marital status: /Civil Union     Spouse name: None    Number of children: None    Years of education: None  Highest education level: None   Occupational History    Occupation: Tim   Social Needs    Financial resource strain: None    Food insecurity     Worry: None     Inability: None    Transportation needs     Medical: None     Non-medical: None   Tobacco Use    Smoking status: Never Smoker    Smokeless tobacco: Never Used   Substance and Sexual Activity    Alcohol use: Yes     Frequency: Monthly or less     Drinks per session: 1 or 2     Binge frequency: Never     Comment: occasional    Drug use: Never    Sexual activity: None   Lifestyle    Physical activity     Days per week: None     Minutes per session: None    Stress: None   Relationships    Social connections     Talks on phone: None     Gets together: None     Attends [employer] service: None     Active member of club or organization: None     Attends meetings of clubs or organizations: None     Relationship status: None    Intimate partner violence     Fear of current or ex partner: None     Emotionally abused: None     Physically abused: None     Forced sexual activity: None   Other Topics Concern    None   Social History Narrative    · Most recent tobacco use screenin2019      · Do you currently or have you served in the [employer]:   No      · Were you activated, into active duty, as a member of the [employer] or as a Reservist:   No      · Diet:   Regular      · Alcohol intake:   Occasional      · Caffeine intake:   Occasional      · Seat belts used routinely:   Yes      · Smoke alarm in home:   Yes       Review of Systems   Constitutional: Negative for chills and fever  HENT: Negative for sore throat  Respiratory: Negative for cough and shortness of breath  Cardiovascular: Negative for chest pain  Gastrointestinal: Negative for diarrhea, nausea and vomiting  Skin: Positive for wound           Objective:       Wound 21 Surgical Foot Right;Lateral (Active)   Wound Image Images linked 21 1044   Wound Description Eschar;Granulation tissue;Pink;Yellow 04/22/21 1029   Dominga-wound Assessment Rash; Intact;Edema 04/22/21 1029   Wound Length (cm) 4 cm 04/22/21 1029   Wound Width (cm) 0 6 cm 04/22/21 1029   Wound Depth (cm) 0 3 cm 04/22/21 1029   Wound Surface Area (cm^2) 2 4 cm^2 04/22/21 1029   Wound Volume (cm^3) 0 72 cm^3 04/22/21 1029   Calculated Wound Volume (cm^3) 0 72 cm^3 04/22/21 1029   Undermining 0 5 04/22/21 1029   Undermining is depth extending from 6-9 04/22/21 1029   Drainage Amount Moderate 04/22/21 1029   Drainage Description Serosanguineous;Purulent 04/22/21 1029   Non-staged Wound Description Full thickness 04/22/21 1029   Treatments Cleansed 04/22/21 1029   Dressing Changed Changed 04/22/21 1029   Patient Tolerance Tolerated well 04/22/21 1029   Dressing Status Removed 04/22/21 1029       Wound 03/11/21 Foot Left; Other (Comment); Posterior (Active)   Wound Image Images linked 04/22/21 1028   Wound Description Pink 04/22/21 1031   Dominga-wound Assessment Intact;Dry 04/22/21 1031   Wound Length (cm) 0 2 cm 04/22/21 1031   Wound Width (cm) 0 1 cm 04/22/21 1031   Wound Depth (cm) 0 1 cm 04/22/21 1031   Wound Surface Area (cm^2) 0 02 cm^2 04/22/21 1031   Wound Volume (cm^3) 0 cm^3 04/22/21 1031   Calculated Wound Volume (cm^3) 0 cm^3 04/22/21 1031   Change in Wound Size % 100 04/22/21 1031       Wound 03/11/21 Heel Left (Active)   Wound Image Images linked 04/22/21 1027   Wound Description Eschar;Epithelialization 04/22/21 1029   Dominga-wound Assessment Dry; Intact 04/22/21 1029   Wound Length (cm) 2 7 cm 04/22/21 1029   Wound Width (cm) 1 5 cm 04/22/21 1029   Wound Depth (cm) 0 cm 04/22/21 1029   Wound Surface Area (cm^2) 4 05 cm^2 04/22/21 1029   Wound Volume (cm^3) 0 cm^3 04/22/21 1029   Calculated Wound Volume (cm^3) 0 cm^3 04/22/21 1029   Drainage Amount None 04/22/21 1029       Wound 04/08/21 Foot Anterior; Left (Active)   Wound Image Images linked 04/22/21 1028   Wound Description Granulation tissue 04/22/21 1030   Wound Length (cm) 0 4 cm 04/22/21 1030   Wound Width (cm) 0 2 cm 04/22/21 1030   Wound Depth (cm) 0 1 cm 04/22/21 1030   Wound Surface Area (cm^2) 0 08 cm^2 04/22/21 1030   Wound Volume (cm^3) 0 01 cm^3 04/22/21 1030   Calculated Wound Volume (cm^3) 0 01 cm^3 04/22/21 1030   Change in Wound Size % 0 04/22/21 1030   Drainage Amount Small 04/22/21 1030   Drainage Description Serous 04/22/21 1030   Non-staged Wound Description Full thickness 04/22/21 1030   Treatments Cleansed 04/22/21 1030       /68   Pulse 88   Temp 97 6 °F (36 4 °C)   Resp 20     Physical Exam  Vitals signs and nursing note reviewed  Constitutional:       General: He is not in acute distress  Appearance: Normal appearance  He is not toxic-appearing  Cardiovascular:      Rate and Rhythm: Normal rate and regular rhythm  Pulses:           Dorsalis pedis pulses are 0 on the right side and 0 on the left side  Posterior tibial pulses are 0 on the right side and 0 on the left side  Comments: No ischemia  Pulmonary:      Effort: Pulmonary effort is normal  No respiratory distress  Musculoskeletal:         General: No signs of injury  Comments: TMA left foot  Right 5th ray amputation  Skin:     General: Skin is warm and dry  Coloration: Skin is not cyanotic or mottled  Findings: Wound present  No abscess or erythema  Rash is not purpuric  Nails: There is no clubbing  Comments: Dry wounds X 2 with eschar left heel with no drainage  Stable wounds left TMA site  Wound presents right foot laterally  Wound looks clean with increased granular tissues  No deep probing or infection bilaterally  See wound assessments  Neurological:      General: No focal deficit present  Mental Status: He is alert and oriented to person, place, and time  Cranial Nerves: No cranial nerve deficit  Motor: No weakness     Psychiatric:         Mood and Affect: Mood normal  Behavior: Behavior normal          Thought Content: Thought content normal          Judgment: Judgment normal            Wound Instructions:  Orders Placed This Encounter   Procedures    Wound cleansing and dressings     Wound cleansing and dressings       Right foot wound:   Do not get wet with shower water, may use cast covers or sponge bathe  Apply Calmoseptine to quita wound  Apply Puracol AG to the wound  Cover with gauze and secure gently with rolled gauze and tape  Change every other day      Left foot wounds:  Do not get wet with shower water, may use cast covers or sponge bathe  Apply Dermagran gauze  Cover with gauze and secure gently with rolled gauze and tape  Change dressing every other day      Left Heel wound  Do not get wet with shower water, may use cast covers or sponge bathe  Paint wound with Betadine  Cover with gauze and secure gently with rolled gauze and tape  Change dressing every other day        Treatments above were completed today at the [facility]                            Ensure adequate nutrition, 3 meals per day that include protein         Follow up in 2 weeks at the wound center     Standing Status:   Future     Standing Expiration Date:   4/22/2022    Debridement     This order was created via procedure documentation        Diagnosis ICD-10-CM Associated Orders   1  Surgical wound, non healing, initial encounter  T81 89XA lidocaine (XYLOCAINE) 4 % topical solution 5 mL     Wound cleansing and dressings     Debridement   2  Ulcer of left foot, unspecified ulcer stage ([facility])  L97 529 lidocaine (XYLOCAINE) 4 % topical solution 5 mL     Wound cleansing and dressings   3  Heel ulceration, left, with unspecified severity [facility]  L97 429    4  Diabetic polyneuropathy associated with type 2 diabetes mellitus ([facility])  E11 42 lidocaine (XYLOCAINE) 4 % topical solution 5 mL     Wound cleansing and dressings   5   Peripheral vascular disease, unspecified ([facility] )  I73 9

## 2021-04-22 NOTE — PATIENT INSTRUCTIONS
Orders Placed This Encounter   Procedures    Wound cleansing and dressings     Wound cleansing and dressings       Right foot wound:   Do not get wet with shower water, may use cast covers or sponge bathe  Apply Calmoseptine to quita wound  Apply Puracol AG to the wound  Cover with gauze and secure gently with rolled gauze and tape  Change every other day      Left foot wounds:  Do not get wet with shower water, may use cast covers or sponge bathe  Apply Dermagran gauze  Cover with gauze and secure gently with rolled gauze and tape  Change dressing every other day      Left Heel wound  Do not get wet with shower water, may use cast covers or sponge bathe     Paint wound with Betadine  Cover with gauze and secure gently with rolled gauze and tape  Change dressing every other day        Treatments above were completed today at the [facility]                            Ensure adequate nutrition, 3 meals per day that include protein         Follow up in 2 weeks at the wound center     Standing Status:   Future     Standing Expiration Date:   4/22/2022

## 2021-04-23 DIAGNOSIS — G89.29 CHRONIC BACK PAIN, UNSPECIFIED BACK LOCATION, UNSPECIFIED BACK PAIN LATERALITY: ICD-10-CM

## 2021-04-23 DIAGNOSIS — M54.9 CHRONIC BACK PAIN, UNSPECIFIED BACK LOCATION, UNSPECIFIED BACK PAIN LATERALITY: ICD-10-CM

## 2021-04-24 RX ORDER — OXYCODONE HYDROCHLORIDE AND ACETAMINOPHEN 5; 325 MG/1; MG/1
1 TABLET ORAL EVERY 6 HOURS PRN
Qty: 120 TABLET | Refills: 0 | Status: SHIPPED | OUTPATIENT
Start: 2021-04-24 | End: 2021-05-26 | Stop reason: SDUPTHER

## 2021-04-26 ENCOUNTER — NURSE TRIAGE (OUTPATIENT)
Dept: OTHER | Facility: OTHER | Age: 66
End: 2021-04-26

## 2021-04-26 NOTE — TELEPHONE ENCOUNTER
Regarding: burning pain w/urination, frequent, darkening in color  ----- Message from Bonita Richmond sent at 4/26/2021  6:40 PM EDT -----  "I am having a burning pain with urination; I have to go often and it is darkening in color  "

## 2021-04-26 NOTE — TELEPHONE ENCOUNTER
TC message from Narendra Cheema "Probably cystitis, tell PT force lots of fluids, SEE Dr Andre Card tomorrow  If feels he cant wait, go to ER  He needs ur c&s, etc "    Notified patient of on call providers recommendation  Patient verbalized appointment  Scheduled appointment for patient tomorrow

## 2021-04-26 NOTE — TELEPHONE ENCOUNTER
Reason for Disposition   Urinating more frequently than usual (i e , frequency)    Answer Assessment - Initial Assessment Questions  1  SYMPTOM: "What's the main symptom you're concerned about?" (e g , frequency, incontinence)      Burning with urination  2  ONSET: "When did the  S/S  start?"      This morning  3  PAIN: "Is there any pain?" If so, ask: "How bad is it?" (Scale: 1-10; mild, moderate, severe)      6/10  4   CAUSE: "What do you think is causing the symptoms?"      UTI  5  OTHER SYMPTOMS: "Do you have any other symptoms?" (e g , fever, flank pain, blood in urine, pain with urination)      Dark urine, fever yesterday 101,    Protocols used: [facility]

## 2021-04-27 ENCOUNTER — OFFICE VISIT (OUTPATIENT)
Dept: VASCULAR SURGERY | Facility: CLINIC | Age: 66
End: 2021-04-27
Payer: COMMERCIAL

## 2021-04-27 ENCOUNTER — OFFICE VISIT (OUTPATIENT)
Dept: FAMILY MEDICINE CLINIC | Facility: CLINIC | Age: 66
End: 2021-04-27
Payer: COMMERCIAL

## 2021-04-27 VITALS
DIASTOLIC BLOOD PRESSURE: 96 MMHG | HEART RATE: 89 BPM | HEIGHT: 64 IN | WEIGHT: 184 LBS | BODY MASS INDEX: 31.41 KG/M2 | SYSTOLIC BLOOD PRESSURE: 158 MMHG

## 2021-04-27 VITALS
HEART RATE: 86 BPM | HEIGHT: 64 IN | WEIGHT: 191 LBS | OXYGEN SATURATION: 97 % | SYSTOLIC BLOOD PRESSURE: 148 MMHG | TEMPERATURE: 98.9 F | RESPIRATION RATE: 18 BRPM | DIASTOLIC BLOOD PRESSURE: 70 MMHG | BODY MASS INDEX: 32.61 KG/M2

## 2021-04-27 DIAGNOSIS — I73.9 PERIPHERAL VASCULAR DISEASE, UNSPECIFIED (HCC): ICD-10-CM

## 2021-04-27 DIAGNOSIS — L97.511 DIABETIC ULCER OF TOE OF RIGHT FOOT ASSOCIATED WITH TYPE 2 DIABETES MELLITUS, LIMITED TO BREAKDOWN OF SKIN (HCC): Primary | ICD-10-CM

## 2021-04-27 DIAGNOSIS — E11.65 POORLY CONTROLLED TYPE 2 DIABETES MELLITUS (HCC): ICD-10-CM

## 2021-04-27 DIAGNOSIS — Z00.00 MEDICARE ANNUAL WELLNESS VISIT, SUBSEQUENT: ICD-10-CM

## 2021-04-27 DIAGNOSIS — I10 ESSENTIAL HYPERTENSION: ICD-10-CM

## 2021-04-27 DIAGNOSIS — R30.0 DYSURIA: Primary | ICD-10-CM

## 2021-04-27 DIAGNOSIS — E11.621 DIABETIC ULCER OF TOE OF RIGHT FOOT ASSOCIATED WITH TYPE 2 DIABETES MELLITUS, LIMITED TO BREAKDOWN OF SKIN (HCC): Primary | ICD-10-CM

## 2021-04-27 DIAGNOSIS — N30.01 ACUTE CYSTITIS WITH HEMATURIA: ICD-10-CM

## 2021-04-27 PROBLEM — E78.2 MIXED HYPERLIPIDEMIA: Status: ACTIVE | Noted: 2021-04-27

## 2021-04-27 LAB
SL AMB  POCT GLUCOSE, UA: 2000
SL AMB LEUKOCYTE ESTERASE,UA: ABNORMAL
SL AMB POCT BILIRUBIN,UA: ABNORMAL
SL AMB POCT BLOOD,UA: ABNORMAL
SL AMB POCT CLARITY,UA: CLEAR
SL AMB POCT COLOR,UA: YELLOW
SL AMB POCT KETONES,UA: ABNORMAL
SL AMB POCT NITRITE,UA: ABNORMAL
SL AMB POCT PH,UA: 5
SL AMB POCT SPECIFIC GRAVITY,UA: 1
SL AMB POCT URINE PROTEIN: ABNORMAL
SL AMB POCT UROBILINOGEN: 0.2

## 2021-04-27 PROCEDURE — 1160F RVW MEDS BY RX/DR IN RCRD: CPT | Performed by: NURSE PRACTITIONER

## 2021-04-27 PROCEDURE — 1170F FXNL STATUS ASSESSED: CPT | Performed by: NURSE PRACTITIONER

## 2021-04-27 PROCEDURE — 1036F TOBACCO NON-USER: CPT | Performed by: SURGERY

## 2021-04-27 PROCEDURE — 99215 OFFICE O/P EST HI 40 MIN: CPT | Performed by: SURGERY

## 2021-04-27 PROCEDURE — 3061F NEG MICROALBUMINURIA REV: CPT | Performed by: SURGERY

## 2021-04-27 PROCEDURE — 99214 OFFICE O/P EST MOD 30 MIN: CPT | Performed by: NURSE PRACTITIONER

## 2021-04-27 PROCEDURE — 81002 URINALYSIS NONAUTO W/O SCOPE: CPT | Performed by: NURSE PRACTITIONER

## 2021-04-27 PROCEDURE — 1125F AMNT PAIN NOTED PAIN PRSNT: CPT | Performed by: NURSE PRACTITIONER

## 2021-04-27 PROCEDURE — G0439 PPPS, SUBSEQ VISIT: HCPCS | Performed by: NURSE PRACTITIONER

## 2021-04-27 PROCEDURE — 4010F ACE/ARB THERAPY RXD/TAKEN: CPT | Performed by: SURGERY

## 2021-04-27 PROCEDURE — 3288F FALL RISK ASSESSMENT DOCD: CPT | Performed by: NURSE PRACTITIONER

## 2021-04-27 RX ORDER — LISINOPRIL 10 MG/1
10 TABLET ORAL DAILY
Qty: 90 TABLET | Refills: 1 | Status: SHIPPED | OUTPATIENT
Start: 2021-04-27

## 2021-04-27 RX ORDER — AMOXICILLIN AND CLAVULANATE POTASSIUM 875; 125 MG/1; MG/1
1 TABLET, FILM COATED ORAL EVERY 12 HOURS SCHEDULED
Qty: 14 TABLET | Refills: 0 | Status: SHIPPED | OUTPATIENT
Start: 2021-04-27 | End: 2021-05-04

## 2021-04-27 RX ORDER — GABAPENTIN 300 MG/1
300 CAPSULE ORAL 3 TIMES DAILY
Qty: 270 CAPSULE | Refills: 3 | Status: SHIPPED | OUTPATIENT
Start: 2021-04-27 | End: 2021-05-03 | Stop reason: SDUPTHER

## 2021-04-27 NOTE — ASSESSMENT & PLAN NOTE
Lab Results   Component Value Date    HGBA1C 8 7 (H) 01/29/2021   84GG M with PMHx PAD, DM with previous Left TMA and active lateral foot wound for the past 6 months  Reports that he has undergone multiple angiograms over the last 6 months with Dr Marnie Lamb with persistence of his wound  Also sees podiatry and wound care       Underwent agram on 1/5/20 (Dr Marnie Lamb) with sfa stent angioplasty and arthrectomy/PTA of tibial vessels, distal AT/DP was noted to be occluded   LEADs at that time demonstrate inadequate perfusion pressure for healing with met pressure of 33mg HG and great toe pressure of 13mmHG      Underwent RLE angiogram 1/28/21 with successful recanalization of the AT/DP  Underwent 5th ray amputation during the same admission  LEADs improved with met pressure of 130mmHG (39) and GTP of 70mmHG (19)  His amp site continues to heal       Has LLE PAD also and is now s/p LLE angiogram on 4/13/21, with successful recanalization of an occluded previously placed peroneal artery stent (single vessel runoff), reestablishing inline flow to the foot       Reports ongoing pain in the heal in the location of his wounds   On exam has a significantly improved peroneal/PT artery signal, biphasic and hyperemic      continue plavix and xarelto   Continue f/u schedule with podiatry  Will obtain 3 month LEADs

## 2021-04-27 NOTE — PROGRESS NOTES
Assessment/Plan:  Physical assessment is remarkable for acute cystitis  Patient was advised will start of Augmentin as he does have a blood thinner on board which precludes him from most antibiotic regimes  I he is also allergic to Bactrim  As being diabetic would not be the best choice  Patient is advised to take medications prescribed if fails to improved please contact the office as soon as possible  Patient also advised if he develops any type of eye yeast infection oral or genitalia to contact me will send medication to the pharmacy  It also should be noted the patient's blood pressure has been elevated for multiple visits  Will start low-dose lisinopril as it provides renal protection and decrease his blood pressure  This was discussed with patient he is in agreement  Dosing all possible side effects of the prescribed medications or medications that had been prescribed in the past were reviewed and all questions were answered  Patient verbalized agreement and understanding of the plan of care as outlined during the office visit today return to office as indicated or sooner if a problem arises  All questions were answered patient was very pleased with the outcome of the visit in visit it well  Problem List Items Addressed This Visit        Endocrine    Poorly controlled type 2 diabetes mellitus ([address] )       Cardiovascular and Mediastinum    Essential hypertension      Other Visit Diagnoses     Dysuria    -  Primary    Relevant Orders    POCT urine dip (Completed)    Acute cystitis with hematuria        Medicare annual wellness visit, subsequent                Subjective:      Patient ID: Alma Alicea is a 76 y o  male      HPI    The following portions of the patient's history were reviewed and updated as appropriate: allergies, current medications, past family history, past medical history, past social history, past surgical history and problem list     Review of Systems Objective:  Patient's shoes and socks were not removed  Right Foot/Ankle   Right Foot Inspection    Toe Exam: right toe deformity  Sensory       Monofilament testing: diminished      Left Foot/Ankle  Left Foot Inspection                           Toe Exam: left toe deformity                   Sensory       Monofilament: diminished    Assign Risk Category:  Deformity present; Loss of protective sensation; No weak pulses       Risk: 3          /70 (BP Location: Left arm, Patient Position: Sitting, Cuff Size: Large)   Pulse 86   Temp 98 9 °F (37 2 °C) (Temporal)   Resp 18   Ht 5' 4" (1 626 m)   Wt 86 6 kg (191 lb)   SpO2 97%   BMI 32 79 kg/m²          Physical Exam  Vitals signs and nursing note reviewed  Constitutional:       General: He is not in acute distress  Appearance: He is well-developed  HENT:      Head: Normocephalic and atraumatic  Cardiovascular:      Rate and Rhythm: Normal rate and regular rhythm  Pulses: no weak pulses     Heart sounds: Normal heart sounds  Pulmonary:      Effort: Pulmonary effort is normal       Breath sounds: Normal breath sounds  Abdominal:      Tenderness: There is abdominal tenderness (suprapubic)  Musculoskeletal:        Feet:    Skin:     General: Skin is warm and dry  Neurological:      General: No focal deficit present  Mental Status: He is alert and oriented to person, place, and time  Psychiatric:         Behavior: Behavior normal          Thought Content:  Thought content normal          Judgment: Judgment normal

## 2021-04-27 NOTE — PROGRESS NOTES
Assessment/Plan:    Diabetic ulcer of toe of right foot associated with type 2 diabetes mellitus, limited to breakdown of skin ([facility] [address] )    Lab Results   Component Value Date    HGBA1C 8 7 (H) 01/29/2021   28KN M with PMHx PAD, DM with previous Left TMA and active lateral foot wound for the past 6 months  Reports that he has undergone multiple angiograms over the last 6 months with Dr Lilliana Pineda with persistence of his wound  Also sees podiatry and wound care       Underwent agram on 1/5/20 (Dr Lilliana Pineda) with sfa stent angioplasty and arthrectomy/PTA of tibial vessels, distal AT/DP was noted to be occluded   LEADs at that time demonstrate inadequate perfusion pressure for healing with met pressure of 33mg HG and great toe pressure of 13mmHG      Underwent RLE angiogram 1/28/21 with successful recanalization of the AT/DP  Underwent 5th ray amputation during the same admission  LEADs improved with met pressure of 130mmHG (39) and GTP of 70mmHG (19)  His amp site continues to heal       Has LLE PAD also and is now s/p LLE angiogram on 4/13/21, with successful recanalization of an occluded previously placed peroneal artery stent (single vessel runoff), reestablishing inline flow to the foot       Reports ongoing pain in the heal in the location of his wounds  On exam has a significantly improved peroneal/PT artery signal, biphasic and hyperemic      continue plavix and xarelto   Continue f/u schedule with podiatry  Will obtain 3 month LEADs         Problem List Items Addressed This Visit        Endocrine    Diabetic ulcer of toe of right foot associated with type 2 diabetes mellitus, limited to breakdown of skin ([facility] [address] ) - Primary       Lab Results   Component Value Date    HGBA1C 8 7 (H) 01/29/2021   68CR M with PMHx PAD, DM with previous Left TMA and active lateral foot wound for the past 6 months  Reports that he has undergone multiple angiograms over the last 6 months with Dr Lilliana Pineda with persistence of his wound   Also sees podiatry and wound care       Underwent agram on 1/5/20 (Dr Zella Eisenmenger) with sfa stent angioplasty and arthrectomy/PTA of tibial vessels, distal AT/DP was noted to be occluded   LEADs at that time demonstrate inadequate perfusion pressure for healing with met pressure of 33mg HG and great toe pressure of 13mmHG      Underwent RLE angiogram 1/28/21 with successful recanalization of the AT/DP  Underwent 5th ray amputation during the same admission  LEADs improved with met pressure of 130mmHG (39) and GTP of 70mmHG (19)  His amp site continues to heal       Has LLE PAD also and is now s/p LLE angiogram on 4/13/21, with successful recanalization of an occluded previously placed peroneal artery stent (single vessel runoff), reestablishing inline flow to the foot       Reports ongoing pain in the heal in the location of his wounds  On exam has a significantly improved peroneal/PT artery signal, biphasic and hyperemic      continue plavix and xarelto   Continue f/u schedule with podiatry  Will obtain 3 month LEADs         Relevant Medications    gabapentin (NEURONTIN) 300 mg capsule    Other Relevant Orders    VAS lower limb arterial duplex, complete bilateral      Other Visit Diagnoses     Peripheral vascular disease, unspecified ([facility] [address] )        Relevant Medications    gabapentin (NEURONTIN) 300 mg capsule    Other Relevant Orders    VAS lower limb arterial duplex, complete bilateral            Subjective:      Patient ID: Alma Alicea is a 76 y o  male  Patient is here to review results of LLE Agram done 4/13/21 by Dr Kenny Morales  Patient states he is having pain and neuropathy in left leg  He states the pain is radiating higher than his ankle now  He denies redness or swelling  He states he has a wound on right foot where toe was removed  Patient takes Atorvastatin, Plavix, and Xarelto         The following portions of the patient's history were reviewed and updated as appropriate: allergies, current medications, past family history, past medical history, past social history, past surgical history and problem list     Review of Systems   Constitutional: Negative  HENT: Negative  Eyes: Negative  Respiratory: Negative  Cardiovascular: Negative  Gastrointestinal: Negative  Endocrine: Negative  Genitourinary: Negative  Musculoskeletal: Positive for gait problem  Skin: Positive for wound (right foot)  Allergic/Immunologic: Negative  Neurological: Positive for numbness (neuropathy in left leg)  Hematological: Negative  Psychiatric/Behavioral: Negative  I have reviewed and updated the ROS as appropriate  Objective:      /96 (BP Location: Right arm, Patient Position: Sitting)   Pulse 89   Ht 5' 4" (1 626 m)   Wt 83 5 kg (184 lb)   BMI 31 58 kg/m²          Physical Exam  Constitutional:       Appearance: Normal appearance  HENT:      Head: Normocephalic and atraumatic  Eyes:      Extraocular Movements: Extraocular movements intact  Pupils: Pupils are equal, round, and reactive to light  Cardiovascular:      Rate and Rhythm: Normal rate and regular rhythm  Pulses:           Femoral pulses are 2+ on the right side and 2+ on the left side  Popliteal pulses are 2+ on the right side and 2+ on the left side  Dorsalis pedis pulses are detected w/ Doppler on the right side  Posterior tibial pulses are detected w/ Doppler on the right side and detected w/ Doppler on the left side  Heart sounds: Normal heart sounds  Pulmonary:      Effort: Pulmonary effort is normal       Breath sounds: Normal breath sounds  Abdominal:      General: Abdomen is flat  Palpations: Abdomen is soft  Musculoskeletal: Normal range of motion  General: Tenderness present  Skin:     General: Skin is warm and dry  Findings: Erythema and lesion present  Neurological:      General: No focal deficit present        Mental Status: He is alert and oriented to person, place, and time  Psychiatric:         Mood and Affect: Mood normal          Behavior: Behavior normal          Thought Content:  Thought content normal          Judgment: Judgment normal

## 2021-04-27 NOTE — TELEPHONE ENCOUNTER
Javy Bailey! How are you doing? This pt called about 6:40 pm -- after sick for 2 days! So, you get the idea    Take care!

## 2021-04-27 NOTE — PATIENT INSTRUCTIONS

## 2021-04-27 NOTE — PROGRESS NOTES
Assessment and Plan:     Problem List Items Addressed This Visit     None      Visit Diagnoses     Dysuria    -  Primary    Relevant Orders    POCT urine dip           Preventive health issues were discussed with patient, and age appropriate screening tests were ordered as noted in patient's After Visit Summary  Personalized health advice and appropriate referrals for health education or preventive services given if needed, as noted in patient's After Visit Summary       History of Present Illness:     Patient presents for Medicare Annual Wellness visit    Patient Care Team:  Severa Bos, MD as PCP - General (Family Medicine)  Jesusita Ch MD (Vascular Surgery)     Problem List:     Patient Active Problem List   Diagnosis    Acquired deformity of foot    Diabetic polyneuropathy associated with type 2 diabetes mellitus ([address] )    Pain in both feet    Peripheral arteriosclerosis ([address] )    Onychomycosis    Tinea pedis of both feet    Dermatophytosis    Ingrown toenail    Paronychia of toenail of right foot    Diabetic ulcer of toe of right foot associated with type 2 diabetes mellitus, limited to breakdown of skin ([address] )    Bony exostosis    Right foot ulcer, with fat layer exposed ([address] )    PAD (peripheral artery disease) ([address] )      Past Medical and Surgical History:     Past Medical History:   Diagnosis Date    Arthritis     fingers    Diabetes mellitus ([address] )     First degree AV block     Full dentures     PAD (peripheral artery disease) ([address] )     PVD (peripheral vascular disease) ([address] )     Wound, open     right foot- by the 5th toe     Past Surgical History:   Procedure Laterality Date    CHOLECYSTECTOMY      COLONOSCOPY      IR AORTAGRAM WITH RUN-OFF  1/28/2021    IR AORTAGRAM WITH RUN-OFF  4/13/2021    TX DEBRIDEMENT, SKIN, SUB-Q TISSUE,MUSCLE,BONE,=<20 SQ CM Right 12/18/2020    Procedure: DEBRIDMENT OF ULCER , REMOVAL OF DEVITALIZED SKIN, SOFT TISSUE, BONE AND APPLICATION OF INTEGRA GRAFT RIGHT FOOT ;  Surgeon: Jose Miguel Nielson DPM;  Location: [facility];  Service: Podiatry    REPLANTATION THUMB Right     right tip reconnected    SKIN GRAFT      right thumb    TOE AMPUTATION      left foot all 5 toes removed    TOE AMPUTATION Right 2/2/2021    Procedure: Right partial 5th ray amputation;  Surgeon: Arash Michael DPM;  Location:  MAIN OR;  Service: Podiatry    TOE OSTEOTOMY Right 6/26/2020    Procedure: exostosis of right big toe;  Surgeon: Yudy Huizar DPM;  Location: [facility];  Service: Podiatry    TRIGGER FINGER RELEASE      bilateral hands    VEIN LIGATION      right      Family History:     Family History   Problem Relation Age of Onset    Arthritis Mother     Cancer Father         colon    Alzheimer's disease Father       Social History:     E-Cigarette/Vaping    E-Cigarette Use Never User      E-Cigarette/Vaping Substances    Nicotine No     THC No     CBD No     Flavoring No     Other No     Unknown No      Social History     Socioeconomic History    Marital status: /Civil Union     Spouse name: None    Number of children: None    Years of education: None    Highest education level: None   Occupational History    Occupation: [employer]   Social Needs    Financial resource strain: None    Food insecurity     Worry: None     Inability: None    Transportation needs     Medical: None     Non-medical: None   Tobacco Use    Smoking status: Never Smoker    Smokeless tobacco: Never Used   Substance and Sexual Activity    Alcohol use: Yes     Frequency: Monthly or less     Drinks per session: 1 or 2     Binge frequency: Never     Comment: occasional    Drug use: Never    Sexual activity: None   Lifestyle    Physical activity     Days per week: None     Minutes per session: None    Stress: None   Relationships    Social connections     Talks on phone: None     Gets together: None     Attends Latter day service: None     Active member of club or organization: None Attends meetings of clubs or organizations: None     Relationship status: None    Intimate partner violence     Fear of current or ex partner: None     Emotionally abused: None     Physically abused: None     Forced sexual activity: None   Other Topics Concern    None   Social History Narrative    · Most recent tobacco use screenin2019      · Do you currently or have you served in the [employer]:   No      · Were you activated, into active duty, as a member of the [employer] or as a Reservist:   No      · Diet:   Regular      · Alcohol intake:   Occasional      · Caffeine intake:   Occasional      · Seat belts used routinely:   Yes      · Smoke alarm in home:   Yes       Medications and Allergies:     Current Outpatient Medications   Medication Sig Dispense Refill    atorvastatin (LIPITOR) 40 mg tablet Take 1 tablet (40 mg total) by mouth daily at bedtime 90 tablet 1    Blood Glucose Monitoring Suppl (OneTouch Verio) w/Device KIT by Does not apply route 2 (two) times a day Dx E11 9 1 kit 1    clopidogrel (PLAVIX) 75 mg tablet Take 1 tablet (75 mg total) by mouth daily 90 tablet 0    gabapentin (NEURONTIN) 300 mg capsule Take 1 capsule (300 mg total) by mouth 3 (three) times a day 270 capsule 3    glucose blood (OneTouch Verio) test strip Use as instructed qid Dx E11 9 400 each 3    halobetasol (ULTRAVATE) 0 05 % cream Apply topically 2 (two) times a day 50 g 5    insulin regular (HumuLIN R,NovoLIN R) 100 units/mL injection 4 units with lunch, 8 units with dinner 10 mL 0    Lancets (OneTouch Delica Plus [id number]) MISC 1 Units by Does not apply route 2 (two) times a day Dx E11 9 100 each 2    Nitro-Bid 2 % ointment       pantoprazole (PROTONIX) 40 mg tablet Take 1 tablet (40 mg total) by mouth daily 90 tablet 1    rivaroxaban (Xarelto) 2 5 mg tablet Take 1 tablet (2 5 mg total) by mouth daily with breakfast Last dose 20 30 tablet 2    sodium hypochlorite (DAKIN'S QUARTER-STRENGTH) Irrigate with 1 application as directed daily Apply with dry dressing  473 mL 0    UltiCare Insulin Syringe 31G X 5/16" 1 ML MISC       HYDROcodone-acetaminophen (NORCO) 5-325 mg per tablet Take 1 tablet by mouth every 6 (six) hours as needed for painMax Daily Amount: 4 tablets (Patient not taking: Reported on 3/11/2021) 20 tablet 0    insulin NPH (HumuLIN N,NovoLIN N) 100 Units/mL subcutaneous injection Inject 12 Units under the skin 2 (two) times a day 7 2 mL 0    oxyCODONE-acetaminophen (PERCOCET) 5-325 mg per tablet Take 1 tablet by mouth every 6 (six) hours as needed for moderate painMax Daily Amount: 4 tablets (Patient not taking: Reported on 4/27/2021) 120 tablet 0     No current facility-administered medications for this visit  Allergies   Allergen Reactions    Shellfish-Derived Products - Food Allergy Anaphylaxis     Throat closes    Sulfamethoxazole-Trimethoprim Rash      Immunizations:     Immunization History   Administered Date(s) Administered    INFLUENZA 11/02/2018    Influenza, high dose seasonal 0 7 mL 11/05/2020      Health Maintenance:         Topic Date Due    Hepatitis C Screening  Never done    Colorectal Cancer Screening  Never done         Topic Date Due    COVID-19 Vaccine (1) Never done    DTaP,Tdap,and Td Vaccines (1 - Tdap) Never done    Pneumococcal Vaccine: 65+ Years (1 of 1 - PPSV23) Never done      Medicare Health Risk Assessment:     Resp 18   Ht 5' 4" (1 626 m)   Wt 86 6 kg (191 lb)   BMI 32 79 kg/m²      Pedro Stacy is here for his Subsequent Wellness visit  Health Risk Assessment:   Patient rates overall health as good  Patient feels that their physical health rating is same  Patient is satisfied with their life  Eyesight was rated as same  Hearing was rated as same  Patient feels that their emotional and mental health rating is same  Patients states they are never, rarely angry  Patient states they are never, rarely unusually tired/fatigued  Pain experienced in the last 7 days has been none  Patient states that he has experienced no weight loss or gain in last 6 months  Fall Risk Screening: In the past year, patient has experienced: no history of falling in past year      Home Safety:  Patient has trouble with stairs inside or outside of their home  Patient has working smoke alarms and has working carbon monoxide detector  Home safety hazards include: none  Nutrition:   Current diet is Regular and Diabetic  Medications:   Patient is currently taking over-the-counter supplements  OTC medications include: see medication list  Patient is able to manage medications  Activities of Daily Living (ADLs)/Instrumental Activities of Daily Living (IADLs):   Walk and transfer into and out of bed and chair?: Yes  Dress and groom yourself?: Yes    Bathe or shower yourself?: Yes    Feed yourself?  Yes  Do your laundry/housekeeping?: No  Manage your money, pay your bills and track your expenses?: No  Make your own meals?: No    Do your own shopping?: No    Previous Hospitalizations:   Any hospitalizations or ED visits within the last 12 months?: Yes    How many hospitalizations have you had in the last year?: 1-2    Advance Care Planning:   Living will: No    Durable POA for healthcare: No    Advanced directive: No    Advanced directive counseling given: No    Five wishes given: No    Patient declined ACP directive: No    End of Life Decisions reviewed with patient: No    Provider agrees with end of life decisions: No      Cognitive Screening:   Provider or family/friend/caregiver concerned regarding cognition?: No    PREVENTIVE SCREENINGS      Cardiovascular Screening:    General: Screening Not Indicated and History Lipid Disorder      Diabetes Screening:     General: Screening Not Indicated and History Diabetes      Abdominal Aortic Aneurysm (AAA) Screening:    Risk factors include: age between 73-67 yo        Lung Cancer Screening:     General: Screening Not Indicated    Screening, Brief Intervention, and Referral to Treatment (SBIRT)    Screening  Typical number of drinks in a day: 0  Typical number of drinks in a week: 0  Interpretation: Low risk drinking behavior  AUDIT-C Screenin) How often did you have a drink containing alcohol in the past year? monthly or less  2) How many drinks did you have on a typical day when you were drinking in the past year?  1 to 2  3) How often did you have 6 or more drinks on one occasion in the past year? never    AUDIT-C Score: 1  Interpretation: Score 0-3 (male): Negative screen for alcohol misuse    Single Item Drug Screening:  How often have you used an illegal drug (including marijuana) or a prescription medication for non-medical reasons in the past year? never    Single Item Drug Screen Score: 0  Interpretation: Negative screen for possible drug use disorder      AHSAN Johnson

## 2021-05-03 ENCOUNTER — VBI (OUTPATIENT)
Dept: ADMINISTRATIVE | Facility: OTHER | Age: 66
End: 2021-05-03

## 2021-05-03 DIAGNOSIS — E11.621 DIABETIC ULCER OF TOE OF RIGHT FOOT ASSOCIATED WITH TYPE 2 DIABETES MELLITUS, LIMITED TO BREAKDOWN OF SKIN (HCC): ICD-10-CM

## 2021-05-03 DIAGNOSIS — L97.511 DIABETIC ULCER OF TOE OF RIGHT FOOT ASSOCIATED WITH TYPE 2 DIABETES MELLITUS, LIMITED TO BREAKDOWN OF SKIN (HCC): ICD-10-CM

## 2021-05-03 DIAGNOSIS — I73.9 PERIPHERAL VASCULAR DISEASE, UNSPECIFIED (HCC): ICD-10-CM

## 2021-05-03 RX ORDER — GABAPENTIN 300 MG/1
300 CAPSULE ORAL 3 TIMES DAILY
Qty: 270 CAPSULE | Refills: 3 | Status: SHIPPED | OUTPATIENT
Start: 2021-05-03 | End: 2022-04-08

## 2021-05-03 NOTE — TELEPHONE ENCOUNTER
Pt seen by Dr Romaine Mullen on 4/27 and was ordered gabapentin 300 mg 3 times a day  Pt called today because the rx was supposed to go to [facility] [place] listed in pt's chart, not [facility]  Please advise if ok to send to [facility] [place]

## 2021-05-06 ENCOUNTER — OFFICE VISIT (OUTPATIENT)
Dept: WOUND CARE | Facility: HOSPITAL | Age: 66
End: 2021-05-06
Payer: COMMERCIAL

## 2021-05-06 VITALS
RESPIRATION RATE: 16 BRPM | DIASTOLIC BLOOD PRESSURE: 72 MMHG | SYSTOLIC BLOOD PRESSURE: 138 MMHG | HEART RATE: 72 BPM | TEMPERATURE: 97.1 F

## 2021-05-06 DIAGNOSIS — E11.42 DIABETIC POLYNEUROPATHY ASSOCIATED WITH TYPE 2 DIABETES MELLITUS (HCC): ICD-10-CM

## 2021-05-06 DIAGNOSIS — L97.512 RIGHT FOOT ULCER, WITH FAT LAYER EXPOSED (HCC): ICD-10-CM

## 2021-05-06 DIAGNOSIS — T81.89XA SURGICAL WOUND, NON HEALING, INITIAL ENCOUNTER: Primary | ICD-10-CM

## 2021-05-06 DIAGNOSIS — L97.429 HEEL ULCERATION, LEFT, WITH UNSPECIFIED SEVERITY (HCC): ICD-10-CM

## 2021-05-06 DIAGNOSIS — L97.529 ULCER OF LEFT FOOT, UNSPECIFIED ULCER STAGE (HCC): ICD-10-CM

## 2021-05-06 DIAGNOSIS — I73.9 PERIPHERAL VASCULAR DISEASE, UNSPECIFIED (HCC): ICD-10-CM

## 2021-05-06 PROCEDURE — 11042 DBRDMT SUBQ TIS 1ST 20SQCM/<: CPT | Performed by: PODIATRIST

## 2021-05-06 PROCEDURE — 97597 DBRDMT OPN WND 1ST 20 CM/<: CPT | Performed by: PODIATRIST

## 2021-05-06 PROCEDURE — 99213 OFFICE O/P EST LOW 20 MIN: CPT | Performed by: PODIATRIST

## 2021-05-06 RX ORDER — LIDOCAINE HYDROCHLORIDE 40 MG/ML
5 SOLUTION TOPICAL ONCE
Status: COMPLETED | OUTPATIENT
Start: 2021-05-06 | End: 2021-05-06

## 2021-05-06 RX ADMIN — LIDOCAINE HYDROCHLORIDE 5 ML: 40 SOLUTION TOPICAL at 10:40

## 2021-05-06 NOTE — PROGRESS NOTES
Debridement   Wound 03/11/21 Foot Left; Other (Comment); Posterior    Universal Protocol:  Consent: Verbal consent obtained  Risks and benefits: risks, benefits and alternatives were discussed  Consent given by: patient  Time out: Immediately prior to procedure a "time out" was called to verify the correct patient, procedure, equipment, support staff and site/side marked as required    Timeout called at: 5/6/2021 10:49 AM   Patient understanding: patient states understanding of the procedure being performed  Patient identity confirmed: verbally with patient      Performed by: physician  Debridement type: selective  Pain control: lidocaine 4%  Post-debridement measurements  Length (cm): 0 3  Width (cm): 0 3  Depth (cm): 0 1  Percent debrided: 100%  Surface Area (cm^2): 0 09  Area debrided (cm^2): 0 09  Volume (cm^3): 0 01  Devitalized tissue debrided: callus, fibrin and slough  Instrument(s) utilized: blade  Bleeding: small  Hemostasis obtained with: pressure  Procedural pain (0-10): 0  Post-procedural pain: 0   Response to treatment: procedure was tolerated well

## 2021-05-06 NOTE — PATIENT INSTRUCTIONS
Orders Placed This Encounter   Procedures    Wound cleansing and dressings     Wash your hands with soap and water  Remove old dressing, discard into plastic bag and place in trash  Cleanse the wound with normal saline prior to applying a clean dressing  Do not use tissue or cotton balls  Do not scrub the wound  Pat dry using gauze  Shower yes, with protection  Right foot wound:  Do not get wet with shower water, may use cast covers or sponge bathe  Apply Puracol AG to the wound  Cover with gauze and secure gently with rolled gauze and tape  Change every other day and as needed if soiled     Left foot wounds:  Do not get wet with shower water, may use cast covers or sponge bathe  Apply Dermagran gauze  Cover with gauze and secure gently with rolled gauze and tape  Change dressing every other day and as needed if soiled       Left Heel wound  Do not get wet with shower water, may use cast covers or sponge bathe     Paint wound with Betadine  Cover with gauze (keep padded) and secure gently with rolled gauze and tape  Change dressing every other day and as needed if soiled          Treatments above were completed today at the [facility]                            Ensure adequate nutrition, 3 meals per day that include protein     Standing Status:   Future     Standing Expiration Date:   5/6/2022

## 2021-05-12 LAB
LEFT EYE DIABETIC RETINOPATHY: NORMAL
RIGHT EYE DIABETIC RETINOPATHY: NORMAL

## 2021-05-12 PROCEDURE — 2022F DILAT RTA XM EVC RTNOPTHY: CPT | Performed by: INTERNAL MEDICINE

## 2021-05-17 ENCOUNTER — RA CDI HCC (OUTPATIENT)
Dept: OTHER | Facility: HOSPITAL | Age: 66
End: 2021-05-17

## 2021-05-17 NOTE — PROGRESS NOTES
[place] [address]  coding opportunities        DX used     Chart reviewed, (number of) suggestions sent to provider: 1     Problem listed updated  Provider Accepted, (number of) suggestions accepted: 1        Patients insurance company: [address]  (Med[employer] and [employer])     Visit status: Patient arrived for their scheduled appointment        [place] [address]  coding opportunities             Chart reviewed, (number of) suggestions sent to provider: 1     Problem listed updated   Provider Accepted, (number of) suggestions accepted: 1        Patients insurance company: [address]  (Med[employer] and [employer])           [place] [address]  coding opportunities        DX: E11 51 Type 2 diabetes mellitus with diabetic peripheral angiopathy without gangrene       Chart reviewed, (number of) suggestions sent to provider: 1           Patients insurance company: [address]  (Medicare [employer] and [employer])

## 2021-05-18 PROBLEM — E11.51 TYPE 2 DIABETES MELLITUS WITH DIABETIC PERIPHERAL ANGIOPATHY WITHOUT GANGRENE (HCC): Status: ACTIVE | Noted: 2021-05-18

## 2021-05-20 ENCOUNTER — OFFICE VISIT (OUTPATIENT)
Dept: WOUND CARE | Facility: HOSPITAL | Age: 66
End: 2021-05-20
Payer: COMMERCIAL

## 2021-05-20 VITALS
TEMPERATURE: 97.9 F | SYSTOLIC BLOOD PRESSURE: 161 MMHG | RESPIRATION RATE: 16 BRPM | HEART RATE: 77 BPM | DIASTOLIC BLOOD PRESSURE: 75 MMHG

## 2021-05-20 DIAGNOSIS — L97.512 RIGHT FOOT ULCER, WITH FAT LAYER EXPOSED (HCC): ICD-10-CM

## 2021-05-20 DIAGNOSIS — L97.529 ULCER OF LEFT FOOT, UNSPECIFIED ULCER STAGE (HCC): ICD-10-CM

## 2021-05-20 DIAGNOSIS — T81.89XA SURGICAL WOUND, NON HEALING, INITIAL ENCOUNTER: Primary | ICD-10-CM

## 2021-05-20 DIAGNOSIS — E11.42 DIABETIC POLYNEUROPATHY ASSOCIATED WITH TYPE 2 DIABETES MELLITUS (HCC): ICD-10-CM

## 2021-05-20 DIAGNOSIS — I73.9 PERIPHERAL VASCULAR DISEASE, UNSPECIFIED (HCC): ICD-10-CM

## 2021-05-20 DIAGNOSIS — L97.429 HEEL ULCERATION, LEFT, WITH UNSPECIFIED SEVERITY (HCC): ICD-10-CM

## 2021-05-20 PROCEDURE — 11042 DBRDMT SUBQ TIS 1ST 20SQCM/<: CPT | Performed by: PODIATRIST

## 2021-05-20 RX ORDER — LIDOCAINE HYDROCHLORIDE 40 MG/ML
5 SOLUTION TOPICAL ONCE
Status: COMPLETED | OUTPATIENT
Start: 2021-05-20 | End: 2021-05-20

## 2021-05-20 RX ADMIN — LIDOCAINE HYDROCHLORIDE 5 ML: 40 SOLUTION TOPICAL at 11:16

## 2021-05-20 NOTE — PROGRESS NOTES
Patient ID: Gualberto Dominguez is a 76 y o  male Date of Birth 1955     Chief Complaint  Chief Complaint   Patient presents with    Follow Up Wound Care Visit     Wounds to the right and left foot       Allergies  Shellfish-derived products - food allergy and Sulfamethoxazole-trimethoprim    Assessment:    No problem-specific Assessment & Plan notes found for this encounter  Diagnoses and all orders for this visit:    Surgical wound, non healing, initial encounter  -     Debridement  -     Debridement    Right foot ulcer, with fat layer exposed ([facility] )  -     lidocaine (XYLOCAINE) 4 % topical solution 5 mL  -     Wound cleansing and dressings; Future    Ulcer of left foot, unspecified ulcer stage ([facility])  -     Wound cleansing and dressings; Future    Heel ulceration, left, with unspecified severity ([facility] )  -     Wound cleansing and dressings; Future    Diabetic polyneuropathy associated with type 2 diabetes mellitus ([facility] )  -     Debridement    Peripheral vascular disease, unspecified ([facility] )    Other orders  -     Cancel: Debridement          Debridement   Wound 03/11/21 Surgical Foot Right;Lateral    Universal Protocol:  Consent: Verbal consent obtained  Risks and benefits: risks, benefits and alternatives were discussed  Consent given by: patient  Time out: Immediately prior to procedure a "time out" was called to verify the correct patient, procedure, equipment, support staff and site/side marked as required    Timeout called at: 5/20/2021 11:34 AM   Patient understanding: patient states understanding of the procedure being performed  Patient identity confirmed: verbally with patient      Performed by: physician  Debridement type: surgical  Level of debridement: subcutaneous tissue  Pain control: lidocaine 4%  Post-debridement measurements  Length (cm): 0 5  Width (cm): 1  Depth (cm): 0 2  Percent debrided: 100%  Surface Area (cm^2): 0 5  Area debrided (cm^2): 0 5  Volume (cm^3): 0 1  Tissue and other material debrided: dermis, epidermis and subcutaneous tissue  Devitalized tissue debrided: callus, fibrin and slough  Instrument(s) utilized: blade  Bleeding: small  Hemostasis obtained with: pressure  Procedural pain (0-10): 0  Post-procedural pain: 0   Response to treatment: procedure was tolerated well            Plan:  1  Continue serial debridement  Continue collagen dressing to right foot  Dermagren to left TMA wounds  Betadine to left heel for now  2  Plan OR debridement with possible wound VAC on left heel after right foot wound healed more  3  Discussed proper off-loading  Surgical shoes on both feet  4  RA in 2 weeks  Wound 03/11/21 Surgical Foot Right;Lateral (Active)   Wound Image Images linked 05/20/21 1121   Wound Description Granulation tissue;Pink;Brown;Eschar 05/20/21 1108   Dominga-wound Assessment Dry;Scaly;Edema 05/20/21 1108   Wound Length (cm) 0 3 cm 05/20/21 1108   Wound Width (cm) 2 cm 05/20/21 1108   Wound Depth (cm) 0 2 cm 05/20/21 1108   Wound Surface Area (cm^2) 0 6 cm^2 05/20/21 1108   Wound Volume (cm^3) 0 12 cm^3 05/20/21 1108   Calculated Wound Volume (cm^3) 0 12 cm^3 05/20/21 1108   Drainage Amount Small 05/20/21 1108   Drainage Description Serosanguineous 05/20/21 1108   Non-staged Wound Description Full thickness 05/20/21 1108       Wound 03/11/21 Foot Left; Other (Comment); Posterior (Active)   Wound Image Images linked 05/20/21 1106   Wound Description Yellow;Pink 05/20/21 1113   Dominga-wound Assessment Dry;Scaly;Callus 05/20/21 1113   Wound Length (cm) 0 2 cm 05/20/21 1113   Wound Width (cm) 0 2 cm 05/20/21 1113   Wound Depth (cm) 0 2 cm 05/20/21 1113   Wound Surface Area (cm^2) 0 04 cm^2 05/20/21 1113   Wound Volume (cm^3) 0 01 cm^3 05/20/21 1113   Calculated Wound Volume (cm^3) 0 01 cm^3 05/20/21 1113   Change in Wound Size % 92 86 05/20/21 1113   Drainage Amount Scant 05/20/21 1113   Drainage Description Serosanguineous 05/20/21 1113   Non-staged Wound Description Full thickness 05/20/21 1113       Wound 03/11/21 Heel Left (Active)   Wound Image Images linked 05/20/21 1105   Wound Description Eschar;Edema;Black; Yellow;Pink 05/20/21 1105   Dominga-wound Assessment Dry;Scaly 05/20/21 1105   Wound Length (cm) 2 4 cm 05/20/21 1105   Wound Width (cm) 4 cm 05/20/21 1105   Wound Depth (cm) 0 1 cm 05/20/21 1105   Wound Surface Area (cm^2) 9 6 cm^2 05/20/21 1105   Wound Volume (cm^3) 0 96 cm^3 05/20/21 1105   Calculated Wound Volume (cm^3) 0 96 cm^3 05/20/21 1105   Drainage Amount Scant 05/20/21 1105   Drainage Description Serosanguineous 05/20/21 1105   Non-staged Wound Description Full thickness 05/20/21 1105       Wound 04/08/21 Foot Anterior; Left (Active)   Wound Image Images linked 05/20/21 1107   Wound Description Yellow;Epithelialization 05/20/21 1112   Dominga-wound Assessment Dry;Edema; Erythema 05/20/21 1112   Wound Length (cm) 0 1 cm 05/20/21 1112   Wound Width (cm) 0 1 cm 05/20/21 1112   Wound Depth (cm) 0 1 cm 05/20/21 1112   Wound Surface Area (cm^2) 0 01 cm^2 05/20/21 1112   Wound Volume (cm^3) 0 cm^3 05/20/21 1112   Calculated Wound Volume (cm^3) 0 cm^3 05/20/21 1112   Change in Wound Size % 100 05/20/21 1112   Drainage Amount Scant 05/20/21 1112   Drainage Description Serosanguineous 05/20/21 1112   Non-staged Wound Description Full thickness 05/20/21 1112       Wound 03/11/21 Surgical Foot Right;Lateral (Active)   Date First Assessed/Time First Assessed: 03/11/21 1057   Primary Wound Type: Surgical  Location: Foot  Wound Location Orientation: Right;Lateral       Wound 03/11/21 Foot Left; Other (Comment); Posterior (Active)   Date First Assessed: 03/11/21   Location: Foot  Wound Location Orientation: (c) Left; Other (Comment); Posterior       Wound 03/11/21 Heel Left (Active)   Date First Assessed/Time First Assessed: 03/11/21 1100   Location: Heel  Wound Location Orientation: Left       Wound 04/08/21 Foot Anterior; Left (Active)   Date First Assessed/Time First Assessed: 04/08/21 1046   Location: Foot  Wound Location Orientation: Anterior; Left       [REMOVED] Wound 07/24/20 Diabetic Ulcer Foot Right;Lateral (Removed)   Resolved Date: 03/11/21  Date First Assessed: 07/24/20   Pre-Existing Wound: Yes  Primary Wound Type: Diabetic Ulcer  Location: Foot  Wound Location Orientation: Right;Lateral  Wound Description (Comments): Cristina 1       [REMOVED] Wound 12/18/20 Foot Right (Removed)   Resolved Date: 03/11/21  Date First Assessed/Time First Assessed: 12/18/20 1146   Location: Foot  Wound Location Orientation: Right  Incision's 1st Dressing: DRESSING OIL EMULSION 3 X 3 IN (x1), SPONGE GAUZE 4 X 8 12 PLY STRL LF (x1), BANDAGE WILLARD 3   [REMOVED] Wound 01/28/21 Catheter entry/exit site Groin Left (Removed)   Resolved Date/Resolved Time: 03/25/21 1011  Date First Assessed/Time First Assessed: 01/28/21 0909   Traumatic Wound Type: Catheter entry/exit site  Location: Groin  Wound Location Orientation: Left  Wound Description (Comments): angiogram access punc    [REMOVED] Wound 01/29/21 Diabetic Ulcer Foot Lateral;Right (Removed)   Resolved Date: 03/11/21  Date First Assessed/Time First Assessed: 01/29/21 0820   Pre-Existing Wound: Yes  Primary Wound Type: Diabetic Ulcer  Location: Foot  Wound Location Orientation: Lateral;Right       [REMOVED] Wound 02/02/21 Foot Right (Removed)   Resolved Date: 03/11/21  Date First Assessed/Time First Assessed: 02/02/21 1734   Location: Foot  Wound Location Orientation: Right  Incision's 1st Dressing: DRESSING OIL EMULSION 3 X 3 IN (x1), SPONGE GAUZE 4 X 4 16 PLY STRL PLASTIC TRAY LF (x1), JOSI           [REMOVED] Wound 04/13/21 Catheter entry/exit site Groin Right (Removed)   Resolved Date/Resolved Time: 04/22/21 1025  Date First Assessed/Time First Assessed: 04/13/21 0843   Traumatic Wound Type: Catheter entry/exit site  Location: Groin  Wound Location Orientation: Right  Wound Description (Comments): angiogram access        Subjective: BULMARO  Rhiannon Godwin presents for wound care bilateral feet  Right foot pain is minimal   Right foot ulcer is healing well  No improvement left foot wounds    No increased pain  No redness  No new complaint         The following portions of the patient's history were reviewed and updated as appropriate: allergies, current medications, past family history, past medical history, past social history, past surgical history and problem list     PAST MEDICAL HISTORY:  Past Medical History:   Diagnosis Date    Arthritis     fingers    [facility]     First degree AV block     Full dentures     PAD [facility]peripheral artery disease) [facility]     PVD (peripheral vascular disease) [facility]     Wound, open     right foot- by the 5th toe       PAST SURGICAL HISTORY:  Past Surgical History:   Procedure Laterality Date    CHOLECYSTECTOMY      COLONOSCOPY      IR AORTAGRAM WITH RUN-OFF  1/28/2021    IR AORTAGRAM WITH RUN-OFF  4/13/2021    TN DEBRIDEMENT, SKIN, SUB-Q TISSUE,MUSCLE,BONE,=<20 SQ CM Right 12/18/2020    Procedure: DEBRIDMENT OF ULCER , REMOVAL OF DEVITALIZED SKIN, SOFT TISSUE, BONE AND APPLICATION OF INTEGRA GRAFT RIGHT FOOT ;  Surgeon: Juan Luis George DPM;  Location: [address] [place] [address];  Service: Podiatry    REPLANTATION THUMB Right     right tip reconnected    SKIN GRAFT      right thumb    TOE AMPUTATION      left foot all 5 toes removed    TOE AMPUTATION Right 2/2/2021    Procedure: Right partial 5th ray amputation;  Surgeon: Alfreda Somers DPM;  Location:  MAIN OR;  Service: Podiatry    TOE OSTEOTOMY Right 6/26/2020    Procedure: exostosis of right big toe;  Surgeon: Radha Pedersen DPM;  Location: [address] [place] [address];  Service: Podiatry    TRIGGER FINGER RELEASE      bilateral hands    VEIN LIGATION      right        ALLERGIES:  Shellfish-derived products - food allergy and Sulfamethoxazole-trimethoprim    MEDICATIONS:  Current Outpatient Medications   Medication Sig Dispense Refill    atorvastatin (LIPITOR) 40 mg tablet Take 1 tablet (40 mg total) by mouth daily at bedtime 90 tablet 1    Blood Glucose Monitoring Suppl (OneTouch Verio) w/Device KIT by Does not apply route 2 (two) times a day Dx E11 9 1 kit 1    clopidogrel (PLAVIX) 75 mg tablet Take 1 tablet (75 mg total) by mouth daily 90 tablet 0    gabapentin (NEURONTIN) 300 mg capsule Take 1 capsule (300 mg total) by mouth 3 (three) times a day 270 capsule 3    glucose blood (OneTouch Verio) test strip Use as instructed qid Dx E11 9 400 each 3    halobetasol (ULTRAVATE) 0 05 % cream Apply topically 2 (two) times a day 50 g 5    HYDROcodone-acetaminophen (NORCO) 5-325 mg per tablet Take 1 tablet by mouth every 6 (six) hours as needed for painMax Daily Amount: 4 tablets (Patient not taking: Reported on 3/11/2021) 20 tablet 0    insulin NPH (HumuLIN N,NovoLIN N) 100 Units/mL subcutaneous injection Inject 12 Units under the skin 2 (two) times a day 7 2 mL 0    insulin regular (HumuLIN R,NovoLIN R) 100 units/mL injection 4 units with lunch, 8 units with dinner 10 mL 0    Lancets (OneTouch Delica Plus [id number]) MISC 1 Units by Does not apply route 2 (two) times a day Dx E11 9 100 each 2    lisinopril (ZESTRIL) 10 mg tablet Take 1 tablet (10 mg total) by mouth daily 90 tablet 1    Nitro-Bid 2 % ointment       oxyCODONE-acetaminophen (PERCOCET) 5-325 mg per tablet Take 1 tablet by mouth every 6 (six) hours as needed for moderate painMax Daily Amount: 4 tablets (Patient not taking: Reported on 4/27/2021) 120 tablet 0    pantoprazole (PROTONIX) 40 mg tablet Take 1 tablet (40 mg total) by mouth daily 90 tablet 1    rivaroxaban (Xarelto) 2 5 mg tablet Take 1 tablet (2 5 mg total) by mouth daily with breakfast Last dose 6/21/20 30 tablet 2    sodium hypochlorite (DAKIN'S QUARTER-STRENGTH) Irrigate with 1 application as directed daily Apply with dry dressing   473 mL 0    UltiCare Insulin Syringe 31G X 5/16" 1 ML MISC        No current facility-administered medications for this visit  SOCIAL HISTORY:  Social History     Socioeconomic History    Marital status: /Civil Union     Spouse name: None    Number of children: None    Years of education: None    Highest education level: None   Occupational History    Occupation: [employer]   Social Needs    Financial resource strain: None    Food insecurity     Worry: None     Inability: None    Transportation needs     Medical: None     Non-medical: None   Tobacco Use    Smoking status: Never Smoker    Smokeless tobacco: Never Used   Substance and Sexual Activity    Alcohol use: Yes     Frequency: Monthly or less     Drinks per session: 1 or 2     Binge frequency: Never     Comment: occasional    Drug use: Never    Sexual activity: None   Lifestyle    Physical activity     Days per week: None     Minutes per session: None    Stress: None   Relationships    Social connections     Talks on phone: None     Gets together: None     Attends Anglican service: None     Active member of club or organization: None     Attends meetings of clubs or organizations: None     Relationship status: None    Intimate partner violence     Fear of current or ex partner: None     Emotionally abused: None     Physically abused: None     Forced sexual activity: None   Other Topics Concern    None   Social History Narrative    · Most recent tobacco use screenin2019      · Do you currently or have you served in the [employer]:   No      · Were you activated, into active duty, as a member of the [employer] or as a Reservist:   No      · Diet:   Regular      · Alcohol intake:   Occasional      · Caffeine intake:   Occasional      · Seat belts used routinely:   Yes      · Smoke alarm in home:   Yes       Review of Systems   Constitutional: Negative for chills and fever  HENT: Negative for sore throat  Respiratory: Negative for cough and shortness of breath  Cardiovascular: Negative for chest pain  Gastrointestinal: Negative for diarrhea, nausea and vomiting  Skin: Positive for wound  Objective:       Wound 03/11/21 Surgical Foot Right;Lateral (Active)   Wound Image Images linked 05/20/21 1121   Wound Description Granulation tissue;Pink;Brown;Eschar 05/20/21 1108   Dominga-wound Assessment Dry;Scaly;Edema 05/20/21 1108   Wound Length (cm) 0 3 cm 05/20/21 1108   Wound Width (cm) 2 cm 05/20/21 1108   Wound Depth (cm) 0 2 cm 05/20/21 1108   Wound Surface Area (cm^2) 0 6 cm^2 05/20/21 1108   Wound Volume (cm^3) 0 12 cm^3 05/20/21 1108   Calculated Wound Volume (cm^3) 0 12 cm^3 05/20/21 1108   Drainage Amount Small 05/20/21 1108   Drainage Description Serosanguineous 05/20/21 1108   Non-staged Wound Description Full thickness 05/20/21 1108       Wound 03/11/21 Foot Left; Other (Comment); Posterior (Active)   Wound Image Images linked 05/20/21 1106   Wound Description Yellow;Pink 05/20/21 1113   Dominga-wound Assessment Dry;Scaly;Callus 05/20/21 1113   Wound Length (cm) 0 2 cm 05/20/21 1113   Wound Width (cm) 0 2 cm 05/20/21 1113   Wound Depth (cm) 0 2 cm 05/20/21 1113   Wound Surface Area (cm^2) 0 04 cm^2 05/20/21 1113   Wound Volume (cm^3) 0 01 cm^3 05/20/21 1113   Calculated Wound Volume (cm^3) 0 01 cm^3 05/20/21 1113   Change in Wound Size % 92 86 05/20/21 1113   Drainage Amount Scant 05/20/21 1113   Drainage Description Serosanguineous 05/20/21 1113   Non-staged Wound Description Full thickness 05/20/21 1113       Wound 03/11/21 Heel Left (Active)   Wound Image Images linked 05/20/21 1105   Wound Description Eschar;Edema;Black; Yellow;Pink 05/20/21 1105   Dominga-wound Assessment Dry;Scaly 05/20/21 1105   Wound Length (cm) 2 4 cm 05/20/21 1105   Wound Width (cm) 4 cm 05/20/21 1105   Wound Depth (cm) 0 1 cm 05/20/21 1105   Wound Surface Area (cm^2) 9 6 cm^2 05/20/21 1105   Wound Volume (cm^3) 0 96 cm^3 05/20/21 1105   Calculated Wound Volume (cm^3) 0 96 cm^3 05/20/21 1105   Drainage Amount Scant 05/20/21 1105 Drainage Description Serosanguineous 05/20/21 1105   Non-staged Wound Description Full thickness 05/20/21 1105       Wound 04/08/21 Foot Anterior; Left (Active)   Wound Image Images linked 05/20/21 1107   Wound Description Yellow;Epithelialization 05/20/21 1112   Dominga-wound Assessment Dry;Edema; Erythema 05/20/21 1112   Wound Length (cm) 0 1 cm 05/20/21 1112   Wound Width (cm) 0 1 cm 05/20/21 1112   Wound Depth (cm) 0 1 cm 05/20/21 1112   Wound Surface Area (cm^2) 0 01 cm^2 05/20/21 1112   Wound Volume (cm^3) 0 cm^3 05/20/21 1112   Calculated Wound Volume (cm^3) 0 cm^3 05/20/21 1112   Change in Wound Size % 100 05/20/21 1112   Drainage Amount Scant 05/20/21 1112   Drainage Description Serosanguineous 05/20/21 1112   Non-staged Wound Description Full thickness 05/20/21 1112       /75   Pulse 77   Temp 97 9 °F (36 6 °C)   Resp 16     Physical Exam  Vitals signs and nursing note reviewed  Constitutional:       General: He is not in acute distress  Appearance: Normal appearance  He is not toxic-appearing  Cardiovascular:      Rate and Rhythm: Normal rate and regular rhythm  Pulses:           Dorsalis pedis pulses are 0 on the right side and 0 on the left side  Posterior tibial pulses are 0 on the right side and 0 on the left side  Comments: No ischemia  Pulmonary:      Effort: Pulmonary effort is normal  No respiratory distress  Musculoskeletal:         General: No signs of injury  Comments: TMA left foot  Right 5th ray amputation  Skin:     General: Skin is warm and dry  Coloration: Skin is not cyanotic or mottled  Findings: Wound present  No abscess or erythema  Rash is not purpuric  Nails: There is no clubbing  Comments: Dry wounds X 3 with eschar left heel with no drainage  Stable wounds left TMA site  Mild keratosis noted  Wound presents right foot laterally  Wound looks clean with increased granular tissues  Decrease in size    No deep probing or infection bilaterally  No cellulitis  See wound assessments  Neurological:      General: No focal deficit present  Mental Status: He is alert and oriented to person, place, and time  Cranial Nerves: No cranial nerve deficit  Motor: No weakness  Psychiatric:         Mood and Affect: Mood normal          Behavior: Behavior normal          Thought Content: Thought content normal          Judgment: Judgment normal            Wound Instructions:  Orders Placed This Encounter   Procedures    Wound cleansing and dressings     Wash your hands with soap and water  Remove old dressing, discard into plastic bag and place in trash  Cleanse the wound with normal saline prior to applying a clean dressing  Do not use tissue or cotton balls  Do not scrub the wound  Pat dry using gauze  Shower yes, with protection  Right foot wound:  Do not get wet with shower water, may use cast covers or sponge bathe  Apply Puracol AG to the wound  Cover with gauze and secure gently with rolled gauze and tape  Change every other day and as needed if soiled    Left foot wounds:  Do not get wet with shower water, may use cast covers or sponge bathe  Apply Dermagran gauze  Cover with gauze and secure gently with rolled gauze and tape  Change dressing every other day and as needed if soiled  Left Heel wound  Do not get wet with shower water, may use cast covers or sponge bathe  Paint wound with Betadine  Cover with gauze (keep padded) and secure gently with rolled gauze and tape  Change dressing every other day and as needed if soiled  Treatments above were completed [facility] at the [facility]    Follow up in 2 weeks at the wound center     Standing Status:   Future     Standing Expiration Date:   5/20/2022    Debridement     This order was created via procedure documentation    Debridement     This order was created via procedure documentation        Diagnosis ICD-10-CM Associated Orders   1   Surgical wound, non healing, initial encounter  T81 [id number] Debridement     Debridement   2  Right foot ulcer, with fat layer exposed ([facility] )  L97 512 lidocaine (XYLOCAINE) 4 % topical solution 5 mL     Wound cleansing and dressings   3  Ulcer of left foot, unspecified ulcer stage ([facility])  L97 529 Wound cleansing and dressings   4  Heel ulceration, left, with unspecified severity ([place] [address] )  L97 429 Wound cleansing and dressings   5  Diabetic polyneuropathy associated with type 2 diabetes mellitus ([facility])  E11 42 Debridement   6   Peripheral vascular disease, unspecified ([place] [address] )  I73 9

## 2021-05-20 NOTE — PATIENT INSTRUCTIONS
Orders Placed This Encounter   Procedures    Wound cleansing and dressings     Wash your hands with soap and water  Remove old dressing, discard into plastic bag and place in trash  Cleanse the wound with normal saline prior to applying a clean dressing  Do not use tissue or cotton balls  Do not scrub the wound  Pat dry using gauze  Shower yes, with protection  Right foot wound:  Do not get wet with shower water, may use cast covers or sponge bathe  Apply Puracol AG to the wound  Cover with gauze and secure gently with rolled gauze and tape  Change every other day and as needed if soiled    Left foot wounds:  Do not get wet with shower water, may use cast covers or sponge bathe  Apply Dermagran gauze  Cover with gauze and secure gently with rolled gauze and tape  Change dressing every other day and as needed if soiled  Left Heel wound  Do not get wet with shower water, may use cast covers or sponge bathe  Paint wound with Betadine  Cover with gauze (keep padded) and secure gently with rolled gauze and tape  Change dressing every other day and as needed if soiled      Treatments above were completed today at the [facility]    Follow up in 2 weeks at the wound center     Standing Status:   Future     Standing Expiration Date:   5/20/2022

## 2021-05-21 ENCOUNTER — OFFICE VISIT (OUTPATIENT)
Dept: FAMILY MEDICINE CLINIC | Facility: CLINIC | Age: 66
End: 2021-05-21
Payer: COMMERCIAL

## 2021-05-21 VITALS
SYSTOLIC BLOOD PRESSURE: 122 MMHG | BODY MASS INDEX: 32.61 KG/M2 | TEMPERATURE: 98.1 F | HEART RATE: 84 BPM | DIASTOLIC BLOOD PRESSURE: 62 MMHG | WEIGHT: 191 LBS | HEIGHT: 64 IN | OXYGEN SATURATION: 97 %

## 2021-05-21 DIAGNOSIS — Z11.59 NEED FOR HEPATITIS C SCREENING TEST: ICD-10-CM

## 2021-05-21 DIAGNOSIS — Z79.4 TYPE 2 DIABETES MELLITUS WITH DIABETIC PERIPHERAL ANGIOPATHY WITHOUT GANGRENE, WITH LONG-TERM CURRENT USE OF INSULIN (HCC): Primary | ICD-10-CM

## 2021-05-21 DIAGNOSIS — E11.42 DIABETIC POLYNEUROPATHY ASSOCIATED WITH TYPE 2 DIABETES MELLITUS (HCC): ICD-10-CM

## 2021-05-21 DIAGNOSIS — Z12.12 SCREENING FOR COLORECTAL CANCER: ICD-10-CM

## 2021-05-21 DIAGNOSIS — Z12.11 SCREENING FOR COLORECTAL CANCER: ICD-10-CM

## 2021-05-21 DIAGNOSIS — Z23 ENCOUNTER FOR IMMUNIZATION: ICD-10-CM

## 2021-05-21 DIAGNOSIS — I73.9 PAD (PERIPHERAL ARTERY DISEASE) (HCC): Chronic | ICD-10-CM

## 2021-05-21 DIAGNOSIS — I10 ESSENTIAL HYPERTENSION: ICD-10-CM

## 2021-05-21 DIAGNOSIS — E78.2 MIXED HYPERLIPIDEMIA: ICD-10-CM

## 2021-05-21 DIAGNOSIS — Z12.11 SCREEN FOR COLON CANCER: ICD-10-CM

## 2021-05-21 DIAGNOSIS — E11.51 TYPE 2 DIABETES MELLITUS WITH DIABETIC PERIPHERAL ANGIOPATHY WITHOUT GANGRENE, WITH LONG-TERM CURRENT USE OF INSULIN (HCC): Primary | ICD-10-CM

## 2021-05-21 DIAGNOSIS — D69.6 PLATELETS DECREASED (HCC): ICD-10-CM

## 2021-05-21 PROCEDURE — 99214 OFFICE O/P EST MOD 30 MIN: CPT | Performed by: FAMILY MEDICINE

## 2021-05-21 NOTE — ASSESSMENT & PLAN NOTE
Lab Results   Component Value Date    HGBA1C 8 7 (H) 01/29/2021   Needs better control    Check labs to assess A1C

## 2021-05-21 NOTE — PROGRESS NOTES
Depression Screening and Follow-up Plan: Clincally patient does not have depression  No treatment is required  Falls Plan of Care: balance, strength, and gait training instructions were provided  Home safety education provided  Assessment/Plan:         Problem List Items Addressed This Visit        Endocrine    Diabetic polyneuropathy associated with type 2 diabetes mellitus ([facility] )       Lab Results   Component Value Date    HGBA1C 8 7 (H) 01/29/2021   continue with neuropathy         Type 2 diabetes mellitus with diabetic peripheral angiopathy without gangrene ([facility] ) - Primary       Lab Results   Component Value Date    HGBA1C 8 7 (H) 01/29/2021   Needs better control   Check labs to assess A1C            Cardiovascular and Mediastinum    PAD (peripheral artery disease) (HCC) (Chronic)     Continue with vascular care and control sugar and lipids  Essential hypertension     Patient is stable with current anti-hypertensive medicine and continue to follow a low sodium diet and take current medication  All questions about this condition were answered today  Other    Mixed hyperlipidemia     Check lipids,           Other Visit Diagnoses     Need for hepatitis C screening test        Screening for colorectal cancer        Encounter for immunization                Subjective:      Patient ID: Je Alejandra is a 76 y o  male  This 44-year-old white male here today for checkup on his diabetes peripheral vascular disease foot wounds hypertension  Patient is getting a care for his feet with the wound people as well as Podiatry  Patient also is due for lab work will see about checking his A1c he is getting NPH insulin with regular insulin I discussed with him about using long-term insulin and he said that he has been at before ambulating work will done after work with the mph are insulins at this point    One to see about getting better control of his sugar his blood pressure is doing very well and we need to watch his kidney function 2  The following portions of the patient's history were reviewed and updated as appropriate:   Past Medical History:  He has a past medical history of Arthritis, First degree AV block, Full dentures, PAD (peripheral artery disease) ([facility] ), PVD (peripheral vascular disease) ([facility] ), Type 2 diabetes mellitus with diabetic peripheral angiopathy without gangrene ([facility] ) (5/18/2021), and Wound, open ,  _______________________________________________________________________  Medical Problems:  does not have any pertinent problems on file ,  _______________________________________________________________________  Past Surgical History:   has a past surgical history that includes Toe amputation; Vein ligation; Replantation thumb (Right); Cholecystectomy; Colonoscopy; Toe Osteotomy (Right, 6/26/2020); Trigger finger release; Skin graft; pr debridement, skin, sub-q tissue,muscle,bone,=<20 sq cm (Right, 12/18/2020); IR aortagram with run-off (1/28/2021); Toe amputation (Right, 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); and IR aortagram with run-off (4/13/2021)  ,  _______________________________________________________________________  Family History:  family history includes Alzheimer's disease in his father; Arthritis in his mother; Cancer in his father ,  _______________________________________________________________________  Social History:   reports that he has never smoked  He has never used smokeless tobacco  He reports current alcohol use  He reports that he does not use drugs  ,  _______________________________________________________________________  Allergies:  is allergic to shellfish-derived products - food allergy and sulfamethoxazole-trimethoprim     _______________________________________________________________________  Current Outpatient Medications   Medication Sig Dispense Refill    atorvastatin (LIPITOR) 40 mg tablet Take 1 tablet (40 mg total) by mouth daily at bedtime 90 tablet 1    Blood Glucose Monitoring Suppl (OneTouch Verio) w/Device KIT by Does not apply route 2 (two) times a day Dx E11 9 1 kit 1    clopidogrel (PLAVIX) 75 mg tablet Take 1 tablet (75 mg total) by mouth daily 90 tablet 0    gabapentin (NEURONTIN) 300 mg capsule Take 1 capsule (300 mg total) by mouth 3 (three) times a day 270 capsule 3    glucose blood (OneTouch Verio) test strip Use as instructed qid Dx E11 9 400 each 3    halobetasol (ULTRAVATE) 0 05 % cream Apply topically 2 (two) times a day 50 g 5    HYDROcodone-acetaminophen (NORCO) 5-325 mg per tablet Take 1 tablet by mouth every 6 (six) hours as needed for painMax Daily Amount: 4 tablets (Patient not taking: Reported on 3/11/2021) 20 tablet 0    insulin NPH (HumuLIN N,NovoLIN N) 100 Units/mL subcutaneous injection Inject 12 Units under the skin 2 (two) times a day 7 2 mL 0    insulin regular (HumuLIN R,NovoLIN R) 100 units/mL injection 4 units with lunch, 8 units with dinner 10 mL 0    Lancets (OneTouch Delica Plus CFPVAV94H) MISC 1 Units by Does not apply route 2 (two) times a day Dx E11 9 100 each 2    lisinopril (ZESTRIL) 10 mg tablet Take 1 tablet (10 mg total) by mouth daily 90 tablet 1    Nitro-Bid 2 % ointment       oxyCODONE-acetaminophen (PERCOCET) 5-325 mg per tablet Take 1 tablet by mouth every 6 (six) hours as needed for moderate painMax Daily Amount: 4 tablets (Patient not taking: Reported on 4/27/2021) 120 tablet 0    pantoprazole (PROTONIX) 40 mg tablet Take 1 tablet (40 mg total) by mouth daily 90 tablet 1    rivaroxaban (Xarelto) 2 5 mg tablet Take 1 tablet (2 5 mg total) by mouth daily with breakfast Last dose 6/21/20 30 tablet 2    sodium hypochlorite (DAKIN'S QUARTER-STRENGTH) Irrigate with 1 application as directed daily Apply with dry dressing  473 mL 0    UltiCare Insulin Syringe 31G X 5/16" 1 ML MISC        No current facility-administered medications for this visit  _______________________________________________________________________  Review of Systems   Constitutional: Negative for activity change, appetite change, chills, fatigue, fever and unexpected weight change  HENT: Negative for congestion, ear pain, hearing loss, mouth sores, postnasal drip, sinus pressure, sinus pain, sneezing and sore throat  Respiratory: Negative for apnea, cough, shortness of breath and wheezing  Cardiovascular: Negative for chest pain, palpitations and leg swelling  Gastrointestinal: Negative for abdominal pain, constipation, diarrhea, nausea and vomiting  Endocrine: Negative for cold intolerance and heat intolerance  Genitourinary: Negative for dysuria, frequency and hematuria  Musculoskeletal: Negative for arthralgias, back pain, gait problem, joint swelling and neck pain  Skin: Negative for rash  Neurological: Negative for dizziness, weakness and numbness  Hematological: Does not bruise/bleed easily  Psychiatric/Behavioral: Negative for agitation, behavioral problems, confusion, hallucinations and sleep disturbance  The patient is not nervous/anxious  Objective: There were no vitals filed for this visit  There is no height or weight on file to calculate BMI       Physical Exam

## 2021-05-24 ENCOUNTER — TELEPHONE (OUTPATIENT)
Dept: ADMINISTRATIVE | Facility: OTHER | Age: 66
End: 2021-05-24

## 2021-05-24 NOTE — TELEPHONE ENCOUNTER
Upon review of the In Basket request and the patient's chart, initial outreach has been made via fax, please see Contacts section for details       Thank you  Shaun Carrasco

## 2021-05-24 NOTE — LETTER
Diabetic Eye Exam Form    Date Requested: 21  Patient: Bernarda Harvey  Patient : 1955   Referring Provider: Savita Narayan MD    Dilated Retinal Exam, Optomap-Iris Exam, or Fundus Photography Done         Yes ([place] one above)         No     Date of Diabetic Eye Exam ______________________________  Left Eye      Exam did show retinopathy    Exam did not show retinopathy         Mild       Moderate       None       Proliferative       Severe     Right Eye     Exam did show retinopathy    Exam did not show retinopathy         Mild       Moderate       None       Proliferative       Severe     Comments __________________________________________________________    Practice Providing Exam ______________________________________________    Exam Performed By (print name) _______________________________________      Provider Signature ___________________________________________________      These reports are needed for  compliance    Please fax this completed form and a copy of the Diabetic Eye Exam report to our office located at [place] [address] as soon as possible to [phone] gabriella Sauceda: Phone [phone]    We thank you for your assistance in treating our mutual patient

## 2021-05-24 NOTE — TELEPHONE ENCOUNTER
----- Message from Stephen Rhoades sent at 5/21/2021  4:07 PM EDT -----  Regarding: care gap request DM eye exam  05/21/21 4:07 PM    Hello, our patient attached above has had Diabetic Eye Exam completed/performed  Please assist in updating the patient chart by making an External outreach to Dr Dora Thompson facility located in [place], [place]  The date of service is 12/02/2020      Thank you,  Christie Beasley  [facility] [phone]

## 2021-05-24 NOTE — LETTER
Diabetic Eye Exam Form    Date Requested: 21  Patient: Doug Cadet  Patient : 1955   Referring Provider: Julianna Mayo MD    Dilated Retinal Exam, Optomap-Iris Exam, or Fundus Photography Done         Yes ([place] one above)         No     Date of Diabetic Eye Exam ______________________________  Left Eye      Exam did show retinopathy    Exam did not show retinopathy         Mild       Moderate       None       Proliferative       Severe     Right Eye     Exam did show retinopathy    Exam did not show retinopathy         Mild       Moderate       None       Proliferative       Severe     Comments __________________________________________________________    Practice Providing Exam ______________________________________________    Exam Performed By (print name) _______________________________________      Provider Signature ___________________________________________________      These reports are needed for  compliance    Please fax this completed form and a copy of the Diabetic Eye Exam report to our office located at [place] [address] as soon as possible to [phone] [facility]: Phone [phone]    We thank you for your assistance in treating our mutual patient

## 2021-05-25 DIAGNOSIS — I73.9 PVD (PERIPHERAL VASCULAR DISEASE) (HCC): ICD-10-CM

## 2021-05-25 DIAGNOSIS — I48.91 ATRIAL FIBRILLATION, UNSPECIFIED TYPE (HCC): ICD-10-CM

## 2021-05-25 RX ORDER — CLOPIDOGREL BISULFATE 75 MG/1
75 TABLET ORAL DAILY
Qty: 90 TABLET | Refills: 0 | Status: SHIPPED | OUTPATIENT
Start: 2021-05-25

## 2021-05-26 ENCOUNTER — TELEPHONE (OUTPATIENT)
Dept: FAMILY MEDICINE CLINIC | Facility: CLINIC | Age: 66
End: 2021-05-26

## 2021-05-26 ENCOUNTER — HOSPITAL ENCOUNTER (OUTPATIENT)
Dept: NON INVASIVE DIAGNOSTICS | Facility: CLINIC | Age: 66
Discharge: HOME | End: 2021-05-26
Payer: COMMERCIAL

## 2021-05-26 DIAGNOSIS — G89.29 CHRONIC BACK PAIN, UNSPECIFIED BACK LOCATION, UNSPECIFIED BACK PAIN LATERALITY: ICD-10-CM

## 2021-05-26 DIAGNOSIS — E11.621 DIABETIC ULCER OF TOE OF RIGHT FOOT ASSOCIATED WITH TYPE 2 DIABETES MELLITUS, LIMITED TO BREAKDOWN OF SKIN (HCC): ICD-10-CM

## 2021-05-26 DIAGNOSIS — M54.9 CHRONIC BACK PAIN, UNSPECIFIED BACK LOCATION, UNSPECIFIED BACK PAIN LATERALITY: ICD-10-CM

## 2021-05-26 DIAGNOSIS — I73.9 PERIPHERAL VASCULAR DISEASE, UNSPECIFIED (HCC): ICD-10-CM

## 2021-05-26 DIAGNOSIS — L97.511 DIABETIC ULCER OF TOE OF RIGHT FOOT ASSOCIATED WITH TYPE 2 DIABETES MELLITUS, LIMITED TO BREAKDOWN OF SKIN (HCC): ICD-10-CM

## 2021-05-26 PROCEDURE — 93925 LOWER EXTREMITY STUDY: CPT

## 2021-05-26 PROCEDURE — 93923 UPR/LXTR ART STDY 3+ LVLS: CPT

## 2021-05-26 RX ORDER — OXYCODONE HYDROCHLORIDE AND ACETAMINOPHEN 5; 325 MG/1; MG/1
1 TABLET ORAL EVERY 6 HOURS PRN
Qty: 120 TABLET | Refills: 0 | Status: SHIPPED | OUTPATIENT
Start: 2021-05-26 | End: 2021-06-29 | Stop reason: SDUPTHER

## 2021-05-26 NOTE — TELEPHONE ENCOUNTER
Pt left a msg on the script line for a refill on his Oxycontin 5/325 qty 120 Please refill at [facility]

## 2021-05-27 NOTE — TELEPHONE ENCOUNTER
As a follow-up, a second attempt has been made for outreach via fax, please see Contacts section for details      Thank you  Kerwin Celeste

## 2021-05-29 PROCEDURE — 93925 LOWER EXTREMITY STUDY: CPT | Performed by: SURGERY

## 2021-06-01 ENCOUNTER — TELEPHONE (OUTPATIENT)
Dept: FAMILY MEDICINE CLINIC | Facility: CLINIC | Age: 66
End: 2021-06-01

## 2021-06-01 NOTE — TELEPHONE ENCOUNTER
As a final attempt, a third outreach has been made via telephone call  Please see Contacts section for details  This encounter will be closed and completed by end of day  Should we receive the requested information because of previous outreach attempts, the requested patient's chart will be updated appropriately       Thank you  Angelita Mcduffie

## 2021-06-02 ENCOUNTER — VBI (OUTPATIENT)
Dept: ADMINISTRATIVE | Facility: OTHER | Age: 66
End: 2021-06-02

## 2021-06-02 DIAGNOSIS — Z79.4 TYPE 2 DIABETES MELLITUS WITH DIABETIC PERIPHERAL ANGIOPATHY WITHOUT GANGRENE, WITH LONG-TERM CURRENT USE OF INSULIN (HCC): Primary | ICD-10-CM

## 2021-06-02 DIAGNOSIS — E11.51 TYPE 2 DIABETES MELLITUS WITH DIABETIC PERIPHERAL ANGIOPATHY WITHOUT GANGRENE, WITH LONG-TERM CURRENT USE OF INSULIN (HCC): Primary | ICD-10-CM

## 2021-06-02 RX ORDER — SYRINGE AND NEEDLE,INSULIN,1ML 31 GX5/16"
SYRINGE, EMPTY DISPOSABLE MISCELLANEOUS AS NEEDED
Qty: 100 EACH | Refills: 1 | Status: SHIPPED | OUTPATIENT
Start: 2021-06-02 | End: 2021-06-04 | Stop reason: SDUPTHER

## 2021-06-03 ENCOUNTER — OFFICE VISIT (OUTPATIENT)
Dept: WOUND CARE | Facility: HOSPITAL | Age: 66
End: 2021-06-03
Payer: COMMERCIAL

## 2021-06-03 VITALS
RESPIRATION RATE: 18 BRPM | SYSTOLIC BLOOD PRESSURE: 141 MMHG | DIASTOLIC BLOOD PRESSURE: 65 MMHG | TEMPERATURE: 97.6 F | HEART RATE: 73 BPM

## 2021-06-03 DIAGNOSIS — L97.512 RIGHT FOOT ULCER, WITH FAT LAYER EXPOSED (HCC): Primary | ICD-10-CM

## 2021-06-03 DIAGNOSIS — E11.42 DIABETIC POLYNEUROPATHY ASSOCIATED WITH TYPE 2 DIABETES MELLITUS (HCC): ICD-10-CM

## 2021-06-03 DIAGNOSIS — L97.429 HEEL ULCERATION, LEFT, WITH UNSPECIFIED SEVERITY (HCC): ICD-10-CM

## 2021-06-03 DIAGNOSIS — L97.529 ULCER OF LEFT FOOT, UNSPECIFIED ULCER STAGE (HCC): ICD-10-CM

## 2021-06-03 DIAGNOSIS — I73.9 PERIPHERAL VASCULAR DISEASE, UNSPECIFIED (HCC): ICD-10-CM

## 2021-06-03 PROCEDURE — 99213 OFFICE O/P EST LOW 20 MIN: CPT | Performed by: PODIATRIST

## 2021-06-03 PROCEDURE — 99214 OFFICE O/P EST MOD 30 MIN: CPT | Performed by: PODIATRIST

## 2021-06-03 NOTE — PATIENT INSTRUCTIONS
Orders Placed This Encounter   Procedures    Wound cleansing and dressings     Wash your hands with soap and water  Remove old dressing, discard into plastic bag and place in trash  Cleanse the wound with normal saline prior to applying a clean dressing  Do not use tissue or cotton balls  Do not scrub the wound  Pat dry using gauze  Shower yes, with protection  Left foot wound:  Do not get wet with shower water, may use cast covers or sponge bathe  Apply Dermagran gauze to wound  Cover with gauze and secure gently with rolled gauze and tape  Change dressing every other day and as needed if soiled  Left Heel wound  Do not get wet with shower water, may use cast covers or sponge bathe    Apply Santyl----nickel thick to the wound  Cover with gauze (keep padded) and secure gently with rolled gauze and tape  Change dressing daily      Treatments above were completed today at the [facility]      Follow up in 1 weeks at the wound center      You may begin using your regular shoe on the right foot and monitor for any new open areas or redness that may occur     Standing Status:   Future     Standing Expiration Date:   6/3/2022

## 2021-06-03 NOTE — PROGRESS NOTES
Patient ID: Greg Flores is a 76 y o  male Date of Birth 1955     Chief Complaint  Chief Complaint   Patient presents with    Follow Up Wound Care Visit     B/L foot wounds       Allergies  Shellfish-derived products - food allergy and Sulfamethoxazole-trimethoprim    Assessment:    No problem-specific Assessment & Plan notes found for this encounter  Diagnoses and all orders for this visit:    Right foot ulcer, with fat layer exposed ([address] )    Ulcer of left foot, unspecified ulcer stage ([address] )  -     Wound cleansing and dressings; Future    Heel ulceration, left, with unspecified severity ([address] )  -     Wound cleansing and dressings; Future    Diabetic polyneuropathy associated with type 2 diabetes mellitus ([address] )    Peripheral vascular disease, unspecified ([address] )          Procedures      Plan:  1  Right foot wound is healed  Will use dry dressing for protection  Okay to wear sneaker on right foot  2  Discussed options, and will go with more conservative care  Will try Santyl to left heel ulcer  Possible wound debridement in the next few weeks either in OR or wound center  3  Discussed proper off-loading  4  Reviewed repeat arterial study and awaits vascular input  5  RA in 1 week  Wound 03/11/21 Surgical Foot Right;Lateral (Active)   Wound Image Images linked 06/03/21 0934   Wound Description Eschar;Brown 06/03/21 0934   Dominga-wound Assessment Dry;Scaly;Edema 06/03/21 0934   Wound Length (cm) 0 6 cm 06/03/21 0934   Wound Width (cm) 2 5 cm 06/03/21 0934   Wound Depth (cm) 0 cm 06/03/21 0934   Wound Surface Area (cm^2) 1 5 cm^2 06/03/21 0934   Wound Volume (cm^3) 0 cm^3 06/03/21 0934   Calculated Wound Volume (cm^3) 0 cm^3 06/03/21 0934   Drainage Amount None 06/03/21 0934   Non-staged Wound Description Full thickness 06/03/21 0934       Wound 03/11/21 Foot Left; Other (Comment); Posterior (Active)   Wound Image Images linked 06/03/21 0927   Wound Description Yellow 06/03/21 0935   Dominga-wound Assessment Callus 06/03/21 0935   Wound Length (cm) 0 1 cm 06/03/21 0935   Wound Width (cm) 0 1 cm 06/03/21 0935   Wound Depth (cm) 0 1 cm 06/03/21 0935   Wound Surface Area (cm^2) 0 01 cm^2 06/03/21 0935   Wound Volume (cm^3) 0 cm^3 06/03/21 0935   Calculated Wound Volume (cm^3) 0 cm^3 06/03/21 0935   Change in Wound Size % 100 06/03/21 0935   Drainage Amount Scant 06/03/21 0935   Drainage Description Serous 06/03/21 0935   Non-staged Wound Description Full thickness 06/03/21 0935       Wound 03/11/21 Heel Left (Active)   Wound Image Images linked 06/03/21 0927   Wound Description Eschar;Epithelialization 06/03/21 0935   Dominga-wound Assessment Dry;Scaly 06/03/21 0935   Wound Length (cm) 2 5 cm 06/03/21 0935   Wound Width (cm) 4 cm 06/03/21 0935   Wound Depth (cm) 0 cm 06/03/21 0935   Wound Surface Area (cm^2) 10 cm^2 06/03/21 0935   Wound Volume (cm^3) 0 cm^3 06/03/21 0935   Calculated Wound Volume (cm^3) 0 cm^3 06/03/21 0935   Drainage Amount None 06/03/21 0935   Non-staged Wound Description Full thickness 06/03/21 0935       Wound 04/08/21 Foot Anterior; Left (Active)   Wound Image Images linked 06/03/21 0926   Wound Description Yellow 06/03/21 0933   Dominga-wound Assessment Dry; Intact 06/03/21 0933   Wound Length (cm) 0 5 cm 06/03/21 0933   Wound Width (cm) 0 3 cm 06/03/21 0933   Wound Depth (cm) 0 1 cm 06/03/21 0933   Wound Surface Area (cm^2) 0 15 cm^2 06/03/21 0933   Wound Volume (cm^3) 0 02 cm^3 06/03/21 0933   Calculated Wound Volume (cm^3) 0 02 cm^3 06/03/21 0933   Change in Wound Size % -100 06/03/21 0933   Drainage Amount Scant 06/03/21 0933   Drainage Description Serous 06/03/21 0933   Non-staged Wound Description Full thickness 06/03/21 0933       Wound 03/11/21 Surgical Foot Right;Lateral (Active)   Date First Assessed/Time First Assessed: 03/11/21 1057   Primary Wound Type: Surgical  Location: Foot  Wound Location Orientation: Right;Lateral       Wound 03/11/21 Foot Left; Other (Comment); Posterior (Active)   Date First Assessed: 03/11/21   Location: Foot  Wound Location Orientation: (c) Left; Other (Comment); Posterior       Wound 03/11/21 Heel Left (Active)   Date First Assessed/Time First Assessed: 03/11/21 1100   Location: Heel  Wound Location Orientation: Left       Wound 04/08/21 Foot Anterior; Left (Active)   Date First Assessed/Time First Assessed: 04/08/21 1046   Location: Foot  Wound Location Orientation: Anterior; Left       [REMOVED] Wound 07/24/20 Diabetic Ulcer Foot Right;Lateral (Removed)   Resolved Date: 03/11/21  Date First Assessed: 07/24/20   Pre-Existing Wound: Yes  Primary Wound Type: Diabetic Ulcer  Location: Foot  Wound Location Orientation: Right;Lateral  Wound Description (Comments): Cristina 1       [REMOVED] Wound 12/18/20 Foot Right (Removed)   Resolved Date: 03/11/21  Date First Assessed/Time First Assessed: 12/18/20 1146   Location: Foot  Wound Location Orientation: Right  Incision's 1st Dressing: DRESSING OIL EMULSION 3 X 3 IN (x1), SPONGE GAUZE 4 X 8 12 PLY STRL LF (x1), BANDAGE WILLARD 3   [REMOVED] Wound 01/28/21 Catheter entry/exit site Groin Left (Removed)   Resolved Date/Resolved Time: 03/25/21 1011  Date First Assessed/Time First Assessed: 01/28/21 0909   Traumatic Wound Type: Catheter entry/exit site  Location: Groin  Wound Location Orientation: Left  Wound Description (Comments): angiogram access punc           [REMOVED] Wound 01/29/21 Diabetic Ulcer Foot Lateral;Right (Removed)   Resolved Date: 03/11/21  Date First Assessed/Time First Assessed: 01/29/21 0820   Pre-Existing Wound: Yes  Primary Wound Type: Diabetic Ulcer  Location: Foot  Wound Location Orientation: Lateral;Right       [REMOVED] Wound 02/02/21 Foot Right (Removed)   Resolved Date: 03/11/21  Date First Assessed/Time First Assessed: 02/02/21 1734   Location: Foot  Wound Location Orientation: Right  Incision's 1st Dressing: DRESSING OIL EMULSION 3 X 3 IN (x1), SPONGE GAUZE 4 X 4 16 PLY STRL PLASTIC TRAY LF (x1), JOSI    [REMOVED] Wound 04/13/21 Catheter entry/exit site Groin Right (Removed)   Resolved Date/Resolved Time: 04/22/21 1025  Date First Assessed/Time First Assessed: 04/13/21 0843   Traumatic Wound Type: Catheter entry/exit site  Location: Groin  Wound Location Orientation: Right  Wound Description (Comments): angiogram access        Subjective:        HPI  Don Rush presents for wound care bilateral feet  Right foot wound looks healed  Decreased pain left foot  It is about 4 out of 10 now  No redness  No edema          The following portions of the patient's history were reviewed and updated as appropriate: allergies, current medications, past family history, past medical history, past social history, past surgical history and problem list     PAST MEDICAL HISTORY:  Past Medical History:   Diagnosis Date    Arthritis     fingers    First degree AV block     Full dentures     Hypertension     [facility]     PVD (peripheral vascular disease) ([facility] )     Type 2 diabetes mellitus with diabetic peripheral angiopathy without gangrene [facility] 5/18/2021    Per CMS ICD 10 guidelines--Per Physician    Wound, open     right foot- by the 5th toe       PAST SURGICAL HISTORY:  Past Surgical History:   Procedure Laterality Date    CHOLECYSTECTOMY      COLONOSCOPY      IR AORTAGRAM WITH RUN-OFF  1/28/2021    IR AORTAGRAM WITH RUN-OFF  4/13/2021    OK DEBRIDEMENT, SKIN, SUB-Q TISSUE,MUSCLE,BONE,=<20 SQ CM Right 12/18/2020    Procedure: DEBRIDMENT OF ULCER , REMOVAL OF DEVITALIZED SKIN, SOFT TISSUE, BONE AND APPLICATION OF INTEGRA GRAFT RIGHT FOOT ;  Surgeon: Shira Pickens DPM;  Location: [address] [place] [address];  Service: Podiatry    REPLANTATION THUMB Right     right tip reconnected    SKIN GRAFT      right thumb    TOE AMPUTATION      left foot all 5 toes removed    TOE AMPUTATION Right 2/2/2021    Procedure: Right partial 5th ray amputation;  Surgeon: Stephany Escamilla DPM;  Location: [facility]; Service: Podiatry    TOE OSTEOTOMY Right 6/26/2020    Procedure: exostosis of right big toe;  Surgeon: Amaya Aguirre DPM;  [facility];  Service: Podiatry    [address] [facility]      bilateral hands    VEIN LIGATION      right        ALLERGIES:  Shellfish-derived products - food allergy and Sulfamethoxazole-trimethoprim    MEDICATIONS:  Current Outpatient Medications   Medication Sig Dispense Refill    atorvastatin (LIPITOR) 40 mg tablet Take 1 tablet (40 mg total) by mouth daily at bedtime 90 tablet 1    Blood Glucose Monitoring Suppl (OneTouch Verio) w/Device KIT by Does not apply route 2 (two) times a day Dx E11 9 1 kit 1    clopidogrel (PLAVIX) 75 mg tablet Take 1 tablet (75 mg total) by mouth daily 90 tablet 0    gabapentin (NEURONTIN) 300 mg capsule Take 1 capsule (300 mg total) by mouth 3 (three) times a day 270 capsule 3    glucose blood (OneTouch Verio) test strip Use as instructed qid Dx E11 9 400 each 3    halobetasol (ULTRAVATE) 0 05 % cream Apply topically 2 (two) times a day 50 g 5    HYDROcodone-acetaminophen (NORCO) 5-325 mg per tablet Take 1 tablet by mouth every 6 (six) hours as needed for painMax Daily Amount: 4 tablets (Patient not taking: Reported on 5/21/2021) 20 tablet 0    insulin NPH (HumuLIN N,NovoLIN N) 100 Units/mL subcutaneous injection Inject 12 Units under the skin 2 (two) times a day 7 2 mL 0    insulin regular (HumuLIN R,NovoLIN R) 100 units/mL injection 4 units with lunch, 8 units with dinner 10 mL 0    Lancets (OneTouch Delica Plus [id number]) MISC 1 Units by Does not apply route 2 (two) times a day Dx E11 9 100 each 2    lisinopril (ZESTRIL) 10 mg tablet Take 1 tablet (10 mg total) by mouth daily 90 tablet 1    Nitro-Bid 2 % ointment       oxyCODONE-acetaminophen (PERCOCET) 5-325 mg per tablet Take 1 tablet by mouth every 6 (six) hours as needed for moderate painMax Daily Amount: 4 tablets 120 tablet 0    pantoprazole (PROTONIX) 40 mg tablet Take 1 tablet (40 mg total) by mouth daily 90 tablet 1    rivaroxaban (Xarelto) 2 5 mg tablet Take 1 tablet (2 5 mg total) by mouth daily with breakfast Last dose 6/21/20 30 tablet 2    sodium hypochlorite (DAKIN'S QUARTER-STRENGTH) Irrigate with 1 application as directed daily Apply with dry dressing  473 mL 0    UltiCare Insulin Syringe 31G X 5/16" 1 ML MISC Inject under the skin as needed (for injection) 100 each 1     No current facility-administered medications for this visit          SOCIAL HISTORY:  Social History     Socioeconomic History    Marital status: /Civil Union     Spouse name: Not on file    Number of children: Not on file    Years of education: Not on file    Highest education level: Not on file   Occupational History    Occupation: [employer] Financial resource strain: Not on file    Food insecurity     Worry: Not on file     Inability: Not on file   [employer] needs     Medical: Not on file     Non-medical: Not on file   Tobacco Use    Smoking status: Never Smoker    Smokeless tobacco: Never Used   Substance and Sexual Activity    Alcohol use: Yes     Frequency: Monthly or less     Drinks per session: 1 or 2     Binge frequency: Never     Comment: occasional    Drug use: Never    Sexual activity: Not on file   Lifestyle    Physical activity     Days per week: Not on file     Minutes per session: Not on file    Stress: Not on file   Relationships    Social connections     Talks on phone: Not on file     Gets together: Not on file     Attends Taoist service: Not on file     Active member of club or organization: Not on file     Attends meetings of clubs or organizations: Not on file     Relationship status: Not on file    Intimate partner violence     Fear of current or ex partner: Not on file     Emotionally abused: Not on file     Physically abused: Not on file     Forced sexual activity: Not on file   Other Topics Concern    Not on file   Social History Narrative    · Most recent tobacco use screenin2019      · Do you currently or have you served in the [employer]:   No      · Were you activated, into active duty, as a member of the [employer] or as a Reservist:   No      · Diet:   Regular      · Alcohol intake:   Occasional      · Caffeine intake:   Occasional      · Seat belts used routinely:   Yes      · Smoke alarm in home:   Yes       Review of Systems   Constitutional: Negative for chills and fever  HENT: Negative for sore throat  Respiratory: Negative for cough and shortness of breath  Cardiovascular: Negative for chest pain  Gastrointestinal: Negative for diarrhea, nausea and vomiting  Skin: Positive for wound  Objective:       Wound 21 Surgical Foot Right;Lateral (Active)   Wound Image Images linked 21   Wound Description Eschar;Brown 21   Dominga-wound Assessment Dry;Scaly;Edema 21   Wound Length (cm) 0 6 cm 21   Wound Width (cm) 2 5 cm 21   Wound Depth (cm) 0 cm 2134   Wound Surface Area (cm^2) 1 5 cm^2 2134   Wound Volume (cm^3) 0 cm^3 21   Calculated Wound Volume (cm^3) 0 cm^3 2134   Drainage Amount None 21   Non-staged Wound Description Full thickness 21       Wound 21 Foot Left; Other (Comment); Posterior (Active)   Wound Image Images linked 21   Wound Description Yellow 21   Dominga-wound Assessment Callus 2135   Wound Length (cm) 0 1 cm 21   Wound Width (cm) 0 1 cm 21   Wound Depth (cm) 0 1 cm 21   Wound Surface Area (cm^2) 0 01 cm^2 21   Wound Volume (cm^3) 0 cm^3 21   Calculated Wound Volume (cm^3) 0 cm^3 2135   Change in Wound Size % 100 21   Drainage Amount Scant 21   Drainage Description Serous 21   Non-staged Wound Description Full thickness 21 0935 Wound 03/11/21 Heel Left (Active)   Wound Image Images linked 06/03/21 0927   Wound Description Eschar;Epithelialization 06/03/21 0935   Dominga-wound Assessment Dry;Scaly 06/03/21 0935   Wound Length (cm) 2 5 cm 06/03/21 0935   Wound Width (cm) 4 cm 06/03/21 0935   Wound Depth (cm) 0 cm 06/03/21 0935   Wound Surface Area (cm^2) 10 cm^2 06/03/21 0935   Wound Volume (cm^3) 0 cm^3 06/03/21 0935   Calculated Wound Volume (cm^3) 0 cm^3 06/03/21 0935   Drainage Amount None 06/03/21 0935   Non-staged Wound Description Full thickness 06/03/21 0935       Wound 04/08/21 Foot Anterior; Left (Active)   Wound Image Images linked 06/03/21 0926   Wound Description Yellow 06/03/21 0933   Dominga-wound Assessment Dry; Intact 06/03/21 0933   Wound Length (cm) 0 5 cm 06/03/21 0933   Wound Width (cm) 0 3 cm 06/03/21 0933   Wound Depth (cm) 0 1 cm 06/03/21 0933   Wound Surface Area (cm^2) 0 15 cm^2 06/03/21 0933   Wound Volume (cm^3) 0 02 cm^3 06/03/21 0933   Calculated Wound Volume (cm^3) 0 02 cm^3 06/03/21 0933   Change in Wound Size % -100 06/03/21 0933   Drainage Amount Scant 06/03/21 0933   Drainage Description Serous 06/03/21 0933   Non-staged Wound Description Full thickness 06/03/21 0933       /65   Pulse 73   Temp 97 6 °F (36 4 °C)   Resp 18     Physical Exam  Vitals signs and nursing note reviewed  Constitutional:       General: He is not in acute distress  Appearance: Normal appearance  He is not toxic-appearing  Cardiovascular:      Rate and Rhythm: Normal rate and regular rhythm  Pulses:           Dorsalis pedis pulses are 0 on the right side and 0 on the left side  Posterior tibial pulses are 0 on the right side and 0 on the left side  Comments: No ischemia  Pulmonary:      Effort: Pulmonary effort is normal  No respiratory distress  Musculoskeletal:         General: No signs of injury  Comments: TMA left foot  Right 5th ray amputation  Skin:     General: Skin is warm and dry  Coloration: Skin is not cyanotic or mottled  Findings: Wound present  No abscess or erythema  Rash is not purpuric  Nails: There is no clubbing  Comments: Eschar left heel is larger because all 3 eschars mostly conjoined  It looks more superficial   No drainage  Anterior wound left TMA is healed  Plantar left TMA wound is smaller  Wound is healed right lateral foot  No cellulitis or infection bilaterally  See wound assessment  Neurological:      General: No focal deficit present  Mental Status: He is alert and oriented to person, place, and time  Cranial Nerves: No cranial nerve deficit  Motor: No weakness  Psychiatric:         Mood and Affect: Mood normal          Behavior: Behavior normal          Thought Content: Thought content normal          Judgment: Judgment normal            Wound Instructions:  Orders Placed This Encounter   Procedures    Wound cleansing and dressings     Wash your hands with soap and water  Remove old dressing, discard into plastic bag and place in trash  Cleanse the wound with normal saline prior to applying a clean dressing  Do not use tissue or cotton balls  Do not scrub the wound  Pat dry using gauze  Shower yes, with protection  Left foot wound:  Do not get wet with shower water, may use cast covers or sponge bathe  Apply Dermagran gauze to wound  Cover with gauze and secure gently with rolled gauze and tape  Change dressing every other day and as needed if soiled  Left Heel wound  Do not get wet with shower water, may use cast covers or sponge bathe    Apply Santyl----nickel thick to the wound  Cover with gauze (keep padded) and secure gently with rolled gauze and tape  Change dressing daily      Treatments above were completed today at the [facility]      Follow up in 1 weeks at the wound center      You may begin using your regular shoe on the right foot and monitor for any new open areas or redness that may occur     Standing Status:   Future     Standing Expiration Date:   6/3/2022        Diagnosis ICD-10-CM Associated Orders   1  Right foot ulcer, with fat layer exposed ([facility] )  L97 512    2  Ulcer of left foot, unspecified ulcer stage ([facility])  L97 529 Wound cleansing and dressings   3  Heel ulceration, left, with unspecified severity ([facility] )  L97 429 Wound cleansing and dressings   4  Diabetic polyneuropathy associated with type 2 diabetes mellitus ([facility])  E11 42    5   Peripheral vascular disease, unspecified ([place] [address] )  I73 9

## 2021-06-04 DIAGNOSIS — E11.51 TYPE 2 DIABETES MELLITUS WITH DIABETIC PERIPHERAL ANGIOPATHY WITHOUT GANGRENE, WITH LONG-TERM CURRENT USE OF INSULIN (HCC): ICD-10-CM

## 2021-06-04 DIAGNOSIS — Z79.4 TYPE 2 DIABETES MELLITUS WITH DIABETIC PERIPHERAL ANGIOPATHY WITHOUT GANGRENE, WITH LONG-TERM CURRENT USE OF INSULIN (HCC): ICD-10-CM

## 2021-06-05 RX ORDER — SYRINGE AND NEEDLE,INSULIN,1ML 31 GX5/16"
SYRINGE, EMPTY DISPOSABLE MISCELLANEOUS AS NEEDED
Qty: 100 EACH | Refills: 1 | Status: SHIPPED | OUTPATIENT
Start: 2021-06-05 | End: 2022-04-08 | Stop reason: SDUPTHER

## 2021-06-08 ENCOUNTER — TELEPHONE (OUTPATIENT)
Dept: PODIATRY | Facility: CLINIC | Age: 66
End: 2021-06-08

## 2021-06-08 NOTE — TELEPHONE ENCOUNTER
Called patient and let him know that he can stop using at night so he can sleep but use during the day, per Dr Sobeida Luis  He was very happy with the information and if he has any more issues he will call

## 2021-06-09 ENCOUNTER — OFFICE VISIT (OUTPATIENT)
Dept: VASCULAR SURGERY | Facility: CLINIC | Age: 66
End: 2021-06-09
Payer: COMMERCIAL

## 2021-06-09 ENCOUNTER — TELEPHONE (OUTPATIENT)
Dept: ADMINISTRATIVE | Facility: HOSPITAL | Age: 66
End: 2021-06-09

## 2021-06-09 VITALS
SYSTOLIC BLOOD PRESSURE: 120 MMHG | WEIGHT: 189 LBS | TEMPERATURE: 98.5 F | HEIGHT: 64 IN | BODY MASS INDEX: 32.27 KG/M2 | HEART RATE: 80 BPM | DIASTOLIC BLOOD PRESSURE: 70 MMHG

## 2021-06-09 DIAGNOSIS — I73.9 PAD (PERIPHERAL ARTERY DISEASE) (HCC): Primary | ICD-10-CM

## 2021-06-09 DIAGNOSIS — I70.209 PERIPHERAL ARTERIOSCLEROSIS (HCC): ICD-10-CM

## 2021-06-09 PROCEDURE — 99215 OFFICE O/P EST HI 40 MIN: CPT | Performed by: SURGERY

## 2021-06-09 NOTE — PROGRESS NOTES
Assessment/Plan:    PAD (peripheral artery disease) ([facility] )  Andreia STOUT with PMHx PAD, DM with previous Left TMA and active lateral foot wound for the past 6 months  Reports that he has undergone multiple angiograms over the last 6 months with Dr King Park with persistence of his wound  Also sees podiatry and wound care       Underwent agram on 1/5/20 (Dr King Park) with sfa stent angioplasty and arthrectomy/PTA of tibial vessels, distal AT/DP was noted to be occluded   LEADs at that time demonstrate inadequate perfusion pressure for healing with met pressure of 33mg HG and great toe pressure of 13mmHG      Underwent RLE angiogram 1/28/21 with successful recanalization of the AT/DP  Underwent 5th ray amputation during the same admission  LEADs improved with met pressure of 130mmHG (39) and GTP of 70mmHG (19)  His amp site continues to heal       Has LLE PAD also and is s/p LLE angiogram on 4/13/21, with successful recanalization of an occluded previously placed peroneal artery stent (single vessel runoff), reestablishing inline flow to the foot       Short interval LEAD however demonstrates interval occlusion of the peroneal artery  Given progressing heal ulcer with now multiple failed attempts at endovascular intervention, will proceed with bypass, Left Fem-peroneal artery  Had an extensive risks/benefits alternatives discussion  Including small caliber peroneal artery with poor outflow could result in early graft thrombosis, injury to adjacent structures, bleeding, infection, risk of anesthesia and the patient consented to proceed  Will obtain vein mapping to assess for any autogenous conduit and referral to cardiology     Bypass in upcoming weeks pending clearance  Will hold xarelto 48hours prior to intervention            Problem List Items Addressed This Visit        Cardiovascular and Mediastinum    PAD (peripheral artery disease) ([facility] ) - Primary (Chronic)     Andreia STOUT with PMHx PAD, DM with previous Left TMA and active lateral foot wound for the past 6 months  Reports that he has undergone multiple angiograms over the last 6 months with Dr Marnie Lamb with persistence of his wound  Also sees podiatry and wound care       Underwent agram on 1/5/20 (Dr Marnie Lamb) with sfa stent angioplasty and arthrectomy/PTA of tibial vessels, distal AT/DP was noted to be occluded   LEADs at that time demonstrate inadequate perfusion pressure for healing with met pressure of 33mg HG and great toe pressure of 13mmHG      Underwent RLE angiogram 1/28/21 with successful recanalization of the AT/DP  Underwent 5th ray amputation during the same admission  LEADs improved with met pressure of 130mmHG (39) and GTP of 70mmHG (19)  His amp site continues to heal       Has LLE PAD also and is s/p LLE angiogram on 4/13/21, with successful recanalization of an occluded previously placed peroneal artery stent (single vessel runoff), reestablishing inline flow to the foot       Short interval LEAD however demonstrates interval occlusion of the peroneal artery  Given progressing heal ulcer with now multiple failed attempts at endovascular intervention, will proceed with bypass, Left Fem-peroneal artery  Had an extensive risks/benefits alternatives discussion  Including small caliber peroneal artery with poor outflow could result in early graft thrombosis, injury to adjacent structures, bleeding, infection, risk of anesthesia and the patient consented to proceed  Will obtain vein mapping to assess for any autogenous conduit and referral to cardiology  Bypass in upcoming weeks pending clearance  Will hold xarelto 48hours prior to intervention            Relevant Orders    VAS lower limb vein mapping bypass graft    Ambulatory referral to Cardiology    Peripheral arteriosclerosis ([facility] )            Subjective:      Patient ID: Cady Hussein is a 76 y o  male  Patient presents today to review GABBY s/p LLE agram on 4/13   Patient continues to experience heel pain in location of wound and leg pain from L knee to foot  He is also unable to bear full weight on L foot, using crutches  The following portions of the patient's history were reviewed and updated as appropriate: allergies, current medications, past family history, past medical history, past social history, past surgical history and problem list     Review of Systems   Constitutional: Negative  HENT: Negative  Eyes: Negative  Respiratory: Negative  Cardiovascular: Negative  Gastrointestinal: Negative  Endocrine: Negative  Genitourinary: Negative  Musculoskeletal: Positive for gait problem  Leg pain   Skin: Negative  Allergic/Immunologic: Negative  Hematological: Bruises/bleeds easily  Psychiatric/Behavioral: Negative  I have reviewed and updated the ROS as appropriate  Objective:      /70 (BP Location: Right arm, Patient Position: Sitting)   Pulse 80   Temp 98 5 °F (36 9 °C) (Tympanic)   Ht 5' 4" (1 626 m)   Wt 85 7 kg (189 lb)   BMI 32 44 kg/m²          Physical Exam  Constitutional:       Appearance: Normal appearance  HENT:      Head: Normocephalic and atraumatic  Nose: Nose normal       Mouth/Throat:      Mouth: Mucous membranes are dry  Eyes:      Extraocular Movements: Extraocular movements intact  Pupils: Pupils are equal, round, and reactive to light  Cardiovascular:      Rate and Rhythm: Normal rate and regular rhythm  Pulses:           Dorsalis pedis pulses are 0 on the left side  Posterior tibial pulses are 0 on the left side  Heart sounds: Normal heart sounds  Comments: Weak monophasic peroneal artery signal, left  Pulmonary:      Effort: Pulmonary effort is normal       Breath sounds: Normal breath sounds  Abdominal:      General: Abdomen is flat  Palpations: Abdomen is soft  Musculoskeletal: Normal range of motion  General: No swelling or tenderness     Skin:     General: Skin is warm and dry  Capillary Refill: Capillary refill takes less than 2 seconds  Neurological:      General: No focal deficit present  Mental Status: He is alert and oriented to person, place, and time  Psychiatric:         Mood and Affect: Mood normal          Behavior: Behavior normal          Thought Content:  Thought content normal          Judgment: Judgment normal        Operative Scheduling Information:    Hospital:  [facility]    Physician:  Me and Melissa Clemente or any other surgeon     Surgery: Fem-peroneal artery bypass with GSV (vein mapping pending) vs prosthetic    Urgency:  Urgent: ASAP pending card clearance     Level:  Level 2: Outpatients to be scheduled for surgery with time dependent medical necessity within 2 weeks    Case Length:  4-6 hours    Post-op Bed:  Stepdown    OR Table:  Stille with C-arm    Equipment Needs:  None    Medication Instructions:  Plavix:  Continue (do not hold)  Xarelto:  Hold for 2 days prior to procedure    Hydration:  No

## 2021-06-09 NOTE — TELEPHONE ENCOUNTER
REMINDER: Under Reason For Call, comments MUST be formatted as:   (Surgeon's Initials) / (Procedure)    Physician / Gianluca Sandoval: Ramila Luther (NPI: [id number]) / [facility] (Tax: [id number] / NPI: [id number])    Procedure: Fem-peroneal artery bypass with GSV (vein mapping pending) vs prosthetic    Level: 2 - Route clearance(s) to [facility] Clearance Pool     Equipment / Rep Needs: No    Assistant Surgeon: Yes, specifically Mike Henry (NPI: [id number])CN ANY OTHER SURGEON    Allergies: Shellfish-derived products - food allergy and Sulfamethoxazole-trimethoprim    Instructions Given: NO Bowel Prep General Instructions     Blood Thinners / Medication Hold: Hold Rx - Xarelto (Rivaroxaban) , 2 days prior to procedure  CONTINUE PLAVIX    Hydration Required: Patient does not require hydration  Dialysis: Patient is not on dialysis  Consent: I certify that patient has signed, printed, timed, and dated their surgery consent  I certify that BOTH sides of the completed surgery consent have been scanned into the patient's Epic chart by myself on 6/9/2021  Yes, I have LABELED the consent in Epic as Consent for Vascular Procedure  Clearances     Levels   1-3 ROUTE this encounter to [facility] Clearance Pool   AND   SEND Clearance Form(s) to Vascular Nursing e-mail group   Level   4 ROUTE this encounter to [facility] Surgery Coordinator Pool  AND   SEND Clearance Form(s) to Vascular Surgery Schedulers e-mail group     (1) ONE CLEARANCE NEEDED - Patient requires Cardiology  clearance  Spoke with, Van Shaikh, at [id number] [place] [address]  Patient's appointment with DR Rene Santoyo has been scheduled for 6/14/21 at 9:40AM     Office contact information P: [phone] - F: [phone]    Yes, I have ROUTED this encounter to [facility] Surgery Coordinator and/or [facility] Clearance Pool

## 2021-06-09 NOTE — ASSESSMENT & PLAN NOTE
72yo M with PMHx PAD, DM with previous Left TMA and active lateral foot wound for the past 6 months  Reports that he has undergone multiple angiograms over the last 6 months with Dr Gwen Barreto with persistence of his wound  Also sees podiatry and wound care       Underwent agram on 1/5/20 (Dr Gwen Barreto) with sfa stent angioplasty and arthrectomy/PTA of tibial vessels, distal AT/DP was noted to be occluded   LEADs at that time demonstrate inadequate perfusion pressure for healing with met pressure of 33mg HG and great toe pressure of 13mmHG      Underwent RLE angiogram 1/28/21 with successful recanalization of the AT/DP  Underwent 5th ray amputation during the same admission  LEADs improved with met pressure of 130mmHG (39) and GTP of 70mmHG (19)  His amp site continues to heal       Has LLE PAD also and is s/p LLE angiogram on 4/13/21, with successful recanalization of an occluded previously placed peroneal artery stent (single vessel runoff), reestablishing inline flow to the foot       Short interval LEAD however demonstrates interval occlusion of the peroneal artery  Given progressing heal ulcer with now multiple failed attempts at endovascular intervention, will proceed with bypass, Left Fem-peroneal artery  Had an extensive risks/benefits alternatives discussion  Including small caliber peroneal artery with poor outflow could result in early graft thrombosis, injury to adjacent structures, bleeding, infection, risk of anesthesia and the patient consented to proceed  Will obtain vein mapping to assess for any autogenous conduit and referral to cardiology     Bypass in upcoming weeks pending clearance  Will hold xarelto 48hours prior to intervention

## 2021-06-15 ENCOUNTER — OFFICE VISIT (OUTPATIENT)
Dept: CARDIOLOGY CLINIC | Facility: CLINIC | Age: 66
End: 2021-06-15
Payer: COMMERCIAL

## 2021-06-15 VITALS
DIASTOLIC BLOOD PRESSURE: 76 MMHG | SYSTOLIC BLOOD PRESSURE: 140 MMHG | OXYGEN SATURATION: 97 % | HEART RATE: 75 BPM | HEIGHT: 64 IN | WEIGHT: 193.5 LBS | BODY MASS INDEX: 33.03 KG/M2

## 2021-06-15 DIAGNOSIS — E11.621 DIABETIC ULCER OF TOE OF RIGHT FOOT ASSOCIATED WITH TYPE 2 DIABETES MELLITUS, LIMITED TO BREAKDOWN OF SKIN (HCC): ICD-10-CM

## 2021-06-15 DIAGNOSIS — E11.51 TYPE 2 DIABETES MELLITUS WITH DIABETIC PERIPHERAL ANGIOPATHY WITHOUT GANGRENE, WITHOUT LONG-TERM CURRENT USE OF INSULIN (HCC): ICD-10-CM

## 2021-06-15 DIAGNOSIS — I10 ESSENTIAL HYPERTENSION: ICD-10-CM

## 2021-06-15 DIAGNOSIS — L97.511 DIABETIC ULCER OF TOE OF RIGHT FOOT ASSOCIATED WITH TYPE 2 DIABETES MELLITUS, LIMITED TO BREAKDOWN OF SKIN (HCC): ICD-10-CM

## 2021-06-15 DIAGNOSIS — E11.65 POORLY CONTROLLED TYPE 2 DIABETES MELLITUS (HCC): ICD-10-CM

## 2021-06-15 DIAGNOSIS — Z01.810 PREOP CARDIOVASCULAR EXAM: Primary | ICD-10-CM

## 2021-06-15 DIAGNOSIS — I70.209 PERIPHERAL ARTERIOSCLEROSIS (HCC): ICD-10-CM

## 2021-06-15 PROCEDURE — 1160F RVW MEDS BY RX/DR IN RCRD: CPT | Performed by: INTERNAL MEDICINE

## 2021-06-15 PROCEDURE — 93000 ELECTROCARDIOGRAM COMPLETE: CPT | Performed by: INTERNAL MEDICINE

## 2021-06-15 PROCEDURE — 1036F TOBACCO NON-USER: CPT | Performed by: INTERNAL MEDICINE

## 2021-06-15 PROCEDURE — 99214 OFFICE O/P EST MOD 30 MIN: CPT | Performed by: INTERNAL MEDICINE

## 2021-06-15 PROCEDURE — 3077F SYST BP >= 140 MM HG: CPT | Performed by: INTERNAL MEDICINE

## 2021-06-15 PROCEDURE — 3008F BODY MASS INDEX DOCD: CPT | Performed by: INTERNAL MEDICINE

## 2021-06-15 PROCEDURE — 3078F DIAST BP <80 MM HG: CPT | Performed by: INTERNAL MEDICINE

## 2021-06-15 NOTE — PROGRESS NOTES
Consult - Cardiology   Art Zheng 76 y o  male MRN: [id number]        Problems    Problem List Items Addressed This Visit        Endocrine    Diabetic ulcer of toe of right foot associated with type 2 diabetes mellitus, limited to breakdown of skin ([facility] )    Poorly controlled type 2 diabetes mellitus ([facility] )    Type 2 diabetes mellitus with diabetic peripheral angiopathy without gangrene [facility])       Cardiovascular and Mediastinum    Peripheral arteriosclerosis ([facility] )    Essential hypertension      Other Visit Diagnoses     Preop cardiovascular exam    -  Primary    Relevant Orders    POCT ECG            Plan nuclear stress test with drug          Reason for Consult / Principal Problem:  He is here for cardiac clearance  He has a history of diabetes and insulin dependence  He never did smoke  However, his rather extensive vascular disease multiple stents in multiple procedures of angioplasty  He is now going for femoral popliteal bypass  He has wounds in both legs and he has lost toes as well  His family history is positive for diabetes above mother and father side  He is on a statin drug  He is also on Xarelto and Plavix  He has had no bleeding issues  I 1 asked him to get a nuclear stress test since dissociation coronary disease is extensive vascular disease and diabetes rather high  He is unable to exert himself 20 degree to develop angina  Apparently his carotids have also been checked in the past   I did not hear any carotid bruits  HPI: Art Zheng is a 76y o  year old male        Review of Systems   Respiratory: Negative  Cardiovascular: Negative  Gastrointestinal: Negative  Musculoskeletal: Positive for gait problem  Claudication pain in feet   Hematological: Negative  Psychiatric/Behavioral: Negative            Past Medical History:   Diagnosis Date    Arthritis     fingers    First degree AV block     Full dentures     Hypertension     PAD (peripheral artery disease) ([facility] )     PVD (peripheral vascular disease) ([facility] )     Type 2 diabetes mellitus with diabetic peripheral angiopathy without gangrene ([facility] ) 5/18/2021    Per CMS ICD 10 guidelines--Per Physician    Wound, open     right foot- by the 5th toe     Past Surgical History:   Procedure Laterality Date    CHOLECYSTECTOMY      COLONOSCOPY      IR AORTAGRAM WITH RUN-OFF  1/28/2021    IR AORTAGRAM WITH RUN-OFF  4/13/2021    [place] DEBRIDEMENT, SKIN, SUB-Q TISSUE,MUSCLE,BONE,=<20 SQ CM Right 12/18/2020    Procedure: DEBRIDMENT OF ULCER , REMOVAL OF DEVITALIZED SKIN, SOFT TISSUE, BONE AND APPLICATION OF INTEGRA GRAFT RIGHT FOOT ;  Surgeon: Drake Pandya DPM;  Location: [address] [place] [address];  Service: Podiatry    REPLANTATION THUMB Right     right tip reconnected    SKIN GRAFT      right thumb    TOE AMPUTATION      left foot all 5 toes removed    TOE AMPUTATION Right 2/2/2021    Procedure: Right partial 5th ray amputation;  Surgeon: Ann-Marie Byrnes DPM;  Location:  MAIN OR;  Service: Podiatry    TOE OSTEOTOMY Right 6/26/2020    Procedure: exostosis of right big toe;  Surgeon: Ree Hurtado DPM;  Location: [address] [place] [address];  Service: Podiatry    [address]      bilateral hands    VEIN LIGATION      right     Social History     Substance and Sexual Activity   Alcohol Use Yes    Comment: occasional     Social History     Substance and Sexual Activity   Drug Use Never     Social History     Tobacco Use   Smoking Status Never Smoker   Smokeless Tobacco Never Used     Family History   Problem Relation Age of Onset    Arthritis Mother     Cancer Father         colon    Alzheimer's disease Father        Allergies:   Allergies   Allergen Reactions    Shellfish-Derived Products - Food Allergy Anaphylaxis     Throat closes    Sulfamethoxazole-Trimethoprim Rash       Medications:     Current Outpatient Medications:     atorvastatin (LIPITOR) 40 mg tablet, Take 1 tablet (40 mg total) by mouth daily at bedtime, Disp: 90 tablet, Rfl: 1    Blood Glucose Monitoring Suppl (OneTouch Verio) w/Device KIT, by Does not apply route 2 (two) times a day Dx E11 9, Disp: 1 kit, Rfl: 1    clopidogrel (PLAVIX) 75 mg tablet, Take 1 tablet (75 mg total) by mouth daily, Disp: 90 tablet, Rfl: 0    gabapentin (NEURONTIN) 300 mg capsule, Take 1 capsule (300 mg total) by mouth 3 (three) times a day, Disp: 270 capsule, Rfl: 3    glucose blood (OneTouch Verio) test strip, Use as instructed qid Dx E11 9, Disp: 400 each, Rfl: 3    halobetasol (ULTRAVATE) 0 05 % cream, Apply topically 2 (two) times a day, Disp: 50 g, Rfl: 5    HYDROcodone-acetaminophen (NORCO) 5-325 mg per tablet, Take 1 tablet by mouth every 6 (six) hours as needed for painMax Daily Amount: 4 tablets, Disp: 20 tablet, Rfl: 0    insulin NPH (HumuLIN N,NovoLIN N) 100 Units/mL subcutaneous injection, Inject 12 Units under the skin 2 (two) times a day, Disp: 7 2 mL, Rfl: 0    insulin regular (HumuLIN R,NovoLIN R) 100 units/mL injection, 4 units with lunch, 8 units with dinner, Disp: 10 mL, Rfl: 0    Lancets (OneTouch Delica Plus USMVMA04S) MISC, 1 Units by Does not apply route 2 (two) times a day Dx E11 9, Disp: 100 each, Rfl: 2    lisinopril (ZESTRIL) 10 mg tablet, Take 1 tablet (10 mg total) by mouth daily, Disp: 90 tablet, Rfl: 1    Nitro-Bid 2 % ointment, , Disp: , Rfl:     oxyCODONE-acetaminophen (PERCOCET) 5-325 mg per tablet, Take 1 tablet by mouth every 6 (six) hours as needed for moderate painMax Daily Amount: 4 tablets, Disp: 120 tablet, Rfl: 0    pantoprazole (PROTONIX) 40 mg tablet, Take 1 tablet (40 mg total) by mouth daily, Disp: 90 tablet, Rfl: 1    rivaroxaban (Xarelto) 2 5 mg tablet, Take 1 tablet (2 5 mg total) by mouth daily with breakfast Last dose 6/21/20, Disp: 30 tablet, Rfl: 2    sodium hypochlorite (DAKIN'S QUARTER-STRENGTH), Irrigate with 1 application as directed daily Apply with dry dressing , Disp: 473 mL, Rfl: 0    UltiCare Insulin Syringe 31G X 5/16" 1 ML MISC, Inject under the skin as needed (for injection), Disp: 100 each, Rfl: 1      Physical Exam  Constitutional:       Appearance: He is obese  Cardiovascular:      Rate and Rhythm: Normal rate and regular rhythm  Heart sounds: No murmur heard  No friction rub  No gallop  Comments: Decreased pulses both legs from popliteal distally  Has ulceration feet  Pulmonary:      Effort: Pulmonary effort is normal  No respiratory distress  Breath sounds: Normal breath sounds  No wheezing or rales  Musculoskeletal:      Right lower leg: No edema  Left lower leg: No edema  Skin:     General: Skin is warm and dry  Findings: Lesion present  Neurological:      Mental Status: He is alert and oriented to person, place, and time  Psychiatric:         Behavior: Behavior normal            Laboratory Studies:  CMP:  Lab Results   Component Value Date    CREATININE 1 03 03/25/2021    CREATININE 0 89 02/10/2014    BUN 12 03/25/2021    BUN 20 02/10/2014     02/10/2014    K 4 4 03/25/2021    K 4 7 02/10/2014     03/25/2021     02/10/2014    CO2 30 03/25/2021    CO2 29 02/10/2014    GLUCOSE 135 02/10/2014    ALKPHOS 65 12/11/2020    ALT 45 12/11/2020    AST 10 12/11/2020     NT-proBNP: No results found for: NTBNP   CBC:  Lab Results   Component Value Date    WBC 5 87 03/25/2021    WBC 7 71 02/10/2014    RBC 4 83 03/25/2021    RBC 4 56 02/10/2014    HGB 15 3 03/25/2021    HGB 14 4 02/10/2014    HCT 46 6 03/25/2021    HCT 43 0 02/10/2014    MCV 97 03/25/2021    MCV 94 02/10/2014    MCH 31 7 03/25/2021    MCH 31 6 02/10/2014    RDW 12 6 03/25/2021    RDW 12 2 02/10/2014     (L) 03/25/2021     02/10/2014     Coags:    Lipid Profile:   No results found for: CHOL  No results found for: HDL  No results found for: LDLCALC  No results found for: TRIG    Cardiac testing:     EKG reviewed personally:  Normal sinus rhythm    Normal EKG    No results found for this or any previous visit  No results found for this or any previous visit  No results found for this or any previous visit  No results found for this or any previous visit  Julio Gomes MD    Portions of the record may have been created with voice recognition software   Occasional wrong word or "sound a like" substitutions may have occurred due to the inherent limitations of voice recognition software   Read the chart carefully and recognize, using context, where substitutions have occurred

## 2021-06-16 ENCOUNTER — HOSPITAL ENCOUNTER (OUTPATIENT)
Dept: NON INVASIVE DIAGNOSTICS | Facility: CLINIC | Age: 66
Discharge: HOME | End: 2021-06-16
Payer: COMMERCIAL

## 2021-06-16 DIAGNOSIS — I73.9 PAD (PERIPHERAL ARTERY DISEASE) (HCC): ICD-10-CM

## 2021-06-16 PROCEDURE — 93971 EXTREMITY STUDY: CPT

## 2021-06-17 PROCEDURE — 93971 EXTREMITY STUDY: CPT | Performed by: SURGERY

## 2021-06-24 ENCOUNTER — HOSPITAL ENCOUNTER (OUTPATIENT)
Dept: NON INVASIVE DIAGNOSTICS | Facility: CLINIC | Age: 66
Discharge: HOME | End: 2021-06-24
Payer: COMMERCIAL

## 2021-06-24 DIAGNOSIS — E11.621 DIABETIC ULCER OF TOE OF RIGHT FOOT ASSOCIATED WITH TYPE 2 DIABETES MELLITUS, LIMITED TO BREAKDOWN OF SKIN (HCC): ICD-10-CM

## 2021-06-24 DIAGNOSIS — E11.51 TYPE 2 DIABETES MELLITUS WITH DIABETIC PERIPHERAL ANGIOPATHY WITHOUT GANGRENE, WITHOUT LONG-TERM CURRENT USE OF INSULIN (HCC): ICD-10-CM

## 2021-06-24 DIAGNOSIS — E11.65 POORLY CONTROLLED TYPE 2 DIABETES MELLITUS (HCC): ICD-10-CM

## 2021-06-24 DIAGNOSIS — Z01.810 PREOP CARDIOVASCULAR EXAM: ICD-10-CM

## 2021-06-24 DIAGNOSIS — L97.511 DIABETIC ULCER OF TOE OF RIGHT FOOT ASSOCIATED WITH TYPE 2 DIABETES MELLITUS, LIMITED TO BREAKDOWN OF SKIN (HCC): ICD-10-CM

## 2021-06-24 DIAGNOSIS — I70.209 PERIPHERAL ARTERIOSCLEROSIS (HCC): ICD-10-CM

## 2021-06-24 LAB
CHEST PAIN STATEMENT: NORMAL
MAX DIASTOLIC BP: 82 MMHG
MAX HEART RATE: 90 BPM
MAX PREDICTED HEART RATE: 154 BPM
MAX. SYSTOLIC BP: 148 MMHG
PROTOCOL NAME: NORMAL
REASON FOR TERMINATION: NORMAL
TARGET HR FORMULA: NORMAL
TEST INDICATION: NORMAL
TIME IN EXERCISE PHASE: NORMAL

## 2021-06-24 PROCEDURE — 93016 CV STRESS TEST SUPVJ ONLY: CPT | Performed by: INTERNAL MEDICINE

## 2021-06-24 PROCEDURE — 78452 HT MUSCLE IMAGE SPECT MULT: CPT | Performed by: INTERNAL MEDICINE

## 2021-06-24 PROCEDURE — 78452 HT MUSCLE IMAGE SPECT MULT: CPT

## 2021-06-24 PROCEDURE — A9502 TC99M TETROFOSMIN: HCPCS

## 2021-06-24 PROCEDURE — 93018 CV STRESS TEST I&R ONLY: CPT | Performed by: INTERNAL MEDICINE

## 2021-06-24 PROCEDURE — G1004 CDSM NDSC: HCPCS

## 2021-06-24 PROCEDURE — 93017 CV STRESS TEST TRACING ONLY: CPT

## 2021-06-24 RX ADMIN — REGADENOSON 0.4 MG: 0.08 INJECTION, SOLUTION INTRAVENOUS at 09:05

## 2021-06-25 NOTE — TELEPHONE ENCOUNTER
Received clearance form back- pt was cleared  Copy placed in surgery scheduling box and copy to be scanned into the chart

## 2021-06-29 ENCOUNTER — TELEPHONE (OUTPATIENT)
Dept: GASTROENTEROLOGY | Facility: CLINIC | Age: 66
End: 2021-06-29

## 2021-06-29 ENCOUNTER — PREP FOR PROCEDURE (OUTPATIENT)
Dept: VASCULAR SURGERY | Facility: CLINIC | Age: 66
End: 2021-06-29

## 2021-06-29 DIAGNOSIS — I73.9 PAD (PERIPHERAL ARTERY DISEASE) (HCC): Primary | ICD-10-CM

## 2021-06-29 DIAGNOSIS — G89.29 CHRONIC BACK PAIN, UNSPECIFIED BACK LOCATION, UNSPECIFIED BACK PAIN LATERALITY: ICD-10-CM

## 2021-06-29 DIAGNOSIS — M54.9 CHRONIC BACK PAIN, UNSPECIFIED BACK LOCATION, UNSPECIFIED BACK PAIN LATERALITY: ICD-10-CM

## 2021-06-29 RX ORDER — OXYCODONE HYDROCHLORIDE AND ACETAMINOPHEN 5; 325 MG/1; MG/1
1 TABLET ORAL EVERY 6 HOURS PRN
Qty: 120 TABLET | Refills: 0 | Status: SHIPPED | OUTPATIENT
Start: 2021-06-29 | End: 2021-07-20 | Stop reason: HOSPADM

## 2021-06-29 NOTE — TELEPHONE ENCOUNTER
Received order from Dr Bessie Horner to call and schedule patient for a colon screening  His last was in 2007 with Dr Baljinder Gomez and his father had a hx of colon cancer  I called and spoke with patient and he is having surgery on July 14th, having vein put in his leg from groin to ankle  Will be in the hospital 4 days  Doesn't know how long he will be laid up  I will call him on 7/19 to see if and when he able to schedule

## 2021-06-29 NOTE — TELEPHONE ENCOUNTER
Authorization requirements reviewed  Please refer to Lyn Marie / Shine Martyr number [id number] for case updates

## 2021-06-29 NOTE — TELEPHONE ENCOUNTER
Is patient requesting a call when authorization has been obtained? Patient did not request a call  Surgery Date: 7/14/21  Primary Surgeon: Jesus Ding (NPI: [id number])  Assisting Surgeon: Mac Fried (NPI: [id number])  Facility: [facility] (Tax: [id number] / NPI: [id number])  Inpatient / Outpatient: Inpatient  Level: 2    Clearance Received: Yes, Cardiology   Consent Received: Yes, scanned into Epic on 6/9/21  Medication Hold / Last Dose: Hold on Xarelto 2 days prior  Last dose 7/11/21  VQI Spreadsheet: 6/29/21  IR Notified: Not Applicable (N/A)  Rep  Notified: Not Applicable (N/A)  Equipment Needs: Not Applicable (N/A)  Vas Lab Requested: Not Applicable (N/A)  Patient Contacted: 6/29/21    Diagnosis: I73 9  Procedure/ CPT Code(s): BYPASS - Femoral-Tibial // CPT: 80069    For varicose vein related procedures, last LEVDR? Not Applicable (N/A)    Post Operative Date/ Time: To Be Determined (TBD)     Pt will have his blood work done next week at [facility]  Ekg done 6/24/21  Pt had his covid vaccines 2/3/21 and 3/3/21  Pt was cleared by Dr Allyson Medina 6/15/21

## 2021-06-30 ENCOUNTER — APPOINTMENT (OUTPATIENT)
Dept: LAB | Facility: MEDICAL CENTER | Age: 66
End: 2021-06-30
Payer: COMMERCIAL

## 2021-06-30 DIAGNOSIS — I10 ESSENTIAL HYPERTENSION: ICD-10-CM

## 2021-06-30 DIAGNOSIS — E11.51 TYPE 2 DIABETES MELLITUS WITH DIABETIC PERIPHERAL ANGIOPATHY WITHOUT GANGRENE, WITH LONG-TERM CURRENT USE OF INSULIN (HCC): ICD-10-CM

## 2021-06-30 DIAGNOSIS — E78.2 MIXED HYPERLIPIDEMIA: ICD-10-CM

## 2021-06-30 DIAGNOSIS — Z11.59 NEED FOR HEPATITIS C SCREENING TEST: ICD-10-CM

## 2021-06-30 DIAGNOSIS — Z79.4 TYPE 2 DIABETES MELLITUS WITH DIABETIC PERIPHERAL ANGIOPATHY WITHOUT GANGRENE, WITH LONG-TERM CURRENT USE OF INSULIN (HCC): ICD-10-CM

## 2021-06-30 LAB
ALBUMIN SERPL BCP-MCNC: 3.6 G/DL (ref 3.5–5)
ALP SERPL-CCNC: 81 U/L (ref 46–116)
ALT SERPL W P-5'-P-CCNC: 40 U/L (ref 12–78)
ANION GAP SERPL CALCULATED.3IONS-SCNC: 6 MMOL/L (ref 4–13)
AST SERPL W P-5'-P-CCNC: 21 U/L (ref 5–45)
BACTERIA UR QL AUTO: ABNORMAL /HPF
BACTERIA UR QL AUTO: ABNORMAL /HPF
BASOPHILS # BLD AUTO: 0.01 THOUSANDS/ΜL (ref 0–0.1)
BASOPHILS NFR BLD AUTO: 0 % (ref 0–1)
BILIRUB SERPL-MCNC: 0.39 MG/DL (ref 0.2–1)
BILIRUB UR QL STRIP: NEGATIVE
BILIRUB UR QL STRIP: NEGATIVE
BUN SERPL-MCNC: 18 MG/DL (ref 5–25)
CALCIUM SERPL-MCNC: 9.2 MG/DL (ref 8.3–10.1)
CHLORIDE SERPL-SCNC: 104 MMOL/L (ref 100–108)
CHOLEST SERPL-MCNC: 200 MG/DL (ref 50–200)
CLARITY UR: ABNORMAL
CLARITY UR: ABNORMAL
CO2 SERPL-SCNC: 25 MMOL/L (ref 21–32)
COLOR UR: YELLOW
COLOR UR: YELLOW
CREAT SERPL-MCNC: 1.09 MG/DL (ref 0.6–1.3)
CREAT UR-MCNC: 45.5 MG/DL
EOSINOPHIL # BLD AUTO: 0.11 THOUSAND/ΜL (ref 0–0.61)
EOSINOPHIL NFR BLD AUTO: 2 % (ref 0–6)
ERYTHROCYTE [DISTWIDTH] IN BLOOD BY AUTOMATED COUNT: 12.5 % (ref 11.6–15.1)
EST. AVERAGE GLUCOSE BLD GHB EST-MCNC: 209 MG/DL
GFR SERPL CREATININE-BSD FRML MDRD: 70 ML/MIN/1.73SQ M
GLUCOSE SERPL-MCNC: 343 MG/DL (ref 65–140)
GLUCOSE UR STRIP-MCNC: ABNORMAL MG/DL
GLUCOSE UR STRIP-MCNC: ABNORMAL MG/DL
HBA1C MFR BLD: 8.9 %
HCT VFR BLD AUTO: 45.2 % (ref 36.5–49.3)
HCV AB SER QL: NORMAL
HDLC SERPL-MCNC: 32 MG/DL
HGB BLD-MCNC: 14.7 G/DL (ref 12–17)
HGB UR QL STRIP.AUTO: NEGATIVE
HGB UR QL STRIP.AUTO: NEGATIVE
HYALINE CASTS #/AREA URNS LPF: ABNORMAL /LPF
HYALINE CASTS #/AREA URNS LPF: ABNORMAL /LPF
IMM GRANULOCYTES # BLD AUTO: 0.02 THOUSAND/UL (ref 0–0.2)
IMM GRANULOCYTES NFR BLD AUTO: 0 % (ref 0–2)
KETONES UR STRIP-MCNC: NEGATIVE MG/DL
KETONES UR STRIP-MCNC: NEGATIVE MG/DL
LDLC SERPL CALC-MCNC: 96 MG/DL (ref 0–100)
LEUKOCYTE ESTERASE UR QL STRIP: ABNORMAL
LEUKOCYTE ESTERASE UR QL STRIP: ABNORMAL
LYMPHOCYTES # BLD AUTO: 2.44 THOUSANDS/ΜL (ref 0.6–4.47)
LYMPHOCYTES NFR BLD AUTO: 36 % (ref 14–44)
MAGNESIUM SERPL-MCNC: 1.8 MG/DL (ref 1.6–2.6)
MCH RBC QN AUTO: 32.1 PG (ref 26.8–34.3)
MCHC RBC AUTO-ENTMCNC: 32.5 G/DL (ref 31.4–37.4)
MCV RBC AUTO: 99 FL (ref 82–98)
MICROALBUMIN UR-MCNC: 14.2 MG/L (ref 0–20)
MICROALBUMIN/CREAT 24H UR: 31 MG/G CREATININE (ref 0–30)
MONOCYTES # BLD AUTO: 0.6 THOUSAND/ΜL (ref 0.17–1.22)
MONOCYTES NFR BLD AUTO: 9 % (ref 4–12)
NEUTROPHILS # BLD AUTO: 3.57 THOUSANDS/ΜL (ref 1.85–7.62)
NEUTS SEG NFR BLD AUTO: 53 % (ref 43–75)
NITRITE UR QL STRIP: NEGATIVE
NITRITE UR QL STRIP: NEGATIVE
NON-SQ EPI CELLS URNS QL MICRO: ABNORMAL /HPF
NON-SQ EPI CELLS URNS QL MICRO: ABNORMAL /HPF
NRBC BLD AUTO-RTO: 0 /100 WBCS
PH UR STRIP.AUTO: 6 [PH]
PH UR STRIP.AUTO: 6 [PH]
PLATELET # BLD AUTO: 161 THOUSANDS/UL (ref 149–390)
PMV BLD AUTO: 11 FL (ref 8.9–12.7)
POTASSIUM SERPL-SCNC: 4.5 MMOL/L (ref 3.5–5.3)
PROT SERPL-MCNC: 8.2 G/DL (ref 6.4–8.2)
PROT UR STRIP-MCNC: NEGATIVE MG/DL
PROT UR STRIP-MCNC: NEGATIVE MG/DL
RBC # BLD AUTO: 4.58 MILLION/UL (ref 3.88–5.62)
RBC #/AREA URNS AUTO: ABNORMAL /HPF
RBC #/AREA URNS AUTO: ABNORMAL /HPF
SODIUM SERPL-SCNC: 135 MMOL/L (ref 136–145)
SP GR UR STRIP.AUTO: 1.03 (ref 1–1.03)
SP GR UR STRIP.AUTO: 1.03 (ref 1–1.03)
T4 FREE SERPL-MCNC: 0.86 NG/DL (ref 0.76–1.46)
TRIGL SERPL-MCNC: 362 MG/DL
TSH SERPL DL<=0.05 MIU/L-ACNC: 3.43 UIU/ML (ref 0.36–3.74)
URATE SERPL-MCNC: 6.6 MG/DL (ref 4.2–8)
UROBILINOGEN UR QL STRIP.AUTO: 0.2 E.U./DL
UROBILINOGEN UR QL STRIP.AUTO: 0.2 E.U./DL
WBC # BLD AUTO: 6.75 THOUSAND/UL (ref 4.31–10.16)
WBC #/AREA URNS AUTO: ABNORMAL /HPF
WBC #/AREA URNS AUTO: ABNORMAL /HPF

## 2021-06-30 PROCEDURE — 87077 CULTURE AEROBIC IDENTIFY: CPT | Performed by: NURSE PRACTITIONER

## 2021-06-30 PROCEDURE — 87186 SC STD MICRODIL/AGAR DIL: CPT | Performed by: NURSE PRACTITIONER

## 2021-06-30 PROCEDURE — 87086 URINE CULTURE/COLONY COUNT: CPT | Performed by: NURSE PRACTITIONER

## 2021-06-30 PROCEDURE — 86803 HEPATITIS C AB TEST: CPT

## 2021-06-30 PROCEDURE — 82570 ASSAY OF URINE CREATININE: CPT

## 2021-06-30 PROCEDURE — 80053 COMPREHEN METABOLIC PANEL: CPT

## 2021-06-30 PROCEDURE — 82043 UR ALBUMIN QUANTITATIVE: CPT

## 2021-06-30 PROCEDURE — 84550 ASSAY OF BLOOD/URIC ACID: CPT

## 2021-06-30 PROCEDURE — 80061 LIPID PANEL: CPT

## 2021-06-30 PROCEDURE — 36415 COLL VENOUS BLD VENIPUNCTURE: CPT

## 2021-06-30 PROCEDURE — 83036 HEMOGLOBIN GLYCOSYLATED A1C: CPT

## 2021-06-30 PROCEDURE — 81001 URINALYSIS AUTO W/SCOPE: CPT | Performed by: NURSE PRACTITIONER

## 2021-06-30 PROCEDURE — 84443 ASSAY THYROID STIM HORMONE: CPT

## 2021-06-30 PROCEDURE — 84439 ASSAY OF FREE THYROXINE: CPT

## 2021-06-30 PROCEDURE — 85025 COMPLETE CBC W/AUTO DIFF WBC: CPT

## 2021-06-30 PROCEDURE — 83735 ASSAY OF MAGNESIUM: CPT

## 2021-06-30 PROCEDURE — 3060F POS MICROALBUMINURIA REV: CPT | Performed by: INTERNAL MEDICINE

## 2021-06-30 PROCEDURE — 81001 URINALYSIS AUTO W/SCOPE: CPT | Performed by: FAMILY MEDICINE

## 2021-07-01 ENCOUNTER — OFFICE VISIT (OUTPATIENT)
Dept: WOUND CARE | Facility: HOSPITAL | Age: 66
End: 2021-07-01
Payer: COMMERCIAL

## 2021-07-01 VITALS
SYSTOLIC BLOOD PRESSURE: 158 MMHG | TEMPERATURE: 97.9 F | DIASTOLIC BLOOD PRESSURE: 77 MMHG | HEART RATE: 84 BPM | RESPIRATION RATE: 18 BRPM

## 2021-07-01 DIAGNOSIS — L97.429 HEEL ULCERATION, LEFT, WITH UNSPECIFIED SEVERITY (HCC): ICD-10-CM

## 2021-07-01 DIAGNOSIS — I73.9 PERIPHERAL VASCULAR DISEASE, UNSPECIFIED (HCC): ICD-10-CM

## 2021-07-01 DIAGNOSIS — L97.529 ULCER OF LEFT FOOT, UNSPECIFIED ULCER STAGE (HCC): Primary | ICD-10-CM

## 2021-07-01 DIAGNOSIS — E11.42 DIABETIC POLYNEUROPATHY ASSOCIATED WITH TYPE 2 DIABETES MELLITUS (HCC): ICD-10-CM

## 2021-07-01 PROBLEM — K21.9 GASTROESOPHAGEAL REFLUX DISEASE WITHOUT ESOPHAGITIS: Status: ACTIVE | Noted: 2021-07-01

## 2021-07-01 PROCEDURE — 99213 OFFICE O/P EST LOW 20 MIN: CPT | Performed by: PODIATRIST

## 2021-07-01 PROCEDURE — 99214 OFFICE O/P EST MOD 30 MIN: CPT | Performed by: PODIATRIST

## 2021-07-01 RX ORDER — LIDOCAINE HYDROCHLORIDE 40 MG/ML
5 SOLUTION TOPICAL ONCE
Status: CANCELLED | OUTPATIENT
Start: 2021-07-01 | End: 2021-07-01

## 2021-07-01 NOTE — PROGRESS NOTES
Patient ID: Nicolle Rosas is a 76 y o  male Date of Birth 1955     Chief Complaint  Chief Complaint   Patient presents with    Follow Up Wound Care Visit     left foot wounds        Allergies  Shellfish-derived products - food allergy and Sulfamethoxazole-trimethoprim    Assessment:    No problem-specific Assessment & Plan notes found for this encounter  Diagnoses and all orders for this visit:    Ulcer of left foot, unspecified ulcer stage ([facility])  -     Wound cleansing and dressings; Future    Heel ulceration, left, with unspecified severity ([facility] )  -     Wound cleansing and dressings; Future    Diabetic polyneuropathy associated with type 2 diabetes mellitus ([facility] )    Peripheral vascular disease, unspecified ([place] [id number] )    Other orders  -     Cancel: lidocaine (XYLOCAINE) 4 % topical solution 5 mL          Procedures    Plan:  1  Reviewed previous wound care note and photo  No improvement with Santyl on left heel ulcer  Will use Betadine and DSD  2  Reviewed vascular note and await bypass surgery  Discussed possible debridement during the admission if his arterial flow is optimized  3  Discussed proper off-loading  Instructed to monitor for any worsening or signs of infection  4  Will follow him up after vascular intervention  Wound 03/11/21 Foot Left; Other (Comment); Posterior (Active)   Wound Image Images linked 07/01/21 1005   Wound Description Yellow 07/01/21 1008   Dominga-wound Assessment Callus 07/01/21 1008   Wound Length (cm) 0 1 cm 07/01/21 1008   Wound Width (cm) 0 1 cm 07/01/21 1008   Wound Depth (cm) 0 2 cm 07/01/21 1008   Wound Surface Area (cm^2) 0 01 cm^2 07/01/21 1008   Wound Volume (cm^3) 0 002 cm^3 07/01/21 1008   Calculated Wound Volume (cm^3) 0 cm^3 07/01/21 1008   Change in Wound Size % 100 07/01/21 1008   Drainage Amount Small 07/01/21 1008   Drainage Description Serous 07/01/21 1008   Non-staged Wound Description Full thickness 07/01/21 1008   Treatments Cleansed 07/01/21 1008   Dressing Changed Changed 07/01/21 1008   Patient Tolerance Tolerated well 07/01/21 1008   Dressing Status Removed 07/01/21 1008       Wound 03/11/21 Heel Left (Active)   Wound Image Images linked 07/01/21 1004   Wound Description Eschar;Epithelialization 07/01/21 1008   Dominga-wound Assessment Dry;Scaly 07/01/21 1008   Wound Length (cm) 2 5 cm 07/01/21 1008   Wound Width (cm) 4 cm 07/01/21 1008   Wound Depth (cm) 0 cm 07/01/21 1008   Wound Surface Area (cm^2) 10 cm^2 07/01/21 1008   Wound Volume (cm^3) 0 cm^3 07/01/21 1008   Calculated Wound Volume (cm^3) 0 cm^3 07/01/21 1008   Drainage Amount None 07/01/21 1008       Wound 03/11/21 Foot Left; Other (Comment); Posterior (Active)   Date First Assessed: 03/11/21   Location: Foot  Wound Location Orientation: (c) Left; Other (Comment); Posterior       Wound 03/11/21 Heel Left (Active)   Date First Assessed/Time First Assessed: 03/11/21 1100   Location: Heel  Wound Location Orientation: Left       [REMOVED] Wound 07/24/20 Diabetic Ulcer Foot Right;Lateral (Removed)   Resolved Date: 03/11/21  Date First Assessed: 07/24/20   Pre-Existing Wound: Yes  Primary Wound Type: Diabetic Ulcer  Location: Foot  Wound Location Orientation: Right;Lateral  Wound Description (Comments): Cristina 1       [REMOVED] Wound 12/18/20 Foot Right (Removed)   Resolved Date: 03/11/21  Date First Assessed/Time First Assessed: 12/18/20 1146   Location: Foot  Wound Location Orientation: Right  Incision's 1st Dressing: DRESSING OIL EMULSION 3 X 3 IN (x1), SPONGE GAUZE 4 X 8 12 PLY STRL LF (x1), BANDAGE WILLARD 3   [REMOVED] Wound 01/28/21 Catheter entry/exit site Groin Left (Removed)   Resolved Date/Resolved Time: 03/25/21 1011  Date First Assessed/Time First Assessed: 01/28/21 0909   Traumatic Wound Type: Catheter entry/exit site  Location: Groin  Wound Location Orientation: Left  Wound Description (Comments): angiogram access punc           [REMOVED] Wound 01/29/21 Diabetic Ulcer Foot Lateral;Right (Removed)   Resolved Date: 03/11/21  Date First Assessed/Time First Assessed: 01/29/21 0820   Pre-Existing Wound: Yes  Primary Wound Type: Diabetic Ulcer  Location: Foot  Wound Location Orientation: Lateral;Right       [REMOVED] Wound 02/02/21 Foot Right (Removed)   Resolved Date: 03/11/21  Date First Assessed/Time First Assessed: 02/02/21 1734   Location: Foot  Wound Location Orientation: Right  Incision's 1st Dressing: DRESSING OIL EMULSION 3 X 3 IN (x1), SPONGE GAUZE 4 X 4 16 PLY STRL PLASTIC TRAY LF (x1), JOSI    [REMOVED] Wound 03/11/21 Surgical Foot Right;Lateral (Removed)   Resolved Date: 06/03/21  Date First Assessed/Time First Assessed: 03/11/21 1057   Primary Wound Type: Surgical  Location: Foot  Wound Location Orientation: Right;Lateral  Wound Outcome: Healed       [REMOVED] Wound 04/08/21 Foot Anterior; Left (Removed)   Resolved Date: 06/03/21  Date First Assessed/Time First Assessed: 04/08/21 1046   Location: Foot  Wound Location Orientation: Anterior; Left  Wound Outcome: Healed       [REMOVED] Wound 04/13/21 Catheter entry/exit site Groin Right (Removed)   Resolved Date/Resolved Time: 04/22/21 1025  Date First Assessed/Time First Assessed: 04/13/21 0843   Traumatic Wound Type: Catheter entry/exit site  Location: Groin  Wound Location Orientation: Right  Wound Description (Comments): angiogram access        Subjective:        BULMARO  Mando Sosa presents for wound care left foot  High risk for limb loss due to non-healing  left heel wound with PAD / diabetes  Right foot wound remains healed  He noticed increased size of wound on left heel  It is tender to touch  No drainage  No redness or increased swelling  He was using Santyl  He had vascular follow-up and planning on bypass surgery          The following portions of the patient's history were reviewed and updated as appropriate: allergies, current medications, past family history, past medical history, past social history, past surgical history and problem list     PAST MEDICAL HISTORY:  Past Medical History:   Diagnosis Date    Arthritis     fingers    First degree AV block     Full dentures     Hypertension     PAD (peripheral artery disease) ([facility] )     [facility]peripheral vascular disease) ([facility])     Type 2 diabetes mellitus with diabetic peripheral angiopathy without gangrene ([facility] ) 5/18/2021    Per CMS ICD 10 guidelines--Per Physician    Wound, open     right foot- by the 5th toe       PAST SURGICAL HISTORY:  Past Surgical History:   Procedure Laterality Date    CHOLECYSTECTOMY      COLONOSCOPY      IR AORTAGRAM WITH RUN-OFF  1/28/2021    IR AORTAGRAM WITH RUN-OFF  4/13/2021    AR DEBRIDEMENT, SKIN, SUB-Q TISSUE,MUSCLE,BONE,=<20 SQ CM Right 12/18/2020    Procedure: DEBRIDMENT OF ULCER , REMOVAL OF DEVITALIZED SKIN, SOFT TISSUE, BONE AND APPLICATION OF INTEGRA GRAFT RIGHT FOOT ;  Surgeon: Alfredo Dent DPM;  Location: [address] [place] [address];  Service: Podiatry    REPLANTATION THUMB Right     right tip reconnected    SKIN GRAFT      right thumb    TOE AMPUTATION      left foot all 5 toes removed    TOE AMPUTATION Right 2/2/2021    Procedure: Right partial 5th ray amputation;  Surgeon: Rosy Vivar DPM;  Location:  MAIN OR;  Service: Podiatry    TOE OSTEOTOMY Right 6/26/2020    Procedure: exostosis of right big toe;  Surgeon: Mouna Walker DPM;  Location: [facility];  Service: Podiatry    [address] [facility]      bilateral hands    VEIN LIGATION      right        ALLERGIES:  Shellfish-derived products - food allergy and Sulfamethoxazole-trimethoprim    MEDICATIONS:  Current Outpatient Medications   Medication Sig Dispense Refill    atorvastatin (LIPITOR) 40 mg tablet Take 1 tablet (40 mg total) by mouth daily at bedtime 90 tablet 1    Blood Glucose Monitoring Suppl (OneTouch Verio) w/Device KIT by Does not apply route 2 (two) times a day Dx E11 9 1 kit 1    clopidogrel (PLAVIX) 75 mg tablet Take 1 tablet (75 mg total) by mouth daily 90 tablet 0    gabapentin (NEURONTIN) 300 mg capsule Take 1 capsule (300 mg total) by mouth 3 (three) times a day 270 capsule 3    glucose blood (OneTouch Verio) test strip Use as instructed qid Dx E11 9 400 each 3    halobetasol (ULTRAVATE) 0 05 % cream Apply topically 2 (two) times a day 50 g 5    HYDROcodone-acetaminophen (NORCO) 5-325 mg per tablet Take 1 tablet by mouth every 6 (six) hours as needed for painMax Daily Amount: 4 tablets 20 tablet 0    insulin NPH (HumuLIN N,NovoLIN N) 100 Units/mL subcutaneous injection Inject 12 Units under the skin 2 (two) times a day 7 2 mL 0    insulin regular (HumuLIN R,NovoLIN R) 100 units/mL injection 4 units with lunch, 8 units with dinner 10 mL 0    Lancets (OneTouch Delica Plus [id number]) MISC 1 Units by Does not apply route 2 (two) times a day Dx E11 9 100 each 2    lisinopril (ZESTRIL) 10 mg tablet Take 1 tablet (10 mg total) by mouth daily 90 tablet 1    Nitro-Bid 2 % ointment       oxyCODONE-acetaminophen (PERCOCET) 5-325 mg per tablet Take 1 tablet by mouth every 6 (six) hours as needed for moderate painMax Daily Amount: 4 tablets 120 tablet 0    pantoprazole (PROTONIX) 40 mg tablet Take 1 tablet (40 mg total) by mouth daily 90 tablet 1    rivaroxaban (Xarelto) 2 5 mg tablet Take 1 tablet (2 5 mg total) by mouth daily with breakfast Last dose 6/21/20 30 tablet 2    sodium hypochlorite (DAKIN'S QUARTER-STRENGTH) Irrigate with 1 application as directed daily Apply with dry dressing  473 mL 0    UltiCare Insulin Syringe 31G X 5/16" 1 ML MISC Inject under the skin as needed (for injection) 100 each 1     No current facility-administered medications for this visit         SOCIAL HISTORY:  Social History     Socioeconomic History    Marital status: /Civil Union     Spouse name: None    Number of children: None    Years of education: None    Highest education level: None   Occupational History    Occupation: [employer]   Tobacco Use    Smoking status: Never Smoker    Smokeless tobacco: Never Used   Vaping Use    Vaping Use: Never used   Substance and Sexual Activity    Alcohol use: Yes     Comment: occasional    Drug use: Never    Sexual activity: None   Other Topics Concern    None   Social History Narrative    · Most recent tobacco use screenin2019      · Do you currently or have you served in the [employer]:   No      · Were you activated, into active duty, as a member of the [email or web address] or as a Reservist:   No      · Diet:   Regular      · Alcohol intake:   Occasional      · Caffeine intake:   Occasional      · Seat belts used routinely:   Yes      · Smoke alarm in home:   Yes      Social Determinants of Health     Financial Resource Strain:     Difficulty of Paying Living Expenses:    Food Insecurity:     Worried About Running Out of Food in the Last Year:     [address] in the Last Year:    Transportation Needs:     Lack of Transportation (Medical):  Lack of Transportation (Non-Medical):    Physical Activity:     Days of Exercise per Week:     Minutes of Exercise per Session:    Stress:     Feeling of Stress :    Social Connections:     Frequency of Communication with Friends and Family:     Frequency of Social Gatherings with Friends and Family:     Attends [employer] Services:     Active Member of Clubs or Organizations:     Attends Club or Organization Meetings:     Marital Status:    Intimate Partner Violence:     Fear of Current or Ex-Partner:     Emotionally Abused:     Physically Abused:     Sexually Abused:       Review of Systems   Constitutional: Negative for chills and fever  HENT: Negative for sore throat  Respiratory: Negative for cough and shortness of breath  Cardiovascular: Negative for chest pain  Gastrointestinal: Negative for diarrhea, nausea and vomiting  Skin: Positive for wound  Objective:       Wound 21 Foot Left; Other (Comment); Posterior (Active)   Wound Image Images linked 07/01/21 1005   Wound Description Yellow 07/01/21 1008   Dominga-wound Assessment Callus 07/01/21 1008   Wound Length (cm) 0 1 cm 07/01/21 1008   Wound Width (cm) 0 1 cm 07/01/21 1008   Wound Depth (cm) 0 2 cm 07/01/21 1008   Wound Surface Area (cm^2) 0 01 cm^2 07/01/21 1008   Wound Volume (cm^3) 0 002 cm^3 07/01/21 1008   Calculated Wound Volume (cm^3) 0 cm^3 07/01/21 1008   Change in Wound Size % 100 07/01/21 1008   Drainage Amount Small 07/01/21 1008   Drainage Description Serous 07/01/21 1008   Non-staged Wound Description Full thickness 07/01/21 1008   Treatments Cleansed 07/01/21 1008   Dressing Changed Changed 07/01/21 1008   Patient Tolerance Tolerated well 07/01/21 1008   Dressing Status Removed 07/01/21 1008       Wound 03/11/21 Heel Left (Active)   Wound Image Images linked 07/01/21 1004   Wound Description Eschar;Epithelialization 07/01/21 1008   Dominga-wound Assessment Dry;Scaly 07/01/21 1008   Wound Length (cm) 2 5 cm 07/01/21 1008   Wound Width (cm) 4 cm 07/01/21 1008   Wound Depth (cm) 0 cm 07/01/21 1008   Wound Surface Area (cm^2) 10 cm^2 07/01/21 1008   Wound Volume (cm^3) 0 cm^3 07/01/21 1008   Calculated Wound Volume (cm^3) 0 cm^3 07/01/21 1008   Drainage Amount None 07/01/21 1008       /77   Pulse 84   Temp 97 9 °F (36 6 °C)   Resp 18     Physical Exam  Vitals and nursing note reviewed  Constitutional:       General: He is not in acute distress  Appearance: Normal appearance  He is not toxic-appearing  Cardiovascular:      Rate and Rhythm: Normal rate and regular rhythm  Pulses:           Dorsalis pedis pulses are 0 on the right side and 0 on the left side  Posterior tibial pulses are 0 on the right side and 0 on the left side  Comments: No calf pain or swelling  Pulmonary:      Effort: Pulmonary effort is normal  No respiratory distress  Musculoskeletal:         General: No signs of injury  Right foot: No foot drop        Left foot: No foot drop  Comments: TMA left foot  Right 5th ray amputation  Skin:     General: Skin is warm and dry  Coloration: Skin is not cyanotic or mottled  Findings: Wound present  No abscess or erythema  Rash is not purpuric  Nails: There is no clubbing  Comments: Eschar left heel is larger  No redness  No drainage  Plantar left TMA wound is stable  No signs of infection  See wound assessment  Neurological:      General: No focal deficit present  Mental Status: He is alert and oriented to person, place, and time  Cranial Nerves: No cranial nerve deficit  Motor: No weakness  Psychiatric:         Mood and Affect: Mood normal          Behavior: Behavior normal          Thought Content: Thought content normal          Judgment: Judgment normal            Wound Instructions:  Orders Placed This Encounter   Procedures    Wound cleansing and dressings     Wound cleansing and dressings       Wash your hands with soap and water  Remove old dressing, discard into plastic bag and place in trash  Cleanse the wound with normal saline prior to applying a clean dressing  Do not use tissue or cotton balls  Do not scrub the wound  Pat dry using gauze  Shower yes, with protection      Left foot wound:  Do not get wet with shower water, may use cast covers or sponge bathe  Apply Dermagran gauze to wound  Cover with gauze and secure gently with rolled gauze and tape  Change dressing every other day and as needed if soiled      Left Heel wound  Do not get wet with shower water, may use cast covers or sponge bathe    Apply betadine  Cover with gauze (keep padded) and secure gently with rolled gauze and tape  Change dressing every other day       Treatments above were completed today at the [facility]        Follow up when you are discharged from the hospital         You may begin using your regular shoe on the right foot and monitor for any new open areas or redness that may occur Standing Status:   Future     Standing Expiration Date:   7/1/2022        Diagnosis ICD-10-CM Associated Orders   1  Ulcer of left foot, unspecified ulcer stage ([facility])  L97 529 Wound cleansing and dressings   2  Heel ulceration, left, with unspecified severity ([facility] )  L97 429 Wound cleansing and dressings   3  Diabetic polyneuropathy associated with type 2 diabetes mellitus ([facility])  E11 42    4   Peripheral vascular disease, unspecified ([facility] )  I73 9

## 2021-07-01 NOTE — PATIENT INSTRUCTIONS
Orders Placed This Encounter   Procedures    Wound cleansing and dressings     Wound cleansing and dressings       Wash your hands with soap and water  Remove old dressing, discard into plastic bag and place in trash  Cleanse the wound with normal saline prior to applying a clean dressing  Do not use tissue or cotton balls  Do not scrub the wound  Pat dry using gauze  Shower yes, with protection      Left foot wound:  Do not get wet with shower water, may use cast covers or sponge bathe  Apply Dermagran gauze to wound  Cover with gauze and secure gently with rolled gauze and tape  Change dressing every other day and as needed if soiled      Left Heel wound  Do not get wet with shower water, may use cast covers or sponge bathe    Apply betadine  Cover with gauze (keep padded) and secure gently with rolled gauze and tape  Change dressing every other day       Treatments above were completed today at the [facility]        Follow up when you are discharged from the hospital         You may begin using your regular shoe on the right foot and monitor for any new open areas or redness that may occur     Standing Status:   Future     Standing Expiration Date:   7/1/2022

## 2021-07-02 ENCOUNTER — OFFICE VISIT (OUTPATIENT)
Dept: FAMILY MEDICINE CLINIC | Facility: CLINIC | Age: 66
End: 2021-07-02
Payer: COMMERCIAL

## 2021-07-02 ENCOUNTER — ANESTHESIA EVENT (OUTPATIENT)
Dept: PERIOP | Facility: HOSPITAL | Age: 66
DRG: 240 | End: 2021-07-02
Payer: COMMERCIAL

## 2021-07-02 VITALS
TEMPERATURE: 98.4 F | WEIGHT: 195 LBS | HEIGHT: 64 IN | OXYGEN SATURATION: 97 % | BODY MASS INDEX: 33.29 KG/M2 | HEART RATE: 75 BPM | SYSTOLIC BLOOD PRESSURE: 124 MMHG | DIASTOLIC BLOOD PRESSURE: 74 MMHG

## 2021-07-02 DIAGNOSIS — L97.511 DIABETIC ULCER OF TOE OF RIGHT FOOT ASSOCIATED WITH TYPE 2 DIABETES MELLITUS, LIMITED TO BREAKDOWN OF SKIN (HCC): ICD-10-CM

## 2021-07-02 DIAGNOSIS — I10 ESSENTIAL HYPERTENSION: Primary | ICD-10-CM

## 2021-07-02 DIAGNOSIS — E11.65 POORLY CONTROLLED TYPE 2 DIABETES MELLITUS (HCC): ICD-10-CM

## 2021-07-02 DIAGNOSIS — E78.2 MIXED HYPERLIPIDEMIA: ICD-10-CM

## 2021-07-02 DIAGNOSIS — E11.621 DIABETIC ULCER OF TOE OF RIGHT FOOT ASSOCIATED WITH TYPE 2 DIABETES MELLITUS, LIMITED TO BREAKDOWN OF SKIN (HCC): ICD-10-CM

## 2021-07-02 DIAGNOSIS — K21.9 GASTROESOPHAGEAL REFLUX DISEASE WITHOUT ESOPHAGITIS: ICD-10-CM

## 2021-07-02 LAB — BACTERIA UR CULT: ABNORMAL

## 2021-07-02 PROCEDURE — 3008F BODY MASS INDEX DOCD: CPT | Performed by: INTERNAL MEDICINE

## 2021-07-02 PROCEDURE — 99214 OFFICE O/P EST MOD 30 MIN: CPT | Performed by: FAMILY MEDICINE

## 2021-07-02 PROCEDURE — 1160F RVW MEDS BY RX/DR IN RCRD: CPT | Performed by: FAMILY MEDICINE

## 2021-07-02 PROCEDURE — 3725F SCREEN DEPRESSION PERFORMED: CPT | Performed by: FAMILY MEDICINE

## 2021-07-02 PROCEDURE — 1036F TOBACCO NON-USER: CPT | Performed by: FAMILY MEDICINE

## 2021-07-02 NOTE — PRE-PROCEDURE INSTRUCTIONS
Pre-Surgery Instructions:   Medication Instructions    atorvastatin (LIPITOR) 40 mg tablet TAKES HS    clopidogrel (PLAVIX) 75 mg tablet take dos sm sip    COVID-19 mRNA Virus Vaccine (MODERNA COVID-19 VACCINE IM) done    gabapentin (NEURONTIN) 300 mg capsule Take dos sm sip prn      halobetasol (ULTRAVATE) 0 05 % cream PRN    insulin NPH (HumuLIN N,NovoLIN N) 100 Units/mL subcutaneous injection take full dose long acting ins dos    insulin regular (HumuLIN R,NovoLIN R) 100 units/mL injection NO SHORT ACTING INS DOS    lisinopril (ZESTRIL) 10 mg tablet DO NOT TAKE DOS    oxyCODONE-acetaminophen (PERCOCET) 5-325 mg per tablet PRN    pantoprazole (PROTONIX) 40 mg tablet TAKE DOS SM SIP    rivaroxaban (Xarelto) 2 5 mg tablet HOLD 2 DAYS PREOP   INSTR  ON ADMIT  LOC ,BRING PHOTO ID/MED LIST/INS  INFO , SHOWER REV , NO ASA/NSAIDS/VIT 1 WEEK PREOP      Pre Procedure Consult Calls    1  Are you currently experiencing symptoms of fever >100 4, cough, or shortness of breath or sore throat? ______YES    ____X_NO   If yes, then please call your PCP immediately for further direction  If you are completing this form on site, please find the safest and most direct route to the nearest exit and avoid close contact with others until you can get further advice from your PCP  2  Have you recently traveled to any foreign country or area within the [place] that has reported cases of COVID-19? ______YES      ___X__NO   IF YES, LIST LOCATION(S): __________________________   3  Have you recently been in contact with someone who is a suspected or confirmed case of COVID-19?   ______ YES   ____X__ No   INSTRUCTED ON NEW COVID VISITOR POLICY- ONLY 1 VISITOR, ALL MUST WEAR MASKS, HAD BOTH VACCINES   PT VERBALIZES UNDERSTANDING

## 2021-07-02 NOTE — ASSESSMENT & PLAN NOTE
Needs to control sugar and continue with podiatry for corrective actions    Lab Results   Component Value Date    HGBA1C 8 9 (H) 06/30/2021

## 2021-07-02 NOTE — PROGRESS NOTES
Falls Plan of Care: balance, strength, and gait training instructions were provided  Recommended assistive device to help with gait and balance  Home safety education provided  Assessment/Plan:         Problem List Items Addressed This Visit        Digestive    Gastroesophageal reflux disease without esophagitis     Patient to continue with present therapy and decrease caffeine, avoid ETOH and smoking to decrease acid production  Pt should also cease eating prior to bedtime and avoid excessive fluid intake prior to sleep  May use antacids as needed for breakthrough GERD  All pateint questions answered today about this condition  Endocrine    Diabetic ulcer of toe of right foot associated with type 2 diabetes mellitus, limited to breakdown of skin ([facility] )     Needs to control sugar and continue with podiatry for corrective actions  Lab Results   Component Value Date    HGBA1C 8 9 (H) 06/30/2021            Poorly controlled type 2 diabetes mellitus ([facility] )     Needs better control  Lab Results   Component Value Date    HGBA1C 8 9 (H) 06/30/2021               Cardiovascular and Mediastinum    Essential hypertension - Primary     Patient is stable with current anti-hypertensive medicine and continue to follow a low sodium diet and take current medication  All questions about this condition were answered today  Other    Mixed hyperlipidemia     Patient  is stable with current medication and we discussed a low fat low cholesterol diet  Weight loss also discussed for this will help lower cholesterol also  Recheck lipids in 6 months  Subjective:      Patient ID: Tia Lopez is a 76 y o  male  This 65-year-old white male here today for peripheral artery disease type 2 diabetes wound on the foot hypertension and is stable  Patient is getting ready for a what sounds like a fem-pop bypass on his left leg with his vascular surgeon to see if they can save his left foot    Patient has poor circulation and has had a partial amputation her ready of the foot  He had reviewed his sugars he is seeing endocrinologist in about 2 weeks I spoke to about his sugars being too high and there were going to have to see about readjusting his insulin he is going to wait until he goes sees endocrinologist they may change him to a different regimen of basal bolus or something that this that they decide on will work better than what he is currently doing  The following portions of the patient's history were reviewed and updated as appropriate:   Past Medical History:  He has a past medical history of Arthritis, First degree AV block, Full dentures, Hypertension, PAD (peripheral artery disease) ([facility] ), PVD (peripheral vascular disease) ([facility] ), Type 2 diabetes mellitus with diabetic peripheral angiopathy without gangrene ([facility] ) (5/18/2021), and Wound, open ,  _______________________________________________________________________  Medical Problems:  does not have any pertinent problems on file ,  _______________________________________________________________________  Past Surgical History:   has a past surgical history that includes Toe amputation; Vein ligation; Replantation thumb (Right); Cholecystectomy; Colonoscopy; Toe Osteotomy (Right, 6/26/2020); Trigger finger release; Skin graft; pr debridement, skin, sub-q tissue,muscle,bone,=<20 sq cm (Right, 12/18/2020); IR aortagram with run-off (1/28/2021); Toe amputation (Right, 2/2/2021); and IR aortagram with run-off (4/13/2021)  ,  _______________________________________________________________________  Family History:  family history includes Alzheimer's disease in his father; Arthritis in his mother; Cancer in his father ,  _______________________________________________________________________  Social History:   reports that he has never smoked  He has never used smokeless tobacco  He reports current alcohol use   He reports that he does not use drugs ,  _______________________________________________________________________  Allergies:  is allergic to shellfish-derived products - food allergy and sulfamethoxazole-trimethoprim     _______________________________________________________________________  Current Outpatient Medications   Medication Sig Dispense Refill    atorvastatin (LIPITOR) 40 mg tablet Take 1 tablet (40 mg total) by mouth daily at bedtime 90 tablet 1    Blood Glucose Monitoring Suppl (OneTouch Verio) w/Device KIT by Does not apply route 2 (two) times a day Dx E11 9 1 kit 1    clopidogrel (PLAVIX) 75 mg tablet Take 1 tablet (75 mg total) by mouth daily 90 tablet 0    gabapentin (NEURONTIN) 300 mg capsule Take 1 capsule (300 mg total) by mouth 3 (three) times a day 270 capsule 3    glucose blood (OneTouch Verio) test strip Use as instructed qid Dx E11 9 400 each 3    insulin NPH (HumuLIN N,NovoLIN N) 100 Units/mL subcutaneous injection Inject 12 Units under the skin 2 (two) times a day 7 2 mL 0    insulin regular (HumuLIN R,NovoLIN R) 100 units/mL injection 4 units with lunch, 8 units with dinner 10 mL 0    lisinopril (ZESTRIL) 10 mg tablet Take 1 tablet (10 mg total) by mouth daily 90 tablet 1    oxyCODONE-acetaminophen (PERCOCET) 5-325 mg per tablet Take 1 tablet by mouth every 6 (six) hours as needed for moderate painMax Daily Amount: 4 tablets 120 tablet 0    pantoprazole (PROTONIX) 40 mg tablet Take 1 tablet (40 mg total) by mouth daily 90 tablet 1    rivaroxaban (Xarelto) 2 5 mg tablet Take 1 tablet (2 5 mg total) by mouth daily with breakfast Last dose 6/21/20 30 tablet 2    UltiCare Insulin Syringe 31G X 5/16" 1 ML MISC Inject under the skin as needed (for injection) 100 each 1    COVID-19 mRNA Virus Vaccine (MODERNA COVID-19 VACCINE IM) Inject into a muscle      halobetasol (ULTRAVATE) 0 05 % cream Apply topically 2 (two) times a day (Patient not taking: Reported on 7/2/2021) 50 g 5    HYDROcodone-acetaminophen (NORCO) 5-325 mg per tablet Take 1 tablet by mouth every 6 (six) hours as needed for painMax Daily Amount: 4 tablets (Patient not taking: Reported on 7/2/2021) 20 tablet 0    Lancets (OneTouch Delica Plus [id number]) MISC 1 Units by Does not apply route 2 (two) times a day Dx E11 9 (Patient not taking: Reported on 7/2/2021) 100 each 2    Nitro-Bid 2 % ointment  (Patient not taking: Reported on 7/2/2021)      sodium hypochlorite (DAKIN'S QUARTER-STRENGTH) Irrigate with 1 application as directed daily Apply with dry dressing  (Patient not taking: Reported on 7/2/2021) 473 mL 0     No current facility-administered medications for this visit      _______________________________________________________________________  Review of Systems   Constitutional: Negative for activity change, appetite change, chills, fatigue, fever and unexpected weight change  HENT: Negative for congestion, ear pain, hearing loss, mouth sores, postnasal drip, sinus pressure, sinus pain, sneezing and sore throat  Respiratory: Negative for apnea, cough, shortness of breath and wheezing  Cardiovascular: Negative for chest pain, palpitations and leg swelling  Gastrointestinal: Negative for abdominal pain, constipation, diarrhea, nausea and vomiting  Endocrine: Negative for cold intolerance and heat intolerance  Genitourinary: Negative for dysuria, frequency and hematuria  Musculoskeletal: Negative for arthralgias, back pain, gait problem, joint swelling and neck pain  Skin: Negative for rash  Neurological: Negative for dizziness, weakness and numbness  Hematological: Does not bruise/bleed easily  Psychiatric/Behavioral: Negative for agitation, behavioral problems, confusion, hallucinations and sleep disturbance  The patient is not nervous/anxious            Objective:  Vitals:    07/02/21 1314   BP: 124/74   BP Location: Left arm   Patient Position: Sitting   Cuff Size: Standard   Pulse: 75   Temp: 98 4 °F (36 9 °C)   TempSrc: Temporal   SpO2: 97%   Weight: 88 5 kg (195 lb)   Height: 5' 4" (1 626 m)     Body mass index is 33 47 kg/m²  Physical Exam  Vitals and nursing note reviewed  Constitutional:       Appearance: He is well-developed  HENT:      Head: Normocephalic and atraumatic  Nose: Nose normal       Mouth/Throat:      Mouth: Mucous membranes are moist    Eyes:      General: No scleral icterus  Conjunctiva/sclera: Conjunctivae normal       Pupils: Pupils are equal, round, and reactive to light  Neck:      Thyroid: No thyromegaly  Cardiovascular:      Rate and Rhythm: Normal rate and regular rhythm  Heart sounds: Normal heart sounds  Pulmonary:      Effort: Pulmonary effort is normal  No respiratory distress  Breath sounds: Normal breath sounds  No wheezing  Abdominal:      General: Bowel sounds are normal       Palpations: Abdomen is soft  Tenderness: There is no abdominal tenderness  There is no guarding or rebound  Musculoskeletal:         General: Normal range of motion  Cervical back: Normal range of motion and neck supple  Skin:     General: Skin is warm and dry  Findings: No rash  Neurological:      Mental Status: He is alert and oriented to person, place, and time  Psychiatric:         Mood and Affect: Mood normal          Behavior: Behavior normal          Thought Content:  Thought content normal          Judgment: Judgment normal

## 2021-07-07 ENCOUNTER — CONSULT (OUTPATIENT)
Dept: ENDOCRINOLOGY | Facility: CLINIC | Age: 66
End: 2021-07-07
Payer: COMMERCIAL

## 2021-07-07 ENCOUNTER — APPOINTMENT (OUTPATIENT)
Dept: LAB | Facility: MEDICAL CENTER | Age: 66
End: 2021-07-07
Payer: COMMERCIAL

## 2021-07-07 ENCOUNTER — LAB REQUISITION (OUTPATIENT)
Dept: LAB | Facility: HOSPITAL | Age: 66
End: 2021-07-07
Payer: COMMERCIAL

## 2021-07-07 VITALS — SYSTOLIC BLOOD PRESSURE: 142 MMHG | DIASTOLIC BLOOD PRESSURE: 80 MMHG | HEART RATE: 84 BPM | TEMPERATURE: 97.7 F

## 2021-07-07 DIAGNOSIS — I73.9 PERIPHERAL VASCULAR DISEASE, UNSPECIFIED (HCC): ICD-10-CM

## 2021-07-07 DIAGNOSIS — Z79.4 TYPE 2 DIABETES MELLITUS WITH FOOT ULCER, WITH LONG-TERM CURRENT USE OF INSULIN (HCC): Primary | ICD-10-CM

## 2021-07-07 DIAGNOSIS — Z79.4 TYPE 2 DIABETES MELLITUS WITH DIABETIC PERIPHERAL ANGIOPATHY WITHOUT GANGRENE, WITH LONG-TERM CURRENT USE OF INSULIN (HCC): ICD-10-CM

## 2021-07-07 DIAGNOSIS — I10 ESSENTIAL HYPERTENSION: ICD-10-CM

## 2021-07-07 DIAGNOSIS — L97.509 TYPE 2 DIABETES MELLITUS WITH FOOT ULCER, WITH LONG-TERM CURRENT USE OF INSULIN (HCC): Primary | ICD-10-CM

## 2021-07-07 DIAGNOSIS — I73.9 PAD (PERIPHERAL ARTERY DISEASE) (HCC): ICD-10-CM

## 2021-07-07 DIAGNOSIS — E11.65 POORLY CONTROLLED TYPE 2 DIABETES MELLITUS (HCC): ICD-10-CM

## 2021-07-07 DIAGNOSIS — E11.51 TYPE 2 DIABETES MELLITUS WITH DIABETIC PERIPHERAL ANGIOPATHY WITHOUT GANGRENE, WITH LONG-TERM CURRENT USE OF INSULIN (HCC): ICD-10-CM

## 2021-07-07 DIAGNOSIS — E78.2 MIXED HYPERLIPIDEMIA: ICD-10-CM

## 2021-07-07 DIAGNOSIS — E11.621 TYPE 2 DIABETES MELLITUS WITH FOOT ULCER, WITH LONG-TERM CURRENT USE OF INSULIN (HCC): Primary | ICD-10-CM

## 2021-07-07 LAB
ABO GROUP BLD: NORMAL
ANION GAP SERPL CALCULATED.3IONS-SCNC: 4 MMOL/L (ref 4–13)
BLD GP AB SCN SERPL QL: NEGATIVE
BUN SERPL-MCNC: 19 MG/DL (ref 5–25)
CALCIUM SERPL-MCNC: 9.5 MG/DL (ref 8.3–10.1)
CHLORIDE SERPL-SCNC: 103 MMOL/L (ref 100–108)
CO2 SERPL-SCNC: 27 MMOL/L (ref 21–32)
CREAT SERPL-MCNC: 1.08 MG/DL (ref 0.6–1.3)
ERYTHROCYTE [DISTWIDTH] IN BLOOD BY AUTOMATED COUNT: 12.4 % (ref 11.6–15.1)
EST. AVERAGE GLUCOSE BLD GHB EST-MCNC: 209 MG/DL
GFR SERPL CREATININE-BSD FRML MDRD: 71 ML/MIN/1.73SQ M
GLUCOSE SERPL-MCNC: 290 MG/DL (ref 65–140)
HBA1C MFR BLD: 8.9 %
HCT VFR BLD AUTO: 44.9 % (ref 36.5–49.3)
HGB BLD-MCNC: 15.2 G/DL (ref 12–17)
INR PPP: 0.97 (ref 0.84–1.19)
MCH RBC QN AUTO: 32.3 PG (ref 26.8–34.3)
MCHC RBC AUTO-ENTMCNC: 33.9 G/DL (ref 31.4–37.4)
MCV RBC AUTO: 96 FL (ref 82–98)
PLATELET # BLD AUTO: 169 THOUSANDS/UL (ref 149–390)
PMV BLD AUTO: 11.1 FL (ref 8.9–12.7)
POTASSIUM SERPL-SCNC: 4.4 MMOL/L (ref 3.5–5.3)
PROTHROMBIN TIME: 12.9 SECONDS (ref 11.6–14.5)
RBC # BLD AUTO: 4.7 MILLION/UL (ref 3.88–5.62)
RH BLD: POSITIVE
SODIUM SERPL-SCNC: 134 MMOL/L (ref 136–145)
SPECIMEN EXPIRATION DATE: NORMAL
WBC # BLD AUTO: 6.74 THOUSAND/UL (ref 4.31–10.16)

## 2021-07-07 PROCEDURE — 1160F RVW MEDS BY RX/DR IN RCRD: CPT | Performed by: INTERNAL MEDICINE

## 2021-07-07 PROCEDURE — 86901 BLOOD TYPING SEROLOGIC RH(D): CPT | Performed by: SURGERY

## 2021-07-07 PROCEDURE — 36415 COLL VENOUS BLD VENIPUNCTURE: CPT

## 2021-07-07 PROCEDURE — 80048 BASIC METABOLIC PNL TOTAL CA: CPT

## 2021-07-07 PROCEDURE — 85027 COMPLETE CBC AUTOMATED: CPT

## 2021-07-07 PROCEDURE — 1036F TOBACCO NON-USER: CPT | Performed by: INTERNAL MEDICINE

## 2021-07-07 PROCEDURE — 86850 RBC ANTIBODY SCREEN: CPT | Performed by: SURGERY

## 2021-07-07 PROCEDURE — 86900 BLOOD TYPING SEROLOGIC ABO: CPT | Performed by: SURGERY

## 2021-07-07 PROCEDURE — 83036 HEMOGLOBIN GLYCOSYLATED A1C: CPT

## 2021-07-07 PROCEDURE — 3052F HG A1C>EQUAL 8.0%<EQUAL 9.0%: CPT | Performed by: INTERNAL MEDICINE

## 2021-07-07 PROCEDURE — 99214 OFFICE O/P EST MOD 30 MIN: CPT | Performed by: INTERNAL MEDICINE

## 2021-07-07 PROCEDURE — 85610 PROTHROMBIN TIME: CPT

## 2021-07-07 RX ORDER — METFORMIN HYDROCHLORIDE 500 MG/1
500 TABLET, EXTENDED RELEASE ORAL
Qty: 30 TABLET | Refills: 4 | Status: SHIPPED | OUTPATIENT
Start: 2021-07-07

## 2021-07-07 NOTE — PATIENT INSTRUCTIONS
Call Insurance to find out if trulicity or Ozempic is covered  Hypoglycemia instructions   Gallito Suze  7/7/2021  [id number]    Low Blood Sugar    Steps to treat low blood sugar  1  Test blood sugar if you have symptoms of low blood sugar:   Low Blood Sugar Symptoms:  o Sweaty  o Dizzy  o Rapid heartbeat  o Shaky    o Bad mood  o Hungry      2  Treat blood sugar less than 70 with 15 grams of fast-acting carbohydrate:   Examples of 15 grams Fast-Acting Carbohydrate:  o 4 oz juice  o 4 oz regular soda  o 3-4 glucose tablets (chew)  o 3-4 hard candies (chew)              3    Wait 15 minutes and test your blood sugar again           4   If blood sugar is less than 100, repeat steps 2-3       5  When your blood sugar is 100 or more, eat a snack if it will be longer than one hour until your next meal  The snack should be 15 grams of carbohydrate and a protein:   Examples of snacks:  o ½ sandwich  o 6 crackers with cheese  o Piece of fruit with cheese or peanut butter  o 6 crackers with peanut butter

## 2021-07-07 NOTE — PROGRESS NOTES
Yuriy Burton 76 y o  male MRN: [id number]    Encounter: [id number]      Assessment/Plan     Assessment: This is a 76y o -year-old male with diabetes with hyperglycemia  Plan:    Diagnoses and all orders for this visit:    Type 2 diabetes mellitus with foot ulcer, with long-term current use of insulin ([facility] )  Lab Results   Component Value Date    HGBA1C 8 9 (H) 06/30/2021    -A1c is 8 9%, uncontrolled  blood sugars are usually in 130-250 mg/dL range as per patient  -  Discussed the advantage of basal bolus insulin regimen, however because of the cost patient prefers Novolin N and R    -Will switch to Novolin 70/30 insulin 20 units with breakfast and 15 units with dinner,( This insulin is also  Affordable)  - Discussed to check blood sugars 3 times daily and send log in 2 weeks to make further adjustment if necessary    -Discussed the goal for blood sugar is 80 to 160 mg/dL  - Discussed importance of follow-up with Ophthalmology and podiatry      -     Ambulatory referral to Endocrinology  -     insulin NPH-insulin regular (NovoLIN 70/30) 100 units/mL subcutaneous injection; Inject 20 units with breakfast and 15 units with dinner  -     Ambulatory referral to Diabetic Education; Future  -     Hemoglobin A1C; Future  -     Comprehensive metabolic panel Lab Collect; Future  -     C-peptide; Future  -     Microalbumin / creatinine urine ratio; Future  -     metFORMIN (GLUCOPHAGE-XR) 500 mg 24 hr tablet; Take 1 tablet (500 mg total) by mouth daily with dinner    Type 2 diabetes mellitus with diabetic peripheral angiopathy without gangrene, with long-term current use of insulin ([facility]   Discussed importance of controlling diabetes to the goal of A1c of 7%, to prevent further worsening of PAD  -     insulin NPH-insulin regular (NovoLIN 70/30) 100 units/mL subcutaneous injection; Inject 20 units with breakfast and 15 units with dinner  -     Ambulatory referral to Diabetic Education;  Future    Essential hypertension   blood pressure well controlled   continue current management  follow up with PCP  Mixed hyperlipidemia   LDL is at goal    continue statins  triglycerides are elevated in 300 range, educated about dietary modifications cutting down free sugars and carbohydrates  Increasing Omega 3 fatty acid in diet inform of fish, nuts,  And avocados  will refer to  Dietitian  Poorly controlled type 2 diabetes mellitus (HCC)   A1c not controlled  changes made to insulin regimen  CC: Diabetes    History of Present Illness     HPI:    Sim Chavez Is 77-year-old gentleman with medical history of type 2 diabetes, uncontrolled on insulin regimen for many years, peripheral angiopathy, history of multiple toe amputation on left food, as well as little toe amputation on right, hypertension, hyperlipidemia is referred here for management of diabetes,    He has type 2 diabetes for 20 yrs  And on insulin regimen for 6 yrs  he admits complication of diabetes such as PAD, microalbuminuria , mild retinopathy  his currently taking Lipitor 40 mg at bedtime  he takes lisinopril 10 mg for hypertension,     for diabetes his currently taking insulin NPH and regular twice a day  he checks blood sugars 2-3 times daily, blood sugars at home are usually in 130-250 mg/dL range  he denies hyperglycemia more than 350 or hypoglycemia less than 70 mg per dL  Lab Results   Component Value Date    HGBA1C 8 9 (H) 06/30/2021     Lab Results   Component Value Date    LDLCALC 96 06/30/2021     Results for Ellis Jean-Baptiste (MRN [id number]) as of 7/7/2021 09:15   Ref  Range 6/30/2021 09:36   Cholesterol Latest Ref Range: 50 - 200 mg/dL 200   Triglycerides Latest Ref Range: <=150 mg/dL 362 (H)   HDL Latest Ref Range: >=40 mg/dL 32 (L)   LDL Calculated Latest Ref Range: 0 - 100 mg/dL 96   Results for Ellis Jean-Baptiste (MRN [id number]) as of 7/7/2021 09:15   Ref   Range 5/12/2021 14:48   Left Eye Diabetic Retinopathy Unknown Mild     Lab Results   Component Value Date    CREATININE 1 09 06/30/2021           Review of Systems   Constitutional: Positive for activity change (because of foot ulcer)  Negative for diaphoresis, fatigue, fever and unexpected weight change  HENT: Negative  Eyes: Negative for visual disturbance  Respiratory: Negative for cough, chest tightness and shortness of breath  Cardiovascular: Negative for chest pain, palpitations and leg swelling  Gastrointestinal: Negative for abdominal pain, blood in stool, constipation, diarrhea, nausea and vomiting  Endocrine: Negative for cold intolerance, heat intolerance, polydipsia, polyphagia and polyuria  Genitourinary: Negative for dysuria, enuresis, frequency and urgency  Musculoskeletal: Positive for arthralgias and gait problem  Negative for myalgias  Skin: Negative for pallor, rash and wound  Allergic/Immunologic: Negative  Neurological: Negative for dizziness, tremors, weakness and numbness  Hematological: Negative  Psychiatric/Behavioral: Negative          Historical Information   Past Medical History:   Diagnosis Date    Arthritis     fingers    First degree AV block     Full dentures     Hypertension     PAD (peripheral artery disease) ([facility] )     [facility]VD [facility]per[facility]) [facility]     Type 2 diabetes mellitus with diabetic peripheral angiopathy without gangrene ([facility] ) 5/18/2021    Per CMS ICD 10 guidelines--Per Physician    Wound, open     right foot- by the 5th toe     Past Surgical History:   Procedure Laterality Date    CHOLECYSTECTOMY      COLONOSCOPY      IR AORTAGRAM WITH RUN-OFF  1/28/2021    IR AORTAGRAM WITH RUN-OFF  4/13/2021    SC DEBRIDEMENT, SKIN, SUB-Q TISSUE,MUSCLE,BONE,=<20 SQ CM Right 12/18/2020    Procedure: DEBRIDMENT OF ULCER , REMOVAL OF DEVITALIZED SKIN, SOFT TISSUE, BONE AND APPLICATION OF INTEGRA GRAFT RIGHT FOOT ;  Surgeon: Zelalem Patterson DPM;  Location: [address] [place] [address];  Service: [facility] THUMB Right     right tip reconnected    SKIN GRAFT      right thumb    TOE AMPUTATION      left foot all 5 toes removed    TOE AMPUTATION Right 2/2/2021    Procedure: Right partial 5th ray amputation;  Surgeon: Parish Melgar DPM;  Location:  MAIN OR;  Service: Podiatry    TOE OSTEOTOMY Right 6/26/2020    Procedure: exostosis of right big toe;  Surgeon: Cody Ramirez DPM;  Location: [address] [place] [address];  Service: Podiatry    TRIGGER FINGER RELEASE      bilateral hands    VEIN LIGATION      right     Social History   Social History     Substance and Sexual Activity   Alcohol Use Yes    Comment: occasional     Social History     Substance and Sexual Activity   Drug Use Never     Social History     Tobacco Use   Smoking Status Never Smoker   Smokeless Tobacco Never Used     Family History:   Family History   Problem Relation Age of Onset    Arthritis Mother     Cancer Father         colon    Alzheimer's disease Father        Meds/Allergies   Current Outpatient Medications   Medication Sig Dispense Refill    atorvastatin (LIPITOR) 40 mg tablet Take 1 tablet (40 mg total) by mouth daily at bedtime 90 tablet 1    Blood Glucose Monitoring Suppl (OneTouch Verio) w/Device KIT by Does not apply route 2 (two) times a day Dx E11 9 1 kit 1    clopidogrel (PLAVIX) 75 mg tablet Take 1 tablet (75 mg total) by mouth daily 90 tablet 0    gabapentin (NEURONTIN) 300 mg capsule Take 1 capsule (300 mg total) by mouth 3 (three) times a day 270 capsule 3    glucose blood (OneTouch Verio) test strip Use as instructed qid Dx E11 9 400 each 3    lisinopril (ZESTRIL) 10 mg tablet Take 1 tablet (10 mg total) by mouth daily 90 tablet 1    oxyCODONE-acetaminophen (PERCOCET) 5-325 mg per tablet Take 1 tablet by mouth every 6 (six) hours as needed for moderate painMax Daily Amount: 4 tablets 120 tablet 0    pantoprazole (PROTONIX) 40 mg tablet Take 1 tablet (40 mg total) by mouth daily 90 tablet 1    rivaroxaban (Xarelto) 2 5 mg tablet Take 1 tablet (2 5 mg total) by mouth daily with breakfast Last dose 6/21/20 30 tablet 2    UltiCare Insulin Syringe 31G X 5/16" 1 ML MISC Inject under the skin as needed (for injection) 100 each 1    COVID-19 mRNA Virus Vaccine (MODERNA COVID-19 VACCINE IM) Inject into a muscle (Patient not taking: Reported on 7/7/2021)      halobetasol (ULTRAVATE) 0 05 % cream Apply topically 2 (two) times a day (Patient not taking: Reported on 7/2/2021) 50 g 5    HYDROcodone-acetaminophen (NORCO) 5-325 mg per tablet Take 1 tablet by mouth every 6 (six) hours as needed for painMax Daily Amount: 4 tablets (Patient not taking: Reported on 7/2/2021) 20 tablet 0    insulin NPH-insulin regular (NovoLIN 70/30) 100 units/mL subcutaneous injection Inject 20 units with breakfast and 15 units with dinner 10 mL 3    Lancets (OneTouch Delica Plus [id number]) MISC 1 Units by Does not apply route 2 (two) times a day Dx E11 9 (Patient not taking: Reported on 7/2/2021) 100 each 2    metFORMIN (GLUCOPHAGE-XR) 500 mg 24 hr tablet Take 1 tablet (500 mg total) by mouth daily with dinner 30 tablet 4    Nitro-Bid 2 % ointment  (Patient not taking: Reported on 7/2/2021)      sodium hypochlorite (DAKIN'S QUARTER-STRENGTH) Irrigate with 1 application as directed daily Apply with dry dressing  (Patient not taking: Reported on 7/2/2021) 473 mL 0     No current facility-administered medications for this visit  Allergies   Allergen Reactions    Shellfish-Derived Products - Food Allergy Anaphylaxis     Throat closes    Sulfamethoxazole-Trimethoprim Rash       Objective   Vitals: Blood pressure 142/80, pulse 84, temperature 97 7 °F (36 5 °C)  Physical Exam  Vitals reviewed  Constitutional:       General: He is not in acute distress  Appearance: He is well-developed  He is not diaphoretic  HENT:      Head: Normocephalic and atraumatic  Eyes:      General:         Right eye: No discharge  Left eye: No discharge  Conjunctiva/sclera: Conjunctivae normal       Pupils: Pupils are equal, round, and reactive to light  Neck:      Thyroid: No thyromegaly  Trachea: No tracheal deviation  Cardiovascular:      Rate and Rhythm: Normal rate and regular rhythm  Heart sounds: Normal heart sounds  No murmur heard  No friction rub  Pulmonary:      Effort: Pulmonary effort is normal  No respiratory distress  Breath sounds: Normal breath sounds  No wheezing or rales  Chest:      Chest wall: No tenderness  Abdominal:      General: Bowel sounds are normal  There is no distension  Palpations: Abdomen is soft  Tenderness: There is no abdominal tenderness  Musculoskeletal:         General: Deformity (S/p all toe amputation, ) present  No swelling or tenderness  Normal range of motion  Cervical back: Normal range of motion and neck supple  Lymphadenopathy:      Cervical: No cervical adenopathy  Skin:     General: Skin is warm and dry  Coloration: Skin is not pale  Findings: No erythema or rash  Comments: Varicose veins present   Neurological:      General: No focal deficit present  Mental Status: He is alert and oriented to person, place, and time  Motor: No abnormal muscle tone  Coordination: Coordination normal       Deep Tendon Reflexes: Reflexes are normal and symmetric  Reflexes normal    Psychiatric:         Mood and Affect: Mood normal          Behavior: Behavior normal          The history was obtained from the review of the chart, patient      Lab Results:   Lab Results   Component Value Date/Time    Hemoglobin A1C 8 9 (H) 06/30/2021 09:36 AM    Hemoglobin A1C 8 7 (H) 01/29/2021 05:59 AM    WBC 6 75 06/30/2021 09:36 AM    WBC 5 87 03/25/2021 11:47 AM    WBC 7 99 02/04/2021 05:58 AM    Hemoglobin 14 7 06/30/2021 09:36 AM    Hemoglobin 15 3 03/25/2021 11:47 AM    Hemoglobin 13 6 02/04/2021 05:58 AM    Hematocrit 45 2 06/30/2021 09:36 AM    Hematocrit 46 6 03/25/2021 11:47 AM    Hematocrit 41 5 02/04/2021 05:58 AM    MCV 99 (H) 06/30/2021 09:36 AM    MCV 97 03/25/2021 11:47 AM    MCV 96 02/04/2021 05:58 AM    Platelets 143 25/72/5667 09:36 AM    Platelets 117 (L) 88/50/4698 11:47 AM    Platelets 600 48/09/9079 05:58 AM    BUN 18 06/30/2021 09:36 AM    BUN 12 03/25/2021 11:47 AM    BUN 14 02/04/2021 05:58 AM    Potassium 4 5 06/30/2021 09:36 AM    Potassium 4 4 03/25/2021 11:47 AM    Potassium 3 7 02/04/2021 05:58 AM    Chloride 104 06/30/2021 09:36 AM    Chloride 103 03/25/2021 11:47 AM    Chloride 107 02/04/2021 05:58 AM    CO2 25 06/30/2021 09:36 AM    CO2 30 03/25/2021 11:47 AM    CO2 28 02/04/2021 05:58 AM    Creatinine 1 09 06/30/2021 09:36 AM    Creatinine 1 03 03/25/2021 11:47 AM    Creatinine 0 73 02/04/2021 05:58 AM    AST 21 06/30/2021 09:36 AM    AST 10 12/11/2020 09:07 AM    ALT 40 06/30/2021 09:36 AM    ALT 45 12/11/2020 09:07 AM    Albumin 3 6 06/30/2021 09:36 AM    Albumin 3 9 12/11/2020 09:07 AM    HDL, Direct 32 (L) 06/30/2021 09:36 AM    Triglycerides 362 (H) 06/30/2021 09:36 AM           Imaging Studies: I have personally reviewed pertinent reports  Portions of the record may have been created with voice recognition software  Occasional wrong word or "sound a like" substitutions may have occurred due to the inherent limitations of voice recognition software  Read the chart carefully and recognize, using context, where substitutions have occurred

## 2021-07-09 NOTE — TELEPHONE ENCOUNTER
Spoke to patient wife to remind them that 7-11-21 will be patients last dose of Xarelto prior to his surgery on 7-14-21 with Dr Alexis MC

## 2021-07-13 DIAGNOSIS — L30.9 DERMATITIS: ICD-10-CM

## 2021-07-14 ENCOUNTER — HOSPITAL ENCOUNTER (INPATIENT)
Facility: HOSPITAL | Age: 66
LOS: 6 days | Discharge: HOME HEALTH | DRG: 240 | End: 2021-07-20
Attending: SURGERY | Admitting: SURGERY
Payer: COMMERCIAL

## 2021-07-14 ENCOUNTER — ANESTHESIA (OUTPATIENT)
Dept: PERIOP | Facility: HOSPITAL | Age: 66
DRG: 240 | End: 2021-07-14
Payer: COMMERCIAL

## 2021-07-14 ENCOUNTER — APPOINTMENT (OUTPATIENT)
Dept: RADIOLOGY | Facility: HOSPITAL | Age: 66
DRG: 240 | End: 2021-07-14
Payer: COMMERCIAL

## 2021-07-14 DIAGNOSIS — E11.51 TYPE 2 DIABETES MELLITUS WITH DIABETIC PERIPHERAL ANGIOPATHY WITHOUT GANGRENE, UNSPECIFIED WHETHER LONG TERM INSULIN USE (HCC): ICD-10-CM

## 2021-07-14 DIAGNOSIS — Z89.512 STATUS POST BELOW-KNEE AMPUTATION OF LEFT LOWER EXTREMITY (HCC): ICD-10-CM

## 2021-07-14 DIAGNOSIS — E11.621 TYPE 2 DIABETES MELLITUS WITH FOOT ULCER, WITH LONG-TERM CURRENT USE OF INSULIN (HCC): ICD-10-CM

## 2021-07-14 DIAGNOSIS — E11.51 TYPE 2 DIABETES MELLITUS WITH DIABETIC PERIPHERAL ANGIOPATHY WITHOUT GANGRENE, WITH LONG-TERM CURRENT USE OF INSULIN (HCC): ICD-10-CM

## 2021-07-14 DIAGNOSIS — L97.509 TYPE 2 DIABETES MELLITUS WITH FOOT ULCER, WITH LONG-TERM CURRENT USE OF INSULIN (HCC): ICD-10-CM

## 2021-07-14 DIAGNOSIS — I70.229 CRITICAL LOWER LIMB ISCHEMIA (HCC): ICD-10-CM

## 2021-07-14 DIAGNOSIS — I73.9 PAD (PERIPHERAL ARTERY DISEASE) (HCC): ICD-10-CM

## 2021-07-14 DIAGNOSIS — Z79.4 TYPE 2 DIABETES MELLITUS WITH DIABETIC PERIPHERAL ANGIOPATHY WITHOUT GANGRENE, WITH LONG-TERM CURRENT USE OF INSULIN (HCC): ICD-10-CM

## 2021-07-14 DIAGNOSIS — Z89.512 S/P BKA (BELOW KNEE AMPUTATION) UNILATERAL, LEFT (HCC): Primary | ICD-10-CM

## 2021-07-14 DIAGNOSIS — Z79.4 TYPE 2 DIABETES MELLITUS WITH FOOT ULCER, WITH LONG-TERM CURRENT USE OF INSULIN (HCC): ICD-10-CM

## 2021-07-14 LAB
ABO GROUP BLD: NORMAL
BASE EXCESS BLDA CALC-SCNC: -3 MMOL/L (ref -2–3)
CA-I BLD-SCNC: 1.1 MMOL/L (ref 1.12–1.32)
GLUCOSE SERPL-MCNC: 121 MG/DL (ref 65–140)
GLUCOSE SERPL-MCNC: 170 MG/DL (ref 65–140)
GLUCOSE SERPL-MCNC: 172 MG/DL (ref 65–140)
GLUCOSE SERPL-MCNC: 216 MG/DL (ref 65–140)
GLUCOSE SERPL-MCNC: 229 MG/DL (ref 65–140)
HCO3 BLDA-SCNC: 22.7 MMOL/L (ref 22–28)
HCT VFR BLD CALC: 36 % (ref 36.5–49.3)
HGB BLDA-MCNC: 12.2 G/DL (ref 12–17)
KCT BLD-ACNC: 178 SEC (ref 89–137)
KCT BLD-ACNC: 189 SEC (ref 89–137)
KCT BLD-ACNC: 201 SEC (ref 89–137)
PCO2 BLD: 24 MMOL/L (ref 21–32)
PCO2 BLD: 41.9 MM HG (ref 36–44)
PH BLD: 7.34 [PH] (ref 7.35–7.45)
PO2 BLD: 161 MM HG (ref 75–129)
POTASSIUM BLD-SCNC: 4.5 MMOL/L (ref 3.5–5.3)
RH BLD: POSITIVE
SAO2 % BLD FROM PO2: 99 % (ref 60–85)
SODIUM BLD-SCNC: 139 MMOL/L (ref 136–145)
SPECIMEN SOURCE: ABNORMAL

## 2021-07-14 PROCEDURE — 75710 ARTERY X-RAYS ARM/LEG: CPT

## 2021-07-14 PROCEDURE — 86923 COMPATIBILITY TEST ELECTRIC: CPT

## 2021-07-14 PROCEDURE — 85014 HEMATOCRIT: CPT

## 2021-07-14 PROCEDURE — 35566 ART BYP FEM-ANT-POST TIB/PRL: CPT | Performed by: SURGERY

## 2021-07-14 PROCEDURE — C1894 INTRO/SHEATH, NON-LASER: HCPCS | Performed by: SURGERY

## 2021-07-14 PROCEDURE — C1769 GUIDE WIRE: HCPCS | Performed by: SURGERY

## 2021-07-14 PROCEDURE — 82330 ASSAY OF CALCIUM: CPT

## 2021-07-14 PROCEDURE — 82948 REAGENT STRIP/BLOOD GLUCOSE: CPT

## 2021-07-14 PROCEDURE — 82803 BLOOD GASES ANY COMBINATION: CPT

## 2021-07-14 PROCEDURE — 84132 ASSAY OF SERUM POTASSIUM: CPT

## 2021-07-14 PROCEDURE — 84295 ASSAY OF SERUM SODIUM: CPT

## 2021-07-14 PROCEDURE — 04CU0ZZ EXTIRPATION OF MATTER FROM LEFT PERONEAL ARTERY, OPEN APPROACH: ICD-10-PCS | Performed by: SURGERY

## 2021-07-14 PROCEDURE — 85347 COAGULATION TIME ACTIVATED: CPT

## 2021-07-14 PROCEDURE — 041L09M BYPASS LEFT FEMORAL ARTERY TO PERONEAL ARTERY WITH AUTOLOGOUS VENOUS TISSUE, OPEN APPROACH: ICD-10-PCS | Performed by: SURGERY

## 2021-07-14 PROCEDURE — 99024 POSTOP FOLLOW-UP VISIT: CPT | Performed by: SURGERY

## 2021-07-14 PROCEDURE — 06BQ0ZZ EXCISION OF LEFT SAPHENOUS VEIN, OPEN APPROACH: ICD-10-PCS | Performed by: SURGERY

## 2021-07-14 PROCEDURE — C1725 CATH, TRANSLUMIN NON-LASER: HCPCS | Performed by: SURGERY

## 2021-07-14 PROCEDURE — 82947 ASSAY GLUCOSE BLOOD QUANT: CPT

## 2021-07-14 RX ORDER — MIDAZOLAM HYDROCHLORIDE 2 MG/2ML
INJECTION, SOLUTION INTRAMUSCULAR; INTRAVENOUS AS NEEDED
Status: DISCONTINUED | OUTPATIENT
Start: 2021-07-14 | End: 2021-07-14

## 2021-07-14 RX ORDER — HYDROMORPHONE HCL/PF 1 MG/ML
SYRINGE (ML) INJECTION AS NEEDED
Status: DISCONTINUED | OUTPATIENT
Start: 2021-07-14 | End: 2021-07-14

## 2021-07-14 RX ORDER — ONDANSETRON 2 MG/ML
4 INJECTION INTRAMUSCULAR; INTRAVENOUS EVERY 6 HOURS PRN
Status: DISCONTINUED | OUTPATIENT
Start: 2021-07-14 | End: 2021-07-20 | Stop reason: HOSPADM

## 2021-07-14 RX ORDER — HYDROMORPHONE HCL/PF 1 MG/ML
0.4 SYRINGE (ML) INJECTION
Status: DISCONTINUED | OUTPATIENT
Start: 2021-07-14 | End: 2021-07-14 | Stop reason: HOSPADM

## 2021-07-14 RX ORDER — DEXAMETHASONE SODIUM PHOSPHATE 10 MG/ML
INJECTION, SOLUTION INTRAMUSCULAR; INTRAVENOUS AS NEEDED
Status: DISCONTINUED | OUTPATIENT
Start: 2021-07-14 | End: 2021-07-14

## 2021-07-14 RX ORDER — ACETAMINOPHEN 325 MG/1
650 TABLET ORAL EVERY 4 HOURS PRN
Status: DISCONTINUED | OUTPATIENT
Start: 2021-07-14 | End: 2021-07-20 | Stop reason: HOSPADM

## 2021-07-14 RX ORDER — DOCUSATE SODIUM 100 MG/1
100 CAPSULE, LIQUID FILLED ORAL 2 TIMES DAILY
Status: DISCONTINUED | OUTPATIENT
Start: 2021-07-14 | End: 2021-07-20 | Stop reason: HOSPADM

## 2021-07-14 RX ORDER — LABETALOL 20 MG/4 ML (5 MG/ML) INTRAVENOUS SYRINGE
10 EVERY 4 HOURS PRN
Status: DISCONTINUED | OUTPATIENT
Start: 2021-07-14 | End: 2021-07-20 | Stop reason: HOSPADM

## 2021-07-14 RX ORDER — PANTOPRAZOLE SODIUM 40 MG/1
40 TABLET, DELAYED RELEASE ORAL
Status: DISCONTINUED | OUTPATIENT
Start: 2021-07-15 | End: 2021-07-20 | Stop reason: HOSPADM

## 2021-07-14 RX ORDER — ONDANSETRON 2 MG/ML
INJECTION INTRAMUSCULAR; INTRAVENOUS AS NEEDED
Status: DISCONTINUED | OUTPATIENT
Start: 2021-07-14 | End: 2021-07-14

## 2021-07-14 RX ORDER — HYDROMORPHONE HCL/PF 1 MG/ML
0.5 SYRINGE (ML) INJECTION
Status: DISCONTINUED | OUTPATIENT
Start: 2021-07-14 | End: 2021-07-17

## 2021-07-14 RX ORDER — HEPARIN SODIUM 1000 [USP'U]/ML
INJECTION, SOLUTION INTRAVENOUS; SUBCUTANEOUS AS NEEDED
Status: DISCONTINUED | OUTPATIENT
Start: 2021-07-14 | End: 2021-07-14

## 2021-07-14 RX ORDER — HEPARIN SODIUM 5000 [USP'U]/ML
5000 INJECTION, SOLUTION INTRAVENOUS; SUBCUTANEOUS EVERY 8 HOURS SCHEDULED
Status: DISCONTINUED | OUTPATIENT
Start: 2021-07-14 | End: 2021-07-19

## 2021-07-14 RX ORDER — ATORVASTATIN CALCIUM 40 MG/1
40 TABLET, FILM COATED ORAL
Status: DISCONTINUED | OUTPATIENT
Start: 2021-07-14 | End: 2021-07-20 | Stop reason: HOSPADM

## 2021-07-14 RX ORDER — INSULIN GLARGINE 100 [IU]/ML
17 INJECTION, SOLUTION SUBCUTANEOUS
Status: DISCONTINUED | OUTPATIENT
Start: 2021-07-14 | End: 2021-07-15

## 2021-07-14 RX ORDER — HYDRALAZINE HYDROCHLORIDE 20 MG/ML
10 INJECTION INTRAMUSCULAR; INTRAVENOUS EVERY 4 HOURS PRN
Status: DISCONTINUED | OUTPATIENT
Start: 2021-07-14 | End: 2021-07-20 | Stop reason: HOSPADM

## 2021-07-14 RX ORDER — GLYCOPYRROLATE 0.2 MG/ML
INJECTION INTRAMUSCULAR; INTRAVENOUS AS NEEDED
Status: DISCONTINUED | OUTPATIENT
Start: 2021-07-14 | End: 2021-07-14

## 2021-07-14 RX ORDER — OXYCODONE HYDROCHLORIDE 10 MG/1
10 TABLET ORAL EVERY 4 HOURS PRN
Status: DISCONTINUED | OUTPATIENT
Start: 2021-07-14 | End: 2021-07-17

## 2021-07-14 RX ORDER — SODIUM CHLORIDE, SODIUM LACTATE, POTASSIUM CHLORIDE, CALCIUM CHLORIDE 600; 310; 30; 20 MG/100ML; MG/100ML; MG/100ML; MG/100ML
100 INJECTION, SOLUTION INTRAVENOUS CONTINUOUS
Status: DISCONTINUED | OUTPATIENT
Start: 2021-07-14 | End: 2021-07-15

## 2021-07-14 RX ORDER — NEOSTIGMINE METHYLSULFATE 1 MG/ML
INJECTION INTRAVENOUS AS NEEDED
Status: DISCONTINUED | OUTPATIENT
Start: 2021-07-14 | End: 2021-07-14

## 2021-07-14 RX ORDER — CEFAZOLIN SODIUM 1 G/3ML
INJECTION, POWDER, FOR SOLUTION INTRAMUSCULAR; INTRAVENOUS AS NEEDED
Status: DISCONTINUED | OUTPATIENT
Start: 2021-07-14 | End: 2021-07-14

## 2021-07-14 RX ORDER — LABETALOL 20 MG/4 ML (5 MG/ML) INTRAVENOUS SYRINGE
AS NEEDED
Status: DISCONTINUED | OUTPATIENT
Start: 2021-07-14 | End: 2021-07-14

## 2021-07-14 RX ORDER — SODIUM CHLORIDE, SODIUM LACTATE, POTASSIUM CHLORIDE, CALCIUM CHLORIDE 600; 310; 30; 20 MG/100ML; MG/100ML; MG/100ML; MG/100ML
50 INJECTION, SOLUTION INTRAVENOUS CONTINUOUS
Status: DISCONTINUED | OUTPATIENT
Start: 2021-07-14 | End: 2021-07-14

## 2021-07-14 RX ORDER — CHLORHEXIDINE GLUCONATE 0.12 MG/ML
15 RINSE ORAL ONCE
Status: COMPLETED | OUTPATIENT
Start: 2021-07-14 | End: 2021-07-14

## 2021-07-14 RX ORDER — GABAPENTIN 300 MG/1
300 CAPSULE ORAL 3 TIMES DAILY
Status: DISCONTINUED | OUTPATIENT
Start: 2021-07-14 | End: 2021-07-20 | Stop reason: HOSPADM

## 2021-07-14 RX ORDER — FENTANYL CITRATE 50 UG/ML
INJECTION, SOLUTION INTRAMUSCULAR; INTRAVENOUS AS NEEDED
Status: DISCONTINUED | OUTPATIENT
Start: 2021-07-14 | End: 2021-07-14

## 2021-07-14 RX ORDER — ROCURONIUM BROMIDE 10 MG/ML
INJECTION, SOLUTION INTRAVENOUS AS NEEDED
Status: DISCONTINUED | OUTPATIENT
Start: 2021-07-14 | End: 2021-07-14

## 2021-07-14 RX ORDER — OXYCODONE HYDROCHLORIDE 5 MG/1
5 TABLET ORAL EVERY 4 HOURS PRN
Status: DISCONTINUED | OUTPATIENT
Start: 2021-07-14 | End: 2021-07-17

## 2021-07-14 RX ORDER — ONDANSETRON 2 MG/ML
4 INJECTION INTRAMUSCULAR; INTRAVENOUS ONCE AS NEEDED
Status: DISCONTINUED | OUTPATIENT
Start: 2021-07-14 | End: 2021-07-14 | Stop reason: HOSPADM

## 2021-07-14 RX ORDER — LIDOCAINE HYDROCHLORIDE 10 MG/ML
0.5 INJECTION, SOLUTION EPIDURAL; INFILTRATION; INTRACAUDAL; PERINEURAL ONCE AS NEEDED
Status: DISCONTINUED | OUTPATIENT
Start: 2021-07-14 | End: 2021-07-14 | Stop reason: HOSPADM

## 2021-07-14 RX ORDER — CLOPIDOGREL BISULFATE 75 MG/1
75 TABLET ORAL DAILY
Status: DISCONTINUED | OUTPATIENT
Start: 2021-07-15 | End: 2021-07-20 | Stop reason: HOSPADM

## 2021-07-14 RX ORDER — PROTAMINE SULFATE 10 MG/ML
INJECTION, SOLUTION INTRAVENOUS AS NEEDED
Status: DISCONTINUED | OUTPATIENT
Start: 2021-07-14 | End: 2021-07-14

## 2021-07-14 RX ORDER — PROPOFOL 10 MG/ML
INJECTION, EMULSION INTRAVENOUS AS NEEDED
Status: DISCONTINUED | OUTPATIENT
Start: 2021-07-14 | End: 2021-07-14

## 2021-07-14 RX ORDER — SODIUM CHLORIDE 9 MG/ML
INJECTION, SOLUTION INTRAVENOUS CONTINUOUS PRN
Status: DISCONTINUED | OUTPATIENT
Start: 2021-07-14 | End: 2021-07-14

## 2021-07-14 RX ADMIN — HEPARIN SODIUM 5000 UNITS: 5000 INJECTION INTRAVENOUS; SUBCUTANEOUS at 21:59

## 2021-07-14 RX ADMIN — CHLORHEXIDINE GLUCONATE 15 ML: 1.2 SOLUTION ORAL at 09:19

## 2021-07-14 RX ADMIN — FENTANYL CITRATE 75 MCG: 50 INJECTION INTRAMUSCULAR; INTRAVENOUS at 11:11

## 2021-07-14 RX ADMIN — PROPOFOL 70 MG: 10 INJECTION, EMULSION INTRAVENOUS at 11:11

## 2021-07-14 RX ADMIN — SODIUM CHLORIDE 3 UNITS/HR: 9 INJECTION, SOLUTION INTRAVENOUS at 16:44

## 2021-07-14 RX ADMIN — PHENYLEPHRINE HYDROCHLORIDE 50 MCG/MIN: 10 INJECTION INTRAVENOUS at 10:03

## 2021-07-14 RX ADMIN — PROPOFOL 130 MG: 10 INJECTION, EMULSION INTRAVENOUS at 09:40

## 2021-07-14 RX ADMIN — HYDROMORPHONE HYDROCHLORIDE 0.4 MG: 1 INJECTION, SOLUTION INTRAMUSCULAR; INTRAVENOUS; SUBCUTANEOUS at 18:15

## 2021-07-14 RX ADMIN — HYDROMORPHONE HYDROCHLORIDE 0.4 MG: 1 INJECTION, SOLUTION INTRAMUSCULAR; INTRAVENOUS; SUBCUTANEOUS at 17:38

## 2021-07-14 RX ADMIN — GLYCOPYRROLATE 0.4 MG: 0.2 INJECTION, SOLUTION INTRAMUSCULAR; INTRAVENOUS at 16:57

## 2021-07-14 RX ADMIN — CEFAZOLIN SODIUM 2000 MG: 1 INJECTION, POWDER, FOR SOLUTION INTRAMUSCULAR; INTRAVENOUS at 14:17

## 2021-07-14 RX ADMIN — SODIUM CHLORIDE, SODIUM LACTATE, POTASSIUM CHLORIDE, AND CALCIUM CHLORIDE 100 ML/HR: .6; .31; .03; .02 INJECTION, SOLUTION INTRAVENOUS at 18:52

## 2021-07-14 RX ADMIN — PROTAMINE SULFATE 30 MG: 10 INJECTION, SOLUTION INTRAVENOUS at 16:55

## 2021-07-14 RX ADMIN — FENTANYL CITRATE 100 MCG: 50 INJECTION INTRAMUSCULAR; INTRAVENOUS at 09:40

## 2021-07-14 RX ADMIN — GABAPENTIN 300 MG: 300 CAPSULE ORAL at 20:34

## 2021-07-14 RX ADMIN — ROCURONIUM BROMIDE 50 MG: 50 INJECTION, SOLUTION INTRAVENOUS at 09:40

## 2021-07-14 RX ADMIN — LIDOCAINE HYDROCHLORIDE 100 MG: 20 INJECTION, SOLUTION INTRAVENOUS at 09:40

## 2021-07-14 RX ADMIN — LABETALOL 20 MG/4 ML (5 MG/ML) INTRAVENOUS SYRINGE 5 MG: at 10:42

## 2021-07-14 RX ADMIN — CEFAZOLIN SODIUM 2000 MG: 1 INJECTION, POWDER, FOR SOLUTION INTRAMUSCULAR; INTRAVENOUS at 10:18

## 2021-07-14 RX ADMIN — OXYCODONE HYDROCHLORIDE 10 MG: 10 TABLET ORAL at 20:34

## 2021-07-14 RX ADMIN — INSULIN GLARGINE 17 UNITS: 100 INJECTION, SOLUTION SUBCUTANEOUS at 21:59

## 2021-07-14 RX ADMIN — HEPARIN SODIUM 2000 UNITS: 1000 INJECTION INTRAVENOUS; SUBCUTANEOUS at 14:59

## 2021-07-14 RX ADMIN — HEPARIN SODIUM 2000 UNITS: 1000 INJECTION INTRAVENOUS; SUBCUTANEOUS at 14:47

## 2021-07-14 RX ADMIN — SODIUM CHLORIDE, SODIUM LACTATE, POTASSIUM CHLORIDE, AND CALCIUM CHLORIDE: .6; .31; .03; .02 INJECTION, SOLUTION INTRAVENOUS at 16:59

## 2021-07-14 RX ADMIN — HYDROMORPHONE HYDROCHLORIDE 0.5 MG: 1 INJECTION, SOLUTION INTRAMUSCULAR; INTRAVENOUS; SUBCUTANEOUS at 12:14

## 2021-07-14 RX ADMIN — HYDROMORPHONE HYDROCHLORIDE 0.4 MG: 1 INJECTION, SOLUTION INTRAMUSCULAR; INTRAVENOUS; SUBCUTANEOUS at 18:02

## 2021-07-14 RX ADMIN — DOCUSATE SODIUM 100 MG: 100 CAPSULE, LIQUID FILLED ORAL at 20:34

## 2021-07-14 RX ADMIN — HEPARIN SODIUM 2000 UNITS: 1000 INJECTION INTRAVENOUS; SUBCUTANEOUS at 15:51

## 2021-07-14 RX ADMIN — ATORVASTATIN CALCIUM 40 MG: 40 TABLET, FILM COATED ORAL at 21:59

## 2021-07-14 RX ADMIN — FENTANYL CITRATE 25 MCG: 50 INJECTION INTRAMUSCULAR; INTRAVENOUS at 10:44

## 2021-07-14 RX ADMIN — SODIUM CHLORIDE: 0.9 INJECTION, SOLUTION INTRAVENOUS at 13:36

## 2021-07-14 RX ADMIN — NEOSTIGMINE METHYLSULFATE 3 MG: 1 INJECTION INTRAVENOUS at 16:57

## 2021-07-14 RX ADMIN — MIDAZOLAM 2 MG: 1 INJECTION INTRAMUSCULAR; INTRAVENOUS at 09:31

## 2021-07-14 RX ADMIN — RIVAROXABAN 2.5 MG: 2.5 TABLET, FILM COATED ORAL at 20:34

## 2021-07-14 RX ADMIN — SODIUM CHLORIDE: 0.9 INJECTION, SOLUTION INTRAVENOUS at 09:45

## 2021-07-14 RX ADMIN — ROCURONIUM BROMIDE 20 MG: 50 INJECTION, SOLUTION INTRAVENOUS at 11:58

## 2021-07-14 RX ADMIN — SODIUM CHLORIDE, SODIUM LACTATE, POTASSIUM CHLORIDE, AND CALCIUM CHLORIDE 50 ML/HR: .6; .31; .03; .02 INJECTION, SOLUTION INTRAVENOUS at 09:09

## 2021-07-14 RX ADMIN — DEXAMETHASONE SODIUM PHOSPHATE 4 MG: 10 INJECTION, SOLUTION INTRAMUSCULAR; INTRAVENOUS at 11:01

## 2021-07-14 RX ADMIN — HEPARIN SODIUM 8000 UNITS: 1000 INJECTION INTRAVENOUS; SUBCUTANEOUS at 13:33

## 2021-07-14 RX ADMIN — HYDROMORPHONE HYDROCHLORIDE 0.4 MG: 1 INJECTION, SOLUTION INTRAMUSCULAR; INTRAVENOUS; SUBCUTANEOUS at 17:28

## 2021-07-14 RX ADMIN — HYDROMORPHONE HYDROCHLORIDE 0.4 MG: 1 INJECTION, SOLUTION INTRAMUSCULAR; INTRAVENOUS; SUBCUTANEOUS at 17:52

## 2021-07-14 RX ADMIN — ONDANSETRON 4 MG: 2 INJECTION INTRAMUSCULAR; INTRAVENOUS at 17:00

## 2021-07-14 RX ADMIN — HYDROMORPHONE HYDROCHLORIDE 0.5 MG: 1 INJECTION, SOLUTION INTRAMUSCULAR; INTRAVENOUS; SUBCUTANEOUS at 14:35

## 2021-07-14 NOTE — ANESTHESIA PREPROCEDURE EVALUATION
Procedure:  BYPASS FEMORAL-TIBIAL; femoral-peroneal artery bypass with GSV vs prosthetic (Left Leg Upper)    Relevant Problems   CARDIO   (+) Essential hypertension   (+) Mixed hyperlipidemia   (+) Type 2 diabetes mellitus with diabetic peripheral angiopathy without gangrene (HCC)      ENDO   (+) Poorly controlled type 2 diabetes mellitus (HCC)   (+) Type 2 diabetes mellitus with diabetic peripheral angiopathy without gangrene (HCC)      GI/HEPATIC   (+) Gastroesophageal reflux disease without esophagitis      HEMATOLOGY   (+) Platelets decreased (HCC)      NEURO/PSYCH   (+) Diabetic polyneuropathy associated with type 2 diabetes mellitus [facility])        Physical Exam    Airway       Dental   upper dentures,     Cardiovascular      Pulmonary      Other Findings        6/24/21 Regadenoson Stress:  SUMMARY:  -  Stress results: There was no chest pain during stress  -  ECG conclusions: The stress ECG was negative for ischemia and normal   -  Perfusion imaging: There was a small, mildly severe, fixed myocardial perfusion defect of the inferior wall due to diaphragm attenuation   -  Gated SPECT: The calculated left ventricular ejection fraction was 66 %  There was no left ventricular regional abnormality      IMPRESSIONS: Normal study after pharmacologic vasodilation without reproduction of symptoms  There was image artifact, without diagnostic evidence for perfusion abnormality  SUMMARY:  -  Stress results: There was no chest pain during stress  -  ECG conclusions: The stress ECG was negative for ischemia and normal   -  Perfusion imaging: There was a small, mildly severe, fixed myocardial perfusion defect of the inferior wall due to diaphragm attenuation   -  Gated SPECT: The calculated left ventricular ejection fraction was 66 %  There was no left ventricular regional abnormality      IMPRESSIONS: Normal study after pharmacologic vasodilation without reproduction of symptoms   There was image artifact, without diagnostic evidence for perfusion abnormality  Anesthesia Plan  ASA Score- 3     Anesthesia Type- general with ASA Monitors  Additional Monitors: arterial line  Airway Plan: ETT  Plan Factors-    Chart reviewed  EKG reviewed  Existing labs reviewed  Patient summary reviewed  Patient is not a current smoker  Patient did not smoke on day of surgery  Induction- intravenous  Postoperative Plan- Plan for postoperative opioid use  Planned trial extubation    Informed Consent- Anesthetic plan and risks discussed with patient

## 2021-07-14 NOTE — H&P
Assessment/Plan:     PAD (peripheral artery disease) ([facility] [address] )  70yo M with PMHx PAD, DM with previous Left TMA and active lateral foot wound for the past 6 months  Reports that he has undergone multiple angiograms over the last 6 months with Dr Kenan Vaguhn with persistence of his wound  Also sees podiatry and wound care       Adenike Parks on 1/5/20 (Dr Saida Kenyon sfa stent angioplasty and arthrectomy/PTA of tibial vessels, distal AT/DP was noted to be occluded   LEADs at that time demonstrate inadequate perfusion pressure for healing with met pressure of 33mg HG and great toe pressure of 13mmHG      Underwent RLE angiogram 1/28/21 with successful recanalization of the AT/DP  Underwent 5th ray amputation during the same admission  LEADs improved with met pressure of 130mmHG (39) and GTP of 70mmHG (19)  His amp site continues to heal       Has LLE PAD also and is s/p LLE angiogram on 4/13/21, with successful recanalization of an occluded previously placed peroneal artery stent (single vessel runoff), reestablishing inline flow to the foot       Short interval LEAD however demonstrates interval occlusion of the peroneal artery  Given progressing heal ulcer with now multiple failed attempts at endovascular intervention, will proceed with bypass, Left Fem-peroneal artery  Had an extensive risks/benefits alternatives discussion  Including small caliber peroneal artery with poor outflow could result in early graft thrombosis, injury to adjacent structures, bleeding, infection, risk of anesthesia and the patient consented to proceed       Will obtain vein mapping to assess for any autogenous conduit and referral to cardiology     Bypass in upcoming weeks pending clearance  Will hold xarelto 48hours prior to intervention                 Problem List Items Addressed This Visit                 Cardiovascular and Mediastinum      [facility]per[facility] ) - Primary (Chronic)        70yo M with PMHx PAD, DM with previous Left TMA and active lateral foot wound for the past 6 months  Reports that he has undergone multiple angiograms over the last 6 months with Dr Jung Duncan with persistence of his wound  Also sees podiatry and wound care       Melecio Leong on 1/5/20 (Dr Indiana Lancaster sfa stent angioplasty and arthrectomy/PTA of tibial vessels, distal AT/DP was noted to be occluded   LEADs at that time demonstrate inadequate perfusion pressure for healing with met pressure of 33mg HG and great toe pressure of 13mmHG      Underwent RLE angiogram 1/28/21 with successful recanalization of the AT/DP  Underwent 5th ray amputation during the same admission  LEADs improved with met pressure of 130mmHG (39) and GTP of 70mmHG (19)  His amp site continues to heal       Has LLE PAD also and is s/p LLE angiogram on 4/13/21, with successful recanalization of an occluded previously placed peroneal artery stent (single vessel runoff), reestablishing inline flow to the foot       Short interval LEAD however demonstrates interval occlusion of the peroneal artery  Given progressing heal ulcer with now multiple failed attempts at endovascular intervention, will proceed with bypass, Left Fem-peroneal artery  Had an extensive risks/benefits alternatives discussion  Including small caliber peroneal artery with poor outflow could result in early graft thrombosis, injury to adjacent structures, bleeding, infection, risk of anesthesia and the patient consented to proceed       Will obtain vein mapping to assess for any autogenous conduit and referral to cardiology  Bypass in upcoming weeks pending clearance  Will hold xarelto 48hours prior to intervention                Relevant Orders      VAS lower limb vein mapping bypass graft      Ambulatory referral to Cardiology      Peripheral arteriosclerosis [facility])               Subjective:       Patient ID: Tia Lopez is a 76 y o  male  Patient presents today to review GABBY s/p LLE agram on 4/13  Patient continues to experience heel pain in location of wound and leg pain from L knee to foot  He is also unable to bear full weight on L foot, using crutches          The following portions of the patient's history were reviewed and updated as appropriate: allergies, current medications, past family history, past medical history, past social history, past surgical history and problem list      Review of Systems   Constitutional: Negative  HENT: Negative  Eyes: Negative  Respiratory: Negative  Cardiovascular: Negative  Gastrointestinal: Negative  Endocrine: Negative  Genitourinary: Negative  Musculoskeletal: Positive for gait problem  Leg pain   Skin: Negative  Allergic/Immunologic: Negative  Hematological: Bruises/bleeds easily  Psychiatric/Behavioral: Negative  I have reviewed and updated the ROS as appropriate           Objective:        /70 (BP Location: Right arm, Patient Position: Sitting)   Pulse 80   Temp 98 5 °F (36 9 °C) (Tympanic)   Ht 5' 4" (1 626 m)   Wt 85 7 kg (189 lb)   BMI 32 44 kg/m²             Physical Exam  Constitutional:       Appearance: Normal appearance  HENT:      Head: Normocephalic and atraumatic  Nose: Nose normal       Mouth/Throat:      Mouth: Mucous membranes are dry  Eyes:      Extraocular Movements: Extraocular movements intact  Pupils: Pupils are equal, round, and reactive to light  Cardiovascular:      Rate and Rhythm: Normal rate and regular rhythm  Pulses:           Dorsalis pedis pulses are 0 on the left side  Posterior tibial pulses are 0 on the left side  Heart sounds: Normal heart sounds  Comments: Weak monophasic peroneal artery signal, left  Pulmonary:      Effort: Pulmonary effort is normal       Breath sounds: Normal breath sounds  Abdominal:      General: Abdomen is flat  Palpations: Abdomen is soft  Musculoskeletal: Normal range of motion           General: No swelling or tenderness  Skin:     General: Skin is warm and dry  Capillary Refill: Capillary refill takes less than 2 seconds  Neurological:      General: No focal deficit present  Mental Status: He is alert and oriented to person, place, and time  Psychiatric:         Mood and Affect: Mood normal          Behavior: Behavior normal          Thought Content:  Thought content normal          Judgment: Judgment normal

## 2021-07-14 NOTE — OP NOTE
OPERATIVE REPORT  PATIENT NAME: Alejandro Tracey    :  1955  MRN: [id number]  Pt Location: [facility] HYBRID OR ROOM 02    SURGERY DATE: 2021    Surgeon(s) and Role:     * Linh Lan MD - Primary     * Carina Martines MD - Chares Kocher, Елена Smith MD - Assisting    Preop Diagnosis:  PAD (peripheral artery disease) ([facility] [address] ) [I73 9]    Post-Op Diagnosis Codes:     * PAD (peripheral artery disease) ([address] ) [I73 9]    Procedure(s) (LRB):  BYPASS femoral-peroneal artery bypass with  reverse GSV and balloon angioplasty peroneal artery (Left)    Specimen(s):  * No specimens in log *    Estimated Blood Loss:   300 mL    Drains:  Urethral Catheter Coude 16 Fr  (Active)   Number of days: 0       Anesthesia Type:   General    Operative Indications:  PAD (peripheral artery disease) ([facility] [address] ) [I689]  62-year-old male with history of multiple vascular lower extremity interventions bilaterally  Has critical limb ischemia of the left lower extremity with nonhealing and progressive heel ulcer, previous TMA, with risk of limb loss without revascularization  Operative Findings:  1-1 5 mm heavily calcified and diseased distal peroneal artery    Complications:   None    Procedure and Technique:  Patient was brought to the OR and placed on the table in supine position  General anesthesia was initiated  The left lower extremity was prepped and draped in circumferential fashion  Perioperative antibiotics were administered  Surgical time-out was performed  Three longitudinal incision in the mid thigh dissection was carried down to the deep fascia and the superficial femoral artery was exposed  Proximal distal control was obtained with vessel loops in Hannah fashion  Longitudinal incision was made in the distal to mid calf approximately 10-15 cm in length  Dissection was carried down to the deep fascia and the distal peroneal artery was exposed    Peroneal artery was exposed for approximately a 7 cm length and was noted to be small and heavily calcified and disease  It ranged in diameter from 1 5-1 mm, given risk of limb loss without revascularization the decision was made to proceed with performed femoral to distal bypass for limb salvage  Proximal distal control was obtained with vessel loops  During dissection of the peroneal artery the paired peroneal veins had iatrogenic injury which was repaired primarily with Prolene are hemoclips  We then turned our attention to exposing the greater saphenous vein which was marked prior to the procedure  The greater saphenous vein was exposed circumferentially and all side branches were tied with silk suture and hemoclips  We then created a subcutaneous tunnel between the 2 incisions using a Genie Brush tunneler device  Patient was then heparinized with 8000 units of heparin and ACT is were maintained above 200 throughout the critical portions of the procedure  The greater saphenous vein was ligated proximally distally using silk suture  We then reversed and and flushed with heparinized saline bleeding sites were repaired with 6-0 Prolene suture  All pressurized the anterior surface of the greater saphenous vein was more throughout its entire length  Proximal distal clamps were applied on the SFA and arteriotomy was made with an 11 blade and an end-to-side anastomosis was performed with 5-0 Prolene suture in standard fashion, prior to the completion of the anastomosis all arteries were back bled and flushed as appropriate  We then obtained proximal distal control on the peroneal artery with vessel loops under tension  Eleven blade was used to make a longitudinal arteriotomy and extended with Hannah scissors  The peroneal artery was noted to be significantly calcified throughout its entire length antegrade bleeding was noted to be extremely poor and the location of the arteriotomy there was backbleeding from the distal end    I was only able to pass a 1 mm Jefe dilator through the inner lumen of the distal peroneal artery  End-to-side anastomosis was performed with the widely spatulated gillespie using 7-0 Prolene suture  Prior to the completion of the anastomosis all arteries were back bled and flushed as appropriate  Upon release of the clamps and perfusing of the bypass graft with bleeding noted along the tunnel tract the other incision was made to identify the location of the bleeding stemming from the bypass graft there was a small hole with pulsatile bleeding that was primarily repaired with 6-0 Prolene suture  One repair suture was also required at the distal anastomosis, at the heel of the anastomosis  Bypass was noted to have a palpable pulse however Doppler insonation of the distal peroneal artery was faint  As such we obtained micropuncture access of the greater saphenous vein bypass graft and under direct visualization was able to pass a wire across the anastomosis and subsequently a 1 5 mm balloon was deployed across the distal anastomosis and into the visualized distal peroneal artery  After balloon angioplasty there was a  low resistance flow within the peroneal artery distal to our anastomosis  This was felt to be a satisfactory result  All incisions were copiously irrigated with antibiotic laden saline  Hemostasis was obtained 30 mg of protamine was administered  The saphenous vein harvest site incisions were closed with 3-0 Monocryl suture and staples to reapproximate skin  All other incisions were closed with 2-0 and 3-0 Monocryl suture  And 4-0 Monocryl suture to reapproximate the skin  Pulse was again checked within the greater saphenous vein and was noted to be palpable  His sterile dressings were applied  General anesthesia was terminated the patient was transferred to recovery in stable condition         I was present for the entire procedure and I was present for all critical portions of the procedure    Patient Disposition:  PACU     SIGNATURE: Redd Valencia MD  DATE: July 14, 2021  TIME: 5:12 PM  Vascular Quality Initiative - Infra-Inguinal Bypass      Urgency: Emergent Anesthesia: General    Side: left  Skin Prep: All 3     Graft Origin: SFA    Graft Recipient: Peroneal    Graft Vein Type: Reversed GSV    Number of Vein Segments: 1    Prosthetic: None      Groin Incision: Incision Orientation is: Vertical    Groin Closure Type: Staples  Closure Dressing is: Gauze under Tape        Total Procedure Time:   Event Time In   Procedure Start 1038   Procedure Closing 0412   Procedure Finish 8060       EBL: 300 mL    If Graft Vein: Vein [place] Incision: Skip    Vein Graft Location: Sub-cutaneous    Adjuncts:  Vein Cuff:  no Sequential Graft:no    Concomitant Proximal Ipsilateral:   PVI: no   Endarterectomy:no   Supra-inguinal Bypass: no    Completion Study:   Doppler: yes   Duplex: no    Arteriogram: no

## 2021-07-14 NOTE — ANESTHESIA PROCEDURE NOTES
Arterial Line Insertion  Performed by: Crystal Schrader CRNA  Authorized by: Thelma Lamb MD   Consent: Verbal consent obtained  Written consent obtained  Risks and benefits: risks, benefits and alternatives were discussed  Consent given by: patient  Patient consent: the patient's understanding of the procedure matches consent given  Relevant documents: relevant documents present and verified  Required items: required blood products, implants, devices, and special equipment available  Patient identity confirmed: verbally with patient and arm band  Time out: Immediately prior to procedure a "time out" was called to verify the correct patient, procedure, equipment, support staff and site/side marked as required  Preparation: Patient was prepped and draped in the usual sterile fashion    Indications: multiple ABGs and hemodynamic monitoring  Orientation:  Right  Location: radial artery  Sedation:  Patient sedated: GA     Procedure Details:  Naveen's test normal: yes  Needle gauge: 20  Seldinger technique: Seldinger technique used  Number of attempts: 5 or more    Post-procedure:  Post-procedure: dressing applied  Waveform: good waveform and waveform confirmed  Post-procedure CNS: normal and unchanged

## 2021-07-14 NOTE — INTERVAL H&P NOTE
H&P reviewed  After examining the patient I find no changes in the patients condition since the H&P had been written      Vitals:    07/14/21 0837   BP: 160/75   Pulse: 78   Resp: 16   Temp: 98 3 °F (36 8 °C)   SpO2: 96%     Abd: Soft non-tender  CV: RRR no mgr  Pulm: no distress, CTA    Plan: LLE fem-distal bypass with GSV

## 2021-07-14 NOTE — INTERVAL H&P NOTE
H&P reviewed  After examining the patient I find no changes in the patients condition since the H&P had been written  There were no vitals filed for this visit      Plan: LLE fem-distal bypass with GSV

## 2021-07-14 NOTE — ANESTHESIA POSTPROCEDURE EVALUATION
Post-Op Assessment Note    CV Status:  Stable    Pain management: adequate     Mental Status:  Alert and awake   Hydration Status:  Euvolemic   PONV Controlled:  Controlled   Airway Patency:  Patent      Post Op Vitals Reviewed: Yes      Staff: CRNA         No complications documented      BP   163/70   Temp 97 8 °F (36 6 °C) (07/14/21 1715)    Pulse 91 (07/14/21 1715)   Resp 16 (07/14/21 1715)    SpO2 98 % (07/14/21 1715)

## 2021-07-14 NOTE — PROGRESS NOTES
MD Roger Garcia and resident notified that the graft site is no longer palpable or dopplerable  Surgeons at bedside assessing patient   No further orders at this time

## 2021-07-15 ENCOUNTER — ANESTHESIA EVENT (INPATIENT)
Dept: PERIOP | Facility: HOSPITAL | Age: 66
DRG: 240 | End: 2021-07-15
Payer: COMMERCIAL

## 2021-07-15 LAB
ANION GAP SERPL CALCULATED.3IONS-SCNC: 8 MMOL/L (ref 4–13)
BUN SERPL-MCNC: 17 MG/DL (ref 5–25)
CALCIUM SERPL-MCNC: 8.2 MG/DL (ref 8.3–10.1)
CHLORIDE SERPL-SCNC: 107 MMOL/L (ref 100–108)
CO2 SERPL-SCNC: 24 MMOL/L (ref 21–32)
CREAT SERPL-MCNC: 0.79 MG/DL (ref 0.6–1.3)
ERYTHROCYTE [DISTWIDTH] IN BLOOD BY AUTOMATED COUNT: 12.8 % (ref 11.6–15.1)
GFR SERPL CREATININE-BSD FRML MDRD: 94 ML/MIN/1.73SQ M
GLUCOSE SERPL-MCNC: 193 MG/DL (ref 65–140)
GLUCOSE SERPL-MCNC: 195 MG/DL (ref 65–140)
GLUCOSE SERPL-MCNC: 201 MG/DL (ref 65–140)
GLUCOSE SERPL-MCNC: 201 MG/DL (ref 65–140)
GLUCOSE SERPL-MCNC: 209 MG/DL (ref 65–140)
GLUCOSE SERPL-MCNC: 225 MG/DL (ref 65–140)
HCT VFR BLD AUTO: 35.2 % (ref 36.5–49.3)
HGB BLD-MCNC: 11.7 G/DL (ref 12–17)
MCH RBC QN AUTO: 32.1 PG (ref 26.8–34.3)
MCHC RBC AUTO-ENTMCNC: 33.2 G/DL (ref 31.4–37.4)
MCV RBC AUTO: 97 FL (ref 82–98)
PLATELET # BLD AUTO: 153 THOUSANDS/UL (ref 149–390)
PMV BLD AUTO: 10.4 FL (ref 8.9–12.7)
POTASSIUM SERPL-SCNC: 3.9 MMOL/L (ref 3.5–5.3)
RBC # BLD AUTO: 3.64 MILLION/UL (ref 3.88–5.62)
SODIUM SERPL-SCNC: 139 MMOL/L (ref 136–145)
WBC # BLD AUTO: 8.95 THOUSAND/UL (ref 4.31–10.16)

## 2021-07-15 PROCEDURE — 85027 COMPLETE CBC AUTOMATED: CPT | Performed by: STUDENT IN AN ORGANIZED HEALTH CARE EDUCATION/TRAINING PROGRAM

## 2021-07-15 PROCEDURE — 99222 1ST HOSP IP/OBS MODERATE 55: CPT | Performed by: INTERNAL MEDICINE

## 2021-07-15 PROCEDURE — 82948 REAGENT STRIP/BLOOD GLUCOSE: CPT

## 2021-07-15 PROCEDURE — 80048 BASIC METABOLIC PNL TOTAL CA: CPT | Performed by: STUDENT IN AN ORGANIZED HEALTH CARE EDUCATION/TRAINING PROGRAM

## 2021-07-15 PROCEDURE — 99024 POSTOP FOLLOW-UP VISIT: CPT | Performed by: SURGERY

## 2021-07-15 RX ORDER — CALCIUM CARBONATE 200(500)MG
500 TABLET,CHEWABLE ORAL 3 TIMES DAILY PRN
Status: DISCONTINUED | OUTPATIENT
Start: 2021-07-15 | End: 2021-07-20 | Stop reason: HOSPADM

## 2021-07-15 RX ORDER — INSULIN GLARGINE 100 [IU]/ML
22 INJECTION, SOLUTION SUBCUTANEOUS
Status: DISCONTINUED | OUTPATIENT
Start: 2021-07-15 | End: 2021-07-16

## 2021-07-15 RX ORDER — LANOLIN ALCOHOL/MO/W.PET/CERES
3 CREAM (GRAM) TOPICAL
Status: DISCONTINUED | OUTPATIENT
Start: 2021-07-15 | End: 2021-07-20 | Stop reason: HOSPADM

## 2021-07-15 RX ADMIN — HEPARIN SODIUM 5000 UNITS: 5000 INJECTION INTRAVENOUS; SUBCUTANEOUS at 21:24

## 2021-07-15 RX ADMIN — INSULIN GLARGINE 22 UNITS: 100 INJECTION, SOLUTION SUBCUTANEOUS at 21:24

## 2021-07-15 RX ADMIN — INSULIN LISPRO 1 UNITS: 100 INJECTION, SOLUTION INTRAVENOUS; SUBCUTANEOUS at 17:22

## 2021-07-15 RX ADMIN — INSULIN LISPRO 2 UNITS: 100 INJECTION, SOLUTION INTRAVENOUS; SUBCUTANEOUS at 21:27

## 2021-07-15 RX ADMIN — OXYCODONE HYDROCHLORIDE 10 MG: 10 TABLET ORAL at 19:36

## 2021-07-15 RX ADMIN — OXYCODONE HYDROCHLORIDE 10 MG: 10 TABLET ORAL at 01:05

## 2021-07-15 RX ADMIN — ATORVASTATIN CALCIUM 40 MG: 40 TABLET, FILM COATED ORAL at 21:24

## 2021-07-15 RX ADMIN — GABAPENTIN 300 MG: 300 CAPSULE ORAL at 17:25

## 2021-07-15 RX ADMIN — GABAPENTIN 300 MG: 300 CAPSULE ORAL at 21:24

## 2021-07-15 RX ADMIN — ANTACID TABLETS 500 MG: 500 TABLET, CHEWABLE ORAL at 08:52

## 2021-07-15 RX ADMIN — INSULIN LISPRO 2 UNITS: 100 INJECTION, SOLUTION INTRAVENOUS; SUBCUTANEOUS at 12:03

## 2021-07-15 RX ADMIN — HYDROMORPHONE HYDROCHLORIDE 0.5 MG: 1 INJECTION, SOLUTION INTRAMUSCULAR; INTRAVENOUS; SUBCUTANEOUS at 04:37

## 2021-07-15 RX ADMIN — CLOPIDOGREL BISULFATE 75 MG: 75 TABLET ORAL at 08:15

## 2021-07-15 RX ADMIN — HEPARIN SODIUM 5000 UNITS: 5000 INJECTION INTRAVENOUS; SUBCUTANEOUS at 13:09

## 2021-07-15 RX ADMIN — OXYCODONE HYDROCHLORIDE 10 MG: 10 TABLET ORAL at 05:04

## 2021-07-15 RX ADMIN — HEPARIN SODIUM 5000 UNITS: 5000 INJECTION INTRAVENOUS; SUBCUTANEOUS at 05:00

## 2021-07-15 RX ADMIN — INSULIN LISPRO 2 UNITS: 100 INJECTION, SOLUTION INTRAVENOUS; SUBCUTANEOUS at 08:16

## 2021-07-15 RX ADMIN — GABAPENTIN 300 MG: 300 CAPSULE ORAL at 08:15

## 2021-07-15 RX ADMIN — HYDROMORPHONE HYDROCHLORIDE 0.5 MG: 1 INJECTION, SOLUTION INTRAMUSCULAR; INTRAVENOUS; SUBCUTANEOUS at 21:24

## 2021-07-15 RX ADMIN — OXYCODONE HYDROCHLORIDE 10 MG: 10 TABLET ORAL at 12:02

## 2021-07-15 RX ADMIN — INSULIN LISPRO 6 UNITS: 100 INJECTION, SOLUTION INTRAVENOUS; SUBCUTANEOUS at 17:23

## 2021-07-15 RX ADMIN — SODIUM CHLORIDE, SODIUM LACTATE, POTASSIUM CHLORIDE, AND CALCIUM CHLORIDE 100 ML/HR: .6; .31; .03; .02 INJECTION, SOLUTION INTRAVENOUS at 04:52

## 2021-07-15 RX ADMIN — RIVAROXABAN 2.5 MG: 2.5 TABLET, FILM COATED ORAL at 08:16

## 2021-07-15 RX ADMIN — PANTOPRAZOLE SODIUM 40 MG: 40 TABLET, DELAYED RELEASE ORAL at 08:15

## 2021-07-15 NOTE — PLAN OF CARE
Problem: MOBILITY - ADULT  Goal: Maintain or return to baseline ADL function  Description: INTERVENTIONS:  -  Assess patient's ability to carry out ADLs; assess patient's baseline for ADL function and identify physical deficits which impact ability to perform ADLs (bathing, care of mouth/teeth, toileting, grooming, dressing, etc )  - Assess/evaluate cause of self-care deficits   - Assess range of motion  - Assess patient's mobility; develop plan if impaired  - Assess patient's need for assistive devices and provide as appropriate  - Encourage maximum independence but intervene and supervise when necessary  - Involve family in performance of ADLs  - Assess for home care needs following discharge   - Consider OT consult to assist with ADL evaluation and planning for discharge  - Provide patient education as appropriate  Outcome: Progressing  Goal: Maintains/Returns to pre admission functional level  Description: INTERVENTIONS:  - Perform BMAT or MOVE assessment daily    - Set and communicate daily mobility goal to care team and patient/family/caregiver  - Collaborate with rehabilitation services on mobility goals if consulted  - Perform Range of Motion several times a day  - Reposition patient every 2 hours    - Dangle patient multiple times a day  - Stand patient 7 times a day  - Ambulate patient 4 times a day  - Out of bed to chair 3 times a day   - Out of bed for meals 3 times a day  - Out of bed for toileting  - Record patient progress and toleration of activity level   Outcome: Progressing

## 2021-07-15 NOTE — WOUND OSTOMY CARE
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, left TMA  Assessment Findings:   Patient agreeable to assessment and photography  He is able to turn/reposition himself in bed and weight shift independently  He denies incontinence  Right heel, buttocks, and sacrum intact  Preventative foam dressing to sacrum  Left medial lower extremity with incision/staples covered with mepilex dressing  1   Present on admission left heel wound (diabetic vs unstageable pressure injury)--wound bed black/brown, dry eschar with yellow, slightly moist edges  Dominga-wound pink and intact  No fluctuance or drainage at this time  2   Left plantar diabetic ulcer--tan, dry with thick callus  No drainage  Dominga-wound intact  Patient has followed with podiatry at the wound center for left foot/heel wounds  He has had unsuccessful attempts to re-vascularize the left lower extremity  Scheduled for left BKA on 7/17/21  Wound Care Plan:   1-Apply Allevyn Life foam dressing to right heel and midline sacrum (small sacral) for prevention  Trey with P   Peel back at least daily for skin assessment and re-apply  Change dressing every 3 days and PRN  2-Elevate heels off of bed/chair surface to offload pressure  3-Offloading air cushion in chair when out of bed  4-Moisturize skin daily with skin nourishing cream   5-Encourage/remind patient to turn/reposition every 2 hours while in bed and weight shift frequently while in chair for pressure re-distribution on skin  6-Left heel and left plantar foot--paint with betadine, allow to dry  Cover with Allevyn Life foam dressing    Change dressing every other day until BKA performed  Wound care team will sign-off at this time  Wound 03/11/21 Foot Left; Other (Comment); Posterior (Active)   Wound Image   07/15/21 0910   Wound Description Portillo;Dry 07/15/21 0910   Dominga-wound Assessment Callus 07/15/21 0910   Wound Length (cm) 0 3 cm 07/15/21 0910   Wound Width (cm) 0 2 cm 07/15/21 0910   Wound Depth (cm) 0 1 cm 07/15/21 0910   Wound Surface Area (cm^2) 0 06 cm^2 07/15/21 0910   Wound Volume (cm^3) 0 006 cm^3 07/15/21 0910   Calculated Wound Volume (cm^3) 0 01 cm^3 07/15/21 0910   Change in Wound Size % 92 86 07/15/21 0910   Drainage Amount None 07/15/21 0910   Non-staged Wound Description Not applicable 08/06/26 4955   Treatments Cleansed 07/15/21 0910   Dressing Foam, Silicon (eg  Allevyn, etc) 07/15/21 0910   Patient Tolerance Tolerated well 07/15/21 0910   Dressing Status Clean;Dry; Intact 07/15/21 0910       Wound 03/11/21 Heel Left (Active)   Wound Image   07/15/21 0909   Wound Description Black; Yellow;Slough 07/15/21 0909   Pressure Injury Stage U 07/15/21 0909   Dominga-wound Assessment Pink 07/15/21 0909   Wound Length (cm) 5 cm 07/15/21 0909   Wound Width (cm) 3 cm 07/15/21 0909   Wound Depth (cm) 0 cm 07/15/21 0909   Wound Surface Area (cm^2) 15 cm^2 07/15/21 0909   Wound Volume (cm^3) 0 cm^3 07/15/21 0909   Calculated Wound Volume (cm^3) 0 cm^3 07/15/21 0909   Drainage Amount None 07/15/21 0909   Treatments Cleansed;Elevated 07/15/21 0909   Dressing Foam, Silicon (eg  Allevyn, etc) 07/15/21 0909   Patient Tolerance Tolerated well 07/15/21 0909   Dressing Status Clean;Dry; Intact 07/15/21 408 Sourav Schaffer, RN, [facility]

## 2021-07-15 NOTE — PHYSICAL THERAPY NOTE
Physical Therapy Cancellation Note         07/15/21 0740   PT Last Visit   PT Visit Date 07/15/21   Note Type   Note type Evaluation   Cancel Reasons Patient to operating room     PT orders received, chart reviewed  Pt with plans for OR Saturday 7/17/21 for L BKA  PT to continue to follow and see pt for initial evaluation post-op      Karla Mathur, PT, DPT

## 2021-07-15 NOTE — UTILIZATION REVIEW
Initial Clinical Review    Elective   INPT  surgical procedure  Age/Sex: 76 y o  male  Surgery Date: 7/14  Procedure:  BYPASS femoral-peroneal artery bypass with  reverse GSV and balloon angioplasty peroneal artery   Luis Manuelnoemi Omer   attempted Left SFA to distal peroneal bypass with reversed GSV, intra-op angiogram, balloon angioplasty of distal anastomosis and peroneal runoff with immediate post op graft thrombosis    Anesthesia:  General   Operative Findings:  1-1 5 mm heavily calcified and diseased distal peroneal artery     POD#1 Progress Note:  no acute events overnight, patient states pain isnt too bad  Patient understands the bypass has already failed and understands plan for below knee amputation on saturday    Admission Orders: Date/Time/Statement:   Admission Orders (From admission, onward)     Ordered        07/14/21 1717  Inpatient Admission  Once                   Orders Placed This Encounter   Procedures    Inpatient Admission     Standing Status:   Standing     Number of Occurrences:   1     Order Specific Question:   Level of Care     Answer:   Level 1 Stepdown [13]     Order Specific Question:   Estimated length of stay     Answer:   More than 2 Midnights     Order Specific Question:   Certification     Answer:   I certify that inpatient services are medically necessary for this patient for a duration of greater than two midnights  See H&P and MD Progress Notes for additional information about the patient's course of treatment       Vital Signs: /56   Pulse 73   Temp 98 3 °F (36 8 °C) (Oral)   Resp 17   Wt 88 5 kg (195 lb)   SpO2 94%   BMI 33 47 kg/m²     Pertinent Labs/Diagnostic Test Results:       Results from last 7 days   Lab Units 07/15/21  0443 07/14/21  1643   WBC Thousand/uL 8 95  --    HEMOGLOBIN g/dL 11 7*  --    I STAT HEMOGLOBIN g/dl  --  12 2   HEMATOCRIT % 35 2*  --    HEMATOCRIT, ISTAT %  --  36*   PLATELETS Thousands/uL 153  --          Results from last 7 days   Lab Units 07/15/21  0443 07/14/21  1643   SODIUM mmol/L 139  --    POTASSIUM mmol/L 3 9  --    CHLORIDE mmol/L 107  --    CO2 mmol/L 24  --    CO2, I-STAT mmol/L  --  24   ANION GAP mmol/L 8  --    BUN mg/dL 17  --    CREATININE mg/dL 0 79  --    EGFR ml/min/1 73sq m 94  --    CALCIUM mg/dL 8 2*  --    CALCIUM, IONIZED, ISTAT mmol/L  --  1 10*         Results from last 7 days   Lab Units 07/15/21  0624 07/14/21  2043 07/14/21  1354 07/14/21  1059 07/14/21  0901   POC GLUCOSE mg/dl 195* 216* 172* 121 170*     Results from last 7 days   Lab Units 07/15/21  0443   GLUCOSE RANDOM mg/dL 193*     Results from last 7 days   Lab Units 07/14/21  1643   I STAT BASE EXC mmol/L -3*   I STAT O2 SAT % 99*   ISTAT PH ART  7 342*   I STAT ART PCO2 mm HG 41 9   I STAT ART PO2 mm  0*   I STAT ART HCO3 mmol/L 22 7         Diet: reg,  Lo carb   Mobility:  Post procedure bedrest, then ambulate   DVT Prophylaxis:  scd to RLE; Heparin sq , xarelto    Medications/Pain Control:   Scheduled Medications:  atorvastatin, 40 mg, Oral, HS  clopidogrel, 75 mg, Oral, Daily  docusate sodium, 100 mg, Oral, BID  gabapentin, 300 mg, Oral, TID  heparin (porcine), 5,000 Units, Subcutaneous, Q8H ALEXEI  insulin glargine, 17 Units, Subcutaneous, HS  insulin lispro, 1-6 Units, Subcutaneous, TID AC  pantoprazole, 40 mg, Oral, Daily Before Breakfast  rivaroxaban, 2 5 mg, Oral, BID With Meals      Continuous IV Infusions:     PRN Meds:  acetaminophen, 650 mg, Oral, Q4H PRN  hydrALAZINE, 10 mg, Intravenous, Q4H PRN  HYDROmorphone, 0 5 mg, Intravenous, Q3H PRN   Postop x1   Labetalol HCl, 10 mg, Intravenous, Q4H PRN  ondansetron, 4 mg, Intravenous, Q6H PRN  oxyCODONE, 10 mg, Oral, Q4H PRN   Postop x3  oxyCODONE, 5 mg, Oral, Q4H PRN      Network Utilization Review Department  ATTENTION: Please call with any questions or concerns to [phone] and carefully listen to the prompts so that you are directed to the right person   All voicemails are confidential   Power huang requests for admission clinical reviews, approved or denied determinations and any other requests to dedicated fax number below belonging to the campus where the patient is receiving treatment   List of dedicated fax numbers for the Facilities:  [address] [place] [address] DENIALS (Administrative/Medical Necessity) [phone]   [address] (Maternity/NICU/Pediatrics) [phone] [address] [facility] [address]4 [address] [place] [address] [address] [facility] [place] [address] [phone]

## 2021-07-15 NOTE — OCCUPATIONAL THERAPY NOTE
Occupational Therapy Cancellation Note        Patient Name: Tia Lopez  Today's Date: 7/15/2021       07/15/21 1329   OT Last Visit   OT Visit Date 07/15/21   Note Type   Note type Evaluation   Cancel Reasons Other       OT orders received, chart reviewed  Noted that pt will be going to OR 7/17/ for BKA  OT will sign off at this time, please re-consult post-op  Thank you       RAGHAV Brown, OTR/L

## 2021-07-15 NOTE — PROGRESS NOTES
Progress Note - Vascular Surgery   Sanchez Bautista 76 y o  male MRN: [id number]  [facility] En[facility]: [id number]      Assessment:  76year old male with extensive LLE revascularization procedures, L TMA (healed) and left heal wound, s/p attempted Left SFA to distal peroneal bypass with reversed GSV, intra-op angiogram, balloon angioplasty of distal anastomosis and peroneal runoff with immediate post op graft thrombosis    Plan:  -prn pain control  -continue plavix and xarelto (vascular dose)  -plan for left BKA on Saturday with Dr Vanessa Vasquez  -okay to be out of bed  -okay for mcknight and arterial line to be removed    Elizabeth Davidson, DO  7/15/2021        Subjective:  Patient seen and examined, no acute events overnight, patient states pain isnt too bad, denies chest pain/shortness of breath  Patient understands the bypass has already failed and understands plan for below knee amputation on saturday    Vitals:  /60 (BP Location: Left arm)   Pulse 77   Temp 98 3 °F (36 8 °C) (Oral)   Resp 18   Wt 88 5 kg (195 lb)   SpO2 97%   BMI 33 47 kg/m²     I/Os:  I/O last 3 completed shifts:   In: 3050 [I V :3050]  Out: 825 [Urine:525; Blood:300]  I/O this shift:  In: 1000 [I V :1000]  Out: 1900 [Urine:1900]    Lab Results and Cultures:   CBC with diff:   Lab Results   Component Value Date    WBC 6 74 07/07/2021    HGB 12 2 07/14/2021    HCT 36 (L) 07/14/2021    MCV 96 07/07/2021     07/07/2021    MCH 32 3 07/07/2021    MCHC 33 9 07/07/2021    RDW 12 4 07/07/2021    MPV 11 1 07/07/2021    NRBC 0 06/30/2021   ,   BMP/CMP:  Lab Results   Component Value Date    SODIUM 134 (L) 07/07/2021    K 4 4 07/07/2021    K 4 7 02/10/2014     07/07/2021     02/10/2014    CO2 24 07/14/2021    ANIONGAP 6 02/10/2014    BUN 19 07/07/2021    BUN 20 02/10/2014    CREATININE 1 08 07/07/2021    CREATININE 0 89 02/10/2014    GLUCOSE 229 (H) 07/14/2021    GLUCOSE 135 02/10/2014    CALCIUM 9 5 07/07/2021    CALCIUM 8 9 02/10/2014    AST 21 06/30/2021    ALT 40 06/30/2021    ALKPHOS 81 06/30/2021    EGFR 71 07/07/2021   ,   Lipid Panel: No results found for: CHOL,   Coags:   Lab Results   Component Value Date    PTT 33 12/11/2020    INR 0 97 07/07/2021    INR 1 11 01/10/2014   ,     Blood Culture: No results found for: BLOODCX,   Urinalysis:   Lab Results   Component Value Date    COLORU Yellow 06/30/2021    COLORU Yellow 06/30/2021    CLARITYU Cloudy 06/30/2021    CLARITYU Cloudy 06/30/2021    SPECGRAV 1 029 06/30/2021    SPECGRAV 1 029 06/30/2021    PHUR 6 0 06/30/2021    PHUR 6 0 06/30/2021    LEUKOCYTESUR Small (A) 06/30/2021    LEUKOCYTESUR Small (A) 06/30/2021    NITRITE Negative 06/30/2021    NITRITE Negative 06/30/2021    GLUCOSEU >=1000 (1%) (A) 06/30/2021    GLUCOSEU >=1000 (1%) (A) 06/30/2021    KETONESU Negative 06/30/2021    KETONESU Negative 06/30/2021    BILIRUBINUR Negative 06/30/2021    BILIRUBINUR Negative 06/30/2021    BLOODU Negative 06/30/2021    BLOODU Negative 06/30/2021   ,   Urine Culture:   Lab Results   Component Value Date    URINECX 40,000-49,000 cfu/ml Klebsiella variicola (A) 06/30/2021   ,   Wound Culure:   Lab Results   Component Value Date    WOUNDCULT 1 colony Staphylococcus aureus (A) 10/23/2020       Medications:  Current Facility-Administered Medications   Medication Dose Route Frequency    acetaminophen (TYLENOL) tablet 650 mg  650 mg Oral Q4H PRN    atorvastatin (LIPITOR) tablet 40 mg  40 mg Oral HS    clopidogrel (PLAVIX) tablet 75 mg  75 mg Oral Daily    docusate sodium (COLACE) capsule 100 mg  100 mg Oral BID    gabapentin (NEURONTIN) capsule 300 mg  300 mg Oral TID    heparin (porcine) subcutaneous injection 5,000 Units  5,000 Units Subcutaneous Q8H [address]    hydrALAZINE (APRESOLINE) injection 10 mg  10 mg Intravenous Q4H PRN    HYDROmorphone (DILAUDID) injection 0 5 mg  0 5 mg Intravenous Q3H PRN    insulin glargine (LANTUS) subcutaneous injection 17 Units 0 17 mL  17 Units Subcutaneous HS    insulin lispro (HumaLOG) 100 units/mL subcutaneous injection 1-6 Units  1-6 Units Subcutaneous TID AC    Labetalol HCl (NORMODYNE) injection 10 mg  10 mg Intravenous Q4H PRN    lactated ringers bolus 500 mL  500 mL Intravenous Once PRN    And    lactated ringers bolus 500 mL  500 mL Intravenous Once PRN    lactated ringers infusion  100 mL/hr Intravenous Continuous    ondansetron (ZOFRAN) injection 4 mg  4 mg Intravenous Q6H PRN    oxyCODONE (ROXICODONE) IR tablet 10 mg  10 mg Oral Q4H PRN    oxyCODONE (ROXICODONE) IR tablet 5 mg  5 mg Oral Q4H PRN    pantoprazole (PROTONIX) EC tablet 40 mg  40 mg Oral Daily Before Breakfast    rivaroxaban (XARELTO) tablet 2 5 mg  2 5 mg Oral BID With Meals    sodium chloride 0 9 % bolus 500 mL  500 mL Intravenous Once PRN    And    sodium chloride 0 9 % bolus 500 mL  500 mL Intravenous Once PRN       Imaging:  No new imaging to review    Physical Exam:    General: aaox3, nad  CV: regular rate  Respiratory: no acute distress, on 1L NC  Abdominal: soft, nontender  Extremities: LEFT: dressings in place, no signs of hematomas, no pulse in bypass graft, foot is warm, no motor/sensory deficit

## 2021-07-15 NOTE — CONSULTS
Consultation - Nicholas Ramirez 76 y o  male MRN: [id number]    Unit/Bed#: [facility] 522-01 Encounter: [id number]        Impression:  -Type 2 diabetes with hyperglycemia, with long-term current use of insulin  -Peripheral arterial disease status post bypass femoral-peroneal artery, planned for below-knee amputation on 07/17/2021    -Hyperlipidemia  -Hypertension        Recommendation:    -Type 2 diabetes mellitus with long-term current use of insulin  Uncontrolled with A1c 8 9% on 06/30/2021  Home regimen consists of mixed insulin 70/30 20 units with breakfast and 15 units with dinner  Currently, patient is on Lantus 17 units q h s  And algorithm 3 correctional insulin with meals  His blood glucose are above target goal   We recommend increasing Lantus to 22 units q h s  And add lispro 6 units t i d  A c  Use correctional insulin as needed and continue glucose monitoring  CC: Diabetes Consult    History of Present Illness     HPI: Nicholas Ramirez is a 76y o  year old male with type 2 diabetes and peripheral artery disease with previous left TMA admitted with persistent lateral foot wound  He underwent femoral-peroneal artery bypass and is planned for below-knee amputation on 7/17/2021  Endocrinology consulted to assist with management of type 2 diabetes with long-term insulin use and hyperglycemia  Patient has type 2 diabetes for 20 years and has been on insulin for the last 6 years  Complications include PAD, microalbuminuria and mild retinopathy  He has history of multiple toe amputations of the left foot and little to amputation of the right foot  He was previously taking NPH and regular insulin twice a day  He was seen by [facility] endocrinology last week on 07/07/2021 and was switched to Novolin 70/30 insulin 20 units with breakfast and 15 units with dinner  He checks his fingerstick 3 times a day, it ranges between 180 and 210 mg/dL  He denies any hypoglycemia  He reports polyuria and polydipsia    He denies blurry vision or numbness and tingling of the feet  States his father has type 1 diabetes  Inpatient consult to Endocrinology     Performed by  Mily Lazo MD     Authorized by Tiburcio Saunders MD              Review of Systems   Constitutional: Negative for activity change, appetite change, chills, fever and unexpected weight change  HENT: Negative for ear pain, sore throat, trouble swallowing and voice change  Eyes: Negative for pain and visual disturbance  Respiratory: Negative for cough and shortness of breath  Cardiovascular: Negative for chest pain and palpitations  Gastrointestinal: Negative for abdominal pain, nausea and vomiting  Endocrine: Positive for polydipsia and polyuria  Negative for cold intolerance and heat intolerance  Genitourinary: Negative for dysuria and hematuria  Musculoskeletal: Negative for arthralgias, back pain and myalgias  Skin: Negative for color change and rash  Neurological: Negative for seizures, syncope and numbness  Psychiatric/Behavioral: Negative for behavioral problems, confusion, decreased concentration and dysphoric mood  All other systems reviewed and are negative        Historical Information   Past Medical History:   Diagnosis Date    Arthritis     fingers    First degree AV block     Full dentures     Hypertension     [facility] [address] [facility]     [facility]     Type 2 diabetes mellitus with diabetic peripheral angiopathy without gangrene ([facility] [address] ) 5/18/2021    Per CMS ICD 10 guidelines--Per Physician    Wound, open     right foot- by the 5th toe     Past Surgical History:   Procedure Laterality Date    CHOLECYSTECTOMY      COLONOSCOPY      IR AORTAGRAM WITH RUN-OFF  1/28/2021    IR AORTAGRAM WITH RUN-OFF  4/13/2021    IL DEBRIDEMENT, SKIN, SUB-Q TISSUE,MUSCLE,BONE,=<20 SQ CM Right 12/18/2020    Procedure: DEBRIDMENT OF ULCER , REMOVAL OF DEVITALIZED SKIN, SOFT TISSUE, BONE AND APPLICATION OF INTEGRA GRAFT RIGHT FOOT ;  Surgeon: Shira Paris DPM;  Location: [address] [place] [address];  Service: Podiatry    REPLANTATION THUMB Right     right tip reconnected    SKIN GRAFT      right thumb    TOE AMPUTATION      left foot all 5 toes removed    TOE AMPUTATION Right 2/2/2021    Procedure: Right partial 5th ray amputation;  Surgeon: Gabriela Macdonald DPM;  Location: [facility] OR;  Service: Podiatry    TOE OSTEOTOMY Right 6/26/2020    Procedure: exostosis of right big toe;  Surgeon: Jose Brown DPM;  Location: [facility];  Service: Podiatry    [address]      bilateral hands    VEIN LIGATION      right     Social History   Social History     Substance and Sexual Activity   Alcohol Use Yes    Comment: occasional     Social History     Substance and Sexual Activity   Drug Use Never     Social History     Tobacco Use   Smoking Status Never Smoker   Smokeless Tobacco Never Used     Family History:   Family History   Problem Relation Age of Onset    Arthritis Mother     Cancer Father         colon    Alzheimer's disease Father        Meds/Allergies   Current Facility-Administered Medications   Medication Dose Route Frequency Provider Last Rate Last Admin    acetaminophen (TYLENOL) tablet 650 mg  650 mg Oral Q4H PRN Khloe Binning, DO        atorvastatin (LIPITOR) tablet 40 mg  40 mg Oral HS [place] Binning, DO   40 mg at 07/14/21 2159    calcium carbonate (TUMS) chewable tablet 500 mg  500 mg Oral TID PRN Adonis Laureano MD   500 mg at 07/15/21 0852    clopidogrel (PLAVIX) tablet 75 mg  75 mg Oral Daily Khloe Binning, DO   75 mg at 07/15/21 0815    docusate sodium (COLACE) capsule 100 mg  100 mg Oral BID [place] Binning, DO   100 mg at 07/14/21 2034    gabapentin (NEURONTIN) capsule 300 mg  300 mg Oral TID [place] Binning, DO   300 mg at 07/15/21 0815    heparin (porcine) subcutaneous injection 5,000 Units  5,000 Units Subcutaneous [facility] Khloe Binning, DO   5,000 Units at 07/15/21 0500    hydrALAZINE (APRESOLINE) injection 10 mg  10 mg Intravenous Q4H PRN Licha [place], DPM        HYDROmorphone (DILAUDID) injection 0 5 mg  0 5 mg Intravenous Q3H PRN Sania Chaparro, DO   0 5 mg at 07/15/21 0437    insulin glargine (LANTUS) subcutaneous injection 17 Units 0 17 mL  17 Units Subcutaneous HS Licha [place], DPM   17 Units at 07/14/21 2159    insulin lispro (HumaLOG) 100 units/mL subcutaneous injection 1-6 Units  1-6 Units Subcutaneous TID [facility], DPM   2 Units at 07/15/21 1203    Labetalol HCl (NORMODYNE) injection 10 mg  10 mg Intravenous Q4H PRN Licha [place], DPM        lactated ringers bolus 500 mL  500 mL Intravenous Once PRN Sania Chaparro, DO        And    lactated ringers bolus 500 mL  500 mL Intravenous Once PRN Sania Chaparro, DO        melatonin tablet 3 mg  3 mg Oral HS PRN Juan A Daniel MD        ondansetron [facility]) injection 4 mg  4 mg Intravenous Q6H PRN Sania Chaparro, DO        oxyCODONE (ROXICODONE) IR tablet 10 mg  10 mg Oral Q4H PRN Sania Chaparro, DO   10 mg at 07/15/21 1202    oxyCODONE (ROXICODONE) IR tablet 5 mg  5 mg Oral Q4H PRN Sania Chaparro, DO        pantoprazole (PROTONIX) EC tablet 40 mg  40 mg Oral Daily Before Breakfast Sania Chaparro, DO   40 mg at 07/15/21 0815    rivaroxaban (XARELTO) tablet 2 5 mg  2 5 mg Oral BID With Meals Sania Chaparro, DO   2 5 mg at 07/15/21 0816    sodium chloride 0 9 % bolus 500 mL  500 mL Intravenous Once PRN Sania Chaparro, DO        And    sodium chloride 0 9 % bolus 500 mL  500 mL Intravenous Once PRN Sania Chaparro, DO         Allergies   Allergen Reactions    Shellfish-Derived Products - Food Allergy Anaphylaxis     Throat closes    Sulfamethoxazole-Trimethoprim Rash       Objective   Vitals: Blood pressure 139/63, pulse 94, temperature 98 3 °F (36 8 °C), temperature source Oral, resp  rate 17, weight 88 5 kg (195 lb), SpO2 93 %      Intake/Output Summary (Last 24 hours) at 7/15/2021 1303  Last data filed at 7/15/2021 1101  Gross per 24 hour   Intake 4488 33 ml   Output 2975 ml   Net 1513 33 ml     Invasive Devices     Peripheral Intravenous Line            Peripheral IV 07/14/21 Dorsal (posterior); Right Forearm 1 day    Peripheral IV 07/14/21 Left Forearm 1 day                Physical Exam  Vitals reviewed  Constitutional:       General: He is not in acute distress  Appearance: Normal appearance  He is obese  HENT:      Head: Normocephalic and atraumatic  Nose: Nose normal       Mouth/Throat:      Mouth: Mucous membranes are moist    Eyes:      General: No scleral icterus  Extraocular Movements: Extraocular movements intact  Conjunctiva/sclera: Conjunctivae normal       Pupils: Pupils are equal, round, and reactive to light  Neck:      Comments: No thyromegaly  Cardiovascular:      Rate and Rhythm: Normal rate  Pulses: Normal pulses  Heart sounds: Normal heart sounds  Pulmonary:      Effort: Pulmonary effort is normal       Breath sounds: Normal breath sounds  Abdominal:      General: Bowel sounds are normal  There is no distension  Palpations: Abdomen is soft  Tenderness: There is no abdominal tenderness  Musculoskeletal:      Cervical back: Normal range of motion and neck supple  Right lower leg: No edema  Left lower leg: No edema  Comments: Status post left TMA, clean dressing   Lymphadenopathy:      Cervical: No cervical adenopathy  Skin:     General: Skin is warm  Neurological:      Mental Status: He is alert and oriented to person, place, and time  Gait: Gait normal       Deep Tendon Reflexes: Reflexes normal    Psychiatric:         Mood and Affect: Mood normal          Behavior: Behavior normal          Thought Content: Thought content normal          Judgment: Judgment normal          The history was obtained from the review of the chart, patient      Lab Results:       Lab Results   Component Value Date    WBC 8 95 07/15/2021    HGB 11 7 (L) 07/15/2021    HCT 35 2 (L) 07/15/2021    MCV 97 07/15/2021     07/15/2021     Lab Results   Component Value Date/Time    BUN 17 07/15/2021 04:43 AM    BUN 20 02/10/2014 04:50 AM     02/10/2014 04:50 AM    K 3 9 07/15/2021 04:43 AM    K 4 7 02/10/2014 04:50 AM     07/15/2021 04:43 AM     02/10/2014 04:50 AM    CO2 24 07/15/2021 04:43 AM    CO2 24 07/14/2021 04:43 PM    CREATININE 0 79 07/15/2021 04:43 AM    CREATININE 0 89 02/10/2014 04:50 AM    AST 21 06/30/2021 09:36 AM    ALT 40 06/30/2021 09:36 AM    ALB 3 6 06/30/2021 09:36 AM     No results for input(s): CHOL, HDL, LDL, TRIG, VLDL in the last 72 hours  No results found for: Leigh Ann Lydia  POC Glucose (mg/dl)   Date Value   07/15/2021 201 (H)   07/15/2021 195 (H)   07/14/2021 216 (H)   07/14/2021 201 (H)   07/14/2021 172 (H)   07/14/2021 121   07/14/2021 170 (H)   04/13/2021 121   02/06/2021 211 (H)   02/06/2021 171 (H)       Imaging Studies: I have personally reviewed pertinent reports  Portions of the record may have been created with voice recognition software

## 2021-07-15 NOTE — QUICK NOTE
Post-op Check:    Patient is resting comfortably in bed and complains of no acute issues  Denies N/V/CP/SOB  States that his left leg is a little "sore "    LLE incision sites are clean, dry, and cleanly dressed  Mild bloody strikethrough noted to the most distal incision site, however no active bleeding is noted  B/L AT/PT/peroneal dopplerable  L foot pulses are weak and monophasic  Insulin drip discontinued, SSI ordered

## 2021-07-16 LAB
ABO GROUP BLD: NORMAL
ANION GAP SERPL CALCULATED.3IONS-SCNC: 2 MMOL/L (ref 4–13)
BLD GP AB SCN SERPL QL: NEGATIVE
BUN SERPL-MCNC: 15 MG/DL (ref 5–25)
CALCIUM SERPL-MCNC: 8.5 MG/DL (ref 8.3–10.1)
CHLORIDE SERPL-SCNC: 105 MMOL/L (ref 100–108)
CO2 SERPL-SCNC: 31 MMOL/L (ref 21–32)
CREAT SERPL-MCNC: 0.84 MG/DL (ref 0.6–1.3)
ERYTHROCYTE [DISTWIDTH] IN BLOOD BY AUTOMATED COUNT: 12.8 % (ref 11.6–15.1)
GFR SERPL CREATININE-BSD FRML MDRD: 91 ML/MIN/1.73SQ M
GLUCOSE SERPL-MCNC: 143 MG/DL (ref 65–140)
GLUCOSE SERPL-MCNC: 179 MG/DL (ref 65–140)
GLUCOSE SERPL-MCNC: 190 MG/DL (ref 65–140)
GLUCOSE SERPL-MCNC: 250 MG/DL (ref 65–140)
GLUCOSE SERPL-MCNC: 269 MG/DL (ref 65–140)
HCT VFR BLD AUTO: 36.8 % (ref 36.5–49.3)
HGB BLD-MCNC: 11.6 G/DL (ref 12–17)
MAGNESIUM SERPL-MCNC: 1.9 MG/DL (ref 1.6–2.6)
MCH RBC QN AUTO: 31.5 PG (ref 26.8–34.3)
MCHC RBC AUTO-ENTMCNC: 31.5 G/DL (ref 31.4–37.4)
MCV RBC AUTO: 100 FL (ref 82–98)
PHOSPHATE SERPL-MCNC: 2.5 MG/DL (ref 2.3–4.1)
PLATELET # BLD AUTO: 136 THOUSANDS/UL (ref 149–390)
PMV BLD AUTO: 10.6 FL (ref 8.9–12.7)
POTASSIUM SERPL-SCNC: 3.7 MMOL/L (ref 3.5–5.3)
RBC # BLD AUTO: 3.68 MILLION/UL (ref 3.88–5.62)
RH BLD: POSITIVE
SODIUM SERPL-SCNC: 138 MMOL/L (ref 136–145)
SPECIMEN EXPIRATION DATE: NORMAL
WBC # BLD AUTO: 8.69 THOUSAND/UL (ref 4.31–10.16)

## 2021-07-16 PROCEDURE — 86900 BLOOD TYPING SEROLOGIC ABO: CPT | Performed by: PHYSICIAN ASSISTANT

## 2021-07-16 PROCEDURE — 83735 ASSAY OF MAGNESIUM: CPT | Performed by: PHYSICIAN ASSISTANT

## 2021-07-16 PROCEDURE — 80048 BASIC METABOLIC PNL TOTAL CA: CPT | Performed by: PHYSICIAN ASSISTANT

## 2021-07-16 PROCEDURE — 99232 SBSQ HOSP IP/OBS MODERATE 35: CPT | Performed by: INTERNAL MEDICINE

## 2021-07-16 PROCEDURE — 82948 REAGENT STRIP/BLOOD GLUCOSE: CPT

## 2021-07-16 PROCEDURE — 86850 RBC ANTIBODY SCREEN: CPT | Performed by: PHYSICIAN ASSISTANT

## 2021-07-16 PROCEDURE — 84100 ASSAY OF PHOSPHORUS: CPT | Performed by: PHYSICIAN ASSISTANT

## 2021-07-16 PROCEDURE — 99024 POSTOP FOLLOW-UP VISIT: CPT | Performed by: SURGERY

## 2021-07-16 PROCEDURE — 86901 BLOOD TYPING SEROLOGIC RH(D): CPT | Performed by: PHYSICIAN ASSISTANT

## 2021-07-16 PROCEDURE — 85027 COMPLETE CBC AUTOMATED: CPT | Performed by: PHYSICIAN ASSISTANT

## 2021-07-16 RX ORDER — CHLORHEXIDINE GLUCONATE 0.12 MG/ML
15 RINSE ORAL ONCE
Status: COMPLETED | OUTPATIENT
Start: 2021-07-16 | End: 2021-07-17

## 2021-07-16 RX ORDER — INSULIN GLARGINE 100 [IU]/ML
30 INJECTION, SOLUTION SUBCUTANEOUS EVERY MORNING
Status: DISCONTINUED | OUTPATIENT
Start: 2021-07-17 | End: 2021-07-19

## 2021-07-16 RX ORDER — CEFAZOLIN SODIUM 2 G/50ML
2000 SOLUTION INTRAVENOUS ONCE
Status: DISCONTINUED | OUTPATIENT
Start: 2021-07-16 | End: 2021-07-17 | Stop reason: HOSPADM

## 2021-07-16 RX ORDER — SODIUM CHLORIDE, SODIUM LACTATE, POTASSIUM CHLORIDE, CALCIUM CHLORIDE 600; 310; 30; 20 MG/100ML; MG/100ML; MG/100ML; MG/100ML
100 INJECTION, SOLUTION INTRAVENOUS CONTINUOUS
Status: DISCONTINUED | OUTPATIENT
Start: 2021-07-17 | End: 2021-07-17

## 2021-07-16 RX ADMIN — ATORVASTATIN CALCIUM 40 MG: 40 TABLET, FILM COATED ORAL at 21:39

## 2021-07-16 RX ADMIN — INSULIN LISPRO 1 UNITS: 100 INJECTION, SOLUTION INTRAVENOUS; SUBCUTANEOUS at 09:09

## 2021-07-16 RX ADMIN — OXYCODONE HYDROCHLORIDE 10 MG: 10 TABLET ORAL at 04:56

## 2021-07-16 RX ADMIN — INSULIN LISPRO 6 UNITS: 100 INJECTION, SOLUTION INTRAVENOUS; SUBCUTANEOUS at 09:09

## 2021-07-16 RX ADMIN — HYDROMORPHONE HYDROCHLORIDE 0.5 MG: 1 INJECTION, SOLUTION INTRAMUSCULAR; INTRAVENOUS; SUBCUTANEOUS at 01:35

## 2021-07-16 RX ADMIN — HYDROMORPHONE HYDROCHLORIDE 0.5 MG: 1 INJECTION, SOLUTION INTRAMUSCULAR; INTRAVENOUS; SUBCUTANEOUS at 15:41

## 2021-07-16 RX ADMIN — PANTOPRAZOLE SODIUM 40 MG: 40 TABLET, DELAYED RELEASE ORAL at 06:22

## 2021-07-16 RX ADMIN — HYDROMORPHONE HYDROCHLORIDE 0.5 MG: 1 INJECTION, SOLUTION INTRAMUSCULAR; INTRAVENOUS; SUBCUTANEOUS at 21:39

## 2021-07-16 RX ADMIN — GABAPENTIN 300 MG: 300 CAPSULE ORAL at 17:13

## 2021-07-16 RX ADMIN — HYDROMORPHONE HYDROCHLORIDE 0.5 MG: 1 INJECTION, SOLUTION INTRAMUSCULAR; INTRAVENOUS; SUBCUTANEOUS at 09:06

## 2021-07-16 RX ADMIN — GABAPENTIN 300 MG: 300 CAPSULE ORAL at 09:04

## 2021-07-16 RX ADMIN — OXYCODONE HYDROCHLORIDE 5 MG: 5 TABLET ORAL at 12:09

## 2021-07-16 RX ADMIN — CLOPIDOGREL BISULFATE 75 MG: 75 TABLET ORAL at 09:04

## 2021-07-16 RX ADMIN — HEPARIN SODIUM 5000 UNITS: 5000 INJECTION INTRAVENOUS; SUBCUTANEOUS at 21:39

## 2021-07-16 RX ADMIN — OXYCODONE HYDROCHLORIDE 10 MG: 10 TABLET ORAL at 19:23

## 2021-07-16 RX ADMIN — INSULIN GLARGINE 22 UNITS: 100 INJECTION, SOLUTION SUBCUTANEOUS at 09:04

## 2021-07-16 RX ADMIN — INSULIN LISPRO 2 UNITS: 100 INJECTION, SOLUTION INTRAVENOUS; SUBCUTANEOUS at 17:14

## 2021-07-16 RX ADMIN — HEPARIN SODIUM 5000 UNITS: 5000 INJECTION INTRAVENOUS; SUBCUTANEOUS at 05:00

## 2021-07-16 RX ADMIN — HEPARIN SODIUM 5000 UNITS: 5000 INJECTION INTRAVENOUS; SUBCUTANEOUS at 14:26

## 2021-07-16 RX ADMIN — GABAPENTIN 300 MG: 300 CAPSULE ORAL at 21:39

## 2021-07-16 RX ADMIN — OXYCODONE HYDROCHLORIDE 10 MG: 10 TABLET ORAL at 00:51

## 2021-07-16 NOTE — PHYSICAL THERAPY NOTE
Physical Therapy Cancellation Note       07/16/21 1338   PT Last Visit   PT Visit Date 07/16/21   Note Type   Note type Evaluation   Cancel Reasons   (cx to OR tomorrow )

## 2021-07-16 NOTE — ANESTHESIA PREPROCEDURE EVALUATION
Procedure:  AMPUTATION BELOW KNEE (BKA) (Left Leg Upper)    Relevant Problems   CARDIO   (+) Essential hypertension   (+) Mixed hyperlipidemia   (+) Type 2 diabetes mellitus with diabetic peripheral angiopathy without gangrene (HCC)      ENDO   (+) Poorly controlled type 2 diabetes mellitus (HCC)   (+) Type 2 diabetes mellitus with diabetic peripheral angiopathy without gangrene (HCC)      GI/HEPATIC   (+) Gastroesophageal reflux disease without esophagitis      HEMATOLOGY   (+) Platelets decreased (HCC)      NEURO/PSYCH   (+) Diabetic polyneuropathy associated with type 2 diabetes mellitus (HCC)        Physical Exam    Airway    Mallampati score: II  TM Distance: >3 FB  Neck ROM: full     Dental   upper dentures,     Cardiovascular      Pulmonary      Other Findings        6/24/21 Regadenoson Stress:  SUMMARY:  -  Stress results: There was no chest pain during stress  -  ECG conclusions: The stress ECG was negative for ischemia and normal   -  Perfusion imaging: There was a small, mildly severe, fixed myocardial perfusion defect of the inferior wall due to diaphragm attenuation   -  Gated SPECT: The calculated left ventricular ejection fraction was 66 %  There was no left ventricular regional abnormality      IMPRESSIONS: Normal study after pharmacologic vasodilation without reproduction of symptoms  There was image artifact, without diagnostic evidence for perfusion abnormality  SUMMARY:  -  Stress results: There was no chest pain during stress  -  ECG conclusions: The stress ECG was negative for ischemia and normal   -  Perfusion imaging: There was a small, mildly severe, fixed myocardial perfusion defect of the inferior wall due to diaphragm attenuation   -  Gated SPECT: The calculated left ventricular ejection fraction was 66 %  There was no left ventricular regional abnormality      IMPRESSIONS: Normal study after pharmacologic vasodilation without reproduction of symptoms   There was image artifact, without diagnostic evidence for perfusion abnormality  Anesthesia Plan  ASA Score- 3     Anesthesia Type- general with ASA Monitors  Additional Monitors:   Airway Plan: ETT  Comment: Patient seen and examined  History reviewed  Patient to be done under general anesthesia with ETT and routine monitors  Popliteal block for post-op pain control  Risks discussed with the patient  Consent obtained          Plan Factors-    Chart reviewed  EKG reviewed  Existing labs reviewed  Patient summary reviewed  Patient is not a current smoker  Patient did not smoke on day of surgery  Induction- intravenous  Postoperative Plan- Plan for postoperative opioid use  Planned trial extubation    Informed Consent- Anesthetic plan and risks discussed with patient  I personally reviewed this patient with the CRNA  Discussed and agreed on the Anesthesia Plan with the CRNA  Mariela Joe

## 2021-07-16 NOTE — PROGRESS NOTES
Progress Note - Tia Lopez 76 y o  male MRN: [id number]    Unit/Bed#: [facility] 522-01 Encounter: [id number]      CC: diabetes f/u    Subjective:   Tia Lopez is a 76y o  year old male with type 2 diabetes admitted with nonhealing left foot wound and scheduled for left BKA on 07/17/2021  Feels well  No complaints  No hypoglycemia  Objective:     Vitals: Blood pressure 145/75, pulse 83, temperature 98 9 °F (37 2 °C), temperature source Oral, resp  rate 18, weight 88 5 kg (195 lb), SpO2 96 %  ,Body mass index is 33 47 kg/m²  Intake/Output Summary (Last 24 hours) at 7/16/2021 1020  Last data filed at 7/16/2021 0901  Gross per 24 hour   Intake 480 ml   Output 1300 ml   Net -820 ml       Physical Exam:  General Appearance: awake, appears stated age and cooperative  Head: Normocephalic, without obvious abnormality, atraumatic  Extremities:  Status post left TMA  Skin: Skin color and temperature normal    Pulm: no labored breathing    Lab, Imaging and other studies: I have personally reviewed pertinent reports  POC Glucose (mg/dl)   Date Value   07/16/2021 179 (H)   07/15/2021 225 (H)   07/15/2021 209 (H)   07/15/2021 201 (H)   07/15/2021 195 (H)   07/14/2021 216 (H)   07/14/2021 201 (H)   07/14/2021 172 (H)   07/14/2021 121   07/14/2021 170 (H)       Assessment:Plan:    -Type 2 diabetes on long-term insulin therapy, uncontrolled with A1c 8 9%  Home regimen consists of mixed insulin 70/30 20 units with breakfast and 15 units with dinner  Currently patient is on Lantus 22 units q h s  And lispro 6 units t i d  A c  He was given 22 units of Lantus this morning in addition the dose he received at night  Will discontinue nighttime Lantus  From tomorrow patient will receive Lantus 30 units q a m  Will increase lispro to 10 units t i d  A c  Continue glucose monitoring for further adjustment        Portions of the record may have been created with voice recognition software

## 2021-07-16 NOTE — PROGRESS NOTES
Progress Note - General Surgery   Stan Muir 76 y o  male MRN: [id number]  Unit/Bed#: [facility] 522-01 Encounter: [id number]    Assessment:  75 y/o male w/ PAD, DM, and chronic left foot wounds w/ extensive revascularization history s/p L SFA-peroneal GSV graft (now occluded)  Scheduled for L BKA Saturday 7/17 w/ Dr Hall Risk  Plan:  Pain control  Continue Plavix and xarelto  Left BKA Saturday morning w/ Dr Hall Risk  NPO/IVF @midnight    Subjective/Objective   Subjective: No acute events overnight  Pain well controlled  Pt understands plan for BKA Saturday morning  Objective:     Blood pressure 141/86, pulse 94, temperature 98 5 °F (36 9 °C), resp  rate 18, weight 88 5 kg (195 lb), SpO2 96 %  ,Body mass index is 33 47 kg/m²  Intake/Output Summary (Last 24 hours) at 7/16/2021 0642  Last data filed at 7/16/2021 0459  Gross per 24 hour   Intake 678 33 ml   Output 1225 ml   Net -546 67 ml       Invasive Devices     Peripheral Intravenous Line            Peripheral IV 07/14/21 Dorsal (posterior); Right Forearm 1 day    Peripheral IV 07/14/21 Left Forearm 1 day                Physical Exam: /86   Pulse 94   Temp 98 5 °F (36 9 °C)   Resp 18   Wt 88 5 kg (195 lb)   SpO2 96%   BMI 33 47 kg/m²     General Appearance:    Alert, cooperative, no distress, appears stated age   Head:    Normocephalic, without obvious abnormality, atraumatic   Eyes:    PERRL, conjunctiva/corneas clear, EOM's intact, fundi     benign, both eyes        Ears:    Normal TM's and external ear canals, both ears   Nose:   Nares normal, septum midline, mucosa normal, no drainage    or sinus tenderness   Throat:   Lips, mucosa, and tongue normal; teeth and gums normal   Neck:   Supple, symmetrical, trachea midline, no adenopathy;        thyroid:  No enlargement/tenderness/nodules; no carotid    bruit or JVD   Back:     Symmetric, no curvature, ROM normal, no CVA tenderness   Lungs:     Clear to auscultation bilaterally, respirations unlabored   Chest wall:    No tenderness or deformity   Heart:    Regular rate and rhythm, S1 and S2 normal, no murmur, rub   or gallop   Abdomen:     Soft, non-tender, bowel sounds active all four quadrants,     no masses, no organomegaly   Genitalia:    Normal male without lesion, discharge or tenderness   Rectal:    Normal tone, normal prostate, no masses or tenderness;    guaiac negative stool   Extremities:      Pulses:   Palpable L femoral pulse  Skin:   Skin color, texture, turgor normal, no rashes or lesions   Lymph nodes:   Cervical, supraclavicular, and axillary nodes normal   Neurologic:   CNII-XII intact  Normal strength, sensation and reflexes       throughout       Lab, Imaging and other studies:  I have personally reviewed pertinent lab results    , CBC:   Lab Results   Component Value Date    WBC 8 69 07/16/2021    HGB 11 6 (L) 07/16/2021    HCT 36 8 07/16/2021     (H) 07/16/2021     (L) 07/16/2021    MCH 31 5 07/16/2021    MCHC 31 5 07/16/2021    RDW 12 8 07/16/2021    MPV 10 6 07/16/2021   , CMP:   Lab Results   Component Value Date    SODIUM 138 07/16/2021    K 3 7 07/16/2021     07/16/2021    CO2 31 07/16/2021    BUN 15 07/16/2021    CREATININE 0 84 07/16/2021    CALCIUM 8 5 07/16/2021    EGFR 91 07/16/2021   , Coagulation: No results found for: PT, INR, APTT, Urinalysis: No results found for: COLORU, CLARITYU, SPECGRAV, PHUR, LEUKOCYTESUR, NITRITE, PROTEINUA, GLUCOSEU, KETONESU, BILIRUBINUR, BLOODU, Amylase: No results found for: AMYLASE, Lipase: No results found for: LIPASE  VTE Pharmacologic Prophylaxis: Heparin  VTE Mechanical Prophylaxis: sequential compression device

## 2021-07-17 ENCOUNTER — ANESTHESIA (INPATIENT)
Dept: PERIOP | Facility: HOSPITAL | Age: 66
DRG: 240 | End: 2021-07-17
Payer: COMMERCIAL

## 2021-07-17 LAB
ANION GAP SERPL CALCULATED.3IONS-SCNC: 3 MMOL/L (ref 4–13)
BUN SERPL-MCNC: 13 MG/DL (ref 5–25)
CALCIUM SERPL-MCNC: 9 MG/DL (ref 8.3–10.1)
CHLORIDE SERPL-SCNC: 103 MMOL/L (ref 100–108)
CO2 SERPL-SCNC: 31 MMOL/L (ref 21–32)
CREAT SERPL-MCNC: 0.86 MG/DL (ref 0.6–1.3)
ERYTHROCYTE [DISTWIDTH] IN BLOOD BY AUTOMATED COUNT: 12.4 % (ref 11.6–15.1)
GFR SERPL CREATININE-BSD FRML MDRD: 90 ML/MIN/1.73SQ M
GLUCOSE SERPL-MCNC: 201 MG/DL (ref 65–140)
GLUCOSE SERPL-MCNC: 206 MG/DL (ref 65–140)
GLUCOSE SERPL-MCNC: 233 MG/DL (ref 65–140)
GLUCOSE SERPL-MCNC: 266 MG/DL (ref 65–140)
HCT VFR BLD AUTO: 37.3 % (ref 36.5–49.3)
HGB BLD-MCNC: 12.2 G/DL (ref 12–17)
MCH RBC QN AUTO: 32.4 PG (ref 26.8–34.3)
MCHC RBC AUTO-ENTMCNC: 32.7 G/DL (ref 31.4–37.4)
MCV RBC AUTO: 99 FL (ref 82–98)
PLATELET # BLD AUTO: 165 THOUSANDS/UL (ref 149–390)
PMV BLD AUTO: 10.3 FL (ref 8.9–12.7)
POTASSIUM SERPL-SCNC: 3.6 MMOL/L (ref 3.5–5.3)
RBC # BLD AUTO: 3.76 MILLION/UL (ref 3.88–5.62)
SODIUM SERPL-SCNC: 137 MMOL/L (ref 136–145)
WBC # BLD AUTO: 9.92 THOUSAND/UL (ref 4.31–10.16)

## 2021-07-17 PROCEDURE — 99024 POSTOP FOLLOW-UP VISIT: CPT | Performed by: SURGERY

## 2021-07-17 PROCEDURE — 88300 SURGICAL PATH GROSS: CPT | Performed by: PATHOLOGY

## 2021-07-17 PROCEDURE — 82948 REAGENT STRIP/BLOOD GLUCOSE: CPT

## 2021-07-17 PROCEDURE — 27880 AMPUTATION OF LOWER LEG: CPT | Performed by: SURGERY

## 2021-07-17 PROCEDURE — C9290 INJ, BUPIVACAINE LIPOSOME: HCPCS | Performed by: ANESTHESIOLOGY

## 2021-07-17 PROCEDURE — 85027 COMPLETE CBC AUTOMATED: CPT | Performed by: SURGERY

## 2021-07-17 PROCEDURE — 0Y6J0Z1 DETACHMENT AT LEFT LOWER LEG, HIGH, OPEN APPROACH: ICD-10-PCS | Performed by: SURGERY

## 2021-07-17 PROCEDURE — 80048 BASIC METABOLIC PNL TOTAL CA: CPT | Performed by: SURGERY

## 2021-07-17 PROCEDURE — 99232 SBSQ HOSP IP/OBS MODERATE 35: CPT | Performed by: ANESTHESIOLOGY

## 2021-07-17 RX ORDER — MAGNESIUM HYDROXIDE 1200 MG/15ML
LIQUID ORAL AS NEEDED
Status: DISCONTINUED | OUTPATIENT
Start: 2021-07-17 | End: 2021-07-17 | Stop reason: HOSPADM

## 2021-07-17 RX ORDER — HYDROMORPHONE HCL/PF 1 MG/ML
0.5 SYRINGE (ML) INJECTION
Status: DISCONTINUED | OUTPATIENT
Start: 2021-07-17 | End: 2021-07-17 | Stop reason: HOSPADM

## 2021-07-17 RX ORDER — SUCCINYLCHOLINE/SOD CL,ISO/PF 100 MG/5ML
SYRINGE (ML) INTRAVENOUS AS NEEDED
Status: DISCONTINUED | OUTPATIENT
Start: 2021-07-17 | End: 2021-07-17

## 2021-07-17 RX ORDER — FENTANYL CITRATE 50 UG/ML
INJECTION, SOLUTION INTRAMUSCULAR; INTRAVENOUS AS NEEDED
Status: DISCONTINUED | OUTPATIENT
Start: 2021-07-17 | End: 2021-07-17

## 2021-07-17 RX ORDER — HYDROMORPHONE HCL/PF 1 MG/ML
0.5 SYRINGE (ML) INJECTION EVERY 2 HOUR PRN
Status: DISCONTINUED | OUTPATIENT
Start: 2021-07-17 | End: 2021-07-20 | Stop reason: HOSPADM

## 2021-07-17 RX ORDER — BUPIVACAINE HYDROCHLORIDE 5 MG/ML
INJECTION, SOLUTION PERINEURAL
Status: COMPLETED | OUTPATIENT
Start: 2021-07-17 | End: 2021-07-17

## 2021-07-17 RX ORDER — ONDANSETRON 2 MG/ML
INJECTION INTRAMUSCULAR; INTRAVENOUS AS NEEDED
Status: DISCONTINUED | OUTPATIENT
Start: 2021-07-17 | End: 2021-07-17

## 2021-07-17 RX ORDER — ALBUMIN, HUMAN INJ 5% 5 %
SOLUTION INTRAVENOUS CONTINUOUS PRN
Status: DISCONTINUED | OUTPATIENT
Start: 2021-07-17 | End: 2021-07-17

## 2021-07-17 RX ORDER — PROPOFOL 10 MG/ML
INJECTION, EMULSION INTRAVENOUS AS NEEDED
Status: DISCONTINUED | OUTPATIENT
Start: 2021-07-17 | End: 2021-07-17

## 2021-07-17 RX ORDER — FENTANYL CITRATE/PF 50 MCG/ML
25 SYRINGE (ML) INJECTION
Status: DISCONTINUED | OUTPATIENT
Start: 2021-07-17 | End: 2021-07-17 | Stop reason: HOSPADM

## 2021-07-17 RX ORDER — OXYCODONE HYDROCHLORIDE 10 MG/1
10 TABLET ORAL EVERY 4 HOURS PRN
Status: DISCONTINUED | OUTPATIENT
Start: 2021-07-17 | End: 2021-07-20 | Stop reason: HOSPADM

## 2021-07-17 RX ORDER — ONDANSETRON 2 MG/ML
4 INJECTION INTRAMUSCULAR; INTRAVENOUS ONCE AS NEEDED
Status: DISCONTINUED | OUTPATIENT
Start: 2021-07-17 | End: 2021-07-17 | Stop reason: HOSPADM

## 2021-07-17 RX ORDER — NEOSTIGMINE METHYLSULFATE 1 MG/ML
INJECTION INTRAVENOUS AS NEEDED
Status: DISCONTINUED | OUTPATIENT
Start: 2021-07-17 | End: 2021-07-17

## 2021-07-17 RX ORDER — SODIUM CHLORIDE, SODIUM LACTATE, POTASSIUM CHLORIDE, CALCIUM CHLORIDE 600; 310; 30; 20 MG/100ML; MG/100ML; MG/100ML; MG/100ML
100 INJECTION, SOLUTION INTRAVENOUS CONTINUOUS
Status: DISPENSED | OUTPATIENT
Start: 2021-07-17 | End: 2021-07-17

## 2021-07-17 RX ORDER — ROCURONIUM BROMIDE 10 MG/ML
INJECTION, SOLUTION INTRAVENOUS AS NEEDED
Status: DISCONTINUED | OUTPATIENT
Start: 2021-07-17 | End: 2021-07-17

## 2021-07-17 RX ORDER — SODIUM CHLORIDE 9 MG/ML
INJECTION, SOLUTION INTRAVENOUS CONTINUOUS PRN
Status: DISCONTINUED | OUTPATIENT
Start: 2021-07-17 | End: 2021-07-17

## 2021-07-17 RX ORDER — LIDOCAINE HYDROCHLORIDE 10 MG/ML
INJECTION, SOLUTION EPIDURAL; INFILTRATION; INTRACAUDAL; PERINEURAL AS NEEDED
Status: DISCONTINUED | OUTPATIENT
Start: 2021-07-17 | End: 2021-07-17

## 2021-07-17 RX ORDER — CEFAZOLIN SODIUM 1 G/3ML
INJECTION, POWDER, FOR SOLUTION INTRAMUSCULAR; INTRAVENOUS AS NEEDED
Status: DISCONTINUED | OUTPATIENT
Start: 2021-07-17 | End: 2021-07-17

## 2021-07-17 RX ORDER — GLYCOPYRROLATE 0.2 MG/ML
INJECTION INTRAMUSCULAR; INTRAVENOUS AS NEEDED
Status: DISCONTINUED | OUTPATIENT
Start: 2021-07-17 | End: 2021-07-17

## 2021-07-17 RX ORDER — HYDROMORPHONE HCL/PF 1 MG/ML
SYRINGE (ML) INJECTION AS NEEDED
Status: DISCONTINUED | OUTPATIENT
Start: 2021-07-17 | End: 2021-07-17

## 2021-07-17 RX ORDER — SODIUM CHLORIDE, SODIUM LACTATE, POTASSIUM CHLORIDE, CALCIUM CHLORIDE 600; 310; 30; 20 MG/100ML; MG/100ML; MG/100ML; MG/100ML
INJECTION, SOLUTION INTRAVENOUS CONTINUOUS PRN
Status: DISCONTINUED | OUTPATIENT
Start: 2021-07-17 | End: 2021-07-17

## 2021-07-17 RX ORDER — EPHEDRINE SULFATE 50 MG/ML
INJECTION INTRAVENOUS AS NEEDED
Status: DISCONTINUED | OUTPATIENT
Start: 2021-07-17 | End: 2021-07-17

## 2021-07-17 RX ADMIN — ATORVASTATIN CALCIUM 40 MG: 40 TABLET, FILM COATED ORAL at 21:00

## 2021-07-17 RX ADMIN — HYDROMORPHONE HYDROCHLORIDE 0.5 MG: 1 INJECTION, SOLUTION INTRAMUSCULAR; INTRAVENOUS; SUBCUTANEOUS at 05:14

## 2021-07-17 RX ADMIN — OXYCODONE HYDROCHLORIDE 10 MG: 10 TABLET ORAL at 00:09

## 2021-07-17 RX ADMIN — GABAPENTIN 300 MG: 300 CAPSULE ORAL at 12:10

## 2021-07-17 RX ADMIN — CEFAZOLIN 2000 MG: 1 INJECTION, POWDER, FOR SOLUTION INTRAMUSCULAR; INTRAVENOUS at 08:21

## 2021-07-17 RX ADMIN — LIDOCAINE HYDROCHLORIDE 50 MG: 10 INJECTION, SOLUTION EPIDURAL; INFILTRATION; INTRACAUDAL; PERINEURAL at 08:19

## 2021-07-17 RX ADMIN — GABAPENTIN 300 MG: 300 CAPSULE ORAL at 17:56

## 2021-07-17 RX ADMIN — HYDROMORPHONE HYDROCHLORIDE 0.5 MG: 1 INJECTION, SOLUTION INTRAMUSCULAR; INTRAVENOUS; SUBCUTANEOUS at 13:12

## 2021-07-17 RX ADMIN — SODIUM CHLORIDE, SODIUM LACTATE, POTASSIUM CHLORIDE, AND CALCIUM CHLORIDE: .6; .31; .03; .02 INJECTION, SOLUTION INTRAVENOUS at 08:13

## 2021-07-17 RX ADMIN — ROCURONIUM BROMIDE 30 MG: 50 INJECTION, SOLUTION INTRAVENOUS at 08:31

## 2021-07-17 RX ADMIN — SODIUM CHLORIDE: 9 INJECTION, SOLUTION INTRAVENOUS at 08:21

## 2021-07-17 RX ADMIN — HYDROMORPHONE HYDROCHLORIDE 0.5 MG: 1 INJECTION, SOLUTION INTRAMUSCULAR; INTRAVENOUS; SUBCUTANEOUS at 22:52

## 2021-07-17 RX ADMIN — OXYCODONE HYDROCHLORIDE 10 MG: 10 TABLET ORAL at 12:12

## 2021-07-17 RX ADMIN — OXYCODONE HYDROCHLORIDE 15 MG: 10 TABLET ORAL at 16:48

## 2021-07-17 RX ADMIN — CHLORHEXIDINE GLUCONATE 15 ML: 1.2 SOLUTION ORAL at 05:14

## 2021-07-17 RX ADMIN — BUPIVACAINE 20 ML: 13.3 INJECTION, SUSPENSION, LIPOSOMAL INFILTRATION at 07:52

## 2021-07-17 RX ADMIN — NEOSTIGMINE METHYLSULFATE 4 MG: 1 INJECTION INTRAVENOUS at 10:01

## 2021-07-17 RX ADMIN — OXYCODONE HYDROCHLORIDE 15 MG: 10 TABLET ORAL at 20:53

## 2021-07-17 RX ADMIN — CLOPIDOGREL BISULFATE 75 MG: 75 TABLET ORAL at 12:12

## 2021-07-17 RX ADMIN — Medication 100 MG: at 08:19

## 2021-07-17 RX ADMIN — HEPARIN SODIUM 5000 UNITS: 5000 INJECTION INTRAVENOUS; SUBCUTANEOUS at 17:56

## 2021-07-17 RX ADMIN — HYDROMORPHONE HYDROCHLORIDE 0.5 MG: 1 INJECTION, SOLUTION INTRAMUSCULAR; INTRAVENOUS; SUBCUTANEOUS at 09:35

## 2021-07-17 RX ADMIN — INSULIN LISPRO 1 UNITS: 100 INJECTION, SOLUTION INTRAVENOUS; SUBCUTANEOUS at 18:01

## 2021-07-17 RX ADMIN — PROPOFOL 200 MG: 10 INJECTION, EMULSION INTRAVENOUS at 08:19

## 2021-07-17 RX ADMIN — GLYCOPYRROLATE 0.6 MG: 0.2 INJECTION, SOLUTION INTRAMUSCULAR; INTRAVENOUS at 10:01

## 2021-07-17 RX ADMIN — GABAPENTIN 300 MG: 300 CAPSULE ORAL at 20:53

## 2021-07-17 RX ADMIN — INSULIN LISPRO 2 UNITS: 100 INJECTION, SOLUTION INTRAVENOUS; SUBCUTANEOUS at 21:00

## 2021-07-17 RX ADMIN — ONDANSETRON 4 MG: 2 INJECTION INTRAMUSCULAR; INTRAVENOUS at 08:19

## 2021-07-17 RX ADMIN — FENTANYL CITRATE 100 MCG: 50 INJECTION INTRAMUSCULAR; INTRAVENOUS at 08:19

## 2021-07-17 RX ADMIN — OXYCODONE HYDROCHLORIDE 10 MG: 10 TABLET ORAL at 04:16

## 2021-07-17 RX ADMIN — Medication 25 MCG: at 11:05

## 2021-07-17 RX ADMIN — PANTOPRAZOLE SODIUM 40 MG: 40 TABLET, DELAYED RELEASE ORAL at 12:11

## 2021-07-17 RX ADMIN — ALBUMIN (HUMAN): 12.5 INJECTION, SOLUTION INTRAVENOUS at 09:24

## 2021-07-17 RX ADMIN — HEPARIN SODIUM 5000 UNITS: 5000 INJECTION INTRAVENOUS; SUBCUTANEOUS at 05:14

## 2021-07-17 RX ADMIN — HYDROMORPHONE HYDROCHLORIDE 0.5 MG: 1 INJECTION, SOLUTION INTRAMUSCULAR; INTRAVENOUS; SUBCUTANEOUS at 01:02

## 2021-07-17 RX ADMIN — PHENYLEPHRINE HYDROCHLORIDE 50 MCG/MIN: 10 INJECTION INTRAVENOUS at 08:27

## 2021-07-17 RX ADMIN — EPHEDRINE SULFATE 5 MG: 50 INJECTION, SOLUTION INTRAVENOUS at 08:28

## 2021-07-17 RX ADMIN — HEPARIN SODIUM 5000 UNITS: 5000 INJECTION INTRAVENOUS; SUBCUTANEOUS at 21:00

## 2021-07-17 RX ADMIN — SODIUM CHLORIDE, SODIUM LACTATE, POTASSIUM CHLORIDE, AND CALCIUM CHLORIDE 100 ML/HR: .6; .31; .03; .02 INJECTION, SOLUTION INTRAVENOUS at 00:10

## 2021-07-17 RX ADMIN — BUPIVACAINE HYDROCHLORIDE 10 ML: 5 INJECTION, SOLUTION PERINEURAL at 07:54

## 2021-07-17 RX ADMIN — HYDROMORPHONE HYDROCHLORIDE 0.5 MG: 1 INJECTION, SOLUTION INTRAMUSCULAR; INTRAVENOUS; SUBCUTANEOUS at 17:55

## 2021-07-17 RX ADMIN — ALBUMIN (HUMAN): 12.5 INJECTION, SOLUTION INTRAVENOUS at 09:44

## 2021-07-17 RX ADMIN — Medication 25 MCG: at 11:02

## 2021-07-17 NOTE — OP NOTE
OPERATIVE REPORT  PATIENT NAME: Rickey George    :  1955  MRN: [id number]  Pt Location: [facility] OR ROOM 06    SURGERY DATE: 2021    Surgeon(s) and Role:     * Melva Ernandez MD - Primary     * Todd Deutsch [facility] Jordan Dozier MD - Assisting    Preop Diagnosis:  Critical lower limb ischemia ([address] ) [I70 229]    Post-Op Diagnosis Codes:     * Critical lower limb ischemia ([address] ) [I70 229]    Procedure(s) (LRB):  AMPUTATION BELOW KNEE (BKA) (Left)    Specimen(s):  ID Type Source Tests Collected by Time Destination   1 : LEFT BKA Tissue Leg, Left TISSUE EXAM Melva Ernandez MD 2021 0857        Estimated Blood Loss:   175 mL    Drains:  Urethral Catheter Latex;Straight-tip 16 Fr  (Active)   Site Assessment Clean;Skin intact 21 1145   Collection Container Standard drainage bag 21 1145   Securement Method Securing device (Describe) 21 1145   Number of days: 0       Anesthesia Type:   Choice    Operative Indications:  Critical lower limb ischemia ([address] ) [I70 229]      Operative Findings:  Healthy bleeding at amp level     Complications:   None    Procedure and Technique:  The procedure was performed under  General anesthesia  The patient was placed supine  The left lower extremity was prepped and draped in the usual sterile fashion  An occlusive dressing was applied to the foot up to the level of the ankle  The anterior skin incision was made 10 cm below the tibial tuberosity and extended medially and laterally towardthe edges of the gastrocnemius muscle  The skin incision was then  extended distally on either side parallel to the tibia for 12  cm,  creating a posterior flap  The skin and subcutaneous tissues were  incised down to the fascia  The greater and short saphenous veins  on the medial and posterior aspects of the leg, respectively, were  ligated and divided   The fascia and the muscles were then divided  with the electrocautery at the same level of the anterior skin incision  The muscles in the anterior and lateral compartment were  divided, exposing the anterior tibial vessels, which were ligated  and divided  The interosseous membrane was then incised  The  tibial periosteum was incised circumferentially with the electrocautery  at the same level of the skin and muscle division  Using a  periosteal elevator, the tibial periosteum was stripped proximally  for 2 cm  The tibia was then transected with a electric saw  2 cm proximal to the skin incision with an anterior bevel  The fibula  was then exposed, dissected circumferentially, and transected  with a bone cutter 2 cm proximal to the tibial division  The amputation was then completed with an amputation knife,  transecting the soleus muscle obliquely and the gastrocnemius  muscle at the same level as the posterior flap  Bleeding soleal  veins and posterior tibial and peroneal vessels were clamped  and oversewn with 0 silk sutures  Sharp bony edges were then  filed, eliminating any bony prominences over the anterior aspect  of the tibia  The fascia of the anterior and posterior muscle flaps  were approximated with interrupted absorbable sutures  The skin  was approximated with interrupted 2-0 nylon sutures and dressed with 4 × 4 gauze, Kerlix, and an stockinet bandage and knee immobilizer    The patient tolerated the procedure well and was taken to the  postanesthesia care       I was present for the entire procedure and I was present for all critical portions of the procedure    Patient Disposition:  PACU     SIGNATURE: Melva Ernandez MD  DATE: July 17, 2021  TIME: 12:03 PM

## 2021-07-17 NOTE — PROGRESS NOTES
Anesthesiologist at bedside  Patient 10/10 post nerve block and pain medication  Dr Kiran Perales states patient can go to floor  Nerve block will "kick in" soon    Dr Kiran Perales aware  Sliding scale in place  Nurse to give insulin on floor

## 2021-07-17 NOTE — PROGRESS NOTES
Progress Note - Vascular Surgery   Stacey Wilder 76 y o  male MRN: [id number]  Unit/Bed#: [facility] 522-01 Encounter: [id number]    Assessment:  77 y/o male w/ PAD, DM, and chronic left foot wounds now s/p L SFA-peroneal GSV graft c/b immediate graft occlusion  Scheduled for L BKA today 7/17 w/ Dr Mario Diop  Hb 12 2    Plan:  Plan for L BKA today 7/17  NPOpM for procedure  PRN analgesia  Monitor H&H postoperatively    Subjective/Objective     Subjective: No acute events overnight, having pain of his calf below the knee, denies other complaints at this time    Objective:    Blood pressure 142/72, pulse 77, temperature 98 8 °F (37 1 °C), resp  rate 18, weight 88 5 kg (195 lb), SpO2 97 %  ,Body mass index is 33 47 kg/m²  Intake/Output Summary (Last 24 hours) at 7/17/2021 0641  Last data filed at 7/17/2021 0401  Gross per 24 hour   Intake 1200 ml   Output 1275 ml   Net -75 ml       Invasive Devices     Peripheral Intravenous Line            Peripheral IV 07/14/21 Dorsal (posterior); Right Forearm 2 days    Peripheral IV 07/14/21 Left Forearm 2 days                Physical Exam:  Gen:    NAD  CV:      warm, well-perfused  Lungs: No respiratory distress  Abd:     soft, NT/ND  Ext:      Palpable L femoral pulse, absent graft signal, absent distal signals, previous well healed TMA with stable L heel wound, motor and sensory intact  Neuro: A&Ox3

## 2021-07-17 NOTE — PROGRESS NOTES
Progress Note - Vascular Surgery   Munira Martinez 76 y o  male MRN: [id number]  Unit/Bed#: [facility] 522-01 Encounter: [id number]    Assessment:  77 y/o male w/ PAD, DM, and chronic left foot wounds now s/p L SFA-peroneal GSV graft c/b immediate graft occlusion  Now s/p L BKA on 7/17  Hb 9 6 (from 12 2)    Plan:  Regular diet  Dressing down 7/19, ok to remove knee immobilizer today, monitor stump  Continue to monitor Hb daily  APS following appreciate recs  PMR consult pending, will follow up recs  PT/OT    Subjective/Objective     Subjective: No acute events overnight, states pain in L BKA stump is much improved, no other complaints    Objective:    Blood pressure (!) 172/67, pulse 82, temperature 97 5 °F (36 4 °C), temperature source Temporal, resp  rate 12, weight 88 5 kg (195 lb), SpO2 99 %  ,Body mass index is 33 47 kg/m²  Intake/Output Summary (Last 24 hours) at 7/17/2021 1411  Last data filed at 7/17/2021 1014  Gross per 24 hour   Intake 2320 ml   Output 1300 ml   Net 1020 ml       Invasive Devices       Peripheral Intravenous Line              Peripheral IV 07/14/21 Dorsal (posterior); Right Forearm 3 days    Peripheral IV 07/14/21 Left Forearm 3 days              Drain              Urethral Catheter Latex;Straight-tip 16 Fr  <1 day                    Physical Exam:  Gen:    NAD  CV:      warm, well-perfused  Lungs: No respiratory distress  Abd:     soft, NT/ND  Ext:      L BKA stump cleanly dressed, knee immobilizer in place, L groin wounds cleanly dressed  Neuro: A&Ox3

## 2021-07-17 NOTE — CONSULTS
Inpatient consult to Acute Pain Service  Consult performed by: Nano Cleveland MD  Consult ordered by: Nano Cleveland MD        Progress Note - Acute Pain Service    Deysi Fatima 76 y o  male MRN: [id number]  Unit/Bed#: OR POOL Encounter: [id number]      Assessment:   Principal Problem:    Critical lower limb ischemia ([address] ) with non-healing ulcer secodnary to PAD    Deysi Fatima is a 76 y o  male  With severe PVD  He had a femoral peroneal bypass on 7/14, but is still having severe pain  He is going today for a left BKA  A popliteal block with exparel will be performed for post-op pain control  Please see procedure note for block details  Plan:   - standard oral opioid regimen with oxycodone 5 mg/10 mg PO q4hrs PRN for moderate/severe pain, Dilaudid 0 5 mg IV q2hrs PRN for breakthrough pain  - Gabapentin 300 mg PO TID    - Docusate (Colace) 100 mg PO twice daily    APS will continue to follow  Please contact Acute Pain Service - [facility] via [employer] from [phone] with additional questions or concerns  See [email or web address] or Jeramy for additional contacts and after hours information      Pain History  Current pain location(s): left lower leg  Pain Scale:   8/10  Quality: burning, aching  24 hour history: uncontrolled    Opioid requirement previous 24 hours: 2/0 mg dilaudid, 35 mg oxycodone    Meds/Allergies   current meds:   Current Facility-Administered Medications   Medication Dose Route Frequency    [MAR Hold] acetaminophen (TYLENOL) tablet 650 mg  650 mg Oral Q4H PRN    [MAR Hold] atorvastatin (LIPITOR) tablet 40 mg  40 mg Oral HS    [MAR Hold] calcium carbonate (TUMS) chewable tablet 500 mg  500 mg Oral TID PRN    ceFAZolin (ANCEF) IVPB (premix in dextrose) 2,000 mg 50 mL  2,000 mg Intravenous Once    [MAR Hold] clopidogrel (PLAVIX) tablet 75 mg  75 mg Oral Daily    [MAR Hold] docusate sodium (COLACE) capsule 100 mg  100 mg Oral BID    [MAR Hold] gabapentin (NEURONTIN) capsule 300 mg  300 mg Oral TID    [MAR Hold] heparin (porcine) subcutaneous injection 5,000 Units  5,000 Units Subcutaneous Q8H [facility]    [MAR Hold] hydrALAZINE (APRESOLINE) injection 10 mg  10 mg Intravenous Q4H PRN    [MAR Hold] HYDROmorphone (DILAUDID) injection 0 5 mg  0 5 mg Intravenous Q3H PRN    [MAR Hold] insulin glargine (LANTUS) subcutaneous injection 30 Units 0 3 mL  30 Units Subcutaneous QAM    [MAR Hold] insulin lispro (HumaLOG) 100 units/mL subcutaneous injection 1-5 Units  1-5 Units Subcutaneous TID AC    [MAR Hold] insulin lispro (HumaLOG) 100 units/mL subcutaneous injection 1-5 Units  1-5 Units Subcutaneous HS    [MAR Hold] insulin lispro (HumaLOG) 100 units/mL subcutaneous injection 10 Units  10 Units Subcutaneous TID With Meals    [MAR Hold] Labetalol HCl (NORMODYNE) injection 10 mg  10 mg Intravenous Q4H PRN    lactated ringers infusion  100 mL/hr Intravenous Continuous    [MAR Hold] melatonin tablet 3 mg  3 mg Oral HS PRN    [MAR Hold] ondansetron (ZOFRAN) injection 4 mg  4 mg Intravenous Q6H PRN    [MAR Hold] oxyCODONE (ROXICODONE) IR tablet 10 mg  10 mg Oral Q4H PRN    [MAR Hold] oxyCODONE (ROXICODONE) IR tablet 5 mg  5 mg Oral Q4H PRN    [MAR Hold] pantoprazole (PROTONIX) EC tablet 40 mg  40 mg Oral Daily Before Breakfast    sterile water irrigation solution    PRN     Facility-Administered Medications Ordered in Other Encounters   Medication Dose Route Frequency    albumin human (FLEXBUMIN) 5 % injection   Intravenous Continuous PRN    ceFAZolin (ANCEF) injection   Intravenous PRN    ePHEDrine injection   Intravenous PRN    fentanyl citrate (PF) 100 MCG/2ML   Intravenous PRN    glycopyrrolate (ROBINUL) injection   Intravenous PRN    HYDROmorphone (DILAUDID) injection   Intravenous PRN    lactated ringers infusion   Intravenous Continuous PRN    lidocaine (PF) (XYLOCAINE-MPF) 1 % injection   Intravenous PRN    neostigmine (BLOXIVERZ) injection   Intravenous PRN    ondansetron (ZOFRAN) injection Intravenous PRN    phenylephrine (EDISON-SYNEPHRINE) 50 mg (STANDARD CONCENTRATION) in sodium chloride 0 9% 250 mL   Intravenous Continuous PRN    phenylephrine bolus from bag   Intravenous PRN    propofol (DIPRIVAN) 200 MG/20ML bolus injection   Intravenous PRN    ROCuronium (ZEMURON) injection   Intravenous PRN    sodium chloride 0 9 % infusion   Intravenous Continuous PRN    Succinylcholine Chloride 100 mg/5 mL syringe   Intravenous PRN       Allergies   Allergen Reactions    Shellfish-Derived Products - Food Allergy Anaphylaxis     Throat closes    Sulfamethoxazole-Trimethoprim Rash       Objective     Temp:  [98 6 °F (37 °C)-99 6 °F (37 6 °C)] 98 8 °F (37 1 °C)  HR:  [77] 77  Resp:  [18] 18  BP: (135-161)/(71-74) 142/72    Physical Exam  Vitals reviewed  Constitutional:       Appearance: Normal appearance  He is normal weight  Comments: In pain   HENT:      Head: Normocephalic  Eyes:      Pupils: Pupils are equal, round, and reactive to light  Cardiovascular:      Rate and Rhythm: Normal rate and regular rhythm  Pulses: Normal pulses  Heart sounds: Normal heart sounds  Pulmonary:      Effort: Pulmonary effort is normal    Musculoskeletal:         General: Normal range of motion  Cervical back: Normal range of motion  Comments: Dressings in place on left leg   Neurological:      General: No focal deficit present  Mental Status: He is alert and oriented to person, place, and time     Psychiatric:         Mood and Affect: Mood normal          Behavior: Behavior normal          Lab Results:   Results from last 7 days   Lab Units 07/17/21  0425   WBC Thousand/uL 9 92   HEMOGLOBIN g/dL 12 2   HEMATOCRIT % 37 3   PLATELETS Thousands/uL 165      Results from last 7 days   Lab Units 07/17/21  0425 07/14/21  1643   POTASSIUM mmol/L 3 6  --    CHLORIDE mmol/L 103  --    CO2 mmol/L 31  --    CO2, I-STAT mmol/L  --  24   BUN mg/dL 13  --    CREATININE mg/dL 0 86  --    CALCIUM mg/dL 9 0  --    GLUCOSE, ISTAT mg/dl  --  229*       Imaging Studies: I have personally reviewed pertinent reports  EKG, Pathology, and Other Studies: I have personally reviewed pertinent reports  Counseling / Coordination of Care  Total floor / unit time spent today 15 minutes  Greater than 50% of total time was spent with the patient and / or family counseling and / or coordination of care  Please note that the APS provides consultative services regarding pain management only  With the exception of ketamine and epidural infusions and except when indicated, final decisions regarding starting or changing doses of analgesic medications are at the discretion of the consulting service  Off hours consultation and/or medication management is generally not available      Latonia Islas MD  Acute Pain Service

## 2021-07-17 NOTE — QUICK NOTE
Post-Op Check    Assessment: 76 y o  M s/p L BKA    Plan:  Regular diet  Dressing down POD2, monitor stump  Follow up post op CBC  APS following, appreciate recs  PMR consult pending, will follow up recs  PT/OT    Subjective:  No acute events since procedure, patient complaining of pain in his L BKA stump, no other complaints at this time    Vitals:    07/17/21 1145   BP: (!) 172/67   Pulse: 82   Resp: 12   Temp:    SpO2: 99%       PE:  Gen: NAD, AAOx3  CV: RRR  Pulm: no resp distress  Abd: Soft, non-distended, non-tender  Ext: L BKA stump cleanly dressed, knee immobilizer in place, L groin wounds cleanly dressed    Corine Howe MD  General Surgery, PGY2

## 2021-07-17 NOTE — ANESTHESIA POSTPROCEDURE EVALUATION
Post-Op Assessment Note    CV Status:  Stable  Pain Score: 0    Pain management: adequate     Mental Status:  Alert and awake   Hydration Status:  Euvolemic   PONV Controlled:  Controlled   Airway Patency:  Patent      Post Op Vitals Reviewed: Yes      Staff: CRNA         No complications documented      BP   177/87   Temp   97 4   Pulse  97   Resp   12   SpO2   98%

## 2021-07-17 NOTE — ANESTHESIA PROCEDURE NOTES
Peripheral Block    Patient location during procedure: holding area  Start time: 7/17/2021 7:40 AM  Reason for block: at surgeon's request and post-op pain management  Staffing  Performed: anesthesiologist   Anesthesiologist: Alfredo Dahl MD  Preanesthetic Checklist  Completed: patient identified, IV checked, site marked, risks and benefits discussed, surgical consent, monitors and equipment checked, pre-op evaluation and timeout performed  Peripheral Block  Patient position: right lateral decubitus  Prep: ChloraPrep  Patient monitoring: continuous pulse ox, frequent blood pressure checks, cardiac monitor and heart rate  Block type: popliteal  Laterality: left  Injection technique: single-shot  Procedures: ultrasound guided, Ultrasound guidance required for the procedure to increase accuracy and safety of medication placement and decrease risk of complications    Ultrasound permanent image savedbupivacaine (MARCAINE) 0 5 % perineural infiltration, 10 mL  Needle  Needle type: Stimuplex   Needle gauge: 22 G  Needle length: 10 cm  Needle localization: ultrasound guidance  Needle insertion depth: 5 cm  Test dose: negative  Assessment  Injection assessment: incremental injection, local visualized surrounding nerve on ultrasound, negative aspiration for heme and no paresthesia on injection  Paresthesia pain: none  Heart rate change: no  Slow fractionated injection: yes  Post-procedure:  site cleaned  patient tolerated the procedure well with no immediate complications

## 2021-07-18 LAB
ABO GROUP BLD BPU: NORMAL
ABO GROUP BLD BPU: NORMAL
ANION GAP SERPL CALCULATED.3IONS-SCNC: 4 MMOL/L (ref 4–13)
BPU ID: NORMAL
BPU ID: NORMAL
BUN SERPL-MCNC: 10 MG/DL (ref 5–25)
CALCIUM SERPL-MCNC: 8.6 MG/DL (ref 8.3–10.1)
CHLORIDE SERPL-SCNC: 100 MMOL/L (ref 100–108)
CO2 SERPL-SCNC: 31 MMOL/L (ref 21–32)
CREAT SERPL-MCNC: 0.88 MG/DL (ref 0.6–1.3)
CROSSMATCH: NORMAL
CROSSMATCH: NORMAL
ERYTHROCYTE [DISTWIDTH] IN BLOOD BY AUTOMATED COUNT: 12.4 % (ref 11.6–15.1)
GFR SERPL CREATININE-BSD FRML MDRD: 90 ML/MIN/1.73SQ M
GLUCOSE SERPL-MCNC: 214 MG/DL (ref 65–140)
GLUCOSE SERPL-MCNC: 231 MG/DL (ref 65–140)
GLUCOSE SERPL-MCNC: 241 MG/DL (ref 65–140)
GLUCOSE SERPL-MCNC: 254 MG/DL (ref 65–140)
GLUCOSE SERPL-MCNC: 277 MG/DL (ref 65–140)
HCT VFR BLD AUTO: 29.4 % (ref 36.5–49.3)
HGB BLD-MCNC: 9.6 G/DL (ref 12–17)
MCH RBC QN AUTO: 32.1 PG (ref 26.8–34.3)
MCHC RBC AUTO-ENTMCNC: 32.7 G/DL (ref 31.4–37.4)
MCV RBC AUTO: 98 FL (ref 82–98)
PLATELET # BLD AUTO: 149 THOUSANDS/UL (ref 149–390)
PMV BLD AUTO: 10.7 FL (ref 8.9–12.7)
POTASSIUM SERPL-SCNC: 3.4 MMOL/L (ref 3.5–5.3)
RBC # BLD AUTO: 2.99 MILLION/UL (ref 3.88–5.62)
SODIUM SERPL-SCNC: 135 MMOL/L (ref 136–145)
UNIT DISPENSE STATUS: NORMAL
UNIT DISPENSE STATUS: NORMAL
UNIT PRODUCT CODE: NORMAL
UNIT PRODUCT CODE: NORMAL
UNIT RH: NORMAL
UNIT RH: NORMAL
WBC # BLD AUTO: 9.49 THOUSAND/UL (ref 4.31–10.16)

## 2021-07-18 PROCEDURE — 85027 COMPLETE CBC AUTOMATED: CPT | Performed by: SURGERY

## 2021-07-18 PROCEDURE — 99232 SBSQ HOSP IP/OBS MODERATE 35: CPT | Performed by: ANESTHESIOLOGY

## 2021-07-18 PROCEDURE — 82948 REAGENT STRIP/BLOOD GLUCOSE: CPT

## 2021-07-18 PROCEDURE — 99024 POSTOP FOLLOW-UP VISIT: CPT | Performed by: SURGERY

## 2021-07-18 PROCEDURE — 80048 BASIC METABOLIC PNL TOTAL CA: CPT | Performed by: SURGERY

## 2021-07-18 RX ADMIN — INSULIN LISPRO 2 UNITS: 100 INJECTION, SOLUTION INTRAVENOUS; SUBCUTANEOUS at 17:13

## 2021-07-18 RX ADMIN — OXYCODONE HYDROCHLORIDE 15 MG: 10 TABLET ORAL at 00:58

## 2021-07-18 RX ADMIN — OXYCODONE HYDROCHLORIDE 10 MG: 10 TABLET ORAL at 12:02

## 2021-07-18 RX ADMIN — INSULIN LISPRO 2 UNITS: 100 INJECTION, SOLUTION INTRAVENOUS; SUBCUTANEOUS at 21:39

## 2021-07-18 RX ADMIN — HEPARIN SODIUM 5000 UNITS: 5000 INJECTION INTRAVENOUS; SUBCUTANEOUS at 05:07

## 2021-07-18 RX ADMIN — INSULIN LISPRO 3 UNITS: 100 INJECTION, SOLUTION INTRAVENOUS; SUBCUTANEOUS at 12:04

## 2021-07-18 RX ADMIN — GABAPENTIN 300 MG: 300 CAPSULE ORAL at 17:12

## 2021-07-18 RX ADMIN — HYDROMORPHONE HYDROCHLORIDE 0.5 MG: 1 INJECTION, SOLUTION INTRAMUSCULAR; INTRAVENOUS; SUBCUTANEOUS at 14:27

## 2021-07-18 RX ADMIN — CLOPIDOGREL BISULFATE 75 MG: 75 TABLET ORAL at 08:43

## 2021-07-18 RX ADMIN — OXYCODONE HYDROCHLORIDE 15 MG: 10 TABLET ORAL at 04:55

## 2021-07-18 RX ADMIN — PANTOPRAZOLE SODIUM 40 MG: 40 TABLET, DELAYED RELEASE ORAL at 06:40

## 2021-07-18 RX ADMIN — HYDROMORPHONE HYDROCHLORIDE 0.5 MG: 1 INJECTION, SOLUTION INTRAMUSCULAR; INTRAVENOUS; SUBCUTANEOUS at 18:33

## 2021-07-18 RX ADMIN — DOCUSATE SODIUM 100 MG: 100 CAPSULE, LIQUID FILLED ORAL at 17:12

## 2021-07-18 RX ADMIN — GABAPENTIN 300 MG: 300 CAPSULE ORAL at 21:36

## 2021-07-18 RX ADMIN — INSULIN LISPRO 2 UNITS: 100 INJECTION, SOLUTION INTRAVENOUS; SUBCUTANEOUS at 08:43

## 2021-07-18 RX ADMIN — HEPARIN SODIUM 5000 UNITS: 5000 INJECTION INTRAVENOUS; SUBCUTANEOUS at 14:27

## 2021-07-18 RX ADMIN — HEPARIN SODIUM 5000 UNITS: 5000 INJECTION INTRAVENOUS; SUBCUTANEOUS at 21:36

## 2021-07-18 RX ADMIN — INSULIN GLARGINE 30 UNITS: 100 INJECTION, SOLUTION SUBCUTANEOUS at 08:42

## 2021-07-18 RX ADMIN — GABAPENTIN 300 MG: 300 CAPSULE ORAL at 08:43

## 2021-07-18 RX ADMIN — DOCUSATE SODIUM 100 MG: 100 CAPSULE, LIQUID FILLED ORAL at 12:03

## 2021-07-18 RX ADMIN — OXYCODONE HYDROCHLORIDE 15 MG: 10 TABLET ORAL at 21:36

## 2021-07-18 RX ADMIN — HYDROMORPHONE HYDROCHLORIDE 0.5 MG: 1 INJECTION, SOLUTION INTRAMUSCULAR; INTRAVENOUS; SUBCUTANEOUS at 02:13

## 2021-07-18 RX ADMIN — OXYCODONE HYDROCHLORIDE 15 MG: 10 TABLET ORAL at 17:12

## 2021-07-18 RX ADMIN — ATORVASTATIN CALCIUM 40 MG: 40 TABLET, FILM COATED ORAL at 21:36

## 2021-07-18 NOTE — CASE MANAGEMENT
PT IS NOT A 30 DAYS READMISSION  PT IS NOT A BUNDLE  CM met with pt to discuss DCP and cm role  Pt lives with spouse in a 2sh with 3ste from the front and 4 from the rear with about 10 steps up to the deck  Prior to admission pt was using crutches with ambulation and was independent with ADLS  No prior hx of VNA  Pt's preference for pharmacy is [facility] in [place]  Pt denies any hx of drugs/ETOH or inpt psych stays  CM reviewed d/c planning process including the following: identifying help at home, patient preference for d/c planning needs, Discharge Lounge, [facility] Meds to Bed program, availability of treatment team to discuss questions or concerns patient and/or family may have regarding understanding medications and recognizing signs and symptoms once discharged  CM also encouraged patient to follow up with all recommended appointments after discharge  Patient advised of importance for patient and family to participate in managing patients medical well being

## 2021-07-18 NOTE — QUICK NOTE
I reviewed pts blood sugars    - he was in the OR yesterday did not receive lantus 30 units in the morning  - today first dayon lantus 30/ humalog 10 TID, monitor sugars, will adjust insulin regimen tomorrow

## 2021-07-18 NOTE — PROGRESS NOTES
Consults  Progress Note - Acute Pain Service    Lawyer Thompson 76 y o  male MRN: [id number]  Unit/Bed#: [facility] 522-01 Encounter: [id number]      Assessment:   Principal Problem:    Critical lower limb ischemia ([address] ) with non-healing ulcer secodnary to PAD    Lawyer Thompson is a 76 y o  male  With severe PVD  He had a femoral peroneal bypass on 7/14, and a BKA on 7/17  He received a popliteal block with exparel for post-op pain control  Last night his pain was severe, and his oxycodone was increased, but this morning he is more comfortable  Plan:   - Oxycodone 10-15 mg q4h prn  Dilaudid 0 5 mg IV q2hr PRN for breakthrough pain  - Gabapentin 300 mg PO TID    - Docusate (Colace) 100 mg PO twice daily    APS will continue to follow  Please contact Acute Pain Service - [facility] via [employer] from [phone] with additional questions or concerns  See Lisha or Jeramy for additional contacts and after hours information      Pain History  Current pain location(s): left lower leg  Pain Scale:   5/10  Quality: burning, aching  24 hour history: better today    Opioid requirement previous 24 hours: 3 5 mg dilaudid, 80 mg oxycodone    Meds/Allergies   current meds:   Current Facility-Administered Medications   Medication Dose Route Frequency    acetaminophen (TYLENOL) tablet 650 mg  650 mg Oral Q4H PRN    atorvastatin (LIPITOR) tablet 40 mg  40 mg Oral HS    calcium carbonate (TUMS) chewable tablet 500 mg  500 mg Oral TID PRN    clopidogrel (PLAVIX) tablet 75 mg  75 mg Oral Daily    docusate sodium (COLACE) capsule 100 mg  100 mg Oral BID    gabapentin (NEURONTIN) capsule 300 mg  300 mg Oral TID    heparin (porcine) subcutaneous injection 5,000 Units  5,000 Units Subcutaneous Q8H [address]    hydrALAZINE (APRESOLINE) injection 10 mg  10 mg Intravenous Q4H PRN    HYDROmorphone (DILAUDID) injection 0 5 mg  0 5 mg Intravenous Q2H PRN    insulin glargine (LANTUS) subcutaneous injection 30 Units 0 3 mL  30 Units Subcutaneous QAM    insulin lispro (HumaLOG) 100 units/mL subcutaneous injection 1-5 Units  1-5 Units Subcutaneous TID AC    insulin lispro (HumaLOG) 100 units/mL subcutaneous injection 1-5 Units  1-5 Units Subcutaneous HS    insulin lispro (HumaLOG) 100 units/mL subcutaneous injection 10 Units  10 Units Subcutaneous TID With Meals    Labetalol HCl (NORMODYNE) injection 10 mg  10 mg Intravenous Q4H PRN    melatonin tablet 3 mg  3 mg Oral HS PRN    ondansetron (ZOFRAN) injection 4 mg  4 mg Intravenous Q6H PRN    oxyCODONE (ROXICODONE) immediate release tablet 10 mg  10 mg Oral Q4H PRN    oxyCODONE (ROXICODONE) IR tablet 15 mg  15 mg Oral Q4H PRN    pantoprazole (PROTONIX) EC tablet 40 mg  40 mg Oral Daily Before Breakfast       Allergies   Allergen Reactions    Shellfish-Derived Products - Food Allergy Anaphylaxis     Throat closes    Sulfamethoxazole-Trimethoprim Rash       Objective     Temp:  [98 2 °F (36 8 °C)-100 4 °F (38 °C)] 100 4 °F (38 °C)  HR:  [89-97] 95  Resp:  [16-18] 18  BP: (124-149)/(67-71) 124/67    Physical Exam  Vitals reviewed  Constitutional:       Appearance: Normal appearance  He is normal weight  Comments: In pain   HENT:      Head: Normocephalic  Eyes:      Pupils: Pupils are equal, round, and reactive to light  Cardiovascular:      Rate and Rhythm: Normal rate and regular rhythm  Pulses: Normal pulses  Heart sounds: Normal heart sounds  Pulmonary:      Effort: Pulmonary effort is normal    Musculoskeletal:         General: Normal range of motion  Cervical back: Normal range of motion  Comments: Dressings in place on left leg   Neurological:      General: No focal deficit present  Mental Status: He is alert and oriented to person, place, and time     Psychiatric:         Mood and Affect: Mood normal          Behavior: Behavior normal          Lab Results:   Results from last 7 days   Lab Units 07/18/21  0506   WBC Thousand/uL 9 49   HEMOGLOBIN g/dL 9 6*   HEMATOCRIT % 29 4*   PLATELETS Thousands/uL 149      Results from last 7 days   Lab Units 07/18/21  0506 07/14/21  1643   POTASSIUM mmol/L 3 4*  --    CHLORIDE mmol/L 100  --    CO2 mmol/L 31  --    CO2, I-STAT mmol/L  --  24   BUN mg/dL 10  --    CREATININE mg/dL 0 88  --    CALCIUM mg/dL 8 6  --    GLUCOSE, ISTAT mg/dl  --  229*       Imaging Studies: I have personally reviewed pertinent reports  EKG, Pathology, and Other Studies: I have personally reviewed pertinent reports  Counseling / Coordination of Care  Total floor / unit time spent today 15 minutes  Greater than 50% of total time was spent with the patient and / or family counseling and / or coordination of care  Please note that the APS provides consultative services regarding pain management only  With the exception of ketamine and epidural infusions and except when indicated, final decisions regarding starting or changing doses of analgesic medications are at the discretion of the consulting service  Off hours consultation and/or medication management is generally not available      Randy Tejada MD  Acute Pain Service

## 2021-07-19 LAB
ANION GAP SERPL CALCULATED.3IONS-SCNC: 7 MMOL/L (ref 4–13)
BUN SERPL-MCNC: 14 MG/DL (ref 5–25)
CALCIUM SERPL-MCNC: 9.3 MG/DL (ref 8.3–10.1)
CHLORIDE SERPL-SCNC: 99 MMOL/L (ref 100–108)
CO2 SERPL-SCNC: 24 MMOL/L (ref 21–32)
CREAT SERPL-MCNC: 0.9 MG/DL (ref 0.6–1.3)
ERYTHROCYTE [DISTWIDTH] IN BLOOD BY AUTOMATED COUNT: 12.5 % (ref 11.6–15.1)
GFR SERPL CREATININE-BSD FRML MDRD: 89 ML/MIN/1.73SQ M
GLUCOSE SERPL-MCNC: 198 MG/DL (ref 65–140)
GLUCOSE SERPL-MCNC: 246 MG/DL (ref 65–140)
GLUCOSE SERPL-MCNC: 261 MG/DL (ref 65–140)
GLUCOSE SERPL-MCNC: 294 MG/DL (ref 65–140)
GLUCOSE SERPL-MCNC: 327 MG/DL (ref 65–140)
HCT VFR BLD AUTO: 33.1 % (ref 36.5–49.3)
HGB BLD-MCNC: 10.6 G/DL (ref 12–17)
MCH RBC QN AUTO: 31.9 PG (ref 26.8–34.3)
MCHC RBC AUTO-ENTMCNC: 32 G/DL (ref 31.4–37.4)
MCV RBC AUTO: 100 FL (ref 82–98)
PLATELET # BLD AUTO: 177 THOUSANDS/UL (ref 149–390)
PMV BLD AUTO: 10.2 FL (ref 8.9–12.7)
POTASSIUM SERPL-SCNC: 3.7 MMOL/L (ref 3.5–5.3)
RBC # BLD AUTO: 3.32 MILLION/UL (ref 3.88–5.62)
SODIUM SERPL-SCNC: 130 MMOL/L (ref 136–145)
WBC # BLD AUTO: 10.33 THOUSAND/UL (ref 4.31–10.16)

## 2021-07-19 PROCEDURE — 99024 POSTOP FOLLOW-UP VISIT: CPT | Performed by: SURGERY

## 2021-07-19 PROCEDURE — 97167 OT EVAL HIGH COMPLEX 60 MIN: CPT

## 2021-07-19 PROCEDURE — 99222 1ST HOSP IP/OBS MODERATE 55: CPT | Performed by: PHYSICAL MEDICINE & REHABILITATION

## 2021-07-19 PROCEDURE — 85027 COMPLETE CBC AUTOMATED: CPT | Performed by: STUDENT IN AN ORGANIZED HEALTH CARE EDUCATION/TRAINING PROGRAM

## 2021-07-19 PROCEDURE — 82948 REAGENT STRIP/BLOOD GLUCOSE: CPT

## 2021-07-19 PROCEDURE — 80048 BASIC METABOLIC PNL TOTAL CA: CPT | Performed by: STUDENT IN AN ORGANIZED HEALTH CARE EDUCATION/TRAINING PROGRAM

## 2021-07-19 PROCEDURE — 97163 PT EVAL HIGH COMPLEX 45 MIN: CPT

## 2021-07-19 PROCEDURE — 99232 SBSQ HOSP IP/OBS MODERATE 35: CPT | Performed by: ANESTHESIOLOGY

## 2021-07-19 PROCEDURE — 97535 SELF CARE MNGMENT TRAINING: CPT

## 2021-07-19 RX ORDER — INSULIN GLARGINE 100 [IU]/ML
35 INJECTION, SOLUTION SUBCUTANEOUS EVERY MORNING
Status: DISCONTINUED | OUTPATIENT
Start: 2021-07-20 | End: 2021-07-20 | Stop reason: HOSPADM

## 2021-07-19 RX ORDER — LISINOPRIL 10 MG/1
10 TABLET ORAL DAILY
Status: DISCONTINUED | OUTPATIENT
Start: 2021-07-19 | End: 2021-07-20 | Stop reason: HOSPADM

## 2021-07-19 RX ORDER — POTASSIUM CHLORIDE 20 MEQ/1
20 TABLET, EXTENDED RELEASE ORAL ONCE
Status: COMPLETED | OUTPATIENT
Start: 2021-07-19 | End: 2021-07-19

## 2021-07-19 RX ADMIN — HYDROMORPHONE HYDROCHLORIDE 0.5 MG: 1 INJECTION, SOLUTION INTRAMUSCULAR; INTRAVENOUS; SUBCUTANEOUS at 02:20

## 2021-07-19 RX ADMIN — INSULIN LISPRO 3 UNITS: 100 INJECTION, SOLUTION INTRAVENOUS; SUBCUTANEOUS at 08:42

## 2021-07-19 RX ADMIN — INSULIN LISPRO 2 UNITS: 100 INJECTION, SOLUTION INTRAVENOUS; SUBCUTANEOUS at 17:16

## 2021-07-19 RX ADMIN — HEPARIN SODIUM 5000 UNITS: 5000 INJECTION INTRAVENOUS; SUBCUTANEOUS at 04:40

## 2021-07-19 RX ADMIN — INSULIN LISPRO 3 UNITS: 100 INJECTION, SOLUTION INTRAVENOUS; SUBCUTANEOUS at 11:38

## 2021-07-19 RX ADMIN — GABAPENTIN 300 MG: 300 CAPSULE ORAL at 16:48

## 2021-07-19 RX ADMIN — INSULIN GLARGINE 30 UNITS: 100 INJECTION, SOLUTION SUBCUTANEOUS at 08:39

## 2021-07-19 RX ADMIN — OXYCODONE HYDROCHLORIDE 10 MG: 10 TABLET ORAL at 11:41

## 2021-07-19 RX ADMIN — PANTOPRAZOLE SODIUM 40 MG: 40 TABLET, DELAYED RELEASE ORAL at 04:40

## 2021-07-19 RX ADMIN — HYDROMORPHONE HYDROCHLORIDE 0.5 MG: 1 INJECTION, SOLUTION INTRAMUSCULAR; INTRAVENOUS; SUBCUTANEOUS at 08:45

## 2021-07-19 RX ADMIN — DOCUSATE SODIUM 100 MG: 100 CAPSULE, LIQUID FILLED ORAL at 08:39

## 2021-07-19 RX ADMIN — INSULIN LISPRO 1 UNITS: 100 INJECTION, SOLUTION INTRAVENOUS; SUBCUTANEOUS at 21:41

## 2021-07-19 RX ADMIN — OXYCODONE HYDROCHLORIDE 15 MG: 10 TABLET ORAL at 21:41

## 2021-07-19 RX ADMIN — RIVAROXABAN 2.5 MG: 2.5 TABLET, FILM COATED ORAL at 16:48

## 2021-07-19 RX ADMIN — CLOPIDOGREL BISULFATE 75 MG: 75 TABLET ORAL at 08:39

## 2021-07-19 RX ADMIN — OXYCODONE HYDROCHLORIDE 15 MG: 10 TABLET ORAL at 16:44

## 2021-07-19 RX ADMIN — DOCUSATE SODIUM 100 MG: 100 CAPSULE, LIQUID FILLED ORAL at 17:16

## 2021-07-19 RX ADMIN — HYDROMORPHONE HYDROCHLORIDE 0.5 MG: 1 INJECTION, SOLUTION INTRAMUSCULAR; INTRAVENOUS; SUBCUTANEOUS at 23:36

## 2021-07-19 RX ADMIN — RIVAROXABAN 2.5 MG: 2.5 TABLET, FILM COATED ORAL at 10:10

## 2021-07-19 RX ADMIN — POTASSIUM CHLORIDE 20 MEQ: 1500 TABLET, EXTENDED RELEASE ORAL at 08:42

## 2021-07-19 RX ADMIN — GABAPENTIN 300 MG: 300 CAPSULE ORAL at 21:41

## 2021-07-19 RX ADMIN — OXYCODONE HYDROCHLORIDE 15 MG: 10 TABLET ORAL at 03:46

## 2021-07-19 RX ADMIN — HYDROMORPHONE HYDROCHLORIDE 0.5 MG: 1 INJECTION, SOLUTION INTRAMUSCULAR; INTRAVENOUS; SUBCUTANEOUS at 20:07

## 2021-07-19 RX ADMIN — GABAPENTIN 300 MG: 300 CAPSULE ORAL at 08:39

## 2021-07-19 RX ADMIN — ATORVASTATIN CALCIUM 40 MG: 40 TABLET, FILM COATED ORAL at 21:41

## 2021-07-19 NOTE — PLAN OF CARE
Problem: OCCUPATIONAL THERAPY ADULT  Goal: Performs self-care activities at highest level of function for planned discharge setting  See evaluation for individualized goals  Description: Treatment Interventions: ADL retraining, Functional transfer training, UE strengthening/ROM, Endurance training, Patient/family training, Equipment evaluation/education, Compensatory technique education, Continued evaluation, Energy conservation, Activityengagement  Equipment Recommended: Bedside commode, Shower transfer bench ($$) (RW)       See flowsheet documentation for full assessment, interventions and recommendations  Note: Limitation: Decreased ADL status, Decreased Safe judgement during ADL, Decreased endurance, Decreased self-care trans, Decreased high-level ADLs  Prognosis: Good  Assessment: Pt is a 76 y o  male seen for OT evaluation after admission to [facility] 7/14/2021 with Critical lower limb ischemia, s/p L BKA 7/17  Pt  has a past medical history of Arthritis, First degree AV block, Full dentures, Hypertension, PAD (peripheral artery disease) , PVD (peripheral vascular disease), Type 2 diabetes mellitus with diabetic peripheral angiopathy without gangrene, and Wound, open    Contexts influencing pts occupational performance include living in a [address] [place] [address] with his wife  PTA, Pt reports independence with ADLs, IADLs (+ driving), and functional mobililty w/ crutches  Pt drives at baseline  At time of evaluation pt is independent self-feeding, setup for grooming, setup for UB ADLs, Min A  for LB ADLs, Min A  for toileting, supervision for bed mobiltiy, Min A  functional transfers w/ RW  Pt currently presents with impairments in the following categories -steps to enter environment, difficulty performing ADLS and difficulty performing IADLS  activity tolerance, endurance, standing balance/tolerance, sitting balance/tolerance, UE strength and safety    These impairments, as well as pt's pain, WBS , risk for falls and home environment limit pt's ability to safely engage in all baseline areas of occupation, includingbathing, dressing, toileting, functional mobility/transfers, community mobility, driving, house maintenance, meal prep, cleaning, social participation  and leisure activities   Pt would benefit from continued OT tx 3-5x/wk to promote safety and in order to return to prior level of functioning  When medically stable, OT recommended discharge to home        OT Discharge Recommendation: Home with home health rehabilitation (home OT and increased family assist/support)  OT - OK to Discharge: Yes (when medically stable )

## 2021-07-19 NOTE — PLAN OF CARE
Problem: PHYSICAL THERAPY ADULT  Goal: Performs mobility at highest level of function for planned discharge setting  See evaluation for individualized goals  Description: Treatment/Interventions: Functional transfer training, LE strengthening/ROM, Therapeutic exercise, Endurance training, Bed mobility, Gait training, Equipment eval/education  Equipment Recommended: Wheelchair, Agustina Mon       See flowsheet documentation for full assessment, interventions and recommendations  7/19/2021 1416 by Alexander Alvarado PT  Note: Prognosis: Good  Problem List: Decreased mobility, Impaired balance, Pain, Orthopedic restrictions  Assessment: Pt is a 77 yo male admitted to [place] [address] on 7/14/2021 s/p surgery for bypass of femoral-peroneal artery on L  Pt had surgery on 7/17 for L BKA  Dx: PAD, DM and chronic L foot wounds  Two patient identifiers were used to confirm  Pt lives in a [facility] with ramp entrance  Pt lives with his wife who can assist  Pt drives and is retired  Pt's impairments include reduced mobility, hgih risk of falling, impulsive, pain, WB status  These impairments limit the ability of the patient to perform mobility without increased assistance, return to PLOF and participate in everyday life activities  Pt would benefit from continued skilled therapy while in the hospital to improve overall mobility and work towards a safe d/c  Recommend discharge to home with home PT  At the end of the session the patient was left in seated position with call bell and phone within reach  The patient's AM-PAC Basic Mobility Inpatient Short Form Raw Score is 18, Standardized Score is 41 05  A standardized score less than 42 9 suggests the patient may benefit from discharge to post-acute rehabilitation services  Please also refer to the recommendation of the Physical Therapist for safe discharge planning  Anticipate the patient will progress to home with home PT              PT Discharge Recommendation: Home with home health rehabilitation     PT - OK to Discharge: No    See flowsheet documentation for full assessment

## 2021-07-19 NOTE — QUICK NOTE
Bijan Higgins is a 76y o  year old male with type 2 diabetes admitted with nonhealing left foot wounds status post left BKA on 07/17  Feels well  No complaints  No hypoglycemia  Currently patient is on Lantus 30 units q a m  And lispro 10 units t i d  A c  He continues to have fasting as well as postprandial hyperglycemia  We recommend increasing Lantus to 35 units q a m  And lispro to 15 units t i d  A c    Use correctional insulin as needed and continue glucose monitoring further adjustment

## 2021-07-19 NOTE — TELEPHONE ENCOUNTER
Left message for patient to call and possibly schedule colonoscopy per Dr Virginia Velasquez  Will call again in 2 weeks if he doesn't return call

## 2021-07-19 NOTE — UTILIZATION REVIEW
Continued Stay Review    Date: 7/19/2021                     Current Patient Class: inpatient  Current Level of Care: Med-Surg  HPI:65 y o  male initially admitted on 7/14 for elective  femoral-peroneal artery bypass with  reverse GSV and balloon angioplasty peroneal artery   Suresh Oden   attempted Left SFA to distal peroneal bypass with reversed GSV, intra-op angiogram, balloon angioplasty of distal anastomosis and peroneal runoff with immediate post op graft thrombosis  Assessment/Plan: S/p L BKA on 7/17  Pt still with some neuropathic pain in L BKA  Dressing down today, ok to remove knee immobilizer today, monitor stump  Dressing changed today by surgery  Continue to monitor Hgb today  APS following for pain  PT/OT evals  Restart Xarelto  PMR consulted sting pt being mobilized with PT, recommend ARC if pt still requiring assistance at time of d/c   Reg diet, SCD's      Vital Signs:   Date/Time  Temp  Pulse  Resp  BP  MAP (mmHg)  SpO2  Calculated FIO2 (%) - Nasal Cannula  O2 Flow Rate (L/min)  Nasal Cannula O2 Flow Rate (L/min)  O2 Device   07/19/21 14:12:49  99 7 °F (37 6 °C)  89  18  110/41Abnormal   64  97 %  --  --  --  --   07/19/21 03:43:07  --  95  --  138/66  90  95 %  --  --  --  None (Room air)   07/18/21 23:27:05  99 9 °F (37 7 °C)  98  18  151/70  97  96 %  --  --  --  --   07/18/21 2000  --  --  --  --  --  --  --  --  --  None (Room air)   07/17/21 1145  --  82  12  172/67Abnormal   --  99 %  28  --  2 L/min  Nasal cannula   07/17/21 1130  97 5 °F (36 4 °C)  82  11Abnormal   172/67Abnormal   --  99 %  28  --  2 L/min  Nasal cannula   07/17/21 1115  --  92  11Abnormal   173/71Abnormal   --  98 %  --  --  --  --   07/17/21 1052  97 4 °F (36 3 °C)Abnormal   96  --  177/87Abnormal   --  100 %  --  6 L/min  --  Simple mask   07/17/21 06:12:30  98 8 °F (37 1 °C)  --  --  142/72  95  --  --  --  --  --       Pertinent Labs/Diagnostic Results:     Results from last 7 days   Lab Units 07/19/21  0456 07/18/21  0460 650 mg, Oral, Q4H PRN  calcium carbonate, 500 mg, Oral, TID PRN  hydrALAZINE, 10 mg, Intravenous, Q4H PRN  HYDROmorphone, 0 5 mg, Intravenous, Q2H PRN 7/18 x3, 7/19 x2  Labetalol HCl, 10 mg, Intravenous, Q4H PRN  melatonin, 3 mg, Oral, HS PRN  ondansetron, 4 mg, Intravenous, Q6H PRN  oxyCODONE, 10 mg, Oral, Q4H PRN 7/18 x1, 7/19 x1  oxyCODONE, 15 mg, Oral, Q4H PRN 7/18 x4, 7/19 x1        Discharge Plan: TBD    Network Utilization Review Department  ATTENTION: Please call with any questions or concerns to [phone] and carefully listen to the prompts so that you are directed to the right person  All voicemails are confidential   Cari Bentley all requests for admission clinical reviews, approved or denied determinations and any other requests to dedicated fax number below belonging to the campus where the patient is receiving treatment   List of dedicated fax numbers for the Facilities:  [address] DENIALS (Administrative/Medical Necessity) [phone]   [address] (Maternity/NICU/Pediatrics) [phone] [address] [address] [place] [address] Alexander Adam 14[address] [facility] [place] [address] [phone]

## 2021-07-19 NOTE — DISCHARGE INSTRUCTIONS
DISCHARGE INSTRUCTIONS  LOWER EXTREMITY AMPUTATION    Following discharge from the hospital, you may have some questions about your operation, your activities or your general condition  These instructions may answer some of your questions and help you adjust during the first few weeks following your operation  REHABILITATION:   All patients require some form of rehabilitation after this procedure  This will assist with your return to daily activities by incorporating exercise and balance training  This may be in the form of visiting nurses and physical therapy in your home  However, admission to a rehabilitation facility is also common for a period of time before you return home  ACTIVITY:  Your limitations for activity will be discussed prior to discharge  Physical therapy and rehab staff will direct progression of your activity based on your progression in the physical therapy  Please follow their recommendations closely  DIET:  Resume your normal diet  Try to eat low fat and low cholesterol foods  INCISION:  Wash incision daily with soap and water  Pat dry thoroughly  Clean, dry gauze and ACE wrap to assist with edema control/stump shrinkage  It is normal to have swelling or discoloration around the incision  If increasing redness or pain develops, call our office immediately  You will have stitches or staples present after your surgery and these will be evaluated at your first post-operative visit  If any of your incisions are open and require dressing changes, you will be given instructions for your daily incision care  If you are not able to change the dressings, a visiting nurse will be arranged  PLEASE CALL THE OFFICE IF YOU HAVE ANY QUESTIONS  [phone] [facility] [phone] [facility] [phone]  [address] [place] [address] , [address]  600 [facility] 20, [address], 210 [facility]  [address]   [address] [place] [address], [place], [address]  [address]  [address] [place] [address] 1215 [facility], 1st Floor, [address]  [address], [address] [facility], 6001 E [facility] 97   1201 [facility], 8614 [facility], 960 [facility]  [address], 532 [facility], One [facility], [address], Susie [address] 6         ___________________________________________________________________________________________________      DISCHARGE INSTRUCTIONS  LEG SURGERY    Following discharge from the hospital, you may have some questions about your operation, your activities or your general condition  These instructions may answer some of your questions and help you adjust during the first few weeks following your operation  ACTIVITY:  Limit your physical activity to slow walking  Avoid climbing or heavy lifting for the first four weeks after surgery  Initially some discomfort will be felt when walking as the skin and muscle tissues heal  You may require help with walking or feel more secure with someone to lean on  Walking up steps and normal activities may be resumed, as you feel ready  You should not drive a car for one week following discharge from the hospital   You may ride in a car for short trips upon discharge  DIET:  Resume your normal diet  Try to eat low fat and low cholesterol foods  Good nutrition is important for healing of your incision  INCISION:  You may include the operated area in a shower on the third day following surgery  Wash your incision daily with soap and water  Pat the incision dry and leave open to air  Do not apply lotions, ointments, creams or powders to incision  Sitting in a tub is not recommended for the first two weeks following surgery or if you have any open wounds  It is normal to have swelling or discoloration around the incision  If increasing redness or pain develops, call our office immediately  Numbness in the region of the incision may occur following the surgery  This normally resolves in six to twelve months  If any of your incisions are open and require dressing changes, you will be given instruction for your daily incision care  If you are not able to change the dressings, a visiting nurse will be arranged  Your physician may have chosen to use a type of adhesive glue to close your incision  There are stitches present under the skin which will absorb on their own  The glue is used to cover the incision, assist in closure, and prevent contamination  This adhesive will darken and peel away on its own within one to two weeks  Alternatively, your surgeon may have chosen to use skin staples to close your incision which will be removed in the office at the time of your follow-up appointment  You may wash the incision with the staples present  LEG SWELLING: Most patients have noticeable leg swelling after leg surgery  This usually disappears within a few weeks  If swelling is present, elevate the leg whenever possible  Avoid sitting with the leg hanging down for prolonged time periods  Walking is beneficial   An ACE bandage or support stocking may be helpful, but this should be discussed with your physician prior to use  FOLLOW UP STUDIES:  Doppler ultrasound studies are very important to your post-operative care  Your surgeon will arrange for them at your first postoperative visit  Repeat studies are then scheduled every three months for the first year and periodically after this  Appt w/ Chance Maki, PAC: 8/2/2021 at 9:30am, [facility] office  1201 [facility], [address] [place] [address], 960 [facility]    PLEASE CALL THE OFFICE IF YOU HAVE ANY QUESTIONS    [phone] Mark Armijo [phone] [facility] FREE [phone]  [address] [place] [address] , [address] [facility], [address]  [address], [address], 210 [facility]  [address]   [address], [place] [address]  611 [facility], [facility], [address] [facility] [address]  [address], 1st Floor, [address]  [address], [address] [facility], [address] [facility], [address] [place] [address] Lilian SOLORZANO [facility] 97   1202 [facility], 8614 [facility], 960 [facility]  One [facility], [address] [place] [address], [facility],[address], [facility]

## 2021-07-19 NOTE — PHYSICAL THERAPY NOTE
Physical Therapy evaluation note     Patient Name: Lawyer Thompson    FPHVR'J Date: 7/19/2021     Problem List  Principal Problem:    Critical lower limb ischemia ([facility] ) with non-healing ulcer secodnary to PAD       Past Medical History  Past Medical History:   Diagnosis Date    Arthritis     fingers    First degree AV block     Full dentures     Hypertension     PAD (peripheral artery disease) ([facility] )     PVD (peripheral vascular disease) ([place] [id number] )     Type 2 diabetes mellitus with diabetic peripheral angiopathy without gangrene ([facility] ) 5/18/2021    Per CMS ICD 10 guidelines--Per Physician    Wound, open     right foot- by the 5th toe        Past Surgical History  Past Surgical History:   Procedure Laterality Date    CHOLECYSTECTOMY      COLONOSCOPY      IR AORTAGRAM WITH RUN-OFF  1/28/2021    IR AORTAGRAM WITH RUN-OFF  4/13/2021    LEG AMPUTATION THROUGH LOWER TIBIA AND FIBULA Left 7/17/2021    Procedure: AMPUTATION BELOW KNEE (BKA);   Surgeon: Jovana Suero MD;  [facility];  Service: Vascular    MA DEBRIDEMENT, SKIN, SUB-Q TISSUE,MUSCLE,BONE,=<20 SQ CM Right 12/18/2020    Procedure: DEBRIDMENT OF ULCER , REMOVAL OF DEVITALIZED SKIN, SOFT TISSUE, BONE AND APPLICATION OF INTEGRA GRAFT RIGHT FOOT ;  Surgeon: Yon Cheema DPM;  Location: [address] [place] [address];  Service: Podiatry    MA VEIN BYPASS GRAFT,FEM-TIBIAL Left 7/14/2021    Procedure: BYPASS femoral-peroneal artery bypass with  reverse GSV and balloon angioplasty peroneal artery;  Surgeon: Jovana Suero MD;  [facility];  Service: Vascular    REPLANTATION THUMB Right     right tip reconnected    SKIN GRAFT      right thumb    TOE AMPUTATION      left foot all 5 toes removed    TOE AMPUTATION Right 2/2/2021    Procedure: Right partial 5th ray amputation;  Surgeon: Gilberto Avila DPM;  [facility];  Service: Podiatry    TOE OSTEOTOMY Right 6/26/2020    Procedure: exostosis of right big toe;  Surgeon: Miguelina Mosley DPM;  [facility]: [facility];  Service: Podiatry    TRIGGER FINGER RELEASE      bilateral hands    VEIN LIGATION      right      07/19/21 0914   PT Last Visit   PT Visit Date 07/19/21   Note Type   Note type Evaluation   Pain Assessment   Pain Assessment Tool 0-10   Pain Score 6   Pain Location/Orientation Orientation: Left; Location: Leg   Home Living   Type of [address] Two level   Additional Comments Pt lives with his wife in a [facility] with ramp entrance  Pt's son is in the process of building ramp  Pt has a FFSU if needed if he sleeps in the recliner  Pt was I for ADL's and mobility prior  Pt owns crutches  Pt's wife is home and can assist if needed  Prior Function   Level of [place] Independent with ADLs and functional mobility   Lives With Spouse   Receives Help From Family   ADL Assistance Independent   IADLs Independent   Falls in the last 6 months 0   Vocational Retired   Restrictions/Precautions   [place]s [place] Bearing Precautions Per Order Yes   LLE Weight Bearing Per Order NWB  (BKA on LLE)   Braces or Orthoses LE Immobilizer  (per vascular patient does not need knee immobilizer on 7/19)   Other Precautions Multiple lines;Telemetry; Fall Risk;Pain   General   Family/Caregiver Present No   Cognition   Overall Cognitive Status WFL   Arousal/Participation Alert   Attention Within functional limits   Orientation Level Oriented X4   Memory Within functional limits   Following Commands Follows all commands and directions without difficulty   RLE Assessment   RLE Assessment WFL   Coordination   Sensation WFL   Light Touch   RLE Light Touch Grossly intact   LLE Light Touch Grossly intact   Bed Mobility   Supine to Sit 5  Supervision   Additional Comments sitting EOB at a S level   Transfers   Sit to Stand   (CGA)   Stand to Sit   (CGA, pt requested no physical assistance at this time, impul)   Stand pivot   (CGA)   Additional items Impulsive   Balance   Static Sitting Fair +   Dynamic Sitting Fair   Static Standing Fair -   Dynamic Standing Fair -   Endurance Deficit   Endurance Deficit No   Activity Tolerance   Activity Tolerance Patient tolerated treatment well   Medical Staff Made Aware OT   Nurse Made Aware nurse approved therapy session   Assessment   Prognosis Good   Problem List Decreased mobility; Impaired balance;Pain;Orthopedic restrictions   Assessment Pt is a 77 yo male admitted to [facility] on 7/14/2021 s/p surgery for bypass of femoral-peroneal artery on L  Pt had surgery on 7/17 for L BKA  Dx: PAD, DM and chronic L foot wounds  Two patient identifiers were used to confirm  Pt lives in a [facility] with ramp entrance  Pt lives with his wife who can assist  Pt drives and is retired  Pt's impairments include reduced mobility, hgih risk of falling, impulsive, pain, WB status  These impairments limit the ability of the patient to perform mobility without increased assistance, return to PLOF and participate in everyday life activities  Pt would benefit from continued skilled therapy while in the hospital to improve overall mobility and work towards a safe d/c  Recommend discharge to home with home PT  At the end of the session the patient was left in seated position with call bell and phone within reach  The patient's AM-[facility] Basic Mobility Inpatient Short Form Raw Score is 18, Standardized Score is 41 05  A standardized score less than 42 9 suggests the patient may benefit from discharge to post-acute rehabilitation services  Please also refer to the recommendation of the Physical Therapist for safe discharge planning  Anticipate the patient will progress to home with home PT  PT educated about phantom limb pain and desensitization techniques when wounds are healed  Goals   STG Expiration Date 08/02/21   Short Term Goal #1 STG 1: Pt will perform transfers at a MI level to return to baseline of function  STG 2: Pt will perform bed mobility at a I to safety return to PLOF   STG 3: Pt will sit EOB at a I level while performing dynamic and static balance to increase I with mobility and ADL's  PT Treatment Day 0   Plan   Treatment/Interventions Functional transfer training;LE strengthening/ROM; Therapeutic exercise; Endurance training;Bed mobility;Gait training;Equipment eval/education   PT Frequency Other (Comment)  (3-5xwk)   Recommendation   PT Discharge Recommendation Home with home health rehabilitation   Equipment Recommended Wheelchair;Walker   Wheelchair Package Recommended Standard   Change or Add item to Wheelchair Package?  No   Walker Package Recommended Wheeled walker   PT - OK to Discharge No   Additional Comments would like to try to ambualte short distances    AM-PAC Basic Mobility Inpatient   Turning in Bed Without Bedrails 3   Lying on Back to Sitting on Edge of Flat Bed 3   Moving Bed to Chair 3   Standing Up From Chair 3   Walk in Room 3   Climb 3-5 Stairs 3   Basic Mobility Inpatient Raw Score 18   Basic Mobility Standardized Score 41 05   Jenniffer Lu, PT, DPT

## 2021-07-19 NOTE — OCCUPATIONAL THERAPY NOTE
Occupational Therapy Evaluation     Patient Name: Alyssia Sherwood  Today's Date: 7/19/2021  Problem List  Principal Problem:    Critical lower limb ischemia ([facility] ) with non-healing ulcer secodnary to PAD    Past Medical History  Past Medical History:   Diagnosis Date    Arthritis     fingers    First degree AV block     Full dentures     Hypertension     PAD (peripheral artery disease) ([facility] )     PVD (peripheral vascular disease) ([facility] )     Type 2 diabetes mellitus with diabetic peripheral angiopathy without gangrene ([facility] ) 5/18/2021    Per CMS ICD 10 guidelines--Per Physician    Wound, open     right foot- by the 5th toe     Past Surgical History  Past Surgical History:   Procedure Laterality Date    CHOLECYSTECTOMY      COLONOSCOPY      IR AORTAGRAM WITH RUN-OFF  1/28/2021    IR AORTAGRAM WITH RUN-OFF  4/13/2021    LEG AMPUTATION THROUGH LOWER TIBIA AND FIBULA Left 7/17/2021    Procedure: AMPUTATION BELOW KNEE (BKA);   Surgeon: Jm Gaming MD;  [facility];  Service: Vascular    WA DEBRIDEMENT, SKIN, SUB-Q TISSUE,MUSCLE,BONE,=<20 SQ CM Right 12/18/2020    Procedure: DEBRIDMENT OF ULCER , REMOVAL OF DEVITALIZED SKIN, SOFT TISSUE, BONE AND APPLICATION OF INTEGRA GRAFT RIGHT FOOT ;  Surgeon: Jose David Sapp DPM;  Location: [address] [place] [address];  Service: Podiatry    WA VEIN BYPASS GRAFT,FEM-TIBIAL Left 7/14/2021    Procedure: BYPASS femoral-peroneal artery bypass with  reverse GSV and balloon angioplasty peroneal artery;  Surgeon: Jm Gaming MD;  [facility];  Service: Vascular    REPLANTATION THUMB Right     right tip reconnected    SKIN GRAFT      right thumb    TOE AMPUTATION      left foot all 5 toes removed    TOE AMPUTATION Right 2/2/2021    Procedure: Right partial 5th ray amputation;  Surgeon: Ade Weiss DPM;  [facility];  Service: Podiatry    TOE OSTEOTOMY Right 6/26/2020    Procedure: exostosis of right big toe;  Surgeon: Kat Hernandes DPM;  Location: [address] [place] [address]; Service: Podiatry    TRIGGER FINGER RELEASE      bilateral hands    VEIN LIGATION      right             07/19/21 0924   OT Last Visit   OT Visit Date 07/19/21   Note Type   Note type Evaluation   Restrictions/Precautions   Weight Bearing Precautions Per Order Yes   LLE Weight Bearing Per Order NWB   Braces or Orthoses LE Immobilizer  (Per vascular, knee immobilizer ok to remove as of 7/19)   Other Precautions Telemetry; Fall Risk;Pain  (s/p L BKA)   Pain Assessment   Pain Assessment Tool 0-10   Pain Score 6   Pain Location/Orientation Orientation: Left; Location: Leg;Location: Incision   Hospital Pain Intervention(s) Repositioned; Ambulation/increased activity; Emotional support; Rest   Home Living   Type of [address] Two level;Stairs to enter with rails; Ramped entrance; Able to live on main level with bedroom/bathroom  ([address], Pt's reports son is currently building ramp )   Bathroom Shower/Tub Tub/shower unit  (on 1st floor, shower on 2nd floor)   [address] bars in [address] [facility]  (Pt reports using crutches PTA )   Prior Function   Level of [place] Independent with ADLs and functional mobility   Lives With Spouse   Receives Help From Family   ADL Assistance Independent   IADLs Independent   Falls in the last 6 months 0   Vocational Retired   Lifestyle   Autonomy Pt reports independence with ADLs, IADLs (+ driving), and functional mobililty w/ crutches PTA   Reciprocal Relationships Pt lives with his wife in 2 story home  Pt reports "she (his wife) would do 120% if he let her" and that he tries to pitch in with things as best he can  Service to Others Pt retired from working for [employer]  Intrinsic Gratification Pt values his continued independence and does everything with music playing      Psychosocial   Psychosocial (WDL) WDL   Subjective   Subjective "I need space"    ADL   Where Assessed Edge of bed   Eating Assistance 7 Independent   Grooming Assistance 5  Supervision/Setup   UB Bathing Assistance 5  Supervision/Setup   LB Bathing Assistance 4  Minimal Assistance   UB Dressing Assistance 5  Supervision/Setup   LB Dressing Assistance 4  Minimal Assistance   Toileting Assistance  4  Minimal Assistance   Bed Mobility   Supine to Sit 6  Modified independent   Additional items HOB elevated; Increased time required;Verbal cues   Additional Comments Pt seated OOB in chair post evaluation with call bell within reach and needs met  Transfers   Sit to Stand 4  Minimal assistance   Additional items Impulsive;Verbal cues  (CGA )   Stand to Sit 4  Minimal assistance   Additional items Verbal cues  (CGA)   Stand pivot 4  Minimal assistance  (CGA)   Additional items Increased time required   Additional Comments w/ RW, VCs for safety, technique, and hand placement  Pt refused physical assistance  Balance   Static Sitting Fair +   Dynamic Sitting Fair   Static Standing Fair -   Dynamic Standing Poor +   Activity Tolerance   Activity Tolerance Patient tolerated treatment well   Medical Staff Made Aware PT MARK Ayala    Nurse Made Aware Appropriate to see per RN    RUE Assessment   RUE Assessment WFL   LUE Assessment   LUE Assessment WFL   Hand Function   Gross Motor Coordination Functional   Fine Motor Coordination Functional   Cognition   Overall Cognitive Status WFL   Arousal/Participation Alert; Responsive; Cooperative   Attention Within functional limits   Orientation Level Oriented X4   Memory Within functional limits   Following Commands Follows all commands and directions without difficulty   Comments Pt pleasant and open to working with therapy  Pt engaged in appropriate conversations throughout session often using humor  Pt motivated to regain independence  Pt denies any physical assist and presents with some impulsivity during transfers   Pt open to education and techniques for safe transfers with good carryover despite not allowing physical assist  Pt remarked how he wants to go home since he misses his wife, stating "she doesn't like driving far from home"  Assessment   Limitation Decreased ADL status; Decreased Safe judgement during ADL;Decreased endurance;Decreased self-care trans;Decreased high-level ADLs   Prognosis Good   Assessment Pt is a 76 y o  male seen for OT evaluation after admission to [facility] 7/14/2021 with Critical lower limb ischemia, s/p L BKA 7/17  Pt  has a past medical history of Arthritis, First degree AV block, Full dentures, Hypertension, PAD (peripheral artery disease) , PVD (peripheral vascular disease), Type 2 diabetes mellitus with diabetic peripheral angiopathy without gangrene, and Wound, open    Contexts influencing pts occupational performance include living in a [address] [place] [address] with his wife  PTA, Pt reports independence with ADLs, IADLs (+ driving), and functional mobililty w/ crutches  Pt drives at baseline  At time of evaluation pt is independent self-feeding, setup for grooming, setup for UB ADLs, Min A  for LB ADLs, Min A  for toileting, supervision for bed mobiltiy, Min A  functional transfers w/ RW  Pt currently presents with impairments in the following categories -steps to enter environment, difficulty performing ADLS and difficulty performing IADLS  activity tolerance, endurance, standing balance/tolerance, sitting balance/tolerance, UE strength and safety   These impairments, as well as pt's pain, WBS , risk for falls and home environment limit pt's ability to safely engage in all baseline areas of occupation, includingbathing, dressing, toileting, functional mobility/transfers, community mobility, driving, house maintenance, meal prep, cleaning, social participation  and leisure activities   Pt would benefit from continued OT tx 3-5x/wk to promote safety and in order to return to prior level of functioning  When medically stable, OT recommended discharge to home      Goals   Patient Goals to go home    LTG Time Frame 10-14   Long Term Goal Please see below    Plan   Treatment Interventions ADL retraining;Functional transfer training;UE strengthening/ROM; Endurance training;Patient/family training;Equipment evaluation/education; Compensatory technique education;Continued evaluation; Energy conservation; Activityengagement   Goal Expiration Date 08/02/21   OT Treatment Day 1   OT Frequency 3-5x/wk   Additional Treatment Session   Start Time 4536   End Time 5511   Treatment Assessment Pt participated in OT tx session on 07/19/21 with focus on education   Pt was received upon OT arrival OOB in chair  Pt receptive to education on LB dressing and anticipated AD/DME needs for home  Pt was initally skeptical of usefulness of BSC but provided education on mutliple uses once home for maximal independence  Pt was left OOB in chair  with  call bell within reach upon completion of session  Deficits impacting pts occupational performance include: impaired balance, decreased endurance, increased fall risk, new onset of impairment of functional mobility, decreased ADLS, decreased IADLS, pain and decreased activity tolerance  Pt continues to demonstrate appropriateness for skilled OT 3-5x/wk and discharge home with home OT and increased family support for increase safety and independence  Pt participated in OT tx session on 07/19/21 with focus on education   Pt was received upon OT arrival OOB in chair  Pt receptive to education on LB dressing and anticipated AD/DME needs for home  Pt was initally skeptical of usefulness of BSC but provided education on mutliple uses once home for maximal independence  Pt was left OOB in chair  with  call bell within reach upon completion of session   Deficits impacting pts occupational performance include: impaired balance, decreased endurance, increased fall risk, new onset of impairment of functional mobility, decreased ADLS, decreased IADLS, pain and decreased activity tolerance  Pt continues to demonstrate appropriateness for skilled OT 3-5x/wk and discharge home with home OT and increased family support for increase safety and independence  Recommendation   OT Discharge Recommendation Home with home health rehabilitation  (home OT and increased family assist/support)   Equipment Recommended Bedside commode; Shower transfer bench ($$)  (RW)   Commode Type Standard   OT - OK to Discharge Yes  (when medically stable )   AM-PAC Daily Activity Inpatient   Lower Body Dressing 3   Bathing 3   Toileting 3   Upper Body Dressing 3   Grooming 3   Eating 4   Daily Activity Raw Score 19   Daily Activity Standardized Score (Calc for Raw Score >=11) 40 22   AM-PAC Applied Cognition Inpatient   Following a Speech/Presentation 4   Understanding Ordinary Conversation 4   Taking Medications 4   Remembering Where Things Are Placed or Put Away 4   Remembering List of 4-5 Errands 4   Taking Care of Complicated Tasks 4   Applied Cognition Raw Score 24   Applied Cognition Standardized Score 62 21   Modified Anoop Scale   Modified Anoop Scale 4   The below listed goals are to be met within 10-14 days  1  Pt will increase independence in UB ADLs to MOD I  with G balance with good sequencing and technique to complete ADL management  2  Pt will improve performance in LB ADLs to MOD I  with use of LHAE PRN with G sequencing and technique to complete ADL management  3  Pt will complete toileting with MOD I  with good hygiene and AD as needed  4  Pt will participate in simulated IADL management task to increase independence to MOD I  with G safety and endurance  5  Pt will participate in med management with MOD I  and perform G ability to sort, organize, and identify medications with G accuracy and insight into safety precautions  6  Pt will increase bed mobility to MOD I  to participate in functional activity with G tolerance and balance    7  Pt will improve functional transfers on/off all surfaces to MOD I  using DME PRN with G balance and safety  8  Pt will improve functional mobility to MOD I  during ADL/IADL/leisure tasks using DME PRN w/ good balance/safety  9  Pt will improve activity tolerance to G for 30 min txment sessions for enhanced functional transfers and ADLs performance  10  Pt will improve dynamic standing balance during functional tasks to Fair+ using DME PRN  11  Pt will engage in ongoing cognitive assessment w/ good participation to assist with safe d/c planning/recommendations  12  Pt will be monitored and screened for signs/symptoms of depression through screening tools and discuss positive coping mechanisms and leisure interests to increase positive affect and promote overall well being  13  Pt will independently recall 3 fall risks strategies for safety when performing functional tasks w/ use of DME PRN  14  Pt will demonstrate 100% carryover of energy conservation techniques after skilled education with MOD I to improve functional performance in ADLs and  IADLs   Lynn Walker, OTS

## 2021-07-19 NOTE — PROGRESS NOTES
Progress Note - Vascular Surgery   Erma Kaur 76 y o  male MRN: [id number]  [facility]: [id number]    Assessment:  77 y/o male w/ PAD, DM, and chronic left foot wounds now s/p L SFA-peroneal GSV graft c/b immediate graft occlusion  Now s/p L BKA on 7/17  Plan:  Regular diet  Dressing down 7/19, ok to remove knee immobilizer today, monitor stump   - will change dressing today with picture for chart  Continue to monitor Hb daily  APS following appreciate recs  PMR consult pending, will follow up recs  PT/OT  Dispo pending - hopeful for ARC   - Patient will need a walker    Restart Xarelto 2 5 bid    Subjective/Objective     Subjective: No acute events overnight, still with some neuropathic pain in L BKA  Objective:    Blood pressure 138/66, pulse 100, temperature 98 9 °F (37 2 °C), resp  rate 18, weight 88 5 kg (195 lb), SpO2 97 %  ,Body mass index is 33 47 kg/m²  Intake/Output Summary (Last 24 hours) at 7/19/2021 0700  Last data filed at 7/19/2021 0447  Gross per 24 hour   Intake 840 ml   Output 625 ml   Net 215 ml       Invasive Devices     Peripheral Intravenous Line            Peripheral IV 07/18/21 Left;Ventral (anterior); Distal;Upper Arm 1 day                Physical Exam:  Gen:    NAD  CV:      warm, well-perfused  Lungs: No respiratory distress  Abd:     soft, NT/ND  Ext:      L BKA stump cleanly dressed, knee immobilizer off

## 2021-07-19 NOTE — PROGRESS NOTES
Consults  Progress Note - Acute Pain Service    Alejandro Tracey 76 y o  male MRN: [id number]  Unit/Bed#: [facility] 522-01 Encounter: [id number]      Assessment:   Principal Problem:    Critical lower limb ischemia ([address] ) with non-healing ulcer secodnary to PAD    Alejandro Tracey is a 76 y o  male  With severe PVD  He had a femoral peroneal bypass on 7/14, and a BKA on 7/17  He received a popliteal block with exparel for post-op pain control  Over the past two days his pain has been well controlled  Plan:   - Oxycodone 10-15 mg q4h prn  Dilaudid 0 5 mg IV q2hr PRN for breakthrough pain  - Gabapentin 300 mg PO TID    - Docusate (Colace) 100 mg PO twice daily    APS will sign off at this time  Thank you for the consult  All opioids and other analgesics to be written at discretion of primary team  Please contact Acute Pain Service - [facility] via [employer] from [phone] with additional questions or concerns  See Lisha or Jeramy for additional contacts and after hours information      Pain History  Current pain location(s): left lower leg  Pain Scale:   5/10  Quality: burning, aching  24 hour history: well controlled    Opioid requirement previous 24 hours: 1 5 mg dilaudid, 70 mg oxycodone    Meds/Allergies   current meds:   Current Facility-Administered Medications   Medication Dose Route Frequency    acetaminophen (TYLENOL) tablet 650 mg  650 mg Oral Q4H PRN    atorvastatin (LIPITOR) tablet 40 mg  40 mg Oral HS    calcium carbonate (TUMS) chewable tablet 500 mg  500 mg Oral TID PRN    clopidogrel (PLAVIX) tablet 75 mg  75 mg Oral Daily    docusate sodium (COLACE) capsule 100 mg  100 mg Oral BID    gabapentin (NEURONTIN) capsule 300 mg  300 mg Oral TID    hydrALAZINE (APRESOLINE) injection 10 mg  10 mg Intravenous Q4H PRN    HYDROmorphone (DILAUDID) injection 0 5 mg  0 5 mg Intravenous Q2H PRN    insulin glargine (LANTUS) subcutaneous injection 30 Units 0 3 mL  30 Units Subcutaneous QAM    insulin lispro (HumaLOG) 100 units/mL subcutaneous injection 1-5 Units  1-5 Units Subcutaneous TID AC    insulin lispro (HumaLOG) 100 units/mL subcutaneous injection 1-5 Units  1-5 Units Subcutaneous HS    insulin lispro (HumaLOG) 100 units/mL subcutaneous injection 10 Units  10 Units Subcutaneous TID With Meals    Labetalol HCl (NORMODYNE) injection 10 mg  10 mg Intravenous Q4H PRN    melatonin tablet 3 mg  3 mg Oral HS PRN    ondansetron (ZOFRAN) injection 4 mg  4 mg Intravenous Q6H PRN    oxyCODONE (ROXICODONE) immediate release tablet 10 mg  10 mg Oral Q4H PRN    oxyCODONE (ROXICODONE) IR tablet 15 mg  15 mg Oral Q4H PRN    pantoprazole (PROTONIX) EC tablet 40 mg  40 mg Oral Daily Before Breakfast    rivaroxaban (XARELTO) tablet 2 5 mg  2 5 mg Oral BID With Meals       Allergies   Allergen Reactions    Shellfish-Derived Products - Food Allergy Anaphylaxis     Throat closes    Sulfamethoxazole-Trimethoprim Rash       Objective     Temp:  [97 9 °F (36 6 °C)-100 4 °F (38 °C)] 97 9 °F (36 6 °C)  HR:  [] 94  Resp:  [18-19] 19  BP: (122-151)/(65-70) 122/65    Physical Exam  Vitals reviewed  Constitutional:       Appearance: Normal appearance  He is normal weight  Comments: In pain   HENT:      Head: Normocephalic  Eyes:      Pupils: Pupils are equal, round, and reactive to light  Cardiovascular:      Rate and Rhythm: Normal rate and regular rhythm  Pulses: Normal pulses  Heart sounds: Normal heart sounds  Pulmonary:      Effort: Pulmonary effort is normal    Musculoskeletal:         General: Normal range of motion  Cervical back: Normal range of motion  Comments: Dressings in place on left leg   Neurological:      General: No focal deficit present  Mental Status: He is alert and oriented to person, place, and time     Psychiatric:         Mood and Affect: Mood normal          Behavior: Behavior normal          Lab Results:   Results from last 7 days   Lab Units 07/19/21  0456   WBC Thousand/uL 10 33*   HEMOGLOBIN g/dL 10 6*   HEMATOCRIT % 33 1*   PLATELETS Thousands/uL 177      Results from last 7 days   Lab Units 07/19/21  0538 07/14/21  1643   POTASSIUM mmol/L 3 7  --    CHLORIDE mmol/L 99*  --    CO2 mmol/L 24  --    CO2, I-STAT mmol/L  --  24   BUN mg/dL 14  --    CREATININE mg/dL 0 90  --    CALCIUM mg/dL 9 3  --    GLUCOSE, ISTAT mg/dl  --  229*       Imaging Studies: I have personally reviewed pertinent reports  EKG, Pathology, and Other Studies: I have personally reviewed pertinent reports  Counseling / Coordination of Care  Total floor / unit time spent today 15 minutes  Greater than 50% of total time was spent with the patient and / or family counseling and / or coordination of care  Please note that the APS provides consultative services regarding pain management only  With the exception of ketamine and epidural infusions and except when indicated, final decisions regarding starting or changing doses of analgesic medications are at the discretion of the consulting service  Off hours consultation and/or medication management is generally not available      Austin Reyes MD  Acute Pain Service

## 2021-07-19 NOTE — CONSULTS
PHYSICAL MEDICINE AND REHABILITATION CONSULT NOTE  Gila Montez 76 y o  male MRN: [id number]  Unit/Bed#: [facility] 993-70 Encounter: [id number]    Requested by (Physician/Service): Serjio Brothers MD  Reason for Consultation:  Assessment of rehabilitation needs  Chief Complaint:  Feeling fine  Assessment:  Rehabilitation Diagnosis:    S/p L BKA   Impaired mobility and ADLs    Recommendations:  Rehabilitation Plan:   Continue PT/OT ([facility]) while on acute care   Not yet medically ready for ARC   That being said, mobilizing quite well  If he still is requiring assistance at time of discharge, would recommend ARC  However, if at a supervision-modified Ind level of function, could go home with home therapies   Will follow-up therapy evals   Reviewed contracture prevention, volume optimization, and timeline for prosthetic   If patient discharged, please have him follow-up with Dr Maria E Winn in [address] [place] [address]  Information will be placed in AVS         Medical Co-morbidities Pending Issues:  Hyponatremia  Improved pain control  Improve BG control  Needs to have a BM  Please give miralax  #Vitals: Stable  #Labs:  Hyponatremic down to 130, WBC increased today, Hgb stable  BG elevated to 200-300s  #Pain: Still using IV dilaudid, also has Oxycodone ordered PRN  Gabapentin 300mg TID  On Tylenol PRN  #Bowel: No BMs have been recorded since admission  #Bladder: Voiding and continent  #Skin/Pressure Injury Prevention: Turn Q2hr in bed, with weight shifts [id number] in wheelchair  #DVT Prophylaxis: fully a/c on Xarelto   #GI Prophylaxis: On protonix     Thank you for allowing the PM&R service to participate in the care of this patient  We will continue to follow Emily Rodriguez progress with you   Please do not hesitate to call with questions or concerns    History of Present Illness:  Gila Montez is a 76 y o  male with PMH of arthritis, 1st degree AV block, HTN, T2DM and PAD with previous L TMA (for non-healing wound) who presented on 7/14 for planned L fem-peroneal artery  Bypass  He had previously had A-gram performed in 1/5/2020 with SFA stent angioplasty and artherectomy/PTA of tibial vessels, distal AT/DP noted to be occluded  LEADs with inadequate perfusion for healing  Underwent RLE angiogram 1/28/21 with successful recanalization of the AT/DP, and underwent 5th ray amputation at that admission  LEADs were improved  Also had LLE angiogram on 4/13/2021 with successful recanalization of an occluded previously placed peroneal artery stent (Single vessel runoff)  However, short interval LEAD demonstrated interval occlusion of the peroneal artery  His ulcer has progressed  He had L fem-peroneal bypass with reverse GSV and balloon angioplasty of peroneal artery with Dr Raffi Bundy on 7/14  Unfortunately, the bypass occluded shortly after creation due to poor peroneal outflow, and the situation became non-salvageable  He was planned for BKA  He had this performed by Dr Raffi Bundy on 7/17 7/19, restarted on Xarelto  Endocrine following to manage his Diabetes  Pain well controlled  Feeling well, has been able with some assistance to transfer to chairs and get to the bathroom  Feels comfortable as he has used crutches for well over a year at this point  Review of Systems: 10 point ROS negative except for what is noted in HPI    CURRENT GAP IN FUNCTION     Prior to Admission:     Functional Status: Patient was independent with mobility/ambulation, transfers, ADL's, IADL's     FUNCTIONAL STATUS:  Physical Therapy:  pending    Occupational Therapy: pending     Social History:    Kody Mayers Lives with: lives with their spouse  He lives in [address] in home: crutches  There 3-4 steps to enter the home  Patient/family's goals: Return to previous home/apartment    Social History     Socioeconomic History    Marital status: /Civil Union     Spouse name: None    Number of children: None    Years of education: None    Highest education level: None   Occupational History    Occupation: [employer]   Tobacco Use    Smoking status: Never Smoker    Smokeless tobacco: Never Used   Vaping Use    Vaping Use: Never used   Substance and Sexual Activity    Alcohol use: Yes     Comment: occasional    Drug use: Never    Sexual activity: None   Other Topics Concern    None   Social History Narrative    · Most recent tobacco use screenin2019      · Do you currently or have you served in the [employer]:   No      · Were you activated, into active duty, as a member of the [employer] or as a Reservist:   No      · Diet:   Regular      · Alcohol intake:   Occasional      · Caffeine intake:   Occasional      · Seat belts used routinely:   Yes      · Smoke alarm in home:   Yes      Social Determinants of Health     Financial Resource Strain:     Difficulty of Paying Living Expenses:    Food Insecurity:     Worried About Running Out of Food in the Last Year:     [address] in the Last Year:    Transportation Needs:     Lack of Transportation (Medical):      Lack of Transportation (Non-Medical):    Physical Activity:     Days of Exercise per Week:     Minutes of Exercise per Session:    Stress:     Feeling of Stress :    Social Connections:     Frequency of Communication with Friends and Family:     Frequency of Social Gatherings with Friends and Family:     Attends Spiritism Services:     Active Member of Clubs or Organizations:     Attends Club or Organization Meetings:     Marital Status:    Intimate Partner Violence:     Fear of Current or Ex-Partner:     Emotionally Abused:     Physically Abused:     Sexually Abused:         Family History:    Family History   Problem Relation Age of Onset    Arthritis Mother     Cancer Father         colon    Alzheimer's disease Father          MEDICATIONS:     Current Facility-Administered Medications:     acetaminophen (TYLENOL) tablet 650 mg, 650 mg, Oral, Q4H PRN, Bo Daily MD    atorvastatin (LIPITOR) tablet 40 mg, 40 mg, Oral, HS, Bo Daily MD, 40 mg at 07/18/21 2136    calcium carbonate (TUMS) chewable tablet 500 mg, 500 mg, Oral, TID PRN, Bo Daily MD, 500 mg at 07/15/21 0852    clopidogrel (PLAVIX) tablet 75 mg, 75 mg, Oral, Daily, Bo Daily MD, 75 mg at 07/19/21 0839    docusate sodium (COLACE) capsule 100 mg, 100 mg, Oral, BID, Bo Daily MD, 100 mg at 07/19/21 0839    gabapentin (NEURONTIN) capsule 300 mg, 300 mg, Oral, TID, Bo Daily MD, 300 mg at 07/19/21 0839    hydrALAZINE (APRESOLINE) injection 10 mg, 10 mg, Intravenous, Q4H PRN, Bo Daily MD    HYDROmorphone (DILAUDID) injection 0 5 mg, 0 5 mg, Intravenous, Q2H PRN, Bo Daily MD, 0 5 mg at 07/19/21 0845    insulin glargine (LANTUS) subcutaneous injection 30 Units 0 3 mL, 30 Units, Subcutaneous, QAM, Bo Daily MD, 30 Units at 07/19/21 0839    insulin lispro (HumaLOG) 100 units/mL subcutaneous injection 1-5 Units, 1-5 Units, Subcutaneous, TID AC, 3 Units at 07/19/21 1138 **AND** Fingerstick Glucose (POCT), , , TID AC, Bo Daily MD    insulin lispro (HumaLOG) 100 units/mL subcutaneous injection 1-5 Units, 1-5 Units, Subcutaneous, HS, Bo Daily MD, 2 Units at 07/18/21 2139    insulin lispro (HumaLOG) 100 units/mL subcutaneous injection 10 Units, 10 Units, Subcutaneous, TID With Meals, Bo Daiyl MD, 10 Units at 07/19/21 1138    Labetalol HCl (NORMODYNE) injection 10 mg, 10 mg, Intravenous, Q4H PRN, Bo Daily MD    lisinopril (ZESTRIL) tablet 10 mg, 10 mg, Oral, Daily, Rella KURT Jeff    melatonin tablet 3 mg, 3 mg, Oral, HS PRN, Bo Daily MD    ondansetron [facility]) injection 4 mg, 4 mg, Intravenous, Q6H PRN, Bo Daily MD    oxyCODONE (ROXICODONE) immediate release tablet 10 mg, 10 mg, Oral, Q4H PRN, Bo Daily MD, 10 mg at 07/19/21 1141    oxyCODONE (ROXICODONE) IR tablet 15 mg, 15 mg, Oral, Q4H PRN, Shilpa Preciado MD, 15 mg at 07/19/21 0346    pantoprazole (PROTONIX) EC tablet 40 mg, 40 mg, Oral, Daily Before Breakfast, Shilpa Preciado MD, 40 mg at 07/19/21 0440    rivaroxaban (XARELTO) tablet 2 5 mg, 2 5 mg, Oral, BID With Meals, Balbir Chaney MD, 2 5 mg at 07/19/21 1010    Past Medical History:     Past Medical History:   Diagnosis Date    Arthritis     fingers    First degree AV block     Full dentures     Hypertension     [facility]per[facility] )     [facility]     Type 2 diabetes mellitus with diabetic peripheral angiopathy without gangrene ([facility] ) 5/18/2021    Per CMS ICD 10 guidelines--Per Physician    Wound, open     right foot- by the 5th toe        Past Surgical History:     Past Surgical History:   Procedure Laterality Date    CHOLECYSTECTOMY      COLONOSCOPY      IR AORTAGRAM WITH RUN-OFF  1/28/2021    IR AORTAGRAM WITH RUN-OFF  4/13/2021    LEG AMPUTATION THROUGH LOWER TIBIA AND FIBULA Left 7/17/2021    Procedure: AMPUTATION BELOW KNEE (BKA);   Surgeon: Priyank Wilson MD;  [facility];  Service: Vascular    FL DEBRIDEMENT, SKIN, SUB-Q TISSUE,MUSCLE,BONE,=<20 SQ CM Right 12/18/2020    Procedure: DEBRIDMENT OF ULCER , REMOVAL OF DEVITALIZED SKIN, SOFT TISSUE, BONE AND APPLICATION OF INTEGRA GRAFT RIGHT FOOT ;  Surgeon: Kamari Peters DPM;  Location: [address] [place] [address];  Service: Podiatry    FL VEIN BYPASS GRAFT,FEM-TIBIAL Left 7/14/2021    Procedure: BYPASS femoral-peroneal artery bypass with  reverse GSV and balloon angioplasty peroneal artery;  Surgeon: Priyank Wilson MD;  [facility];  Service: Vascular    REPLANTATION THUMB Right     right tip reconnected    SKIN GRAFT      right thumb    TOE AMPUTATION      left foot all 5 toes removed    TOE AMPUTATION Right 2/2/2021    Procedure: Right partial 5th ray amputation;  Surgeon: Rajinder Jordan DPM;  [facility];  Service: Podiatry    TOE OSTEOTOMY Right 6/26/2020    Procedure: exostosis of right big toe;  Surgeon: Rebecca Vidales DPM;  [facility];  Service: Podiatry    TRIGGER FINGER RELEASE      bilateral hands    VEIN LIGATION      right         Allergies: Allergies   Allergen Reactions    Shellfish-Derived Products - Food Allergy Anaphylaxis     Throat closes    Sulfamethoxazole-Trimethoprim Rash           Physical Exam:  /65   Pulse 94   Temp 97 9 °F (36 6 °C) (Oral)   Resp 19   Wt 88 5 kg (195 lb)   SpO2 96%   BMI 33 47 kg/m²        Intake/Output Summary (Last 24 hours) at 7/19/2021 1159  Last data filed at 7/19/2021 0850  Gross per 24 hour   Intake 480 ml   Output 300 ml   Net 180 ml       Body mass index is 33 47 kg/m²  Gen: No acute distress, Well-nourished, well-appearing  HEENT: Moist mucus membranes, Normocephalic/Atraumatic  Cardiovascular: Regular rate, rhythm, S1/S2  Distal pulses palpable  Heme/Extr: No edema  Pulmonary: Non-labored breathing  Lungs CTAB  : No mcknight  GI: Soft, non-tender, non-distended  BS+  MSK: L BKA with bulbous residual limb  Has full knee extension, but requires cuing  Tends to pull into flexion  Integumentary: Skin is warm, dry  Dressing C/D/I  Neuro: AAOx3, Speech is intact  Appropriate to questioning  Tone is normal    MMT:   Strength:   Right  Left  Site  Right  Left  Site    5 5  S Ab: Shoulder Abductors  5  4  HF: Hip Flexors    5 5  EF: Elbow Flexors  5  4 KF: Knee Flexors    5  5  EE: Elbow Extensors  5  4 KE: Knee Extensors    5  5  WE: Wrist Extensors  5  NA DR: Dorsi Flexors    5  5  FF: Finger Flexors  5  NA PF: Plantar Flexors    5  5  HI: Hand Intrinsics  5  NA EHL: Extensor Hallucis Longus   Psych: Normal mood and affect         LABORATORY RESULTS:      Lab Results   Component Value Date    HGB 10 6 (L) 07/19/2021    HGB 14 4 02/10/2014    HCT 33 1 (L) 07/19/2021    HCT 43 0 02/10/2014    WBC 10 33 (H) 07/19/2021    WBC 7 71 02/10/2014     Lab Results   Component Value Date    BUN 14 07/19/2021    BUN 20 02/10/2014     02/10/2014    K 3 7 07/19/2021    K 4 7 02/10/2014    CL 99 (L) 07/19/2021     02/10/2014    GLUCOSE 229 (H) 07/14/2021    GLUCOSE 135 02/10/2014    CREATININE 0 90 07/19/2021    CREATININE 0 89 02/10/2014     Lab Results   Component Value Date    PROTIME 12 9 07/07/2021    PROTIME 13 8 01/10/2014    INR 0 97 07/07/2021    INR 1 11 01/10/2014        DIAGNOSTIC STUDIES: Reviewed  No results found

## 2021-07-20 ENCOUNTER — DOCUMENTATION (OUTPATIENT)
Dept: VASCULAR SURGERY | Facility: CLINIC | Age: 66
End: 2021-07-20

## 2021-07-20 VITALS
TEMPERATURE: 97.9 F | DIASTOLIC BLOOD PRESSURE: 66 MMHG | SYSTOLIC BLOOD PRESSURE: 152 MMHG | OXYGEN SATURATION: 96 % | HEART RATE: 92 BPM | WEIGHT: 195 LBS | RESPIRATION RATE: 16 BRPM | BODY MASS INDEX: 33.47 KG/M2

## 2021-07-20 PROBLEM — I70.229 CRITICAL LOWER LIMB ISCHEMIA (HCC): Status: RESOLVED | Noted: 2021-07-14 | Resolved: 2021-07-20

## 2021-07-20 LAB
ANION GAP SERPL CALCULATED.3IONS-SCNC: 6 MMOL/L (ref 4–13)
BUN SERPL-MCNC: 12 MG/DL (ref 5–25)
CALCIUM SERPL-MCNC: 8.9 MG/DL (ref 8.3–10.1)
CHLORIDE SERPL-SCNC: 103 MMOL/L (ref 100–108)
CO2 SERPL-SCNC: 25 MMOL/L (ref 21–32)
CREAT SERPL-MCNC: 0.85 MG/DL (ref 0.6–1.3)
ERYTHROCYTE [DISTWIDTH] IN BLOOD BY AUTOMATED COUNT: 12.8 % (ref 11.6–15.1)
GFR SERPL CREATININE-BSD FRML MDRD: 91 ML/MIN/1.73SQ M
GLUCOSE SERPL-MCNC: 198 MG/DL (ref 65–140)
GLUCOSE SERPL-MCNC: 202 MG/DL (ref 65–140)
GLUCOSE SERPL-MCNC: 224 MG/DL (ref 65–140)
GLUCOSE SERPL-MCNC: 256 MG/DL (ref 65–140)
HCT VFR BLD AUTO: 31.2 % (ref 36.5–49.3)
HGB BLD-MCNC: 10.1 G/DL (ref 12–17)
MCH RBC QN AUTO: 32 PG (ref 26.8–34.3)
MCHC RBC AUTO-ENTMCNC: 32.4 G/DL (ref 31.4–37.4)
MCV RBC AUTO: 99 FL (ref 82–98)
PLATELET # BLD AUTO: 200 THOUSANDS/UL (ref 149–390)
PMV BLD AUTO: 10.1 FL (ref 8.9–12.7)
POTASSIUM SERPL-SCNC: 3.8 MMOL/L (ref 3.5–5.3)
RBC # BLD AUTO: 3.16 MILLION/UL (ref 3.88–5.62)
SODIUM SERPL-SCNC: 134 MMOL/L (ref 136–145)
WBC # BLD AUTO: 10.41 THOUSAND/UL (ref 4.31–10.16)

## 2021-07-20 PROCEDURE — 99024 POSTOP FOLLOW-UP VISIT: CPT | Performed by: PHYSICIAN ASSISTANT

## 2021-07-20 PROCEDURE — 99024 POSTOP FOLLOW-UP VISIT: CPT | Performed by: SURGERY

## 2021-07-20 PROCEDURE — 80048 BASIC METABOLIC PNL TOTAL CA: CPT | Performed by: SURGERY

## 2021-07-20 PROCEDURE — 85027 COMPLETE CBC AUTOMATED: CPT | Performed by: SURGERY

## 2021-07-20 PROCEDURE — 82948 REAGENT STRIP/BLOOD GLUCOSE: CPT

## 2021-07-20 RX ORDER — METHOCARBAMOL 500 MG/1
500 TABLET, FILM COATED ORAL EVERY 6 HOURS SCHEDULED
Status: DISCONTINUED | OUTPATIENT
Start: 2021-07-20 | End: 2021-07-20 | Stop reason: HOSPADM

## 2021-07-20 RX ORDER — METHOCARBAMOL 500 MG/1
500 TABLET, FILM COATED ORAL EVERY 6 HOURS SCHEDULED
Qty: 120 TABLET | Refills: 0 | Status: SHIPPED | OUTPATIENT
Start: 2021-07-20 | End: 2022-04-08

## 2021-07-20 RX ORDER — DOCUSATE SODIUM 100 MG/1
100 CAPSULE, LIQUID FILLED ORAL 2 TIMES DAILY
COMMUNITY
Start: 2021-07-20 | End: 2022-04-07

## 2021-07-20 RX ORDER — OXYCODONE HYDROCHLORIDE 10 MG/1
10 TABLET ORAL EVERY 4 HOURS PRN
Qty: 40 TABLET | Refills: 0 | Status: SHIPPED | OUTPATIENT
Start: 2021-07-20 | End: 2021-07-30

## 2021-07-20 RX ORDER — ACETAMINOPHEN 325 MG/1
650 TABLET ORAL EVERY 4 HOURS PRN
COMMUNITY
Start: 2021-07-20 | End: 2022-04-08

## 2021-07-20 RX ADMIN — CLOPIDOGREL BISULFATE 75 MG: 75 TABLET ORAL at 09:12

## 2021-07-20 RX ADMIN — DOCUSATE SODIUM 100 MG: 100 CAPSULE, LIQUID FILLED ORAL at 09:12

## 2021-07-20 RX ADMIN — RIVAROXABAN 2.5 MG: 2.5 TABLET, FILM COATED ORAL at 09:13

## 2021-07-20 RX ADMIN — OXYCODONE HYDROCHLORIDE 15 MG: 10 TABLET ORAL at 03:15

## 2021-07-20 RX ADMIN — INSULIN GLARGINE 35 UNITS: 100 INJECTION, SOLUTION SUBCUTANEOUS at 09:12

## 2021-07-20 RX ADMIN — LISINOPRIL 10 MG: 10 TABLET ORAL at 09:12

## 2021-07-20 RX ADMIN — METHOCARBAMOL 500 MG: 500 TABLET, FILM COATED ORAL at 12:28

## 2021-07-20 RX ADMIN — INSULIN LISPRO 3 UNITS: 100 INJECTION, SOLUTION INTRAVENOUS; SUBCUTANEOUS at 12:29

## 2021-07-20 RX ADMIN — PANTOPRAZOLE SODIUM 40 MG: 40 TABLET, DELAYED RELEASE ORAL at 05:12

## 2021-07-20 RX ADMIN — OXYCODONE HYDROCHLORIDE 15 MG: 10 TABLET ORAL at 09:10

## 2021-07-20 RX ADMIN — GABAPENTIN 300 MG: 300 CAPSULE ORAL at 09:12

## 2021-07-20 NOTE — DISCHARGE SUMMARY
Discharge Summary   Gus Dixon 76 y o  male MRN: [id number]  [facility] Encounter: [id number]    Admission Date: 7/14/2021     Discharge Date:07/20/21    Tesha Restrepo MD     Consultants:    · Endocrinology  · Acute Pain Service  · Physical medicine & rehab    Admitting Diagnosis: PAD (peripheral artery disease) ([facility] ) [I73 9]    Principle/ Secondary Diagnosis:  · PAD with left foot wounds  · Early graft failure  · Uncontrolled diabetes with hyperglycemia-hemoglobin A1c 8 9  · Hyponatremia- resolved  · Postoperative acute blood loss anemia secondary to procedural and dilutional loss    Past Medical History:   Diagnosis Date    Arthritis     fingers    First degree AV block     Full dentures     Hypertension     PAD (peripheral artery disease) ([facility] )     PVD (peripheral vascular disease) ([place] [address] )     Type 2 diabetes mellitus with diabetic peripheral angiopathy without gangrene ([place] [address] ) 5/18/2021    Per CMS ICD 10 guidelines--Per Physician    Wound, open     right foot- by the 5th toe     Past Surgical History:   Procedure Laterality Date    CHOLECYSTECTOMY      COLONOSCOPY      IR AORTAGRAM WITH RUN-OFF  1/28/2021    IR AORTAGRAM WITH RUN-OFF  4/13/2021    LEG AMPUTATION THROUGH LOWER TIBIA AND FIBULA Left 7/17/2021    Procedure: AMPUTATION BELOW KNEE (BKA);   Surgeon: Ani Araujo MD;  [facility];  Service: Vascular    LA DEBRIDEMENT, SKIN, SUB-Q TISSUE,MUSCLE,BONE,=<20 SQ CM Right 12/18/2020    Procedure: DEBRIDMENT OF ULCER , REMOVAL OF DEVITALIZED SKIN, SOFT TISSUE, BONE AND APPLICATION OF INTEGRA GRAFT RIGHT FOOT ;  Surgeon: Tara Lechuga DPM;  Location: [address] [place] [address];  Service: Podiatry    LA VEIN BYPASS GRAFT,FEM-TIBIAL Left 7/14/2021    Procedure: BYPASS femoral-peroneal artery bypass with  reverse GSV and balloon angioplasty peroneal artery;  Surgeon: Ani Araujo MD;  [facility];  Service: Vascular    REPLANTATION THUMB Right     right tip reconnected  SKIN GRAFT      right thumb    TOE AMPUTATION      left foot all 5 toes removed    TOE AMPUTATION Right 2/2/2021    Procedure: Right partial 5th ray amputation;  Surgeon: Roopa Cuellar DPM;  [facility];  Service: Podiatry    TOE OSTEOTOMY Right 6/26/2020    Procedure: exostosis of right big toe;  Surgeon: Rebecca Vidales DPM;  Location: [address] [place] [address];  Service: Podiatry    TRIGGER FINGER RELEASE      bilateral hands    VEIN LIGATION      right       Procedures Performed:   · 07/14/2021 left femoral-peroneal bypass with reversed GSV and PTA of peroneal artery-Usman  · 07/17/2021 left popliteal peripheral block-anesthesia  · 7/17/2021  left BKA-Usman    Laboratory data at discharge:   Results from last 7 days   Lab Units 07/20/21  0845 07/19/21  0456 07/18/21  0506   WBC Thousand/uL 10 41* 10 33* 9 49   HEMOGLOBIN g/dL 10 1* 10 6* 9 6*   HEMATOCRIT % 31 2* 33 1* 29 4*   PLATELETS Thousands/uL 200 177 149     Results from last 7 days   Lab Units 07/20/21  0523 07/19/21  0538 07/18/21  0506 07/14/21  1643   POTASSIUM mmol/L 3 8 3 7 3 4*  --    CHLORIDE mmol/L 103 99* 100  --    CO2 mmol/L 25 24 31  --    CO2, I-STAT mmol/L  --   --   --  24   BUN mg/dL 12 14 10  --    CREATININE mg/dL 0 85 0 90 0 88  --    GLUCOSE, ISTAT mg/dl  --   --   --  229*   CALCIUM mg/dL 8 9 9 3 8 6  --                Discharge instructions/Information to patient and family:   See after visit summary for information provided to patient and family  · Amputation instructions  · Leg surgery instructions    Discharge Medications:  See after visit summary for reconciled discharge medications provided to patient and family      · Continued anticoagulation-Xarelto 2 5 mg b i d  (PAD)  · Continued anti-platelet-Plavix  · Continued statin-Lipitor  · Manipulated insulin regimen- insulin 70/30:  40 units at breakfast, 15 units at lunch, 25 units at dinner    Hospital Course:   Sim Chavez is a 64y/o male with hypertension, hyperlipidemia, GERD and diabetes with neuropathy poorly controlled with hyperglycemia as evidenced by hemoglobin A1c 8 9, and known PA D having previously undergone the following:  · Multiple arteriograms- Misael  · 01/05/2020 arteriogram with SFA stent PTA and atherectomy/PTA of tibial vessels (distal AT/ DP occluded)- Misael  · 01/28/2021 right lower extremity arteriogram was successful recanalization of AT/DP  · 02/2021 right 5th ray amputation  · 04/13/2021 left lower extremity arteriogram was successful recanalization of occluded peroneal artery stent  · Left TMA  He has suffered with left TMA and active lateral foot wounds for approximately 6 months  He underwent short interval LEAD demonstrating interval occlusion of the peroneal artery  Given progression of heel ulcer with now multiple failed attempts at endovascular intervention, recommendation was to proceed with open revascularization  On 07/14/2021, the patient was electively admitted to [facility] at which time he was taken to the operating room and underwent left femoral-peroneal bypass with reversed GSV and PTA of peroneal artery by Dr Kolton Pimentel  Intraoperatively, the peroneal artery was noted to be 1-1 5 mm with heavy calcification and disease  Postoperatively, he was maintained in the PACU where unfortunately the graft was noted to no longer be palpable nor with dopplerable signals  The bypass occluded shortly after its creation due to very poor peroneal outflow  This deemed the limb nonsalvageable  The patient was transferred to medical-surgical/telemetry/step-down floor  On 07/17/2021, the patient was again returned to the operating room at which time he underwent left popliteal peripheral block by anesthesia and a left BKA by Dr Kolton Pimentel      Again, postoperatively, he was maintained in the PACU then transferred to medical-surgical/telemetry/step-down floor where he continued to convalesce for the remainder of his hospitalization  On POD #2 from his amputation, dressing was removed at which time the residual stump was noted to be stable and healing well with intact suture line  He was resumed on his home Xarelto 2 5 mg b i d  given his significant PAD  Plavix and Lipitor were continued for the duration of the hospitalization  He was evaluated by Physical therapy and Occupational therapy who deemed him appropriate for home PT/OT services  Consultation was placed to our physical medicine and rehab team with recommendation for home and outpatient follow-up in the amputee Clinic  By POD # 7/ #3, he remained neurovascularly intact  His wounds were healing well  His pain was controlled with use of muscle relaxant and narcotic  He was tolerating a diet  He was voiding spontaneously  With the assistance of our case management colleagues, arrangements were made for [facility] for home PT/OT and nursing services  DME was provided in the form of a wheelchair  He was deemed appropriate and stable for discharge and was discharged in the care of his wife on 07/20/2021  Hospital course was complicated by the following:  · Hyponatremia-resolved  · Postoperative acute blood loss anemia secondary to procedural and dilutional loss  · Uncontrolled diabetes with hyperglycemia as evidence by hemoglobin A1c 8 9  Given poor hyperglycemic control as well as increased risk of postoperative surgical site infection, consultation was placed to our endocrinology colleagues for evaluation  Over the course of the hospitalization, insulin regimen was manipulated for better blood sugar control  At the time of discharge, recommendation was for insulin 70/30: 40 units at breakfast, 15 units at lunch, and 25 units at dinner    He was instructed for fingerstick blood sugars 4 times daily (meals and bedtime) for which he will record and keep a log for review at follow-up appointments    Prior to discharge, the patient was given instructions for outpatient care and follow-up  The patient has been instructed to call w/ any questions, changes, or concerns for the medical condition  For further details of the hospitalization, please refer to the medical record  Condition at Discharge: stable     Provisions for Follow-Up Care:  See after visit summary for information related to follow-up care and any pertinent home health orders  Disposition: Home w/ [facility] (PT/ OT/ RN)    Planned Readmission: No    Discharge Statement   I spent 30 minutes discharging the patient  This time was spent on the day of discharge  I had direct contact with the patient on the day of discharge  Additional documentation is required if more than 30 minutes were spent on discharge  Td Love PA-C  7/20/2021      This text is generated with voice recognition software  There may be translation, syntax,  or grammatical errors  If you have any questions, please contact the dictating provider

## 2021-07-20 NOTE — NURSING NOTE
Discharge reviewed with patient  Patient aware and agreeable  Patient discharged to home  Scripts sent to [facility]

## 2021-07-20 NOTE — CASE MANAGEMENT
Met with patient to discuss DCP  A post acute care recommendation was made by your care team for [facility]  Discussed [place] [facility] with patient  List of agencies given to patient via in person  patient aware the list is custom filtered for them by zip code location and that [facility] post acute providers are designated  Pt relates that he is known to [facility] in the past and would like to use their services post d/c  Referral sent via [place]  Patient has been accepted by [facility]/ [facility] for tomorrow 7/21/20  Tent d/c today  Family will transport  Please send wound care instructions on AVS  Therapy rec WC and walker  Patient would like to use [employer] for DME  [facility]to be delivered to patient's room today  Pt will purchase walker at [address]

## 2021-07-20 NOTE — PROGRESS NOTES
Vascular Nurse Navigator Post Op Education    Met with patient to introduce myself as Vascular Nurse Navigator and explained my role  Patient is appropriate and accepting to education  Patient was educated with Review of written materials provided, Teachback, Explanation, Demonstration and Question & Answer on expectations of post op care and recovery on Left SFA to peroneal bypass and Left BKA  Patient is a non-smoker  Education provided to patient on infection prevention, activity limitations, when to call the office, importance of follow up, and incisional care  Discharge instruction handout provided to patient to review

## 2021-07-20 NOTE — QUICK NOTE
Patient is a 58-year-old male with uncontrolled type 2 diabetes and peripheral vascular disease status post left BKA  Patient is currently on Lantus 35 units and lispro 15 units t i d  A c  He has been discharged home today  He can resume his mixed insulin 70/30 with the following doses:  40 units with breakfast  15 units with lunch  25 units with dinner  Patient is instructed to check his fingerstick 3 to 4 times a day and send his blood glucose logs to the office in a week for further adjustment

## 2021-07-20 NOTE — QUICK NOTE
PMR Consults    Reviewed chart, and patient could likely be progressed to Sup-mod Ind and be discharged home  Would have him follow-up with Dr Sanket Gabriel for amputee clinic in case he required inpatient rehab once he is fitted for his prosthetic      Omaira Singh MD  Physical Medicine and Rehabilitation

## 2021-07-20 NOTE — PROGRESS NOTES
Progress Note - Vascular Surgery   Stan Muir 76 y o  male MRN: [id number]  Unit/Bed#: [facility] 522-01 Encounter: [id number]    Assessment:  77 y/o male w/ PAD, DM, and chronic left foot wounds now s/p L SFA-peroneal GSV graft c/b immediate graft occlusion  Now s/p L BKA on 7/17  Plan:  Regular diet  Continue to monitor stump, stump check Thursday  Continue xarelto  APS following, appreciate recs  PMR following appreciate recs, AR[facility] vs home with [facility]  PT/OT - Recommending Home with [facility]  Dispo planning    Subjective/Objective     Subjective: No acute events overnight, no complaints this morning, patient now states that he is amenable to acute rehab after discussion with his wife and his mother    Objective:    Blood pressure (!) 110/41, pulse 89, temperature 99 7 °F (37 6 °C), resp  rate 18, weight 88 5 kg (195 lb), SpO2 97 %  ,Body mass index is 33 47 kg/m²  Intake/Output Summary (Last 24 hours) at 7/20/2021 0008  Last data filed at 7/19/2021 1839  Gross per 24 hour   Intake 60 ml   Output 420 ml   Net -360 ml       Invasive Devices       Peripheral Intravenous Line              Peripheral IV 07/18/21 Left;Ventral (anterior); Distal;Upper Arm 1 day                    Physical Exam:   Gen:    NAD  CV:      warm, well-perfused  Lungs: No respiratory distress  Abd:     soft, NT/ND  Ext:      L BKA stump cleanly dressed  Neuro: A&Ox3

## 2021-07-21 ENCOUNTER — TRANSITIONAL CARE MANAGEMENT (OUTPATIENT)
Dept: FAMILY MEDICINE CLINIC | Facility: CLINIC | Age: 66
End: 2021-07-21

## 2021-07-21 NOTE — UTILIZATION REVIEW
Notification of Discharge   This is a Notification of Discharge from our facility [address]  Please be advised that this patient has been discharge from our facility  Below you will find the admission and discharge date and time including the patients disposition  UTILIZATION REVIEW CONTACT:  Iva Mccarthy  Utilization   Network Utilization Review Department  Phone: [phone] x carefully listen to the prompts  All voicemails are confidential   Email: [email or web address]     PHYSICIAN ADVISORY SERVICES:  FOR YMOX-NE-CKVZ REVIEW - MEDICAL NECESSITY DENIAL  Phone: [phone]  Fax: [phone]  Email: [email or web address]     PRESENTATION DATE: 7/14/2021  8:28 AM  OBERVATION ADMISSION DATE:   INPATIENT ADMISSION DATE: 7/14/21  5:17 PM   DISCHARGE DATE: 7/20/2021  5:47 PM  DISPOSITION: Home with [place] with [address] INFORMATION:  Send all requests for admission clinical reviews, approved or denied determinations and any other requests to dedicated fax number below belonging to the campus where the patient is receiving treatment   List of dedicated fax numbers:  [address] DENIALS (Administrative/Medical Necessity) [phone]   [address] (Maternity/NICU/Pediatrics) [phone]   Fab Greenberg [phone]   Flaco Zamudio [phone]   Sharmin Martinez [phone]   Ellis Florentino [facility] 1525 [facility] [phone]   [facility]  [phone]   2205 [facility]  2401 [facility] 1000 [facility] [phone]

## 2021-07-22 ENCOUNTER — TELEPHONE (OUTPATIENT)
Dept: VASCULAR SURGERY | Facility: CLINIC | Age: 66
End: 2021-07-22

## 2021-07-22 NOTE — TELEPHONE ENCOUNTER
Vascular Nurse Navigator Post Op Call    Procedure:  BYPASS femoral-peroneal artery bypass with  reverse GSV and balloon angioplasty peroneal artery (Left)    Date of Procedure:  7/14/21    Surgeon:     Anyi Aleman MD - Primary     * Gerardo Ramos MD - Assisting     * Aditi Raymond MD - Assisting    Procedure:  AMPUTATION BELOW KNEE (BKA) (Left)    Date of Procedure:  7/17/21    Surgeon:    Anyi Aleman MD - Primary     * Aditi Raymond MD - Assisting    Discharge Date:  7/20/21    Discharge Disposition: Home with [facility]    Chest Pain?:No    Shortness of Breath?:No     Increased Pain, Swelling, Warmth, or Redness? :No    Purulent Drainage?:No    Foul- smelling drainage?: No    Signs of dehiscence?:No    Fever/Chills? :No    Bleeding?:No    Anticoagulation pt was discharged on post op?: Rivaroxaban (Xarelto) and Clopidogrel (Plavix)    Statin pt was discharged on post op?: Lipitor (atorvastatin)    Uncontrolled Pain?:No      Reviewed discharge instructions and incision care with patient  NEXT OFFICE VISIT SCHEDULED: 8/2/21 at 9:30 am with Jessy Russell PA-C at [facility]: Yes       Any further questions/concerns? Patient stated that he is doing good since discharge  He stated that the nurse from [address] [place] [address] was out to see him yesterday and will be back tomorrow  He stated that when his wife changed his dressing today, they noted a small spot of blood on the guaze and it appeared to be coming from a suture  They stated that they cleansed the area with no further bleeding or drainage  Advised him to have nurse check this area tomorrow and to contact office with any change in color or amount of drainage  Patient stated that he did not bump his leg, but he stated that he is exercising his leg by bending and extending his knee    Reviewed incision care with him - wash daily with soap and water and betadine to stump incision with a dressing  Patient inquired about a dressing to upper leg incisions from bypass, informed him that it was not necessary to apply a dressing to this area, but if he felt more comfortable to have a dressing on due to snagging on clothing, he can place a piece of guaze to area and change daily  Reviewed discharge medications - Xarelto, Plavix, and Lipitor  All questions answered  No concerns expressed at this time

## 2021-07-23 LAB

## 2021-07-27 ENCOUNTER — TELEPHONE (OUTPATIENT)
Dept: FAMILY MEDICINE CLINIC | Facility: CLINIC | Age: 66
End: 2021-07-27

## 2021-07-27 NOTE — TELEPHONE ENCOUNTER
Barbie from [facility] called, she said Shameka Abdullahi will be receiving PT two times a week for three weeks

## 2021-08-01 PROBLEM — Z89.512 S/P BKA (BELOW KNEE AMPUTATION), LEFT (HCC): Status: ACTIVE | Noted: 2021-08-01

## 2021-08-01 NOTE — PATIENT INSTRUCTIONS
DISCHARGE INSTRUCTIONS  LOWER EXTREMITY AMPUTATION    Following discharge from the hospital, you may have some questions about your operation, your activities or your general condition  These instructions may answer some of your questions and help you adjust during the first few weeks following your operation  REHABILITATION:   All patients require some form of rehabilitation after this procedure  This will assist with your return to daily activities by incorporating exercise and balance training  This may be in the form of visiting nurses and physical therapy in your home  However, admission to a rehabilitation facility is also common for a period of time before you return home  ACTIVITY:  Your limitations for activity will be discussed prior to discharge  Physical therapy and rehab staff will direct progression of your activity based on your progression in the physical therapy  Please follow their recommendations closely  DIET:  Resume your normal diet  Try to eat low fat and low cholesterol foods  INCISION:  Wash incision daily with soap and water  Pat dry thoroughly  Clean, dry gauze and ACE wrap to assist with edema control/stump shrinkage  It is normal to have swelling or discoloration around the incision  If increasing redness or pain develops, call our office immediately  You will have stitches or staples present after your surgery and these will be evaluated at your first post-operative visit  If any of your incisions are open and require dressing changes, you will be given instructions for your daily incision care  If you are not able to change the dressings, a visiting nurse will be arranged  PLEASE CALL THE OFFICE IF YOU HAVE ANY QUESTIONS  [phone] [facility] [phone] [facility] FREE [phone]  [address] [place] [address] , [place] [address], [facility] [place], [address] [place] [address]  600 [facility] 20, [address] [facility], 210 [facility]  [address]   2707 [facility], [place] [address]  [address] [place], [place] 1215 [facility], 1st Floor, [address]  [address], 2nd Floor, [address] [facility], [facility] 97   1201 [facility], 8614 [facility], [place], 960 [facility]  One [facility], 532 [facility], One [facility], [address], [place], [facility] 6    -continue to clean incision daily with soap and water  No lotions, ointments or creams to incision  No soaking or submerging incision until completely healed  -continue to straighten knee intermittently throughout the day to prevent contracture  -we will initiate use of stump  to help control edema after sutures removed and stump completely healed as a next step to prosthesis  -continue Plavix, Xarelto and atorvastatin  -return to office in 2 weeks for suture removal and postop follow up   -please contact the office in the interim with any questions, concerns or changes in stump, including separation of incision    -we will have a representative from [address] [place] [address] contact you regarding future prosthesis today

## 2021-08-01 NOTE — ASSESSMENT & PLAN NOTE
13-year-old male nonsmoker with HTN, HLD, uncontrolled DM 2 with neuropathy and PAD w/hx BLE tissue loss, s/p multiple BLE interventions @ OSH including L SFA PTA/stent and atherectomy tibial vessels w/AT/PT  1/5/2020, L TMA and R AT/DP recanalization 1/28/2021 followed by R 5th ray and most recently L foot tissue loss and nonhealing L TMA x 6 months, s/p recanalization of occluded L peroneal stent Azell Leak) 4/13/21 w/early stent reocclusion who is now s/p L fem-peroneal bypass w/reversed GSV 7/14/2021 c/b early graft failure 2/2 poor outflow, ultimately requiring L BKA by Dr Wily Oreilly 7/17/2021  Patient returns for initial postoperative follow-up  -doing well  Pain well controlled  L thigh bypass incisions healing well  All staples removed from proximal and distal thigh incisions  L BKA w/sutures intact with wound edges well approximated  +small skin edge necrosis mid incision without stump necrosis or dehiscence  Stump edema well controlled  -continue incisional care as directed  Continue light compression with Ace wrap to L BKA stump  -continue left knee straightening exercises to prevent contracture  -continue ASA, Xarelto 2 5 mg daily (PAD) and statin  -return to office in 2 weeks with Dr Wily Oreilly for postoperative follow-up and L BKA stump suture removal   -will need to continue surveillance of RLE PAD and known SFA stent  Patent on most recent GABBY 5/26/21  Surveillance interval to be determined at f/u appt   -instructed to contact the office with new symptoms, concerns or changes in incision/stump

## 2021-08-02 ENCOUNTER — OFFICE VISIT (OUTPATIENT)
Dept: VASCULAR SURGERY | Facility: CLINIC | Age: 66
End: 2021-08-02

## 2021-08-02 VITALS
WEIGHT: 181 LBS | HEART RATE: 80 BPM | HEIGHT: 64 IN | SYSTOLIC BLOOD PRESSURE: 140 MMHG | DIASTOLIC BLOOD PRESSURE: 72 MMHG | BODY MASS INDEX: 30.9 KG/M2 | TEMPERATURE: 97.2 F

## 2021-08-02 DIAGNOSIS — I73.9 PAD (PERIPHERAL ARTERY DISEASE) (HCC): Primary | ICD-10-CM

## 2021-08-02 DIAGNOSIS — Z89.512 S/P BKA (BELOW KNEE AMPUTATION), LEFT (HCC): ICD-10-CM

## 2021-08-02 PROCEDURE — 99024 POSTOP FOLLOW-UP VISIT: CPT | Performed by: PHYSICIAN ASSISTANT

## 2021-08-02 PROCEDURE — 3008F BODY MASS INDEX DOCD: CPT | Performed by: INTERNAL MEDICINE

## 2021-08-02 NOTE — TELEPHONE ENCOUNTER
Called and left message for patient to call and schedule per Dr Rusty Wright  Will now wait for patient to call

## 2021-08-02 NOTE — PROGRESS NOTES
Assessment/Plan:    PAD (peripheral artery disease) [facility])  77-year-old male nonsmoker with HTN, HLD, uncontrolled DM 2 with neuropathy and PAD w/hx BLE tissue loss, s/p multiple BLE interventions @ OSH including L SFA PTA/stent and atherectomy tibial vessels w/AT/PT  1/5/2020, L TMA and R AT/DP recanalization 1/28/2021 followed by R 5th ray and most recently L foot tissue loss and nonhealing L TMA x 6 months, s/p recanalization of occluded L peroneal stent Iris Head) 4/13/21 w/early stent reocclusion who is now s/p L fem-peroneal bypass w/reversed GSV 7/14/2021 c/b early graft failure 2/2 poor outflow, ultimately requiring L BKA by Dr Judd Perales 7/17/2021  Patient returns for initial postoperative follow-up  -doing well  Pain well controlled  L thigh bypass incisions healing well  All staples removed from proximal and distal thigh incisions  L BKA w/sutures intact with wound edges well approximated  +small skin edge necrosis mid incision without stump necrosis or dehiscence  Stump edema well controlled  -continue incisional care as directed  Continue light compression with Ace wrap to L BKA stump  -continue left knee straightening exercises to prevent contracture  -continue ASA, Xarelto 2 5 mg daily (PAD) and statin  -return to office in 2 weeks with Dr Judd Perales for postoperative follow-up and L BKA stump suture removal   -will need to continue surveillance of RLE PAD and known SFA stent  Patent on most recent GABBY 5/26/21  Surveillance interval to be determined at f/u appt   -instructed to contact the office with new symptoms, concerns or changes in incision/stump  Diagnoses and all orders for this visit:    PAD (peripheral artery disease) ([address] )    S/P BKA (below knee amputation), left (HCC)          Subjective:      Patient ID: Nicolle Rosas is a 76 y o  male  Patient underwent L fem-peroneal artery bypass 7/14, c/b bypass occlusion and subsequently had L BKA 7/17, Dr Camille Wilkins   Presents today for post-op visit  62-year-old male nonsmoker with HTN, HLD, uncontrolled DM 2 with neuropathy and PAD w/hx BLE tissue loss, s/p multiple BLE interventions @ OSH including L SFA PTA/stent and atherectomy tibial vessels w/AT/PT  1/5/2020, L TMA and R AT/DP recanalization 1/28/2021 followed by R 5th ray and most recently L foot tissue loss and nonhealing L TMA x 6 months, s/p recanalization of occluded L peroneal stent Ellie Navarro) 4/13/21 w/early stent reocclusion who is now s/p L fem-peroneal bypass w/reversed GSV 7/14/2021 c/b early graft failure 2/2 poor outflow, ultimately requiring L BKA by Dr Chantal Naidu 7/17/2021  Patient returns for initial postoperative follow-up  Pt doing well  Pain well controlled  L thigh bypass incisions healing well  All staples removed from proximal and distal thigh incisions  L BKA w/sutures intact with wound edges well approximated  +small skin edge necrosis mid incision without stump necrosis or dehiscence  Stump edema well controlled  Patient denies fever, chills or incisional erythema, dehiscence, drainage or pain  The following portions of the patient's history were reviewed and updated as appropriate: allergies, current medications, past family history, past medical history, past social history, past surgical history and problem list     Review of Systems   Constitutional: Positive for activity change  HENT: Negative  Eyes: Negative  Respiratory: Negative  Cardiovascular: Negative  Gastrointestinal: Negative  Endocrine: Negative  Genitourinary: Negative  Musculoskeletal: Positive for gait problem  Leg pain   Skin: Positive for color change and wound  Allergic/Immunologic: Negative  Hematological: Bruises/bleeds easily  Psychiatric/Behavioral: Negative  I have personally reviewed the ROS entered by MA and agree as documented      Objective:    L proximal thigh    LLE    L BKA anterior    L BKA lateral      /72 (BP Location: Right arm, Patient Position: Sitting)   Pulse 80   Temp (!) 97 2 °F (36 2 °C) (Tympanic)   Ht 5' 4" (1 626 m)   Wt 82 1 kg (181 lb)   BMI 31 07 kg/m²          Physical Exam  Vitals and nursing note reviewed  Exam conducted with a chaperone present  Constitutional:       General: He is not in acute distress  Appearance: Normal appearance  He is not ill-appearing, toxic-appearing or diaphoretic  Cardiovascular:      Rate and Rhythm: Normal rate and regular rhythm  Heart sounds: Normal heart sounds  No murmur heard  No friction rub  No gallop  Pulmonary:      Effort: Pulmonary effort is normal  No respiratory distress  Breath sounds: Normal breath sounds  No stridor  No wheezing, rhonchi or rales  Musculoskeletal:      Comments: Left groin and distal thigh incisions clean, dry and intact and healed well  Farina intact with wound edges well approximated  No erythema, induration, drainage, dehiscence or hematoma  L BKA stump with areas of marginal skin necrosis mid incision without dehiscence, drainage, hematoma, hemorrhage or drainage  Mild stump edema  Sutures intact with wound edges well approximated  Skin:     General: Skin is warm and dry  Coloration: Skin is not pale  Findings: No erythema or rash  Neurological:      Mental Status: He is alert and oriented to person, place, and time  Psychiatric:         Mood and Affect: Mood normal          Thought Content:  Thought content normal

## 2021-08-03 NOTE — UTILIZATION REVIEW
Notification of Discharge   This is a Notification of Discharge from our facility [address]  Please be advised that this patient has been discharge from our facility  Below you will find the admission and discharge date and time including the patients disposition  UTILIZATION REVIEW CONTACT:  Cathy Vu  Utilization   Network Utilization Review Department  Phone: [phone] x carefully listen to the prompts  All voicemails are confidential   Email: [email or web address]     PHYSICIAN ADVISORY SERVICES:  FOR [id number] REVIEW - MEDICAL NECESSITY DENIAL  Phone: [phone]  Fax: [phone]  Email: [email or web address]     PRESENTATION DATE: 7/14/2021  8:28 AM  OBERVATION ADMISSION DATE:   INPATIENT ADMISSION DATE: 7/14/21  5:17 PM   DISCHARGE DATE: 7/20/2021  5:47 PM  DISPOSITION: Home with [place] with [address] INFORMATION:  Send all requests for admission clinical reviews, approved or denied determinations and any other requests to dedicated fax number below belonging to the campus where the patient is receiving treatment   List of dedicated fax numbers:  [address] DENIALS (Administrative/Medical Necessity) [phone]   [address] (Maternity/NICU/Pediatrics) [phone]   Jhoananae Bailey [phone]   Chapoandria Jiang [phone]   Vanessa Severin [phone]   Neo Rouse [facility] [address] [place] [address] [phone]   [facility]  [phone]   [address] [place] [address], [address]  [address] [facility] [address] [facility] [phone]

## 2021-08-06 DIAGNOSIS — L97.509 TYPE 2 DIABETES MELLITUS WITH FOOT ULCER, WITH LONG-TERM CURRENT USE OF INSULIN (HCC): ICD-10-CM

## 2021-08-06 DIAGNOSIS — Z79.4 TYPE 2 DIABETES MELLITUS WITH FOOT ULCER, WITH LONG-TERM CURRENT USE OF INSULIN (HCC): ICD-10-CM

## 2021-08-06 DIAGNOSIS — E11.51 TYPE 2 DIABETES MELLITUS WITH DIABETIC PERIPHERAL ANGIOPATHY WITHOUT GANGRENE, WITH LONG-TERM CURRENT USE OF INSULIN (HCC): ICD-10-CM

## 2021-08-06 DIAGNOSIS — E11.621 TYPE 2 DIABETES MELLITUS WITH FOOT ULCER, WITH LONG-TERM CURRENT USE OF INSULIN (HCC): ICD-10-CM

## 2021-08-06 DIAGNOSIS — Z79.4 TYPE 2 DIABETES MELLITUS WITH DIABETIC PERIPHERAL ANGIOPATHY WITHOUT GANGRENE, WITH LONG-TERM CURRENT USE OF INSULIN (HCC): ICD-10-CM

## 2021-08-06 NOTE — TELEPHONE ENCOUNTER
Spoke with patient and reviewed bg log and changes to medication    He understood        Updated med list

## 2021-08-06 NOTE — TELEPHONE ENCOUNTER
Reviewed blood sugar log, blood sugars are variable in  range     usually elevated after breakfast    please inform patient to keep carbohydrate consistent with meals to avoid fluctuation in blood sugars   increase NovoLog 70/30, 44 units with breakfast, 20 units with lunch, 30 units with dinner   please update the med list    America Austin MD

## 2021-08-06 NOTE — TELEPHONE ENCOUNTER
bs log  Metformin 500mg q day  70-30 take 40 20 and 30  Pt lost his leg had surgeries  Now on oxycontin

## 2021-08-10 ENCOUNTER — TELEPHONE (OUTPATIENT)
Dept: PODIATRY | Facility: CLINIC | Age: 66
End: 2021-08-10

## 2021-08-10 DIAGNOSIS — M54.9 CHRONIC BACK PAIN, UNSPECIFIED BACK LOCATION, UNSPECIFIED BACK PAIN LATERALITY: Primary | ICD-10-CM

## 2021-08-10 DIAGNOSIS — G89.29 CHRONIC BACK PAIN, UNSPECIFIED BACK LOCATION, UNSPECIFIED BACK PAIN LATERALITY: Primary | ICD-10-CM

## 2021-08-10 RX ORDER — OXYCODONE HYDROCHLORIDE AND ACETAMINOPHEN 5; 325 MG/1; MG/1
1 TABLET ORAL EVERY 6 HOURS PRN
COMMUNITY
End: 2021-08-10 | Stop reason: SDUPTHER

## 2021-08-10 RX ORDER — OXYCODONE HYDROCHLORIDE AND ACETAMINOPHEN 5; 325 MG/1; MG/1
1 TABLET ORAL EVERY 6 HOURS PRN
Qty: 120 TABLET | Refills: 0 | Status: SHIPPED | OUTPATIENT
Start: 2021-08-10 | End: 2021-09-07 | Stop reason: SDUPTHER

## 2021-08-11 DIAGNOSIS — I73.9 PERIPHERAL VASCULAR DISEASE, UNSPECIFIED (HCC): ICD-10-CM

## 2021-08-11 DIAGNOSIS — E11.40 TYPE 2 DIABETES MELLITUS WITH DIABETIC NEUROPATHY, WITH LONG-TERM CURRENT USE OF INSULIN (HCC): Primary | ICD-10-CM

## 2021-08-11 DIAGNOSIS — Z79.4 TYPE 2 DIABETES MELLITUS WITH DIABETIC NEUROPATHY, WITH LONG-TERM CURRENT USE OF INSULIN (HCC): Primary | ICD-10-CM

## 2021-08-17 ENCOUNTER — OFFICE VISIT (OUTPATIENT)
Dept: VASCULAR SURGERY | Facility: CLINIC | Age: 66
End: 2021-08-17

## 2021-08-17 VITALS
SYSTOLIC BLOOD PRESSURE: 146 MMHG | HEIGHT: 64 IN | BODY MASS INDEX: 31.58 KG/M2 | HEART RATE: 81 BPM | TEMPERATURE: 98.4 F | DIASTOLIC BLOOD PRESSURE: 70 MMHG | WEIGHT: 185 LBS

## 2021-08-17 DIAGNOSIS — Z89.512 S/P BKA (BELOW KNEE AMPUTATION), LEFT (HCC): Primary | ICD-10-CM

## 2021-08-17 PROCEDURE — 99024 POSTOP FOLLOW-UP VISIT: CPT | Performed by: SURGERY

## 2021-08-17 NOTE — ASSESSMENT & PLAN NOTE
Presents for post-op check  Doing well  Pain is well controlled  Not having significant pain  Overall amputation site is healing well  Removed a few sutures in clinic today however left the majority intact  Given multiple incisions from previous failed bypass, will reassess in two weeks and likely remove remaining sutures then  Overall healing as expected  Mild epidermolysis in expected areas no open areas of breakdown or dehiscence     Clinic f/u in 2 weeks

## 2021-08-24 ENCOUNTER — OFFICE VISIT (OUTPATIENT)
Dept: PODIATRY | Facility: CLINIC | Age: 66
End: 2021-08-24
Payer: COMMERCIAL

## 2021-08-24 ENCOUNTER — VBI (OUTPATIENT)
Dept: ADMINISTRATIVE | Facility: OTHER | Age: 66
End: 2021-08-24

## 2021-08-24 VITALS
HEART RATE: 88 BPM | WEIGHT: 179 LBS | HEIGHT: 64 IN | SYSTOLIC BLOOD PRESSURE: 161 MMHG | DIASTOLIC BLOOD PRESSURE: 80 MMHG | BODY MASS INDEX: 30.56 KG/M2

## 2021-08-24 DIAGNOSIS — E11.40 TYPE 2 DIABETES MELLITUS WITH DIABETIC NEUROPATHY, WITH LONG-TERM CURRENT USE OF INSULIN (HCC): ICD-10-CM

## 2021-08-24 DIAGNOSIS — I73.9 PERIPHERAL VASCULAR DISEASE, UNSPECIFIED (HCC): ICD-10-CM

## 2021-08-24 DIAGNOSIS — Z89.421 ACQUIRED ABSENCE OF OTHER RIGHT TOE(S) (HCC): Primary | ICD-10-CM

## 2021-08-24 DIAGNOSIS — Z79.4 TYPE 2 DIABETES MELLITUS WITH DIABETIC NEUROPATHY, WITH LONG-TERM CURRENT USE OF INSULIN (HCC): ICD-10-CM

## 2021-08-24 DIAGNOSIS — Z89.512: ICD-10-CM

## 2021-08-24 DIAGNOSIS — B35.1 ONYCHOMYCOSIS: ICD-10-CM

## 2021-08-24 PROCEDURE — 11720 DEBRIDE NAIL 1-5: CPT | Performed by: PODIATRIST

## 2021-08-24 PROCEDURE — 1160F RVW MEDS BY RX/DR IN RCRD: CPT | Performed by: PODIATRIST

## 2021-08-24 PROCEDURE — 1036F TOBACCO NON-USER: CPT | Performed by: PODIATRIST

## 2021-08-24 PROCEDURE — 99213 OFFICE O/P EST LOW 20 MIN: CPT | Performed by: PODIATRIST

## 2021-08-24 NOTE — PROGRESS NOTES
Patient ID: Kenny Acuna is a 76 y o  male Date of Birth 1955     Chief Complaint  Chief Complaint   Patient presents with    Follow-up     nailcare, Ulcer of left foot, unspecified ulcer stage       Allergies  Shellfish-derived products - food allergy and Sulfamethoxazole-trimethoprim    Assessment:    No problem-specific Assessment & Plan notes found for this encounter  Diagnoses and all orders for this visit:    Acquired absence of other right toe(s) ([facility] )    Acquired absence of leg below knee, left (HCC)    Peripheral vascular disease, unspecified ([facility] )    Type 2 diabetes mellitus with diabetic neuropathy, with long-term current use of insulin ([facility] )    Onychomycosis  -     Debridement          Plan:  1  Reviewed medical records from previous admission including OP note and vascular note  2  Unfortunately, bypass surgery was unsuccessful and he had BKA on left side  He also has history of right 5th ray amputation with PAD / Type II diabetes with neuropathy  He has high risk diabetic foot and recommends diabetic shoes and inserts  3  Referred him to  for DM shoes and inserts  4  Okay for WB on right foot as tolerated  5  Recommended periodic foot exam concerning his risks  6  RA in 2 months  Procedure: All mycotic toenails were reduced and debrided in length, width, and girth using a nail nipper and dremel  Patient tolerated procedure(s) well without complications  Subjective:      HPI  Liberty Poag presents for foot evaluation  S/P left BKA after failed bypass surgery  No recurring ulcer on right foot  No redness or edema right foot  No foot infection  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  BS has been little better  He complained of thick toenails on right foot        The following portions of the patient's history were reviewed and updated as appropriate: allergies, current medications, past family history, past medical history, past social history, past surgical history and problem list     PAST MEDICAL HISTORY:  Past Medical History:   Diagnosis Date    Arthritis     fingers    First degree AV block     Full dentures     Hypertension     PAD (peripheral artery disease) ([facility] )     PVD [facility]peripheral vascular disease) ([facility])     Type 2 diabetes mellitus with diabetic peripheral angiopathy without gangrene ([facility] ) 5/18/2021    Per CMS ICD 10 guidelines--Per Physician    Wound, open     right foot- by the 5th toe       PAST SURGICAL HISTORY:  Past Surgical History:   Procedure Laterality Date    CHOLECYSTECTOMY      COLONOSCOPY      IR AORTAGRAM WITH RUN-OFF  1/28/2021    IR AORTAGRAM WITH RUN-OFF  4/13/2021    LEG AMPUTATION THROUGH LOWER TIBIA AND FIBULA Left 7/17/2021    Procedure: AMPUTATION BELOW KNEE (BKA);   Surgeon: Susy Lane MD;  [facility];  Service: Vascular    NM DEBRIDEMENT, SKIN, SUB-Q TISSUE,MUSCLE,BONE,=<20 SQ CM Right 12/18/2020    Procedure: DEBRIDMENT OF ULCER , REMOVAL OF DEVITALIZED SKIN, SOFT TISSUE, BONE AND APPLICATION OF INTEGRA GRAFT RIGHT FOOT ;  Surgeon: Estrellita Davis DPM;  Location: [address] [place] [address];  Service: Podiatry    NM VEIN BYPASS GRAFT,FEM-TIBIAL Left 7/14/2021    Procedure: BYPASS femoral-peroneal artery bypass with  reverse GSV and balloon angioplasty peroneal artery;  Surgeon: Susy Lane MD;  [facility];  Service: Vascular    REPLANTATION THUMB Right     right tip reconnected    SKIN GRAFT      right thumb    TOE AMPUTATION      left foot all 5 toes removed    TOE AMPUTATION Right 2/2/2021    Procedure: Right partial 5th ray amputation;  Surgeon: Parish Melgar DPM;  [facility];  Service: Podiatry    TOE OSTEOTOMY Right 6/26/2020    Procedure: exostosis of right big toe;  Surgeon: Cody Ramirez DPM;  Location: [address] [place] [address];  Service: Podiatry    [address]      bilateral hands    VEIN LIGATION      right        ALLERGIES:  Shellfish-derived products - food allergy and Sulfamethoxazole-trimethoprim    MEDICATIONS:  Current Outpatient Medications   Medication Sig Dispense Refill    acetaminophen (TYLENOL) 325 mg tablet Take 2 tablets (650 mg total) by mouth every 4 (four) hours as needed for mild pain, moderate pain, severe pain or fever      atorvastatin (LIPITOR) 40 mg tablet Take 1 tablet (40 mg total) by mouth daily at bedtime 90 tablet 1    Blood Glucose Monitoring Suppl (OneTouch Verio) w/Device KIT by Does not apply route 2 (two) times a day Dx E11 9 1 kit 1    clopidogrel (PLAVIX) 75 mg tablet Take 1 tablet (75 mg total) by mouth daily 90 tablet 0    gabapentin (NEURONTIN) 300 mg capsule Take 1 capsule (300 mg total) by mouth 3 (three) times a day 270 capsule 3    glucose blood (OneTouch Verio) test strip Use as instructed qid Dx E11 9 400 each 3    insulin NPH-insulin regular (NovoLIN 70/30) 100 units/mL subcutaneous injection Inject:  44 units with breakfast, 20 units with lunch, 30 units with dinner 15 mL 1    Lancets (OneTouch Delica Plus [id number]) MISC 1 Units by Does not apply route 2 (two) times a day Dx E11 9 100 each 2    lisinopril (ZESTRIL) 10 mg tablet Take 1 tablet (10 mg total) by mouth daily 90 tablet 1    metFORMIN (GLUCOPHAGE-XR) 500 mg 24 hr tablet Take 1 tablet (500 mg total) by mouth daily with dinner 30 tablet 4    methocarbamol (ROBAXIN) 500 mg tablet Take 1 tablet (500 mg total) by mouth every 6 (six) hours 120 tablet 0    oxyCODONE-acetaminophen (PERCOCET) 5-325 mg per tablet Take 1 tablet by mouth every 6 (six) hours as needed for moderate painMax Daily Amount: 4 tablets 120 tablet 0    pantoprazole (PROTONIX) 40 mg tablet Take 1 tablet (40 mg total) by mouth daily 90 tablet 1    rivaroxaban (Xarelto) 2 5 mg tablet Take 1 tablet (2 5 mg total) by mouth daily with breakfast Last dose 6/21/20 30 tablet 2    UltiCare Insulin Syringe 31G X 5/16" 1 ML MISC Inject under the skin as needed (for injection) 100 each 1    docusate sodium (COLACE) 100 mg capsule Take 1 capsule (100 mg total) by mouth 2 (two) times a day Stool softener for constipation  Hold loose stools (Patient not taking: Reported on 2021)       No current facility-administered medications for this visit  SOCIAL HISTORY:  Social History     Socioeconomic History    Marital status: /Civil Union     Spouse name: None    Number of children: None    Years of education: None    Highest education level: None   Occupational History    Occupation: Cook   Tobacco Use    Smoking status: Never Smoker    Smokeless tobacco: Never Used   Vaping Use    Vaping Use: Never used   Substance and Sexual Activity    Alcohol use: Yes     Comment: occasional    Drug use: Never    Sexual activity: None   Other Topics Concern    None   Social History Narrative    · Most recent tobacco use screenin2019      · Do you currently or have you served in the [employer]:   No      · Were you activated, into active duty, as a member of the [employer] or as a Reservist:   No      · Diet:   Regular      · Alcohol intake:   Occasional      · Caffeine intake:   Occasional      · Seat belts used routinely:   Yes      · Smoke alarm in home:   Yes      Social Determinants of Health     Financial Resource Strain:     Difficulty of Paying Living Expenses:    Food Insecurity:     Worried About Running Out of Food in the Last Year:     Ran Out of Food in the Last Year:    Transportation Needs:     Lack of Transportation (Medical):      Lack of Transportation (Non-Medical):    Physical Activity:     Days of Exercise per Week:     Minutes of Exercise per Session:    Stress:     Feeling of Stress :    Social Connections:     Frequency of Communication with Friends and Family:     Frequency of Social Gatherings with Friends and Family:     Attends [employer] Services:     Active Member of Clubs or Organizations:     Attends Club or Organization Meetings:     Marital Status:    Intimate Partner Violence:     Fear of Current or Ex-Partner:     Emotionally Abused:     Physically Abused:     Sexually Abused:       Review of Systems   Constitutional: Negative for chills and fever  Respiratory: Negative for cough and shortness of breath  Cardiovascular: Negative for chest pain  Gastrointestinal: Negative for diarrhea, nausea and vomiting  Musculoskeletal:        BKA left         Objective:            /80   Pulse 88   Ht 5' 4" (1 626 m) Comment: verbal  Wt 81 2 kg (179 lb)   BMI 30 73 kg/m²     Physical Exam  Vitals and nursing note reviewed  Constitutional:       General: He is not in acute distress  Appearance: Normal appearance  He is not toxic-appearing  Cardiovascular:      Rate and Rhythm: Normal rate and regular rhythm  Pulses:           Dorsalis pedis pulses are 1+ on the right side  Posterior tibial pulses are 0 on the right side  Comments: No calf pain or swelling  Pulmonary:      Effort: Pulmonary effort is normal  No respiratory distress  Musculoskeletal:         General: No signs of injury  Right foot: No foot drop  Comments: BKA left  Right 5th ray amputation  Left Lower Extremity: Left leg is amputated below knee  Skin:     General: Skin is warm and dry  Coloration: Skin is not cyanotic or mottled  Findings: No abscess or erythema  Nails: There is no clubbing  Comments: No wound presents right foot  Skin is dry  Thick, mycotic toenails X 4 with discoloration and onycholysis  Neurological:      General: No focal deficit present  Mental Status: He is alert and oriented to person, place, and time  Cranial Nerves: No cranial nerve deficit  Motor: No weakness  Psychiatric:         Mood and Affect: Mood normal          Behavior: Behavior normal          Thought Content:  Thought content normal          Judgment: Judgment normal

## 2021-09-02 ENCOUNTER — OFFICE VISIT (OUTPATIENT)
Dept: VASCULAR SURGERY | Facility: CLINIC | Age: 66
End: 2021-09-02

## 2021-09-02 VITALS
BODY MASS INDEX: 30.39 KG/M2 | SYSTOLIC BLOOD PRESSURE: 142 MMHG | DIASTOLIC BLOOD PRESSURE: 78 MMHG | RESPIRATION RATE: 12 BRPM | HEART RATE: 80 BPM | WEIGHT: 178 LBS | HEIGHT: 64 IN

## 2021-09-02 DIAGNOSIS — Z89.512 S/P BKA (BELOW KNEE AMPUTATION), LEFT (HCC): Primary | ICD-10-CM

## 2021-09-02 PROCEDURE — 3008F BODY MASS INDEX DOCD: CPT | Performed by: PODIATRIST

## 2021-09-02 PROCEDURE — 99024 POSTOP FOLLOW-UP VISIT: CPT | Performed by: NURSE PRACTITIONER

## 2021-09-02 NOTE — ASSESSMENT & PLAN NOTE
Pt is a 72yo male nonsmoker with h/o HTN, HLD, uncontrolled DM 2 with neuropathy and PAD w/hx BLE tissue loss, s/p multiple BLE interventions @ OSH including L SFA PTA/stent and atherectomy tibial vessels w/AT/PT  1/5/2020, L TMA and R AT/DP recanalization 1/28/2021 followed by R 5th ray and most recently L foot tissue loss and nonhealing L TMA x 6 months, s/p recanalization of occluded L peroneal stent Anastacia Ronquillo) 4/13/21 w/early stent reocclusion who is now s/p L fem-peroneal bypass w/reversed GSV 7/14/2021 c/b early graft failure 2/2 poor outflow, ultimately requiring L BKA by Dr Prudence Helms 7/17/2021  He returns for left BKA incision site check and removal of remaining sutures  Incision well approximated without dehiscence, drainage or bleeding  Dry scabbed eschar centrally    - remaining sutures removed without incident  - no S/S of infection  - Betadine paint and dry gauze dressing/Janice/ ACE applied    Plan:  - continue visiting nurse and wound care  - stump  when healed  - pt in contact with Unknown [place]

## 2021-09-02 NOTE — PROGRESS NOTES
Assessment/Plan:    S/P BKA (below knee amputation), left (HCC)  Pt is a 70yo male nonsmoker with h/o HTN, HLD, uncontrolled DM 2 with neuropathy and PAD w/hx BLE tissue loss, s/p multiple BLE interventions @ OSH including L SFA PTA/stent and atherectomy tibial vessels w/AT/PT  1/5/2020, L TMA and R AT/DP recanalization 1/28/2021 followed by R 5th ray and most recently L foot tissue loss and nonhealing L TMA x 6 months, s/p recanalization of occluded L peroneal stent Davidse Merritt) 4/13/21 w/early stent reocclusion who is now s/p L fem-peroneal bypass w/reversed GSV 7/14/2021 c/b early graft failure 2/2 poor outflow, ultimately requiring L BKA by Dr Pedro Atwood 7/17/2021  He returns for left BKA incision site check and removal of remaining sutures  Incision well approximated without dehiscence, drainage or bleeding  Dry scabbed eschar centrally  - remaining sutures removed without incident  - no S/S of infection  - Betadine paint and dry gauze dressing/Janice/ ACE applied    Plan:  - continue visiting nurse and wound care  - stump  when healed  - pt in contact with [employer]       There are no diagnoses linked to this encounter  Subjective:      Patient ID: Alejandro Tracey is a 76 y o  male  Patient is here today for a wound check  He is s/p  l bka by Dr Pedro Atwood on 8/17  Patient is using saline and betadine daily, wrapping with gauze and ace wrap  HPI    The following portions of the patient's history were reviewed and updated as appropriate: allergies, current medications, past family history, past medical history, past social history, past surgical history and problem list     Review of Systems      /78   Pulse 80   Resp 12   Ht 5' 4" (1 626 m)   Wt 80 7 kg (178 lb)   BMI 30 55 kg/m²          Physical Exam  Vitals reviewed  Constitutional:       Appearance: Normal appearance  Cardiovascular:      Rate and Rhythm: Normal rate and regular rhythm     Musculoskeletal:      Comments: L BKA  Walks with crutches   Skin:     General: Skin is warm and dry  Comments: L BKA incision CDI with sutures  Eschar scab central and medial incision  No drainage, bleeding, or discharge noted  No signs of infection  Remaining sutures removed today without incident  Neurological:      Mental Status: He is alert  Vitals:    09/02/21 1358   BP: 142/78   Pulse: 80   Resp: 12   Weight: 80 7 kg (178 lb)   Height: 5' 4" (1 626 m)       Patient Active Problem List   Diagnosis    Acquired deformity of foot    Diabetic polyneuropathy associated with type 2 diabetes mellitus ([facility])    Pain in both feet    Peripheral arteriosclerosis ([facility])    Onychomycosis    Tinea pedis of both feet    Dermatophytosis    Ingrown toenail    Paronychia of toenail of right foot    Diabetic ulcer of toe of right foot associated with type 2 diabetes mellitus, limited to breakdown of skin ([facility])    Bony exostosis    Right foot ulcer, with fat layer exposed ([address]    PAD (peripheral artery disease) ([facility])    Poorly controlled type 2 diabetes mellitus ([address] )    Essential hypertension    Mixed hyperlipidemia    Type 2 diabetes mellitus with diabetic peripheral angiopathy without gangrene ([address] )    Platelets decreased ([address]    Gastroesophageal reflux disease without esophagitis    S/P BKA (below knee amputation), left [facility])       Past Surgical History:   Procedure Laterality Date    CHOLECYSTECTOMY      COLONOSCOPY      IR AORTAGRAM WITH RUN-OFF  1/28/2021    IR AORTAGRAM WITH RUN-OFF  4/13/2021    LEG AMPUTATION THROUGH LOWER TIBIA AND FIBULA Left 7/17/2021    Procedure: AMPUTATION BELOW KNEE (BKA);   Surgeon: Ely Loza MD;  [facility];  Service: Vascular    IA DEBRIDEMENT, SKIN, SUB-Q TISSUE,MUSCLE,BONE,=<20 SQ CM Right 12/18/2020    Procedure: DEBRIDMENT OF ULCER , REMOVAL OF DEVITALIZED SKIN, SOFT TISSUE, BONE AND APPLICATION OF INTEGRA GRAFT RIGHT FOOT ;  Surgeon: Jaime Kearney PHILLIP;  Location: [facility];  Service: Podiatry    NE VEIN BYPASS GRAFT,FEM-TIBIAL Left 2021    Procedure: BYPASS femoral-peroneal artery bypass with  reverse GSV and balloon angioplasty peroneal artery;  Surgeon: Linh Lan MD;  Location:  MAIN OR;  Service: Vascular    REPLANTATION THUMB Right     right tip reconnected    SKIN GRAFT      right thumb    TOE AMPUTATION      left foot all 5 toes removed    TOE AMPUTATION Right 2021    Procedure: Right partial 5th ray amputation;  Surgeon: Trey Todd DPM;  Location:  MAIN OR;  Service: Podiatry    TOE OSTEOTOMY Right 2020    Procedure: exostosis of right big toe;  Surgeon: Lynette Leal DPM;  Location: [facility];  Service: Podiatry    TRIGGER FINGER RELEASE      bilateral hands    VEIN LIGATION      right       Family History   Problem Relation Age of Onset    Arthritis Mother     Cancer Father         colon    Alzheimer's disease Father        Social History     Socioeconomic History    Marital status: /Civil Union     Spouse name: Not on file    Number of children: Not on file    Years of education: Not on file    Highest education level: Not on file   Occupational History    Occupation: [employer]   Tobacco Use    Smoking status: Never Smoker    Smokeless tobacco: Never Used   Vaping Use    Vaping Use: Never used   Substance and Sexual Activity    Alcohol use: Yes     Comment: occasional    Drug use: Never    Sexual activity: Not on file   Other Topics Concern    Not on file   Social History Narrative    · Most recent tobacco use screenin2019      · Do you currently or have you served in the [employer]:   No      · Were you activated, into active duty, as a member of the [employer] or as a Reservist:   No      · Diet:   Regular      · Alcohol intake:   Occasional      · Caffeine intake:   Occasional      · Seat belts used routinely:   Yes      · Smoke alarm in home:   Yes      Social Determinants of Health     Financial Resource Strain:     Difficulty of Paying Living Expenses:    Food Insecurity:     Worried About Running Out of Food in the Last Year:     [address] in the Last Year:    Transportation Needs:     Lack of Transportation (Medical):  Lack of Transportation (Non-Medical):    Physical Activity:     Days of Exercise per Week:     Minutes of Exercise per Session:    Stress:     Feeling of Stress :    Social Connections:     Frequency of Communication with Friends and Family:     Frequency of Social Gatherings with Friends and Family:     Attends [employer] Services:     Active Member of Clubs or Organizations:     Attends Club or Organization Meetings:     Marital Status:    Intimate Partner Violence:     Fear of Current or Ex-Partner:     Emotionally Abused:     Physically Abused:     Sexually Abused: Allergies   Allergen Reactions    Shellfish-Derived Products - Food Allergy Anaphylaxis     Throat closes    Sulfamethoxazole-Trimethoprim Rash         Current Outpatient Medications:     acetaminophen (TYLENOL) 325 mg tablet, Take 2 tablets (650 mg total) by mouth every 4 (four) hours as needed for mild pain, moderate pain, severe pain or fever, Disp: , Rfl:     atorvastatin (LIPITOR) 40 mg tablet, Take 1 tablet (40 mg total) by mouth daily at bedtime, Disp: 90 tablet, Rfl: 1    Blood Glucose Monitoring Suppl (OneTouch Verio) w/Device KIT, by Does not apply route 2 (two) times a day Dx E11 9, Disp: 1 kit, Rfl: 1    clopidogrel (PLAVIX) 75 mg tablet, Take 1 tablet (75 mg total) by mouth daily, Disp: 90 tablet, Rfl: 0    docusate sodium (COLACE) 100 mg capsule, Take 1 capsule (100 mg total) by mouth 2 (two) times a day Stool softener for constipation    Hold loose stools (Patient not taking: Reported on 8/17/2021), Disp: , Rfl:     gabapentin (NEURONTIN) 300 mg capsule, Take 1 capsule (300 mg total) by mouth 3 (three) times a day, Disp: 270 capsule, Rfl: 3   glucose blood (OneTouch Verio) test strip, Use as instructed qid Dx E11 9, Disp: 400 each, Rfl: 3    insulin NPH-insulin regular (NovoLIN 70/30) 100 units/mL subcutaneous injection, Inject:  44 units with breakfast, 20 units with lunch, 30 units with dinner, Disp: 15 mL, Rfl: 1    Lancets (OneTouch Delica Plus QCLSSQ00Q) MISC, 1 Units by Does not apply route 2 (two) times a day Dx E11 9, Disp: 100 each, Rfl: 2    lisinopril (ZESTRIL) 10 mg tablet, Take 1 tablet (10 mg total) by mouth daily, Disp: 90 tablet, Rfl: 1    metFORMIN (GLUCOPHAGE-XR) 500 mg 24 hr tablet, Take 1 tablet (500 mg total) by mouth daily with dinner, Disp: 30 tablet, Rfl: 4    methocarbamol (ROBAXIN) 500 mg tablet, Take 1 tablet (500 mg total) by mouth every 6 (six) hours, Disp: 120 tablet, Rfl: 0    oxyCODONE-acetaminophen (PERCOCET) 5-325 mg per tablet, Take 1 tablet by mouth every 6 (six) hours as needed for moderate painMax Daily Amount: 4 tablets, Disp: 120 tablet, Rfl: 0    pantoprazole (PROTONIX) 40 mg tablet, Take 1 tablet (40 mg total) by mouth daily, Disp: 90 tablet, Rfl: 1    rivaroxaban (Xarelto) 2 5 mg tablet, Take 1 tablet (2 5 mg total) by mouth daily with breakfast Last dose 6/21/20, Disp: 30 tablet, Rfl: 2    UltiCare Insulin Syringe 31G X 5/16" 1 ML MISC, Inject under the skin as needed (for injection), Disp: 100 each, Rfl: 1

## 2021-09-02 NOTE — PATIENT INSTRUCTIONS
- Remaining sutures removed today- left BKA  No signs of dehiscence or infection  -keep incision clean and dry    Betadine paint  - continue visiting nurse and wound care as prior

## 2021-09-07 DIAGNOSIS — G89.29 CHRONIC BACK PAIN, UNSPECIFIED BACK LOCATION, UNSPECIFIED BACK PAIN LATERALITY: ICD-10-CM

## 2021-09-07 DIAGNOSIS — M54.9 CHRONIC BACK PAIN, UNSPECIFIED BACK LOCATION, UNSPECIFIED BACK PAIN LATERALITY: ICD-10-CM

## 2021-09-07 RX ORDER — OXYCODONE HYDROCHLORIDE AND ACETAMINOPHEN 5; 325 MG/1; MG/1
1 TABLET ORAL EVERY 6 HOURS PRN
Qty: 120 TABLET | Refills: 0 | Status: SHIPPED | OUTPATIENT
Start: 2021-09-07 | End: 2021-10-04 | Stop reason: SDUPTHER

## 2021-09-15 ENCOUNTER — OFFICE VISIT (OUTPATIENT)
Dept: DIABETES SERVICES | Facility: CLINIC | Age: 66
End: 2021-09-15
Payer: COMMERCIAL

## 2021-09-15 VITALS — WEIGHT: 183.6 LBS | HEIGHT: 64 IN | BODY MASS INDEX: 31.34 KG/M2

## 2021-09-15 DIAGNOSIS — E11.51 TYPE 2 DIABETES MELLITUS WITH DIABETIC PERIPHERAL ANGIOPATHY WITHOUT GANGRENE, WITH LONG-TERM CURRENT USE OF INSULIN (HCC): Primary | ICD-10-CM

## 2021-09-15 DIAGNOSIS — Z79.4 TYPE 2 DIABETES MELLITUS WITH DIABETIC PERIPHERAL ANGIOPATHY WITHOUT GANGRENE, WITH LONG-TERM CURRENT USE OF INSULIN (HCC): Primary | ICD-10-CM

## 2021-09-15 PROCEDURE — 97802 MEDICAL NUTRITION INDIV IN: CPT | Performed by: DIETITIAN, REGISTERED

## 2021-09-15 PROCEDURE — 3008F BODY MASS INDEX DOCD: CPT | Performed by: PODIATRIST

## 2021-09-15 NOTE — PROGRESS NOTES
Medical Nutrition Therapy        Assessment    Visit Type: Initial visit    Chief complaint T2DM    HPI: 76year old male with B1IS and complication of diabetic peripheral angiopathy  Recent BKA of left leg  Most recent HbA1c 8 9%  Marco Mae was downloaded today but after review and checking the actual glucometer it was discovered that the times are inaccurate due to time setting in metr being incorrect  Per Christaoko report average  mg/dL, SD 50 mg/dL, range 118-398 mg/dL  Ollie diet history reveals inconsistent carbohydrate intake, excess saturated fat intake,and inadequate intake of high-fibr fruit and whole grains  Currently meals range from 17 to 95 grams of carbohydrate  Explained basic pathophysiology of diabetes and impact of diet on blood glucose levels  Together we discussed what foods contain CHO, reading a food label, timing of meals and snacks, serving sizes, the role of fiber, and the importance of consistent carbohydrate intake in the appropriate amounts at appropriate times  Used the portion booklet to teach Guillermina Pierre more about food groups and basic carbohydrate counting  Created an individualized meal plan for Guillermina Gabby with 3 meals and 2 snacks providing 45-60 g carb per meal and 0-15 g carb per snack  Put together sample meals for Kan's reference  Eddysa Pierre demonstrated good understanding and will call with any questions prior to follow-up appointment in 6 weeks      Ht Readings from Last 1 Encounters:   09/02/21 5' 4" (1 626 m)     Wt Readings from Last 2 Encounters:   09/02/21 80 7 kg (178 lb)   08/24/21 81 2 kg (179 lb)     Weight Change: Yes 15 lb loss, attibuted to BK amptutation    Medical Diagnosis/ICD10:   E11 621, L97 509, Z79 4 (ICD-10-CM) - Type 2 diabetes mellitus with foot ulcer, with long-term current use of insulin ([facility])   E11 51, Z79 4 (ICD-10-CM) - Type 2 diabetes mellitus with diabetic peripheral angiopathy without gangrene, with long-term current use of insulin ([facility]  Barriers to Learning: no barriers    Do you follow any special diet presently?: No, but tries to watch carb and sugar intake  Who shops: spouse  Who cooks: spouse    Food Log: Completed via the method of food recall    Breakfast: wakes 8 am  8:45 eats breakfast honey nut cheerios (1 cup) with 2% milk  Coffee, with stevia and 1 tsp or less of hazelnut creamer  Morning Snack:none  Lunch:12-1 pm  Pasta (2 cups)  and a 2 cups salad (lettuce tomato, cucumber, peppers ,onion, lite ranch) Water  Afternoon Snack: 3 pm oatmeal cookies (sugar free) 2-3  Dinner:5-6 pm  Fish (fried tilapia with breading), salad (same as above)  Water  Evening Snack: 8:30 pm baked chips (snack bag size)  Beverages: 5-6 bottles (16 oz ) pre day, 2 cups coffee in the morning, [place] protein drink   Eating out/Take out:Fridays goes out for dinner    Exercise weights with arms does every day for, 30 reps each arm    Calorie needs 1,836 kcals/day Carbs: 45-60 g/meal, 0-15 g/snack         Nutrition Diagnosis:  Inconsistent carbohydrate intake  intake related to Food and nutrition related knowledge deficit concerning appropriate amount and timing of carbohydrate intake as evidenced by  Estimated carbohydrate intake that is different from recommended types or ingested on an irregular basis    Intervention: plate method, reduced fat intake, increased fiber intake, label reading, carbohydrate counting, increased plant based foods, meal planning, individualized meal plan, monitoring portion control and food diary     Treatment Goals: Patient understands education and recommendations, Patient will monitor fat intake, Patient will monitor portion control, Patient will consume 25-35 grams of fiber a day, Patient will increase their intake of plant based foods and Patient will count carbohydrates    Monitoring and evaluation:    Term code indicator   1 6 3 Carbohydrate Intake Criteria: 45-60 g CHO per meal, 0-15 g CHO per snack  Term code indicator  4 4 Mealtime Behavior Criteria: 3 meals per day, 4-5 hours apart  2 snacks per day, at least 2 hours apart from meals  Patients Response to Instruction:  John Delay  Expected Compliancegood    Thank you for coming to the [facility] for education today  Please feel free to call with any questions or concerns      Start: 10:51 am  Stop: 11:54 am  Referred by: Carol Marquez MD    [address] [id number]

## 2021-09-15 NOTE — PATIENT INSTRUCTIONS
45-60 grams of carbohydrate   0-15 grams of carbohydrate per snack    3 meals per day, 4-5 hours apart  2 snacks per day, at least 2 hours apart from meals    Please complete 3 day food record prior to follow-up appointment

## 2021-10-04 DIAGNOSIS — M54.9 CHRONIC BACK PAIN, UNSPECIFIED BACK LOCATION, UNSPECIFIED BACK PAIN LATERALITY: ICD-10-CM

## 2021-10-04 DIAGNOSIS — G89.29 CHRONIC BACK PAIN, UNSPECIFIED BACK LOCATION, UNSPECIFIED BACK PAIN LATERALITY: ICD-10-CM

## 2021-10-04 RX ORDER — OXYCODONE HYDROCHLORIDE AND ACETAMINOPHEN 5; 325 MG/1; MG/1
1 TABLET ORAL EVERY 6 HOURS PRN
Qty: 120 TABLET | Refills: 0 | Status: SHIPPED | OUTPATIENT
Start: 2021-10-04 | End: 2021-11-05 | Stop reason: SDUPTHER

## 2021-10-12 ENCOUNTER — TELEPHONE (OUTPATIENT)
Dept: OTHER | Facility: OTHER | Age: 66
End: 2021-10-12

## 2021-10-14 ENCOUNTER — OFFICE VISIT (OUTPATIENT)
Dept: FAMILY MEDICINE CLINIC | Facility: CLINIC | Age: 66
End: 2021-10-14
Payer: COMMERCIAL

## 2021-10-14 VITALS
DIASTOLIC BLOOD PRESSURE: 68 MMHG | HEART RATE: 85 BPM | BODY MASS INDEX: 31.58 KG/M2 | RESPIRATION RATE: 16 BRPM | WEIGHT: 185 LBS | HEIGHT: 64 IN | OXYGEN SATURATION: 98 % | SYSTOLIC BLOOD PRESSURE: 128 MMHG | TEMPERATURE: 99.1 F

## 2021-10-14 DIAGNOSIS — Z89.512 S/P BKA (BELOW KNEE AMPUTATION), LEFT (HCC): ICD-10-CM

## 2021-10-14 DIAGNOSIS — Z12.12 SCREENING FOR COLORECTAL CANCER: ICD-10-CM

## 2021-10-14 DIAGNOSIS — I10 ESSENTIAL HYPERTENSION: ICD-10-CM

## 2021-10-14 DIAGNOSIS — Z23 ENCOUNTER FOR IMMUNIZATION: ICD-10-CM

## 2021-10-14 DIAGNOSIS — K21.9 GASTROESOPHAGEAL REFLUX DISEASE WITHOUT ESOPHAGITIS: Primary | ICD-10-CM

## 2021-10-14 DIAGNOSIS — E78.2 MIXED HYPERLIPIDEMIA: ICD-10-CM

## 2021-10-14 DIAGNOSIS — Z12.11 SCREENING FOR COLORECTAL CANCER: ICD-10-CM

## 2021-10-14 DIAGNOSIS — E11.42 DIABETIC POLYNEUROPATHY ASSOCIATED WITH TYPE 2 DIABETES MELLITUS (HCC): ICD-10-CM

## 2021-10-14 PROCEDURE — 99214 OFFICE O/P EST MOD 30 MIN: CPT | Performed by: FAMILY MEDICINE

## 2021-10-14 PROCEDURE — 3078F DIAST BP <80 MM HG: CPT | Performed by: FAMILY MEDICINE

## 2021-10-14 PROCEDURE — 3008F BODY MASS INDEX DOCD: CPT | Performed by: FAMILY MEDICINE

## 2021-10-14 PROCEDURE — 1036F TOBACCO NON-USER: CPT | Performed by: FAMILY MEDICINE

## 2021-10-14 PROCEDURE — G0008 ADMIN INFLUENZA VIRUS VAC: HCPCS | Performed by: FAMILY MEDICINE

## 2021-10-14 PROCEDURE — 3074F SYST BP LT 130 MM HG: CPT | Performed by: FAMILY MEDICINE

## 2021-10-14 PROCEDURE — 90662 IIV NO PRSV INCREASED AG IM: CPT | Performed by: FAMILY MEDICINE

## 2021-10-19 ENCOUNTER — TELEPHONE (OUTPATIENT)
Dept: FAMILY MEDICINE CLINIC | Facility: CLINIC | Age: 66
End: 2021-10-19

## 2021-10-19 NOTE — TELEPHONE ENCOUNTER
Is patient requesting a call when authorization has been obtained? Patient did not request a call  Surgery Date: 1/28/21  Primary Surgeon: Danyelle Poole (NPI: [id number])  Assisting Surgeon: Not Applicable (N/A)  Facility: [facility] (Tax: [id number] / NPI: [id number])  Inpatient / Outpatient: Inpatient - Per Dr Amaya Hill the patient is being admitted after the procedure with plans for operative debridement of the wound with podiatry (if successful revasc) versus may require surgical bypass   Level: 2    Clearance Received: No clearance ordered  Consent Received: Yes, scanned into Epic on 1/19/21  Medication Hold / Last Dose: Hold on Xarelto 2 days prior  Last dose 1/25/21  VQI Spreadsheet: Not Applicable (N/A)  IR Notified: 1/19/21  Rep  Notified: [employer] 1/19/21  Equipment Needs: Not Applicable (N/A)  Vas Lab Requested: Not Applicable (N/A)  Patient Contacted: 1/19/21    Diagnosis: I73 9  Procedure/ CPT Code(s): Angiogram // CPT: 08160, [id number] and 14231    For varicose vein related procedures, last LEVDR? Not Applicable (N/A)    Post Operative Date/ Time: To Be Determined (TBD)     S/w pt and scheduled him for his RLE agram w/possible intervention on 1/28/21 in [facility] with Dr Amaya Hill  He is aware NPO after midnight on 1/27/21  He will have his blood work done at a [facility] lab by this weekend  light sensitivity

## 2021-10-20 DIAGNOSIS — K21.9 GASTROESOPHAGEAL REFLUX DISEASE WITHOUT ESOPHAGITIS: ICD-10-CM

## 2021-10-20 RX ORDER — PANTOPRAZOLE SODIUM 40 MG/1
40 TABLET, DELAYED RELEASE ORAL DAILY
Qty: 90 TABLET | Refills: 1 | Status: SHIPPED | OUTPATIENT
Start: 2021-10-20 | End: 2021-10-21 | Stop reason: SDUPTHER

## 2021-10-21 DIAGNOSIS — K21.9 GASTROESOPHAGEAL REFLUX DISEASE WITHOUT ESOPHAGITIS: ICD-10-CM

## 2021-10-21 RX ORDER — PANTOPRAZOLE SODIUM 40 MG/1
40 TABLET, DELAYED RELEASE ORAL DAILY
Qty: 90 TABLET | Refills: 1 | Status: SHIPPED | OUTPATIENT
Start: 2021-10-21 | End: 2022-07-08 | Stop reason: SDUPTHER

## 2021-11-02 ENCOUNTER — OFFICE VISIT (OUTPATIENT)
Dept: VASCULAR SURGERY | Facility: CLINIC | Age: 66
End: 2021-11-02
Payer: COMMERCIAL

## 2021-11-02 ENCOUNTER — OFFICE VISIT (OUTPATIENT)
Dept: PODIATRY | Facility: CLINIC | Age: 66
End: 2021-11-02
Payer: COMMERCIAL

## 2021-11-02 VITALS
SYSTOLIC BLOOD PRESSURE: 190 MMHG | RESPIRATION RATE: 18 BRPM | BODY MASS INDEX: 32.61 KG/M2 | HEART RATE: 88 BPM | WEIGHT: 191 LBS | HEIGHT: 64 IN | DIASTOLIC BLOOD PRESSURE: 96 MMHG | TEMPERATURE: 96.5 F

## 2021-11-02 VITALS
HEART RATE: 90 BPM | DIASTOLIC BLOOD PRESSURE: 79 MMHG | SYSTOLIC BLOOD PRESSURE: 170 MMHG | BODY MASS INDEX: 32.61 KG/M2 | HEIGHT: 64 IN | WEIGHT: 191 LBS

## 2021-11-02 DIAGNOSIS — I73.9 PAD (PERIPHERAL ARTERY DISEASE) (HCC): Primary | ICD-10-CM

## 2021-11-02 DIAGNOSIS — T87.89 NON-HEALING WOUND OF AMPUTATION STUMP (HCC): ICD-10-CM

## 2021-11-02 DIAGNOSIS — Z79.4 TYPE 2 DIABETES MELLITUS WITH DIABETIC NEUROPATHY, WITH LONG-TERM CURRENT USE OF INSULIN (HCC): Primary | ICD-10-CM

## 2021-11-02 DIAGNOSIS — I73.9 PERIPHERAL VASCULAR DISEASE, UNSPECIFIED (HCC): ICD-10-CM

## 2021-11-02 DIAGNOSIS — E11.40 TYPE 2 DIABETES MELLITUS WITH DIABETIC NEUROPATHY, WITH LONG-TERM CURRENT USE OF INSULIN (HCC): Primary | ICD-10-CM

## 2021-11-02 DIAGNOSIS — Z12.11 ENCOUNTER FOR SCREENING COLONOSCOPY: ICD-10-CM

## 2021-11-02 PROCEDURE — 99213 OFFICE O/P EST LOW 20 MIN: CPT | Performed by: PODIATRIST

## 2021-11-02 PROCEDURE — 99213 OFFICE O/P EST LOW 20 MIN: CPT | Performed by: SURGERY

## 2021-11-04 ENCOUNTER — TELEMEDICINE (OUTPATIENT)
Dept: FAMILY MEDICINE CLINIC | Facility: CLINIC | Age: 66
End: 2021-11-04
Payer: COMMERCIAL

## 2021-11-04 DIAGNOSIS — G47.00 INSOMNIA, UNSPECIFIED TYPE: Primary | ICD-10-CM

## 2021-11-04 PROCEDURE — 99441 PR PHYS/QHP TELEPHONE EVALUATION 5-10 MIN: CPT | Performed by: FAMILY MEDICINE

## 2021-11-04 RX ORDER — ZOLPIDEM TARTRATE 10 MG/1
10 TABLET ORAL
Qty: 30 TABLET | Refills: 0 | Status: SHIPPED | OUTPATIENT
Start: 2021-11-04 | End: 2022-04-07

## 2021-11-05 DIAGNOSIS — G89.29 CHRONIC BACK PAIN, UNSPECIFIED BACK LOCATION, UNSPECIFIED BACK PAIN LATERALITY: ICD-10-CM

## 2021-11-05 DIAGNOSIS — M54.9 CHRONIC BACK PAIN, UNSPECIFIED BACK LOCATION, UNSPECIFIED BACK PAIN LATERALITY: ICD-10-CM

## 2021-11-05 RX ORDER — OXYCODONE HYDROCHLORIDE AND ACETAMINOPHEN 5; 325 MG/1; MG/1
1 TABLET ORAL EVERY 6 HOURS PRN
Qty: 120 TABLET | Refills: 0 | Status: SHIPPED | OUTPATIENT
Start: 2021-11-05 | End: 2021-12-06 | Stop reason: SDUPTHER

## 2021-11-15 ENCOUNTER — TELEPHONE (OUTPATIENT)
Dept: VASCULAR SURGERY | Facility: CLINIC | Age: 66
End: 2021-11-15

## 2021-11-16 ENCOUNTER — OFFICE VISIT (OUTPATIENT)
Dept: WOUND CARE | Facility: HOSPITAL | Age: 66
End: 2021-11-16
Payer: COMMERCIAL

## 2021-11-16 VITALS
TEMPERATURE: 97.8 F | SYSTOLIC BLOOD PRESSURE: 165 MMHG | HEART RATE: 91 BPM | DIASTOLIC BLOOD PRESSURE: 98 MMHG | WEIGHT: 191 LBS | BODY MASS INDEX: 32.61 KG/M2 | RESPIRATION RATE: 15 BRPM | HEIGHT: 64 IN

## 2021-11-16 DIAGNOSIS — T81.89XA NON-HEALING SURGICAL WOUND, INITIAL ENCOUNTER: Primary | ICD-10-CM

## 2021-11-16 DIAGNOSIS — E11.42 DIABETIC POLYNEUROPATHY ASSOCIATED WITH TYPE 2 DIABETES MELLITUS (HCC): ICD-10-CM

## 2021-11-16 PROCEDURE — 97597 DBRDMT OPN WND 1ST 20 CM/<: CPT | Performed by: NURSE PRACTITIONER

## 2021-11-16 PROCEDURE — 99213 OFFICE O/P EST LOW 20 MIN: CPT | Performed by: NURSE PRACTITIONER

## 2021-11-16 RX ORDER — LIDOCAINE HYDROCHLORIDE 40 MG/ML
5 SOLUTION TOPICAL ONCE
Status: COMPLETED | OUTPATIENT
Start: 2021-11-16 | End: 2021-11-16

## 2021-11-16 RX ADMIN — LIDOCAINE HYDROCHLORIDE 5 ML: 40 SOLUTION TOPICAL at 14:16

## 2021-11-24 ENCOUNTER — VBI (OUTPATIENT)
Dept: ADMINISTRATIVE | Facility: OTHER | Age: 66
End: 2021-11-24

## 2021-12-02 ENCOUNTER — OFFICE VISIT (OUTPATIENT)
Dept: WOUND CARE | Facility: HOSPITAL | Age: 66
End: 2021-12-02
Payer: COMMERCIAL

## 2021-12-02 VITALS
HEART RATE: 97 BPM | TEMPERATURE: 98.7 F | RESPIRATION RATE: 15 BRPM | DIASTOLIC BLOOD PRESSURE: 79 MMHG | SYSTOLIC BLOOD PRESSURE: 169 MMHG

## 2021-12-02 DIAGNOSIS — T81.89XA NON-HEALING SURGICAL WOUND, INITIAL ENCOUNTER: Primary | ICD-10-CM

## 2021-12-02 PROCEDURE — 97597 DBRDMT OPN WND 1ST 20 CM/<: CPT | Performed by: NURSE PRACTITIONER

## 2021-12-02 PROCEDURE — 99213 OFFICE O/P EST LOW 20 MIN: CPT | Performed by: NURSE PRACTITIONER

## 2021-12-02 RX ORDER — BETAMETHASONE DIPROPIONATE 0.5 MG/G
CREAM TOPICAL 2 TIMES DAILY
Qty: 30 G | Refills: 0 | Status: SHIPPED | OUTPATIENT
Start: 2021-12-02 | End: 2022-05-23 | Stop reason: SDUPTHER

## 2021-12-02 RX ORDER — LIDOCAINE HYDROCHLORIDE 40 MG/ML
5 SOLUTION TOPICAL ONCE
Status: COMPLETED | OUTPATIENT
Start: 2021-12-02 | End: 2021-12-02

## 2021-12-02 RX ADMIN — LIDOCAINE HYDROCHLORIDE 5 ML: 40 SOLUTION TOPICAL at 13:28

## 2021-12-06 ENCOUNTER — HOSPITAL ENCOUNTER (OUTPATIENT)
Dept: NON INVASIVE DIAGNOSTICS | Facility: CLINIC | Age: 66
Discharge: HOME | End: 2021-12-06
Payer: COMMERCIAL

## 2021-12-06 DIAGNOSIS — G89.29 CHRONIC BACK PAIN, UNSPECIFIED BACK LOCATION, UNSPECIFIED BACK PAIN LATERALITY: ICD-10-CM

## 2021-12-06 DIAGNOSIS — I73.9 PAD (PERIPHERAL ARTERY DISEASE) (HCC): ICD-10-CM

## 2021-12-06 DIAGNOSIS — M54.9 CHRONIC BACK PAIN, UNSPECIFIED BACK LOCATION, UNSPECIFIED BACK PAIN LATERALITY: ICD-10-CM

## 2021-12-06 PROCEDURE — 93926 LOWER EXTREMITY STUDY: CPT

## 2021-12-06 RX ORDER — OXYCODONE HYDROCHLORIDE AND ACETAMINOPHEN 5; 325 MG/1; MG/1
1 TABLET ORAL EVERY 6 HOURS PRN
Qty: 120 TABLET | Refills: 0 | Status: SHIPPED | OUTPATIENT
Start: 2021-12-06 | End: 2022-01-12 | Stop reason: SDUPTHER

## 2021-12-07 PROCEDURE — 93922 UPR/L XTREMITY ART 2 LEVELS: CPT | Performed by: SURGERY

## 2021-12-07 PROCEDURE — 93926 LOWER EXTREMITY STUDY: CPT | Performed by: SURGERY

## 2021-12-14 ENCOUNTER — OFFICE VISIT (OUTPATIENT)
Dept: PODIATRY | Facility: CLINIC | Age: 66
End: 2021-12-14
Payer: COMMERCIAL

## 2021-12-14 VITALS
DIASTOLIC BLOOD PRESSURE: 72 MMHG | HEIGHT: 64 IN | SYSTOLIC BLOOD PRESSURE: 161 MMHG | BODY MASS INDEX: 31.41 KG/M2 | WEIGHT: 184 LBS | HEART RATE: 82 BPM

## 2021-12-14 DIAGNOSIS — I73.9 PERIPHERAL VASCULAR DISEASE, UNSPECIFIED (HCC): ICD-10-CM

## 2021-12-14 DIAGNOSIS — Z89.421 ACQUIRED ABSENCE OF OTHER RIGHT TOE(S) (HCC): ICD-10-CM

## 2021-12-14 DIAGNOSIS — Z89.512: ICD-10-CM

## 2021-12-14 DIAGNOSIS — E11.40 TYPE 2 DIABETES MELLITUS WITH DIABETIC NEUROPATHY, WITH LONG-TERM CURRENT USE OF INSULIN (HCC): Primary | ICD-10-CM

## 2021-12-14 DIAGNOSIS — Z79.4 TYPE 2 DIABETES MELLITUS WITH DIABETIC NEUROPATHY, WITH LONG-TERM CURRENT USE OF INSULIN (HCC): Primary | ICD-10-CM

## 2021-12-14 PROCEDURE — 3008F BODY MASS INDEX DOCD: CPT | Performed by: PODIATRIST

## 2021-12-14 PROCEDURE — 1036F TOBACCO NON-USER: CPT | Performed by: PODIATRIST

## 2021-12-14 PROCEDURE — 99213 OFFICE O/P EST LOW 20 MIN: CPT | Performed by: PODIATRIST

## 2021-12-14 PROCEDURE — 1160F RVW MEDS BY RX/DR IN RCRD: CPT | Performed by: PODIATRIST

## 2021-12-16 ENCOUNTER — OFFICE VISIT (OUTPATIENT)
Dept: WOUND CARE | Facility: HOSPITAL | Age: 66
End: 2021-12-16
Payer: COMMERCIAL

## 2021-12-16 ENCOUNTER — TELEPHONE (OUTPATIENT)
Dept: VASCULAR SURGERY | Facility: CLINIC | Age: 66
End: 2021-12-16

## 2021-12-16 VITALS
DIASTOLIC BLOOD PRESSURE: 78 MMHG | TEMPERATURE: 98.9 F | SYSTOLIC BLOOD PRESSURE: 151 MMHG | RESPIRATION RATE: 18 BRPM | HEART RATE: 78 BPM

## 2021-12-16 DIAGNOSIS — T81.89XA NON-HEALING SURGICAL WOUND, INITIAL ENCOUNTER: Primary | ICD-10-CM

## 2021-12-16 DIAGNOSIS — E11.42 DIABETIC POLYNEUROPATHY ASSOCIATED WITH TYPE 2 DIABETES MELLITUS (HCC): ICD-10-CM

## 2021-12-16 DIAGNOSIS — T81.89XD NON-HEALING SURGICAL WOUND, SUBSEQUENT ENCOUNTER: ICD-10-CM

## 2021-12-16 PROCEDURE — 97597 DBRDMT OPN WND 1ST 20 CM/<: CPT | Performed by: NURSE PRACTITIONER

## 2021-12-16 RX ORDER — LIDOCAINE HYDROCHLORIDE 40 MG/ML
5 SOLUTION TOPICAL ONCE
Status: COMPLETED | OUTPATIENT
Start: 2021-12-16 | End: 2021-12-16

## 2021-12-16 RX ADMIN — LIDOCAINE HYDROCHLORIDE 5 ML: 40 SOLUTION TOPICAL at 13:33

## 2021-12-28 ENCOUNTER — VBI (OUTPATIENT)
Dept: ADMINISTRATIVE | Facility: OTHER | Age: 66
End: 2021-12-28

## 2022-01-04 ENCOUNTER — TELEPHONE (OUTPATIENT)
Dept: ADMINISTRATIVE | Facility: HOSPITAL | Age: 67
End: 2022-01-04

## 2022-01-04 NOTE — TELEPHONE ENCOUNTER
Contacted patient today for a reminder call for his appointment with Dr Johnathon Bee, for results review  Patient indicated that he was trying to reach us to cancel his appointment  He would like us to call him back in approximately two weeks to reschedule the appointment  At that time he will know when he would like to come to see us because he is sick and his mother is very ill at this point  Did not want to reschedule today, please call third week in January, 2022

## 2022-01-11 ENCOUNTER — TELEMEDICINE (OUTPATIENT)
Dept: FAMILY MEDICINE CLINIC | Facility: CLINIC | Age: 67
End: 2022-01-11
Payer: COMMERCIAL

## 2022-01-11 DIAGNOSIS — U07.1 COVID: Primary | ICD-10-CM

## 2022-01-11 DIAGNOSIS — R05.9 COUGH: ICD-10-CM

## 2022-01-11 PROCEDURE — 99441 PR PHYS/QHP TELEPHONE EVALUATION 5-10 MIN: CPT | Performed by: NURSE PRACTITIONER

## 2022-01-11 RX ORDER — BENZONATATE 100 MG/1
100 CAPSULE ORAL 3 TIMES DAILY PRN
Qty: 60 CAPSULE | Refills: 0 | Status: SHIPPED | OUTPATIENT
Start: 2022-01-11 | End: 2022-04-07

## 2022-01-11 NOTE — PROGRESS NOTES
Virtual Brief Visit    Patient is located in the following state in which I hold an active license [place]         Assessment/Plan:  Patient is being seen today via telephone visit  Patient does not have video capabilities  Patient's has been diagnosed with COVID and states he is having a bit of a cough  Advised that this is 1 of the side effects of a COVID infection  Patient denies any shortness of breath difficulty breathing but the cough is annoying  Advised will send a cough capsule to the pharmacy he is to take 1-2 to 3 times a day as needed for the cough  Any shortness of breath ar  e worsening severely worsening symptoms he is to seek emergency care  Patient verbalized agreement and understanding of this  Dosing all possible side effects of the prescribed medications or medications that had been prescribed in the past were reviewed and all questions were answered  Patient verbalized agreement and understanding of the plan of care as outlined during the office visit today return to office as indicated or sooner if a problem arises  Problem List Items Addressed This Visit     None      Visit Diagnoses     COVID    -  Primary    Cough        Relevant Medications    benzonatate (TESSALON PERLES) 100 mg capsule          Recent Visits  No visits were found meeting these conditions  Showing recent visits within past 7 days and meeting all other requirements  Today's Visits  Date Type Provider Dept   01/11/22 [facility], [address] [facility]   Showing today's visits and meeting all other requirements  Future Appointments  No visits were found meeting these conditions    Showing future appointments within next 150 days and meeting all other requirements         I spent 7 minutes directly with the patient during this visit          Virtual Brief Visit    Patient is located in the following state in which I hold an active license [place]      Assessment/Plan:    Problem List Items Addressed This Visit None      Visit Diagnoses     COVID    -  Primary    Cough        Relevant Medications    benzonatate (TESSALON PERLES) 100 mg capsule          Recent Visits  No visits were found meeting these conditions  Showing recent visits within past 7 days and meeting all other requirements  Today's Visits  Date Type Provider Dept   01/11/22 [facility], [address] [facility]   Showing today's visits and meeting all other requirements  Future Appointments  No visits were found meeting these conditions    Showing future appointments within next 150 days and meeting all other requirements         I spent 7 minutes directly with the patient during this visit

## 2022-01-12 DIAGNOSIS — M54.9 CHRONIC BACK PAIN, UNSPECIFIED BACK LOCATION, UNSPECIFIED BACK PAIN LATERALITY: ICD-10-CM

## 2022-01-12 DIAGNOSIS — G89.29 CHRONIC BACK PAIN, UNSPECIFIED BACK LOCATION, UNSPECIFIED BACK PAIN LATERALITY: ICD-10-CM

## 2022-01-12 RX ORDER — OXYCODONE HYDROCHLORIDE AND ACETAMINOPHEN 5; 325 MG/1; MG/1
1 TABLET ORAL EVERY 6 HOURS PRN
Qty: 120 TABLET | Refills: 0 | Status: SHIPPED | OUTPATIENT
Start: 2022-01-12 | End: 2022-02-07 | Stop reason: SDUPTHER

## 2022-01-12 NOTE — TELEPHONE ENCOUNTER
Patient called, he said the [place] [employer] are not working, he is wondering if you  can prescribe something else for his cough?

## 2022-01-18 ENCOUNTER — OFFICE VISIT (OUTPATIENT)
Dept: WOUND CARE | Facility: HOSPITAL | Age: 67
End: 2022-01-18
Payer: COMMERCIAL

## 2022-01-18 VITALS
TEMPERATURE: 98.3 F | RESPIRATION RATE: 16 BRPM | SYSTOLIC BLOOD PRESSURE: 162 MMHG | HEART RATE: 84 BPM | DIASTOLIC BLOOD PRESSURE: 92 MMHG

## 2022-01-18 DIAGNOSIS — E11.42 DIABETIC POLYNEUROPATHY ASSOCIATED WITH TYPE 2 DIABETES MELLITUS (HCC): ICD-10-CM

## 2022-01-18 DIAGNOSIS — T81.89XA NON-HEALING SURGICAL WOUND, INITIAL ENCOUNTER: Primary | ICD-10-CM

## 2022-01-18 PROCEDURE — 97597 DBRDMT OPN WND 1ST 20 CM/<: CPT | Performed by: NURSE PRACTITIONER

## 2022-01-18 RX ORDER — LIDOCAINE HYDROCHLORIDE 40 MG/ML
5 SOLUTION TOPICAL ONCE
Status: COMPLETED | OUTPATIENT
Start: 2022-01-18 | End: 2022-01-18

## 2022-01-18 RX ADMIN — LIDOCAINE HYDROCHLORIDE 5 ML: 40 SOLUTION TOPICAL at 11:01

## 2022-01-18 NOTE — PATIENT INSTRUCTIONS
Orders Placed This Encounter   Procedures    Wound cleansing and dressings      Left Lower Leg Wound:     Wash your hands with soap and water  Remove old dressing, discard into plastic bag and place in trash  Cleanse the wound with soap and water prior to applying a clean dressing  Do not use tissue or cotton balls  Do not scrub the wound  Pat dry using gauze  Shower yes   Apply moisturizer to skin surrounding wound (discontinue betamethasone)  Apply small piece of Maxorb AG to the open areas  Cover with Allevyn Life  Secure with Spandigrip E  Change dressing in 7 days and as needed     This was done today                                    Standing Status:   Future     Standing Expiration Date:   1/18/2023    Wound compression and edema control     Spandagrip "E" to LLE - apply in a m  - remove at bedtime       Standing Status:   Future     Standing Expiration Date:   1/18/2023

## 2022-01-18 NOTE — PROGRESS NOTES
Patient ID: Stacey Sanabria is a 78 y o  male Date of Birth 1955     Chief Complaint  Chief Complaint   Patient presents with    Follow Up Wound Care Visit     left BKA stump wound       Allergies  Shellfish-derived products - food allergy and Sulfamethoxazole-trimethoprim    Assessment:     Diagnoses and all orders for this visit:    Non-healing surgical wound, initial encounter  -     lidocaine (XYLOCAINE) 4 % topical solution 5 mL  -     Wound cleansing and dressings; Future  -     Wound compression and edema control; Future  -     Debridement    Diabetic polyneuropathy associated with type 2 diabetes mellitus (HCC)  -     lidocaine (XYLOCAINE) 4 % topical solution 5 mL  -     Wound cleansing and dressings; Future  -     Wound compression and edema control; Future              Debridement   Wound 11/16/21 Surgical Leg Left; Lower    Universal Protocol:  Consent: Written consent obtained  Consent given by: patient  Time out: Immediately prior to procedure a "time out" was called to verify the correct patient, procedure, equipment, support staff and site/side marked as required  Timeout called at: 1/18/2022 11:12 AM   Patient understanding: patient states understanding of the procedure being performed  Patient consent: the patient's understanding of the procedure matches consent given  Procedure consent matches procedure scheduled: N/A  Relevant documents present and verified: N/A  Test results available and properly labeled: N/A  Site marked: the operative site was marked  Imaging studies available: N/A  Required blood products, implants, devices, and special equipment available: N/A    Patient identity confirmed: verbally with patient      Performed by: NP  Debridement type: selective  Pain control: lidocaine 4%  Pre-debridement measurements  Length (cm): 0 7  Width (cm): 3 1  Depth (cm): 0  Surface Area (cm^2): 2 17  Volume (cm^3): 0    Post-debridement measurements  Length (cm): 0 7  Width (cm): 3 1  Depth (cm): 0 1  Percent debrided: 100%  Surface Area (cm^2): 2 17  Area debrided (cm^2): 2 17  Volume (cm^3): 0 22  Devitalized tissue debrided: biofilm, fibrin and slough  Instrument(s) utilized: curette  Bleeding: none  Hemostasis obtained with: not applicable  Procedural pain (0-10): 0  Post-procedural pain: 0   Response to treatment: procedure was tolerated well          Plan:  1  F/u visit  Wounds debrided  Wounds almost healed  Will keep covered with silver alginate secured with a bordered foam dressing kept in place x 7 days  Patient will follow up in 2 weeks  Wound 11/16/21 Surgical Leg Left; Lower (Active)   Wound Image Images linked 01/18/22 1104   Wound Description Eschar;Yellow;Brown;Epithelialization;Slough 01/18/22 1058   Dominga-wound Assessment Pink;Dry 01/18/22 1058   Wound Length (cm) 0 7 cm 01/18/22 1058   Wound Width (cm) 3 1 cm 01/18/22 1058   Wound Depth (cm) 0 cm 01/18/22 1058   Wound Surface Area (cm^2) 2 17 cm^2 01/18/22 1058   Wound Volume (cm^3) 0 cm^3 01/18/22 1058   Calculated Wound Volume (cm^3) 0 cm^3 01/18/22 1058   Change in Wound Size % 100 01/18/22 1058   Drainage Amount None 01/18/22 1058   Non-staged Wound Description Full thickness 01/18/22 1058   Dressing Status Intact 01/18/22 1058       Wound 11/16/21 Surgical Leg Left; Lower (Active)   Date First Assessed: 11/16/21   Primary Wound Type: Surgical  Location: Leg  Wound Location Orientation: Left; Lower       [REMOVED] Wound 07/24/20 Diabetic Ulcer Foot Right;Lateral (Removed)   Resolved Date: 03/11/21  Date First Assessed: 07/24/20   Pre-Existing Wound: Yes  Primary Wound Type: Diabetic Ulcer  Location: Foot  Wound Location Orientation: Right;Lateral  Wound Description (Comments):  Cristina 1       [REMOVED] Wound 12/18/20 Foot Right (Removed)   Resolved Date: 03/11/21  Date First Assessed/Time First Assessed: 12/18/20 1146   Location: Foot  Wound Location Orientation: Right  Incision's 1st Dressing: DRESSING OIL EMULSION 3 X 3 IN (x1), SPONGE GAUZE 4 X 8 12 PLY STRL LF (x1), BANDAGE WILLARD 3   [REMOVED] Wound 01/28/21 Catheter entry/exit site Groin Left (Removed)   Resolved Date/Resolved Time: 03/25/21 1011  Date First Assessed/Time First Assessed: 01/28/21 0909   Traumatic Wound Type: Catheter entry/exit site  Location: Groin  Wound Location Orientation: Left  Wound Description (Comments): angiogram access punc    [REMOVED] Wound 01/29/21 Diabetic Ulcer Foot Lateral;Right (Removed)   Resolved Date: 03/11/21  Date First Assessed/Time First Assessed: 01/29/21 0820   Pre-Existing Wound: Yes  Primary Wound Type: Diabetic Ulcer  Location: Foot  Wound Location Orientation: Lateral;Right       [REMOVED] Wound 02/02/21 Foot Right (Removed)   Resolved Date: 03/11/21  Date First Assessed/Time First Assessed: 02/02/21 1734   Location: Foot  Wound Location Orientation: Right  Incision's 1st Dressing: DRESSING OIL EMULSION 3 X 3 IN (x1), SPONGE GAUZE 4 X 4 16 PLY STRL PLASTIC TRAY LF (x1), JOSI    [REMOVED] Wound 03/11/21 Surgical Foot Right;Lateral (Removed)   Resolved Date: 06/03/21  Date First Assessed/Time First Assessed: 03/11/21 1057   Primary Wound Type: Surgical  Location: Foot  Wound Location Orientation: Right;Lateral  Wound Outcome: Healed       [REMOVED] Wound 03/11/21 Foot Left; Other (Comment); Posterior (Removed)   Resolved Date/Resolved Time: 07/17/21 0910  Date First Assessed: 03/11/21   Location: Foot  Wound Location Orientation: (c) Left; Other (Comment); Posterior  Wound Outcome: Amputation       [REMOVED] Wound 03/11/21 Heel Left (Removed)   Resolved Date/Resolved Time: 07/17/21 0910  Date First Assessed/Time First Assessed: 03/11/21 1100   Location: Heel  Wound Location Orientation: Left  Wound Outcome: Amputation       [REMOVED] Wound 04/08/21 Foot Anterior; Left (Removed)   Resolved Date: 06/03/21  Date First Assessed/Time First Assessed: 04/08/21 1046   Location: Foot  Wound Location Orientation: Anterior; Left  Wound Outcome: Healed       [REMOVED] Wound 04/13/21 Catheter entry/exit site Groin Right (Removed)   Resolved Date/Resolved Time: 04/22/21 1025  Date First Assessed/Time First Assessed: 04/13/21 0843   Traumatic Wound Type: Catheter entry/exit site  Location: Groin  Wound Location Orientation: Right  Wound Description (Comments): angiogram access        [REMOVED] Wound 07/14/21 Leg Left (Removed)   Resolved Date: 11/16/21  Date First Assessed/Time First Assessed: 07/14/21 1505   Location: Leg  Wound Location Orientation: Left  Wound Description (Comments): PROXIMAL THIGH ICISIONS X 3  Skin staples  Incision's 1st Dressing: DRESSING MEPILEX AG B  [REMOVED] Wound 07/17/21 Leg Left (Removed)   Resolved Date: 11/16/21  Date First Assessed/Time First Assessed: 07/17/21 0910   Location: Leg  Wound Location Orientation: Left  Wound Description (Comments): BKA SURGICAL  INCISION  Incision's 1st Dressing: DRESSING XEROFORM 1 X 8 (x1), SPONGE GAUZ    Subjective:     F/u visit non-healing surgical wound of left BKA stump  No new complaints  He denies any pain, fevers, or chills  The following portions of the patient's history were reviewed and updated as appropriate:   He  has a past medical history of Arthritis, First degree AV block, Full dentures, Hypertension, PAD (peripheral artery disease) ([facility] [address] ), PVD (peripheral vascular disease) ([facility] [address] ), Type 2 diabetes mellitus with diabetic peripheral angiopathy without gangrene ([facility] [address] ) (5/18/2021), and Wound, open    He   Patient Active Problem List    Diagnosis Date Noted    Insomnia 11/04/2021    S/P BKA (below knee amputation), left ([facility] [address] ) 08/01/2021    Gastroesophageal reflux disease without esophagitis 07/01/2021    Platelets decreased ([address] ) 05/21/2021    Type 2 diabetes mellitus with diabetic peripheral angiopathy without gangrene ([facility] [address] ) 05/18/2021    Poorly controlled type 2 diabetes mellitus ([facility] [address] ) 04/27/2021    Essential hypertension 04/27/2021    Mixed hyperlipidemia 04/27/2021    PVD (peripheral vascular disease) ([place] [id number] ) 01/28/2021    Right foot ulcer, with fat layer exposed ([place] [address] )     Diabetic ulcer of toe of right foot associated with type 2 diabetes mellitus, limited to breakdown of skin ([place] [address] ) 03/13/2019    Bony exostosis 03/13/2019    Ingrown toenail 07/26/2018    Paronychia of toenail of right foot 07/26/2018    Dermatophytosis 07/11/2018    Onychomycosis 03/15/2018    Tinea pedis of both feet 03/15/2018    Acquired deformity of foot 02/15/2018    Diabetic polyneuropathy associated with type 2 diabetes mellitus ([place] [address] ) 02/15/2018    Pain in both feet 02/15/2018    Peripheral arteriosclerosis ([place] [address] ) 02/15/2018     He  has a past surgical history that includes Toe amputation; Vein ligation; Replantation thumb (Right); Cholecystectomy; Colonoscopy; Toe Osteotomy (Right, 6/26/2020); Trigger finger release; Skin graft; pr debridement, skin, sub-q tissue,muscle,bone,=<20 sq cm (Right, 12/18/2020); IR aortagram with run-off (1/28/2021); Toe amputation (Right, 2/2/2021); IR aortagram with run-off (4/13/2021); pr vein bypass graft,fem-tibial (Left, 7/14/2021); and Leg amputation, lower tibia/fibula (Left, 7/17/2021)  His family history includes Alzheimer's disease in his father; Arthritis in his mother; Cancer in his father  He  reports that he has never smoked  He has never used smokeless tobacco  He reports current alcohol use  He reports that he does not use drugs    Current Outpatient Medications   Medication Sig Dispense Refill    acetaminophen (TYLENOL) 325 mg tablet Take 2 tablets (650 mg total) by mouth every 4 (four) hours as needed for mild pain, moderate pain, severe pain or fever      atorvastatin (LIPITOR) 40 mg tablet Take 1 tablet (40 mg total) by mouth daily at bedtime 90 tablet 1    benzonatate (TESSALON PERLES) 100 mg capsule Take 1 capsule (100 mg total) by mouth 3 (three) times a day as needed for cough 60 capsule 0  betamethasone dipropionate (DIPROSONE) 0 05 % cream Apply topically 2 (two) times a day 30 g 0    Blood Glucose Monitoring Suppl (OneTouch Verio) w/Device KIT by Does not apply route 2 (two) times a day Dx E11 9 1 kit 1    clopidogrel (PLAVIX) 75 mg tablet Take 1 tablet (75 mg total) by mouth daily 90 tablet 0    docusate sodium (COLACE) 100 mg capsule Take 1 capsule (100 mg total) by mouth 2 (two) times a day Stool softener for constipation    Hold loose stools (Patient not taking: Reported on 8/17/2021)      gabapentin (NEURONTIN) 300 mg capsule Take 1 capsule (300 mg total) by mouth 3 (three) times a day 270 capsule 3    glucose blood (OneTouch Verio) test strip Use as instructed qid Dx E11 9 400 each 3    insulin NPH-insulin regular (NovoLIN 70/30) 100 units/mL subcutaneous injection Inject:  44 units with breakfast, 20 units with lunch, 30 units with dinner 15 mL 1    Lancets (OneTouch Delica Plus [id number]) MISC 1 Units by Does not apply route 2 (two) times a day Dx E11 9 100 each 2    lisinopril (ZESTRIL) 10 mg tablet Take 1 tablet (10 mg total) by mouth daily 90 tablet 1    metFORMIN (GLUCOPHAGE-XR) 500 mg 24 hr tablet Take 1 tablet (500 mg total) by mouth daily with dinner 30 tablet 4    methocarbamol (ROBAXIN) 500 mg tablet Take 1 tablet (500 mg total) by mouth every 6 (six) hours (Patient not taking: Reported on 11/2/2021) 120 tablet 0    oxyCODONE-acetaminophen (PERCOCET) 5-325 mg per tablet Take 1 tablet by mouth every 6 (six) hours as needed for moderate pain Max Daily Amount: 4 tablets 120 tablet 0    pantoprazole (PROTONIX) 40 mg tablet Take 1 tablet (40 mg total) by mouth daily 90 tablet 1    rivaroxaban (Xarelto) 2 5 mg tablet Take 1 tablet (2 5 mg total) by mouth daily with breakfast Last dose 6/21/20 30 tablet 2    UltiCare Insulin Syringe 31G X 5/16" 1 ML MISC Inject under the skin as needed (for injection) 100 each 1    zolpidem (AMBIEN) 10 mg tablet Take 1 tablet (10 mg total) by mouth daily at bedtime as needed for sleep 30 tablet 0     No current facility-administered medications for this visit  He is allergic to shellfish-derived products - food allergy and sulfamethoxazole-trimethoprim       Review of Systems   Constitutional: Negative  HENT: Negative for ear pain and hearing loss  Eyes: Negative for pain  Respiratory: Negative for chest tightness and shortness of breath  Cardiovascular: Negative for chest pain, palpitations and leg swelling  Gastrointestinal: Negative for diarrhea, nausea and vomiting  Genitourinary: Negative for dysuria  Musculoskeletal: Positive for gait problem  Skin: Positive for wound  Neurological: Negative for tremors and weakness  Psychiatric/Behavioral: Negative for behavioral problems, confusion and suicidal ideas  Objective:       Wound 11/16/21 Surgical Leg Left; Lower (Active)   Wound Image Images linked 01/18/22 1104   Wound Description Eschar;Yellow;Brown;Epithelialization;Slough 01/18/22 1058   Dominga-wound Assessment Pink;Dry 01/18/22 1058   Wound Length (cm) 0 7 cm 01/18/22 1058   Wound Width (cm) 3 1 cm 01/18/22 1058   Wound Depth (cm) 0 cm 01/18/22 1058   Wound Surface Area (cm^2) 2 17 cm^2 01/18/22 1058   Wound Volume (cm^3) 0 cm^3 01/18/22 1058   Calculated Wound Volume (cm^3) 0 cm^3 01/18/22 1058   Change in Wound Size % 100 01/18/22 1058   Drainage Amount None 01/18/22 1058   Non-staged Wound Description Full thickness 01/18/22 1058   Dressing Status Intact 01/18/22 1058       /92   Pulse 84   Temp 98 3 °F (36 8 °C)   Resp 16                  Wound Instructions:  Orders Placed This Encounter   Procedures    Wound cleansing and dressings      Left Lower Leg Wound:     Wash your hands with soap and water  Remove old dressing, discard into plastic bag and place in trash  Cleanse the wound with soap and water prior to applying a clean dressing  Do not use tissue or cotton balls   Do not scrub the wound  Pat dry using gauze  Shower yes   Apply moisturizer to skin surrounding wound (discontinue betamethasone)  Apply small piece of Maxorb AG to the open areas  Cover with Allevyn Life  Secure with Spandigrip E  Change dressing in 7 days and as needed     This was done today                                    Standing Status:   Future     Standing Expiration Date:   1/18/2023    Wound compression and edema control     Spandagrip "E" to LLE - apply in a m  - remove at bedtime  Standing Status:   Future     Standing Expiration Date:   1/18/2023    Debridement     This order was created via procedure documentation        Diagnosis ICD-10-CM Associated Orders   1  Non-healing surgical wound, initial encounter  [id number] lidocaine (XYLOCAINE) 4 % topical solution 5 mL     Wound cleansing and dressings     Wound compression and edema control     Debridement   2   Diabetic polyneuropathy associated with type 2 diabetes mellitus (HCC)  E11 42 lidocaine (XYLOCAINE) 4 % topical solution 5 mL     Wound cleansing and dressings     Wound compression and edema control

## 2022-01-21 ENCOUNTER — TELEPHONE (OUTPATIENT)
Dept: FAMILY MEDICINE CLINIC | Facility: CLINIC | Age: 67
End: 2022-01-21

## 2022-01-21 NOTE — TELEPHONE ENCOUNTER
Pt called asking if we can do a letter stating that he is diabetic and has vascular issues       Please call patient when ready

## 2022-02-01 ENCOUNTER — OFFICE VISIT (OUTPATIENT)
Dept: WOUND CARE | Facility: HOSPITAL | Age: 67
End: 2022-02-01
Payer: COMMERCIAL

## 2022-02-01 VITALS
RESPIRATION RATE: 15 BRPM | SYSTOLIC BLOOD PRESSURE: 162 MMHG | DIASTOLIC BLOOD PRESSURE: 77 MMHG | TEMPERATURE: 97.5 F | HEART RATE: 77 BPM

## 2022-02-01 DIAGNOSIS — T81.89XA NON-HEALING SURGICAL WOUND, INITIAL ENCOUNTER: Primary | ICD-10-CM

## 2022-02-01 PROCEDURE — 99213 OFFICE O/P EST LOW 20 MIN: CPT | Performed by: NURSE PRACTITIONER

## 2022-02-01 PROCEDURE — G0463 HOSPITAL OUTPT CLINIC VISIT: HCPCS | Performed by: NURSE PRACTITIONER

## 2022-02-01 RX ORDER — LIDOCAINE HYDROCHLORIDE 40 MG/ML
5 SOLUTION TOPICAL ONCE
Status: COMPLETED | OUTPATIENT
Start: 2022-02-01 | End: 2022-02-01

## 2022-02-01 RX ADMIN — LIDOCAINE HYDROCHLORIDE 5 ML: 40 SOLUTION TOPICAL at 11:43

## 2022-02-01 NOTE — PATIENT INSTRUCTIONS
Orders Placed This Encounter   Procedures    Wound cleansing and dressings      Left Lower Leg Wound:     Wash your hands with soap and water  Remove old dressing, discard into plastic bag and place in trash  Cleanse the wound with soap and water prior to applying a clean dressing  Do not use tissue or cotton balls  Do not scrub the wound  Pat dry using gauze  Shower yes   Apply moisturizer to skin surrounding wound (discontinue betamethasone)  Apply betadine daily to wound  Cover with Allevyn Life  Secure with Spandigrip E        This was done today                                                             Standing Status:   Future     Standing Expiration Date:   2/1/2023    Wound compression and edema control     Spandagrip "E" to LLE - apply in a m  - remove at bedtime       Standing Status:   Future     Standing Expiration Date:   2/1/2023

## 2022-02-01 NOTE — PROGRESS NOTES
Patient ID: Shantel Castillo is a 78 y o  male Date of Birth 1955     Chief Complaint  Chief Complaint   Patient presents with    Follow Up Wound Care Visit     LLE       Allergies  Shellfish-derived products - food allergy and Sulfamethoxazole-trimethoprim    Assessment:     Diagnoses and all orders for this visit:    Non-healing surgical wound, initial encounter  -     lidocaine (XYLOCAINE) 4 % topical solution 5 mL  -     Wound cleansing and dressings; Future  -     Wound compression and edema control; Future  -     Cancel: Prosthetic  -     Prosthetic          Plan:  1  F/u visit  Wound cleansed with NSS and gauze  Wound almost healed  Will paint area with betadine daily covered with a bordered foam dressing changed every 3-5 days  Dressing may be peeled back and reapplied if no drainage is present on dressing  2  Patient is cleared to be fitted for prosthetic leg  3  Patient will follow up in 1 week  Wound 11/16/21 Surgical Leg Left; Lower (Active)   Wound Image Images linked 02/01/22 1140   Wound Description Eschar;Yellow;Epithelialization;Slough;Pink;Granulation tissue 02/01/22 1139   Dominga-wound Assessment Pink;Dry 02/01/22 1139   Wound Length (cm) 0 3 cm 02/01/22 1139   Wound Width (cm) 0 3 cm 02/01/22 1139   Wound Depth (cm) 0 1 cm 02/01/22 1139   Wound Surface Area (cm^2) 0 09 cm^2 02/01/22 1139   Wound Volume (cm^3) 0 009 cm^3 02/01/22 1139   Calculated Wound Volume (cm^3) 0 01 cm^3 02/01/22 1139   Change in Wound Size % 99 26 02/01/22 1139   Drainage Amount Scant 02/01/22 1139   Drainage Description Yellow 02/01/22 1139   Non-staged Wound Description Full thickness 02/01/22 1139   Dressing Status Intact (upon arrival) 02/01/22 1139       Wound 11/16/21 Surgical Leg Left; Lower (Active)   Date First Assessed: 11/16/21   Primary Wound Type: Surgical  Location: Leg  Wound Location Orientation: Left; Lower       [REMOVED] Wound 07/24/20 Diabetic Ulcer Foot Right;Lateral (Removed)   Resolved Date: 03/11/21  Date First Assessed: 07/24/20   Pre-Existing Wound: Yes  Primary Wound Type: Diabetic Ulcer  Location: Foot  Wound Location Orientation: Right;Lateral  Wound Description (Comments): Cristina 1       [REMOVED] Wound 12/18/20 Foot Right (Removed)   Resolved Date: 03/11/21  Date First Assessed/Time First Assessed: 12/18/20 1146   Location: Foot  Wound Location Orientation: Right  Incision's 1st Dressing: DRESSING OIL EMULSION 3 X 3 IN (x1), SPONGE GAUZE 4 X 8 12 PLY STRL LF (x1), BANDAGE WILLARD 3   [REMOVED] Wound 01/28/21 Catheter entry/exit site Groin Left (Removed)   Resolved Date/Resolved Time: 03/25/21 1011  Date First Assessed/Time First Assessed: 01/28/21 0909   Traumatic Wound Type: Catheter entry/exit site  Location: Groin  Wound Location Orientation: Left  Wound Description (Comments): angiogram access punc    [REMOVED] Wound 01/29/21 Diabetic Ulcer Foot Lateral;Right (Removed)   Resolved Date: 03/11/21  Date First Assessed/Time First Assessed: 01/29/21 0820   Pre-Existing Wound: Yes  Primary Wound Type: Diabetic Ulcer  Location: Foot  Wound Location Orientation: Lateral;Right       [REMOVED] Wound 02/02/21 Foot Right (Removed)   Resolved Date: 03/11/21  Date First Assessed/Time First Assessed: 02/02/21 1734   Location: Foot  Wound Location Orientation: Right  Incision's 1st Dressing: DRESSING OIL EMULSION 3 X 3 IN (x1), SPONGE GAUZE 4 X 4 16 PLY STRL PLASTIC TRAY LF (x1), JOSI    [REMOVED] Wound 03/11/21 Surgical Foot Right;Lateral (Removed)   Resolved Date: 06/03/21  Date First Assessed/Time First Assessed: 03/11/21 1057   Primary Wound Type: Surgical  Location: Foot  Wound Location Orientation: Right;Lateral  Wound Outcome: Healed       [REMOVED] Wound 03/11/21 Foot Left; Other (Comment); Posterior (Removed)   Resolved Date/Resolved Time: 07/17/21 0910  Date First Assessed: 03/11/21   Location: Foot  Wound Location Orientation: (c) Left; Other (Comment); Posterior  Wound Outcome: Amputation       [REMOVED] Wound 03/11/21 Heel Left (Removed)   Resolved Date/Resolved Time: 07/17/21 0910  Date First Assessed/Time First Assessed: 03/11/21 1100   Location: Heel  Wound Location Orientation: Left  Wound Outcome: Amputation       [REMOVED] Wound 04/08/21 Foot Anterior; Left (Removed)   Resolved Date: 06/03/21  Date First Assessed/Time First Assessed: 04/08/21 1046   Location: Foot  Wound Location Orientation: Anterior; Left  Wound Outcome: Healed       [REMOVED] Wound 04/13/21 Catheter entry/exit site Groin Right (Removed)   Resolved Date/Resolved Time: 04/22/21 1025  Date First Assessed/Time First Assessed: 04/13/21 0843   Traumatic Wound Type: Catheter entry/exit site  Location: Groin  Wound Location Orientation: Right  Wound Description (Comments): angiogram access        [REMOVED] Wound 07/14/21 Leg Left (Removed)   Resolved Date: 11/16/21  Date First Assessed/Time First Assessed: 07/14/21 1505   Location: Leg  Wound Location Orientation: Left  Wound Description (Comments): PROXIMAL THIGH ICISIONS X 3  Skin staples  Incision's 1st Dressing: DRESSING MEPILEX AG B  [REMOVED] Wound 07/17/21 Leg Left (Removed)   Resolved Date: 11/16/21  Date First Assessed/Time First Assessed: 07/17/21 0910   Location: Leg  Wound Location Orientation: Left  Wound Description (Comments): BKA SURGICAL  INCISION  Incision's 1st Dressing: DRESSING XEROFORM 1 X 8 (x1), SPONGE GAUZ    Subjective:     F/u visit non-healing surgical wound of left BKA stump  No new complaints  He denies any pain, fevers, or chills  The following portions of the patient's history were reviewed and updated as appropriate:   He  has a past medical history of Arthritis, First degree AV block, Full dentures, Hypertension, PAD (peripheral artery disease) ([facility] [address] ), PVD (peripheral vascular disease) ([facility] [address] ), Type 2 diabetes mellitus with diabetic peripheral angiopathy without gangrene ([facility] [address] ) (5/18/2021), and Wound, open    He   Patient Active Problem List    Diagnosis Date Noted    Insomnia 11/04/2021    S/P BKA (below knee amputation), left ([place] [id number] ) 08/01/2021    Gastroesophageal reflux disease without esophagitis 07/01/2021    Platelets decreased ([place] [id number] ) 05/21/2021    Type 2 diabetes mellitus with diabetic peripheral angiopathy without gangrene ([place] [id number] ) 05/18/2021    Poorly controlled type 2 diabetes mellitus ([place] [id number] ) 04/27/2021    Essential hypertension 04/27/2021    Mixed hyperlipidemia 04/27/2021    PVD (peripheral vascular disease) ([place] [id number] ) 01/28/2021    Right foot ulcer, with fat layer exposed ([place] [id number] )     Diabetic ulcer of toe of right foot associated with type 2 diabetes mellitus, limited to breakdown of skin ([place] [id number] ) 03/13/2019    Bony exostosis 03/13/2019    Ingrown toenail 07/26/2018    Paronychia of toenail of right foot 07/26/2018    Dermatophytosis 07/11/2018    Onychomycosis 03/15/2018    Tinea pedis of both feet 03/15/2018    Acquired deformity of foot 02/15/2018    Diabetic polyneuropathy associated with type 2 diabetes mellitus ([place] [id number] ) 02/15/2018    Pain in both feet 02/15/2018    Peripheral arteriosclerosis ([place] [id number] ) 02/15/2018     He  has a past surgical history that includes Toe amputation; Vein ligation; Replantation thumb (Right); Cholecystectomy; Colonoscopy; Toe Osteotomy (Right, 6/26/2020); Trigger finger release; Skin graft; pr debridement, skin, sub-q tissue,muscle,bone,=<20 sq cm (Right, 12/18/2020); IR aortagram with run-off (1/28/2021); Toe amputation (Right, 2/2/2021); IR aortagram with run-off (4/13/2021); pr vein bypass graft,fem-tibial (Left, 7/14/2021); and Leg amputation, lower tibia/fibula (Left, 7/17/2021)  His family history includes Alzheimer's disease in his father; Arthritis in his mother; Cancer in his father  He  reports that he has never smoked  He has never used smokeless tobacco  He reports current alcohol use  He reports that he does not use drugs    Current Outpatient Medications   Medication Sig Dispense Refill    acetaminophen (TYLENOL) 325 mg tablet Take 2 tablets (650 mg total) by mouth every 4 (four) hours as needed for mild pain, moderate pain, severe pain or fever      atorvastatin (LIPITOR) 40 mg tablet Take 1 tablet (40 mg total) by mouth daily at bedtime 90 tablet 1    benzonatate (TESSALON PERLES) 100 mg capsule Take 1 capsule (100 mg total) by mouth 3 (three) times a day as needed for cough 60 capsule 0    betamethasone dipropionate (DIPROSONE) 0 05 % cream Apply topically 2 (two) times a day 30 g 0    Blood Glucose Monitoring Suppl (OneTouch Verio) w/Device KIT by Does not apply route 2 (two) times a day Dx E11 9 1 kit 1    clopidogrel (PLAVIX) 75 mg tablet Take 1 tablet (75 mg total) by mouth daily 90 tablet 0    docusate sodium (COLACE) 100 mg capsule Take 1 capsule (100 mg total) by mouth 2 (two) times a day Stool softener for constipation    Hold loose stools (Patient not taking: Reported on 8/17/2021)      gabapentin (NEURONTIN) 300 mg capsule Take 1 capsule (300 mg total) by mouth 3 (three) times a day 270 capsule 3    glucose blood (OneTouch Verio) test strip Use as instructed qid Dx E11 9 400 each 3    insulin NPH-insulin regular (NovoLIN 70/30) 100 units/mL subcutaneous injection Inject:  44 units with breakfast, 20 units with lunch, 30 units with dinner 15 mL 1    Lancets (OneTouch Delica Plus MPECCG49D) MISC 1 Units by Does not apply route 2 (two) times a day Dx E11 9 100 each 2    lisinopril (ZESTRIL) 10 mg tablet Take 1 tablet (10 mg total) by mouth daily 90 tablet 1    metFORMIN (GLUCOPHAGE-XR) 500 mg 24 hr tablet Take 1 tablet (500 mg total) by mouth daily with dinner 30 tablet 4    methocarbamol (ROBAXIN) 500 mg tablet Take 1 tablet (500 mg total) by mouth every 6 (six) hours (Patient not taking: Reported on 11/2/2021) 120 tablet 0    oxyCODONE-acetaminophen (PERCOCET) 5-325 mg per tablet Take 1 tablet by mouth every 6 (six) hours as needed for moderate pain Max Daily Amount: 4 tablets 120 tablet 0    pantoprazole (PROTONIX) 40 mg tablet Take 1 tablet (40 mg total) by mouth daily 90 tablet 1    rivaroxaban (Xarelto) 2 5 mg tablet Take 1 tablet (2 5 mg total) by mouth daily with breakfast Last dose 6/21/20 30 tablet 2    UltiCare Insulin Syringe 31G X 5/16" 1 ML MISC Inject under the skin as needed (for injection) 100 each 1    zolpidem (AMBIEN) 10 mg tablet Take 1 tablet (10 mg total) by mouth daily at bedtime as needed for sleep 30 tablet 0     No current facility-administered medications for this visit  He is allergic to shellfish-derived products - food allergy and sulfamethoxazole-trimethoprim       Review of Systems   Constitutional: Negative  HENT: Negative for ear pain and hearing loss  Eyes: Negative for pain  Respiratory: Negative for chest tightness and shortness of breath  Cardiovascular: Negative for chest pain, palpitations and leg swelling  Gastrointestinal: Negative for diarrhea, nausea and vomiting  Genitourinary: Negative for dysuria  Musculoskeletal: Positive for gait problem  Skin: Positive for wound  Neurological: Negative for tremors and weakness  Psychiatric/Behavioral: Negative for behavioral problems, confusion and suicidal ideas  Objective:       Wound 11/16/21 Surgical Leg Left; Lower (Active)   Wound Image Images linked 02/01/22 1140   Wound Description Eschar;Yellow;Epithelialization;Slough;Pink;Granulation tissue 02/01/22 1139   Dominga-wound Assessment Pink;Dry 02/01/22 1139   Wound Length (cm) 0 3 cm 02/01/22 1139   Wound Width (cm) 0 3 cm 02/01/22 1139   Wound Depth (cm) 0 1 cm 02/01/22 1139   Wound Surface Area (cm^2) 0 09 cm^2 02/01/22 1139   Wound Volume (cm^3) 0 009 cm^3 02/01/22 1139   Calculated Wound Volume (cm^3) 0 01 cm^3 02/01/22 1139   Change in Wound Size % 99 26 02/01/22 1139   Drainage Amount Scant 02/01/22 1139   Drainage Description Yellow 02/01/22 1139   Non-staged Wound Description Full thickness 02/01/22 1139   Dressing Status Intact (upon arrival) 02/01/22 1139       /77   Pulse 77   Temp 97 5 °F (36 4 °C)   Resp 15     Physical Exam  Vitals and nursing note reviewed  Constitutional:       General: He is not in acute distress  Appearance: Normal appearance  He is normal weight  HENT:      Head: Normocephalic and atraumatic  Eyes:      General:         Right eye: No discharge  Left eye: No discharge  Pulmonary:      Effort: Pulmonary effort is normal  No respiratory distress  Musculoskeletal:         General: Deformity present  Cervical back: Normal range of motion and neck supple  No rigidity  Comments: L BKA   Skin:     General: Skin is warm and dry  Findings: Wound present  No erythema  Neurological:      General: No focal deficit present  Mental Status: He is alert and oriented to person, place, and time  Mental status is at baseline  Psychiatric:         Mood and Affect: Mood normal          Behavior: Behavior normal          Thought Content: Thought content normal          Judgment: Judgment normal                    Wound Instructions:  Orders Placed This Encounter   Procedures    Wound cleansing and dressings      Left Lower Leg Wound:     Wash your hands with soap and water  Remove old dressing, discard into plastic bag and place in trash  Cleanse the wound with soap and water prior to applying a clean dressing  Do not use tissue or cotton balls  Do not scrub the wound  Pat dry using gauze  Shower yes   Apply moisturizer to skin surrounding wound (discontinue betamethasone)  Apply betadine daily to wound  Cover with Allevyn Life  Secure with Spandigrip E        This was done today                                                             Standing Status:   Future     Standing Expiration Date:   2/1/2023    Wound compression and edema control     Spandagrip "E" to LLE - apply in a m  - remove at bedtime       Standing Status:   Future     Standing Expiration Date:   2/1/2023    Prosthetic     Wound almost healed  Patient is cleared to be fitted for prosthetic  Order Specific Question:   Location     Answer:   Below Knee     Order Specific Question:   Type     Answer:   Initial Prosthesis     Order Specific Question:   L/R/BL     Answer:   Left        Diagnosis ICD-10-CM Associated Orders   1   Non-healing surgical wound, initial encounter  [id number] lidocaine (XYLOCAINE) 4 % topical solution 5 mL     Wound cleansing and dressings     Wound compression and edema control     Prosthetic

## 2022-02-07 ENCOUNTER — OFFICE VISIT (OUTPATIENT)
Dept: WOUND CARE | Facility: HOSPITAL | Age: 67
End: 2022-02-07
Payer: COMMERCIAL

## 2022-02-07 VITALS
SYSTOLIC BLOOD PRESSURE: 197 MMHG | DIASTOLIC BLOOD PRESSURE: 76 MMHG | TEMPERATURE: 98.4 F | RESPIRATION RATE: 18 BRPM | HEART RATE: 98 BPM

## 2022-02-07 DIAGNOSIS — T81.89XA NON-HEALING SURGICAL WOUND, INITIAL ENCOUNTER: Primary | ICD-10-CM

## 2022-02-07 DIAGNOSIS — M54.9 CHRONIC BACK PAIN, UNSPECIFIED BACK LOCATION, UNSPECIFIED BACK PAIN LATERALITY: ICD-10-CM

## 2022-02-07 DIAGNOSIS — G89.29 CHRONIC BACK PAIN, UNSPECIFIED BACK LOCATION, UNSPECIFIED BACK PAIN LATERALITY: ICD-10-CM

## 2022-02-07 DIAGNOSIS — E11.42 DIABETIC POLYNEUROPATHY ASSOCIATED WITH TYPE 2 DIABETES MELLITUS (HCC): ICD-10-CM

## 2022-02-07 PROCEDURE — 99213 OFFICE O/P EST LOW 20 MIN: CPT | Performed by: NURSE PRACTITIONER

## 2022-02-07 PROCEDURE — G0463 HOSPITAL OUTPT CLINIC VISIT: HCPCS | Performed by: NURSE PRACTITIONER

## 2022-02-07 RX ORDER — OXYCODONE HYDROCHLORIDE AND ACETAMINOPHEN 5; 325 MG/1; MG/1
1 TABLET ORAL EVERY 6 HOURS PRN
Qty: 120 TABLET | Refills: 0 | Status: SHIPPED | OUTPATIENT
Start: 2022-02-07 | End: 2022-04-08 | Stop reason: SDUPTHER

## 2022-02-07 RX ORDER — LIDOCAINE HYDROCHLORIDE 40 MG/ML
5 SOLUTION TOPICAL ONCE
Status: COMPLETED | OUTPATIENT
Start: 2022-02-07 | End: 2022-02-07

## 2022-02-07 RX ADMIN — LIDOCAINE HYDROCHLORIDE 5 ML: 40 SOLUTION TOPICAL at 15:09

## 2022-02-07 NOTE — PATIENT INSTRUCTIONS
Orders Placed This Encounter   Procedures    Wound compression and edema control     Elastic Tubular Stocking: Spandigrip E to left lower leg    Tubular elastic bandage: Apply from base of toes to behind the knee  Apply in AM, may remove for sleep  Avoid prolonged standing in one place  Elevate leg(s) above the level of the heart when sitting or as much as possible  This was done today at [address] [facility],[address]     Standing Status:   Future     Standing Expiration Date:   2/7/2023    Wound cleansing and dressings      Left Lower Leg Wound:     Wash your hands with soap and water  Remove old dressing, discard into plastic bag and place in trash  Cleanse the wound with soap and water prior to applying a clean dressing  Do not use tissue or cotton balls  Do not scrub the wound  Pat dry using gauze  Shower yes   Apply moisturizer to skin surrounding wound  Apply Hydraguard to pink abrasion  on leg  Apply betadine daily to wound  Cover with Allevyn Gentle Border Lite      Spandigrip E to leg     This was done today                                                                                            Standing Status:   Future     Standing Expiration Date:   2/7/2023

## 2022-02-07 NOTE — PROGRESS NOTES
Patient ID: Doug Cadet is a 78 y o  male Date of Birth 1955     Chief Complaint  Chief Complaint   Patient presents with    Follow Up Wound Care Visit     LLE       Allergies  Shellfish-derived products - food allergy and Sulfamethoxazole-trimethoprim    Assessment:     Diagnoses and all orders for this visit:    Non-healing surgical wound, initial encounter    Diabetic polyneuropathy associated with type 2 diabetes mellitus (HCC)  -     lidocaine (XYLOCAINE) 4 % topical solution 5 mL  -     Cancel: Wound cleansing and dressings; Future  -     Wound compression and edema control; Future  -     Wound cleansing and dressings; Future          Plan:  1  F/u visit  Wound cleansed with NSS and gauze  Wound improving and measuring slightly smaller  Continue betadine to wound daily covered with Allevyn bordered foam  Patient will follow up in 1 week  Wound 11/16/21 Surgical Leg Left; Lower (Active)   Wound Image Images linked 02/07/22 1506   Wound Description Pink;Granulation tissue; White;Slough 02/07/22 1506   Dominga-wound Assessment Pink;Dry 02/07/22 1506   Wound Length (cm) 0 2 cm 02/07/22 1506   Wound Width (cm) 0 2 cm 02/07/22 1506   Wound Depth (cm) 0 1 cm 02/07/22 1506   Wound Surface Area (cm^2) 0 04 cm^2 02/07/22 1506   Wound Volume (cm^3) 0 004 cm^3 02/07/22 1506   Calculated Wound Volume (cm^3) 0 cm^3 02/07/22 1506   Change in Wound Size % 100 02/07/22 1506   Drainage Amount Scant 02/07/22 1506   Drainage Description Serous 02/07/22 1506   Non-staged Wound Description Full thickness 02/07/22 1506   Dressing Status Intact (upon arrival) 02/07/22 1506       Wound 11/16/21 Surgical Leg Left; Lower (Active)   Date First Assessed: 11/16/21   Primary Wound Type: Surgical  Location: Leg  Wound Location Orientation: Left; Lower       [REMOVED] Wound 07/24/20 Diabetic Ulcer Foot Right;Lateral (Removed)   Resolved Date: 03/11/21  Date First Assessed: 07/24/20   Pre-Existing Wound: Yes  Primary Wound Type: Diabetic Ulcer  Location: Foot  Wound Location Orientation: Right;Lateral  Wound Description (Comments): Cristina 1       [REMOVED] Wound 12/18/20 Foot Right (Removed)   Resolved Date: 03/11/21  Date First Assessed/Time First Assessed: 12/18/20 1146   Location: Foot  Wound Location Orientation: Right  Incision's 1st Dressing: DRESSING OIL EMULSION 3 X 3 IN (x1), SPONGE GAUZE 4 X 8 12 PLY STRL LF (x1), BANDAGE WILLARD 3   [REMOVED] Wound 01/28/21 Catheter entry/exit site Groin Left (Removed)   Resolved Date/Resolved Time: 03/25/21 1011  Date First Assessed/Time First Assessed: 01/28/21 0909   Traumatic Wound Type: Catheter entry/exit site  Location: Groin  Wound Location Orientation: Left  Wound Description (Comments): angiogram access punc    [REMOVED] Wound 01/29/21 Diabetic Ulcer Foot Lateral;Right (Removed)   Resolved Date: 03/11/21  Date First Assessed/Time First Assessed: 01/29/21 0820   Pre-Existing Wound: Yes  Primary Wound Type: Diabetic Ulcer  Location: Foot  Wound Location Orientation: Lateral;Right       [REMOVED] Wound 02/02/21 Foot Right (Removed)   Resolved Date: 03/11/21  Date First Assessed/Time First Assessed: 02/02/21 1734   Location: Foot  Wound Location Orientation: Right  Incision's 1st Dressing: DRESSING OIL EMULSION 3 X 3 IN (x1), SPONGE GAUZE 4 X 4 16 PLY STRL PLASTIC TRAY LF (x1), JOSI    [REMOVED] Wound 03/11/21 Surgical Foot Right;Lateral (Removed)   Resolved Date: 06/03/21  Date First Assessed/Time First Assessed: 03/11/21 1057   Primary Wound Type: Surgical  Location: Foot  Wound Location Orientation: Right;Lateral  Wound Outcome: Healed       [REMOVED] Wound 03/11/21 Foot Left; Other (Comment); Posterior (Removed)   Resolved Date/Resolved Time: 07/17/21 0910  Date First Assessed: 03/11/21   Location: Foot  Wound Location Orientation: (c) Left; Other (Comment); Posterior  Wound Outcome: Amputation       [REMOVED] Wound 03/11/21 Heel Left (Removed)   Resolved Date/Resolved Time: 07/17/21 0910  Date First Assessed/Time First Assessed: 03/11/21 1100   Location: Heel  Wound Location Orientation: Left  Wound Outcome: Amputation       [REMOVED] Wound 04/08/21 Foot Anterior; Left (Removed)   Resolved Date: 06/03/21  Date First Assessed/Time First Assessed: 04/08/21 1046   Location: Foot  Wound Location Orientation: Anterior; Left  Wound Outcome: Healed       [REMOVED] Wound 04/13/21 Catheter entry/exit site Groin Right (Removed)   Resolved Date/Resolved Time: 04/22/21 1025  Date First Assessed/Time First Assessed: 04/13/21 0843   Traumatic Wound Type: Catheter entry/exit site  Location: Groin  Wound Location Orientation: Right  Wound Description (Comments): angiogram access        [REMOVED] Wound 07/14/21 Leg Left (Removed)   Resolved Date: 11/16/21  Date First Assessed/Time First Assessed: 07/14/21 1505   Location: Leg  Wound Location Orientation: Left  Wound Description (Comments): PROXIMAL THIGH ICISIONS X 3  Skin staples  Incision's 1st Dressing: DRESSING MEPILEX AG B  [REMOVED] Wound 07/17/21 Leg Left (Removed)   Resolved Date: 11/16/21  Date First Assessed/Time First Assessed: 07/17/21 0910   Location: Leg  Wound Location Orientation: Left  Wound Description (Comments): BKA SURGICAL  INCISION  Incision's 1st Dressing: DRESSING XEROFORM 1 X 8 (x1), SPONGE GAUZ    Subjective:     F/u visit non-healing surgical wound of left BKA stump  No new complaints  He denies any pain, fevers, or chills  The following portions of the patient's history were reviewed and updated as appropriate:   He  has a past medical history of Arthritis, First degree AV block, Full dentures, Hypertension, PAD (peripheral artery disease) ([facility] ), PVD (peripheral vascular disease) ([facility] ), Type 2 diabetes mellitus with diabetic peripheral angiopathy without gangrene ([facility] ) (5/18/2021), and Wound, open    He   Patient Active Problem List    Diagnosis Date Noted    Insomnia 11/04/2021    S/P BKA (below knee amputation), left ([place] [id number] ) 08/01/2021    Gastroesophageal reflux disease without esophagitis 07/01/2021    Platelets decreased ([place] [id number] ) 05/21/2021    Type 2 diabetes mellitus with diabetic peripheral angiopathy without gangrene ([place] [id number] ) 05/18/2021    Poorly controlled type 2 diabetes mellitus ([place] [id number] ) 04/27/2021    Essential hypertension 04/27/2021    Mixed hyperlipidemia 04/27/2021    PVD (peripheral vascular disease) ([place] [id number] ) 01/28/2021    Right foot ulcer, with fat layer exposed ([place] [id number] )     Diabetic ulcer of toe of right foot associated with type 2 diabetes mellitus, limited to breakdown of skin ([place] [id number] ) 03/13/2019    Bony exostosis 03/13/2019    Ingrown toenail 07/26/2018    Paronychia of toenail of right foot 07/26/2018    Dermatophytosis 07/11/2018    Onychomycosis 03/15/2018    Tinea pedis of both feet 03/15/2018    Acquired deformity of foot 02/15/2018    Diabetic polyneuropathy associated with type 2 diabetes mellitus ([place] [id number] ) 02/15/2018    Pain in both feet 02/15/2018    Peripheral arteriosclerosis ([place] [id number] ) 02/15/2018     He  has a past surgical history that includes Toe amputation; Vein ligation; Replantation thumb (Right); Cholecystectomy; Colonoscopy; Toe Osteotomy (Right, 6/26/2020); Trigger finger release; Skin graft; pr debridement, skin, sub-q tissue,muscle,bone,=<20 sq cm (Right, 12/18/2020); IR aortagram with run-off (1/28/2021); Toe amputation (Right, 2/2/2021); IR aortagram with run-off (4/13/2021); pr vein bypass graft,fem-tibial (Left, 7/14/2021); and Leg amputation, lower tibia/fibula (Left, 7/17/2021)  His family history includes Alzheimer's disease in his father; Arthritis in his mother; Cancer in his father  He  reports that he has never smoked  He has never used smokeless tobacco  He reports current alcohol use  He reports that he does not use drugs    Current Outpatient Medications   Medication Sig Dispense Refill    acetaminophen (TYLENOL) 325 mg tablet Take 2 tablets (650 mg total) by mouth every 4 (four) hours as needed for mild pain, moderate pain, severe pain or fever      atorvastatin (LIPITOR) 40 mg tablet Take 1 tablet (40 mg total) by mouth daily at bedtime 90 tablet 1    benzonatate (TESSALON PERLES) 100 mg capsule Take 1 capsule (100 mg total) by mouth 3 (three) times a day as needed for cough 60 capsule 0    betamethasone dipropionate (DIPROSONE) 0 05 % cream Apply topically 2 (two) times a day 30 g 0    Blood Glucose Monitoring Suppl (OneTouch Verio) w/Device KIT by Does not apply route 2 (two) times a day Dx E11 9 1 kit 1    clopidogrel (PLAVIX) 75 mg tablet Take 1 tablet (75 mg total) by mouth daily 90 tablet 0    docusate sodium (COLACE) 100 mg capsule Take 1 capsule (100 mg total) by mouth 2 (two) times a day Stool softener for constipation    Hold loose stools (Patient not taking: Reported on 8/17/2021)      gabapentin (NEURONTIN) 300 mg capsule Take 1 capsule (300 mg total) by mouth 3 (three) times a day 270 capsule 3    glucose blood (OneTouch Verio) test strip Use as instructed qid Dx E11 9 400 each 3    insulin NPH-insulin regular (NovoLIN 70/30) 100 units/mL subcutaneous injection Inject:  44 units with breakfast, 20 units with lunch, 30 units with dinner 15 mL 1    Lancets (OneTouch Delica Plus [id number]) MISC 1 Units by Does not apply route 2 (two) times a day Dx E11 9 100 each 2    lisinopril (ZESTRIL) 10 mg tablet Take 1 tablet (10 mg total) by mouth daily 90 tablet 1    metFORMIN (GLUCOPHAGE-XR) 500 mg 24 hr tablet Take 1 tablet (500 mg total) by mouth daily with dinner 30 tablet 4    methocarbamol (ROBAXIN) 500 mg tablet Take 1 tablet (500 mg total) by mouth every 6 (six) hours (Patient not taking: Reported on 11/2/2021) 120 tablet 0    oxyCODONE-acetaminophen (PERCOCET) 5-325 mg per tablet Take 1 tablet by mouth every 6 (six) hours as needed for moderate pain Max Daily Amount: 4 tablets 120 tablet 0    pantoprazole (PROTONIX) 40 mg tablet Take 1 tablet (40 mg total) by mouth daily 90 tablet 1    rivaroxaban (Xarelto) 2 5 mg tablet Take 1 tablet (2 5 mg total) by mouth daily with breakfast Last dose 6/21/20 30 tablet 2    UltiCare Insulin Syringe 31G X 5/16" 1 ML MISC Inject under the skin as needed (for injection) 100 each 1    zolpidem (AMBIEN) 10 mg tablet Take 1 tablet (10 mg total) by mouth daily at bedtime as needed for sleep 30 tablet 0     No current facility-administered medications for this visit  He is allergic to shellfish-derived products - food allergy and sulfamethoxazole-trimethoprim       Review of Systems   Constitutional: Negative  HENT: Negative for ear pain and hearing loss  Eyes: Negative for pain  Respiratory: Negative for chest tightness and shortness of breath  Cardiovascular: Negative for chest pain, palpitations and leg swelling  Gastrointestinal: Negative for diarrhea, nausea and vomiting  Genitourinary: Negative for dysuria  Musculoskeletal: Positive for gait problem  Skin: Positive for wound  Neurological: Negative for tremors and weakness  Psychiatric/Behavioral: Negative for behavioral problems, confusion and suicidal ideas  Objective:       Wound 11/16/21 Surgical Leg Left; Lower (Active)   Wound Image Images linked 02/07/22 1506   Wound Description Pink;Granulation tissue; White;Slough 02/07/22 1506   Dominga-wound Assessment Pink;Dry 02/07/22 1506   Wound Length (cm) 0 2 cm 02/07/22 1506   Wound Width (cm) 0 2 cm 02/07/22 1506   Wound Depth (cm) 0 1 cm 02/07/22 1506   Wound Surface Area (cm^2) 0 04 cm^2 02/07/22 1506   Wound Volume (cm^3) 0 004 cm^3 02/07/22 1506   Calculated Wound Volume (cm^3) 0 cm^3 02/07/22 1506   Change in Wound Size % 100 02/07/22 1506   Drainage Amount Scant 02/07/22 1506   Drainage Description Serous 02/07/22 1506   Non-staged Wound Description Full thickness 02/07/22 1506   Dressing Status Intact (upon arrival) 02/07/22 1506       BP (!) 197/76   Pulse 98   Temp 98 4 °F (36 9 °C)   Resp 18     Physical Exam  Vitals and nursing note reviewed  Constitutional:       General: He is not in acute distress  Appearance: Normal appearance  He is normal weight  HENT:      Head: Normocephalic and atraumatic  Eyes:      General:         Right eye: No discharge  Left eye: No discharge  Pulmonary:      Effort: Pulmonary effort is normal  No respiratory distress  Musculoskeletal:         General: Deformity present  Normal range of motion  Cervical back: Normal range of motion and neck supple  No rigidity  Right lower leg: No edema  Comments: L BKA    Skin:     General: Skin is warm and dry  Findings: Wound present  No erythema  Neurological:      General: No focal deficit present  Mental Status: He is alert and oriented to person, place, and time  Mental status is at baseline  Psychiatric:         Mood and Affect: Mood normal          Behavior: Behavior normal          Thought Content: Thought content normal          Judgment: Judgment normal                    Wound Instructions:  Orders Placed This Encounter   Procedures    Wound compression and edema control     Elastic Tubular Stocking: Spandigrip E to left lower leg    Tubular elastic bandage: Apply from base of toes to behind the knee  Apply in AM, may remove for sleep  Avoid prolonged standing in one place  Elevate leg(s) above the level of the heart when sitting or as much as possible  This was done today at [facility]     Standing Status:   Future     Standing Expiration Date:   2/7/2023    Wound cleansing and dressings      Left Lower Leg Wound:     Wash your hands with soap and water  Remove old dressing, discard into plastic bag and place in trash  Cleanse the wound with soap and water prior to applying a clean dressing  Do not use tissue or cotton balls  Do not scrub the wound  Pat dry using gauze  Shower yes   Apply moisturizer to skin surrounding wound  Apply Hydraguard to pink abrasion  on leg  Apply betadine daily to wound  Cover with Allevyn Gentle Border Lite  Spandigrip E to leg     This was done today                                                                                            Standing Status:   Future     Standing Expiration Date:   2/7/2023        Diagnosis ICD-10-CM Associated Orders   1  Non-healing surgical wound, initial encounter  T81 89XA    2   Diabetic polyneuropathy associated with type 2 diabetes mellitus (HCC)  E11 42 lidocaine (XYLOCAINE) 4 % topical solution 5 mL     Wound compression and edema control     Wound cleansing and dressings

## 2022-02-10 ENCOUNTER — OFFICE VISIT (OUTPATIENT)
Dept: VASCULAR SURGERY | Facility: CLINIC | Age: 67
End: 2022-02-10
Payer: COMMERCIAL

## 2022-02-10 VITALS
TEMPERATURE: 98.8 F | HEART RATE: 73 BPM | WEIGHT: 188 LBS | SYSTOLIC BLOOD PRESSURE: 130 MMHG | DIASTOLIC BLOOD PRESSURE: 80 MMHG | HEIGHT: 64 IN | BODY MASS INDEX: 32.1 KG/M2

## 2022-02-10 DIAGNOSIS — Z89.512 S/P BKA (BELOW KNEE AMPUTATION), LEFT (HCC): ICD-10-CM

## 2022-02-10 DIAGNOSIS — Z89.512 HX OF BKA, LEFT (HCC): ICD-10-CM

## 2022-02-10 DIAGNOSIS — I73.9 PAD (PERIPHERAL ARTERY DISEASE) (HCC): Primary | ICD-10-CM

## 2022-02-10 DIAGNOSIS — I70.209 PERIPHERAL ARTERIOSCLEROSIS (HCC): ICD-10-CM

## 2022-02-10 PROCEDURE — 3008F BODY MASS INDEX DOCD: CPT | Performed by: SURGERY

## 2022-02-10 PROCEDURE — 99212 OFFICE O/P EST SF 10 MIN: CPT | Performed by: SURGERY

## 2022-02-10 PROCEDURE — 1160F RVW MEDS BY RX/DR IN RCRD: CPT | Performed by: SURGERY

## 2022-02-10 PROCEDURE — 1036F TOBACCO NON-USER: CPT | Performed by: SURGERY

## 2022-02-10 NOTE — ASSESSMENT & PLAN NOTE
S/p left BKA which is now essentially healed  Continue dressing and tubigrip as directed by wound care center to protect the skin  OK to be fitted for /prosthetic   Orders placed to [address] [place] [address]

## 2022-02-10 NOTE — ASSESSMENT & PLAN NOTE
PAD with extensive revascularization history of the left lower extremity now s/p left BKA 7/2021  The BKA stump is finally essentially healed after prolonged local wound care at the wound care center  He has very small superficial scabs at 2 sites along the BKA incision and mild abrasion on the anterior surface of his distal stump  Repeat LEAD of the right leg demonstrates right ROBERT 1 2 with MTP79/GTP46  No significant arterial stenosis    -We discussed the pathophysiology of arterial occlusive disease, available treatment options and indications for treatment  Has been ambulatory with crutches  Eager to be fitted for prosthetic and start walking  Denies claudication, rest pain or tissue loss  Recommend yearly surveillance with lower extremity arterial duplex   -Continue medical optimization  Currently on plavix, xarelto 2 5mg BID and statin  OK to stop xarelto at this point since his bypass has failed  Continue plavix for stent patency  Goal hgb A1c<7 0, LDL<70, BP <130/80  -Recommend moisturization of the lower extremities, avoidance of injury and close observation of bilateral feet for any new wounds  -Follow-up in 1 year with repeat LEAD to re-evaluate symptoms

## 2022-02-10 NOTE — PATIENT INSTRUCTIONS
Peripheral arteriosclerosis ([facility] )  PAD with extensive revascularization history of the left lower extremity now s/p left BKA 7/2021  The BKA stump is finally essentially healed after prolonged local wound care at the wound care center  He has very small superficial scabs at 2 sites along the BKA incision and mild abrasion on the anterior surface of his distal stump       Repeat LEAD of the right leg demonstrates right ROBERT 1 2 with MTP79/GTP46  No significant arterial stenosis     -We discussed the pathophysiology of arterial occlusive disease, available treatment options and indications for treatment  Has been ambulatory with crutches  Eager to be fitted for prosthetic and start walking  Denies claudication, rest pain or tissue loss  Recommend yearly surveillance with lower extremity arterial duplex   -Continue medical optimization  Currently on plavix, xarelto 2 5mg BID and statin  OK to stop xarelto at this point since his bypass has failed  Continue plavix for stent patency  Goal hgb A1c<7 0, LDL<70, BP <130/80  -Recommend moisturization of the lower extremities, avoidance of injury and close observation of bilateral feet for any new wounds  -Follow-up in 1 year with repeat LEAD to re-evaluate symptoms         S/P BKA (below knee amputation), left (HCC)  S/p left BKA which is now essentially healed  Continue dressing and tubigrip as directed by wound care center to protect the skin  OK to be fitted for /prosthetic   Orders placed to [address] [place] [address]

## 2022-02-10 NOTE — PROGRESS NOTES
Assessment/Plan:    Peripheral arteriosclerosis ([facility] )  PAD with extensive revascularization history of the left lower extremity now s/p left BKA 7/2021  The BKA stump is finally essentially healed after prolonged local wound care at the wound care center  He has very small superficial scabs at 2 sites along the BKA incision and mild abrasion on the anterior surface of his distal stump  Repeat LEAD of the right leg demonstrates right ROBERT 1 2 with MTP79/GTP46  No significant arterial stenosis    -We discussed the pathophysiology of arterial occlusive disease, available treatment options and indications for treatment  Has been ambulatory with crutches  Eager to be fitted for prosthetic and start walking  Denies claudication, rest pain or tissue loss  Recommend yearly surveillance with lower extremity arterial duplex   -Continue medical optimization  Currently on plavix, xarelto 2 5mg BID and statin  OK to stop xarelto at this point since his bypass has failed  Continue plavix for stent patency  Goal hgb A1c<7 0, LDL<70, BP <130/80  -Recommend moisturization of the lower extremities, avoidance of injury and close observation of bilateral feet for any new wounds  -Follow-up in 1 year with repeat LEAD to re-evaluate symptoms  S/P BKA (below knee amputation), left ([facility] )  S/p left BKA which is now essentially healed  Continue dressing and tubigrip as directed by wound care center to protect the skin  OK to be fitted for /prosthetic   Orders placed to [address] [place] [address]        Diagnoses and all orders for this visit:    PAD (peripheral artery disease) ([place] [address] )  -     VAS lower limb arterial duplex, complete bilateral; Future    Hx of BKA, left ([facility])  -     Prosthetic  -     Limb     S/P BKA (below knee amputation), left ([facility] )    Peripheral arteriosclerosis ([place] [address] )        I have spent 15 minutes with Patient  today in which greater than 50% of this time was spent in counseling/coordination of care regarding Intructions for management, Importance of tx compliance and Impressions  Subjective:      Patient ID: Jessica Crocker is a 78 y o  male  Pt is here to review GABBY done on 12/06/21  Pt denies fever, pain and chills  He is a former smoker and is taking atorvastatin, plavix and xarelto  HPI  Mr Leonora Randolph is a 69yo male nonsmoker with h/o HTN, HLD, uncontrolled DM 2 with neuropathy and PAD w/hx BLE tissue loss, s/p multiple BLE interventions @ OSH including L SFA PTA/stent and atherectomy tibial vessels w/AT/PT  1/5/2020, L TMA and R AT/DP recanalization 1/28/2021 followed by R 5th ray and most recently L foot tissue loss and nonhealing L TMA x 6 months, s/p recanalization of occluded L peroneal stent Ace Jean) 4/13/21 w/early stent reocclusion who is now s/p L fem-peroneal bypass w/reversed GSV 7/14/2021 c/b early graft failure 2/2 poor outflow, ultimately requiring [facility] Rasheed 7/17/2021  He is presenting for follow-up wound check of his left BKA stump  The stump appears essentially healed  He has a mild abrasion on the anterior aspect of the distal stump likely from tape  He has received a slip for a prosthetic from the wound care center already  He has been using tubigrip on his stump as well  The following portions of the patient's history were reviewed and updated as appropriate: allergies, current medications, past family history, past medical history, past social history, past surgical history and problem list     Review of Systems   Constitutional: Negative  HENT: Negative  Eyes: Negative  Respiratory: Negative  Cardiovascular: Negative  Gastrointestinal: Negative  Endocrine: Negative  Genitourinary: Negative  Musculoskeletal: Negative  Skin: Positive for wound  Allergic/Immunologic: Negative  Neurological: Negative  Hematological: Negative  Psychiatric/Behavioral: Negative            Objective:      /80 (BP Location: Left arm, Patient Position: Sitting, Cuff Size: Standard)   Pulse 73   Temp 98 8 °F (37 1 °C) (Tympanic)   Ht 5' 4" (1 626 m)   Wt 85 3 kg (188 lb)   BMI 32 27 kg/m²          Physical Exam  Constitutional:       Appearance: Normal appearance  HENT:      Head: Normocephalic and atraumatic  Cardiovascular:      Rate and Rhythm: Normal rate  Pulmonary:      Effort: Pulmonary effort is normal    Musculoskeletal:         General: Normal range of motion  Cervical back: Normal range of motion and neck supple  Skin:     General: Skin is warm and dry  Capillary Refill: Capillary refill takes less than 2 seconds  Comments: Very small pinpoint scab x 2 along the anteromedial aspect of left BKA stump, mild abrasion on the anterior surface without skin breakdown   Neurological:      General: No focal deficit present  Mental Status: He is alert and oriented to person, place, and time  Psychiatric:         Mood and Affect: Mood normal          Behavior: Behavior normal          Thought Content:  Thought content normal          Judgment: Judgment normal

## 2022-02-17 ENCOUNTER — TELEPHONE (OUTPATIENT)
Dept: WOUND CARE | Facility: HOSPITAL | Age: 67
End: 2022-02-17

## 2022-02-17 NOTE — TELEPHONE ENCOUNTER
Pt called on 2/14  States wound is healed  Will call if wound reopens or has any questions or concerns

## 2022-03-15 DIAGNOSIS — M54.9 CHRONIC BACK PAIN, UNSPECIFIED BACK LOCATION, UNSPECIFIED BACK PAIN LATERALITY: ICD-10-CM

## 2022-03-15 DIAGNOSIS — G89.29 CHRONIC BACK PAIN, UNSPECIFIED BACK LOCATION, UNSPECIFIED BACK PAIN LATERALITY: ICD-10-CM

## 2022-03-15 RX ORDER — OXYCODONE HYDROCHLORIDE AND ACETAMINOPHEN 5; 325 MG/1; MG/1
1 TABLET ORAL EVERY 6 HOURS PRN
Qty: 120 TABLET | Refills: 0 | OUTPATIENT
Start: 2022-03-15

## 2022-03-25 ENCOUNTER — RA CDI HCC (OUTPATIENT)
Dept: OTHER | Facility: HOSPITAL | Age: 67
End: 2022-03-25

## 2022-03-25 NOTE — PROGRESS NOTES
E11 51, E11 65  [facility]  coding opportunities          Chart Reviewed number of suggestions sent to Provider: 2     Patients Insurance     Medicare Insurance: Alireza Elena

## 2022-04-07 NOTE — PROGRESS NOTES
BMI Counseling: Body mass index is 33 81 kg/m²  The BMI is above normal  Nutrition recommendations include decreasing portion sizes, encouraging healthy choices of fruits and vegetables, decreasing fast food intake, consuming healthier snacks, limiting drinks that contain sugar, moderation in carbohydrate intake, increasing intake of lean protein, reducing intake of saturated and trans fat and reducing intake of cholesterol  Exercise recommendations include exercising 3-5 times per week  No pharmacotherapy was ordered  Patient referred to PCP  Rationale for BMI follow-up plan is due to patient being overweight or obese  Assessment/Plan:         Problem List Items Addressed This Visit        Digestive    Gastroesophageal reflux disease without esophagitis - Primary     Patient to continue with present therapy and decrease caffeine, avoid ETOH and smoking to decrease acid production  Pt should also cease eating prior to bedtime and avoid excessive fluid intake prior to sleep  May use antacids as needed for breakthrough GERD  All pateint questions answered today about this condition  Endocrine    Diabetic polyneuropathy associated with type 2 diabetes mellitus ([facility] )     Needs A1C  Lab Results   Component Value Date    HGBA1C 8 9 (H) 07/07/2021               Cardiovascular and Mediastinum    PVD (peripheral vascular disease) ([facility] )     Patient is stable  and will continue present plan of care and reassess at next routine visit  All questions about this problem from patient were answered today  Essential hypertension     Patient is stable with current anti-hypertensive medicine and continue to follow a low sodium diet and take current medication  All questions about this condition were answered today  Other    Mixed hyperlipidemia     Patient  is stable with current medication and we discussed a low fat low cholesterol diet   Weight loss also discussed for this will help lower cholesterol also  Recheck lipids in 6 months  S/P BKA (below knee amputation), left [facility])     Patient is stable  and will continue present plan of care and reassess at next routine visit  All questions about this problem from patient were answered today  Subjective:      Patient ID: Gabbi Dee is a 78 y o  male  HPI    The following portions of the patient's history were reviewed and updated as appropriate:   Past Medical History:  He has a past medical history of Arthritis, First degree AV block, Full dentures, Hypertension, [facility]AD [facility]peripheral artery [facility]) [facility] ), PVD (peripheral vascular disease) ([facility] ), Type 2 diabetes mellitus with diabetic peripheral angiopathy without gangrene ([facility] ) (5/18/2021), and Wound, open ,  _______________________________________________________________________  Medical Problems:  does not have any pertinent problems on file ,  _______________________________________________________________________  Past Surgical History:   has a past surgical history that includes Toe amputation; Vein ligation; Replantation thumb (Right); Cholecystectomy; Colonoscopy; Toe Osteotomy (Right, 6/26/2020); Trigger finger release; Skin graft; pr debridement, skin, sub-q tissue,muscle,bone,=<20 sq cm (Right, 12/18/2020); IR aortagram with run-off (1/28/2021); Toe amputation (Right, 2/2/2021); IR aortagram with run-off (4/13/2021); pr vein bypass graft,fem-tibial (Left, 7/14/2021); and Leg amputation, lower tibia/fibula (Left, 7/17/2021)  ,  _______________________________________________________________________  Family History:  family history includes Alzheimer's disease in his father; Arthritis in his mother; Cancer in his father ,  _______________________________________________________________________  Social History:   reports that he has never smoked  He has never used smokeless tobacco  He reports current alcohol use   He reports that he does not use drugs ,  _______________________________________________________________________  Allergies:  is allergic to shellfish-derived products - food allergy and sulfamethoxazole-trimethoprim     _______________________________________________________________________  Current Outpatient Medications   Medication Sig Dispense Refill    acetaminophen (TYLENOL) 325 mg tablet Take 2 tablets (650 mg total) by mouth every 4 (four) hours as needed for mild pain, moderate pain, severe pain or fever      atorvastatin (LIPITOR) 40 mg tablet Take 1 tablet (40 mg total) by mouth daily at bedtime 90 tablet 1    betamethasone dipropionate (DIPROSONE) 0 05 % cream Apply topically 2 (two) times a day 30 g 0    Blood Glucose Monitoring Suppl (OneTouch Verio) w/Device KIT by Does not apply route 2 (two) times a day Dx E11 9 1 kit 1    clopidogrel (PLAVIX) 75 mg tablet Take 1 tablet (75 mg total) by mouth daily 90 tablet 0    gabapentin (NEURONTIN) 300 mg capsule Take 1 capsule (300 mg total) by mouth 3 (three) times a day 270 capsule 3    glucose blood (OneTouch Verio) test strip Use as instructed qid Dx E11 9 400 each 3    insulin NPH-insulin regular (NovoLIN 70/30) 100 units/mL subcutaneous injection Inject:  44 units with breakfast, 20 units with lunch, 30 units with dinner 15 mL 1    Lancets (OneTouch Delica Plus TEBXVK30G) MISC 1 Units by Does not apply route 2 (two) times a day Dx E11 9 100 each 2    lisinopril (ZESTRIL) 10 mg tablet Take 1 tablet (10 mg total) by mouth daily 90 tablet 1    metFORMIN (GLUCOPHAGE-XR) 500 mg 24 hr tablet Take 1 tablet (500 mg total) by mouth daily with dinner 30 tablet 4    methocarbamol (ROBAXIN) 500 mg tablet Take 1 tablet (500 mg total) by mouth every 6 (six) hours (Patient not taking: Reported on 11/2/2021) 120 tablet 0    oxyCODONE-acetaminophen (PERCOCET) 5-325 mg per tablet Take 1 tablet by mouth every 6 (six) hours as needed for moderate pain Max Daily Amount: 4 tablets 120 tablet 0  pantoprazole (PROTONIX) 40 mg tablet Take 1 tablet (40 mg total) by mouth daily 90 tablet 1    rivaroxaban (Xarelto) 2 5 mg tablet Take 1 tablet (2 5 mg total) by mouth daily with breakfast Last dose 6/21/20 30 tablet 2    UltiCare Insulin Syringe 31G X 5/16" 1 ML MISC Inject under the skin as needed (for injection) 100 each 1     No current facility-administered medications for this visit      _______________________________________________________________________  Review of Systems   Constitutional: Negative for activity change, appetite change, chills, fatigue, fever and unexpected weight change  HENT: Negative for congestion, ear pain, hearing loss, mouth sores, postnasal drip, sinus pressure, sinus pain, sneezing and sore throat  Respiratory: Negative for apnea, cough, shortness of breath and wheezing  Cardiovascular: Negative for chest pain, palpitations and leg swelling  Gastrointestinal: Negative for abdominal pain, constipation, diarrhea, nausea and vomiting  Endocrine: Negative for cold intolerance and heat intolerance  Genitourinary: Negative for dysuria, frequency and hematuria  Musculoskeletal: Negative for arthralgias, back pain, gait problem, joint swelling and neck pain  Skin: Negative for rash  Neurological: Negative for dizziness, weakness and numbness  Hematological: Does not bruise/bleed easily  Psychiatric/Behavioral: Negative for agitation, behavioral problems, confusion, hallucinations and sleep disturbance  The patient is not nervous/anxious  Objective: There were no vitals filed for this visit  There is no height or weight on file to calculate BMI  Physical Exam  Vitals and nursing note reviewed  Constitutional:       Appearance: He is well-developed  HENT:      Head: Normocephalic and atraumatic  Nose: Nose normal       Mouth/Throat:      Mouth: Mucous membranes are moist    Eyes:      General: No scleral icterus       Conjunctiva/sclera: Conjunctivae normal       Pupils: Pupils are equal, round, and reactive to light  Neck:      Thyroid: No thyromegaly  Cardiovascular:      Rate and Rhythm: Normal rate and regular rhythm  Heart sounds: Normal heart sounds  Pulmonary:      Effort: Pulmonary effort is normal  No respiratory distress  Breath sounds: Normal breath sounds  No wheezing  Abdominal:      General: Bowel sounds are normal       Palpations: Abdomen is soft  Tenderness: There is no abdominal tenderness  There is no guarding or rebound  Musculoskeletal:         General: Normal range of motion  Cervical back: Normal range of motion and neck supple  Skin:     General: Skin is warm and dry  Findings: No rash  Neurological:      Mental Status: He is alert and oriented to person, place, and time  Psychiatric:         Mood and Affect: Mood normal          Behavior: Behavior normal          Thought Content:  Thought content normal          Judgment: Judgment normal

## 2022-04-08 ENCOUNTER — OFFICE VISIT (OUTPATIENT)
Dept: FAMILY MEDICINE CLINIC | Facility: CLINIC | Age: 67
End: 2022-04-08
Payer: COMMERCIAL

## 2022-04-08 VITALS
HEIGHT: 64 IN | DIASTOLIC BLOOD PRESSURE: 82 MMHG | WEIGHT: 197 LBS | TEMPERATURE: 98.2 F | SYSTOLIC BLOOD PRESSURE: 138 MMHG | BODY MASS INDEX: 33.63 KG/M2 | HEART RATE: 71 BPM | OXYGEN SATURATION: 96 %

## 2022-04-08 DIAGNOSIS — Z12.11 SCREEN FOR COLON CANCER: ICD-10-CM

## 2022-04-08 DIAGNOSIS — I48.91 ATRIAL FIBRILLATION, UNSPECIFIED TYPE (HCC): ICD-10-CM

## 2022-04-08 DIAGNOSIS — I10 ESSENTIAL HYPERTENSION: ICD-10-CM

## 2022-04-08 DIAGNOSIS — Z12.5 SCREENING FOR MALIGNANT NEOPLASM OF PROSTATE: ICD-10-CM

## 2022-04-08 DIAGNOSIS — F11.20 CONTINUOUS OPIOID DEPENDENCE (HCC): ICD-10-CM

## 2022-04-08 DIAGNOSIS — Z79.4 TYPE 2 DIABETES MELLITUS WITH DIABETIC PERIPHERAL ANGIOPATHY AND GANGRENE, WITH LONG-TERM CURRENT USE OF INSULIN (HCC): ICD-10-CM

## 2022-04-08 DIAGNOSIS — M54.9 CHRONIC BACK PAIN, UNSPECIFIED BACK LOCATION, UNSPECIFIED BACK PAIN LATERALITY: ICD-10-CM

## 2022-04-08 DIAGNOSIS — G89.29 CHRONIC BACK PAIN, UNSPECIFIED BACK LOCATION, UNSPECIFIED BACK PAIN LATERALITY: ICD-10-CM

## 2022-04-08 DIAGNOSIS — Z79.4 TYPE 2 DIABETES MELLITUS WITH DIABETIC PERIPHERAL ANGIOPATHY WITHOUT GANGRENE, WITH LONG-TERM CURRENT USE OF INSULIN (HCC): ICD-10-CM

## 2022-04-08 DIAGNOSIS — E11.42 DIABETIC POLYNEUROPATHY ASSOCIATED WITH TYPE 2 DIABETES MELLITUS (HCC): ICD-10-CM

## 2022-04-08 DIAGNOSIS — Z89.512 S/P BKA (BELOW KNEE AMPUTATION), LEFT (HCC): ICD-10-CM

## 2022-04-08 DIAGNOSIS — K21.9 GASTROESOPHAGEAL REFLUX DISEASE WITHOUT ESOPHAGITIS: Primary | ICD-10-CM

## 2022-04-08 DIAGNOSIS — E11.52 TYPE 2 DIABETES MELLITUS WITH DIABETIC PERIPHERAL ANGIOPATHY AND GANGRENE, WITH LONG-TERM CURRENT USE OF INSULIN (HCC): ICD-10-CM

## 2022-04-08 DIAGNOSIS — E78.2 MIXED HYPERLIPIDEMIA: ICD-10-CM

## 2022-04-08 DIAGNOSIS — D69.6 PLATELETS DECREASED (HCC): ICD-10-CM

## 2022-04-08 DIAGNOSIS — I73.9 PVD (PERIPHERAL VASCULAR DISEASE) (HCC): ICD-10-CM

## 2022-04-08 DIAGNOSIS — E11.51 TYPE 2 DIABETES MELLITUS WITH DIABETIC PERIPHERAL ANGIOPATHY WITHOUT GANGRENE, WITH LONG-TERM CURRENT USE OF INSULIN (HCC): ICD-10-CM

## 2022-04-08 PROCEDURE — 99214 OFFICE O/P EST MOD 30 MIN: CPT | Performed by: FAMILY MEDICINE

## 2022-04-08 PROCEDURE — 3079F DIAST BP 80-89 MM HG: CPT | Performed by: FAMILY MEDICINE

## 2022-04-08 PROCEDURE — 3008F BODY MASS INDEX DOCD: CPT | Performed by: FAMILY MEDICINE

## 2022-04-08 PROCEDURE — 1036F TOBACCO NON-USER: CPT | Performed by: FAMILY MEDICINE

## 2022-04-08 PROCEDURE — 3075F SYST BP GE 130 - 139MM HG: CPT | Performed by: FAMILY MEDICINE

## 2022-04-08 PROCEDURE — 1160F RVW MEDS BY RX/DR IN RCRD: CPT | Performed by: FAMILY MEDICINE

## 2022-04-08 RX ORDER — SYRINGE AND NEEDLE,INSULIN,1ML 31 GX5/16"
SYRINGE, EMPTY DISPOSABLE MISCELLANEOUS AS NEEDED
Qty: 100 EACH | Refills: 1 | Status: SHIPPED | OUTPATIENT
Start: 2022-04-08

## 2022-04-08 RX ORDER — BLOOD SUGAR DIAGNOSTIC
STRIP MISCELLANEOUS
Qty: 400 EACH | Refills: 3 | Status: SHIPPED | OUTPATIENT
Start: 2022-04-08 | End: 2022-06-16

## 2022-04-08 RX ORDER — OXYCODONE HYDROCHLORIDE AND ACETAMINOPHEN 5; 325 MG/1; MG/1
1 TABLET ORAL EVERY 6 HOURS PRN
Qty: 120 TABLET | Refills: 0 | Status: SHIPPED | OUTPATIENT
Start: 2022-04-08 | End: 2022-05-12 | Stop reason: SDUPTHER

## 2022-04-21 ENCOUNTER — TELEPHONE (OUTPATIENT)
Dept: FAMILY MEDICINE CLINIC | Facility: CLINIC | Age: 67
End: 2022-04-21

## 2022-05-12 DIAGNOSIS — M54.9 CHRONIC BACK PAIN, UNSPECIFIED BACK LOCATION, UNSPECIFIED BACK PAIN LATERALITY: ICD-10-CM

## 2022-05-12 DIAGNOSIS — G89.29 CHRONIC BACK PAIN, UNSPECIFIED BACK LOCATION, UNSPECIFIED BACK PAIN LATERALITY: ICD-10-CM

## 2022-05-12 RX ORDER — OXYCODONE HYDROCHLORIDE AND ACETAMINOPHEN 5; 325 MG/1; MG/1
1 TABLET ORAL EVERY 6 HOURS PRN
Qty: 120 TABLET | Refills: 0 | Status: SHIPPED | OUTPATIENT
Start: 2022-05-12 | End: 2022-06-09 | Stop reason: SDUPTHER

## 2022-05-23 DIAGNOSIS — T81.89XA NON-HEALING SURGICAL WOUND, INITIAL ENCOUNTER: ICD-10-CM

## 2022-05-23 RX ORDER — BETAMETHASONE DIPROPIONATE 0.5 MG/G
CREAM TOPICAL 2 TIMES DAILY
Qty: 30 G | Refills: 0 | Status: SHIPPED | OUTPATIENT
Start: 2022-05-23

## 2022-05-28 ENCOUNTER — OFFICE VISIT (OUTPATIENT)
Dept: URGENT CARE | Facility: MEDICAL CENTER | Age: 67
End: 2022-05-28
Payer: COMMERCIAL

## 2022-05-28 VITALS
WEIGHT: 189 LBS | TEMPERATURE: 98.6 F | DIASTOLIC BLOOD PRESSURE: 74 MMHG | HEART RATE: 92 BPM | BODY MASS INDEX: 32.27 KG/M2 | OXYGEN SATURATION: 96 % | RESPIRATION RATE: 18 BRPM | HEIGHT: 64 IN | SYSTOLIC BLOOD PRESSURE: 170 MMHG

## 2022-05-28 DIAGNOSIS — R05.9 COUGH: Primary | ICD-10-CM

## 2022-05-28 PROCEDURE — 99213 OFFICE O/P EST LOW 20 MIN: CPT | Performed by: PHYSICIAN ASSISTANT

## 2022-05-28 RX ORDER — AZITHROMYCIN 250 MG/1
TABLET, FILM COATED ORAL
Qty: 6 TABLET | Refills: 0 | Status: SHIPPED | OUTPATIENT
Start: 2022-05-28 | End: 2022-06-01

## 2022-05-28 NOTE — PROGRESS NOTES
[facility]        NAME: Thomas Acevedo is a 78 y o  male  : 1955    MRN: [id number]  DATE: May 28, 2022  TIME: 7:08 PM    Assessment and Plan   Cough [R05 9]  1  Cough  azithromycin (ZITHROMAX) 250 mg tablet         Patient Instructions     Cough  Zithromax as directed  Follow up with PCP in 3-5 days  Proceed to  ER if symptoms worsen  Chief Complaint     Chief Complaint   Patient presents with    Cold Like Symptoms     Pt  C/O cough, congestion, and headache that began two days ago  History of Present Illness       76-year-old male who presents complaining of cough productive of yellow sputum  Patient states he has been taking over-the-counter medications with no relief  States he has taken a home test for COVID which was negative and declined COVID test  at office      Review of Systems   Review of Systems   Constitutional: Negative  HENT: Positive for congestion and sore throat  Negative for dental problem, drooling, ear pain, postnasal drip, rhinorrhea, sinus pain, trouble swallowing and voice change  Eyes: Negative  Respiratory: Positive for cough  Negative for apnea, choking, chest tightness, shortness of breath, wheezing and stridor  Cardiovascular: Negative  Negative for chest pain           Current Medications       Current Outpatient Medications:     atorvastatin (LIPITOR) 40 mg tablet, Take 1 tablet (40 mg total) by mouth daily at bedtime, Disp: 90 tablet, Rfl: 1    azithromycin (ZITHROMAX) 250 mg tablet, Take 2 tablets today then 1 tablet daily x 4 days, Disp: 6 tablet, Rfl: 0    betamethasone dipropionate (DIPROSONE) 0 05 % cream, Apply topically 2 (two) times a day, Disp: 30 g, Rfl: 0    Blood Glucose Monitoring Suppl (OneTouch Verio) w/Device KIT, by Does not apply route 2 (two) times a day Dx E11 9, Disp: 1 kit, Rfl: 1    clopidogrel (PLAVIX) 75 mg tablet, Take 1 tablet (75 mg total) by mouth daily, Disp: 90 tablet, Rfl: 0    glucose blood (OneTouch Verio) test strip, Use as instructed qid Dx E11 9, Disp: 400 each, Rfl: 3    insulin NPH-insulin regular (NovoLIN 70/30) 100 units/mL subcutaneous injection, Inject:  44 units with breakfast, 20 units with lunch, 30 units with dinner (Patient taking differently: Inject:  44 units with breakfast, 20 units with lunch, 30 units with dinner), Disp: 15 mL, Rfl: 1    Lancets (OneTouch Delica Plus [id number]) MISC, 1 Units by Does not apply route 2 (two) times a day Dx E11 9, Disp: 100 each, Rfl: 2    lisinopril (ZESTRIL) 10 mg tablet, Take 1 tablet (10 mg total) by mouth daily, Disp: 90 tablet, Rfl: 1    metFORMIN (GLUCOPHAGE-XR) 500 mg 24 hr tablet, Take 1 tablet (500 mg total) by mouth daily with dinner, Disp: 30 tablet, Rfl: 4    oxyCODONE-acetaminophen (PERCOCET) 5-325 mg per tablet, Take 1 tablet by mouth every 6 (six) hours as needed for moderate pain Max Daily Amount: 4 tablets, Disp: 120 tablet, Rfl: 0    pantoprazole (PROTONIX) 40 mg tablet, Take 1 tablet (40 mg total) by mouth daily, Disp: 90 tablet, Rfl: 1    rivaroxaban (Xarelto) 2 5 mg tablet, Take 1 tablet (2 5 mg total) by mouth daily with breakfast Last dose 6/21/20, Disp: 30 tablet, Rfl: 2    UltiCare Insulin Syringe 31G X 5/16" 1 ML MISC, Inject under the skin as needed (for injection), Disp: 100 each, Rfl: 1    Current Allergies     Allergies as of 05/28/2022 - Reviewed 05/28/2022   Allergen Reaction Noted    Shellfish-derived products - food allergy Anaphylaxis     Sulfamethoxazole-trimethoprim Rash             The following portions of the patient's history were reviewed and updated as appropriate: allergies, current medications, past family history, past medical history, past social history, past surgical history and problem list      Past Medical History:   Diagnosis Date    Arthritis     fingers    First degree AV block     Full dentures     Hypertension     PAD (peripheral artery disease) ([address] )     [facility]VD [facility]peripheral vascular disease) ([facility] )     Type 2 diabetes mellitus with diabetic peripheral angiopathy without gang[facility] 5/18/2021    Per CMS ICD 10 guidelines--Per Physician    Wound, open     right foot- by the 5th toe       Past Surgical History:   Procedure Laterality Date    CHOLECYSTECTOMY      COLONOSCOPY      IR AORTAGRAM WITH RUN-OFF  1/28/2021    IR AORTAGRAM WITH RUN-OFF  4/13/2021    LEG AMPUTATION THROUGH LOWER TIBIA AND FIBULA Left 7/17/2021    Procedure: AMPUTATION BELOW KNEE (BKA); Surgeon: Alejandro Martinez MD;  Location:  MAIN OR;  Service: Vascular    AL DEBRIDEMENT, SKIN, SUB-Q TISSUE,MUSCLE,BONE,=<20 SQ CM Right 12/18/2020    Procedure: DEBRIDMENT OF ULCER , REMOVAL OF DEVITALIZED SKIN, SOFT TISSUE, BONE AND APPLICATION OF INTEGRA GRAFT RIGHT FOOT ;  Surgeon: Lesa Andre DPM;  Location: [address] [place] [address];  Service: Podiatry    AL VEIN BYPASS GRAFT,FEM-TIBIAL Left 7/14/2021    Procedure: BYPASS femoral-peroneal artery bypass with  reverse GSV and balloon angioplasty peroneal artery;  Surgeon: Alejandro Martinez MD;  Location:  MAIN OR;  Service: Vascular    REPLANTATION THUMB Right     right tip reconnected    SKIN GRAFT      right thumb    TOE AMPUTATION      left foot all 5 toes removed    TOE AMPUTATION Right 2/2/2021    Procedure: Right partial 5th ray amputation;  Surgeon: aCde Franks DPM;  Location:  MAIN OR;  Service: Podiatry    TOE OSTEOTOMY Right 6/26/2020    Procedure: exostosis of right big toe;  Surgeon: Troy Johnson DPM;  Location: [facility];  Service: Podiatry    TRIGGER FINGER RELEASE      bilateral hands    VEIN LIGATION      right       Family History   Problem Relation Age of Onset    Arthritis Mother     Cancer Father         colon    Alzheimer's disease Father          Medications have been verified          Objective   /74   Pulse 92   Temp 98 6 °F (37 °C)   Resp 18   Ht 5' 4" (1 626 m)   Wt 85 7 kg (189 lb)   SpO2 96%   BMI 32 44 kg/m²        Physical Exam     Physical Exam  Constitutional:       General: He is not in acute distress  Appearance: Normal appearance  He is well-developed  He is not diaphoretic  HENT:      Head: Normocephalic and atraumatic  Right Ear: Hearing, tympanic membrane, ear canal and external ear normal       Left Ear: Hearing, tympanic membrane, ear canal and external ear normal       Nose: Rhinorrhea present  Right Sinus: No maxillary sinus tenderness or frontal sinus tenderness  Left Sinus: No maxillary sinus tenderness or frontal sinus tenderness  Mouth/Throat:      Pharynx: Uvula midline  Cardiovascular:      Rate and Rhythm: Normal rate and regular rhythm  Heart sounds: Normal heart sounds  Pulmonary:      Effort: Pulmonary effort is normal  No respiratory distress  Breath sounds: Normal breath sounds  No stridor  No wheezing, rhonchi or rales  Chest:      Chest wall: No tenderness  Musculoskeletal:      Cervical back: Normal range of motion and neck supple  Lymphadenopathy:      Cervical: No cervical adenopathy  Neurological:      Mental Status: He is alert

## 2022-06-09 DIAGNOSIS — G89.29 CHRONIC BACK PAIN, UNSPECIFIED BACK LOCATION, UNSPECIFIED BACK PAIN LATERALITY: ICD-10-CM

## 2022-06-09 DIAGNOSIS — M54.9 CHRONIC BACK PAIN, UNSPECIFIED BACK LOCATION, UNSPECIFIED BACK PAIN LATERALITY: ICD-10-CM

## 2022-06-09 RX ORDER — OXYCODONE HYDROCHLORIDE AND ACETAMINOPHEN 5; 325 MG/1; MG/1
1 TABLET ORAL EVERY 6 HOURS PRN
Qty: 120 TABLET | Refills: 0 | Status: SHIPPED | OUTPATIENT
Start: 2022-06-09 | End: 2022-07-08 | Stop reason: SDUPTHER

## 2022-06-16 DIAGNOSIS — E11.52 TYPE 2 DIABETES MELLITUS WITH DIABETIC PERIPHERAL ANGIOPATHY AND GANGRENE, WITH LONG-TERM CURRENT USE OF INSULIN (HCC): ICD-10-CM

## 2022-06-16 DIAGNOSIS — Z79.4 TYPE 2 DIABETES MELLITUS WITH DIABETIC PERIPHERAL ANGIOPATHY AND GANGRENE, WITH LONG-TERM CURRENT USE OF INSULIN (HCC): ICD-10-CM

## 2022-06-16 RX ORDER — BLOOD SUGAR DIAGNOSTIC
STRIP MISCELLANEOUS
Qty: 400 EACH | Refills: 0 | Status: SHIPPED | OUTPATIENT
Start: 2022-06-16

## 2022-07-08 ENCOUNTER — OFFICE VISIT (OUTPATIENT)
Dept: FAMILY MEDICINE CLINIC | Facility: CLINIC | Age: 67
End: 2022-07-08
Payer: COMMERCIAL

## 2022-07-08 VITALS
BODY MASS INDEX: 32.44 KG/M2 | TEMPERATURE: 98.1 F | WEIGHT: 190 LBS | SYSTOLIC BLOOD PRESSURE: 116 MMHG | OXYGEN SATURATION: 97 % | HEIGHT: 64 IN | DIASTOLIC BLOOD PRESSURE: 64 MMHG | HEART RATE: 74 BPM

## 2022-07-08 DIAGNOSIS — Z00.00 MEDICARE ANNUAL WELLNESS VISIT, SUBSEQUENT: ICD-10-CM

## 2022-07-08 DIAGNOSIS — I73.9 PVD (PERIPHERAL VASCULAR DISEASE) (HCC): ICD-10-CM

## 2022-07-08 DIAGNOSIS — E11.65 POORLY CONTROLLED TYPE 2 DIABETES MELLITUS (HCC): ICD-10-CM

## 2022-07-08 DIAGNOSIS — D69.6 PLATELETS DECREASED (HCC): ICD-10-CM

## 2022-07-08 DIAGNOSIS — M86.9 OSTEOMYELITIS, UNSPECIFIED SITE, UNSPECIFIED TYPE (HCC): ICD-10-CM

## 2022-07-08 DIAGNOSIS — K21.9 GASTROESOPHAGEAL REFLUX DISEASE WITHOUT ESOPHAGITIS: ICD-10-CM

## 2022-07-08 DIAGNOSIS — G89.29 CHRONIC BACK PAIN, UNSPECIFIED BACK LOCATION, UNSPECIFIED BACK PAIN LATERALITY: ICD-10-CM

## 2022-07-08 DIAGNOSIS — Z00.00 WELL ADULT EXAM: Primary | ICD-10-CM

## 2022-07-08 DIAGNOSIS — M54.9 CHRONIC BACK PAIN, UNSPECIFIED BACK LOCATION, UNSPECIFIED BACK PAIN LATERALITY: ICD-10-CM

## 2022-07-08 DIAGNOSIS — I10 ESSENTIAL HYPERTENSION: ICD-10-CM

## 2022-07-08 DIAGNOSIS — E78.2 MIXED HYPERLIPIDEMIA: ICD-10-CM

## 2022-07-08 DIAGNOSIS — Z89.512 S/P BKA (BELOW KNEE AMPUTATION), LEFT (HCC): ICD-10-CM

## 2022-07-08 DIAGNOSIS — F11.20 CONTINUOUS OPIOID DEPENDENCE (HCC): ICD-10-CM

## 2022-07-08 PROCEDURE — 1125F AMNT PAIN NOTED PAIN PRSNT: CPT | Performed by: FAMILY MEDICINE

## 2022-07-08 PROCEDURE — 1170F FXNL STATUS ASSESSED: CPT | Performed by: FAMILY MEDICINE

## 2022-07-08 PROCEDURE — 3288F FALL RISK ASSESSMENT DOCD: CPT | Performed by: FAMILY MEDICINE

## 2022-07-08 PROCEDURE — 3725F SCREEN DEPRESSION PERFORMED: CPT | Performed by: FAMILY MEDICINE

## 2022-07-08 PROCEDURE — G0439 PPPS, SUBSEQ VISIT: HCPCS | Performed by: FAMILY MEDICINE

## 2022-07-08 PROCEDURE — 1160F RVW MEDS BY RX/DR IN RCRD: CPT | Performed by: FAMILY MEDICINE

## 2022-07-08 PROCEDURE — 3078F DIAST BP <80 MM HG: CPT | Performed by: FAMILY MEDICINE

## 2022-07-08 PROCEDURE — 3074F SYST BP LT 130 MM HG: CPT | Performed by: FAMILY MEDICINE

## 2022-07-08 PROCEDURE — 99214 OFFICE O/P EST MOD 30 MIN: CPT | Performed by: FAMILY MEDICINE

## 2022-07-08 RX ORDER — OXYCODONE HYDROCHLORIDE AND ACETAMINOPHEN 5; 325 MG/1; MG/1
1 TABLET ORAL EVERY 6 HOURS PRN
Qty: 120 TABLET | Refills: 0 | Status: SHIPPED | OUTPATIENT
Start: 2022-07-08 | End: 2022-08-12 | Stop reason: SDUPTHER

## 2022-07-08 RX ORDER — PANTOPRAZOLE SODIUM 40 MG/1
40 TABLET, DELAYED RELEASE ORAL DAILY
Qty: 90 TABLET | Refills: 1 | Status: SHIPPED | OUTPATIENT
Start: 2022-07-08

## 2022-07-08 NOTE — PROGRESS NOTES
Assessment and Plan:     Problem List Items Addressed This Visit        Digestive    Gastroesophageal reflux disease without esophagitis     Patient to continue with present therapy and decrease caffeine, avoid ETOH and smoking to decrease acid production  Pt should also cease eating prior to bedtime and avoid excessive fluid intake prior to sleep  May use antacids as needed for breakthrough GERD  All pateint questions answered today about this condition  Relevant Medications    pantoprazole (PROTONIX) 40 mg tablet       Endocrine    Poorly controlled type 2 diabetes mellitus ([place] [address] )     Needs better control  Lab Results   Component Value Date    HGBA1C 8 9 (H) 07/07/2021              Relevant Orders    Hemoglobin A1C       Cardiovascular and Mediastinum    PVD (peripheral vascular disease) ([place] [address] )     Patient is stable  and will continue present plan of care and reassess at next routine visit  All questions about this problem from patient were answered today  Essential hypertension     Patient is stable with current anti-hypertensive medicine and continue to follow a low sodium diet and take current medication  All questions about this condition were answered today  Relevant Orders    IRIS Diabetic eye exam    TSH + Free T4    Comprehensive metabolic panel    CBC and differential    Magnesium    Uric acid    Urinalysis with microscopic       Musculoskeletal and Integument    Osteomyelitis, unspecified site, unspecified type ([place] [address] )       Other    Mixed hyperlipidemia     Patient  is stable with current medication and we discussed a low fat low cholesterol diet  Weight loss also discussed for this will help lower cholesterol also  Recheck lipids in 6 months  Relevant Orders    Lipid Panel with Direct LDL reflex    Platelets decreased (HCC)    S/P BKA (below knee amputation), left [facility])     Patient is stable  and will continue present plan of care and reassess at next routine visit  All questions about this problem from patient were answered today  Continuous opioid dependence ([facility] )     Patient is stable  and will continue present plan of care and reassess at next routine visit  All questions about this problem from patient were answered today  Other Visit Diagnoses     Well adult exam    -  Primary    Medicare annual wellness visit, subsequent        Chronic back pain, unspecified back location, unspecified back pain laterality        Relevant Medications    oxyCODONE-acetaminophen (PERCOCET) 5-325 mg per tablet           Preventive health issues were discussed with patient, and age appropriate screening tests were ordered as noted in patient's After Visit Summary  Personalized health advice and appropriate referrals for health education or preventive services given if needed, as noted in patient's After Visit Summary       History of Present Illness:     Patient presents for a Medicare Wellness Visit    HPI   Patient Care Team:  Raegan Atkinson MD as PCP - General (Family Medicine)  Harika Mejia as PCP - Wound (Wound Care)  Alysia Yarbrough MD (Vascular Surgery)     Review of Systems:     Review of Systems     Problem List:     Patient Active Problem List   Diagnosis    Acquired deformity of foot    Diabetic polyneuropathy associated with type 2 diabetes mellitus ([address] )    Pain in both feet    Peripheral arteriosclerosis ([address] )    Onychomycosis    Tinea pedis of both feet    Dermatophytosis    Ingrown toenail    Paronychia of toenail of right foot    Diabetic ulcer of toe of right foot associated with type 2 diabetes mellitus, limited to breakdown of skin ([address] )    Bony exostosis    Right foot ulcer, with fat layer exposed ([address] )    PVD (peripheral vascular disease) ([address] )    Poorly controlled type 2 diabetes mellitus ([address] )    Essential hypertension    Mixed hyperlipidemia    Type 2 diabetes mellitus with diabetic peripheral angiopathy without gangrene (HCC)    Platelets decreased ([facility] )    Gastroesophageal reflux disease without esophagitis    S/P BKA (below knee amputation), left (HCC)    Insomnia    Continuous opioid dependence (HCC)    Osteomyelitis, unspecified site, unspecified type [facility])      Past Medical and Surgical History:     Past Medical History:   Diagnosis Date    Arthritis     fingers    First degree AV block     Full dentures     Hypertension     PAD (peripheral artery disease) ([place] [address] )     PVD (peripheral vascular disease) ([place] [address] )     Type 2 diabetes mellitus with diabetic peripheral angiopathy without gangrene ([place] [address] ) 5/18/2021    Per CMS ICD 10 guidelines--Per Physician    Wound, open     right foot- by the 5th toe     Past Surgical History:   Procedure Laterality Date    CHOLECYSTECTOMY      COLONOSCOPY      IR AORTAGRAM WITH RUN-OFF  1/28/2021    IR AORTAGRAM WITH RUN-OFF  4/13/2021    LEG AMPUTATION THROUGH LOWER TIBIA AND FIBULA Left 7/17/2021    Procedure: AMPUTATION BELOW KNEE (BKA);   Surgeon: An Parson MD;  [facility];  Service: Vascular    WV DEBRIDEMENT, SKIN, SUB-Q TISSUE,MUSCLE,BONE,=<20 SQ CM Right 12/18/2020    Procedure: DEBRIDMENT OF ULCER , REMOVAL OF DEVITALIZED SKIN, SOFT TISSUE, BONE AND APPLICATION OF INTEGRA GRAFT RIGHT FOOT ;  Surgeon: Slava Lr DPM;  Location: [address] [place] [address];  Service: Podiatry    WV VEIN BYPASS GRAFT,FEM-TIBIAL Left 7/14/2021    Procedure: BYPASS femoral-peroneal artery bypass with  reverse GSV and balloon angioplasty peroneal artery;  Surgeon: nA Parson MD;  [facility];  Service: Vascular    REPLANTATION THUMB Right     right tip reconnected    SKIN GRAFT      right thumb    TOE AMPUTATION      left foot all 5 toes removed    TOE AMPUTATION Right 2/2/2021    Procedure: Right partial 5th ray amputation;  Surgeon: Paz Spear DPM;  [facility];  Service: Podiatry    TOE OSTEOTOMY Right 6/26/2020    Procedure: exostosis of right big toe; Surgeon: Priti Haas DPM;  Location: [address] [place] [address];  Service: Podiatry    TRIGGER FINGER RELEASE      bilateral hands    VEIN LIGATION      right      Family History:     Family History   Problem Relation Age of Onset    Arthritis Mother     Cancer Father         colon    Alzheimer's disease Father       Social History:     Social History     Socioeconomic History    Marital status: /Civil Union     Spouse name: Farzana Lugo Number of children: None    Years of education: 15    Highest education level: High school graduate   Occupational History    Occupation: [employer]   Tobacco Use    Smoking status: Never Smoker    Smokeless tobacco: Never Used   Vaping Use    Vaping Use: Never used   Substance and Sexual Activity    Alcohol use: Yes     Comment: occasional    Drug use: Never    Sexual activity: None   Other Topics Concern    None   Social History Narrative         Social Determinants of Health     Financial Resource Strain: Not on file   Food Insecurity: Not on file   Transportation Needs: Not on file   Physical Activity: Not on file   Stress: Not on file   Social Connections: Not on file   Intimate Partner Violence: Not on file   Housing Stability: Not on file      Medications and Allergies:     Current Outpatient Medications   Medication Sig Dispense Refill    atorvastatin (LIPITOR) 40 mg tablet Take 1 tablet (40 mg total) by mouth daily at bedtime 90 tablet 1    betamethasone dipropionate (DIPROSONE) 0 05 % cream Apply topically 2 (two) times a day 30 g 0    Blood Glucose Monitoring Suppl (OneTouch Verio) w/Device KIT by Does not apply route 2 (two) times a day Dx E11 9 1 kit 1    clopidogrel (PLAVIX) 75 mg tablet Take 1 tablet (75 mg total) by mouth daily 90 tablet 0    insulin NPH-insulin regular (NovoLIN 70/30) 100 units/mL subcutaneous injection Inject:  44 units with breakfast, 20 units with lunch, 30 units with dinner (Patient taking differently: Inject:  44 units with breakfast, 20 units with lunch, 30 units with dinner) 15 mL 1    Lancets (OneTouch Delica Plus NAMIWG02L) MISC 1 Units by Does not apply route 2 (two) times a day Dx E11 9 100 each 2    lisinopril (ZESTRIL) 10 mg tablet Take 1 tablet (10 mg total) by mouth daily 90 tablet 1    metFORMIN (GLUCOPHAGE-XR) 500 mg 24 hr tablet Take 1 tablet (500 mg total) by mouth daily with dinner 30 tablet 4    OneTouch Ultra test strip use to test blood sugars four times a day 400 each 0    oxyCODONE-acetaminophen (PERCOCET) 5-325 mg per tablet Take 1 tablet by mouth every 6 (six) hours as needed for moderate pain Max Daily Amount: 4 tablets 120 tablet 0    pantoprazole (PROTONIX) 40 mg tablet Take 1 tablet (40 mg total) by mouth daily 90 tablet 1    rivaroxaban (Xarelto) 2 5 mg tablet Take 1 tablet (2 5 mg total) by mouth daily with breakfast Last dose 6/21/20 30 tablet 2    UltiCare Insulin Syringe 31G X 5/16" 1 ML MISC Inject under the skin as needed (for injection) 100 each 1     No current facility-administered medications for this visit  Allergies   Allergen Reactions    Shellfish-Derived Products - Food Allergy Anaphylaxis     Throat closes    Sulfamethoxazole-Trimethoprim Rash      Immunizations:     Immunization History   Administered Date(s) Administered    INFLUENZA 11/02/2018    Influenza, high dose seasonal 0 7 mL 11/05/2020, 10/14/2021      Health Maintenance:         Topic Date Due    Colorectal Cancer Screening  Never done    Hepatitis C Screening  Completed         Topic Date Due    COVID-19 Vaccine (1) Never done    Pneumococcal Vaccine: 65+ Years (1 - PCV) Never done    DTaP,Tdap,and Td Vaccines (1 - Tdap) Never done    Influenza Vaccine (1) 09/01/2022      Medicare Screening Tests and Risk Assessments:     Genna Mclaughlin is here for his Subsequent Wellness visit  Last Medicare Wellness visit information reviewed, patient interviewed and updates made to the record today        Health Risk Assessment:   Patient rates overall health as good  Patient feels that their physical health rating is slightly better  Patient is very satisfied with their life  Eyesight was rated as same  Hearing was rated as same  Patient feels that their emotional and mental health rating is much better  Patients states they are never, rarely angry  Patient states they are sometimes unusually tired/fatigued  Pain experienced in the last 7 days has been a lot  Patient's pain rating has been 8/10  Patient states that he has experienced no weight loss or gain in last 6 months  Depression Screening:   PHQ-2 Score: 0      Fall Risk Screening: In the past year, patient has experienced: history of falling in past year    Number of falls: 1  Injured during fall?: No    Feels unsteady when standing or walking?: No    Worried about falling?: No      Home Safety:  Patient has trouble with stairs inside or outside of their home  Patient has working smoke alarms and has working carbon monoxide detector  Home safety hazards include: none  Nutrition:   Current diet is Regular  Medications:   Patient is currently taking over-the-counter supplements  OTC medications include: see medication list  Patient is able to manage medications  Activities of Daily Living (ADLs)/Instrumental Activities of Daily Living (IADLs):   Walk and transfer into and out of bed and chair?: Yes  Dress and groom yourself?: Yes    Bathe or shower yourself?: Yes    Feed yourself?  Yes  Do your laundry/housekeeping?: Yes  Manage your money, pay your bills and track your expenses?: Yes  Make your own meals?: Yes    Do your own shopping?: Yes    Previous Hospitalizations:   Any hospitalizations or ED visits within the last 12 months?: No      Advance Care Planning:   Living will: Yes    Durable POA for healthcare: No    Advanced directive: Yes      PREVENTIVE SCREENINGS      Cardiovascular Screening:    General: Screening Not Indicated and History Lipid Disorder      Diabetes Screening:     General: Screening Not Indicated and History Diabetes      Abdominal Aortic Aneurysm (AAA) Screening:    Risk factors include: age between 73-67 yo        Lung Cancer Screening:     General: Screening Not Indicated      Hepatitis C Screening:    General: Screening Current    Screening, Brief Intervention, and Referral to Treatment (SBIRT)    Screening      AUDIT-C Screenin) How often did you have a drink containing alcohol in the past year? monthly or less  2) How many drinks did you have on a typical day when you were drinking in the past year?  1 to 2  3) How often did you have 6 or more drinks on one occasion in the past year? never    AUDIT-C Score: 1  Interpretation: Score 0-3 (male): Negative screen for alcohol misuse    Single Item Drug Screening:  How often have you used an illegal drug (including marijuana) or a prescription medication for non-medical reasons in the past year? never    Single Item Drug Screen Score: 0  Interpretation: Negative screen for possible drug use disorder    Review of Current Opioid Use    Opioid Risk Tool (ORT) Interpretation: Complete Opioid Risk Tool (ORT)    No exam data present     Physical Exam:     /64 (BP Location: Left arm, Patient Position: Sitting, Cuff Size: Large)   Pulse 74   Temp 98 1 °F (36 7 °C)   Ht 5' 4" (1 626 m)   Wt 86 2 kg (190 lb)   SpO2 97%   BMI 32 61 kg/m²     Physical Exam     Maykel Gutierrez MD

## 2022-07-08 NOTE — PATIENT INSTRUCTIONS
Medicare Preventive Visit Patient Instructions  Thank you for completing your Welcome to Medicare Visit or Medicare Annual Wellness Visit today  Your next wellness visit will be due in one year (7/9/2023)  The screening/preventive services that you may require over the next 5-10 years are detailed below  Some tests may not apply to you based off risk factors and/or age  Screening tests ordered at today's visit but not completed yet may show as past due  Also, please note that scanned in results may not display below  Preventive Screenings:  Service Recommendations Previous Testing/Comments   Colorectal Cancer Screening  · Colonoscopy    · Fecal Occult Blood Test (FOBT)/Fecal Immunochemical Test (FIT)  · Fecal DNA/Cologuard Test  · Flexible Sigmoidoscopy Age: 54-65 years old   Colonoscopy: every 10 years (May be performed more frequently if at higher risk)  OR  FOBT/FIT: every 1 year  OR  Cologuard: every 3 years  OR  Sigmoidoscopy: every 5 years  Screening may be recommended earlier than age 48 if at higher risk for colorectal cancer  Also, an individualized decision between you and your healthcare provider will decide whether screening between the ages of 74-80 would be appropriate   Colonoscopy: Not on file  FOBT/FIT: Not on file  Cologuard: Not on file  Sigmoidoscopy: Not on file          Prostate Cancer Screening Individualized decision between patient and health care provider in men between ages of 53-78   Medicare will cover every 12 months beginning on the day after your 50th birthday PSA: No results in last 5 years           Hepatitis C Screening Once for adults born between 1945 and 1965  More frequently in patients at high risk for Hepatitis C Hep C Antibody: 06/30/2021    Screening Current   Diabetes Screening 1-2 times per year if you're at risk for diabetes or have pre-diabetes Fasting glucose: 254 mg/dL   A1C: 8 9 %    Screening Not Indicated  History Diabetes   Cholesterol Screening Once every 5 years if you don't have a lipid disorder  May order more often based on risk factors  Lipid panel: 06/30/2021    Screening Not Indicated  History Lipid Disorder      Other Preventive Screenings Covered by Medicare:  1  Abdominal Aortic Aneurysm (AAA) Screening: covered once if your at risk  You're considered to be at risk if you have a family history of AAA or a male between the age of 73-68 who smoking at least 100 cigarettes in your lifetime  2  Lung Cancer Screening: covers low dose CT scan once per year if you meet all of the following conditions: (1) Age 50-69; (2) No signs or symptoms of lung cancer; (3) Current smoker or have quit smoking within the last 15 years; (4) You have a tobacco smoking history of at least 30 pack years (packs per day x number of years you smoked); (5) You get a written order from a healthcare provider  3  Glaucoma Screening: covered annually if you're considered high risk: (1) You have diabetes OR (2) Family history of glaucoma OR (3)  aged 48 and older OR (3)  American aged 72 and older  3  Osteoporosis Screening: covered every 2 years if you meet one of the following conditions: (1) Have a vertebral abnormality; (2) On glucocorticoid therapy for more than 3 months; (3) Have primary hyperparathyroidism; (4) On osteoporosis medications and need to assess response to drug therapy  5  HIV Screening: covered annually if you're between the age of 12-76  Also covered annually if you are younger than 13 and older than 72 with risk factors for HIV infection  For pregnant patients, it is covered up to 3 times per pregnancy      Immunizations:  Immunization Recommendations   Influenza Vaccine Annual influenza vaccination during flu season is recommended for all persons aged >= 6 months who do not have contraindications   Pneumococcal Vaccine (Prevnar and Pneumovax)  * Prevnar = PCV13  * Pneumovax = PPSV23 Adults 25-60 years old: 1-3 doses may be recommended based on certain risk factors  Adults 72 years old: Prevnar (PCV13) vaccine recommended followed by Pneumovax (PPSV23) vaccine  If already received PPSV23 since turning 65, then PCV13 recommended at least one year after PPSV23 dose  Hepatitis B Vaccine 3 dose series if at intermediate or high risk (ex: diabetes, end stage renal disease, liver disease)   Tetanus (Td) Vaccine - COST NOT COVERED BY MEDICARE PART B Following completion of primary series, a booster dose should be given every 10 years to maintain immunity against tetanus  Td may also be given as tetanus wound prophylaxis  Tdap Vaccine - COST NOT COVERED BY MEDICARE PART B Recommended at least once for all adults  For pregnant patients, recommended with each pregnancy  Shingles Vaccine (Shingrix) - COST NOT COVERED BY MEDICARE PART B  2 shot series recommended in those aged 48 and above     Health Maintenance Due:      Topic Date Due    Colorectal Cancer Screening  Never done    Hepatitis C Screening  Completed     Immunizations Due:      Topic Date Due    COVID-19 Vaccine (1) Never done    Pneumococcal Vaccine: 65+ Years (1 - PCV) Never done    DTaP,Tdap,and Td Vaccines (1 - Tdap) Never done    Influenza Vaccine (1) 09/01/2022     Advance Directives   What are advance directives? Advance directives are legal documents that state your wishes and plans for medical care  These plans are made ahead of time in case you lose your ability to make decisions for yourself  Advance directives can apply to any medical decision, such as the treatments you want, and if you want to donate organs  What are the types of advance directives? There are many types of advance directives, and each state has rules about how to use them  You may choose a combination of any of the following:  · Living will: This is a written record of the treatment you want  You can also choose which treatments you do not want, which to limit, and which to stop at a certain time   This includes surgery, medicine, IV fluid, and tube feedings  · Durable power of  for healthcare [facility]): This is a written record that states who you want to make healthcare choices for you when you are unable to make them for yourself  This person, called a proxy, is usually a family member or a friend  You may choose more than 1 proxy  · Do not resuscitate (DNR) order:  A DNR order is used in case your heart stops beating or you stop breathing  It is a request not to have certain forms of treatment, such as CPR  A DNR order may be included in other types of advance directives  · Medical directive: This covers the care that you want if you are in a coma, near death, or unable to make decisions for yourself  You can list the treatments you want for each condition  Treatment may include pain medicine, surgery, blood transfusions, dialysis, IV or tube feedings, and a ventilator (breathing machine)  · Values history: This document has questions about your views, beliefs, and how you feel and think about life  This information can help others choose the care that you would choose  Why are advance directives important? An advance directive helps you control your care  Although spoken wishes may be used, it is better to have your wishes written down  Spoken wishes can be misunderstood, or not followed  Treatments may be given even if you do not want them  An advance directive may make it easier for your family to make difficult choices about your care  Fall Prevention    Fall prevention  includes ways to make your home and other areas safer  It also includes ways you can move more carefully to prevent a fall  Health conditions that cause changes in your blood pressure, vision, or muscle strength and coordination may increase your risk for falls  Medicines may also increase your risk for falls if they make you dizzy, weak, or sleepy  Fall prevention tips:   · Stand or sit up slowly      · Use assistive devices as directed  · Wear shoes that fit well and have soles that   · Wear a personal alarm  · Stay active  · Manage your medical conditions  Home Safety Tips:  · Add items to prevent falls in the bathroom  · Keep paths clear  · Install bright lights in your home  · Keep items you use often on shelves within reach  · Paint or place reflective tape on the edges of your stairs  Weight Management   Why it is important to manage your weight:  Being overweight increases your risk of health conditions such as heart disease, high blood pressure, type 2 diabetes, and certain types of cancer  It can also increase your risk for osteoarthritis, sleep apnea, and other respiratory problems  Aim for a slow, steady weight loss  Even a small amount of weight loss can lower your risk of health problems  How to lose weight safely:  A safe and healthy way to lose weight is to eat fewer calories and get regular exercise  You can lose up about 1 pound a week by decreasing the number of calories you eat by 500 calories each day  Healthy meal plan for weight management:  A healthy meal plan includes a variety of foods, contains fewer calories, and helps you stay healthy  A healthy meal plan includes the following:  · Eat whole-grain foods more often  A healthy meal plan should contain fiber  Fiber is the part of grains, fruits, and vegetables that is not broken down by your body  Whole-grain foods are healthy and provide extra fiber in your diet  Some examples of whole-grain foods are whole-wheat breads and pastas, oatmeal, brown rice, and bulgur  · Eat a variety of vegetables every day  Include dark, leafy greens such as spinach, kale, mak greens, and mustard greens  Eat yellow and orange vegetables such as carrots, sweet potatoes, and winter squash  · Eat a variety of fruits every day  Choose fresh or canned fruit (canned in its own juice or light syrup) instead of juice   Fruit juice has very little or no fiber  · Eat low-fat dairy foods  Drink fat-free (skim) milk or 1% milk  Eat fat-free yogurt and low-fat cottage cheese  Try low-fat cheeses such as mozzarella and other reduced-fat cheeses  · Choose meat and other protein foods that are low in fat  Choose beans or other legumes such as split peas or lentils  Choose fish, skinless poultry (chicken or turkey), or lean cuts of red meat (beef or pork)  Before you cook meat or poultry, cut off any visible fat  · Use less fat and oil  Try baking foods instead of frying them  Add less fat, such as margarine, sour cream, regular salad dressing and mayonnaise to foods  Eat fewer high-fat foods  Some examples of high-fat foods include french fries, doughnuts, ice cream, and cakes  · Eat fewer sweets  Limit foods and drinks that are high in sugar  This includes candy, cookies, regular soda, and sweetened drinks  Exercise:  Exercise at least 30 minutes per day on most days of the week  Some examples of exercise include walking, biking, dancing, and swimming  You can also fit in more physical activity by taking the stairs instead of the elevator or parking farther away from stores  Ask your healthcare provider about the best exercise plan for you  Narcotic (Opioid) Safety    Use narcotics safely:  · Take prescribed narcotics exactly as directed  · Do not give narcotics to others or take narcotics that belong to someone else  · Do not mix narcotics without medicines or alcohol  · Do not drive or operate heavy machinery after you take the narcotic  · Monitor for side effects and notify your healthcare provider if you experienced side effects such as nausea, sleepiness, itching, or trouble thinking clearly  Manage constipation:    Constipation is the most common side effect of narcotic medicine  Constipation is when you have hard, dry bowel movements, or you go longer than usual between bowel movements   Tell your healthcare provider about all changes in your bowel movements while you are taking narcotics  He or she may recommend laxative medicine to help you have a bowel movement  He or she may also change the kind of narcotic you are taking, or change when you take it  The following are more ways you can prevent or relieve constipation:    · Drink liquids as directed  You may need to drink extra liquids to help soften and move your bowels  Ask how much liquid to drink each day and which liquids are best for you  · Eat high-fiber foods  This may help decrease constipation by adding bulk to your bowel movements  High-fiber foods include fruits, vegetables, whole-grain breads and cereals, and beans  Your healthcare provider or dietitian can help you create a high-fiber meal plan  Your provider may also recommend a fiber supplement if you cannot get enough fiber from food  · Exercise regularly  Regular physical activity can help stimulate your intestines  Walking is a good exercise to prevent or relieve constipation  Ask which exercises are best for you  · Schedule a time each day to have a bowel movement  This may help train your body to have regular bowel movements  Bend forward while you are on the toilet to help move the bowel movement out  Sit on the toilet for at least 10 minutes, even if you do not have a bowel movement  Store narcotics safely:   · Store narcotics where others cannot easily get them  Keep them in a locked cabinet or secure area  Do not  keep them in a purse or other bag you carry with you  A person may be looking for something else and find the narcotics  · Make sure narcotics are stored out of the reach of children  A child can easily overdose on narcotics  Narcotics may look like candy to a small child  The best way to dispose of narcotics: The laws vary by country and area   In the [place], the best way is to return the narcotics through a take-back program  This program is offered by the [place] Drug Enforcement Agency ([address] [facility])  The following are options for using the program:  · Take the narcotics to a ANDRY collection site  The site is often a law enforcement center  Call your local law enforcement center for scheduled take-back days in your area  You will be given information on where to go if the collection site is in a different location  · Take the narcotics to an approved pharmacy or hospital   A pharmacy or hospital may be set up as a collection site  You will need to ask if it is a ANDRY collection site if you were not directed there  A pharmacy or doctor's office may not be able to take back narcotics unless it is a ANDRY site  · Use a mail-back system  This means you are given containers to put the narcotics into  You will then mail them in the containers  · Use a take-back drop box  This is a place to leave the narcotics at any time  People and animals will not be able to get into the box  Your local law enforcement agency can tell you where to find a drop box in your area  Other ways to manage pain:   · Ask your healthcare provider about non-narcotic medicines to control pain  Nonprescription medicines include NSAIDs (such as ibuprofen) and acetaminophen  Prescription medicines include muscle relaxers, antidepressants, and steroids  · Pain may be managed without any medicines  Some ways to relieve pain include massage, aromatherapy, or meditation  Physical or occupational therapy may also help  For more information:   · Drug Enforcement Administration  [address] [place] [address] [address]  Phone: 0- 619 - 745-0166  Web Address: [facility]/drug_disposal/    · [address]  and Drug Administration  [facility] , [address] [place] [address]  Phone: 2- 326 - 682-6447  Web Address: http://[employer]     © Copyright [employer] 2018 Information is for End User's use only and may not be sold, redistributed or otherwise used for commercial purposes   All illustrations and images included in CareNotes® are the copyrighted property of [employer] D A M , Inc  or Yaima Melendrez

## 2022-07-08 NOTE — PROGRESS NOTES
Depression Screening and Follow-up Plan: Patient was screened for depression during today's encounter  They screened negative with a PHQ-2 score of 0  Falls Plan of Care: balance, strength, and gait training instructions were provided  Home safety education provided  Assessment/Plan:         Problem List Items Addressed This Visit        Digestive    Gastroesophageal reflux disease without esophagitis     Patient to continue with present therapy and decrease caffeine, avoid ETOH and smoking to decrease acid production  Pt should also cease eating prior to bedtime and avoid excessive fluid intake prior to sleep  May use antacids as needed for breakthrough GERD  All pateint questions answered today about this condition  Relevant Medications    pantoprazole (PROTONIX) 40 mg tablet       Endocrine    Poorly controlled type 2 diabetes mellitus ([facility] )     Needs better control  Lab Results   Component Value Date    HGBA1C 8 9 (H) 07/07/2021              Relevant Orders    Hemoglobin A1C       Cardiovascular and Mediastinum    PVD (peripheral vascular disease) ([facility] )     Patient is stable  and will continue present plan of care and reassess at next routine visit  All questions about this problem from patient were answered today  Essential hypertension     Patient is stable with current anti-hypertensive medicine and continue to follow a low sodium diet and take current medication  All questions about this condition were answered today  Relevant Orders    IRIS Diabetic eye exam    TSH + Free T4    Comprehensive metabolic panel    CBC and differential    Magnesium    Uric acid    Urinalysis with microscopic       Musculoskeletal and Integument    Osteomyelitis, unspecified site, unspecified type ([facility] )       Other    Mixed hyperlipidemia     Patient  is stable with current medication and we discussed a low fat low cholesterol diet   Weight loss also discussed for this will help lower cholesterol also  Recheck lipids in 6 months  Relevant Orders    Lipid Panel with Direct LDL reflex    Platelets decreased (HCC)    S/P BKA (below knee amputation), left [facility])     Patient is stable  and will continue present plan of care and reassess at next routine visit  All questions about this problem from patient were answered today  Continuous opioid dependence ([facility] )     Patient is stable  and will continue present plan of care and reassess at next routine visit  All questions about this problem from patient were answered today  Other Visit Diagnoses     Well adult exam    -  Primary    Medicare annual wellness visit, subsequent        Chronic back pain, unspecified back location, unspecified back pain laterality        Relevant Medications    oxyCODONE-acetaminophen (PERCOCET) 5-325 mg per tablet            Subjective:      Patient ID: Ananya Adam is a 78 y o  male  HPI    The following portions of the patient's history were reviewed and updated as appropriate:   Past Medical History:  He has a past medical history of Arthritis, First degree AV block, Full dentures, Hypertension, PAD (peripheral artery disease) ([place] [address] ), PVD (peripheral vascular disease) ([place] [address] ), Type 2 diabetes mellitus with diabetic peripheral angiopathy without gangrene ([place] [address] ) (5/18/2021), and Wound, open ,  _______________________________________________________________________  Medical Problems:  does not have any pertinent problems on file ,  _______________________________________________________________________  Past Surgical History:   has a past surgical history that includes Toe amputation; Vein ligation; Replantation thumb (Right); Cholecystectomy; Colonoscopy; Toe Osteotomy (Right, 6/26/2020); Trigger finger release; Skin graft; pr debridement, skin, sub-q tissue,muscle,bone,=<20 sq cm (Right, 12/18/2020); IR aortagram with run-off (1/28/2021); Toe amputation (Right, 2/2/2021);  IR aortagram with run-off (4/13/2021); pr vein bypass graft,fem-tibial (Left, 7/14/2021); and Leg amputation, lower tibia/fibula (Left, 7/17/2021)  ,  _______________________________________________________________________  Family History:  family history includes Alzheimer's disease in his father; Arthritis in his mother; Cancer in his father ,  _______________________________________________________________________  Social History:   reports that he has never smoked  He has never used smokeless tobacco  He reports current alcohol use  He reports that he does not use drugs  ,  _______________________________________________________________________  Allergies:  is allergic to shellfish-derived products - food allergy and sulfamethoxazole-trimethoprim     _______________________________________________________________________  Current Outpatient Medications   Medication Sig Dispense Refill    atorvastatin (LIPITOR) 40 mg tablet Take 1 tablet (40 mg total) by mouth daily at bedtime 90 tablet 1    betamethasone dipropionate (DIPROSONE) 0 05 % cream Apply topically 2 (two) times a day 30 g 0    Blood Glucose Monitoring Suppl (OneTouch Verio) w/Device KIT by Does not apply route 2 (two) times a day Dx E11 9 1 kit 1    clopidogrel (PLAVIX) 75 mg tablet Take 1 tablet (75 mg total) by mouth daily 90 tablet 0    insulin NPH-insulin regular (NovoLIN 70/30) 100 units/mL subcutaneous injection Inject:  44 units with breakfast, 20 units with lunch, 30 units with dinner (Patient taking differently: Inject:  44 units with breakfast, 20 units with lunch, 30 units with dinner) 15 mL 1    Lancets (OneTouch Delica Plus [id number]) MISC 1 Units by Does not apply route 2 (two) times a day Dx E11 9 100 each 2    lisinopril (ZESTRIL) 10 mg tablet Take 1 tablet (10 mg total) by mouth daily 90 tablet 1    metFORMIN (GLUCOPHAGE-XR) 500 mg 24 hr tablet Take 1 tablet (500 mg total) by mouth daily with dinner 30 tablet 4    OneTouch Ultra test strip use to test blood sugars four times a day 400 each 0    oxyCODONE-acetaminophen (PERCOCET) 5-325 mg per tablet Take 1 tablet by mouth every 6 (six) hours as needed for moderate pain Max Daily Amount: 4 tablets 120 tablet 0    pantoprazole (PROTONIX) 40 mg tablet Take 1 tablet (40 mg total) by mouth daily 90 tablet 1    rivaroxaban (Xarelto) 2 5 mg tablet Take 1 tablet (2 5 mg total) by mouth daily with breakfast Last dose 6/21/20 30 tablet 2    UltiCare Insulin Syringe 31G X 5/16" 1 ML MISC Inject under the skin as needed (for injection) 100 each 1     No current facility-administered medications for this visit  Patient's shoes and socks removed  Right Foot/Ankle   Right Foot Inspection  Skin Exam: skin normal  Skin not intact, no dry skin, no warmth, no callus, no erythema, no maceration, no abnormal color, no pre-ulcer, no ulcer and no callus  Toe Exam: ROM and strength within normal limits  No tenderness    Sensory   Vibration: diminished  Proprioception: diminished  Monofilament testing: diminished    Vascular  Capillary refills: < 3 seconds  The right DP pulse is 1+  The right PT pulse is 1+  Left Foot/Ankle  Left Foot Inspection  Amputation: amputation left foot     Toe Exam: No swelling and no tenderness  Sensory   Vibration: intact  Proprioception: intact  Monofilament testing: intact    Vascular  Capillary refills: < 3 seconds  The left DP pulse is 2+  The left PT pulse is 2+  Assign Risk Category  Deformity present  No loss of protective sensation  Weak pulses    _______________________________________________________________________  Review of Systems   Constitutional: Negative for activity change, appetite change, chills, fatigue, fever and unexpected weight change  HENT: Negative for congestion, ear pain, hearing loss, mouth sores, postnasal drip, sinus pressure, sinus pain, sneezing and sore throat  Respiratory: Negative for apnea, cough, shortness of breath and wheezing  Cardiovascular: Negative for chest pain, palpitations and leg swelling  Gastrointestinal: Negative for abdominal pain, constipation, diarrhea, nausea and vomiting  Endocrine: Negative for cold intolerance and heat intolerance  Genitourinary: Negative for dysuria, frequency and hematuria  Musculoskeletal: Negative for arthralgias, back pain, gait problem, joint swelling and neck pain  Skin: Negative for rash  Neurological: Negative for dizziness, weakness and numbness  Hematological: Does not bruise/bleed easily  Psychiatric/Behavioral: Negative for agitation, behavioral problems, confusion, hallucinations and sleep disturbance  The patient is not nervous/anxious  Objective:  Vitals:    07/08/22 1039   BP: 116/64   BP Location: Left arm   Patient Position: Sitting   Cuff Size: Large   Pulse: 74   Temp: 98 1 °F (36 7 °C)   SpO2: 97%   Weight: 86 2 kg (190 lb)   Height: 5' 4" (1 626 m)     Body mass index is 32 61 kg/m²  Physical Exam  Vitals and nursing note reviewed  Constitutional:       Appearance: He is well-developed  HENT:      Head: Normocephalic and atraumatic  Nose: Nose normal    Eyes:      General: No scleral icterus  Conjunctiva/sclera: Conjunctivae normal       Pupils: Pupils are equal, round, and reactive to light  Neck:      Thyroid: No thyromegaly  Cardiovascular:      Rate and Rhythm: Normal rate and regular rhythm  Pulses: Pulses are weak  Dorsalis pedis pulses are 1+ on the right side and 2+ on the left side  Posterior tibial pulses are 1+ on the right side and 2+ on the left side  Heart sounds: Normal heart sounds  Pulmonary:      Effort: Pulmonary effort is normal  No respiratory distress  Breath sounds: Normal breath sounds  No wheezing  Abdominal:      General: Bowel sounds are normal       Palpations: Abdomen is soft  Tenderness: There is no abdominal tenderness  There is no guarding or rebound  Musculoskeletal:         General: Deformity present  Normal range of motion  Cervical back: Normal range of motion and neck supple  Comments: L BKA   Feet:      Right foot:      Skin integrity: No ulcer, skin breakdown, erythema, warmth, callus or dry skin  Left foot: amputated  Skin:     General: Skin is warm and dry  Findings: No rash  Neurological:      Mental Status: He is alert and oriented to person, place, and time  Psychiatric:         Mood and Affect: Mood normal          Behavior: Behavior normal          Thought Content:  Thought content normal          Judgment: Judgment normal

## 2022-08-03 ENCOUNTER — TELEPHONE (OUTPATIENT)
Dept: FAMILY MEDICINE CLINIC | Facility: CLINIC | Age: 67
End: 2022-08-03

## 2022-08-03 NOTE — TELEPHONE ENCOUNTER
Patient is requesting a new order for a one touch ultra 2 he has the strips for it but needs a new meter  If this one cant be ordered then he will need strips as well   Patient uses [facility] in [place]

## 2022-08-12 DIAGNOSIS — G89.29 CHRONIC BACK PAIN, UNSPECIFIED BACK LOCATION, UNSPECIFIED BACK PAIN LATERALITY: ICD-10-CM

## 2022-08-12 DIAGNOSIS — M54.9 CHRONIC BACK PAIN, UNSPECIFIED BACK LOCATION, UNSPECIFIED BACK PAIN LATERALITY: ICD-10-CM

## 2022-08-12 RX ORDER — OXYCODONE HYDROCHLORIDE AND ACETAMINOPHEN 5; 325 MG/1; MG/1
1 TABLET ORAL EVERY 6 HOURS PRN
Qty: 120 TABLET | Refills: 0 | Status: SHIPPED | OUTPATIENT
Start: 2022-08-12 | End: 2022-09-07 | Stop reason: SDUPTHER

## 2022-09-07 DIAGNOSIS — G89.29 CHRONIC BACK PAIN, UNSPECIFIED BACK LOCATION, UNSPECIFIED BACK PAIN LATERALITY: ICD-10-CM

## 2022-09-07 DIAGNOSIS — M54.9 CHRONIC BACK PAIN, UNSPECIFIED BACK LOCATION, UNSPECIFIED BACK PAIN LATERALITY: ICD-10-CM

## 2022-09-08 RX ORDER — OXYCODONE HYDROCHLORIDE AND ACETAMINOPHEN 5; 325 MG/1; MG/1
1 TABLET ORAL EVERY 6 HOURS PRN
Qty: 120 TABLET | Refills: 0 | Status: SHIPPED | OUTPATIENT
Start: 2022-09-08 | End: 2022-10-10 | Stop reason: SDUPTHER

## 2022-10-04 ENCOUNTER — TELEPHONE (OUTPATIENT)
Dept: VASCULAR SURGERY | Facility: CLINIC | Age: 67
End: 2022-10-04

## 2022-10-04 NOTE — TELEPHONE ENCOUNTER
Pt w/ PAD s/p L BKA  Pt called the office today w/ concern about a "dark spot" on his R 5th toe about 0 5 inch wide by 1 5 inches long  He reports that he noticed it about 2-3 weeks ago and thought it might be a bruise so he waited to see if it would get better  Pt states it still looks the same, not any worse or better  He denies any pain in the foot or wounds  He states he has chronic neuropathy, which is unchanged  He denies any other new symptoms  Pt is due for GABBY in December  Informed him I would send a message to our triage provider and we will call him back

## 2022-10-05 NOTE — PROGRESS NOTES
Assessment/Plan:    PVD (peripheral vascular disease) ([facility] )  Patient is a 69yo male nonsmoker with h/o HTN, HLD, uncontrolled DM 2 with neuropathy and PAD w/hx BLE tissue loss, s/p multiple BLE interventions @ OSH including L SFA PTA/stent and atherectomy tibial vessels w/AT/PT  1/5/2020, L TMA and R AT/DP recanalization 1/28/2021 followed by R 5th ray and most recently L foot tissue loss and nonhealing L TMA x 6 months, s/p recanalization of occluded L peroneal stent Katelyn Wilson) 4/13/21 w/early stent reocclusion who is now s/p L fem-peroneal bypass w/reversed GSV 7/14/2021 c/b early graft failure 2/2 poor outflow, ultimately requiring [facility] 7/17/2021  He presents today for OV regarding discoloration of the right anterior foot for several days   -Patient denies rest pain, claudication, tissue loss  -Patient states he sits frequently with his prosthetic resting on the top of his foot  He states a few days ago he started noticing discoloration    -Small area of ecchymosis on anterior aspect of foot likely 2/2 pressure from prosthetic and anticoagulation usage (Xarelto/plavix)  Area is not tender to palpation  (see media)  -Palpable DP pulse in RLE  -GABBY scheduled 2/2023   -Continue plavix, statin    -Discussed risk factor modification, including adequate glycemic and lipid control, smoking cessation, blood pressure, and lifestyle modifications    -Recommend starting a structured walking program as tolerated 3-5 times/week for 30 minutes  Patient should walk until claudication symptoms occur, rest for 5-10 minutes, then continue to walk  Patient should increase distance each week  -Follow up after GABBY or sooner if needed  -Instructed patient to call the office with questions, concerns, or new symptoms  Diagnoses and all orders for this visit:    PVD (peripheral vascular disease) ([facility] )          Subjective:      Patient ID: Robert Pineda is a 78 y o  male        HPI  Patient is a 69yo male nonsmoker with h/o HTN, HLD, uncontrolled DM 2 with neuropathy and PAD w/hx BLE tissue loss, s/p multiple BLE interventions @ OSH including L SFA PTA/stent and atherectomy tibial vessels w/AT/PT  1/5/2020, L TMA and R AT/DP recanalization 1/28/2021 followed by R 5th ray and most recently L foot tissue loss and nonhealing L TMA x 6 months, s/p recanalization of occluded L peroneal stent Abel Turner) 4/13/21 w/early stent reocclusion who is now s/p L fem-peroneal bypass w/reversed GSV 7/14/2021 c/b early graft failure 2/2 poor outflow, ultimately requiring [facility] 7/17/2021  He presents today for OV regarding brown discoloration of the right anterior foot for several days  He states he sits often with his prosthetic resting on the top of his foot  He denies any tenderness to the area  He denies redness, swelling  He denies tissue loss, rest pain, or claudication  His only complaint is chronic neuropathy which has been unchanged  He is motor and sensory intact  He is taking plavix, xarelto, and atorvastatin  We discussed that his discoloration is likely due to pressure from the prosthetic and being on 2 blood thinners (Xarelto/plavix)  He has palpable DP pulse in right foot  We will obtain routine GABBY in February 2022 and follow after to review  The following portions of the patient's history were reviewed and updated as appropriate: allergies, current medications, past family history, past medical history, past social history, past surgical history and problem list     Review of Systems   Constitutional: Negative  HENT: Negative  Eyes: Negative  Respiratory: Negative  Cardiovascular: Negative  Gastrointestinal: Negative  Endocrine: Negative  Genitourinary: Negative  Musculoskeletal: Negative  Skin: Color change: 5th toe  Allergic/Immunologic: Negative  Neurological: Negative  Hematological: Negative  Psychiatric/Behavioral: Negative          I have personally reviewed and made appropriate changes to the ROS that was input by the medical assistant  Objective:      Vitals:    10/06/22 1558   BP: 156/84   BP Location: Right arm   Patient Position: Sitting   Cuff Size: Adult   Pulse: 82   Weight: 85 3 kg (188 lb)   Height: 5' 4" (1 626 m)       Patient Active Problem List   Diagnosis    Acquired deformity of foot    Diabetic polyneuropathy associated with type 2 diabetes mellitus ([facility]    Pain in both feet    Peripheral arteriosclerosis ([facility])    Onychomycosis    Tinea pedis of both feet    Dermatophytosis    Ingrown toenail    Paronychia of toenail of right foot    Diabetic ulcer of toe of right foot associated with type 2 diabetes mellitus, limited to breakdown of skin ([facility])    Bony exostosis    Right foot ulcer, with fat layer exposed ([facility] )    PVD (peripheral vascular disease) ([facility])    Poorly controlled type 2 diabetes mellitus ([facility] )    Essential hypertension    Mixed hyperlipidemia    Type 2 diabetes mellitus with diabetic peripheral angiopathy without gangrene ([facility] )    Platelets decreased ([facility] )    Gastroesophageal reflux disease without esophagitis    S/P BKA (below knee amputation), left ([facility])    Insomnia    Continuous opioid dependence ([facility])    Osteomyelitis, unspecified site, unspecified type [facility])       Past Surgical History:   Procedure Laterality Date    CHOLECYSTECTOMY      COLONOSCOPY      IR AORTAGRAM WITH RUN-OFF  1/28/2021    IR AORTAGRAM WITH RUN-OFF  4/13/2021    LEG AMPUTATION THROUGH LOWER TIBIA AND FIBULA Left 7/17/2021    Procedure: AMPUTATION BELOW KNEE (BKA);   Surgeon: Dyllan Goodrich MD;  Location:  MAIN OR;  Service: Vascular    NH DEBRIDEMENT, SKIN, SUB-Q TISSUE,MUSCLE,BONE,=<20 SQ CM Right 12/18/2020    Procedure: DEBRIDMENT OF ULCER , REMOVAL OF DEVITALIZED SKIN, SOFT TISSUE, BONE AND APPLICATION OF INTEGRA GRAFT RIGHT FOOT ;  Surgeon: Paco Forte DPM;  Location: [address] [place] [address];  Service: Podiatry    NH VEIN BYPASS GRAFT,FEM-TIBIAL Left 7/14/2021    Procedure: BYPASS femoral-peroneal artery bypass with  reverse GSV and balloon angioplasty peroneal artery;  Surgeon: Sergey Calderón MD;  Location: [facility];  Service: Vascular    REPLANTATION THUMB Right     right tip reconnected    SKIN GRAFT      right thumb    TOE AMPUTATION      left foot all 5 toes removed    TOE AMPUTATION Right 2/2/2021    Procedure: Right partial 5th ray amputation;  Surgeon: Francisco J Marshall DPM;  Location: [facility];  Service: Podiatry    TOE OSTEOTOMY Right 6/26/2020    Procedure: exostosis of right big toe;  Surgeon: Linda Wilder DPM;  Location: [facility];  Service: Podiatry    TRIGGER FINGER RELEASE      bilateral hands    VEIN LIGATION      right       Family History   Problem Relation Age of Onset    Arthritis Mother     Cancer Father         colon    Alzheimer's disease Father        Social History     Socioeconomic History    Marital status: /Civil Union     Spouse name: Gino Enrique Number of children: Not on file    Years of education: 15    Highest education level: High school graduate   Occupational History    Occupation: [employer]   Tobacco Use    Smoking status: Never Smoker    Smokeless tobacco: Never Used   Vaping Use    Vaping Use: Never used   Substance and Sexual Activity    Alcohol use: Yes     Comment: occasional    Drug use: Never    Sexual activity: Not on file   Other Topics Concern    Not on file   Social History Narrative         Social Determinants of Health     Financial Resource Strain: Not on file   Food Insecurity: Not on file   Transportation Needs: Not on file   Physical Activity: Not on file   Stress: Not on file   Social Connections: Not on file   Intimate Partner Violence: Not on file   Housing Stability: Not on file       Allergies   Allergen Reactions    Shellfish-Derived Products - Food Allergy Anaphylaxis     Throat closes    Sulfamethoxazole-Trimethoprim Rash         Current Outpatient Medications:     atorvastatin (LIPITOR) 40 mg tablet, Take 1 tablet (40 mg total) by mouth daily at bedtime, Disp: 90 tablet, Rfl: 1    betamethasone dipropionate (DIPROSONE) 0 05 % cream, Apply topically 2 (two) times a day, Disp: 30 g, Rfl: 0    Blood Glucose Monitoring Suppl (OneTouch Verio) w/Device KIT, by Does not apply route 2 (two) times a day Dx E11 9, Disp: 1 kit, Rfl: 1    clopidogrel (PLAVIX) 75 mg tablet, Take 1 tablet (75 mg total) by mouth daily, Disp: 90 tablet, Rfl: 0    insulin NPH-insulin regular (NovoLIN 70/30) 100 units/mL subcutaneous injection, Inject:  44 units with breakfast, 20 units with lunch, 30 units with dinner (Patient taking differently: Inject:  44 units with breakfast, 20 units with lunch, 30 units with dinner), Disp: 15 mL, Rfl: 1    Lancets (OneTouch Delica Plus [id number]) MISC, 1 Units by Does not apply route 2 (two) times a day Dx E11 9, Disp: 100 each, Rfl: 2    lisinopril (ZESTRIL) 10 mg tablet, Take 1 tablet (10 mg total) by mouth daily, Disp: 90 tablet, Rfl: 1    metFORMIN (GLUCOPHAGE-XR) 500 mg 24 hr tablet, Take 1 tablet (500 mg total) by mouth daily with dinner, Disp: 30 tablet, Rfl: 4    OneTouch Ultra test strip, use to test blood sugars four times a day, Disp: 400 each, Rfl: 0    oxyCODONE-acetaminophen (PERCOCET) 5-325 mg per tablet, Take 1 tablet by mouth every 6 (six) hours as needed for moderate pain Max Daily Amount: 4 tablets, Disp: 120 tablet, Rfl: 0    pantoprazole (PROTONIX) 40 mg tablet, Take 1 tablet (40 mg total) by mouth daily, Disp: 90 tablet, Rfl: 1    rivaroxaban (Xarelto) 2 5 mg tablet, Take 1 tablet (2 5 mg total) by mouth daily with breakfast Last dose 6/21/20, Disp: 30 tablet, Rfl: 2    UltiCare Insulin Syringe 31G X 5/16" 1 ML MISC, Inject under the skin as needed (for injection), Disp: 100 each, Rfl: 1      /84 (BP Location: Right arm, Patient Position: Sitting, Cuff Size: Adult)   Pulse 82   Ht 5' 4" (1 626 m)   Wt 85 3 kg (188 lb)   BMI 32 27 kg/m²          Physical Exam  Vitals and nursing note reviewed  Constitutional:       Appearance: Normal appearance  He is obese  HENT:      Head: Normocephalic and atraumatic  Neck:      Vascular: No carotid bruit  Cardiovascular:      Rate and Rhythm: Normal rate and regular rhythm  Pulses:           Carotid pulses are 2+ on the right side and 2+ on the left side  Radial pulses are 2+ on the right side and 2+ on the left side  Dorsalis pedis pulses are 2+ on the right side  Heart sounds: Normal heart sounds  Comments: No carotid bruit   Pulmonary:      Effort: Pulmonary effort is normal  No respiratory distress  Abdominal:      General: Bowel sounds are normal  There is no distension  Palpations: Abdomen is soft  Comments: No abdominal bruit or pulsatile masses  Musculoskeletal:         General: No swelling or tenderness  Normal range of motion  Cervical back: Normal range of motion and neck supple  Right lower leg: No edema  Left lower leg: No edema  Skin:     General: Skin is warm  Capillary Refill: Capillary refill takes less than 2 seconds  Coloration: Skin is not pale  Findings: Bruising present  No erythema  Comments: Bruising noted on anterior aspect of right foot  Neurological:      General: No focal deficit present  Mental Status: He is alert and oriented to person, place, and time     Psychiatric:         Mood and Affect: Mood normal          Behavior: Behavior normal          Right     Tom Liang  [facility]  [phone]

## 2022-10-06 ENCOUNTER — OFFICE VISIT (OUTPATIENT)
Dept: VASCULAR SURGERY | Facility: CLINIC | Age: 67
End: 2022-10-06
Payer: COMMERCIAL

## 2022-10-06 VITALS
DIASTOLIC BLOOD PRESSURE: 84 MMHG | BODY MASS INDEX: 32.1 KG/M2 | HEART RATE: 82 BPM | WEIGHT: 188 LBS | SYSTOLIC BLOOD PRESSURE: 156 MMHG | HEIGHT: 64 IN

## 2022-10-06 DIAGNOSIS — I73.9 PVD (PERIPHERAL VASCULAR DISEASE) (HCC): Primary | ICD-10-CM

## 2022-10-06 PROCEDURE — 99213 OFFICE O/P EST LOW 20 MIN: CPT

## 2022-10-06 NOTE — ASSESSMENT & PLAN NOTE
Patient is a 67yo male nonsmoker with h/o HTN, HLD, uncontrolled DM 2 with neuropathy and PAD w/hx BLE tissue loss, s/p multiple BLE interventions @ OSH including L SFA PTA/stent and atherectomy tibial vessels w/AT/PT  1/5/2020, L TMA and R AT/DP recanalization 1/28/2021 followed by R 5th ray and most recently L foot tissue loss and nonhealing L TMA x 6 months, s/p recanalization of occluded L peroneal stent Binh Villalta) 4/13/21 w/early stent reocclusion who is now s/p L fem-peroneal bypass w/reversed GSV 7/14/2021 c/b early graft failure 2/2 poor outflow, ultimately requiring Michaelle Baumgarten Dr Rasheed 7/17/2021  He presents today for OV regarding discoloration of the right anterior foot    -Patient denies rest pain, claudication, tissue loss  -Patient states he sits frequently with his prosthetic resting on the top of his foot  He states a few days ago he started noticing discoloration    -Small area of ecchymosis on anterior aspect of foot likely 2/2 pressure from prosthetic and anticoagulation usage (Xarelto/plavix)  Area is not tender to palpation  (see media)  -Palpable DP pulse in RLE  -GABBY scheduled 2/2023   -Continue plavix, statin    -Discussed risk factor modification, including adequate glycemic and lipid control, smoking cessation, blood pressure, and lifestyle modifications    -Recommend starting a structured walking program as tolerated 3-5 times/week for 30 minutes  Patient should walk until claudication symptoms occur, rest for 5-10 minutes, then continue to walk  Patient should increase distance each week  -Follow up after GABBY or sooner if needed  -Instructed patient to call the office with questions, concerns, or new symptoms

## 2022-10-10 DIAGNOSIS — G89.29 CHRONIC BACK PAIN, UNSPECIFIED BACK LOCATION, UNSPECIFIED BACK PAIN LATERALITY: ICD-10-CM

## 2022-10-10 DIAGNOSIS — M54.9 CHRONIC BACK PAIN, UNSPECIFIED BACK LOCATION, UNSPECIFIED BACK PAIN LATERALITY: ICD-10-CM

## 2022-10-10 DIAGNOSIS — I48.91 ATRIAL FIBRILLATION, UNSPECIFIED TYPE (HCC): ICD-10-CM

## 2022-10-11 RX ORDER — OXYCODONE HYDROCHLORIDE AND ACETAMINOPHEN 5; 325 MG/1; MG/1
1 TABLET ORAL EVERY 6 HOURS PRN
Qty: 120 TABLET | Refills: 0 | Status: SHIPPED | OUTPATIENT
Start: 2022-10-11

## 2022-10-17 ENCOUNTER — IMMUNIZATIONS (OUTPATIENT)
Dept: FAMILY MEDICINE CLINIC | Facility: CLINIC | Age: 67
End: 2022-10-17
Payer: COMMERCIAL

## 2022-10-17 DIAGNOSIS — Z23 IMMUNIZATION DUE: Primary | ICD-10-CM

## 2022-10-17 PROCEDURE — 90662 IIV NO PRSV INCREASED AG IM: CPT | Performed by: NURSE PRACTITIONER

## 2022-10-17 PROCEDURE — G0008 ADMIN INFLUENZA VIRUS VAC: HCPCS | Performed by: NURSE PRACTITIONER

## 2022-10-31 ENCOUNTER — VBI (OUTPATIENT)
Dept: ADMINISTRATIVE | Facility: OTHER | Age: 67
End: 2022-10-31

## 2022-11-14 DIAGNOSIS — G89.29 CHRONIC BACK PAIN, UNSPECIFIED BACK LOCATION, UNSPECIFIED BACK PAIN LATERALITY: ICD-10-CM

## 2022-11-14 DIAGNOSIS — M54.9 CHRONIC BACK PAIN, UNSPECIFIED BACK LOCATION, UNSPECIFIED BACK PAIN LATERALITY: ICD-10-CM

## 2022-11-14 RX ORDER — OXYCODONE HYDROCHLORIDE AND ACETAMINOPHEN 5; 325 MG/1; MG/1
1 TABLET ORAL EVERY 6 HOURS PRN
Qty: 120 TABLET | Refills: 0 | Status: SHIPPED | OUTPATIENT
Start: 2022-11-14

## 2022-12-04 ENCOUNTER — RA CDI HCC (OUTPATIENT)
Dept: OTHER | Facility: HOSPITAL | Age: 67
End: 2022-12-04

## 2022-12-05 DIAGNOSIS — M54.9 CHRONIC BACK PAIN, UNSPECIFIED BACK LOCATION, UNSPECIFIED BACK PAIN LATERALITY: ICD-10-CM

## 2022-12-05 DIAGNOSIS — G89.29 CHRONIC BACK PAIN, UNSPECIFIED BACK LOCATION, UNSPECIFIED BACK PAIN LATERALITY: ICD-10-CM

## 2022-12-05 RX ORDER — OXYCODONE HYDROCHLORIDE AND ACETAMINOPHEN 5; 325 MG/1; MG/1
1 TABLET ORAL EVERY 6 HOURS PRN
Qty: 120 TABLET | Refills: 0 | Status: SHIPPED | OUTPATIENT
Start: 2022-12-05

## 2022-12-12 ENCOUNTER — OFFICE VISIT (OUTPATIENT)
Dept: FAMILY MEDICINE CLINIC | Facility: CLINIC | Age: 67
End: 2022-12-12

## 2022-12-12 VITALS
HEART RATE: 80 BPM | SYSTOLIC BLOOD PRESSURE: 130 MMHG | DIASTOLIC BLOOD PRESSURE: 62 MMHG | HEIGHT: 64 IN | TEMPERATURE: 97.9 F | RESPIRATION RATE: 16 BRPM | OXYGEN SATURATION: 98 % | BODY MASS INDEX: 32.78 KG/M2 | WEIGHT: 192 LBS

## 2022-12-12 DIAGNOSIS — Z12.11 SCREEN FOR COLON CANCER: ICD-10-CM

## 2022-12-12 DIAGNOSIS — I73.9 PVD (PERIPHERAL VASCULAR DISEASE) (HCC): ICD-10-CM

## 2022-12-12 DIAGNOSIS — I10 ESSENTIAL HYPERTENSION: ICD-10-CM

## 2022-12-12 DIAGNOSIS — K21.9 GASTROESOPHAGEAL REFLUX DISEASE WITHOUT ESOPHAGITIS: ICD-10-CM

## 2022-12-12 DIAGNOSIS — E11.51 TYPE 2 DIABETES MELLITUS WITH DIABETIC PERIPHERAL ANGIOPATHY WITHOUT GANGRENE, WITH LONG-TERM CURRENT USE OF INSULIN (HCC): ICD-10-CM

## 2022-12-12 DIAGNOSIS — L03.115 CELLULITIS OF LEG, RIGHT: Primary | ICD-10-CM

## 2022-12-12 DIAGNOSIS — E78.2 MIXED HYPERLIPIDEMIA: ICD-10-CM

## 2022-12-12 DIAGNOSIS — Z79.4 TYPE 2 DIABETES MELLITUS WITH DIABETIC PERIPHERAL ANGIOPATHY WITHOUT GANGRENE, WITH LONG-TERM CURRENT USE OF INSULIN (HCC): ICD-10-CM

## 2022-12-12 RX ORDER — CEPHALEXIN 500 MG/1
500 CAPSULE ORAL EVERY 6 HOURS SCHEDULED
Qty: 40 CAPSULE | Refills: 0 | OUTPATIENT
Start: 2022-12-12 | End: 2022-12-16

## 2022-12-12 NOTE — PROGRESS NOTES
Name: Ekta Duran      : 1955      MRN: [id number]  Encounter Provider: Barrett Ward MD  Encounter Date: 2022   Encounter department: [address] [place] [address] Place     1  Cellulitis of leg, right  -     cephalexin (KEFLEX) 500 mg capsule; Take 1 capsule (500 mg total) by mouth every 6 (six) hours for 10 days    2  Screen for colon cancer  -     Ambulatory referral for colonoscopy; Future    3  Type 2 diabetes mellitus with diabetic peripheral angiopathy without gangrene, with long-term current use of insulin ([facility] [address] )    4  Gastroesophageal reflux disease without esophagitis  Assessment & Plan:  Patient to continue with present therapy and decrease caffeine, avoid ETOH and smoking to decrease acid production  Pt should also cease eating prior to bedtime and avoid excessive fluid intake prior to sleep  May use antacids as needed for breakthrough GERD  All pateint questions answered today about this condition  5  Essential hypertension  Assessment & Plan:  Patient is stable with current anti-hypertensive medicine and continue to follow a low sodium diet and take current medication  All questions about this condition were answered today  6  PVD (peripheral vascular disease) ([facility] [address] )  Assessment & Plan:  Patient is stable  and will continue present plan of care and reassess at next routine visit  All questions about this problem from patient were answered today  7  Mixed hyperlipidemia  Assessment & Plan:  Patient  is stable with current medication and we discussed a low fat low cholesterol diet  Weight loss also discussed for this will help lower cholesterol also  Recheck lipids in 6 months  Subjective     This 50-year-old male seen and examined in our office today for multiple medical problems  Patient hypertension peripheral artery disease type 2 diabetes arthritis GERD hyperlipidemia    Patient recently states he was at his parents house and got bit in the back of his right leg  Patient has pain and redness in that area and from looking allergies that he is developing cellulitis  In light of his peripheral vascular disease we will get a get aggressive and treat this with antibiotics and some warm soaks  Patient also is 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 and has a new prosthesis and is very happy with that  His medicines have been reviewed today  Patient is also overdue for lab work and he states he will get that  He states he had lab work back in August but there is no record of any results of that  Review of Systems   Constitutional: Negative for activity change, appetite change, chills, fatigue, fever and unexpected weight change  HENT: Negative for congestion, ear pain, hearing loss, mouth sores, postnasal drip, sinus pressure, sinus pain, sneezing and sore throat  Respiratory: Negative for apnea, cough, shortness of breath and wheezing  Cardiovascular: Negative for chest pain, palpitations and leg swelling  Gastrointestinal: Negative for abdominal pain, constipation, diarrhea, nausea and vomiting  Endocrine: Negative for cold intolerance and heat intolerance  Genitourinary: Negative for dysuria, frequency and hematuria  Musculoskeletal: Negative for arthralgias, back pain, gait problem, joint swelling and neck pain  Skin: Positive for rash  Neurological: Negative for dizziness, seizures, weakness and numbness  Hematological: Does not bruise/bleed easily  Psychiatric/Behavioral: Negative for agitation, behavioral problems, confusion, hallucinations and sleep disturbance  The patient is not nervous/anxious          Past Medical History:   Diagnosis Date   • Arthritis     fingers   • First degree AV block    • Full dentures    • Hypertension    • PAD (peripheral artery disease) ([facility])    • PVD (peripheral vascular disease) ([facility])    • Type 2 diabetes mellitus with diabetic peripheral angiopathy without gangrene ([facility] [address] ) 5/18/2021    Per CMS ICD 10 guidelines--Per Physician   • Wound, open     right foot- by the 5th toe     Past Surgical History:   Procedure Laterality Date   • CHOLECYSTECTOMY     • COLONOSCOPY     • IR AORTAGRAM WITH RUN-OFF  1/28/2021   • IR AORTAGRAM WITH RUN-OFF  4/13/2021   • LEG AMPUTATION THROUGH LOWER TIBIA AND FIBULA Left 7/17/2021    Procedure: AMPUTATION BELOW KNEE (BKA);   Surgeon: Pranay Christie MD;  Location:  MAIN OR;  Service: Vascular   • TX BYP FEM-ANT TIBL PST TIBL PRONEAL ART/OTH DSTL Left 7/14/2021    Procedure: BYPASS femoral-peroneal artery bypass with  reverse GSV and balloon angioplasty peroneal artery;  Surgeon: Pranay Christie MD;  Location:  MAIN OR;  Service: Vascular   • TX DEBRIDEMENT BONE MUSCLE &/FASCIA 20 SQ CM/< Right 12/18/2020    Procedure: DEBRIDMENT OF ULCER , REMOVAL OF DEVITALIZED SKIN, SOFT TISSUE, BONE AND APPLICATION OF INTEGRA GRAFT RIGHT FOOT ;  Surgeon: Shira Pickens DPM;  Location: [address] [place] [address];  Service: Podiatry   • REPLANTATION THUMB Right     right tip reconnected   • SKIN GRAFT      right thumb   • TOE AMPUTATION      left foot all 5 toes removed   • TOE AMPUTATION Right 2/2/2021    Procedure: Right partial 5th ray amputation;  Surgeon: Stephany Escamilla DPM;  Location:  MAIN OR;  Service: Podiatry   • TOE OSTEOTOMY Right 6/26/2020    Procedure: exostosis of right big toe;  Surgeon: Blake Peterson DPM;  Location: [facility];  Service: Podiatry   • TRIGGER FINGER RELEASE      bilateral hands   • VEIN LIGATION      right     Family History   Problem Relation Age of Onset   • Arthritis Mother    • Cancer Father         colon   • Alzheimer's disease Father      Social History     Socioeconomic History   • Marital status: /Civil Union     Spouse name: [place]   • Number of children: None   • Years of education: 12   • Highest education level: High school graduate   Occupational History   • Occupation: [employer]   Tobacco Use   • Smoking status: Never   • Smokeless tobacco: Never   Vaping Use   • Vaping Use: Never used   Substance and Sexual Activity   • Alcohol use: Yes     Comment: occasional   • Drug use: Never   • Sexual activity: None   Other Topics Concern   • None   Social History Narrative         Social Determinants of Health     Financial Resource Strain: Not on file   Food Insecurity: Not on file   Transportation Needs: Not on file   Physical Activity: Not on file   Stress: Not on file   Social Connections: Not on file   Intimate Partner Violence: Not on file   Housing Stability: Not on file     Current Outpatient Medications on File Prior to Visit   Medication Sig   • atorvastatin (LIPITOR) 40 mg tablet Take 1 tablet (40 mg total) by mouth daily at bedtime   • betamethasone dipropionate (DIPROSONE) 0 05 % cream Apply topically 2 (two) times a day   • Blood Glucose Monitoring Suppl (OneTouch Verio) w/Device KIT by Does not apply route 2 (two) times a day Dx E11 9   • clopidogrel (PLAVIX) 75 mg tablet Take 1 tablet (75 mg total) by mouth daily   • insulin NPH-insulin regular (NovoLIN 70/30) 100 units/mL subcutaneous injection Inject:  44 units with breakfast, 20 units with lunch, 30 units with dinner (Patient taking differently: Inject:  44 units with breakfast, 20 units with lunch, 30 units with dinner)   • Lancets (OneTouch Delica Plus [id number]) MISC 1 Units by Does not apply route 2 (two) times a day Dx E11 9   • lisinopril (ZESTRIL) 10 mg tablet Take 1 tablet (10 mg total) by mouth daily   • metFORMIN (GLUCOPHAGE-XR) 500 mg 24 hr tablet Take 1 tablet (500 mg total) by mouth daily with dinner   • OneTouch Ultra test strip use to test blood sugars four times a day   • oxyCODONE-acetaminophen (PERCOCET) 5-325 mg per tablet Take 1 tablet by mouth every 6 (six) hours as needed for moderate pain Max Daily Amount: 4 tablets   • pantoprazole (PROTONIX) 40 mg tablet Take 1 tablet (40 mg total) by mouth daily   • rivaroxaban (Xarelto) 2 5 mg tablet Take 1 tablet (2 5 mg total) by mouth daily with breakfast Last dose 6/21/20   • UltiCare Insulin Syringe 31G X 5/16" 1 ML MISC Inject under the skin as needed (for injection)     Allergies   Allergen Reactions   • Shellfish-Derived Products - Food Allergy Anaphylaxis     Throat closes   • Sulfamethoxazole-Trimethoprim Rash     Immunization History   Administered Date(s) Administered   • INFLUENZA 11/02/2018   • Influenza, high dose seasonal 0 7 mL 11/05/2020, 10/14/2021, 10/17/2022     Patient's shoes and socks removed  Right Foot/Ankle   Right Foot Inspection  Skin Exam: dry skin, erythema, maceration and abnormal color  Skin not intact, no warmth, no callus, no pre-ulcer, no ulcer and no callus  Toe Exam: tenderness  No swelling    Left Foot/Ankle  Left Foot Inspection  Amputation: amputation left foot     Assign Risk Category  Deformity present  Loss of protective sensation  Weak pulses  Risk: 2   foot  Objective     /62 (BP Location: Left arm, Patient Position: Sitting, Cuff Size: Large)   Pulse 80   Temp 97 9 °F (36 6 °C) (Temporal)   Resp 16   Ht 5' 4" (1 626 m)   Wt 87 1 kg (192 lb)   SpO2 98%   BMI 32 96 kg/m²     Physical Exam  Vitals and nursing note reviewed  Constitutional:       Appearance: He is well-developed  HENT:      Head: Normocephalic and atraumatic  Nose: Nose normal    Eyes:      General: No scleral icterus  Conjunctiva/sclera: Conjunctivae normal       Pupils: Pupils are equal, round, and reactive to light  Neck:      Thyroid: No thyromegaly  Cardiovascular:      Rate and Rhythm: Normal rate and regular rhythm  Pulses: Pulses are weak  Heart sounds: Normal heart sounds  Pulmonary:      Effort: Pulmonary effort is normal  No respiratory distress  Breath sounds: Normal breath sounds  No wheezing  Abdominal:      General: Bowel sounds are normal       Palpations: Abdomen is soft  Tenderness: There is no abdominal tenderness   There is no guarding or rebound  Musculoskeletal:         General: Normal range of motion  Cervical back: Normal range of motion and neck supple  Comments:  L BKA   Feet:      Right foot:      Skin integrity: Skin breakdown, erythema and dry skin present  No ulcer, warmth or callus  Left foot: amputated  Skin:     General: Skin is warm and dry  Findings: Erythema present  No rash  Neurological:      Mental Status: He is alert and oriented to person, place, and time  Psychiatric:         Mood and Affect: Mood normal          Behavior: Behavior normal          Thought Content:  Thought content normal          Judgment: Judgment normal        Azalea Madrigal MD

## 2022-12-15 ENCOUNTER — TELEPHONE (OUTPATIENT)
Dept: FAMILY MEDICINE CLINIC | Facility: CLINIC | Age: 67
End: 2022-12-15

## 2022-12-15 NOTE — TELEPHONE ENCOUNTER
I called him in a 10 day supply at his last OV and is should not  till   Did he  the script? He should not be out of antibiotics yet

## 2022-12-15 NOTE — TELEPHONE ENCOUNTER
Johnathon called, he said he will finish taking his course of antibiotics but his leg is still red  He said he is going away to his parents house tmrw and is wondering if he will need more medication for his leg?

## 2022-12-16 ENCOUNTER — HOSPITAL ENCOUNTER (EMERGENCY)
Facility: HOSPITAL | Age: 67
Discharge: HOME | End: 2022-12-16
Attending: EMERGENCY MEDICINE

## 2022-12-16 ENCOUNTER — APPOINTMENT (EMERGENCY)
Dept: VASCULAR ULTRASOUND | Facility: HOSPITAL | Age: 67
End: 2022-12-16

## 2022-12-16 VITALS
OXYGEN SATURATION: 96 % | TEMPERATURE: 98.2 F | HEART RATE: 85 BPM | SYSTOLIC BLOOD PRESSURE: 190 MMHG | WEIGHT: 187 LBS | RESPIRATION RATE: 18 BRPM | BODY MASS INDEX: 32.1 KG/M2 | DIASTOLIC BLOOD PRESSURE: 99 MMHG

## 2022-12-16 DIAGNOSIS — L03.115 CELLULITIS OF RIGHT LOWER EXTREMITY: Primary | ICD-10-CM

## 2022-12-16 LAB
ALBUMIN SERPL BCP-MCNC: 4.2 G/DL (ref 3.5–5)
ALP SERPL-CCNC: 60 U/L (ref 34–104)
ALT SERPL W P-5'-P-CCNC: 32 U/L (ref 7–52)
ANION GAP SERPL CALCULATED.3IONS-SCNC: 9 MMOL/L (ref 4–13)
AST SERPL W P-5'-P-CCNC: 31 U/L (ref 13–39)
BASOPHILS # BLD AUTO: 0.01 THOUSANDS/ÂΜL (ref 0–0.1)
BASOPHILS NFR BLD AUTO: 0 % (ref 0–1)
BILIRUB SERPL-MCNC: 0.4 MG/DL (ref 0.2–1)
BUN SERPL-MCNC: 21 MG/DL (ref 5–25)
CALCIUM SERPL-MCNC: 9.4 MG/DL (ref 8.4–10.2)
CHLORIDE SERPL-SCNC: 104 MMOL/L (ref 96–108)
CO2 SERPL-SCNC: 27 MMOL/L (ref 21–32)
CREAT SERPL-MCNC: 0.94 MG/DL (ref 0.6–1.3)
EOSINOPHIL # BLD AUTO: 0.13 THOUSAND/ÂΜL (ref 0–0.61)
EOSINOPHIL NFR BLD AUTO: 1 % (ref 0–6)
ERYTHROCYTE [DISTWIDTH] IN BLOOD BY AUTOMATED COUNT: 12.4 % (ref 11.6–15.1)
GFR SERPL CREATININE-BSD FRML MDRD: 82 ML/MIN/1.73SQ M
GLUCOSE SERPL-MCNC: 76 MG/DL (ref 65–140)
HCT VFR BLD AUTO: 46.3 % (ref 36.5–49.3)
HGB BLD-MCNC: 15.5 G/DL (ref 12–17)
HOLD SPECIMEN: NORMAL
IMM GRANULOCYTES # BLD AUTO: 0.06 THOUSAND/UL (ref 0–0.2)
IMM GRANULOCYTES NFR BLD AUTO: 1 % (ref 0–2)
LYMPHOCYTES # BLD AUTO: 2.3 THOUSANDS/ÂΜL (ref 0.6–4.47)
LYMPHOCYTES NFR BLD AUTO: 24 % (ref 14–44)
MCH RBC QN AUTO: 31.9 PG (ref 26.8–34.3)
MCHC RBC AUTO-ENTMCNC: 33.5 G/DL (ref 31.4–37.4)
MCV RBC AUTO: 95 FL (ref 82–98)
MONOCYTES # BLD AUTO: 0.77 THOUSAND/ÂΜL (ref 0.17–1.22)
MONOCYTES NFR BLD AUTO: 8 % (ref 4–12)
NEUTROPHILS # BLD AUTO: 6.28 THOUSANDS/ÂΜL (ref 1.85–7.62)
NEUTS SEG NFR BLD AUTO: 66 % (ref 43–75)
NRBC BLD AUTO-RTO: 0 /100 WBCS
PLATELET # BLD AUTO: 185 THOUSANDS/UL (ref 149–390)
PMV BLD AUTO: 10.3 FL (ref 8.9–12.7)
POTASSIUM SERPL-SCNC: 3.5 MMOL/L (ref 3.5–5.3)
PROT SERPL-MCNC: 7.9 G/DL (ref 6.4–8.4)
RBC # BLD AUTO: 4.86 MILLION/UL (ref 3.88–5.62)
SODIUM SERPL-SCNC: 140 MMOL/L (ref 135–147)
WBC # BLD AUTO: 9.55 THOUSAND/UL (ref 4.31–10.16)

## 2022-12-16 RX ORDER — DOXYCYCLINE HYCLATE 100 MG/1
100 CAPSULE ORAL 2 TIMES DAILY
Qty: 14 CAPSULE | Refills: 0 | Status: SHIPPED | OUTPATIENT
Start: 2022-12-16 | End: 2022-12-23

## 2022-12-16 NOTE — TELEPHONE ENCOUNTER
Both myself and Caitlin Buenrostro spoke with patient he states he will go to [facility] to be check out at the ED

## 2022-12-16 NOTE — TELEPHONE ENCOUNTER
Spoke with patient instructed him to finish his antibiotic that he is currently on  His concern is that he is going away and his leg infection still looks pretty bad  Is there anything else you would want him to take or call in for him since he is going away

## 2022-12-16 NOTE — TELEPHONE ENCOUNTER
No  If he is getting worse or not better with his infection , he needs to be seen by a medical professional for follow up

## 2022-12-20 NOTE — ED PROVIDER NOTES
History  Chief Complaint   Patient presents with   • Insect Bite     Patient was bit by spider last Friday on lower right leg/calf  Aprx 2in diameter red area noted at site of bite  Patient sent by PCP as it is not getting better  26-year-old male presenting improving bit by a spider last Friday on his lower right leg  Patient was started on Keflex  For infection but it has not been getting any better and was sent over by his PCP for evaluation  Patient denies any history of blood clots  Does not member any trauma to the area other than the spider bite  Denies any leg swelling or numbness/tingling  Denies any shortness of breath, chest pain, nausea, vomiting, abdominal pain, or any other symptoms at this time  Prior to Admission Medications   Prescriptions Last Dose Informant Patient Reported? Taking?    Blood Glucose Monitoring Suppl (OneTouch Verio) w/Device KIT   No No   Sig: by Does not apply route 2 (two) times a day Dx E11 9   Lancets (OneTouch Delica Plus FFFFTS11Q) MISC   No No   Si Units by Does not apply route 2 (two) times a day Dx E11 9   OneTouch Ultra test strip   No No   Sig: use to test blood sugars four times a day   UltiCare Insulin Syringe 31G X 5/16" 1 ML MISC   No No   Sig: Inject under the skin as needed (for injection)   atorvastatin (LIPITOR) 40 mg tablet   No No   Sig: Take 1 tablet (40 mg total) by mouth daily at bedtime   betamethasone dipropionate (DIPROSONE) 0 05 % cream   No No   Sig: Apply topically 2 (two) times a day   cephalexin (KEFLEX) 500 mg capsule   No No   Sig: Take 1 capsule (500 mg total) by mouth every 6 (six) hours for 10 days   clopidogrel (PLAVIX) 75 mg tablet   No No   Sig: Take 1 tablet (75 mg total) by mouth daily   insulin NPH-insulin regular (NovoLIN 70/30) 100 units/mL subcutaneous injection   No No   Sig: Inject:  44 units with breakfast, 20 units with lunch, 30 units with dinner   Patient taking differently: Inject:  44 units with breakfast, 20 units with lunch, 30 units with dinner   lisinopril (ZESTRIL) 10 mg tablet   No No   Sig: Take 1 tablet (10 mg total) by mouth daily   metFORMIN (GLUCOPHAGE-XR) 500 mg 24 hr tablet   No No   Sig: Take 1 tablet (500 mg total) by mouth daily with dinner   oxyCODONE-acetaminophen (PERCOCET) 5-325 mg per tablet   No No   Sig: Take 1 tablet by mouth every 6 (six) hours as needed for moderate pain Max Daily Amount: 4 tablets   pantoprazole (PROTONIX) 40 mg tablet   No No   Sig: Take 1 tablet (40 mg total) by mouth daily   rivaroxaban (Xarelto) 2 5 mg tablet   No No   Sig: Take 1 tablet (2 5 mg total) by mouth daily with breakfast Last dose 6/21/20      Facility-Administered Medications: None       Past Medical History:   Diagnosis Date   • Arthritis     fingers   • First degree AV block    • Full dentures    • Hypertension    • PAD (peripheral artery disease) ([facility])    • PVD (peripheral vascular disease) ([facility])    • Type 2 diabetes mellitus with diabetic peripheral angiopathy without gangrene ([facility] [address] ) 5/18/2021    Per CMS ICD 10 guidelines--Per Physician   • Wound, open     right foot- by the 5th toe       Past Surgical History:   Procedure Laterality Date   • CHOLECYSTECTOMY     • COLONOSCOPY     • IR AORTAGRAM WITH RUN-OFF  1/28/2021   • IR AORTAGRAM WITH RUN-OFF  4/13/2021   • LEG AMPUTATION THROUGH LOWER TIBIA AND FIBULA Left 7/17/2021    Procedure: AMPUTATION BELOW KNEE (BKA);   Surgeon: Mio Zazueta MD;  Location:  MAIN OR;  Service: Vascular   • DE DEBRIDEMENT, SKIN, SUB-Q TISSUE,MUSCLE,BONE,=<20 SQ CM Right 12/18/2020    Procedure: DEBRIDMENT OF ULCER , REMOVAL OF DEVITALIZED SKIN, SOFT TISSUE, BONE AND APPLICATION OF INTEGRA GRAFT RIGHT FOOT ;  Surgeon: Sandra Baca DPM;  Location: [address] [place] [address];  Service: Podiatry   • DE VEIN BYPASS GRAFT,FEM-TIBIAL Left 7/14/2021    Procedure: BYPASS femoral-peroneal artery bypass with  reverse GSV and balloon angioplasty peroneal artery;  Surgeon: Mio Zazueta MD;  Location: [facility];  Service: Vascular   • REPLANTATION THUMB Right     right tip reconnected   • SKIN GRAFT      right thumb   • TOE AMPUTATION      left foot all 5 toes removed   • TOE AMPUTATION Right 2/2/2021    Procedure: Right partial 5th ray amputation;  Surgeon: Rosa Castañeda DPM;  Location:  MAIN OR;  Service: Podiatry   • TOE OSTEOTOMY Right 6/26/2020    Procedure: exostosis of right big toe;  Surgeon: Emiliano Peña DPM;  Location: [address] [place] [address];  Service: Podiatry   • TRIGGER FINGER RELEASE      bilateral hands   • VEIN LIGATION      right       Family History   Problem Relation Age of Onset   • Arthritis Mother    • Cancer Father         colon   • Alzheimer's disease Father      I have reviewed and agree with the history as documented  E-Cigarette/Vaping   • E-Cigarette Use Never User      E-Cigarette/Vaping Substances   • Nicotine No    • THC No    • CBD No    • Flavoring No    • Other No    • Unknown No      Social History     Tobacco Use   • Smoking status: Never   • Smokeless tobacco: Never   Vaping Use   • Vaping Use: Never used   Substance Use Topics   • Alcohol use: Yes     Comment: occasional   • Drug use: Never       Review of Systems   Constitutional: Negative for chills and fever  HENT: Negative for ear pain and sore throat  Eyes: Negative for pain and visual disturbance  Respiratory: Negative for cough and shortness of breath  Cardiovascular: Negative for chest pain and palpitations  Gastrointestinal: Negative for abdominal pain, constipation, diarrhea, nausea and vomiting  Genitourinary: Negative for dysuria and hematuria  Musculoskeletal: Negative for arthralgias and back pain  Skin: Positive for color change, rash and wound  Negative for pallor  Neurological: Negative for dizziness, seizures, syncope, weakness and headaches  All other systems reviewed and are negative  Physical Exam  Physical Exam  Vitals and nursing note reviewed     Constitutional: General: He is not in acute distress  Appearance: He is well-developed  HENT:      Head: Normocephalic and atraumatic  Eyes:      Conjunctiva/sclera: Conjunctivae normal    Cardiovascular:      Rate and Rhythm: Normal rate and regular rhythm  Heart sounds: No murmur heard  Pulmonary:      Effort: Pulmonary effort is normal  No respiratory distress  Breath sounds: Normal breath sounds  No stridor  No wheezing, rhonchi or rales  Chest:      Chest wall: No tenderness  Abdominal:      Palpations: Abdomen is soft  Tenderness: There is no abdominal tenderness  Musculoskeletal:         General: Tenderness (To right lower leg, positive for erythema no abscess ) present  No swelling, deformity or signs of injury  Cervical back: Neck supple  Right lower leg: No edema  Left lower leg: No edema  Comments: Patient left leg AKA with prosthetic  Skin:     General: Skin is warm and dry  Capillary Refill: Capillary refill takes less than 2 seconds  Neurological:      Mental Status: He is alert  Psychiatric:         Mood and Affect: Mood normal          Vital Signs  ED Triage Vitals [12/16/22 1424]   Temperature Pulse Respirations Blood Pressure SpO2   98 2 °F (36 8 °C) 85 18 (!) 190/99 96 %      Temp Source Heart Rate Source Patient Position - Orthostatic VS BP Location FiO2 (%)   Oral Monitor Sitting Right arm --      Pain Score       --           Vitals:    12/16/22 1424   BP: (!) 190/99   Pulse: 85   Patient Position - Orthostatic VS: Sitting         Visual Acuity      ED Medications  Medications - No data to display    Diagnostic Studies  Results Reviewed     Procedure Component Value Units Date/Time    [place] draw [[id number]] Collected: 12/16/22 1430    Lab Status: Final result Specimen: Blood from Arm, Left Updated: 12/16/22 1601    Narrative: The following orders were created for panel order [place] draw    Procedure                               Abnormality Status                     ---------                               -----------         ------                     Lilia Halo Top on WAJB[[id number]]                           Final result               Green / Black tube on ITPB[[id number]]                       Final result                 Please view results for these tests on the individual orders      Comprehensive metabolic panel [[id number]] Collected: 12/16/22 1430    Lab Status: Final result Specimen: Blood from Arm, Left Updated: 12/16/22 1501     Sodium 140 mmol/L      Potassium 3 5 mmol/L      Chloride 104 mmol/L      CO2 27 mmol/L      ANION GAP 9 mmol/L      BUN 21 mg/dL      Creatinine 0 94 mg/dL      Glucose 76 mg/dL      Calcium 9 4 mg/dL      AST 31 U/L      ALT 32 U/L      Alkaline Phosphatase 60 U/L      Total Protein 7 9 g/dL      Albumin 4 2 g/dL      Total Bilirubin 0 40 mg/dL      eGFR 82 ml/min/1 73sq m     Narrative:      [place] guidelines for Chronic Kidney Disease (CKD):   •  Stage 1 with normal or high GFR (GFR > 90 mL/min/1 73 square meters)  •  Stage 2 Mild CKD (GFR = 60-89 mL/min/1 73 square meters)  •  Stage 3A Moderate CKD (GFR = 45-59 mL/min/1 73 square meters)  •  Stage 3B Moderate CKD (GFR = 30-44 mL/min/1 73 square meters)  •  Stage 4 Severe CKD (GFR = 15-29 mL/min/1 73 square meters)  •  Stage 5 End Stage CKD (GFR <15 mL/min/1 73 square meters)  Note: GFR calculation is accurate only with a steady state creatinine    CBC and differential [[id number]] Collected: 12/16/22 1430    Lab Status: Final result Specimen: Blood from Arm, Left Updated: 12/16/22 1458     WBC 9 55 Thousand/uL      RBC 4 86 Million/uL      Hemoglobin 15 5 g/dL      Hematocrit 46 3 %      MCV 95 fL      MCH 31 9 pg      MCHC 33 5 g/dL      RDW 12 4 %      MPV 10 3 fL      Platelets 651 Thousands/uL      nRBC 0 /100 WBCs      Neutrophils Relative 66 %      Immat GRANS % 1 %      Lymphocytes Relative 24 %      Monocytes Relative 8 % Eosinophils Relative 1 %      Basophils Relative 0 %      Neutrophils Absolute 6 28 Thousands/µL      Immature Grans Absolute 0 06 Thousand/uL      Lymphocytes Absolute 2 30 Thousands/µL      Monocytes Absolute 0 77 Thousand/µL      Eosinophils Absolute 0 13 Thousand/µL      Basophils Absolute 0 01 Thousands/µL                  VAS lower limb venous duplex study, unilateral/limited   Final Result by Diann Dai MD (12/16 1851)                 Procedures  Procedures         ED Course                                             MDM  Number of Diagnoses or Management Options  Cellulitis of right lower extremity  Diagnosis management comments: 43-year-old male presenting with swelling, erythema, tenderness to area surrounding a spider bite  Patient was on Keflex prior but is not getting any better  Will rule out clot with ultrasound  Was negative  We will change patient's antibiotic, to include doxycycline  We will have patient stop his Keflex  All of the labs were within normal limits  Patient looks well on the time of discharge and is in no acute distress  Patient's vitals, lab/imaging results, diagnosis, and treatment plan were discussed with the patient  All new/changed medications were discussed with patient, specifically, route of administration, how often and when to take, and where they can be picked up  Strict return precautions as well as close follow up with PCP was discussed with the patient and the patient was agreeable to my recommendations  Patient verbally acknowledged understanding of the above communications         Amount and/or Complexity of Data Reviewed  Clinical lab tests: ordered and reviewed  Tests in the radiology section of CPT®: ordered and reviewed  Tests in the medicine section of CPT®: ordered        Disposition  Final diagnoses:   Cellulitis of right lower extremity     Time reflects when diagnosis was documented in both MDM as applicable and the Disposition within this note Time User Action Codes Description Comment    12/16/2022  4:26 PM Abdirahman Matos Add [[id number]] Cellulitis of right lower extremity       ED Disposition     ED Disposition   Discharge    Condition   Stable    Date/Time   Fri Dec 16, 2022  4:26 PM    Comment   Lorna Arshad discharge to home/self care                 Follow-up Information     Follow up With Specialties Details Why Contact Info Additional [address] Emergency Department Emergency Medicine Go to  If symptoms worsen [address] [facility] [address] [facility] Emergency Department, [address], [place], [address] [address] [facility] Schedule an appointment as soon as possible for a visit  As needed [id number] [facility] Male [address]  [phone]             Discharge Medication List as of 12/16/2022  4:36 PM      START taking these medications    Details   doxycycline hyclate (VIBRAMYCIN) 100 mg capsule Take 1 capsule (100 mg total) by mouth 2 (two) times a day for 7 days, Starting Fri 12/16/2022, Until Fri 12/23/2022, Normal         CONTINUE these medications which have NOT CHANGED    Details   atorvastatin (LIPITOR) 40 mg tablet Take 1 tablet (40 mg total) by mouth daily at bedtime, Starting Sun 1/17/2021, Normal      betamethasone dipropionate (DIPROSONE) 0 05 % cream Apply topically 2 (two) times a day, Starting Mon 5/23/2022, Normal      Blood Glucose Monitoring Suppl (OneTouch Verio) w/Device KIT by Does not apply route 2 (two) times a day Dx E11 9, Starting Thu 11/5/2020, Normal      clopidogrel (PLAVIX) 75 mg tablet Take 1 tablet (75 mg total) by mouth daily, Starting Tue 5/25/2021, Normal      insulin NPH-insulin regular (NovoLIN 70/30) 100 units/mL subcutaneous injection Inject:  44 units with breakfast, 20 units with lunch, 30 units with dinner, No Print      Lancets (OneTouch Delica Plus [id number]) MISC 1 Units by Does not apply route 2 (two) times a day Dx E11 9, Starting Thu 11/5/2020, Normal      lisinopril (ZESTRIL) 10 mg tablet Take 1 tablet (10 mg total) by mouth daily, Starting Tue 4/27/2021, Normal      metFORMIN (GLUCOPHAGE-XR) 500 mg 24 hr tablet Take 1 tablet (500 mg total) by mouth daily with dinner, Starting Wed 7/7/2021, Normal      OneTouch Ultra test strip use to test blood sugars four times a day, Normal      oxyCODONE-acetaminophen (PERCOCET) 5-325 mg per tablet Take 1 tablet by mouth every 6 (six) hours as needed for moderate pain Max Daily Amount: 4 tablets, Starting Mon 12/5/2022, Normal      pantoprazole (PROTONIX) 40 mg tablet Take 1 tablet (40 mg total) by mouth daily, Starting Fri 7/8/2022, Normal      rivaroxaban (Xarelto) 2 5 mg tablet Take 1 tablet (2 5 mg total) by mouth daily with breakfast Last dose 6/21/20, Starting Tue 10/11/2022, Normal      UltiCare Insulin Syringe 31G X 5/16" 1 ML MISC Inject under the skin as needed (for injection), Starting Fri 4/8/2022, Normal         STOP taking these medications       cephalexin (KEFLEX) 500 mg capsule Comments:   Reason for Stopping:               No discharge procedures on file      PDMP Review       Value Time User    PDMP Reviewed  Yes 12/5/2022  9:25 PM All Hopkins MD          ED Provider  Electronically Signed by           Ishan Martel PA-C  12/20/22 2958

## 2022-12-29 ENCOUNTER — APPOINTMENT (OUTPATIENT)
Dept: LAB | Facility: MEDICAL CENTER | Age: 67
End: 2022-12-29

## 2022-12-29 DIAGNOSIS — Z12.5 SCREENING FOR MALIGNANT NEOPLASM OF PROSTATE: ICD-10-CM

## 2022-12-29 DIAGNOSIS — E11.42 DIABETIC POLYNEUROPATHY ASSOCIATED WITH TYPE 2 DIABETES MELLITUS (HCC): ICD-10-CM

## 2022-12-29 DIAGNOSIS — I10 ESSENTIAL HYPERTENSION: ICD-10-CM

## 2022-12-29 DIAGNOSIS — E78.2 MIXED HYPERLIPIDEMIA: ICD-10-CM

## 2022-12-29 DIAGNOSIS — E11.65 POORLY CONTROLLED TYPE 2 DIABETES MELLITUS (HCC): ICD-10-CM

## 2022-12-29 LAB
ALBUMIN SERPL BCP-MCNC: 4 G/DL (ref 3.5–5)
ALP SERPL-CCNC: 73 U/L (ref 46–116)
ALT SERPL W P-5'-P-CCNC: 41 U/L (ref 12–78)
ANION GAP SERPL CALCULATED.3IONS-SCNC: 6 MMOL/L (ref 4–13)
AST SERPL W P-5'-P-CCNC: 20 U/L (ref 5–45)
BACTERIA UR QL AUTO: ABNORMAL /HPF
BASOPHILS # BLD AUTO: 0.01 THOUSANDS/ÂΜL (ref 0–0.1)
BASOPHILS NFR BLD AUTO: 0 % (ref 0–1)
BILIRUB SERPL-MCNC: 0.65 MG/DL (ref 0.2–1)
BILIRUB UR QL STRIP: NEGATIVE
BUN SERPL-MCNC: 18 MG/DL (ref 5–25)
CALCIUM SERPL-MCNC: 9.7 MG/DL (ref 8.3–10.1)
CHLORIDE SERPL-SCNC: 106 MMOL/L (ref 96–108)
CHOLEST SERPL-MCNC: 202 MG/DL
CLARITY UR: CLEAR
CO2 SERPL-SCNC: 27 MMOL/L (ref 21–32)
COLOR UR: YELLOW
CREAT SERPL-MCNC: 1.12 MG/DL (ref 0.6–1.3)
CREAT UR-MCNC: 256 MG/DL
EOSINOPHIL # BLD AUTO: 0.11 THOUSAND/ÂΜL (ref 0–0.61)
EOSINOPHIL NFR BLD AUTO: 2 % (ref 0–6)
ERYTHROCYTE [DISTWIDTH] IN BLOOD BY AUTOMATED COUNT: 12.2 % (ref 11.6–15.1)
EST. AVERAGE GLUCOSE BLD GHB EST-MCNC: 246 MG/DL
GFR SERPL CREATININE-BSD FRML MDRD: 67 ML/MIN/1.73SQ M
GLUCOSE SERPL-MCNC: 236 MG/DL (ref 65–140)
GLUCOSE UR STRIP-MCNC: ABNORMAL MG/DL
GRAN CASTS #/AREA URNS LPF: ABNORMAL /[LPF]
HBA1C MFR BLD: 10.2 %
HCT VFR BLD AUTO: 50.1 % (ref 36.5–49.3)
HDLC SERPL-MCNC: 45 MG/DL
HGB BLD-MCNC: 16.2 G/DL (ref 12–17)
HGB UR QL STRIP.AUTO: NEGATIVE
HYALINE CASTS #/AREA URNS LPF: ABNORMAL /LPF
IMM GRANULOCYTES # BLD AUTO: 0.02 THOUSAND/UL (ref 0–0.2)
IMM GRANULOCYTES NFR BLD AUTO: 0 % (ref 0–2)
KETONES UR STRIP-MCNC: NEGATIVE MG/DL
LDLC SERPL CALC-MCNC: 121 MG/DL (ref 0–100)
LEUKOCYTE ESTERASE UR QL STRIP: ABNORMAL
LYMPHOCYTES # BLD AUTO: 2.09 THOUSANDS/ÂΜL (ref 0.6–4.47)
LYMPHOCYTES NFR BLD AUTO: 33 % (ref 14–44)
MAGNESIUM SERPL-MCNC: 2 MG/DL (ref 1.6–2.6)
MCH RBC QN AUTO: 31.5 PG (ref 26.8–34.3)
MCHC RBC AUTO-ENTMCNC: 32.3 G/DL (ref 31.4–37.4)
MCV RBC AUTO: 98 FL (ref 82–98)
MICROALBUMIN UR-MCNC: 253 MG/L (ref 0–20)
MICROALBUMIN/CREAT 24H UR: 99 MG/G CREATININE (ref 0–30)
MONOCYTES # BLD AUTO: 0.51 THOUSAND/ÂΜL (ref 0.17–1.22)
MONOCYTES NFR BLD AUTO: 8 % (ref 4–12)
MUCOUS THREADS UR QL AUTO: ABNORMAL
NEUTROPHILS # BLD AUTO: 3.66 THOUSANDS/ÂΜL (ref 1.85–7.62)
NEUTS SEG NFR BLD AUTO: 57 % (ref 43–75)
NITRITE UR QL STRIP: NEGATIVE
NON-SQ EPI CELLS URNS QL MICRO: ABNORMAL /HPF
NRBC BLD AUTO-RTO: 0 /100 WBCS
PH UR STRIP.AUTO: 5.5 [PH]
PLATELET # BLD AUTO: 180 THOUSANDS/UL (ref 149–390)
PMV BLD AUTO: 11 FL (ref 8.9–12.7)
POTASSIUM SERPL-SCNC: 4.2 MMOL/L (ref 3.5–5.3)
PROT SERPL-MCNC: 8.4 G/DL (ref 6.4–8.4)
PROT UR STRIP-MCNC: ABNORMAL MG/DL
PSA SERPL-MCNC: 0.2 NG/ML (ref 0–4)
RBC # BLD AUTO: 5.14 MILLION/UL (ref 3.88–5.62)
RBC #/AREA URNS AUTO: ABNORMAL /HPF
SODIUM SERPL-SCNC: 139 MMOL/L (ref 135–147)
SP GR UR STRIP.AUTO: 1.03 (ref 1–1.03)
TRIGL SERPL-MCNC: 178 MG/DL
URATE SERPL-MCNC: 6.9 MG/DL (ref 3.5–8.5)
UROBILINOGEN UR STRIP-ACNC: <2 MG/DL
WBC # BLD AUTO: 6.4 THOUSAND/UL (ref 4.31–10.16)
WBC #/AREA URNS AUTO: ABNORMAL /HPF

## 2023-01-04 ENCOUNTER — OFFICE VISIT (OUTPATIENT)
Dept: FAMILY MEDICINE CLINIC | Facility: CLINIC | Age: 68
End: 2023-01-04

## 2023-01-04 VITALS
SYSTOLIC BLOOD PRESSURE: 140 MMHG | BODY MASS INDEX: 34.49 KG/M2 | WEIGHT: 202 LBS | OXYGEN SATURATION: 96 % | HEIGHT: 64 IN | DIASTOLIC BLOOD PRESSURE: 72 MMHG | TEMPERATURE: 97.1 F | HEART RATE: 89 BPM

## 2023-01-04 DIAGNOSIS — F11.20 CONTINUOUS OPIOID DEPENDENCE (HCC): ICD-10-CM

## 2023-01-04 DIAGNOSIS — E11.621 TYPE 2 DIABETES MELLITUS WITH FOOT ULCER (CODE) (HCC): ICD-10-CM

## 2023-01-04 DIAGNOSIS — I48.91 ATRIAL FIBRILLATION, UNSPECIFIED TYPE (HCC): ICD-10-CM

## 2023-01-04 DIAGNOSIS — M86.9 OSTEOMYELITIS, UNSPECIFIED SITE, UNSPECIFIED TYPE (HCC): ICD-10-CM

## 2023-01-04 DIAGNOSIS — I74.3 EMBOLISM AND THROMBOSIS OF ARTERIES OF THE LOWER EXTREMITIES (HCC): Primary | ICD-10-CM

## 2023-01-04 DIAGNOSIS — Z89.512 S/P BKA (BELOW KNEE AMPUTATION), LEFT (HCC): ICD-10-CM

## 2023-01-04 DIAGNOSIS — D69.6 PLATELETS DECREASED (HCC): ICD-10-CM

## 2023-01-04 DIAGNOSIS — E11.65 POORLY CONTROLLED TYPE 2 DIABETES MELLITUS (HCC): ICD-10-CM

## 2023-01-04 NOTE — PROGRESS NOTES
Name: Ekta Duran      : 1955      MRN: [id number]  Encounter Provider: Barrett Ward MD  Encounter Date: 2023   Encounter department: [address] [place] [address] Place     1  Embolism and thrombosis of arteries of the lower extremities ([facility] )    2  Poorly controlled type 2 diabetes mellitus (HCC)  -     metFORMIN (GLUCOPHAGE) 1000 MG tablet; Take 1 tablet (1,000 mg total) by mouth 2 (two) times a day with meals    3  Atrial fibrillation, unspecified type ([facility] )    4  S/P BKA (below knee amputation), left ([facility] )    5  Type 2 diabetes mellitus with foot ulcer (CODE) ([place] [address] )    6  Osteomyelitis, unspecified site, unspecified type ([facility] )    7  Continuous opioid dependence ([place] [address] )    8  Platelets decreased ([place] [address] )         Subjective     60-year-old male here today for checkup on multiple medical problems  Patient with type 2 diabetes hypertension peripheral vascular disease and atrial fibrillation  Patient is here for addressing his diabetes today sugars have been way high his A1c was 10 2  Patient is taking novel and 7030 he is here and is 3 times a day with meals and he is taking and how he feels fit by his sugars with sliding scale  This is not working for him  Patient also has been taking metformin 500 mg in the morning we will see about increase at 2000 mg twice a day in conjunction with his current insulin regimen  Also going to see about seeing her back in about 6 weeks to reassess him with his sugars for sugars are still high we can see about adding Actos 45 mg once a day pending what his sugars are running with the higher dose of metformin  Review of Systems   Constitutional: Negative for activity change, appetite change, chills, fatigue, fever and unexpected weight change  HENT: Negative for congestion, ear pain, hearing loss, mouth sores, postnasal drip, sinus pressure, sinus pain, sneezing and sore throat      Respiratory: Negative for apnea, cough, shortness of breath and wheezing  Cardiovascular: Negative for chest pain, palpitations and leg swelling  Gastrointestinal: Negative for abdominal pain, constipation, diarrhea, nausea and vomiting  Endocrine: Negative for cold intolerance and heat intolerance  Genitourinary: Negative for dysuria, frequency and hematuria  Musculoskeletal: Positive for gait problem  Negative for arthralgias, back pain, joint swelling and neck pain  Skin: Negative for rash  Neurological: Negative for dizziness, weakness and numbness  Hematological: Does not bruise/bleed easily  Psychiatric/Behavioral: Negative for agitation, behavioral problems, confusion, hallucinations and sleep disturbance  The patient is not nervous/anxious  Past Medical History:   Diagnosis Date   • Arthritis     fingers   • First degree AV block    • Full dentures    • Hypertension    • PAD (peripheral artery disease) ([facility])    • PVD (peripheral vascular disease) ([facility])    • Type 2 diabetes mellitus with diabetic peripheral angiopathy without gangrene ([facility] ) 5/18/2021    Per CMS ICD 10 guidelines--Per Physician   • Wound, open     right foot- by the 5th toe     Past Surgical History:   Procedure Laterality Date   • CHOLECYSTECTOMY     • COLONOSCOPY     • IR AORTAGRAM WITH RUN-OFF  1/28/2021   • IR AORTAGRAM WITH RUN-OFF  4/13/2021   • LEG AMPUTATION THROUGH LOWER TIBIA AND FIBULA Left 7/17/2021    Procedure: AMPUTATION BELOW KNEE (BKA);   Surgeon: Andrew De MD;  Location: [facility];  Service: Vascular   • AR BYP FEM-ANT TIBL PST TIBL PRONEAL ART/OTH DSTL Left 7/14/2021    Procedure: BYPASS femoral-peroneal artery bypass with  reverse GSV and balloon angioplasty peroneal artery;  Surgeon: Andrew De MD;  Location: [facility];  Service: Vascular   • AR DEBRIDEMENT BONE MUSCLE &/FASCIA 20 SQ CM/< Right 12/18/2020    Procedure: DEBRIDMENT OF ULCER , REMOVAL OF DEVITALIZED SKIN, SOFT TISSUE, BONE AND APPLICATION OF INTEGRA GRAFT RIGHT FOOT ; Surgeon: Serge Okeefe DPM;  Location: [address] [place] [address];  Service: Podiatry   • REPLANTATION THUMB Right     right tip reconnected   • SKIN GRAFT      right thumb   • TOE AMPUTATION      left foot all 5 toes removed   • TOE AMPUTATION Right 2/2/2021    Procedure: Right partial 5th ray amputation;  Surgeon: Nicolas Coffman DPM;  Location: [facility];  Service: Podiatry   • TOE OSTEOTOMY Right 6/26/2020    Procedure: exostosis of right big toe;  Surgeon: Lakhwinder Maxwell DPM;  Location: [address] [place] [address];  Service: Podiatry   • TRIGGER FINGER RELEASE      bilateral hands   • VEIN LIGATION      right     Family History   Problem Relation Age of Onset   • Arthritis Mother    • Pancreatic cancer Mother    • Cancer Father         colon   • Alzheimer's disease Father      Social History     Socioeconomic History   • Marital status: /Civil Union     Spouse name: Jeanie Bruner   • Number of children: None   • Years of education: 12   • Highest education level: High school graduate   Occupational History   • Occupation: [employer]   Tobacco Use   • Smoking status: Never   • Smokeless tobacco: Never   Vaping Use   • Vaping Use: Never used   Substance and Sexual Activity   • Alcohol use: Not Currently     Comment: occasional   • Drug use: Never   • Sexual activity: Yes     Partners: Female   Other Topics Concern   • None   Social History Narrative         Social Determinants of Health     Financial Resource Strain: Not on file   Food Insecurity: Not on file   Transportation Needs: Not on file   Physical Activity: Not on file   Stress: Not on file   Social Connections: Not on file   Intimate Partner Violence: Not on file   Housing Stability: Not on file     Current Outpatient Medications on File Prior to Visit   Medication Sig   • atorvastatin (LIPITOR) 40 mg tablet Take 1 tablet (40 mg total) by mouth daily at bedtime   • betamethasone dipropionate (DIPROSONE) 0 05 % cream Apply topically 2 (two) times a day   • clopidogrel (PLAVIX) 75 mg tablet Take 1 tablet (75 mg total) by mouth daily   • insulin NPH-insulin regular (NovoLIN 70/30) 100 units/mL subcutaneous injection Inject:  44 units with breakfast, 20 units with lunch, 30 units with dinner (Patient taking differently: Inject:  44 units with breakfast, 20 units with lunch, 30 units with dinner)   • Lancets (OneTouch Delica Plus [id number]) MISC 1 Units by Does not apply route 2 (two) times a day Dx E11 9   • lisinopril (ZESTRIL) 10 mg tablet Take 1 tablet (10 mg total) by mouth daily   • OneTouch Ultra test strip use to test blood sugars four times a day   • oxyCODONE-acetaminophen (PERCOCET) 5-325 mg per tablet Take 1 tablet by mouth every 6 (six) hours as needed for moderate pain Max Daily Amount: 4 tablets   • pantoprazole (PROTONIX) 40 mg tablet Take 1 tablet (40 mg total) by mouth daily   • rivaroxaban (Xarelto) 2 5 mg tablet Take 1 tablet (2 5 mg total) by mouth daily with breakfast Last dose 6/21/20   • UltiCare Insulin Syringe 31G X 5/16" 1 ML MISC Inject under the skin as needed (for injection)   • [DISCONTINUED] metFORMIN (GLUCOPHAGE-XR) 500 mg 24 hr tablet Take 1 tablet (500 mg total) by mouth daily with dinner   • atorvastatin (LIPITOR) 40 mg tablet Take 1 tablet (40 mg total) by mouth daily at bedtime   • Blood Glucose Monitoring Suppl (OneTouch Verio) w/Device KIT by Does not apply route 2 (two) times a day Dx E11 9   • lisinopril (ZESTRIL) 10 mg tablet Take 1 tablet (10 mg total) by mouth daily     Allergies   Allergen Reactions   • Shellfish-Derived Products - Food Allergy Anaphylaxis     Throat closes   • Sulfamethoxazole-Trimethoprim Rash     Immunization History   Administered Date(s) Administered   • INFLUENZA 11/02/2018   • Influenza, high dose seasonal 0 7 mL 11/05/2020, 10/14/2021, 10/17/2022       Objective     /72 (BP Location: Left arm, Patient Position: Sitting, Cuff Size: Standard)   Pulse 89   Temp (!) 97 1 °F (36 2 °C) (Temporal)   Ht 5' 4" (1 626 m)   Wt 91 6 kg (202 lb) SpO2 96%   BMI 34 67 kg/m²     Physical Exam  Vitals and nursing note reviewed  Constitutional:       Appearance: He is well-developed  HENT:      Head: Normocephalic and atraumatic  Nose: Nose normal    Eyes:      General: No scleral icterus  Conjunctiva/sclera: Conjunctivae normal       Pupils: Pupils are equal, round, and reactive to light  Neck:      Thyroid: No thyromegaly  Cardiovascular:      Rate and Rhythm: Normal rate and regular rhythm  Heart sounds: Normal heart sounds  Pulmonary:      Effort: Pulmonary effort is normal  No respiratory distress  Breath sounds: Normal breath sounds  No wheezing  Abdominal:      General: Bowel sounds are normal       Palpations: Abdomen is soft  Tenderness: There is no abdominal tenderness  There is no guarding or rebound  Musculoskeletal:         General: Normal range of motion  Cervical back: Normal range of motion and neck supple  Comments: LLE amputation   Skin:     General: Skin is warm and dry  Findings: No rash  Neurological:      Mental Status: He is alert and oriented to person, place, and time     Psychiatric:         Behavior: Behavior normal        North Campoverde MD

## 2023-01-10 DIAGNOSIS — G89.29 CHRONIC BACK PAIN, UNSPECIFIED BACK LOCATION, UNSPECIFIED BACK PAIN LATERALITY: ICD-10-CM

## 2023-01-10 DIAGNOSIS — M54.9 CHRONIC BACK PAIN, UNSPECIFIED BACK LOCATION, UNSPECIFIED BACK PAIN LATERALITY: ICD-10-CM

## 2023-01-10 RX ORDER — OXYCODONE HYDROCHLORIDE AND ACETAMINOPHEN 5; 325 MG/1; MG/1
1 TABLET ORAL EVERY 6 HOURS PRN
Qty: 120 TABLET | Refills: 0 | Status: SHIPPED | OUTPATIENT
Start: 2023-01-10

## 2023-01-11 DIAGNOSIS — K21.9 GASTROESOPHAGEAL REFLUX DISEASE WITHOUT ESOPHAGITIS: ICD-10-CM

## 2023-01-11 RX ORDER — PANTOPRAZOLE SODIUM 40 MG/1
TABLET, DELAYED RELEASE ORAL
Qty: 90 TABLET | Refills: 0 | Status: SHIPPED | OUTPATIENT
Start: 2023-01-11

## 2023-01-13 DIAGNOSIS — E11.52 TYPE 2 DIABETES MELLITUS WITH DIABETIC PERIPHERAL ANGIOPATHY AND GANGRENE, WITH LONG-TERM CURRENT USE OF INSULIN (HCC): ICD-10-CM

## 2023-01-13 DIAGNOSIS — Z79.4 TYPE 2 DIABETES MELLITUS WITH DIABETIC PERIPHERAL ANGIOPATHY AND GANGRENE, WITH LONG-TERM CURRENT USE OF INSULIN (HCC): ICD-10-CM

## 2023-01-13 RX ORDER — BLOOD SUGAR DIAGNOSTIC
1 STRIP MISCELLANEOUS 4 TIMES DAILY
Qty: 400 EACH | Refills: 3 | Status: SHIPPED | OUTPATIENT
Start: 2023-01-13

## 2023-02-07 DIAGNOSIS — G89.29 CHRONIC BACK PAIN, UNSPECIFIED BACK LOCATION, UNSPECIFIED BACK PAIN LATERALITY: ICD-10-CM

## 2023-02-07 DIAGNOSIS — M54.9 CHRONIC BACK PAIN, UNSPECIFIED BACK LOCATION, UNSPECIFIED BACK PAIN LATERALITY: ICD-10-CM

## 2023-02-07 RX ORDER — OXYCODONE HYDROCHLORIDE AND ACETAMINOPHEN 5; 325 MG/1; MG/1
1 TABLET ORAL EVERY 6 HOURS PRN
Qty: 120 TABLET | Refills: 0 | Status: SHIPPED | OUTPATIENT
Start: 2023-02-07

## 2023-02-07 NOTE — TELEPHONE ENCOUNTER
Refill requested The instruction was given to the patient via home setting.    This visit is being performed via video to discuss Instruct DM - Initial and Video Visit    Clinician Location: [facility]    Alaina is in [place] and her identity has been established.   She understands that we are limiting office visits due to the coronavirus pandemic and was informed that consent to treat includes permission to submit charges to her insurance. It was also shared that without being seen and evaluated in person, there is a risk that the information and/or assessment may be incomplete or inaccurate.       Gestational Diabetes Self Assessment    Learning Style  When learning something new, do you prefer:   any  Reading health information can be difficult.Occasionally  How often do you have a problem understanding what is told to you about your medical condition? Occasionally    Health History  Do you have a history of prediabetes or glucose intolerance? No  Have you gestational diabetes in a previous pregnancy? No  Have you ever had a baby weighting 9 pounds or more at birth? No  Does anyone in your family havediabetes? Yes  Do you understand the diabetes lab results that your doctor provided? No  Have you had diabetes education before? No    Nutrition / Meal Planning  Do you eat three meals a day?  No  Do you eat at about the same time each day?  No  Do have diet restrictions? (low salt, no dairy products, no wheat products)  No  Do you have any weight or eating concerns? No  Are you trying to lose weight; currently following a diet/meal plan? No  Is it hard to control what you eat? No  Do you drink regular soda or fruit drinks (orange juice, fruit punch)?  No  Do you eat desserts/sugar sweetened foods?  No  Do you drink alcohol?  No  Do you \"eat out\" or get \"carry out\" more than once per week? No  Do you do your own cooking? Yes  Do you do your own food shopping?  Yes    Activity  Do you exercise at least  30 minutes/3 or more times per week?  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  No    Monitoring  Do you have a blood sugar monitor? No  Do you have a sharps container?  No    Medication  Are you aware medication may be used to control blood sugars during pregnancy?  No   Are you taking any herbal or vitamin/mineral supplements? No    Complications/Problems/General Health  Are you aware of the complications for mothers related to diabetes and pregnancy? No  Are you aware of the complications for babies related to diabetes and pregnancy? No  Did you had a dilated eye exam in the past year? No  Have you seen a dentist in the past year?  No  Do you sleep 6-8 hours at a given time?  Yes  Do you know about your doctor visits and follow up care after pregnancy? Yes    Socioeconomic  Do you live alone?  No  Do you feel safe in relationships at home? Yes  Do you work outside the home?  Yes  Do you work second or third shift? Yes  Do money concerns keep you from:   Taking your medications as prescribed? No   Attending medical appointments? No  Within the past 12 months, have you worried whether food would run out before you had money to buy more? No   Do you have cultural or Gnosticist practices or beliefs that influence how you care for your diabetes?  No  Over the past two weeks have you felt little interest or pleasure in doing things? No  Over the past two weeks have you felt down, depressed or hopeless? No  To what degree, during the past month, have you been distressed or bothered by the following:     Feeling overwhelmed with the diagnosis of gestational diabetes?Not a problem    Feeling overwhelmed by the demands of living with gestational diabetes? Not a problem    How do you get support?   significant other and family    Which Topics Would You Like to Learn About?  any    Anything you would like your diabetes educator to know to help  you succeed in caring for your gestational diabetes?       Nutrition/Diabetes Progress Report    Alaina Wilson was seen from 1010 to 1042 for r/o gestational diabetes.  The instruction was given to the patient.  Patient seen for 20 minutes, billed for 20 minutes.    Education Topics   Diabetes and pregnancy x Setting healthy goals x   Planning meals  Post-Partum care/considerations    Monitoring/Blood sugar goals x Managing stress    Treating high/low blood sugars  Sick day care (cold or flu)    Benefits of physical activity  Diabetes lab tests and doctor visits x   Diabetes medications  Insulin pumps or continuous glucose monitoring     What is diabetes x Refresher/review of diabetes    Preventing complications x Community resources    Other:       Material was presented using verbal, written, demonstration and return demonstration.      Highlights of today’s visit:  BELÉN: 12/13/2021  The pt recalls her dad and relatives on his side have h/o DM.  The pt is new to GDM.  She declined OGTT, makes her sick.  GDM risk, complications, insulin needs in pregnancy and strategies to manage it were discussed.    The patient plans to get her meter later but states she knows how to use one.  The BG goals, timing and logging were d/w her.  The plan for her to try meal/activity strategies to meet BG goals, inc before medication option was discussed.  She states she plans to start to SMBG Monday.  She was encouraged to start to monitor today.  She states she'll try to get the monitor today and start tomorrow.    She declines further details about GDM at this time.  She states her appetite is less as she's further along.    Plan for patient to update mid next week.    Assessment of Blood Sugars:    A1c   No results found for: HGBA1C  No results found for: DSPG, GLU0, GLU1, GLU2, GLU3, 5GLYH    Medications:   Diabetes Medication Update/Changes: None  Current Outpatient Medications   Medication Sig Dispense Refill   • OneTouch  Ultra test strip TEST BLOOD SUGARS FOUR TIMES DAILY AS DIRECTED 300 strip 1   • blood glucose lancets Test blood sugar 4 times daily as directed. Diagnosis: O24.419. Meter: Insurance Preference 200 each 3   • blood glucose meter Test blood sugar 4 times daily as directed. Diagnosis: O24.419. Meter: Insurance Preference 1 kit 0   • metoCLOPramide (Reglan) 10 MG tablet Take 1 tablet by mouth every 12 hours as needed for Nausea or Vomiting. 5 tablet 0   • Hydrocortisone Acetate 2.5 % Cream Apply topically daily. 4 g 1   • Doxylamine-Pyridoxine 10-10 MG Tablet Enteric Coated If sx persist, add 1 tab q am starting day 3; if sx persist, add 1 tab q mid- afternoon starting day 4. 100 tablet 1     No current facility-administered medications for this encounter.       Food and Nutrition Related History:   Oral fluids: water  Type of food/meals: limited  Meal/Snack pattern: 2-3 meals and occas snacks, janie hs.  May eat out once/wk     Breakfast  Wakes at 0700  breakfast at 0900  [place] small amt syrup, apple  water   Lunch  [id number]  [place], fruit, water Dinner  May just snack on pretzels, muffin   Snack  1200 apple   Snack  - Snack  -       Area(s) and level of knowledge: new to GDM    Self-reported adherence score: new to SMBG but has seen her father do it.    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  Self-monitoring deficit related to r/o GDM as evidenced by the pt is new to SMBG.    Nutrition Prescription:  SMBG 4  times/day    Intervention:  GDM and reason to SMBG was d/w her     Nutrition Counseling:      Theoretical Basis/Approach (1)   Cognitive-Behavioral Theory   Utilizing:  Goal setting, Self-monitoring and Problem solving.    Monitoring and Evaluation:  She verbalized she's able to SMBG.    Plan to evaluate Self-reported adherence score: SMBG 4 times/day with results in goal at least 80% of the time.    Print/Written Resources Provided:   GDM Eating Guide, FYWB-First Steps to Managing Gestational Diabetes, Food/BS Log, GDM sample menu, Snack Ideas    Recommended Follow-up:  The pt is scheduled with Dr. Salinas.  The patient was encouraged to update weekly and to call back if any questions or concerns.    See Patient Instructions for further information.  Thank you for your referral.  Please contact me with any question/concerns.    This report is routed to the referring provider, LORI Elizabeth, and Dr. Salinas.   The MD referral is located in EMR.

## 2023-02-13 ENCOUNTER — HOSPITAL ENCOUNTER (OUTPATIENT)
Dept: NON INVASIVE DIAGNOSTICS | Facility: CLINIC | Age: 68
Discharge: HOME | End: 2023-02-13

## 2023-02-13 ENCOUNTER — TRANSCRIBE ORDERS (OUTPATIENT)
Dept: VASCULAR SURGERY | Facility: CLINIC | Age: 68
End: 2023-02-13

## 2023-02-13 DIAGNOSIS — I73.9 PVD (PERIPHERAL VASCULAR DISEASE) (HCC): ICD-10-CM

## 2023-02-13 DIAGNOSIS — I73.9 PVD (PERIPHERAL VASCULAR DISEASE) (HCC): Primary | ICD-10-CM

## 2023-02-15 ENCOUNTER — RA CDI HCC (OUTPATIENT)
Dept: OTHER | Facility: HOSPITAL | Age: 68
End: 2023-02-15

## 2023-02-15 NOTE — PROGRESS NOTES
E11 51  [facility]  coding opportunities          Chart Reviewed number of suggestions sent to Provider: 1     Patients Insurance     Medicare Insurance: Alireza Elena

## 2023-03-08 ENCOUNTER — RA CDI HCC (OUTPATIENT)
Dept: OTHER | Facility: HOSPITAL | Age: 68
End: 2023-03-08

## 2023-03-08 DIAGNOSIS — M54.9 CHRONIC BACK PAIN, UNSPECIFIED BACK LOCATION, UNSPECIFIED BACK PAIN LATERALITY: ICD-10-CM

## 2023-03-08 DIAGNOSIS — G89.29 CHRONIC BACK PAIN, UNSPECIFIED BACK LOCATION, UNSPECIFIED BACK PAIN LATERALITY: ICD-10-CM

## 2023-03-08 RX ORDER — OXYCODONE HYDROCHLORIDE AND ACETAMINOPHEN 5; 325 MG/1; MG/1
1 TABLET ORAL EVERY 6 HOURS PRN
Qty: 120 TABLET | Refills: 0 | Status: SHIPPED | OUTPATIENT
Start: 2023-03-08

## 2023-03-08 NOTE — PROGRESS NOTES
E11 51  [facility]  coding opportunities          Chart Reviewed number of suggestions sent to Provider: 1     Patients Insurance     Medicare Insurance: Alireza Elena

## 2023-03-13 NOTE — TELEPHONE ENCOUNTER
Patient is tentatively scheduled for 2/16/21, will call once he is discharged to see if this works Trying to do cessation

## 2023-03-20 DIAGNOSIS — K21.9 GASTROESOPHAGEAL REFLUX DISEASE WITHOUT ESOPHAGITIS: ICD-10-CM

## 2023-03-20 RX ORDER — PANTOPRAZOLE SODIUM 40 MG/1
TABLET, DELAYED RELEASE ORAL
Qty: 90 TABLET | Refills: 0 | Status: SHIPPED | OUTPATIENT
Start: 2023-03-20

## 2023-03-22 NOTE — CASE MANAGEMENT
CM informed that pt may be ready for dc today or tomorrow and would need VNA services  Pt did not have a preference on an agency, Cm sent referral to [facility]  Pt is now [facility] accepted  Pt is now requesting a walker  CM will continue to follow  Yes

## 2023-05-05 ENCOUNTER — TELEPHONE (OUTPATIENT)
Dept: FAMILY MEDICINE CLINIC | Facility: CLINIC | Age: 68
End: 2023-05-05

## 2023-05-09 ENCOUNTER — TELEPHONE (OUTPATIENT)
Dept: GASTROENTEROLOGY | Facility: CLINIC | Age: 68
End: 2023-05-09

## 2023-05-09 NOTE — TELEPHONE ENCOUNTER
05/09/23  Screened by: Maryuri Nino    Referring Provider Dr Adenike Espinosa: There is no height or weight on file to calculate BMI  Has patient been referred for a routine screening Colonoscopy? yes  Is the patient between 39-70 years old? yes      Previous Colonoscopy yes   If yes:    Date: recall    Facility:     Reason:       SCHEDULING STAFF: If the patient is between 39yrs-47yrs, please advise patient to confirm benefits/coverage with their insurance company for a routine screening colonoscopy, some insurance carriers will only cover at [address] or older  If the patient is over 66years old, please schedule an office visit  Does the patient want to see a Gastroenterologist prior to their procedure OR are they having any GI symptoms? no    Has the patient been hospitalized or had abdominal surgery in the past 6 months? no    Does the patient use supplemental oxygen? no    Does the patient take Coumadin, Lovenox, Plavix, Elliquis, Xarelto, or other blood thinning medication? no    Has the patient had a stroke, cardiac event, or stent placed in the past year? no    SCHEDULING STAFF: If patient answers NO to above questions, then schedule procedure  If patient answers YES to above questions, then schedule office appointment  If patient is between 45yrs - 49yrs, please advise patient that we will have to confirm benefits & coverage with their insurance company for a routine screening colonoscopy

## 2023-05-09 NOTE — TELEPHONE ENCOUNTER
Scheduled date of colonoscopy (as of today): 7/10/23   Phhysician performing colonoscopy: Dr James Roberts   Location of colonoscopy: [facility]  Bowel prep reviewed with patient: ledy w/ patrick emailed  Instructions reviewed with patient by: karen  Clearances: n/a

## 2023-05-14 NOTE — PROGRESS NOTES
Name: Pratik Barron      : 1955      MRN: [id number]  Encounter Provider: Romaine Valiente MD  Encounter Date: 5/15/2023   Encounter department: [address] [place] [address] Place     1  Type 2 diabetes mellitus with diabetic peripheral angiopathy without gangrene, with long-term current use of insulin ([facility])  Assessment & Plan:  Needs better control  Lab Results   Component Value Date    HGBA1C 10 2 (H) 2022       Orders:  -     Hemoglobin A1C; Future; Expected date: 2023  -     Comprehensive metabolic panel; Future; Expected date: 2023  -     pioglitazone (ACTOS) 45 mg tablet; Take 1 tablet (45 mg total) by mouth daily    2  Poorly controlled type 2 diabetes mellitus ([address] )  -     IRIS Diabetic eye exam    3  PVD (peripheral vascular disease) ([facility] )  Assessment & Plan:  Patient is stable  and will continue present plan of care and reassess at next routine visit  All questions about this problem from patient were answered today  4  Mixed hyperlipidemia  Assessment & Plan:  Patient  is stable with current medication and we discussed a low fat low cholesterol diet  Weight loss also discussed for this will help lower cholesterol also  Recheck lipids in 6 months  5  Insomnia, unspecified type  Assessment & Plan:  Discussed with patient use of sedative  medications and good  sleep hygiene to maximize treatment for this problem  Pt  to use sedatives only prior to sleep and cautioned them about usage at any other time  All patient questions about this problem answered today  6  Gastroesophageal reflux disease without esophagitis  Assessment & Plan:  Patient to continue with present therapy and decrease caffeine, avoid ETOH and smoking to decrease acid production  Pt should also cease eating prior to bedtime and avoid excessive fluid intake prior to sleep  May use antacids as needed for breakthrough GERD   All pateint questions answered today about this condition  7  Essential hypertension  Assessment & Plan:  Patient is stable with current anti-hypertensive medicine and continue to follow a low sodium diet and take current medication  All questions about this condition were answered today  8  Atrial fibrillation, unspecified type [facility])  Assessment & Plan:  Continue with anticogulation and rate control of heart rate  Concerns about condition were addressed today  9  BPH with obstruction/lower urinary tract symptoms    10  DM (diabetes mellitus), type 2 with peripheral vascular complications (HCC)      BMI Counseling: There is no height or weight on file to calculate BMI  The BMI is above normal  Nutrition recommendations include decreasing portion sizes, encouraging healthy choices of fruits and vegetables, decreasing fast food intake, consuming healthier snacks, limiting drinks that contain sugar, moderation in carbohydrate intake, increasing intake of lean protein, reducing intake of saturated and trans fat and reducing intake of cholesterol  Exercise recommendations include exercising 3-5 times per week  No pharmacotherapy was ordered  Patient referred to PCP  Rationale for BMI follow-up plan is due to patient being overweight or obese  Depression Screening and Follow-up Plan: Patient was screened for depression during today's encounter  They screened negative with a PHQ-2 score of 0  Falls Plan of Care: balance, strength, and gait training instructions were provided  Home safety education provided  Subjective     This 65-year-old male here today for checkup on multiple medical problem patient with type 2 diabetes peripheral vascular disease history of BKA on the left side  Patient also with atrial fibrillation and is doing well with his medications  Patient with high sugars in the past and he is due for his A1c    Patient is tells me his sugars are going to be high again I Roberto Carlos Spear see about adding some Actos to his current regimen of insulin and metformin  We will see him back in approximately 3 months and see how he is doing with that  Patient was he refills on his lancets which was done for him today  Review of Systems   Constitutional: Negative for activity change, appetite change, chills, fatigue, fever and unexpected weight change  HENT: Negative for congestion, ear pain, hearing loss, mouth sores, postnasal drip, sinus pressure, sinus pain, sneezing and sore throat  Respiratory: Negative for apnea, cough, shortness of breath and wheezing  Cardiovascular: Negative for chest pain, palpitations and leg swelling  Gastrointestinal: Negative for abdominal pain, constipation, diarrhea, nausea and vomiting  Endocrine: Negative for cold intolerance and heat intolerance  Genitourinary: Positive for decreased urine volume and urgency  Negative for dysuria, frequency and hematuria  Musculoskeletal: Negative for arthralgias, back pain, gait problem, joint swelling and neck pain  Skin: Negative for rash  Neurological: Negative for dizziness, weakness and numbness  Hematological: Does not bruise/bleed easily  Psychiatric/Behavioral: Negative for agitation, behavioral problems, confusion, hallucinations and sleep disturbance  The patient is not nervous/anxious          Past Medical History:   Diagnosis Date   • Arthritis     fingers   • First degree AV block    • Full dentures    • Hypertension    • PAD (peripheral artery disease) ([facility])    • PVD (peripheral vascular disease) ([facility])    • Type 2 diabetes mellitus with diabetic peripheral angiopathy without gangrene ([facility] ) 5/18/2021    Per CMS ICD 10 guidelines--Per Physician   • Wound, open     right foot- by the 5th toe     Past Surgical History:   Procedure Laterality Date   • CHOLECYSTECTOMY     • COLONOSCOPY     • IR AORTAGRAM WITH RUN-OFF  1/28/2021   • IR AORTAGRAM WITH RUN-OFF  4/13/2021   • LEG AMPUTATION THROUGH LOWER TIBIA AND FIBULA Left 7/17/2021    Procedure: AMPUTATION BELOW KNEE (BKA);   Surgeon: Julianna Bourgeois MD;  [facility];  Service: Vascular   • VT BYP FEM-ANT TIBL PST TIBL PRONEAL ART/OTH DSTL Left 7/14/2021    Procedure: BYPASS femoral-peroneal artery bypass with  reverse GSV and balloon angioplasty peroneal artery;  Surgeon: Julianna Bourgeois MD;  [facility];  Service: Vascular   • VT DEBRIDEMENT BONE MUSCLE &/FASCIA 20 SQ CM/< Right 12/18/2020    Procedure: DEBRIDMENT OF ULCER , REMOVAL OF DEVITALIZED SKIN, SOFT TISSUE, BONE AND APPLICATION OF INTEGRA GRAFT RIGHT FOOT ;  Surgeon: Jess Hudson DPM;  Location: [address] [place] [address];  Service: Podiatry   • REPLANTATION THUMB Right     right tip reconnected   • SKIN GRAFT      right thumb   • TOE AMPUTATION      left foot all 5 toes removed   • TOE AMPUTATION Right 2/2/2021    Procedure: Right partial 5th ray amputation;  Surgeon: Sanchez Parada DPM;  [facility];  Service: Podiatry   • TOE OSTEOTOMY Right 6/26/2020    Procedure: exostosis of right big toe;  Surgeon: Yinka Lacey DPM;  Location: [address] [place] [address];  Service: Podiatry   • TRIGGER FINGER RELEASE      bilateral hands   • VEIN LIGATION      right     Family History   Problem Relation Age of Onset   • Arthritis Mother    • Pancreatic cancer Mother    • Cancer Father         colon   • Alzheimer's disease Father      Social History     Socioeconomic History   • Marital status: /Civil Union     Spouse name: Vilma Bradshaw   • Number of children: None   • Years of education: 12   • Highest education level: High school graduate   Occupational History   • Occupation: [employer]   Tobacco Use   • Smoking status: Never   • Smokeless tobacco: Never   Vaping Use   • Vaping Use: Never used   Substance and Sexual Activity   • Alcohol use: Not Currently     Comment: occasional   • Drug use: Never   • Sexual activity: Yes     Partners: Female   Other Topics Concern   • None   Social History Narrative         Social Determinants of Health     Financial Resource Strain: "Not on file   Food Insecurity: Not on file   Transportation Needs: Not on file   Physical Activity: Not on file   Stress: Not on file   Social Connections: Not on file   Intimate Partner Violence: Not on file   Housing Stability: Not on file     Current Outpatient Medications on File Prior to Visit   Medication Sig   • atorvastatin (LIPITOR) 40 mg tablet Take 1 tablet (40 mg total) by mouth daily at bedtime   • betamethasone dipropionate (DIPROSONE) 0 05 % cream Apply topically 2 (two) times a day   • Blood Glucose Monitoring Suppl (OneTouch Verio) w/Device KIT by Does not apply route 2 (two) times a day Dx E11 9   • clopidogrel (PLAVIX) 75 mg tablet Take 1 tablet (75 mg total) by mouth daily   • glucose blood (OneTouch Ultra) test strip Use 1 each 4 (four) times a day To test blood sugars   • Lancets (OneTouch Delica Plus [id number]) MISC 1 Units by Does not apply route 2 (two) times a day Dx E11 9   • lisinopril (ZESTRIL) 10 mg tablet Take 1 tablet (10 mg total) by mouth daily   • metFORMIN (GLUCOPHAGE) 1000 MG tablet Take 1 tablet (1,000 mg total) by mouth 2 (two) times a day with meals   • oxyCODONE-acetaminophen (PERCOCET) 5-325 mg per tablet Take 1 tablet by mouth every 6 (six) hours as needed for moderate pain Max Daily Amount: 4 tablets   • pantoprazole (PROTONIX) 40 mg tablet TAKE ONE TABLET BY MOUTH ONCE DAILY   • rivaroxaban (Xarelto) 2 5 mg tablet Take 1 tablet (2 5 mg total) by mouth daily with breakfast Last dose 6/21/20   • UltiCare Insulin Syringe 31G X 5/16\" 1 ML MISC Inject under the skin as needed (for injection)   • insulin NPH-insulin regular (NovoLIN 70/30) 100 units/mL subcutaneous injection Inject:  44 units with breakfast, 20 units with lunch, 30 units with dinner (Patient taking differently: Inject:  44 units with breakfast, 20 units with lunch, 30 units with dinner)     Allergies   Allergen Reactions   • Shellfish-Derived Products - Food Allergy Anaphylaxis     Throat closes   • " "Sulfamethoxazole-Trimethoprim Rash     Immunization History   Administered Date(s) Administered   • INFLUENZA 11/02/2018   • Influenza, high dose seasonal 0 7 mL 11/05/2020, 10/14/2021, 10/17/2022       Objective     /80 (BP Location: Left arm, Patient Position: Sitting, Cuff Size: Standard)   Pulse 78   Temp 98 1 °F (36 7 °C)   Resp 18   Ht 5' 4\" (1 626 m)   Wt 90 8 kg (200 lb 1 6 oz)   SpO2 97%   BMI 34 35 kg/m²     Physical Exam  Vitals and nursing note reviewed  Constitutional:       Appearance: He is well-developed  HENT:      Head: Normocephalic and atraumatic  Nose: Nose normal    Eyes:      General: No scleral icterus  Conjunctiva/sclera: Conjunctivae normal       Pupils: Pupils are equal, round, and reactive to light  Neck:      Thyroid: No thyromegaly  Cardiovascular:      Rate and Rhythm: Normal rate and regular rhythm  Heart sounds: Normal heart sounds  Pulmonary:      Effort: Pulmonary effort is normal  No respiratory distress  Breath sounds: Normal breath sounds  No wheezing  Abdominal:      General: Bowel sounds are normal       Palpations: Abdomen is soft  Tenderness: There is no abdominal tenderness  There is no guarding or rebound  Musculoskeletal:         General: Normal range of motion  Cervical back: Normal range of motion and neck supple  Skin:     General: Skin is warm and dry  Findings: No rash  Neurological:      Mental Status: He is alert and oriented to person, place, and time  Psychiatric:         Mood and Affect: Mood normal          Behavior: Behavior normal          Thought Content:  Thought content normal          Judgment: Judgment normal        Migel Deal MD  "

## 2023-05-15 ENCOUNTER — OFFICE VISIT (OUTPATIENT)
Dept: FAMILY MEDICINE CLINIC | Facility: CLINIC | Age: 68
End: 2023-05-15

## 2023-05-15 VITALS
WEIGHT: 200.1 LBS | SYSTOLIC BLOOD PRESSURE: 142 MMHG | HEIGHT: 64 IN | HEART RATE: 78 BPM | RESPIRATION RATE: 18 BRPM | BODY MASS INDEX: 34.16 KG/M2 | TEMPERATURE: 98.1 F | DIASTOLIC BLOOD PRESSURE: 80 MMHG | OXYGEN SATURATION: 97 %

## 2023-05-15 DIAGNOSIS — G47.00 INSOMNIA, UNSPECIFIED TYPE: ICD-10-CM

## 2023-05-15 DIAGNOSIS — E11.51 DM (DIABETES MELLITUS), TYPE 2 WITH PERIPHERAL VASCULAR COMPLICATIONS (HCC): ICD-10-CM

## 2023-05-15 DIAGNOSIS — I48.91 ATRIAL FIBRILLATION, UNSPECIFIED TYPE (HCC): ICD-10-CM

## 2023-05-15 DIAGNOSIS — I10 ESSENTIAL HYPERTENSION: ICD-10-CM

## 2023-05-15 DIAGNOSIS — K21.9 GASTROESOPHAGEAL REFLUX DISEASE WITHOUT ESOPHAGITIS: ICD-10-CM

## 2023-05-15 DIAGNOSIS — Z79.4 TYPE 2 DIABETES MELLITUS WITH DIABETIC PERIPHERAL ANGIOPATHY WITHOUT GANGRENE, WITH LONG-TERM CURRENT USE OF INSULIN (HCC): Primary | ICD-10-CM

## 2023-05-15 DIAGNOSIS — E11.65 POORLY CONTROLLED TYPE 2 DIABETES MELLITUS (HCC): ICD-10-CM

## 2023-05-15 DIAGNOSIS — N40.1 BPH WITH OBSTRUCTION/LOWER URINARY TRACT SYMPTOMS: ICD-10-CM

## 2023-05-15 DIAGNOSIS — I73.9 PVD (PERIPHERAL VASCULAR DISEASE) (HCC): ICD-10-CM

## 2023-05-15 DIAGNOSIS — E78.2 MIXED HYPERLIPIDEMIA: ICD-10-CM

## 2023-05-15 DIAGNOSIS — N13.8 BPH WITH OBSTRUCTION/LOWER URINARY TRACT SYMPTOMS: ICD-10-CM

## 2023-05-15 DIAGNOSIS — E11.51 TYPE 2 DIABETES MELLITUS WITH DIABETIC PERIPHERAL ANGIOPATHY WITHOUT GANGRENE, WITH LONG-TERM CURRENT USE OF INSULIN (HCC): Primary | ICD-10-CM

## 2023-05-15 RX ORDER — PIOGLITAZONEHYDROCHLORIDE 45 MG/1
45 TABLET ORAL DAILY
Qty: 90 TABLET | Refills: 1 | Status: SHIPPED | OUTPATIENT
Start: 2023-05-15

## 2023-05-17 DIAGNOSIS — M54.9 CHRONIC BACK PAIN, UNSPECIFIED BACK LOCATION, UNSPECIFIED BACK PAIN LATERALITY: ICD-10-CM

## 2023-05-17 DIAGNOSIS — G89.29 CHRONIC BACK PAIN, UNSPECIFIED BACK LOCATION, UNSPECIFIED BACK PAIN LATERALITY: ICD-10-CM

## 2023-05-17 RX ORDER — OXYCODONE HYDROCHLORIDE AND ACETAMINOPHEN 5; 325 MG/1; MG/1
1 TABLET ORAL EVERY 6 HOURS PRN
Qty: 120 TABLET | Refills: 0 | Status: SHIPPED | OUTPATIENT
Start: 2023-05-17

## 2023-06-01 DIAGNOSIS — E11.51 TYPE 2 DIABETES MELLITUS WITH DIABETIC PERIPHERAL ANGIOPATHY WITHOUT GANGRENE, WITH LONG-TERM CURRENT USE OF INSULIN (HCC): ICD-10-CM

## 2023-06-01 DIAGNOSIS — Z79.4 TYPE 2 DIABETES MELLITUS WITH DIABETIC PERIPHERAL ANGIOPATHY WITHOUT GANGRENE, WITH LONG-TERM CURRENT USE OF INSULIN (HCC): ICD-10-CM

## 2023-06-04 RX ORDER — SYRINGE AND NEEDLE,INSULIN,1ML 31 GX5/16"
SYRINGE, EMPTY DISPOSABLE MISCELLANEOUS AS NEEDED
Qty: 100 EACH | Refills: 1 | Status: SHIPPED | OUTPATIENT
Start: 2023-06-04

## 2023-06-23 ENCOUNTER — VBI (OUTPATIENT)
Dept: ADMINISTRATIVE | Facility: OTHER | Age: 68
End: 2023-06-23

## 2023-06-26 ENCOUNTER — TELEPHONE (OUTPATIENT)
Dept: GASTROENTEROLOGY | Facility: CLINIC | Age: 68
End: 2023-06-26

## 2023-06-26 NOTE — TELEPHONE ENCOUNTER
Spoke with patient confirming his colonoscopy 7/10 with Dr Darrell Portillo  He has a , will be called Friday prior with arrival time, and he has his prep instructions

## 2023-06-27 ENCOUNTER — TELEPHONE (OUTPATIENT)
Age: 68
End: 2023-06-27

## 2023-06-27 NOTE — TELEPHONE ENCOUNTER
Caller: Mary Beth Oquendo    Doctor: LEONOR    Reason for call: Calling to check on script    Call back#: [phone]

## 2023-06-30 ENCOUNTER — HOSPITAL ENCOUNTER (EMERGENCY)
Facility: HOSPITAL | Age: 68
Discharge: HOME | End: 2023-06-30
Attending: EMERGENCY MEDICINE
Payer: COMMERCIAL

## 2023-06-30 ENCOUNTER — APPOINTMENT (EMERGENCY)
Dept: RADIOLOGY | Facility: HOSPITAL | Age: 68
End: 2023-06-30
Payer: COMMERCIAL

## 2023-06-30 VITALS
WEIGHT: 189.15 LBS | OXYGEN SATURATION: 94 % | SYSTOLIC BLOOD PRESSURE: 160 MMHG | DIASTOLIC BLOOD PRESSURE: 72 MMHG | BODY MASS INDEX: 32.47 KG/M2 | HEART RATE: 82 BPM | RESPIRATION RATE: 18 BRPM

## 2023-06-30 DIAGNOSIS — L03.116 CELLULITIS OF LEFT KNEE: Primary | ICD-10-CM

## 2023-06-30 PROCEDURE — 99284 EMERGENCY DEPT VISIT MOD MDM: CPT

## 2023-06-30 PROCEDURE — 99284 EMERGENCY DEPT VISIT MOD MDM: CPT | Performed by: EMERGENCY MEDICINE

## 2023-06-30 PROCEDURE — 73564 X-RAY EXAM KNEE 4 OR MORE: CPT

## 2023-06-30 RX ORDER — DOXYCYCLINE HYCLATE 100 MG/1
100 CAPSULE ORAL ONCE
Status: COMPLETED | OUTPATIENT
Start: 2023-06-30 | End: 2023-06-30

## 2023-06-30 RX ORDER — DOXYCYCLINE HYCLATE 100 MG/1
100 CAPSULE ORAL 2 TIMES DAILY
Qty: 20 CAPSULE | Refills: 0 | Status: SHIPPED | OUTPATIENT
Start: 2023-06-30 | End: 2023-07-10

## 2023-06-30 RX ADMIN — DOXYCYCLINE 100 MG: 100 CAPSULE ORAL at 09:57

## 2023-06-30 NOTE — ED PROVIDER NOTES
"History  Chief Complaint   Patient presents with   • Knee Swelling     History of left sided BKA  Has had prosthesis for 2 years, but has noticed pain and swelling of left knee yesterday morning  Knee is red and warm, minimal swelling  76-year-old male coming into the ED for evaluation of left knee warmth and redness, with discomfort that started yesterday morning  He has a history of a below knee amputation on the left side  2 days ago, he worked a long shift, he states about 12 hours where he was on his feet the entire time  Symptoms started the morning after  He denies any fevers or chills  The knee is tender to touch  He denies any pain or discomfort at the site of the prior amputation  History provided by:  Patient   used: No        Prior to Admission Medications   Prescriptions Last Dose Informant Patient Reported? Taking?    Blood Glucose Monitoring Suppl (OneTouch Verio) w/Device KIT  Self No No   Sig: by Does not apply route 2 (two) times a day Dx E11 9   Lancets (OneTouch Delica Plus [id number]) MISC  Self No No   Si Units by Does not apply route 2 (two) times a day Dx E11 9   UltiCare Insulin Syringe 31G X 5/16\" 1 ML MISC   No No   Sig: Inject under the skin as needed (for injection)   atorvastatin (LIPITOR) 40 mg tablet  Self No No   Sig: Take 1 tablet (40 mg total) by mouth daily at bedtime   betamethasone dipropionate (DIPROSONE) 0 05 % cream   No No   Sig: Apply topically 2 (two) times a day   clopidogrel (PLAVIX) 75 mg tablet  Self No No   Sig: Take 1 tablet (75 mg total) by mouth daily   glucose blood (OneTouch Ultra) test strip   No No   Sig: Use 1 each 4 (four) times a day To test blood sugars   insulin NPH-insulin regular (NovoLIN 70/30) 100 units/mL subcutaneous injection   No No   Sig: Inject:  44 units with breakfast, 20 units with lunch, 30 units with dinner   Patient taking differently: Inject:  44 units with breakfast, 20 units with lunch, 30 units with " dinner   lisinopril (ZESTRIL) 10 mg tablet  Self No No   Sig: Take 1 tablet (10 mg total) by mouth daily   metFORMIN (GLUCOPHAGE) 1000 MG tablet   No No   Sig: Take 1 tablet (1,000 mg total) by mouth 2 (two) times a day with meals   oxyCODONE-acetaminophen (PERCOCET) 5-325 mg per tablet   No No   Sig: Take 1 tablet by mouth every 6 (six) hours as needed for moderate pain Max Daily Amount: 4 tablets   pantoprazole (PROTONIX) 40 mg tablet   No No   Sig: TAKE ONE TABLET BY MOUTH ONCE DAILY   pioglitazone (ACTOS) 45 mg tablet   No No   Sig: Take 1 tablet (45 mg total) by mouth daily   rivaroxaban (Xarelto) 2 5 mg tablet   No No   Sig: Take 1 tablet (2 5 mg total) by mouth daily with breakfast Last dose 6/21/20      Facility-Administered Medications: None       Past Medical History:   Diagnosis Date   • Arthritis     fingers   • First degree AV block    • Full dentures    • Hypertension    • PAD (peripheral artery disease) ([facility])    • PVD (peripheral vascular disease) ([facility])    • Type 2 diabetes mellitus with diabetic peripheral angiopathy without gangrene ([facility] ) 5/18/2021    Per CMS ICD 10 guidelines--Per Physician   • Wound, open     right foot- by the 5th toe       Past Surgical History:   Procedure Laterality Date   • CHOLECYSTECTOMY     • COLONOSCOPY     • IR AORTAGRAM WITH RUN-OFF  1/28/2021   • IR AORTAGRAM WITH RUN-OFF  4/13/2021   • LEG AMPUTATION THROUGH LOWER TIBIA AND FIBULA Left 7/17/2021    Procedure: AMPUTATION BELOW KNEE (BKA);   Surgeon: Shanon Riley MD;  [facility];  Service: Vascular   • MA BYP FEM-ANT TIBL PST TIBL PRONEAL ART/OTH DSTL Left 7/14/2021    Procedure: BYPASS femoral-peroneal artery bypass with  reverse GSV and balloon angioplasty peroneal artery;  Surgeon: Shanon Riley MD;  [facility];  Service: Vascular   • MA DEBRIDEMENT BONE MUSCLE &/FASCIA 20 SQ CM/< Right 12/18/2020    Procedure: DEBRIDMENT OF ULCER , REMOVAL OF DEVITALIZED SKIN, SOFT TISSUE, BONE AND APPLICATION OF INTEGRA GRAFT RIGHT FOOT ;  Surgeon: Didi Elise DPM;  Location: [address] [place] [address];  Service: Podiatry   • REPLANTATION THUMB Right     right tip reconnected   • SKIN GRAFT      right thumb   • TOE AMPUTATION      left foot all 5 toes removed   • TOE AMPUTATION Right 2/2/2021    Procedure: Right partial 5th ray amputation;  Surgeon: Georges Coleman DPM;  Location: [facility];  Service: Podiatry   • TOE OSTEOTOMY Right 6/26/2020    Procedure: exostosis of right big toe;  Surgeon: Tammy Alcaraz DPM;  Location: [address] [place] [address];  Service: Podiatry   • TRIGGER FINGER RELEASE      bilateral hands   • VEIN LIGATION      right       Family History   Problem Relation Age of Onset   • Arthritis Mother    • Pancreatic cancer Mother    • Cancer Father         colon   • Alzheimer's disease Father      I have reviewed and agree with the history as documented  E-Cigarette/Vaping   • E-Cigarette Use Never User      E-Cigarette/Vaping Substances   • Nicotine No    • THC No    • CBD No    • Flavoring No    • Other No    • Unknown No      Social History     Tobacco Use   • Smoking status: Never   • Smokeless tobacco: Never   Vaping Use   • Vaping Use: Never used   Substance Use Topics   • Alcohol use: Not Currently     Comment: occasional   • Drug use: Never       Review of Systems   Musculoskeletal:        Left knee redness, pain   All other systems reviewed and are negative  Physical Exam  Physical Exam  Vitals and nursing note reviewed  Constitutional:       General: He is not in acute distress  Appearance: Normal appearance  He is normal weight  He is not ill-appearing, toxic-appearing or diaphoretic  HENT:      Head: Normocephalic and atraumatic  Right Ear: External ear normal       Left Ear: External ear normal       Nose: Nose normal  No congestion or rhinorrhea  Mouth/Throat:      Mouth: Mucous membranes are moist       Pharynx: Oropharynx is clear  Eyes:      General: No scleral icterus          Right eye: No discharge  Left eye: No discharge  Conjunctiva/sclera: Conjunctivae normal    Cardiovascular:      Rate and Rhythm: Normal rate and regular rhythm  Pulses: Normal pulses  Pulmonary:      Effort: Pulmonary effort is normal  No respiratory distress  Abdominal:      General: Abdomen is flat  Musculoskeletal:         General: No swelling  Normal range of motion  Cervical back: Normal range of motion and neck supple  Comments: L knee: mild warmth, erythema, tenderness to the left knee - pre-patellar region  No joint effusion  Normal ROM of the left knee  The L BKA stump is non-tender, clean, dry, intact, no warmth or erythema there  Skin:     General: Skin is warm and dry  Capillary Refill: Capillary refill takes less than 2 seconds  Neurological:      General: No focal deficit present  Mental Status: He is alert and oriented to person, place, and time  Mental status is at baseline  Psychiatric:         Mood and Affect: Mood normal          Behavior: Behavior normal          Vital Signs  ED Triage Vitals [06/30/23 0831]   Temp Pulse Respirations Blood Pressure SpO2   -- 82 18 160/72 94 %      Temp src Heart Rate Source Patient Position - Orthostatic VS BP Location FiO2 (%)   -- Monitor Lying Right arm --      Pain Score       6           Vitals:    06/30/23 0831   BP: 160/72   Pulse: 82   Patient Position - Orthostatic VS: Lying         Visual Acuity      ED Medications  Medications   doxycycline hyclate (VIBRAMYCIN) capsule 100 mg (100 mg Oral Given 6/30/23 0957)       Diagnostic Studies  Results Reviewed     None                 XR knee 4+ vw left injury   Final Result by Vanessa Ferrell MD (06/30 1212)      Minimal undulation of the resection margins of the distal fibula and tibia  Without prior postoperative imaging available for comparison, it is uncertain whether this a chronic finding, and clinical assessment for acute osteomyelitis is recommended  Workstation performed: [id number]                    Procedures  Procedures         ED Course  ED Course as of 06/30/23 1457   Fri Jun 30, 2023   0956 No joint effusion or prepatellar fluid collection on bedside US  Will treat as left knee cellultiis, vs  Early prepatellar bursitis  Doxycylcine BID x 10 days  RTEr precautions discussed  MDM    Disposition  Final diagnoses:   Cellulitis of left knee     Time reflects when diagnosis was documented in both MDM as applicable and the Disposition within this note     Time User Action Codes Description Comment    6/30/2023  9:55 AM Navya Melgar Add [L03 [id number]] Cellulitis of left knee       ED Disposition     ED Disposition   Discharge    Condition   Stable    Date/Time   Fri Jun 30, 2023  9:55 AM    Comment   Kasia Sport discharge to home/self care                 Follow-up Information     Follow up With Specialties Details Why Contact Info Additional Information    Alissa Davalos MD Family Medicine  As needed, If symptoms worsen [id number] [facility] [address] [facility] [address] [facility] Emergency Medicine   [address] Emergency Department,  [address], [place], [place], [address]          Discharge Medication List as of 6/30/2023  9:56 AM      START taking these medications    Details   doxycycline hyclate (VIBRAMYCIN) 100 mg capsule Take 1 capsule (100 mg total) by mouth 2 (two) times a day for 10 days, Starting Fri 6/30/2023, Until Mon 7/10/2023, Normal         CONTINUE these medications which have NOT CHANGED    Details   atorvastatin (LIPITOR) 40 mg tablet Take 1 tablet (40 mg total) by mouth daily at bedtime, Starting Sun 1/17/2021, Normal      betamethasone dipropionate (DIPROSONE) 0 05 % cream Apply topically 2 (two) times a day, Starting Mon 5/23/2022, Normal      Blood Glucose "Monitoring Suppl (OneTouch Verio) w/Device KIT by Does not apply route 2 (two) times a day Dx E11 9, Starting Thu 11/5/2020, Normal      clopidogrel (PLAVIX) 75 mg tablet Take 1 tablet (75 mg total) by mouth daily, Starting Tue 5/25/2021, Normal      glucose blood (OneTouch Ultra) test strip Use 1 each 4 (four) times a day To test blood sugars, Starting Fri 1/13/2023, Normal      insulin NPH-insulin regular (NovoLIN 70/30) 100 units/mL subcutaneous injection Inject:  44 units with breakfast, 20 units with lunch, 30 units with dinner, No Print      Lancets (OneTouch Delica Plus [id number]) MISC 1 Units by Does not apply route 2 (two) times a day Dx E11 9, Starting Thu 11/5/2020, Normal      lisinopril (ZESTRIL) 10 mg tablet Take 1 tablet (10 mg total) by mouth daily, Starting Tue 4/27/2021, Normal      metFORMIN (GLUCOPHAGE) 1000 MG tablet Take 1 tablet (1,000 mg total) by mouth 2 (two) times a day with meals, Starting Wed 1/4/2023, Normal      oxyCODONE-acetaminophen (PERCOCET) 5-325 mg per tablet Take 1 tablet by mouth every 6 (six) hours as needed for moderate pain Max Daily Amount: 4 tablets, Starting Wed 5/17/2023, Normal      pantoprazole (PROTONIX) 40 mg tablet TAKE ONE TABLET BY MOUTH ONCE DAILY, Normal      pioglitazone (ACTOS) 45 mg tablet Take 1 tablet (45 mg total) by mouth daily, Starting Mon 5/15/2023, Normal      rivaroxaban (Xarelto) 2 5 mg tablet Take 1 tablet (2 5 mg total) by mouth daily with breakfast Last dose 6/21/20, Starting Tue 10/11/2022, Normal      UltiCare Insulin Syringe 31G X 5/16\" 1 ML MISC Inject under the skin as needed (for injection), Starting Sun 6/4/2023, Normal             No discharge procedures on file      PDMP Review       Value Time User    PDMP Reviewed  Yes 5/17/2023  9:24 PM Maye Dunn MD          ED Provider  Electronically Signed by           Thalia Lloyd DO  06/30/23 2587    "

## 2023-07-03 ENCOUNTER — NURSE TRIAGE (OUTPATIENT)
Age: 68
End: 2023-07-03

## 2023-07-03 DIAGNOSIS — G89.29 CHRONIC BACK PAIN, UNSPECIFIED BACK LOCATION, UNSPECIFIED BACK PAIN LATERALITY: ICD-10-CM

## 2023-07-03 DIAGNOSIS — M54.9 CHRONIC BACK PAIN, UNSPECIFIED BACK LOCATION, UNSPECIFIED BACK PAIN LATERALITY: ICD-10-CM

## 2023-07-03 RX ORDER — OXYCODONE HYDROCHLORIDE AND ACETAMINOPHEN 5; 325 MG/1; MG/1
1 TABLET ORAL EVERY 6 HOURS PRN
Qty: 120 TABLET | Refills: 0 | Status: SHIPPED | OUTPATIENT
Start: 2023-07-03

## 2023-07-03 NOTE — TELEPHONE ENCOUNTER
BRANNON:  I spoke with patient, he is on course of doxycycline ordered at ER for cellulitis. I advised he can take medication prior to procedure. If fever or any new symptoms I asked that he contact office. Patient will comply.     Reason for Disposition  • Information only question and nurse able to answer    Protocols used: INFORMATION ONLY CALL - NO TRIAGE-ADULT-[place]

## 2023-07-03 NOTE — TELEPHONE ENCOUNTER
Regarding: med issues  ----- Message from Adrienne Webb MA sent at 7/3/2023 12:06 PM EDT -----  Pt called about upcomming colonoscopy. Pt has been put on antibiotic and wants to make sure that wont affect his appt. Please advise.

## 2023-07-05 ENCOUNTER — TELEPHONE (OUTPATIENT)
Dept: FAMILY MEDICINE CLINIC | Facility: CLINIC | Age: 68
End: 2023-07-05

## 2023-07-05 NOTE — TELEPHONE ENCOUNTER
Patient was currently in ER(6/30/23) for an infection of his left knee. Patient states that he can put prostatic leg yet and irritates it. He feels the Doxycycline 100 mg bid is not working fast enough for him and would like you to review his ER notes and see if you could increase in abx.      Please review and advise

## 2023-07-06 ENCOUNTER — TELEPHONE (OUTPATIENT)
Dept: PAIN MEDICINE | Facility: MEDICAL CENTER | Age: 68
End: 2023-07-06

## 2023-07-06 ENCOUNTER — HOSPITAL ENCOUNTER (EMERGENCY)
Facility: HOSPITAL | Age: 68
Discharge: HOME | End: 2023-07-06
Attending: EMERGENCY MEDICINE
Payer: COMMERCIAL

## 2023-07-06 VITALS
DIASTOLIC BLOOD PRESSURE: 81 MMHG | SYSTOLIC BLOOD PRESSURE: 189 MMHG | TEMPERATURE: 97.5 F | RESPIRATION RATE: 18 BRPM | OXYGEN SATURATION: 97 % | HEART RATE: 91 BPM

## 2023-07-06 DIAGNOSIS — M25.562 LEFT KNEE PAIN: Primary | ICD-10-CM

## 2023-07-06 PROCEDURE — 99284 EMERGENCY DEPT VISIT MOD MDM: CPT | Performed by: EMERGENCY MEDICINE

## 2023-07-06 PROCEDURE — 99284 EMERGENCY DEPT VISIT MOD MDM: CPT

## 2023-07-06 NOTE — ED PROVIDER NOTES
History  Chief Complaint   Patient presents with   • Cellulitis     Pt dx with cellulitis of left knee on Saturday, now reports decreased swelling but worsening pain. Pt states he is taking all prescribed medications. History provided by:  Patient   used: No      58-year-old presenting to the ER for evaluation of left knee pain. Was seen for this 3 days ago, diagnosed with cellulitis, discharged antibiotics. States pain and redness has improved, continues to improve but not fully gone so he wanted to get reevaluated. No pain with ranging of the knee. No limit on bending of the knee. No pain or tenderness behind the knee. No fevers or chills. No nausea vomiting. No chest pain or shortness. Prior to Admission Medications   Prescriptions Last Dose Informant Patient Reported? Taking?    Blood Glucose Monitoring Suppl (OneTouch Verio) w/Device KIT  Self No No   Sig: by Does not apply route 2 (two) times a day Dx E11.9   Lancets (OneTouch Delica Plus [id number]) MISC  Self No No   Si Units by Does not apply route 2 (two) times a day Dx E11.9   UltiCare Insulin Syringe 31G X 5/16" 1 ML MISC   No No   Sig: Inject under the skin as needed (for injection)   atorvastatin (LIPITOR) 40 mg tablet  Self No No   Sig: Take 1 tablet (40 mg total) by mouth daily at bedtime   betamethasone dipropionate (DIPROSONE) 0.05 % cream   No No   Sig: Apply topically 2 (two) times a day   clopidogrel (PLAVIX) 75 mg tablet  Self No No   Sig: Take 1 tablet (75 mg total) by mouth daily   doxycycline hyclate (VIBRAMYCIN) 100 mg capsule   No No   Sig: Take 1 capsule (100 mg total) by mouth 2 (two) times a day for 10 days   glucose blood (OneTouch Ultra) test strip   No No   Sig: Use 1 each 4 (four) times a day To test blood sugars   insulin NPH-insulin regular (NovoLIN 70/30) 100 units/mL subcutaneous injection   No No   Sig: Inject:  44 units with breakfast, 20 units with lunch, 30 units with dinner   Patient taking differently: Inject:  44 units with breakfast, 20 units with lunch, 30 units with dinner   lisinopril (ZESTRIL) 10 mg tablet  Self No No   Sig: Take 1 tablet (10 mg total) by mouth daily   metFORMIN (GLUCOPHAGE) 1000 MG tablet   No No   Sig: Take 1 tablet (1,000 mg total) by mouth 2 (two) times a day with meals   oxyCODONE-acetaminophen (PERCOCET) 5-325 mg per tablet   No No   Sig: Take 1 tablet by mouth every 6 (six) hours as needed for moderate pain Max Daily Amount: 4 tablets   pantoprazole (PROTONIX) 40 mg tablet   No No   Sig: TAKE ONE TABLET BY MOUTH ONCE DAILY   pioglitazone (ACTOS) 45 mg tablet   No No   Sig: Take 1 tablet (45 mg total) by mouth daily   rivaroxaban (Xarelto) 2.5 mg tablet   No No   Sig: Take 1 tablet (2.5 mg total) by mouth daily with breakfast Last dose 6/21/20      Facility-Administered Medications: None       Past Medical History:   Diagnosis Date   • Arthritis     fingers   • First degree AV block    • Full dentures    • Hypertension    • PAD (peripheral artery disease) ([facility])    • PVD (peripheral vascular disease) ([facility])    • Type 2 diabetes mellitus with diabetic peripheral angiopathy without gangrene ([address]) 5/18/2021    Per CMS ICD 10 guidelines--Per Physician   • Wound, open     right foot- by the 5th toe       Past Surgical History:   Procedure Laterality Date   • CHOLECYSTECTOMY     • COLONOSCOPY     • IR AORTAGRAM WITH RUN-OFF  1/28/2021   • IR AORTAGRAM WITH RUN-OFF  4/13/2021   • LEG AMPUTATION THROUGH LOWER TIBIA AND FIBULA Left 7/17/2021    Procedure: AMPUTATION BELOW KNEE (BKA);   Surgeon: Anny Palomino MD;  [facility];  Service: Vascular   • DC BYP FEM-ANT TIBL PST TIBL PRONEAL ART/OTH DSTL Left 7/14/2021    Procedure: BYPASS femoral-peroneal artery bypass with  reverse GSV and balloon angioplasty peroneal artery;  Surgeon: Anny Palomino MD;  Location: [facility];  Service: Vascular   • DC DEBRIDEMENT BONE MUSCLE &/FASCIA 20 SQ CM/< Right 12/18/2020 Procedure: DEBRIDMENT OF ULCER , REMOVAL OF DEVITALIZED SKIN, SOFT TISSUE, BONE AND APPLICATION OF INTEGRA GRAFT RIGHT FOOT.;  Surgeon: Leonid Caraballo DPM;  Location: [facility];  Service: Podiatry   • REPLANTATION THUMB Right     right tip reconnected   • SKIN GRAFT      right thumb   • TOE AMPUTATION      left foot all 5 toes removed   • TOE AMPUTATION Right 2/2/2021    Procedure: Right partial 5th ray amputation;  Surgeon: Darien Rodriguez DPM;  Location: [facility];  Service: Podiatry   • TOE OSTEOTOMY Right 6/26/2020    Procedure: exostosis of right big toe;  Surgeon: Yayo Pascal DPM;  Location: [facility];  Service: Podiatry   • TRIGGER FINGER RELEASE      bilateral hands   • VEIN LIGATION      right       Family History   Problem Relation Age of Onset   • Arthritis Mother    • Pancreatic cancer Mother    • Cancer Father         colon   • Alzheimer's disease Father      I have reviewed and agree with the history as documented. E-Cigarette/Vaping   • E-Cigarette Use Never User      E-Cigarette/Vaping Substances   • Nicotine No    • THC No    • CBD No    • Flavoring No    • Other No    • Unknown No      Social History     Tobacco Use   • Smoking status: Never   • Smokeless tobacco: Never   Vaping Use   • Vaping Use: Never used   Substance Use Topics   • Alcohol use: Not Currently     Comment: occasional   • Drug use: Never       Review of Systems   Constitutional: Negative for chills, diaphoresis and fever. HENT: Negative for congestion and sore throat. Respiratory: Negative for cough, shortness of breath, wheezing and stridor. Cardiovascular: Negative for chest pain, palpitations and leg swelling. Gastrointestinal: Negative for abdominal pain, blood in stool, diarrhea, nausea and vomiting. Genitourinary: Negative for dysuria, frequency and urgency. Musculoskeletal: Negative for neck pain and neck stiffness. Skin: Negative for pallor and rash.    Neurological: Negative for dizziness, syncope, weakness, light-headedness and headaches. All other systems reviewed and are negative. Physical Exam  Physical Exam  Vitals reviewed. Constitutional:       Appearance: Normal appearance. He is well-developed. HENT:      Head: Normocephalic and atraumatic. Eyes:      Extraocular Movements: Extraocular movements intact. Pupils: Pupils are equal, round, and reactive to light. Cardiovascular:      Rate and Rhythm: Normal rate and regular rhythm. Pulmonary:      Effort: Pulmonary effort is normal. No respiratory distress. Musculoskeletal:         General: Normal range of motion. Cervical back: Normal range of motion and neck supple. Comments: Below-knee amputation noted on the left. No erythema or swelling noted at the distal tip. Knee tenderness over the patella. No erythema. Normal temperature. No swelling. No knee effusion. No wound or skin breakdown. Skin:     General: Skin is warm and dry. Capillary Refill: Capillary refill takes less than 2 seconds. Neurological:      General: No focal deficit present. Mental Status: He is alert and oriented to person, place, and time.          Vital Signs  ED Triage Vitals [07/06/23 1048]   Temperature Pulse Respirations Blood Pressure SpO2   97.5 °F (36.4 °C) 91 18 (!) 189/81 97 %      Temp Source Heart Rate Source Patient Position - Orthostatic VS BP Location FiO2 (%)   Oral -- -- Right arm --      Pain Score       --           Vitals:    07/06/23 1048   BP: (!) 189/81   Pulse: 91         Visual Acuity      ED Medications  Medications - No data to display    Diagnostic Studies  Results Reviewed     None                 No orders to display              Procedures  Procedures         ED Course                                             Medical Decision Making  69-year-old with likely recent cellulitis, improved on antibiotics, outpatient follow-up as needed    Reviewed x-ray read, no sign of infection noted at the distal tip of the amputation, patient was referred to orthopedics        Disposition  Final diagnoses:   Left knee pain     Time reflects when diagnosis was documented in both MDM as applicable and the Disposition within this note     Time User Action Codes Description Comment    7/6/2023 11:16 AM Jacki Rodríguez [Z30.159] Left knee pain       ED Disposition     ED Disposition   Discharge    Condition   Stable    Date/Time   Thu Jul 6, 2023 11:16 AM    Comment   Ganga Victor discharge to home/self care.                Follow-up Information     Follow up With Specialties Details Why Contact Info Additional Information    Kenny Soto MD Family Medicine In 3 days Reevaluation [address] Male [address] [facility] 800 [facility] Emergency Department Emergency Medicine  As needed, If symptoms worsen [address] 501 [facility] Emergency Department, [place], [place], [address] [place] [address] Orthopedic Surgery Schedule an appointment as soon as possible for a visit  Knee pain [address] [facility] [phone]  Critical access hospital), [address] [place] [address] 100, [address] [place], [place], [address]          Discharge Medication List as of 7/6/2023 11:17 AM      CONTINUE these medications which have NOT CHANGED    Details   atorvastatin (LIPITOR) 40 mg tablet Take 1 tablet (40 mg total) by mouth daily at bedtime, Starting Sun 1/17/2021, Normal      betamethasone dipropionate (DIPROSONE) 0.05 % cream Apply topically 2 (two) times a day, Starting Mon 5/23/2022, Normal      Blood Glucose Monitoring Suppl (OneTouch Verio) w/Device KIT by Does not apply route 2 (two) times a day Dx E11.9, Starting Thu 11/5/2020, Normal      clopidogrel (PLAVIX) 75 mg tablet Take 1 tablet (75 mg total) by mouth daily, Starting Tue 5/25/2021, Normal      doxycycline hyclate (VIBRAMYCIN) 100 mg capsule Take 1 capsule (100 mg total) by mouth 2 (two) times a day for 10 days, Starting Fri 6/30/2023, Until Mon 7/10/2023, Normal      glucose blood (OneTouch Ultra) test strip Use 1 each 4 (four) times a day To test blood sugars, Starting Fri 1/13/2023, Normal      insulin NPH-insulin regular (NovoLIN 70/30) 100 units/mL subcutaneous injection Inject:  44 units with breakfast, 20 units with lunch, 30 units with dinner, No Print      Lancets (OneTouch Delica Plus [id number]) MISC 1 Units by Does not apply route 2 (two) times a day Dx E11.9, Starting Thu 11/5/2020, Normal      lisinopril (ZESTRIL) 10 mg tablet Take 1 tablet (10 mg total) by mouth daily, Starting Tue 4/27/2021, Normal      metFORMIN (GLUCOPHAGE) 1000 MG tablet Take 1 tablet (1,000 mg total) by mouth 2 (two) times a day with meals, Starting Wed 1/4/2023, Normal      oxyCODONE-acetaminophen (PERCOCET) 5-325 mg per tablet Take 1 tablet by mouth every 6 (six) hours as needed for moderate pain Max Daily Amount: 4 tablets, Starting Mon 7/3/2023, Normal      pantoprazole (PROTONIX) 40 mg tablet TAKE ONE TABLET BY MOUTH ONCE DAILY, Normal      pioglitazone (ACTOS) 45 mg tablet Take 1 tablet (45 mg total) by mouth daily, Starting Mon 5/15/2023, Normal      rivaroxaban (Xarelto) 2.5 mg tablet Take 1 tablet (2.5 mg total) by mouth daily with breakfast Last dose 6/21/20, Starting Tue 10/11/2022, Normal      UltiCare Insulin Syringe 31G X 5/16" 1 ML MISC Inject under the skin as needed (for injection), Starting Sun 6/4/2023, Normal                 PDMP Review       Value Time User    PDMP Reviewed  Yes 7/3/2023  8:51 PM Kenny Soto MD          ED Provider  Electronically Signed by           Gerry Louis MD  07/06/23 5920

## 2023-07-06 NOTE — DISCHARGE INSTRUCTIONS
Please make sure to follow-up with orthopedics and family doctor.   Return to ER for worsening swelling, pain, fevers or feeling worse overall

## 2023-07-10 ENCOUNTER — ANESTHESIA (OUTPATIENT)
Dept: GASTROENTEROLOGY | Facility: HOSPITAL | Age: 68
End: 2023-07-10

## 2023-07-10 ENCOUNTER — ANESTHESIA EVENT (OUTPATIENT)
Dept: GASTROENTEROLOGY | Facility: HOSPITAL | Age: 68
End: 2023-07-10

## 2023-07-10 ENCOUNTER — HOSPITAL ENCOUNTER (OUTPATIENT)
Dept: GASTROENTEROLOGY | Facility: HOSPITAL | Age: 68
Setting detail: OUTPATIENT SURGERY
Discharge: HOME | End: 2023-07-10
Attending: INTERNAL MEDICINE
Payer: COMMERCIAL

## 2023-07-10 VITALS
RESPIRATION RATE: 16 BRPM | WEIGHT: 172 LBS | OXYGEN SATURATION: 96 % | HEART RATE: 76 BPM | TEMPERATURE: 97.7 F | HEIGHT: 64 IN | BODY MASS INDEX: 29.37 KG/M2 | SYSTOLIC BLOOD PRESSURE: 106 MMHG | DIASTOLIC BLOOD PRESSURE: 59 MMHG

## 2023-07-10 DIAGNOSIS — Z12.11 COLON CANCER SCREENING: ICD-10-CM

## 2023-07-10 LAB
GLUCOSE SERPL-MCNC: 244 MG/DL (ref 65–140)
GLUCOSE SERPL-MCNC: 259 MG/DL (ref 65–140)

## 2023-07-10 PROCEDURE — 82948 REAGENT STRIP/BLOOD GLUCOSE: CPT

## 2023-07-10 PROCEDURE — 45385 COLONOSCOPY W/LESION REMOVAL: CPT | Performed by: INTERNAL MEDICINE

## 2023-07-10 PROCEDURE — 88305 TISSUE EXAM BY PATHOLOGIST: CPT | Performed by: STUDENT IN AN ORGANIZED HEALTH CARE EDUCATION/TRAINING PROGRAM

## 2023-07-10 RX ORDER — SODIUM CHLORIDE, SODIUM LACTATE, POTASSIUM CHLORIDE, CALCIUM CHLORIDE 600; 310; 30; 20 MG/100ML; MG/100ML; MG/100ML; MG/100ML
INJECTION, SOLUTION INTRAVENOUS CONTINUOUS PRN
Status: DISCONTINUED | OUTPATIENT
Start: 2023-07-10 | End: 2023-07-10

## 2023-07-10 RX ORDER — PROPOFOL 10 MG/ML
INJECTION, EMULSION INTRAVENOUS CONTINUOUS PRN
Status: DISCONTINUED | OUTPATIENT
Start: 2023-07-10 | End: 2023-07-10

## 2023-07-10 RX ORDER — PROPOFOL 10 MG/ML
INJECTION, EMULSION INTRAVENOUS AS NEEDED
Status: DISCONTINUED | OUTPATIENT
Start: 2023-07-10 | End: 2023-07-10

## 2023-07-10 RX ADMIN — SODIUM CHLORIDE, SODIUM LACTATE, POTASSIUM CHLORIDE, AND CALCIUM CHLORIDE: .6; .31; .03; .02 INJECTION, SOLUTION INTRAVENOUS at 07:47

## 2023-07-10 RX ADMIN — Medication 40 MG: at 08:03

## 2023-07-10 RX ADMIN — PROPOFOL 100 MCG/KG/MIN: 10 INJECTION, EMULSION INTRAVENOUS at 07:54

## 2023-07-10 RX ADMIN — PROPOFOL 100 MG: 10 INJECTION, EMULSION INTRAVENOUS at 07:54

## 2023-07-10 RX ADMIN — INSULIN HUMAN 4 UNITS: 100 INJECTION, SOLUTION PARENTERAL at 07:28

## 2023-07-10 NOTE — H&P
History and Physical - [facility]  Jasson Stevenson 75 y.o. male MRN: [id number]                  HPI: Jasson Stevenson is a 75y.o. year old male who presents for colonoscopy for colon cancer screening purposes. Father was diagnosed with colon cancer in his 62s. REVIEW OF SYSTEMS: Per the HPI, and otherwise unremarkable. Historical Information   Past Medical History:   Diagnosis Date   • Arthritis     fingers   • First degree AV block    • Full dentures    • Hypertension    • PAD (peripheral artery disease) ([facility])    • PVD (peripheral vascular disease) ([facility])    • Type 2 diabetes mellitus with diabetic peripheral angiopathy without gangrene ([address]) 5/18/2021    Per CMS ICD 10 guidelines--Per Physician   • Wound, open     right foot- by the 5th toe     Past Surgical History:   Procedure Laterality Date   • CHOLECYSTECTOMY     • COLONOSCOPY     • IR AORTAGRAM WITH RUN-OFF  1/28/2021   • IR AORTAGRAM WITH RUN-OFF  4/13/2021   • LEG AMPUTATION THROUGH LOWER TIBIA AND FIBULA Left 7/17/2021    Procedure: AMPUTATION BELOW KNEE (BKA);   Surgeon: Pili Donato MD;  Location: [facility];  Service: Vascular   • SD BYP FEM-ANT TIBL PST TIBL PRONEAL ART/OTH DSTL Left 7/14/2021    Procedure: BYPASS femoral-peroneal artery bypass with  reverse GSV and balloon angioplasty peroneal artery;  Surgeon: Piil Donato MD;  Location: [facility];  Service: Vascular   • SD DEBRIDEMENT BONE MUSCLE &/FASCIA 20 SQ CM/< Right 12/18/2020    Procedure: DEBRIDMENT OF ULCER , REMOVAL OF DEVITALIZED SKIN, SOFT TISSUE, BONE AND APPLICATION OF INTEGRA GRAFT RIGHT FOOT.;  Surgeon: Juan Fernández DPM;  Location: [facility];  Service: Podiatry   • REPLANTATION THUMB Right     right tip reconnected   • SKIN GRAFT      right thumb   • TOE AMPUTATION      left foot all 5 toes removed   • TOE AMPUTATION Right 2/2/2021    Procedure: Right partial 5th ray amputation;  Surgeon: Jian Hill DPM;  Location: [facility];  Service: Podiatry   • TOE OSTEOTOMY Right 6/26/2020    Procedure: exostosis of right big toe;  Surgeon: Adam Arciniega DPM;  Location: [facility];  Service: Podiatry   • TRIGGER FINGER RELEASE      bilateral hands   • VEIN LIGATION      right     Social History   Social History     Substance and Sexual Activity   Alcohol Use Not Currently    Comment: occasional     Social History     Substance and Sexual Activity   Drug Use Never     Social History     Tobacco Use   Smoking Status Never   Smokeless Tobacco Never     Family History   Problem Relation Age of Onset   • Arthritis Mother    • Pancreatic cancer Mother    • Cancer Father         colon   • Alzheimer's disease Father        Meds/Allergies       Current Outpatient Medications:   •  atorvastatin (LIPITOR) 40 mg tablet  •  betamethasone dipropionate (DIPROSONE) 0.05 % cream  •  doxycycline hyclate (VIBRAMYCIN) 100 mg capsule  •  insulin NPH-insulin regular (NovoLIN 70/30) 100 units/mL subcutaneous injection  •  lisinopril (ZESTRIL) 10 mg tablet  •  metFORMIN (GLUCOPHAGE) 1000 MG tablet  •  oxyCODONE-acetaminophen (PERCOCET) 5-325 mg per tablet  •  pantoprazole (PROTONIX) 40 mg tablet  •  pioglitazone (ACTOS) 45 mg tablet  •  Blood Glucose Monitoring Suppl (OneTouch Verio) w/Device KIT  •  clopidogrel (PLAVIX) 75 mg tablet  •  glucose blood (OneTouch Ultra) test strip  •  Lancets (OneTouch Delica Plus [id number]) MISC  •  rivaroxaban (Xarelto) 2.5 mg tablet  •  UltiCare Insulin Syringe 31G X 5/16" 1 ML MISC  No current facility-administered medications for this encounter.     Facility-Administered Medications Ordered in Other Encounters:   •  lactated ringers infusion, , Intravenous, Continuous PRN, New Bag at 07/10/23 0747    Allergies   Allergen Reactions   • Shellfish-Derived Products - Food Allergy Anaphylaxis     Throat closes   • Sulfamethoxazole-Trimethoprim Rash       Objective     /76   Pulse 80   Temp 97.6 °F (36.4 °C) (Temporal)   Resp 18   Ht 5' 4" (1.626 m)   Wt 78 kg (172 lb)   SpO2 98%   BMI 29.52 kg/m²       PHYSICAL EXAM    Gen: NAD  Head: NCAT  CV: RRR  CHEST: Clear  ABD: soft, NT/ND  EXT: no edema      ASSESSMENT/PLAN:  This is a 75y.o. year old male here for colonoscopy, and he is stable and optimized for his procedure.

## 2023-07-10 NOTE — ANESTHESIA PREPROCEDURE EVALUATION
Procedure:  COLONOSCOPY    Relevant Problems   ANESTHESIA (within normal limits)      CARDIO   (+) Atrial fibrillation, unspecified type ([facility])   (+) Essential hypertension   (+) Mixed hyperlipidemia   (+) PVD (peripheral vascular disease) ([facility])   (+) Type 2 diabetes mellitus with diabetic peripheral angiopathy without gangrene ([facility])      ENDO   (+) Poorly controlled type 2 diabetes mellitus ([facility])   (+) Type 2 diabetes mellitus with diabetic peripheral angiopathy without gangrene ([facility])      GI/HEPATIC   (+) Gastroesophageal reflux disease without esophagitis      NEURO/PSYCH   (+) Continuous opioid dependence ([facility])   (+) Diabetic polyneuropathy associated with type 2 diabetes mellitus ([facility])      Endocrine   (+) Diabetic ulcer of toe of right foot associated with type 2 diabetes mellitus, limited to breakdown of skin ([facility])      Other   (+) S/P BKA (below knee amputation), left ([facility])        Physical Exam    Airway    Mallampati score: II  TM Distance: >3 FB  Neck ROM: full     Dental   No notable dental hx     Cardiovascular  Rhythm: regular, Rate: normal,     Pulmonary  Pulmonary exam normal Breath sounds clear to auscultation,     Other Findings        Anesthesia Plan  ASA Score- 3     Anesthesia Type- IV sedation with anesthesia with ASA Monitors. Additional Monitors:   Airway Plan:           Plan Factors-Exercise tolerance (METS): >4 METS. Chart reviewed. Existing labs reviewed. Patient summary reviewed. Patient is not a current smoker. Induction-     Postoperative Plan-     Informed Consent- Anesthetic plan and risks discussed with patient. I personally reviewed this patient with the CRNA. Discussed and agreed on the Anesthesia Plan with the CRNA. Donald Johnston

## 2023-07-10 NOTE — ANESTHESIA POSTPROCEDURE EVALUATION
Post-Op Assessment Note    CV Status:  Stable  Pain Score: 0    Pain management: adequate     Mental Status:  Alert and awake   Hydration Status:  Stable   PONV Controlled:  None   Airway Patency:  Patent      Post Op Vitals Reviewed: Yes      Staff: CRNA         No notable events documented.     BP      Temp      Pulse     Resp      SpO2

## 2023-07-11 PROCEDURE — 88305 TISSUE EXAM BY PATHOLOGIST: CPT | Performed by: STUDENT IN AN ORGANIZED HEALTH CARE EDUCATION/TRAINING PROGRAM

## 2023-07-12 ENCOUNTER — OFFICE VISIT (OUTPATIENT)
Dept: OBGYN CLINIC | Facility: CLINIC | Age: 68
End: 2023-07-12
Payer: COMMERCIAL

## 2023-07-12 VITALS
HEIGHT: 64 IN | DIASTOLIC BLOOD PRESSURE: 77 MMHG | SYSTOLIC BLOOD PRESSURE: 135 MMHG | WEIGHT: 172 LBS | BODY MASS INDEX: 29.37 KG/M2 | HEART RATE: 87 BPM

## 2023-07-12 DIAGNOSIS — M25.562 LEFT KNEE PAIN: ICD-10-CM

## 2023-07-12 DIAGNOSIS — M17.12 PRIMARY OSTEOARTHRITIS OF LEFT KNEE: ICD-10-CM

## 2023-07-12 DIAGNOSIS — M76.892 TENDINITIS OF LEFT QUADRICEPS TENDON: Primary | ICD-10-CM

## 2023-07-12 PROCEDURE — 99204 OFFICE O/P NEW MOD 45 MIN: CPT | Performed by: STUDENT IN AN ORGANIZED HEALTH CARE EDUCATION/TRAINING PROGRAM

## 2023-07-12 NOTE — PROGRESS NOTES
Ortho Sports Medicine Knee New Patient Visit     Assesment:   75 y.o. male left knee chondromalcia of the patella; history of prior below-knee amputation for diabetes/vascular insufficiency    Plan:  The patient's diagnosis and treatment were discussed at length today. We discussed no treatment, non-operative treatment, and operative treatment. All Garibay presents for evaluation of left knee pain in setting of prior below-knee amputation and uncontrolled diabetes. I explained he has some chondromalacia of the patella in addition to mild osteoarthritis. His primary complaint is pain with weightbearing activities, and I explained the increased load across the knee in the setting of a below-knee amputation. Due to the fact that his most recent A1c was greater than 10 I do not recommend a corticosteroid injection at this time. I explained we can request authorization for viscosupplement injection for symptomatic relief given the fact that is not a good candidate for any MRI/surgical intervention. I discussed over-the-counter medications for pain control and advised caution with using NSAIDs and recommended Tylenol and Voltaren gel. He can follow-up after Visco or as needed. Conservative treatment:    PT for ROM/strengthening to knee, hip and core. OTC NSAIDS prn for pain. Tylenol for pain. Imaging: All imaging from today was reviewed by myself and explained to the patient. Injection:    The risks and benefits of the injection (which include but are not limited to: infection, bleeding,damage to nerve/artery, need for further intervention), as well as the risks and benefits of all alternative treatments were explained and understood. The patient elected to proceed with injection. The procedure was done with aseptic technique, and the patient tolerated the procedure well with no complications. A viscosupplementation injection was ordered and will be given at a future visit.       Surgery:     No surgery is recommended at this point, continue with conservative treatment plan as noted. Follow up:    No follow-ups on file. Chief Complaint   Patient presents with   • Left Knee - Pain       History of Present Illness: The patient is a 75 y.o. male who presents with left knee pain. He had his left leg amputated below the knee due to circulation issues due to diabetes. He states the pain started on 6/30/2023 when he presented to the ED. At the ED he was diagnosed with cellulitis of the left knee and given a course of antibiotics. He was having continued pain and redness around the knee, when he presented to the ED on 7/6/2023. He reports completing his course of antibiotics. He states his prosthetic weighs about 12 lbs and that sometime it does not lift off the ground causing him to stumble. He does get some clicking in the knee. He denies any numbness and tingling. Knee Surgical History:  None    Past Medical, Social and Family History:  Past Medical History:   Diagnosis Date   • Arthritis     fingers   • First degree AV block    • Full dentures    • Hypertension    • PAD (peripheral artery disease) ([address])    • PVD (peripheral vascular disease) ([address])    • Type 2 diabetes mellitus with diabetic peripheral angiopathy without gangrene ([address]) 5/18/2021    Per CMS ICD 10 guidelines--Per Physician   • Wound, open     right foot- by the 5th toe     Past Surgical History:   Procedure Laterality Date   • CHOLECYSTECTOMY     • COLONOSCOPY     • IR AORTAGRAM WITH RUN-OFF  1/28/2021   • IR AORTAGRAM WITH RUN-OFF  4/13/2021   • LEG AMPUTATION THROUGH LOWER TIBIA AND FIBULA Left 7/17/2021    Procedure: AMPUTATION BELOW KNEE (BKA);   Surgeon: Juan Pablo Ramirez MD;  [facility];  Service: Vascular   • NE BYP FEM-ANT TIBL PST [place] ART/OTH DSTL Left 7/14/2021    Procedure: BYPASS femoral-peroneal artery bypass with  reverse GSV and balloon angioplasty peroneal artery;  Surgeon: Juan Pablo Ramirez MD;  Location:  MAIN OR;  Service: Vascular   • ME DEBRIDEMENT BONE MUSCLE &/FASCIA 20 SQ CM/< Right 12/18/2020    Procedure: DEBRIDMENT OF ULCER , REMOVAL OF DEVITALIZED SKIN, SOFT TISSUE, BONE AND APPLICATION OF INTEGRA GRAFT RIGHT FOOT.;  Surgeon: Mack Collins DPM;  Location: [facility];  Service: Podiatry   • REPLANTATION THUMB Right     right tip reconnected   • SKIN GRAFT      right thumb   • TOE AMPUTATION      left foot all 5 toes removed   • TOE AMPUTATION Right 2/2/2021    Procedure: Right partial 5th ray amputation;  Surgeon: Savannah Hutchison DPM;  Location:  MAIN OR;  Service: Podiatry   • TOE OSTEOTOMY Right 6/26/2020    Procedure: exostosis of right big toe;  Surgeon: Wale Guo DPM;  Location: [facility];  Service: Podiatry   • TRIGGER FINGER RELEASE      bilateral hands   • VEIN LIGATION      right     Allergies   Allergen Reactions   • Shellfish-Derived Products - Food Allergy Anaphylaxis     Throat closes   • Sulfamethoxazole-Trimethoprim Rash     Current Outpatient Medications on File Prior to Visit   Medication Sig Dispense Refill   • atorvastatin (LIPITOR) 40 mg tablet Take 1 tablet (40 mg total) by mouth daily at bedtime 90 tablet 1   • betamethasone dipropionate (DIPROSONE) 0.05 % cream Apply topically 2 (two) times a day 30 g 0   • Blood Glucose Monitoring Suppl (OneTouch Verio) w/Device KIT by Does not apply route 2 (two) times a day Dx E11.9 1 kit 1   • clopidogrel (PLAVIX) 75 mg tablet Take 1 tablet (75 mg total) by mouth daily 90 tablet 0   • glucose blood (OneTouch Ultra) test strip Use 1 each 4 (four) times a day To test blood sugars 400 each 3   • insulin NPH-insulin regular (NovoLIN 70/30) 100 units/mL subcutaneous injection Inject:  44 units with breakfast, 20 units with lunch, 30 units with dinner (Patient taking differently: Inject:  44 units with breakfast, 20 units with lunch, 30 units with dinner) 15 mL 1   • Lancets (OneTouch Delica Plus [id number]) MISC 1 Units by Does not apply route 2 (two) times a day Dx E11.9 100 each 2   • lisinopril (ZESTRIL) 10 mg tablet Take 1 tablet (10 mg total) by mouth daily 90 tablet 1   • metFORMIN (GLUCOPHAGE) 1000 MG tablet Take 1 tablet (1,000 mg total) by mouth 2 (two) times a day with meals 60 tablet 5   • oxyCODONE-acetaminophen (PERCOCET) 5-325 mg per tablet Take 1 tablet by mouth every 6 (six) hours as needed for moderate pain Max Daily Amount: 4 tablets 120 tablet 0   • pantoprazole (PROTONIX) 40 mg tablet TAKE ONE TABLET BY MOUTH ONCE DAILY 90 tablet 0   • pioglitazone (ACTOS) 45 mg tablet Take 1 tablet (45 mg total) by mouth daily 90 tablet 1   • rivaroxaban (Xarelto) 2.5 mg tablet Take 1 tablet (2.5 mg total) by mouth daily with breakfast Last dose 6/21/20 30 tablet 2   • UltiCare Insulin Syringe 31G X 5/16" 1 ML MISC Inject under the skin as needed (for injection) 100 each 1     No current facility-administered medications on file prior to visit.      Social History     Socioeconomic History   • Marital status: /Civil Union     Spouse name: Blaise Kan   • Number of children: Not on file   • Years of education: 12   • Highest education level: High school graduate   Occupational History   • Occupation: [employer]   Tobacco Use   • Smoking status: Never   • Smokeless tobacco: Never   Vaping Use   • Vaping Use: Never used   Substance and Sexual Activity   • Alcohol use: Not Currently     Comment: occasional   • Drug use: Never   • Sexual activity: Yes     Partners: Female   Other Topics Concern   • Not on file   Social History Narrative         Social Determinants of Health     Financial Resource Strain: Not on file   Food Insecurity: Not on file   Transportation Needs: Not on file   Physical Activity: Not on file   Stress: Not on file   Social Connections: Not on file   Intimate Partner Violence: Not on file   Housing Stability: Not on file         I have reviewed the past medical, surgical, social and family history, medications and allergies as documented in the EMR. Review of systems: ROS is negative other than that noted in the HPI. Constitutional: Negative for fatigue and fever. HENT: Negative for sore throat. Respiratory: Negative for shortness of breath. Cardiovascular: Negative for chest pain. Gastrointestinal: Negative for abdominal pain. Endocrine: Negative for cold intolerance and heat intolerance. Genitourinary: Negative for flank pain. Musculoskeletal: Negative for back pain. Skin: Negative for rash. Allergic/Immunologic: Negative for immunocompromised state. Neurological: Negative for dizziness. Psychiatric/Behavioral: Negative for agitation. Physical Exam:    Blood pressure 135/77, pulse 87, height 5' 4" (1.626 m), weight 78 kg (172 lb). General/Constitutional: NAD, well developed, well nourished  HENT: Normocephalic, atraumatic  CV: Intact distal pulses, regular rate  Resp: No respiratory distress or labored breathing  Lymphatic: No lymphadenopathy palpated  Neuro: Alert and Oriented x 3, no focal deficits  Psych: Normal mood, normal affect, normal judgement, normal behavior  Skin: Warm, dry, no rashes, no erythema      Knee Exam (focused):  Visual inspection demonstrates prior below-knee amputation with healed incisions. No erythema or drainage. There is no significant erythema or edema. No significant joint effusion   Range of motion is full from 0-130 degrees of flexion   Able to straight leg raise   Superior pole of patella tender to palpation. Positive patellar grind  Mild medial medial joint line tenderness, (-) lateral joint line tenderness  Patella tracks with mild crepitus. No J sign. No apprehension. Translation is approximately 2 quadrants and is equal to the contralateral side  Patellar eversion is similar to the contralateral side    Examination of the patient's ipsilateral hip demonstrates full painless range of motion. No crepitus.       LE NV Exam: +2 DP/PT pulses bilaterally  Sensation intact to light touch L2-S1 bilaterally     Bilateral hip ROM demonstrates no pain actively or passively    No calf tenderness to palpation bilaterally    Knee Imaging    X-rays of the left knee were reviewed, which demonstrate moderate patellar arthritis and chondromalacia setting of prior below-knee amputation. .  I have reviewed the radiology report and agree with their impression.       Scribe Attestation    I,:  Arleen Carrizales am acting as a scribe while in the presence of the attending physician.:       I,:  Briana Cortes, DO personally performed the services described in this documentation    as scribed in my presence.:

## 2023-07-18 ENCOUNTER — TELEPHONE (OUTPATIENT)
Dept: OBGYN CLINIC | Facility: HOSPITAL | Age: 68
End: 2023-07-18

## 2023-07-18 NOTE — TELEPHONE ENCOUNTER
Caller: Patient    Doctor: Michael Roberts    Reason for call: Patient questioned if the order was placed for gel injection?  Please advise    Call back#: [phone]

## 2023-07-18 NOTE — TELEPHONE ENCOUNTER
Caller: [place]/[facility]    Doctor: Danyell Patel    Reason for call: Questioned status of gel injection.  Advised spoke w/ and will have someone from office call to discuss    Call back#: [phone]

## 2023-07-27 ENCOUNTER — TELEPHONE (OUTPATIENT)
Dept: OBGYN CLINIC | Facility: HOSPITAL | Age: 68
End: 2023-07-27

## 2023-07-27 NOTE — TELEPHONE ENCOUNTER
Caller: patient   Doctor: Promise Bragg    Reason for call: patient wife paid half for injections,  Said the pharmacy will be contacting Dr Promise Bragg for consent to deliver the medication, said her  already gave consent.     Call back#: [phone]

## 2023-07-27 NOTE — TELEPHONE ENCOUNTER
Caller: patient  Doctor: Lee Vallejo    Reason for call: patient told he was  being charged $300 for 4 shots, he said he would rather just have one shot     Call back#: [phone]

## 2023-07-27 NOTE — TELEPHONE ENCOUNTER
Caller: [place]    Doctor: Juan Jose Jones    Reason for call: Ramon Ishmael reports that she spoke with billing in regards to patient's co-payment due for the injections and was advised she would need to speak with office to ask if co pay can be split.       Call back#: [phone]

## 2023-07-27 NOTE — TELEPHONE ENCOUNTER
Caller: Wife    Doctor: Dr. Viral Reynoso    Reason for call: Patient is going to speak to billing to see if co-pay can be done in payments and will call back when she gets an answer.     Call back#: [phone]

## 2023-08-02 DIAGNOSIS — M54.9 CHRONIC BACK PAIN, UNSPECIFIED BACK LOCATION, UNSPECIFIED BACK PAIN LATERALITY: ICD-10-CM

## 2023-08-02 DIAGNOSIS — G89.29 CHRONIC BACK PAIN, UNSPECIFIED BACK LOCATION, UNSPECIFIED BACK PAIN LATERALITY: ICD-10-CM

## 2023-08-02 RX ORDER — OXYCODONE HYDROCHLORIDE AND ACETAMINOPHEN 5; 325 MG/1; MG/1
1 TABLET ORAL EVERY 6 HOURS PRN
Qty: 120 TABLET | Refills: 0 | Status: SHIPPED | OUTPATIENT
Start: 2023-08-02

## 2023-08-16 ENCOUNTER — PROCEDURE VISIT (OUTPATIENT)
Dept: OBGYN CLINIC | Facility: CLINIC | Age: 68
End: 2023-08-16
Payer: COMMERCIAL

## 2023-08-16 ENCOUNTER — TELEPHONE (OUTPATIENT)
Dept: ENDOCRINOLOGY | Facility: CLINIC | Age: 68
End: 2023-08-16

## 2023-08-16 VITALS
BODY MASS INDEX: 29.37 KG/M2 | HEIGHT: 64 IN | DIASTOLIC BLOOD PRESSURE: 78 MMHG | SYSTOLIC BLOOD PRESSURE: 139 MMHG | HEART RATE: 85 BPM | WEIGHT: 172 LBS

## 2023-08-16 DIAGNOSIS — M17.12 PRIMARY OSTEOARTHRITIS OF LEFT KNEE: Primary | ICD-10-CM

## 2023-08-16 PROCEDURE — 20610 DRAIN/INJ JOINT/BURSA W/O US: CPT | Performed by: STUDENT IN AN ORGANIZED HEALTH CARE EDUCATION/TRAINING PROGRAM

## 2023-08-16 NOTE — PROGRESS NOTES
Ortho Sports Medicine Knee Follow Up Visit     Assesment:     75 y.o. male left knee chondromalcia of the patella; history of prior below-knee amputation for diabetes/vascular insufficiency    Plan:  The patient's diagnosis and treatment were discussed at length today. We discussed no treatment, non-operative treatment, and operative treatment. The patient presents today for Monovisc of his left knee. He tolerated procedure well and all questions were answered. I discussed with him that his knee may feel slightly full and uncomfortable over the next 24 to 48 hours, however he can manage this with ice, elevation, compression, and over-the-counter medication. He is able to perform activity as tolerated, using pain as a guide. He can continue with his home exercise program which was reviewed at today's visit. He is able to repeat these cortisone injections every 6 months. Large joint arthrocentesis: L knee  Universal Protocol:  Consent: Verbal consent obtained. Risks and benefits: risks, benefits and alternatives were discussed  Consent given by: patient  Time out: Immediately prior to procedure a "time out" was called to verify the correct patient, procedure, equipment, support staff and site/side marked as required.   Timeout called at: 8/16/2023 2:28 PM.  Patient understanding: patient states understanding of the procedure being performed  Patient consent: the patient's understanding of the procedure matches consent given  Site marked: the operative site was marked  Supporting Documentation  Indications: pain   Procedure Details  Location: knee - L knee  Preparation: Patient was prepped and draped in the usual sterile fashion  Needle size: 22 G  Ultrasound guidance: no  Approach: anterolateral  Medications administered: 88 mg hyaluronan 88 MG/4ML    Patient tolerance: patient tolerated the procedure well with no immediate complications  Dressing:  Sterile dressing applied            Conservative treatment:    Ice to knee for 20 minutes at least 1-2 times daily. OTC NSAIDS prn for pain. Let pain guide gradual return activities. Home exercise program reviewed. Imaging: All imaging from today was reviewed by myself and explained to the patient. Injection:    No Injection planned at this time. Surgery:     No surgery is recommended at this point, continue with conservative treatment plan as noted. Follow up:    No follow-ups on file. Chief Complaint   Patient presents with   • Injections     Patient is here for gel monovisc injection       History of Present Illness: The patient is returns for follow up of left knee pain. Since the prior visit, He reports minimal improvement. He states that he continues to exhibit anterior medial knee pain which she describes as dull and achy that is worse with periods of prolonged standing, as well as going up and down steps. He rates his pain a 5 out of 10. He states that he has attempted to manage his pain with ice, rest, outpatient physical therapy, and home exercise program for 8+ weeks without any significant improvement of his symptoms. He denies any instability. He denies any mechanical symptoms of catching or locking. He denies any new injury or trauma to his knee. He denies any numbness or tingling.     Knee Surgical History:  None    Past Medical, Social and Family History:  Past Medical History:   Diagnosis Date   • Arthritis     fingers   • First degree AV block    • Full dentures    • Hypertension    • PAD (peripheral artery disease) ([address])    • PVD (peripheral vascular disease) ([address])    • Type 2 diabetes mellitus with diabetic peripheral angiopathy without gangrene ([address]) 5/18/2021    Per CMS ICD 10 guidelines--Per Physician   • Wound, open     right foot- by the 5th toe     Past Surgical History:   Procedure Laterality Date   • CHOLECYSTECTOMY     • COLONOSCOPY     • IR AORTAGRAM WITH RUN-OFF  1/28/2021   • IR AORTAGRAM WITH RUN-OFF  4/13/2021   • LEG AMPUTATION THROUGH LOWER TIBIA AND FIBULA Left 7/17/2021    Procedure: AMPUTATION BELOW KNEE (BKA);   Surgeon: Christina Chery MD;  [facility];  Service: Vascular   • HI BYP FEM-ANT TIBL PST TIBL PRONEAL ART/OTH DSTL Left 7/14/2021    Procedure: BYPASS femoral-peroneal artery bypass with  reverse GSV and balloon angioplasty peroneal artery;  Surgeon: Christina Chery MD;  [facility];  Service: Vascular   • HI DEBRIDEMENT BONE MUSCLE &/FASCIA 20 SQ CM/< Right 12/18/2020    Procedure: DEBRIDMENT OF ULCER , REMOVAL OF DEVITALIZED SKIN, SOFT TISSUE, BONE AND APPLICATION OF INTEGRA GRAFT RIGHT FOOT.;  Surgeon: Boone Lu DPM;  Location: [facility];  Service: Podiatry   • REPLANTATION THUMB Right     right tip reconnected   • SKIN GRAFT      right thumb   • TOE AMPUTATION      left foot all 5 toes removed   • TOE AMPUTATION Right 2/2/2021    Procedure: Right partial 5th ray amputation;  Surgeon: Alisa Baldwin DPM;  Location:  MAIN OR;  Service: Podiatry   • TOE OSTEOTOMY Right 6/26/2020    Procedure: exostosis of right big toe;  Surgeon: Kenia Love DPM;  Location: [facility];  Service: Podiatry   • TRIGGER FINGER RELEASE      bilateral hands   • VEIN LIGATION      right     Allergies   Allergen Reactions   • Shellfish-Derived Products - Food Allergy Anaphylaxis     Throat closes   • Sulfamethoxazole-Trimethoprim Rash     Current Outpatient Medications on File Prior to Visit   Medication Sig Dispense Refill   • atorvastatin (LIPITOR) 40 mg tablet Take 1 tablet (40 mg total) by mouth daily at bedtime 90 tablet 1   • betamethasone dipropionate (DIPROSONE) 0.05 % cream Apply topically 2 (two) times a day 30 g 0   • Blood Glucose Monitoring Suppl (OneTouch Verio) w/Device KIT by Does not apply route 2 (two) times a day Dx E11.9 1 kit 1   • clopidogrel (PLAVIX) 75 mg tablet Take 1 tablet (75 mg total) by mouth daily 90 tablet 0   • glucose blood (OneTouch Ultra) test strip Use 1 each 4 (four) times a day To test blood sugars 400 each 3   • insulin NPH-insulin regular (NovoLIN 70/30) 100 units/mL subcutaneous injection Inject:  44 units with breakfast, 20 units with lunch, 30 units with dinner (Patient taking differently: Inject:  44 units with breakfast, 20 units with lunch, 30 units with dinner) 15 mL 1   • Lancets (OneTouch Delica Plus [id number]) MISC 1 Units by Does not apply route 2 (two) times a day Dx E11.9 100 each 2   • lisinopril (ZESTRIL) 10 mg tablet Take 1 tablet (10 mg total) by mouth daily 90 tablet 1   • metFORMIN (GLUCOPHAGE) 1000 MG tablet Take 1 tablet (1,000 mg total) by mouth 2 (two) times a day with meals 60 tablet 5   • oxyCODONE-acetaminophen (PERCOCET) 5-325 mg per tablet Take 1 tablet by mouth every 6 (six) hours as needed for moderate pain Max Daily Amount: 4 tablets 120 tablet 0   • pantoprazole (PROTONIX) 40 mg tablet TAKE ONE TABLET BY MOUTH ONCE DAILY 90 tablet 0   • pioglitazone (ACTOS) 45 mg tablet Take 1 tablet (45 mg total) by mouth daily 90 tablet 1   • rivaroxaban (Xarelto) 2.5 mg tablet Take 1 tablet (2.5 mg total) by mouth daily with breakfast Last dose 6/21/20 30 tablet 2   • UltiCare Insulin Syringe 31G X 5/16" 1 ML MISC Inject under the skin as needed (for injection) 100 each 1     No current facility-administered medications on file prior to visit.      Social History     Socioeconomic History   • Marital status: /Civil Union     Spouse name: Cleopatra Denise   • Number of children: Not on file   • Years of education: 12   • Highest education level: High school graduate   Occupational History   • Occupation: [employer]   Tobacco Use   • Smoking status: Never   • Smokeless tobacco: Never   Vaping Use   • Vaping Use: Never used   Substance and Sexual Activity   • Alcohol use: Not Currently     Comment: occasional   • Drug use: Never   • Sexual activity: Yes     Partners: Female   Other Topics Concern   • Not on file   Social History Narrative         Social Determinants of Health     Financial Resource Strain: Not on file   Food Insecurity: Not on file   Transportation Needs: Not on file   Physical Activity: Not on file   Stress: Not on file   Social Connections: Not on file   Intimate Partner Violence: Not on file   Housing Stability: Not on file         I have reviewed the past medical, surgical, social and family history, medications and allergies as documented in the EMR. Review of systems: ROS is negative other than that noted in the HPI. Constitutional: Negative for fatigue and fever. Physical Exam:    Blood pressure 139/78, pulse 85, height 5' 4" (1.626 m), weight 78 kg (172 lb). General/Constitutional: NAD, well developed, well nourished  HENT: Normocephalic, atraumatic  CV: Intact distal pulses, regular rate  Resp: No respiratory distress or labored breathing  Lymphatic: No lymphadenopathy palpated  Neuro: Alert and Oriented x 3, no focal deficits  Psych: Normal mood, normal affect, normal judgement, normal behavior  Skin: Warm, dry, no rashes, no erythema      Knee Exam (focused):  Visual inspection demonstrates prior below-knee amputation with healed incisions. No erythema or drainage. There is no significant erythema or edema. No significant joint effusion   Range of motion is full from 0-130 degrees of flexion   Able to straight leg raise   Superior pole of patella tender to palpation. Positive patellar grind  Mild medial medial joint line tenderness, (-) lateral joint line tenderness  Patella tracks with mild crepitus. No J sign. No apprehension. Translation is approximately 2 quadrants and is equal to the contralateral side  Patellar eversion is similar to the contralateral side     Examination of the patient's ipsilateral hip demonstrates full painless range of motion.   No crepitus.      LE NV Exam: +2 DP/PT pulses bilaterally  Sensation intact to light touch L2-S1 bilaterally     Bilateral hip ROM demonstrates no pain actively or passively     No calf tenderness to palpation bilaterally     Knee Imaging     X-rays of the left knee were reviewed, which demonstrate moderate patellar arthritis and chondromalacia setting of prior below-knee amputation. .  I have reviewed the radiology report and agree with their impression.         Scribe Attestation    I,:  Khushi Grant am acting as a scribe while in the presence of the attending physician.:       I,:  Tommy Marsh, DO personally performed the services described in this documentation    as scribed in my presence.:

## 2023-09-11 DIAGNOSIS — E11.52 TYPE 2 DIABETES MELLITUS WITH DIABETIC PERIPHERAL ANGIOPATHY AND GANGRENE, WITH LONG-TERM CURRENT USE OF INSULIN (HCC): ICD-10-CM

## 2023-09-11 DIAGNOSIS — M54.9 CHRONIC BACK PAIN, UNSPECIFIED BACK LOCATION, UNSPECIFIED BACK PAIN LATERALITY: ICD-10-CM

## 2023-09-11 DIAGNOSIS — G89.29 CHRONIC BACK PAIN, UNSPECIFIED BACK LOCATION, UNSPECIFIED BACK PAIN LATERALITY: ICD-10-CM

## 2023-09-11 DIAGNOSIS — Z79.4 TYPE 2 DIABETES MELLITUS WITH DIABETIC PERIPHERAL ANGIOPATHY AND GANGRENE, WITH LONG-TERM CURRENT USE OF INSULIN (HCC): ICD-10-CM

## 2023-09-11 RX ORDER — BLOOD SUGAR DIAGNOSTIC
1 STRIP MISCELLANEOUS 4 TIMES DAILY
Qty: 400 EACH | Refills: 3 | OUTPATIENT
Start: 2023-09-11

## 2023-09-11 RX ORDER — OXYCODONE HYDROCHLORIDE AND ACETAMINOPHEN 5; 325 MG/1; MG/1
1 TABLET ORAL EVERY 6 HOURS PRN
Qty: 120 TABLET | Refills: 0 | OUTPATIENT
Start: 2023-09-11

## 2023-09-12 ENCOUNTER — TELEPHONE (OUTPATIENT)
Dept: OTHER | Facility: HOSPITAL | Age: 68
End: 2023-09-12

## 2023-09-12 NOTE — TELEPHONE ENCOUNTER
Refill Gabapentin. Patient is requesting refill for gabapentin. He stated he has had this before. I do not see on med list.  Please call patient back.   Thank you

## 2023-09-15 ENCOUNTER — OFFICE VISIT (OUTPATIENT)
Dept: FAMILY MEDICINE CLINIC | Facility: CLINIC | Age: 68
End: 2023-09-15
Payer: COMMERCIAL

## 2023-09-15 VITALS
BODY MASS INDEX: 32.1 KG/M2 | HEART RATE: 84 BPM | WEIGHT: 188 LBS | DIASTOLIC BLOOD PRESSURE: 73 MMHG | OXYGEN SATURATION: 96 % | HEIGHT: 64 IN | TEMPERATURE: 98.4 F | SYSTOLIC BLOOD PRESSURE: 148 MMHG

## 2023-09-15 DIAGNOSIS — Z00.00 WELL ADULT EXAM: ICD-10-CM

## 2023-09-15 DIAGNOSIS — L03.119 CELLULITIS AND ABSCESS OF LEG: ICD-10-CM

## 2023-09-15 DIAGNOSIS — L02.419 CELLULITIS AND ABSCESS OF LEG: ICD-10-CM

## 2023-09-15 DIAGNOSIS — E11.52 TYPE 2 DIABETES MELLITUS WITH DIABETIC PERIPHERAL ANGIOPATHY AND GANGRENE, WITH LONG-TERM CURRENT USE OF INSULIN (HCC): Primary | ICD-10-CM

## 2023-09-15 DIAGNOSIS — Z79.4 TYPE 2 DIABETES MELLITUS WITH DIABETIC PERIPHERAL ANGIOPATHY AND GANGRENE, WITH LONG-TERM CURRENT USE OF INSULIN (HCC): Primary | ICD-10-CM

## 2023-09-15 PROCEDURE — 99214 OFFICE O/P EST MOD 30 MIN: CPT | Performed by: FAMILY MEDICINE

## 2023-09-15 PROCEDURE — G0439 PPPS, SUBSEQ VISIT: HCPCS | Performed by: FAMILY MEDICINE

## 2023-09-15 RX ORDER — CEPHALEXIN 500 MG/1
500 CAPSULE ORAL EVERY 6 HOURS SCHEDULED
Qty: 40 CAPSULE | Refills: 0 | Status: SHIPPED | OUTPATIENT
Start: 2023-09-15 | End: 2023-09-25

## 2023-09-15 NOTE — PATIENT INSTRUCTIONS
Medicare Preventive Visit Patient Instructions  Thank you for completing your Welcome to Medicare Visit or Medicare Annual Wellness Visit today. Your next wellness visit will be due in one year (9/15/2024). The screening/preventive services that you may require over the next 5-10 years are detailed below. Some tests may not apply to you based off risk factors and/or age. Screening tests ordered at today's visit but not completed yet may show as past due. Also, please note that scanned in results may not display below. Preventive Screenings:  Service Recommendations Previous Testing/Comments   Colorectal Cancer Screening  · Colonoscopy    · Fecal Occult Blood Test (FOBT)/Fecal Immunochemical Test (FIT)  · Fecal DNA/Cologuard Test  · Flexible Sigmoidoscopy Age: 43-73 years old   Colonoscopy: every 10 years (May be performed more frequently if at higher risk)  OR  FOBT/FIT: every 1 year  OR  Cologuard: every 3 years  OR  Sigmoidoscopy: every 5 years  Screening may be recommended earlier than age 39 if at higher risk for colorectal cancer. Also, an individualized decision between you and your healthcare provider will decide whether screening between the ages of 77-80 would be appropriate.  Colonoscopy: 07/10/2023  FOBT/FIT: Not on file  Cologuard: Not on file  Sigmoidoscopy: Not on file    Screening Current     Prostate Cancer Screening Individualized decision between patient and health care provider in men between ages of 53-66   Medicare will cover every 12 months beginning on the day after your 50th birthday PSA: 0.2 ng/mL     Screening Current     Hepatitis C Screening Once for adults born between 1945 and 1965  More frequently in patients at high risk for Hepatitis C Hep C Antibody: 06/30/2021    Screening Current   Diabetes Screening 1-2 times per year if you're at risk for diabetes or have pre-diabetes Fasting glucose: 254 mg/dL (3/25/2021)  A1C: 10.2 % (12/29/2022)  Screening Not Indicated  History Diabetes Cholesterol Screening Once every 5 years if you don't have a lipid disorder. May order more often based on risk factors. Lipid panel: 12/29/2022  Screening Not Indicated  History Lipid Disorder      Other Preventive Screenings Covered by Medicare:  1. Abdominal Aortic Aneurysm (AAA) Screening: covered once if your at risk. You're considered to be at risk if you have a family history of AAA or a male between the age of 70-76 who smoking at least 100 cigarettes in your lifetime. 2. Lung Cancer Screening: covers low dose CT scan once per year if you meet all of the following conditions: (1) Age 48-67; (2) No signs or symptoms of lung cancer; (3) Current smoker or have quit smoking within the last 15 years; (4) You have a tobacco smoking history of at least 20 pack years (packs per day x number of years you smoked); (5) You get a written order from a healthcare provider. 3. Glaucoma Screening: covered annually if you're considered high risk: (1) You have diabetes OR (2) Family history of glaucoma OR (3)  aged 48 and older OR (3)  American aged 72 and older  3. Osteoporosis Screening: covered every 2 years if you meet one of the following conditions: (1) Have a vertebral abnormality; (2) On glucocorticoid therapy for more than 3 months; (3) Have primary hyperparathyroidism; (4) On osteoporosis medications and need to assess response to drug therapy. 5. HIV Screening: covered annually if you're between the age of 14-79. Also covered annually if you are younger than 13 and older than 72 with risk factors for HIV infection. For pregnant patients, it is covered up to 3 times per pregnancy.     Immunizations:  Immunization Recommendations   Influenza Vaccine Annual influenza vaccination during flu season is recommended for all persons aged >= 6 months who do not have contraindications   Pneumococcal Vaccine   * Pneumococcal conjugate vaccine = PCV13 (Prevnar 13), PCV15 (Vaxneuvance), PCV20 (Prevnar 20)  * Pneumococcal polysaccharide vaccine = PPSV23 (Pneumovax) Adults 2364 years old: 1-3 doses may be recommended based on certain risk factors  Adults 72 years old: 1-2 doses may be recommended based off what pneumonia vaccine you previously received   Hepatitis B Vaccine 3 dose series if at intermediate or high risk (ex: diabetes, end stage renal disease, liver disease)   Tetanus (Td) Vaccine - COST NOT COVERED BY MEDICARE PART B Following completion of primary series, a booster dose should be given every 10 years to maintain immunity against tetanus. Td may also be given as tetanus wound prophylaxis. Tdap Vaccine - COST NOT COVERED BY MEDICARE PART B Recommended at least once for all adults. For pregnant patients, recommended with each pregnancy. Shingles Vaccine (Shingrix) - COST NOT COVERED BY MEDICARE PART B  2 shot series recommended in those aged 48 and above     Health Maintenance Due:      Topic Date Due   • Colorectal Cancer Screening  07/08/2028   • Hepatitis C Screening  Completed     Immunizations Due:      Topic Date Due   • COVID-19 Vaccine (1) Never done   • Pneumococcal Vaccine: 65+ Years (1 - PCV) Never done   • Influenza Vaccine (1) 09/01/2023     Advance Directives   What are advance directives? Advance directives are legal documents that state your wishes and plans for medical care. These plans are made ahead of time in case you lose your ability to make decisions for yourself. Advance directives can apply to any medical decision, such as the treatments you want, and if you want to donate organs. What are the types of advance directives? There are many types of advance directives, and each state has rules about how to use them. You may choose a combination of any of the following:  · Living will: This is a written record of the treatment you want. You can also choose which treatments you do not want, which to limit, and which to stop at a certain time.  This includes surgery, medicine, IV fluid, and tube feedings. · Durable power of  for healthcare [facility]): This is a written record that states who you want to make healthcare choices for you when you are unable to make them for yourself. This person, called a proxy, is usually a family member or a friend. You may choose more than 1 proxy. · Do not resuscitate (DNR) order:  A DNR order is used in case your heart stops beating or you stop breathing. It is a request not to have certain forms of treatment, such as CPR. A DNR order may be included in other types of advance directives. · Medical directive: This covers the care that you want if you are in a coma, near death, or unable to make decisions for yourself. You can list the treatments you want for each condition. Treatment may include pain medicine, surgery, blood transfusions, dialysis, IV or tube feedings, and a ventilator (breathing machine). · Values history: This document has questions about your views, beliefs, and how you feel and think about life. This information can help others choose the care that you would choose. Why are advance directives important? An advance directive helps you control your care. Although spoken wishes may be used, it is better to have your wishes written down. Spoken wishes can be misunderstood, or not followed. Treatments may be given even if you do not want them. An advance directive may make it easier for your family to make difficult choices about your care. Fall Prevention    Fall prevention  includes ways to make your home and other areas safer. It also includes ways you can move more carefully to prevent a fall. Health conditions that cause changes in your blood pressure, vision, or muscle strength and coordination may increase your risk for falls. Medicines may also increase your risk for falls if they make you dizzy, weak, or sleepy. Fall prevention tips:   · Stand or sit up slowly. · Use assistive devices as directed. · Wear shoes that fit well and have soles that . · Wear a personal alarm. · Stay active. · Manage your medical conditions. Home Safety Tips:  · Add items to prevent falls in the bathroom. · Keep paths clear. · Install bright lights in your home. · Keep items you use often on shelves within reach. · Paint or place reflective tape on the edges of your stairs. Weight Management   Why it is important to manage your weight:  Being overweight increases your risk of health conditions such as heart disease, high blood pressure, type 2 diabetes, and certain types of cancer. It can also increase your risk for osteoarthritis, sleep apnea, and other respiratory problems. Aim for a slow, steady weight loss. Even a small amount of weight loss can lower your risk of health problems. How to lose weight safely:  A safe and healthy way to lose weight is to eat fewer calories and get regular exercise. You can lose up about 1 pound a week by decreasing the number of calories you eat by 500 calories each day. Healthy meal plan for weight management:  A healthy meal plan includes a variety of foods, contains fewer calories, and helps you stay healthy. A healthy meal plan includes the following:  · Eat whole-grain foods more often. A healthy meal plan should contain fiber. Fiber is the part of grains, fruits, and vegetables that is not broken down by your body. Whole-grain foods are healthy and provide extra fiber in your diet. Some examples of whole-grain foods are whole-wheat breads and pastas, oatmeal, brown rice, and bulgur. · Eat a variety of vegetables every day. Include dark, leafy greens such as spinach, kale, mak greens, and mustard greens. Eat yellow and orange vegetables such as carrots, sweet potatoes, and winter squash. · Eat a variety of fruits every day. Choose fresh or canned fruit (canned in its own juice or light syrup) instead of juice.  Fruit juice has very little or no fiber.  · Eat low-fat dairy foods. Drink fat-free (skim) milk or 1% milk. Eat fat-free yogurt and low-fat cottage cheese. Try low-fat cheeses such as mozzarella and other reduced-fat cheeses. · Choose meat and other protein foods that are low in fat. Choose beans or other legumes such as split peas or lentils. Choose fish, skinless poultry (chicken or turkey), or lean cuts of red meat (beef or pork). Before you cook meat or poultry, cut off any visible fat. · Use less fat and oil. Try baking foods instead of frying them. Add less fat, such as margarine, sour cream, regular salad dressing and mayonnaise to foods. Eat fewer high-fat foods. Some examples of high-fat foods include french fries, doughnuts, ice cream, and cakes. · Eat fewer sweets. Limit foods and drinks that are high in sugar. This includes candy, cookies, regular soda, and sweetened drinks. Exercise:  Exercise at least 30 minutes per day on most days of the week. Some examples of exercise include walking, biking, dancing, and swimming. You can also fit in more physical activity by taking the stairs instead of the elevator or parking farther away from stores. Ask your healthcare provider about the best exercise plan for you. Narcotic (Opioid) Safety    Use narcotics safely:  · Take prescribed narcotics exactly as directed  · Do not give narcotics to others or take narcotics that belong to someone else  · Do not mix narcotics without medicines or alcohol  · Do not drive or operate heavy machinery after you take the narcotic  · Monitor for side effects and notify your healthcare provider if you experienced side effects such as nausea, sleepiness, itching, or trouble thinking clearly. Manage constipation:    Constipation is the most common side effect of narcotic medicine. Constipation is when you have hard, dry bowel movements, or you go longer than usual between bowel movements.  Tell your healthcare provider about all changes in your bowel movements while you are taking narcotics. He or she may recommend laxative medicine to help you have a bowel movement. He or she may also change the kind of narcotic you are taking, or change when you take it. The following are more ways you can prevent or relieve constipation:    · Drink liquids as directed. You may need to drink extra liquids to help soften and move your bowels. Ask how much liquid to drink each day and which liquids are best for you. · Eat high-fiber foods. This may help decrease constipation by adding bulk to your bowel movements. High-fiber foods include fruits, vegetables, whole-grain breads and cereals, and beans. Your healthcare provider or dietitian can help you create a high-fiber meal plan. Your provider may also recommend a fiber supplement if you cannot get enough fiber from food. · Exercise regularly. Regular physical activity can help stimulate your intestines. Walking is a good exercise to prevent or relieve constipation. Ask which exercises are best for you. · Schedule a time each day to have a bowel movement. This may help train your body to have regular bowel movements. Bend forward while you are on the toilet to help move the bowel movement out. Sit on the toilet for at least 10 minutes, even if you do not have a bowel movement. Store narcotics safely:   · Store narcotics where others cannot easily get them. Keep them in a locked cabinet or secure area. Do not  keep them in a purse or other bag you carry with you. A person may be looking for something else and find the narcotics. · Make sure narcotics are stored out of the reach of children. A child can easily overdose on narcotics. Narcotics may look like candy to a small child. The best way to dispose of narcotics: The laws vary by country and area. In the [facility], the best way is to return the narcotics through a take-back program. This program is offered by the [employer] (ANDRY).  The following are options for using the program:  · Take the narcotics to a ANDRY collection site. The site is often a law enforcement center. Call your local law enforcement center for scheduled take-back days in your area. You will be given information on where to go if the collection site is in a different location. · Take the narcotics to an approved pharmacy or hospital.  A pharmacy or hospital may be set up as a collection site. You will need to ask if it is a ANDRY collection site if you were not directed there. A pharmacy or doctor's office may not be able to take back narcotics unless it is a ANDRY site. · Use a mail-back system. This means you are given containers to put the narcotics into. You will then mail them in the containers. · Use a take-back drop box. This is a place to leave the narcotics at any time. People and animals will not be able to get into the box. Your local law enforcement agency can tell you where to find a drop box in your area. Other ways to manage pain:   · Ask your healthcare provider about non-narcotic medicines to control pain. Nonprescription medicines include NSAIDs (such as ibuprofen) and acetaminophen. Prescription medicines include muscle relaxers, antidepressants, and steroids. · Pain may be managed without any medicines. Some ways to relieve pain include massage, aromatherapy, or meditation. Physical or occupational therapy may also help. For more information:   · Drug Enforcement Administration  [address]  Phone: 7- 245 - 369-5883  Web Address: [facility] [employer].gov/drug_disposal/    · [address] [facility] [address] and Drug Administration  [address] [facility] , [address] [place] [address] 12  Phone: 3- 279 - 925-3016  Web Address: http://[employer]/     © Copyright [employer] 2018 Information is for End User's use only and may not be sold, redistributed or otherwise used for commercial purposes.  All illustrations and images included in [facility] are the copyrighted property of [employer] or [address] [place] [address]

## 2023-09-15 NOTE — PROGRESS NOTES
Assessment and Plan:     Problem List Items Addressed This Visit    None  Visit Diagnoses     Type 2 diabetes mellitus with diabetic peripheral angiopathy and gangrene, with long-term current use of insulin ([facility])    -  Primary    Relevant Orders    Hemoglobin A1C    Comprehensive metabolic panel    Cellulitis and abscess of leg        Relevant Medications    cephalexin (KEFLEX) 500 mg capsule    Well adult exam               Preventive health issues were discussed with patient, and age appropriate screening tests were ordered as noted in patient's After Visit Summary. Personalized health advice and appropriate referrals for health education or preventive services given if needed, as noted in patient's After Visit Summary. History of Present Illness:     Patient presents for a Medicare Wellness Visit    Is a 24-year-old male here today for checkup for his Medicare wellness as well as checkup on multimedical problems. Type 2 diabetes peripheral vascular disease as been having some problems with his stump on his left side patient has a below the knee amputation but his prosthesis is not fitting properly. He is making calls out to see about getting another 1 has had this 1 over 2 years. He does have on his anterior shin in the distal tibia he does have his skin wearing away and he also has on the left distal hamstring area on the lateral side to open areas that he has Band-Aids on where the prosthesis is rubbing incorrectly. Patient will need to see his prosthetic supplier to see about getting another leg patient was referred back to a physiatrist to see about get that going for him. Discussed with patient about the need to get this thing fixed ASAP and to watch for any signs of worsening infection.      Patient Care Team:  Dacia Antonio MD as PCP - General (Family Medicine)  Bacilio Connell as PCP - Wound (Wound Care)  Wild Lobato MD (Vascular Surgery)     Review of Systems:     Review of Systems     Problem List:     Patient Active Problem List   Diagnosis   • Acquired deformity of foot   • Diabetic polyneuropathy associated with type 2 diabetes mellitus (HCC)   • Pain in both feet   • Peripheral arteriosclerosis ([address])   • Onychomycosis   • Tinea pedis of both feet   • Dermatophytosis   • Ingrown toenail   • Paronychia of toenail of right foot   • Diabetic ulcer of toe of right foot associated with type 2 diabetes mellitus, limited to breakdown of skin ([address])   • Bony exostosis   • Right foot ulcer, with fat layer exposed ([address])   • PVD (peripheral vascular disease) ([address])   • Poorly controlled type 2 diabetes mellitus ([address])   • Essential hypertension   • Mixed hyperlipidemia   • Type 2 diabetes mellitus with diabetic peripheral angiopathy without gangrene ([facility])   • Platelets decreased ([facility])   • Gastroesophageal reflux disease without esophagitis   • S/P BKA (below knee amputation), left ([facility])   • Insomnia   • Continuous opioid dependence ([address])   • Osteomyelitis, unspecified site, unspecified type ([address])   • Embolism and thrombosis of arteries of the lower extremities ([address])   • Atrial fibrillation, unspecified type [facility])      Past Medical and Surgical History:     Past Medical History:   Diagnosis Date   • Arthritis     fingers   • First degree AV block    • Full dentures    • Hypertension    • PAD (peripheral artery disease) ([address])    • PVD (peripheral vascular disease) ([address])    • Type 2 diabetes mellitus with diabetic peripheral angiopathy without gangrene ([address]) 5/18/2021    Per CMS ICD 10 guidelines--Per Physician   • Wound, open     right foot- by the 5th toe     Past Surgical History:   Procedure Laterality Date   • CHOLECYSTECTOMY     • COLONOSCOPY     • IR AORTAGRAM WITH RUN-OFF  1/28/2021   • IR AORTAGRAM WITH RUN-OFF  4/13/2021   • LEG AMPUTATION THROUGH LOWER TIBIA AND FIBULA Left 7/17/2021    Procedure: AMPUTATION BELOW KNEE (BKA);   Surgeon: Rolando Bolden MD;  Location: [facility]; Service: Vascular   • NV BYP FEM-ANT TIBL PST TIBL PRONEAL ART/OTH DSTL Left 7/14/2021    Procedure: BYPASS femoral-peroneal artery bypass with  reverse GSV and balloon angioplasty peroneal artery;  Surgeon: Shilpa Anderson MD;  Location:  MAIN OR;  Service: Vascular   • NV DEBRIDEMENT BONE MUSCLE &/FASCIA 20 SQ CM/< Right 12/18/2020    Procedure: DEBRIDMENT OF ULCER , REMOVAL OF DEVITALIZED SKIN, SOFT TISSUE, BONE AND APPLICATION OF INTEGRA GRAFT RIGHT FOOT.;  Surgeon: Lam Gr DPM;  Location: [facility];  Service: Podiatry   • REPLANTATION THUMB Right     right tip reconnected   • SKIN GRAFT      right thumb   • TOE AMPUTATION      left foot all 5 toes removed   • TOE AMPUTATION Right 2/2/2021    Procedure: Right partial 5th ray amputation;  Surgeon: Rosenda Parham DPM;  Location: [facility];  Service: Podiatry   • TOE OSTEOTOMY Right 6/26/2020    Procedure: exostosis of right big toe;  Surgeon: Michelle Amado DPM;  Location: [facility];  Service: Podiatry   • TRIGGER FINGER RELEASE      bilateral hands   • VEIN LIGATION      right      Family History:     Family History   Problem Relation Age of Onset   • Arthritis Mother    • Pancreatic cancer Mother    • Cancer Father         colon   • Alzheimer's disease Father       Social History:     Social History     Socioeconomic History   • Marital status: /Civil Union     Spouse name: Ena Rogers   • Number of children: None   • Years of education: 12   • Highest education level: High school graduate   Occupational History   • Occupation: [employer]   Tobacco Use   • Smoking status: Never   • Smokeless tobacco: Never   Vaping Use   • Vaping Use: Never used   Substance and Sexual Activity   • Alcohol use: Not Currently     Comment: occasional   • Drug use: Never   • Sexual activity: Yes     Partners: Female   Other Topics Concern   • None   Social History Narrative         Social Determinants of Health     Financial Resource Strain: Low Risk  (9/15/2023)    Overall Financial Resource Strain (CARDIA)    • Difficulty of Paying Living Expenses: Not hard at all   Food Insecurity: Not on file   Transportation Needs: No Transportation Needs (9/15/2023)    PRAPARE - Transportation    • Lack of Transportation (Medical): No    • Lack of Transportation (Non-Medical):  No   Physical Activity: Not on file   Stress: Not on file   Social Connections: Not on file   Intimate Partner Violence: Not on file   Housing Stability: Not on file      Medications and Allergies:     Current Outpatient Medications   Medication Sig Dispense Refill   • atorvastatin (LIPITOR) 40 mg tablet Take 1 tablet (40 mg total) by mouth daily at bedtime 90 tablet 1   • betamethasone dipropionate (DIPROSONE) 0.05 % cream Apply topically 2 (two) times a day 30 g 0   • Blood Glucose Monitoring Suppl (OneTouch Verio) w/Device KIT by Does not apply route 2 (two) times a day Dx E11.9 1 kit 1   • cephalexin (KEFLEX) 500 mg capsule Take 1 capsule (500 mg total) by mouth every 6 (six) hours for 10 days 40 capsule 0   • clopidogrel (PLAVIX) 75 mg tablet Take 1 tablet (75 mg total) by mouth daily 90 tablet 0   • glucose blood (OneTouch Ultra) test strip Use 1 each 4 (four) times a day To test blood sugars 400 each 3   • insulin NPH-insulin regular (NovoLIN 70/30) 100 units/mL subcutaneous injection Inject:  44 units with breakfast, 20 units with lunch, 30 units with dinner (Patient taking differently: Inject:  44 units with breakfast, 20 units with lunch, 30 units with dinner) 15 mL 1   • Lancets (OneTouch Delica Plus SXHCID11Y) MISC 1 Units by Does not apply route 2 (two) times a day Dx E11.9 100 each 2   • lisinopril (ZESTRIL) 10 mg tablet Take 1 tablet (10 mg total) by mouth daily 90 tablet 1   • metFORMIN (GLUCOPHAGE) 1000 MG tablet Take 1 tablet (1,000 mg total) by mouth 2 (two) times a day with meals 60 tablet 5   • oxyCODONE-acetaminophen (PERCOCET) 5-325 mg per tablet Take 1 tablet by mouth every 6 (six) hours as needed for moderate pain Max Daily Amount: 4 tablets 120 tablet 0   • pantoprazole (PROTONIX) 40 mg tablet TAKE ONE TABLET BY MOUTH ONCE DAILY 90 tablet 0   • pioglitazone (ACTOS) 45 mg tablet Take 1 tablet (45 mg total) by mouth daily 90 tablet 1   • rivaroxaban (Xarelto) 2.5 mg tablet Take 1 tablet (2.5 mg total) by mouth daily with breakfast Last dose 6/21/20 30 tablet 2   • UltiCare Insulin Syringe 31G X 5/16" 1 ML MISC Inject under the skin as needed (for injection) 100 each 1     No current facility-administered medications for this visit. Allergies   Allergen Reactions   • Shellfish-Derived Products - Food Allergy Anaphylaxis     Throat closes   • Sulfamethoxazole-Trimethoprim Rash      Immunizations:     Immunization History   Administered Date(s) Administered   • INFLUENZA 11/02/2018   • Influenza, high dose seasonal 0.7 mL 11/05/2020, 10/14/2021, 10/17/2022      Health Maintenance:         Topic Date Due   • Colorectal Cancer Screening  07/08/2028   • Hepatitis C Screening  Completed         Topic Date Due   • COVID-19 Vaccine (1) Never done   • Pneumococcal Vaccine: 65+ Years (1 - PCV) Never done   • Influenza Vaccine (1) 09/01/2023      Medicare Screening Tests and Risk Assessments:         Health Risk Assessment:   Patient rates overall health as good. Patient feels that their physical health rating is same. Patient is satisfied with their life. Eyesight was rated as same. Hearing was rated as same. Patient feels that their emotional and mental health rating is same. Patients states they are never, rarely angry. Patient states they are often unusually tired/fatigued. Pain experienced in the last 7 days has been some. Patient's pain rating has been 3/10. Patient states that he has experienced no weight loss or gain in last 6 months. Fall Risk Screening:    In the past year, patient has experienced: history of falling in past year    Number of falls: 1  Injured during fall?: Yes    Feels unsteady when standing or walking?: Yes    Worried about falling?: Yes      Home Safety:  Patient has trouble with stairs inside or outside of their home. Patient has working smoke alarms and has working carbon monoxide detector. Home safety hazards include: none. Nutrition:   Current diet is Regular. Medications:   Patient is currently taking over-the-counter supplements. OTC medications include: see medication list. Patient is able to manage medications. Activities of Daily Living (ADLs)/Instrumental Activities of Daily Living (IADLs):   Walk and transfer into and out of bed and chair?: Yes  Dress and groom yourself?: Yes    Bathe or shower yourself?: Yes    Feed yourself? Yes  Do your laundry/housekeeping?: Yes  Manage your money, pay your bills and track your expenses?: Yes  Make your own meals?: Yes    Do your own shopping?: Yes    Previous Hospitalizations:   Any hospitalizations or ED visits within the last 12 months?: No      Advance Care Planning:   Living will: Yes    Durable POA for healthcare: No    Advanced directive: Yes      PREVENTIVE SCREENINGS      Cardiovascular Screening:    General: Screening Not Indicated and History Lipid Disorder      Diabetes Screening:     General: Screening Not Indicated and History Diabetes      Colorectal Cancer Screening:     General: Screening Current      Prostate Cancer Screening:    General: Screening Current      Abdominal Aortic Aneurysm (AAA) Screening:    Risk factors include: age between 70-75 yo        Lung Cancer Screening:     General: Screening Not Indicated      Hepatitis C Screening:    General: Screening Current    Screening, Brief Intervention, and Referral to Treatment (SBIRT)    Screening      AUDIT-C Screenin) How often did you have a drink containing alcohol in the past year? never  2) How many drinks did you have on a typical day when you were drinking in the past year? 0  3) How often did you have 6 or more drinks on one occasion in the past year? never    AUDIT-C Score: 0  Interpretation: Score 0-3 (male): Negative screen for alcohol misuse    Single Item Drug Screening:  How often have you used an illegal drug (including marijuana) or a prescription medication for non-medical reasons in the past year? never    Single Item Drug Screen Score: 0  Interpretation: Negative screen for possible drug use disorder    Review of Current Opioid Use    Opioid Risk Tool (ORT) Interpretation: Complete Opioid Risk Tool (ORT)    No results found.      Physical Exam:     /73 (BP Location: Left arm, Patient Position: Sitting, Cuff Size: Standard)   Pulse 84   Temp 98.4 °F (36.9 °C) (Skin)   Ht 5' 4" (1.626 m)   Wt 85.3 kg (188 lb)   SpO2 96%   BMI 32.27 kg/m²     Physical Exam     Jody Mercer MD

## 2023-09-22 DIAGNOSIS — Z79.4 TYPE 2 DIABETES MELLITUS WITH DIABETIC PERIPHERAL ANGIOPATHY AND GANGRENE, WITH LONG-TERM CURRENT USE OF INSULIN (HCC): ICD-10-CM

## 2023-09-22 DIAGNOSIS — E11.52 TYPE 2 DIABETES MELLITUS WITH DIABETIC PERIPHERAL ANGIOPATHY AND GANGRENE, WITH LONG-TERM CURRENT USE OF INSULIN (HCC): ICD-10-CM

## 2023-09-22 RX ORDER — BLOOD SUGAR DIAGNOSTIC
1 STRIP MISCELLANEOUS 4 TIMES DAILY
Qty: 400 EACH | Refills: 1 | Status: SHIPPED | OUTPATIENT
Start: 2023-09-22 | End: 2023-09-25 | Stop reason: SDUPTHER

## 2023-09-22 NOTE — TELEPHONE ENCOUNTER
Reason for call:   [x] Refill   [] Prior Auth  [] Other:     Office:   [x] PCP/Provider -   [] Speciality/Provider -     Medication: One Touch Ultra Test Strips    Dose/Frequency: 4 times daily     Quantity: 400    Pharmacy: [address] [place] [address] Way    Does the patient have enough for 3 days?    [x] Yes   [] No - Send as HP to POD

## 2023-09-25 DIAGNOSIS — Z79.4 TYPE 2 DIABETES MELLITUS WITH DIABETIC PERIPHERAL ANGIOPATHY AND GANGRENE, WITH LONG-TERM CURRENT USE OF INSULIN (HCC): Primary | ICD-10-CM

## 2023-09-25 DIAGNOSIS — E11.52 TYPE 2 DIABETES MELLITUS WITH DIABETIC PERIPHERAL ANGIOPATHY AND GANGRENE, WITH LONG-TERM CURRENT USE OF INSULIN (HCC): Primary | ICD-10-CM

## 2023-09-25 DIAGNOSIS — E11.52 TYPE 2 DIABETES MELLITUS WITH DIABETIC PERIPHERAL ANGIOPATHY AND GANGRENE, WITH LONG-TERM CURRENT USE OF INSULIN (HCC): ICD-10-CM

## 2023-09-25 DIAGNOSIS — Z79.4 TYPE 2 DIABETES MELLITUS WITH DIABETIC PERIPHERAL ANGIOPATHY AND GANGRENE, WITH LONG-TERM CURRENT USE OF INSULIN (HCC): ICD-10-CM

## 2023-09-25 RX ORDER — BLOOD SUGAR DIAGNOSTIC
STRIP MISCELLANEOUS
Qty: 400 STRIP | Refills: 1 | Status: SHIPPED | OUTPATIENT
Start: 2023-09-25

## 2023-09-25 RX ORDER — BLOOD SUGAR DIAGNOSTIC
STRIP MISCELLANEOUS
Qty: 400 STRIP | Refills: 2 | Status: CANCELLED | OUTPATIENT
Start: 2023-09-25

## 2023-09-25 RX ORDER — BLOOD-GLUCOSE METER
EACH MISCELLANEOUS DAILY
Qty: 1 KIT | Refills: 0 | Status: CANCELLED | OUTPATIENT
Start: 2023-09-25

## 2023-09-25 RX ORDER — BLOOD-GLUCOSE METER
EACH MISCELLANEOUS DAILY
Qty: 1 KIT | Refills: 0 | Status: SHIPPED | OUTPATIENT
Start: 2023-09-25

## 2023-09-25 RX ORDER — BLOOD SUGAR DIAGNOSTIC
1 STRIP MISCELLANEOUS 4 TIMES DAILY
Qty: 400 EACH | Refills: 1 | Status: SHIPPED | OUTPATIENT
Start: 2023-09-25

## 2023-09-25 NOTE — TELEPHONE ENCOUNTER
Pt. [id number] [facility] requires some type of authorization from PCP. Pharmacy states that they requested the authorization from Dr. Keyana Herron prior to filling order. Per patient Please process ASAP.     Thanks

## 2023-10-18 ENCOUNTER — TELEPHONE (OUTPATIENT)
Age: 68
End: 2023-10-18

## 2023-10-18 NOTE — TELEPHONE ENCOUNTER
Patient is calling regarding cancelling an appointment. Date/Time:10/19/2023    Patient was rescheduled: YES [] NO [x]    Patient requesting call back to reschedule: YES [] NO [x]    Patient report that he is at his parents house at the moment.

## 2023-11-24 ENCOUNTER — OFFICE VISIT (OUTPATIENT)
Dept: FAMILY MEDICINE CLINIC | Facility: CLINIC | Age: 68
End: 2023-11-24
Payer: COMMERCIAL

## 2023-11-24 VITALS
RESPIRATION RATE: 16 BRPM | SYSTOLIC BLOOD PRESSURE: 140 MMHG | OXYGEN SATURATION: 97 % | WEIGHT: 193 LBS | DIASTOLIC BLOOD PRESSURE: 72 MMHG | HEART RATE: 71 BPM | TEMPERATURE: 97.3 F | HEIGHT: 64 IN | BODY MASS INDEX: 32.95 KG/M2

## 2023-11-24 DIAGNOSIS — K21.9 GASTROESOPHAGEAL REFLUX DISEASE WITHOUT ESOPHAGITIS: ICD-10-CM

## 2023-11-24 DIAGNOSIS — E11.65 POORLY CONTROLLED TYPE 2 DIABETES MELLITUS (HCC): ICD-10-CM

## 2023-11-24 DIAGNOSIS — I48.91 ATRIAL FIBRILLATION, UNSPECIFIED TYPE (HCC): Primary | ICD-10-CM

## 2023-11-24 DIAGNOSIS — G89.29 CHRONIC BACK PAIN, UNSPECIFIED BACK LOCATION, UNSPECIFIED BACK PAIN LATERALITY: ICD-10-CM

## 2023-11-24 DIAGNOSIS — I10 ESSENTIAL HYPERTENSION: ICD-10-CM

## 2023-11-24 DIAGNOSIS — M25.512 ACUTE PAIN OF LEFT SHOULDER: ICD-10-CM

## 2023-11-24 DIAGNOSIS — M54.9 CHRONIC BACK PAIN, UNSPECIFIED BACK LOCATION, UNSPECIFIED BACK PAIN LATERALITY: ICD-10-CM

## 2023-11-24 DIAGNOSIS — Z23 ENCOUNTER FOR IMMUNIZATION: ICD-10-CM

## 2023-11-24 LAB
CREAT UR-MCNC: 45 MG/DL
MICROALBUMIN UR-MCNC: 72.4 MG/L
MICROALBUMIN/CREAT 24H UR: 161 MG/G CREATININE (ref 0–30)
SL AMB POCT HEMOGLOBIN AIC: 11.6 (ref ?–6.5)

## 2023-11-24 PROCEDURE — 82570 ASSAY OF URINE CREATININE: CPT | Performed by: FAMILY MEDICINE

## 2023-11-24 PROCEDURE — 99214 OFFICE O/P EST MOD 30 MIN: CPT | Performed by: FAMILY MEDICINE

## 2023-11-24 PROCEDURE — G0008 ADMIN INFLUENZA VIRUS VAC: HCPCS | Performed by: FAMILY MEDICINE

## 2023-11-24 PROCEDURE — 90662 IIV NO PRSV INCREASED AG IM: CPT | Performed by: FAMILY MEDICINE

## 2023-11-24 PROCEDURE — 82043 UR ALBUMIN QUANTITATIVE: CPT | Performed by: FAMILY MEDICINE

## 2023-11-24 PROCEDURE — 83036 HEMOGLOBIN GLYCOSYLATED A1C: CPT | Performed by: FAMILY MEDICINE

## 2023-11-24 RX ORDER — INSULIN LISPRO 100 [IU]/ML
25 INJECTION, SOLUTION INTRAVENOUS; SUBCUTANEOUS
Qty: 15 ML | Refills: 1 | Status: SHIPPED | OUTPATIENT
Start: 2023-11-24

## 2023-11-24 RX ORDER — PANTOPRAZOLE SODIUM 40 MG/1
40 TABLET, DELAYED RELEASE ORAL DAILY
Qty: 90 TABLET | Refills: 1 | Status: SHIPPED | OUTPATIENT
Start: 2023-11-24

## 2023-11-24 RX ORDER — OXYCODONE AND ACETAMINOPHEN 7.5; 325 MG/1; MG/1
1 TABLET ORAL EVERY 4 HOURS PRN
Qty: 30 TABLET | Refills: 0 | Status: SHIPPED | OUTPATIENT
Start: 2023-11-24

## 2023-11-24 RX ORDER — PEN NEEDLE, DIABETIC 31 GX3/16"
NEEDLE, DISPOSABLE MISCELLANEOUS 4 TIMES DAILY
Qty: 200 EACH | Refills: 2 | Status: SHIPPED | OUTPATIENT
Start: 2023-11-24

## 2023-11-24 RX ORDER — INSULIN GLARGINE 100 [IU]/ML
50 INJECTION, SOLUTION SUBCUTANEOUS
Qty: 15 ML | Refills: 1 | Status: SHIPPED | OUTPATIENT
Start: 2023-11-24

## 2023-11-24 NOTE — PROGRESS NOTES
Name: Janelle Lainez      : 1955      MRN: [id number]  Encounter Provider: Estrella Gordillo MD  Encounter Date: 2023   Encounter department: [address] [place] [address]     1. Atrial fibrillation, unspecified type [facility])  Assessment & Plan:  Continue with anticogulation and rate control of heart rate. Concerns about condition were addressed today. 2. Essential hypertension  Assessment & Plan:  Patient is stable with current anti-hypertensive medicine and continue to follow a low sodium diet and take current medication. All questions about this condition were answered today. 3. Gastroesophageal reflux disease without esophagitis  -     pantoprazole (PROTONIX) 40 mg tablet; Take 1 tablet (40 mg total) by mouth daily    4. Poorly controlled type 2 diabetes mellitus (HCC)  Assessment & Plan:    Lab Results   Component Value Date    HGBA1C 10.2 (H) 2022       Orders:  -     POCT hemoglobin A1c  -     Albumin / creatinine urine ratio  -     Insulin Glargine Solostar (Basaglar KwikPen) 100 UNIT/ML SOPN; Inject 0.5 mL (50 Units total) under the skin daily at bedtime  -     insulin lispro (HumaLOG KwikPen) 100 units/mL injection pen; Inject 25 Units under the skin 3 (three) times a day with meals  -     Insulin Pen Needle (HM UltiCare Mini Pen Needles) 31G X 5 MM MISC; Use 4 (four) times a day    5. Encounter for immunization  -     influenza vaccine, high-dose, PF 0.7 mL (FLUZONE HIGH-DOSE)    6. Acute pain of left shoulder  -     XR shoulder 2+ vw left; Future; Expected date: 2023    7. Chronic back pain, unspecified back location, unspecified back pain laterality  -     oxyCODONE-acetaminophen (Percocet) 7.5-325 MG per tablet; Take 1 tablet by mouth every 4 (four) hours as needed for moderate pain Max Daily Amount: 6 tablets           Subjective     65-year-old male today for checkup on multiple medical problems.   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 Patient also some atrial fibrillation. 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 Patient is due for his lab work we will see about doing an A1c form today. I will see about getting him a flu shot. Also will get him increasing his Percocet from the 5 to 7.5 mg furthermore pain control. Patient is also having some problems with his stump as well his prosthesis that is not fitting properly he is currently wrestling with the company doing the prosthesis as well as his physiatrist to get this straightened out. Patient also would like to change his insulin because what he is taking he says is not really helping he is taking the Humulin 70/30 he takes at about 100 units he takes 3 times a day altogether and also covers himself. His A1c has been very high so usually not been doing well controlled with his medicine we will get a see about switching him over to some basal bolus insulin. I will medicate him some Basaglar 50 units at bedtime and that he is going to cover himself throughout the day using it back to me about how much coverage he has been eating during the day and then we will adjust his Basaglar as needed. Review of Systems   Constitutional:  Negative for activity change, appetite change, chills, fatigue, fever and unexpected weight change. HENT:  Negative for congestion, ear pain, hearing loss, mouth sores, postnasal drip, sinus pressure, sinus pain, sneezing and sore throat. Respiratory:  Negative for apnea, cough, shortness of breath and wheezing. Cardiovascular:  Negative for chest pain, palpitations and leg swelling. Gastrointestinal:  Negative for abdominal pain, constipation, diarrhea, nausea and vomiting. Endocrine: Negative for cold intolerance and heat intolerance.    Genitourinary:  Negative for dysuria, frequency and hematuria. Musculoskeletal:  Negative for arthralgias, back pain, gait problem, joint swelling and neck pain. Skin:  Negative for rash. Neurological:  Negative for dizziness, weakness and numbness. Hematological:  Does not bruise/bleed easily. Psychiatric/Behavioral:  Negative for agitation, behavioral problems, confusion, hallucinations and sleep disturbance. The patient is not nervous/anxious. Past Medical History:   Diagnosis Date   • Arthritis     fingers   • First degree AV block    • Full dentures    • Hypertension    • PAD (peripheral artery disease) ([facility])    • PVD (peripheral vascular disease) ([facility])    • Type 2 diabetes mellitus with diabetic peripheral angiopathy without gangrene ([address]) 5/18/2021    Per CMS ICD 10 guidelines--Per Physician   • Wound, open     right foot- by the 5th toe     Past Surgical History:   Procedure Laterality Date   • CHOLECYSTECTOMY     • COLONOSCOPY     • IR AORTAGRAM WITH RUN-OFF  1/28/2021   • IR AORTAGRAM WITH RUN-OFF  4/13/2021   • LEG AMPUTATION THROUGH LOWER TIBIA AND FIBULA Left 7/17/2021    Procedure: AMPUTATION BELOW KNEE (BKA);   Surgeon: Kenan Henderson MD;  Location: [facility];  Service: Vascular   • SC BYP FEM-ANT TIBL PST TIBL PRONEAL ART/OTH DSTL Left 7/14/2021    Procedure: BYPASS femoral-peroneal artery bypass with  reverse GSV and balloon angioplasty peroneal artery;  Surgeon: Kenan Henderson MD;  Location:  MAIN OR;  Service: Vascular   • SC DEBRIDEMENT BONE MUSCLE &/FASCIA 20 SQ CM/< Right 12/18/2020    Procedure: DEBRIDMENT OF ULCER , REMOVAL OF DEVITALIZED SKIN, SOFT TISSUE, BONE AND APPLICATION OF INTEGRA GRAFT RIGHT FOOT.;  Surgeon: Jackeline Pathak DPM;  Location: [facility];  Service: Podiatry   • REPLANTATION THUMB Right     right tip reconnected   • SKIN GRAFT      right thumb   • TOE AMPUTATION      left foot all 5 toes removed   • TOE AMPUTATION Right 2/2/2021    Procedure: Right partial 5th ray amputation; Surgeon: Ashley Sesay DPM;  Location: [facility];  Service: Podiatry   • TOE OSTEOTOMY Right 6/26/2020    Procedure: exostosis of right big toe;  Surgeon: Ade Burkett DPM;  Location: [facility];  Service: Podiatry   • TRIGGER FINGER RELEASE      bilateral hands   • VEIN LIGATION      right     Family History   Problem Relation Age of Onset   • Arthritis Mother    • Pancreatic cancer Mother    • Cancer Father         colon   • Alzheimer's disease Father      Social History     Socioeconomic History   • Marital status: /Civil Union     Spouse name: Syed Dia   • Number of children: None   • Years of education: 12   • Highest education level: High school graduate   Occupational History   • Occupation: [employer]   Tobacco Use   • Smoking status: Never   • Smokeless tobacco: Never   Vaping Use   • Vaping Use: Never used   Substance and Sexual Activity   • Alcohol use: Not Currently     Comment: occasional   • Drug use: Never   • Sexual activity: Yes     Partners: Female   Other Topics Concern   • None   Social History Narrative         Social Determinants of Health     Financial Resource Strain: Low Risk  (9/15/2023)    Overall Financial Resource Strain (CARDIA)    • Difficulty of Paying Living Expenses: Not hard at all   Food Insecurity: Not on file   Transportation Needs: No Transportation Needs (9/15/2023)    PRAPARE - Transportation    • Lack of Transportation (Medical): No    • Lack of Transportation (Non-Medical):  No   Physical Activity: Not on file   Stress: Not on file   Social Connections: Not on file   Intimate Partner Violence: Not on file   Housing Stability: Not on file     Current Outpatient Medications on File Prior to Visit   Medication Sig   • atorvastatin (LIPITOR) 40 mg tablet Take 1 tablet (40 mg total) by mouth daily at bedtime   • betamethasone dipropionate (DIPROSONE) 0.05 % cream Apply topically 2 (two) times a day   • Blood Glucose Monitoring Suppl (Accu-Chek Guide) w/Device KIT Use in the morning • clopidogrel (PLAVIX) 75 mg tablet Take 1 tablet (75 mg total) by mouth daily   • glucose blood (Accu-Chek Guide) test strip Check Blood Sugars QID as directed by physician.    • glucose blood (OneTouch Ultra) test strip Use 1 each 4 (four) times a day To test blood sugars   • insulin NPH-insulin regular (NovoLIN 70/30) 100 units/mL subcutaneous injection Inject:  44 units with breakfast, 20 units with lunch, 30 units with dinner (Patient taking differently: Inject:  44 units with breakfast, 20 units with lunch, 30 units with dinner)   • Lancets (OneTouch Delica Plus [id number]) MISC 1 Units by Does not apply route 2 (two) times a day Dx E11.9   • lisinopril (ZESTRIL) 10 mg tablet Take 1 tablet (10 mg total) by mouth daily   • metFORMIN (GLUCOPHAGE) 1000 MG tablet Take 1 tablet (1,000 mg total) by mouth 2 (two) times a day with meals   • oxyCODONE-acetaminophen (PERCOCET) 5-325 mg per tablet Take 1 tablet by mouth every 6 (six) hours as needed for moderate pain Max Daily Amount: 4 tablets   • pioglitazone (ACTOS) 45 mg tablet Take 1 tablet (45 mg total) by mouth daily   • rivaroxaban (Xarelto) 2.5 mg tablet Take 1 tablet (2.5 mg total) by mouth daily with breakfast Last dose 6/21/20   • UltiCare Insulin Syringe 31G X 5/16" 1 ML MISC Inject under the skin as needed (for injection)   • [DISCONTINUED] pantoprazole (PROTONIX) 40 mg tablet TAKE ONE TABLET BY MOUTH ONCE DAILY     Allergies   Allergen Reactions   • Shellfish-Derived Products - Food Allergy Anaphylaxis     Throat closes   • Sulfamethoxazole-Trimethoprim Rash     Immunization History   Administered Date(s) Administered   • INFLUENZA 11/02/2018   • Influenza, high dose seasonal 0.7 mL 11/05/2020, 10/14/2021, 10/17/2022, 11/24/2023       Objective     /72 (BP Location: Left arm, Patient Position: Sitting, Cuff Size: Large)   Pulse 71   Temp (!) 97.3 °F (36.3 °C) (Temporal)   Resp 16   Ht 5' 4" (1.626 m)   Wt 87.5 kg (193 lb)   SpO2 97%   BMI 33.13 kg/m²     Physical Exam  Vitals and nursing note reviewed. Constitutional:       Appearance: He is well-developed. HENT:      Head: Normocephalic and atraumatic. Nose: Nose normal.   Eyes:      General: No scleral icterus. Conjunctiva/sclera: Conjunctivae normal.      Pupils: Pupils are equal, round, and reactive to light. Neck:      Thyroid: No thyromegaly. Cardiovascular:      Rate and Rhythm: Normal rate and regular rhythm. Heart sounds: Normal heart sounds. Pulmonary:      Effort: Pulmonary effort is normal. No respiratory distress. Breath sounds: Normal breath sounds. No wheezing. Abdominal:      General: Bowel sounds are normal.      Palpations: Abdomen is soft. Tenderness: There is no abdominal tenderness. There is no guarding or rebound. Musculoskeletal:         General: Normal range of motion. Cervical back: Normal range of motion and neck supple. Skin:     General: Skin is warm and dry. Findings: No rash. Neurological:      Mental Status: He is alert and oriented to person, place, and time. Psychiatric:         Mood and Affect: Mood normal.         Behavior: Behavior normal.         Thought Content:  Thought content normal.         Judgment: Judgment normal.       Jo Munoz MD

## 2023-11-25 ENCOUNTER — HOSPITAL ENCOUNTER (EMERGENCY)
Facility: HOSPITAL | Age: 68
Discharge: HOME | End: 2023-11-25
Attending: EMERGENCY MEDICINE
Payer: COMMERCIAL

## 2023-11-25 ENCOUNTER — APPOINTMENT (EMERGENCY)
Dept: RADIOLOGY | Facility: HOSPITAL | Age: 68
End: 2023-11-25
Payer: COMMERCIAL

## 2023-11-25 VITALS
HEART RATE: 93 BPM | SYSTOLIC BLOOD PRESSURE: 161 MMHG | RESPIRATION RATE: 18 BRPM | DIASTOLIC BLOOD PRESSURE: 76 MMHG | OXYGEN SATURATION: 97 % | TEMPERATURE: 97.9 F | BODY MASS INDEX: 31.45 KG/M2 | WEIGHT: 183.2 LBS

## 2023-11-25 DIAGNOSIS — D69.6 THROMBOCYTOPENIA (HCC): ICD-10-CM

## 2023-11-25 DIAGNOSIS — M70.52 PATELLAR BURSITIS OF LEFT KNEE: ICD-10-CM

## 2023-11-25 DIAGNOSIS — M70.42 PREPATELLAR BURSITIS OF LEFT KNEE: Primary | ICD-10-CM

## 2023-11-25 LAB
ANION GAP SERPL CALCULATED.3IONS-SCNC: 7 MMOL/L
BASOPHILS # BLD AUTO: 0.02 THOUSANDS/ÂΜL (ref 0–0.1)
BASOPHILS NFR BLD AUTO: 0 % (ref 0–1)
BUN SERPL-MCNC: 14 MG/DL (ref 5–25)
CALCIUM SERPL-MCNC: 9.4 MG/DL (ref 8.4–10.2)
CHLORIDE SERPL-SCNC: 97 MMOL/L (ref 96–108)
CO2 SERPL-SCNC: 27 MMOL/L (ref 21–32)
CREAT SERPL-MCNC: 1 MG/DL (ref 0.6–1.3)
CRP SERPL QL: 55.1 MG/L
EOSINOPHIL # BLD AUTO: 0.02 THOUSAND/ÂΜL (ref 0–0.61)
EOSINOPHIL NFR BLD AUTO: 0 % (ref 0–6)
ERYTHROCYTE [DISTWIDTH] IN BLOOD BY AUTOMATED COUNT: 12.2 % (ref 11.6–15.1)
ERYTHROCYTE [SEDIMENTATION RATE] IN BLOOD: 47 MM/HOUR (ref 0–19)
GFR SERPL CREATININE-BSD FRML MDRD: 76 ML/MIN/1.73SQ M
GLUCOSE SERPL-MCNC: 286 MG/DL (ref 65–140)
HCT VFR BLD AUTO: 48.1 % (ref 36.5–49.3)
HGB BLD-MCNC: 15.9 G/DL (ref 12–17)
IMM GRANULOCYTES # BLD AUTO: 0.03 THOUSAND/UL (ref 0–0.2)
IMM GRANULOCYTES NFR BLD AUTO: 0 % (ref 0–2)
LYMPHOCYTES # BLD AUTO: 0.55 THOUSANDS/ÂΜL (ref 0.6–4.47)
LYMPHOCYTES NFR BLD AUTO: 6 % (ref 14–44)
MCH RBC QN AUTO: 31.9 PG (ref 26.8–34.3)
MCHC RBC AUTO-ENTMCNC: 33.1 G/DL (ref 31.4–37.4)
MCV RBC AUTO: 96 FL (ref 82–98)
MONOCYTES # BLD AUTO: 0.79 THOUSAND/ÂΜL (ref 0.17–1.22)
MONOCYTES NFR BLD AUTO: 8 % (ref 4–12)
NEUTROPHILS # BLD AUTO: 8.58 THOUSANDS/ÂΜL (ref 1.85–7.62)
NEUTS SEG NFR BLD AUTO: 86 % (ref 43–75)
NRBC BLD AUTO-RTO: 0 /100 WBCS
PLATELET # BLD AUTO: 141 THOUSANDS/UL (ref 149–390)
PMV BLD AUTO: 10 FL (ref 8.9–12.7)
POTASSIUM SERPL-SCNC: 4.4 MMOL/L (ref 3.5–5.3)
RBC # BLD AUTO: 4.99 MILLION/UL (ref 3.88–5.62)
SODIUM SERPL-SCNC: 131 MMOL/L (ref 135–147)
WBC # BLD AUTO: 9.99 THOUSAND/UL (ref 4.31–10.16)

## 2023-11-25 PROCEDURE — 85652 RBC SED RATE AUTOMATED: CPT | Performed by: EMERGENCY MEDICINE

## 2023-11-25 PROCEDURE — 99284 EMERGENCY DEPT VISIT MOD MDM: CPT | Performed by: EMERGENCY MEDICINE

## 2023-11-25 PROCEDURE — 73564 X-RAY EXAM KNEE 4 OR MORE: CPT

## 2023-11-25 PROCEDURE — 80048 BASIC METABOLIC PNL TOTAL CA: CPT | Performed by: EMERGENCY MEDICINE

## 2023-11-25 PROCEDURE — 85025 COMPLETE CBC W/AUTO DIFF WBC: CPT | Performed by: EMERGENCY MEDICINE

## 2023-11-25 PROCEDURE — 36415 COLL VENOUS BLD VENIPUNCTURE: CPT | Performed by: EMERGENCY MEDICINE

## 2023-11-25 PROCEDURE — 99285 EMERGENCY DEPT VISIT HI MDM: CPT

## 2023-11-25 PROCEDURE — 86140 C-REACTIVE PROTEIN: CPT | Performed by: EMERGENCY MEDICINE

## 2023-11-25 RX ORDER — OXYCODONE HYDROCHLORIDE AND ACETAMINOPHEN 5; 325 MG/1; MG/1
1 TABLET ORAL ONCE
Status: COMPLETED | OUTPATIENT
Start: 2023-11-25 | End: 2023-11-25

## 2023-11-25 RX ORDER — CEPHALEXIN 250 MG/1
500 CAPSULE ORAL ONCE
Status: COMPLETED | OUTPATIENT
Start: 2023-11-25 | End: 2023-11-25

## 2023-11-25 RX ORDER — CEPHALEXIN 500 MG/1
500 CAPSULE ORAL EVERY 6 HOURS SCHEDULED
Qty: 40 CAPSULE | Refills: 0 | Status: SHIPPED | OUTPATIENT
Start: 2023-11-25 | End: 2023-12-03

## 2023-11-25 RX ADMIN — OXYCODONE HYDROCHLORIDE AND ACETAMINOPHEN 1 TABLET: 5; 325 TABLET ORAL at 11:50

## 2023-11-25 RX ADMIN — CEPHALEXIN 500 MG: 250 CAPSULE ORAL at 11:34

## 2023-11-25 NOTE — DISCHARGE INSTRUCTIONS
Today you were seen in the emergency department for left knee pain and swelling. Your workup included labs and left knee x-ray I believe your symptoms to be the result of bursitis of the left knee, likely due to irritation from the prosthetic. We will treat this with oral antibiotics (Keflex every 6 hours for the next 10 days.)  You may take Tylenol as needed for pain at home, and also continue to do rest/ice/compression and elevation to help alleviate pain. Please follow-up with your orthopedic doctor at the next available appointment for further evaluation and management for your left knee pain. Your labs were incidental for mild low platelets, please discuss with your regular doctor. At this time there does not appear to be an emergent life threatening cause to explain your symptoms. You are stable for discharge. Please follow up with your primary care provider in the next 2-3 days and your orthopedic doctor at the next available appointment. . Please review all results discussed today with your doctors. Please return to the emergency department as soon as possible if you develop uncontrollable fevers (Temp >100.4), inability to bend or flex the left knee, worsening rash/redness or warmth or swelling, uncontrollable pain, vomiting, chest pain, trouble breathing, or any other concerning symptoms. Thank you for choosing [facility] for your care.

## 2023-11-25 NOTE — ED ATTENDING ATTESTATION
11/25/2023  I, Jacky Gee DO, saw and evaluated the patient. I have discussed the patient with the resident/non-physician practitioner and agree with the resident's/non-physician practitioner's findings, Plan of Care, and MDM as documented in the resident's/non-physician practitioner's note, except where noted. All available labs and Radiology studies were reviewed. I was present for key portions of any procedure(s) performed by the resident/non-physician practitioner and I was immediately available to provide assistance. At this point I agree with the current assessment done in the Emergency Department. I have conducted an independent evaluation of this patient a history and physical is as follows:    Patient is a 69-year-old male with a history of diabetes, atrial fibrillation, hypertension and left BKA who presents with left knee pain. Patient states that the pain started about 3 days ago. He describes pain, swelling and redness to the left knee. He denies fevers, chills, other concerns. He denies any trauma to his knees, but states that his prosthesis does cover his left patellar region. Had a similar issue previously that resolved with p.o. antibiotics. On exam, patient is in no acute distress. Heart is regular rate and rhythm. Breath sounds normal.  Erythema and swelling of the left prepatellar region. No joint effusion. Prepatellar bursa is tender to palpation. Decreased flexion of the left knee secondary to pain. Clinical presentation consistent with prepatellar bursitis. No evidence of joint effusion. Inflammatory markers are elevated, however I do not suspect septic arthritis based on history and physical.  Prepatellar bursa has overlying erythema, therefore will not perform needle aspiration or incision and drainage. We will treat with p.o. antibiotics. Patient does not appear septic. He is well-appearing and stable for discharge.   We will have him follow-up with orthopedic surgery. Patient vies to return to ED if pain, swelling worsen or he develops fever, chills, other concerns. Portions of the above record have been created with voice recognition software. Occasional wrong word or "sound alike" substitutions may have occurred due to the inherent limitations of voice recognition software. Read the chart carefully and recognize, using context, where substitutions may have occurred.       ED Course         Critical Care Time  Procedures

## 2023-11-25 NOTE — ED PROVIDER NOTES
History  Chief Complaint   Patient presents with    Knee Swelling     L knee swelling and redness that started yesterday. BKA amputation on L leg. Pt is diabetic. 65-year-old male with history of uncontrolled diabetes, PVD, left BKA () A-fib, hypertension, GERD presenting to the ED with complaints of worsening severe left knee pain, warmth and swelling for the past 3 to 4 days that acutely worsened last night. Patient has history of similar symptoms earlier this summer when he was diagnosed with patella bursitis versus left knee cellulitis, treated with with doxycycline twice daily. He states that his prosthesis rubs up on his left knee causing irritation. Knee hurts with range of motion, however. Patient is able to extend and flex left knee. Denies radiation of pain elsewhere. Denies systemic symptoms including fevers, chills, chest pain, nausea, vomiting. Denies any increased focal weakness, numbness or tingling. Prior to Admission Medications   Prescriptions Last Dose Informant Patient Reported? Taking?    Blood Glucose Monitoring Suppl (Accu-Chek Guide) w/Device KIT   No No   Sig: Use in the morning   Insulin Glargine Solostar (Basaglar KwikPen) 100 UNIT/ML SOPN   No No   Sig: Inject 0.5 mL (50 Units total) under the skin daily at bedtime   Insulin Pen Needle (HM UltiCare Mini Pen Needles) 31G X 5 MM MISC   No No   Sig: Use 4 (four) times a day   Lancets (OneTouch Delica Plus [id number]) MISC  Self No No   Si Units by Does not apply route 2 (two) times a day Dx E11.9   UltiCare Insulin Syringe 31G X 5/16" 1 ML MISC  Self No No   Sig: Inject under the skin as needed (for injection)   atorvastatin (LIPITOR) 40 mg tablet  Self No No   Sig: Take 1 tablet (40 mg total) by mouth daily at bedtime   betamethasone dipropionate (DIPROSONE) 0.05 % cream  Self No No   Sig: Apply topically 2 (two) times a day   clopidogrel (PLAVIX) 75 mg tablet  Self No No   Sig: Take 1 tablet (75 mg total) by mouth daily   glucose blood (Accu-Chek Guide) test strip   No No   Sig: Check Blood Sugars QID as directed by physician.    glucose blood (OneTouch Ultra) test strip   No No   Sig: Use 1 each 4 (four) times a day To test blood sugars   insulin NPH-insulin regular (NovoLIN 70/30) 100 units/mL subcutaneous injection  Self No No   Sig: Inject:  44 units with breakfast, 20 units with lunch, 30 units with dinner   Patient taking differently: Inject:  44 units with breakfast, 20 units with lunch, 30 units with dinner   insulin lispro (HumaLOG KwikPen) 100 units/mL injection pen   No No   Sig: Inject 25 Units under the skin 3 (three) times a day with meals   lisinopril (ZESTRIL) 10 mg tablet  Self No No   Sig: Take 1 tablet (10 mg total) by mouth daily   metFORMIN (GLUCOPHAGE) 1000 MG tablet  Self No No   Sig: Take 1 tablet (1,000 mg total) by mouth 2 (two) times a day with meals   oxyCODONE-acetaminophen (PERCOCET) 5-325 mg per tablet   No No   Sig: Take 1 tablet by mouth every 6 (six) hours as needed for moderate pain Max Daily Amount: 4 tablets   oxyCODONE-acetaminophen (Percocet) 7.5-325 MG per tablet   No No   Sig: Take 1 tablet by mouth every 4 (four) hours as needed for moderate pain Max Daily Amount: 6 tablets   pantoprazole (PROTONIX) 40 mg tablet   No No   Sig: Take 1 tablet (40 mg total) by mouth daily   pioglitazone (ACTOS) 45 mg tablet  Self No No   Sig: Take 1 tablet (45 mg total) by mouth daily   rivaroxaban (Xarelto) 2.5 mg tablet  Self No No   Sig: Take 1 tablet (2.5 mg total) by mouth daily with breakfast Last dose 6/21/20      Facility-Administered Medications: None       Past Medical History:   Diagnosis Date    Arthritis     fingers    First degree AV block     Full dentures     Hypertension     PAD (peripheral artery disease) (HCC)     PVD (peripheral vascular disease) (HCC)     Type 2 diabetes mellitus with diabetic peripheral angiopathy without gangrene ([address]) 5/18/2021    Per CMS ICD 10 guidelines--Per Physician    Wound, open     right foot- by the 5th toe       Past Surgical History:   Procedure Laterality Date    CHOLECYSTECTOMY      COLONOSCOPY      IR AORTAGRAM WITH RUN-OFF  1/28/2021    IR AORTAGRAM WITH RUN-OFF  4/13/2021    LEG AMPUTATION THROUGH LOWER TIBIA AND FIBULA Left 7/17/2021    Procedure: AMPUTATION BELOW KNEE (BKA); Surgeon: Kate Huntley MD;  Location: [facility];  Service: Vascular    MT BYP FEM-ANT TIBL PST TIBL PRONEAL ART/OTH DSTL Left 7/14/2021    Procedure: BYPASS femoral-peroneal artery bypass with  reverse GSV and balloon angioplasty peroneal artery;  Surgeon: Kate Huntley MD;  Location: [facility];  Service: Vascular    MT DEBRIDEMENT BONE MUSCLE &/FASCIA 20 SQ CM/< Right 12/18/2020    Procedure: DEBRIDMENT OF ULCER , REMOVAL OF DEVITALIZED SKIN, SOFT TISSUE, BONE AND APPLICATION OF INTEGRA GRAFT RIGHT FOOT.;  Surgeon: Van Regalado DPM;  Location: [facility];  Service: Podiatry    REPLANTATION THUMB Right     right tip reconnected    SKIN GRAFT      right thumb    TOE AMPUTATION      left foot all 5 toes removed    TOE AMPUTATION Right 2/2/2021    Procedure: Right partial 5th ray amputation;  Surgeon: Eh Singh DPM;  Location:  MAIN OR;  Service: Podiatry    TOE OSTEOTOMY Right 6/26/2020    Procedure: exostosis of right big toe;  Surgeon: Christine Dhillon DPM;  Location: [facility];  Service: Podiatry    TRIGGER FINGER RELEASE      bilateral hands    VEIN LIGATION      right       Family History   Problem Relation Age of Onset    Arthritis Mother     Pancreatic cancer Mother     Cancer Father         colon    Alzheimer's disease Father      I have reviewed and agree with the history as documented.     E-Cigarette/Vaping    E-Cigarette Use Never User      E-Cigarette/Vaping Substances    Nicotine No     THC No     CBD No     Flavoring No     Other No     Unknown No      Social History     Tobacco Use    Smoking status: Never    Smokeless tobacco: Never   Vaping Use    Vaping Use: Never used   Substance Use Topics    Alcohol use: Not Currently     Comment: occasional    Drug use: Never        Review of Systems   Constitutional:  Negative for chills and fever. HENT:  Negative for congestion and sore throat. Eyes:  Negative for photophobia and visual disturbance. Respiratory:  Negative for chest tightness and shortness of breath. Cardiovascular:  Negative for chest pain, palpitations and leg swelling. Gastrointestinal:  Negative for abdominal pain, blood in stool, constipation, diarrhea, nausea and vomiting. Genitourinary:  Negative for dysuria and urgency. Musculoskeletal:  Positive for arthralgias and joint swelling. Negative for neck pain and neck stiffness. Skin:  Positive for color change (Redness) and rash (Redness. ). Negative for pallor and wound. Neurological:  Negative for dizziness, weakness, numbness and headaches. Hematological:  Does not bruise/bleed easily. Physical Exam  ED Triage Vitals [11/25/23 1006]   Temperature Pulse Respirations Blood Pressure SpO2   97.9 °F (36.6 °C) 102 18 161/76 97 %      Temp Source Heart Rate Source Patient Position - Orthostatic VS BP Location FiO2 (%)   Oral Monitor -- Right arm --      Pain Score       --             Orthostatic Vital Signs  Vitals:    11/25/23 1006 11/25/23 1008   BP: 161/76    Pulse: 102 93       Physical Exam  Vitals and nursing note reviewed. Constitutional:       Appearance: He is ill-appearing (Chronically). HENT:      Head: Normocephalic and atraumatic. Right Ear: External ear normal.      Left Ear: External ear normal.      Nose: Nose normal. No congestion. Mouth/Throat:      Mouth: Mucous membranes are moist.      Pharynx: Oropharynx is clear. No oropharyngeal exudate. Eyes:      Extraocular Movements: Extraocular movements intact. Conjunctiva/sclera: Conjunctivae normal.      Pupils: Pupils are equal, round, and reactive to light.    Cardiovascular:      Rate and Rhythm: Normal rate and regular rhythm. Pulses: Normal pulses. Heart sounds: Normal heart sounds. Pulmonary:      Effort: Pulmonary effort is normal. No respiratory distress. Breath sounds: Normal breath sounds. Abdominal:      General: Abdomen is flat. Bowel sounds are normal.      Palpations: Abdomen is soft. Tenderness: There is no abdominal tenderness. There is no guarding or rebound. Musculoskeletal:         General: Swelling and tenderness present. Cervical back: Normal range of motion and neck supple. No tenderness. Comments: Left BKA. Patient with limited range of motion of left knee secondary to pain, however able to extend and flex knee. Mild overlying erythema noted to anterior knee/patella with mild effusion. midline and lateral joint tenderness. No posterior knee tenderness. No crepitus with Vivek's testing. Skin:     General: Skin is warm. Capillary Refill: Capillary refill takes less than 2 seconds. Coloration: Skin is not pale. Findings: Erythema (Erythematous, warmth overlying anterior left knee.) present. Rash: Erythematous, warm rash overlying anterior left knee. [place] Neurological:      General: No focal deficit present. Mental Status: He is alert and oriented to person, place, and time. Mental status is at baseline. Sensory: No sensory deficit. Motor: No weakness. Psychiatric:         Mood and Affect: Mood normal.         Behavior: Behavior normal.         Thought Content:  Thought content normal.         ED Medications  Medications   cephalexin (KEFLEX) capsule 500 mg (500 mg Oral Given 11/25/23 1134)   oxyCODONE-acetaminophen (PERCOCET) 5-325 mg per tablet 1 tablet (1 tablet Oral Given 11/25/23 1150)       Diagnostic Studies  Results Reviewed       Procedure Component Value Units Date/Time    Sedimentation rate, automated [[id number]]  (Abnormal) Collected: 11/25/23 1112    Lab Status: Final result Specimen: Blood from Arm, Right Updated: 11/25/23 1147     Sed Rate 47 mm/hour     C-reactive protein [[id number]]  (Abnormal) Collected: 11/25/23 1112    Lab Status: Final result Specimen: Blood from Arm, Right Updated: 11/25/23 1139     CRP 55.1 mg/L     Basic metabolic panel [[id number]]  (Abnormal) Collected: 11/25/23 1112    Lab Status: Final result Specimen: Blood from Arm, Right Updated: 11/25/23 1139     Sodium 131 mmol/L      Potassium 4.4 mmol/L      Chloride 97 mmol/L      CO2 27 mmol/L      ANION GAP 7 mmol/L      BUN 14 mg/dL      Creatinine 1.00 mg/dL      Glucose 286 mg/dL      Calcium 9.4 mg/dL      eGFR 76 ml/min/1.73sq m     Narrative:      [facility] (CKD):     Stage 1 with normal or high GFR (GFR > 90 mL/min/1.73 square meters)    Stage 2 Mild CKD (GFR = 60-89 mL/min/1.73 square meters)    Stage 3A Moderate CKD (GFR = 45-59 mL/min/1.73 square meters)    Stage 3B Moderate CKD (GFR = 30-44 mL/min/1.73 square meters)    Stage 4 Severe CKD (GFR = 15-29 mL/min/1.73 square meters)    Stage 5 End Stage CKD (GFR <15 mL/min/1.73 square meters)  Note: GFR calculation is accurate only with a steady state creatinine    CBC and differential [[id number]]  (Abnormal) Collected: 11/25/23 1112    Lab Status: Final result Specimen: Blood from Arm, Right Updated: 11/25/23 1119     WBC 9.99 Thousand/uL      RBC 4.99 Million/uL      Hemoglobin 15.9 g/dL      Hematocrit 48.1 %      MCV 96 fL      MCH 31.9 pg      MCHC 33.1 g/dL      RDW 12.2 %      MPV 10.0 fL      Platelets 307 Thousands/uL      nRBC 0 /100 WBCs      Neutrophils Relative 86 %      Immat GRANS % 0 %      Lymphocytes Relative 6 %      Monocytes Relative 8 %      Eosinophils Relative 0 %      Basophils Relative 0 %      Neutrophils Absolute 8.58 Thousands/µL      Immature Grans Absolute 0.03 Thousand/uL      Lymphocytes Absolute 0.55 Thousands/µL      Monocytes Absolute 0.79 Thousand/µL      Eosinophils Absolute 0.02 Thousand/µL      Basophils Absolute 0.02 Thousands/µL                    XR knee 4+ vw left injury   ED Interpretation by Margy Yang DO (11/25 1157)   No acute fracture or dislocation. No obvious gouty lesions. Patellar arthritis appears similar to prior x-ray. No significant change from prior x-ray in June 2023. Procedures  Procedures      ED Course  ED Course as of 11/25/23 1759   Sat Nov 25, 2023   1130 CBC and differential(!)  Mild thrombocytopenia at 141. No active signs of bleeding. Hemoglobin stable at 15.9. No leukocytosis. 1143 C-REACTIVE PROTEIN(!): 55.1  Likely reactive in the setting of septic bursitis   1143 Sodium(!): 131  Corrected for sodium, 135. WNL. 7299 Basic metabolic panel(!)  Hyperglycemia to 286, otherwise no acute electro abnormality. Renal function at baseline. 1145 Reviewed x-rays and left knee. No acute fracture or dislocation. Appears similar with prior x-ray done in June 2023.   1152 Sed Rate(!): 47   1152 Reviewed all results with patient, patient feeling better after Percocet. Feels comfortable managing symptoms at. Will prescribe Keflex 500 mg q6 for 10 days for treatment of prepatellar bursitis we will have patient follow-up with orthopedic as soon as possible for further evaluation and management. Reviewed strict return precautions with patient, and he understands and is agreeable with discharge plan. Patient's friend drove him here today. Medical Decision Making  Patient with history as above presented to triage with CC of " Patient presents with:  Knee Swelling: L knee swelling and redness that started yesterday. BKA amputation on L leg. Pt is diabetic.    "    Hx obtained from pt and spouse    39-year-old male with history of diabetes, PVD, left BKA presenting to the ED with pain and swelling over the left anterior knee.   On exam patient has significant tenderness to palpation, erythema and effusion noted over the prepatellar area. Minimal joint line tenderness. Given overlying erythema, will hold off on arthrocentesis at this time. Will evaluate with x-rays and labs and treat his pain. X-ray unremarkable. Labs significant for mild leukocytosis and elevation of inflammatory markers. Patient otherwise without any systemic symptoms concerning for sepsis. Exam more consistent with prepatellar bursitis rather than septic joint. Patient has history of this in the past, and reports persistent irritation from his prosthetic device over the patella area. We will treat with Keflex for 10 days with close orthopedic follow-up. Reviewed strict return precautions with patient he is agreeable with discharge plan. Patient also with mild thrombocytopenia, without any active signs of bleeding. He was advised to follow-up with his PMD for this as well. Patient was nontoxic appearing and stable. Exam as above. Ambulatory and Tolerating PO. I have independently ordered, reviewed and interpreted the following: labs and/or imaging studies listed above    Reviewed external records including notes, and prior labs/imaging results. DDX including but not limited to: arthritis, bursitis, tendinitis, sprain, strain, fracture, meniscal tear, cellulitis, osteomyelitis, gout, Lyme disease, Baker's cyst, rheumatologic process; doubt septic arthritis or DVT or arterial occlusion. Overall presentation is consistent with prepatellar bursitis. .     Patient was treated with improvement in symptoms from the following:  Percocet    Consideration was given for admission, but the patient was stable for outpatient management. Disposition: Discussed need for follow up with their primary doctor or specialist to review all results, including incidental findings as above.  Patient discharged with explanation of ED workup and diagnosis, instructions on how to obtain outpatient follow up, care instructions at home, and strict return precautions if patient develops new or worsening symptoms. Patients questions answered and agreeable with discharge plan. See ED Course for further MDM. PLEASE NOTE:  This encounter was completed utilizing the [employer] Direct Speech Voice Recognition Software. Grammatical errors, random word insertions, pronoun errors and incomplete sentences are occasional inherent consequences of the system due to software limitations, ambient noise and hardware issues. These may be missed by proof reading prior to affixing electronic signature. Any questions or concerns about the content, text or information contained within the body of this dictation should be directly addressed to the physician for clarification. Please do not hesitate to call me directly if you have any questions or concerns. Amount and/or Complexity of Data Reviewed  Independent Historian: david  External Data Reviewed: labs, radiology and notes. Labs: ordered. Decision-making details documented in ED Course. Radiology: ordered and independent interpretation performed. Decision-making details documented in ED Course. Risk  Prescription drug management.           Disposition  Final diagnoses:   Patellar bursitis of left knee   Thrombocytopenia (HCC)   Prepatellar bursitis of left knee     Time reflects when diagnosis was documented in both MDM as applicable and the Disposition within this note       Time User Action Codes Description Comment    11/25/2023 11:37 AM Margy Yang Add [M70.52] Patellar bursitis of left knee     11/25/2023 11:54 AM Ahmed, Alcus Ditty Add [D69.6] Thrombocytopenia ([address])     11/25/2023  5:55 PM Ahmed, Alcus Ditty Add [M70.42] Prepatellar bursitis of left knee     11/25/2023  5:55 PM Ahmed, Alcus Ditty Modify [M70.52] Patellar bursitis of left knee     11/25/2023  5:55 PM Ahmed, Alcus Ditty Modify [M70.42] Prepatellar bursitis of left knee           ED Disposition       ED Disposition   Discharge    Condition   Stable    Date/Time Sat Nov 25, 2023 11:59 AM    Comment   Lopez Oshea discharge to home/self care.                    Follow-up Information       Follow up With Specialties Details Why Contact Info    Nel Nuno MD Family Medicine  As needed [id number] [address] Male [address] [facility] [address]  [address] [facility], DO Orthopedic Surgery  Please call tomorrow to schedule an appointment [address]  [phone]              Discharge Medication List as of 11/25/2023 11:59 AM        START taking these medications    Details   cephalexin (KEFLEX) 500 mg capsule Take 1 capsule (500 mg total) by mouth every 6 (six) hours for 10 days, Starting Sat 11/25/2023, Until Tue 12/5/2023, Normal           CONTINUE these medications which have NOT CHANGED    Details   atorvastatin (LIPITOR) 40 mg tablet Take 1 tablet (40 mg total) by mouth daily at bedtime, Starting Sun 1/17/2021, Normal      betamethasone dipropionate (DIPROSONE) 0.05 % cream Apply topically 2 (two) times a day, Starting Mon 5/23/2022, Normal      Blood Glucose Monitoring Suppl (Accu-Chek Guide) w/Device KIT Use in the morning, Starting Mon 9/25/2023, Normal      clopidogrel (PLAVIX) 75 mg tablet Take 1 tablet (75 mg total) by mouth daily, Starting Tue 5/25/2021, Normal      !! glucose blood (Accu-Chek Guide) test strip Check Blood Sugars QID as directed by physician., Normal      !! glucose blood (OneTouch Ultra) test strip Use 1 each 4 (four) times a day To test blood sugars, Starting Mon 9/25/2023, Normal      Insulin Glargine Solostar (Basaglar KwikPen) 100 UNIT/ML SOPN Inject 0.5 mL (50 Units total) under the skin daily at bedtime, Starting Fri 11/24/2023, Normal      insulin lispro (HumaLOG KwikPen) 100 units/mL injection pen Inject 25 Units under the skin 3 (three) times a day with meals, Starting Fri 11/24/2023, Normal      insulin NPH-insulin regular (NovoLIN 70/30) 100 units/mL subcutaneous injection Inject:  44 units with breakfast, 20 units with lunch, 30 units with dinner, No Print      Insulin Pen Needle (HM UltiCare Mini Pen Needles) 31G X 5 MM MISC Use 4 (four) times a day, Starting Fri 11/24/2023, Normal      Lancets (OneTouch Delica Plus [id number]) MISC 1 Units by Does not apply route 2 (two) times a day Dx E11.9, Starting Thu 11/5/2020, Normal      lisinopril (ZESTRIL) 10 mg tablet Take 1 tablet (10 mg total) by mouth daily, Starting Tue 4/27/2021, Normal      metFORMIN (GLUCOPHAGE) 1000 MG tablet Take 1 tablet (1,000 mg total) by mouth 2 (two) times a day with meals, Starting Wed 1/4/2023, Normal      oxyCODONE-acetaminophen (PERCOCET) 5-325 mg per tablet Take 1 tablet by mouth every 6 (six) hours as needed for moderate pain Max Daily Amount: 4 tablets, Starting Wed 8/2/2023, Normal      oxyCODONE-acetaminophen (Percocet) 7.5-325 MG per tablet Take 1 tablet by mouth every 4 (four) hours as needed for moderate pain Max Daily Amount: 6 tablets, Starting Fri 11/24/2023, Normal      pantoprazole (PROTONIX) 40 mg tablet Take 1 tablet (40 mg total) by mouth daily, Starting Fri 11/24/2023, Normal      pioglitazone (ACTOS) 45 mg tablet Take 1 tablet (45 mg total) by mouth daily, Starting Mon 5/15/2023, Normal      rivaroxaban (Xarelto) 2.5 mg tablet Take 1 tablet (2.5 mg total) by mouth daily with breakfast Last dose 6/21/20, Starting Tue 10/11/2022, Normal      UltiCare Insulin Syringe 31G X 5/16" 1 ML MISC Inject under the skin as needed (for injection), Starting Sun 6/4/2023, Normal       !! - Potential duplicate medications found. Please discuss with provider. No discharge procedures on file. PDMP Review         Value Time User    PDMP Reviewed  Yes 8/2/2023  9:33 PM Geovani Lowery MD             ED Provider  Attending physically available and evaluated Libby Nino. I managed the patient along with the ED Attending.     Electronically Signed by           Will Adam DO  11/25/23 3033

## 2023-11-27 ENCOUNTER — TELEPHONE (OUTPATIENT)
Age: 68
End: 2023-11-27

## 2023-11-27 NOTE — TELEPHONE ENCOUNTER
Called and spoke with pt, pt was seen 23 in the ED for left knee pain. Pt does have a prosthetic and is a below the knee amputation. He was prescribed oxycodone-acetaminophen 23 by John Paul Dean MD. Pt states he has been laying in bed for the last 2.5 days. When pt stands up is the worst pain and when he lays down it feels better. PT is icing but does not seem to help. The Emergency room dr told pt to keep the leg flat. Pt states knee cap is red and pain is on both sides of knee cap, swelling. On the back of the knee the prosthetic rubbed pt states. Pt states knee is warm to touch. Pain is 12/10 and pt states he has a high tolerance for pain. Pt is taking the oxycodone-acetaminophen that was prescribed, pt is also on Keflex, pt states hospital ED Dr states he think he has an infection. I was going to schedule the pt with Layne Reynoso, pt would like to see Dr Delano Bernardo instead. I did advise the pt that Dr Delano Bernardo is not available this week as he is booked. PT would still like to see Dr Delano Bernardo. I also advised the Pt to call his PCP in the mean time. I further advised to take ibuprofen for swelling/pain as long as he is not on blood thinners or has kidney disease. Can the office please contact the pt and schedule him as he should be f/u after his ED visit on 23 as triage nurse unable to force on. Pt would like to go to [facility]. Hello,  Please advise if the following patient can be forced onto the schedule:    Patient: Gordo Rivera    : 1955    MRN: [id number]    Call back #: [phone]    Insurance: 1 [facility]    Reason for appointment: F/U after Hospital Visit 23 (concern for infection)    Requested doctor/location: Burak Romano ShepHertz. Thank you.
Caller: Patient    Doctor: Dnag Felipe    Reason for call:     Patient calling about his left knee, he had a visco injection on 8/16/23, he starting   Having pain a few days ago (12), he is currently taking the Oxycodine 7.5 mg, but   He is calling to be advised what else he can do for the pain. .. Please give a call back.     Call back#: [phone]
Can this pt please be contacted to be scheduled as triage nurse is unable to force on.     Thank you
Thank you!
Yes

## 2023-11-29 ENCOUNTER — ANESTHESIA EVENT (INPATIENT)
Dept: PERIOP | Facility: HOSPITAL | Age: 68
DRG: 558 | End: 2023-11-29
Payer: COMMERCIAL

## 2023-11-29 ENCOUNTER — HOSPITAL ENCOUNTER (INPATIENT)
Facility: HOSPITAL | Age: 68
LOS: 4 days | Discharge: HOME HEALTH | DRG: 558 | End: 2023-12-03
Attending: EMERGENCY MEDICINE | Admitting: INTERNAL MEDICINE
Payer: COMMERCIAL

## 2023-11-29 ENCOUNTER — OFFICE VISIT (OUTPATIENT)
Dept: OBGYN CLINIC | Facility: CLINIC | Age: 68
End: 2023-11-29
Payer: COMMERCIAL

## 2023-11-29 ENCOUNTER — ANESTHESIA (INPATIENT)
Dept: PERIOP | Facility: HOSPITAL | Age: 68
DRG: 558 | End: 2023-11-29
Payer: COMMERCIAL

## 2023-11-29 VITALS — BODY MASS INDEX: 31.28 KG/M2 | WEIGHT: 183.2 LBS | HEIGHT: 64 IN

## 2023-11-29 DIAGNOSIS — M71.162 SEPTIC PREPATELLAR BURSITIS OF LEFT KNEE: Primary | ICD-10-CM

## 2023-11-29 DIAGNOSIS — Z89.512 S/P BKA (BELOW KNEE AMPUTATION), LEFT (HCC): ICD-10-CM

## 2023-11-29 LAB
ALBUMIN SERPL BCP-MCNC: 4.1 G/DL (ref 3.5–5)
ALP SERPL-CCNC: 73 U/L (ref 34–104)
ALT SERPL W P-5'-P-CCNC: 108 U/L (ref 7–52)
ANION GAP SERPL CALCULATED.3IONS-SCNC: 16 MMOL/L
AST SERPL W P-5'-P-CCNC: 38 U/L (ref 13–39)
BASOPHILS # BLD AUTO: 0.02 THOUSANDS/ÂΜL (ref 0–0.1)
BASOPHILS NFR BLD AUTO: 0 % (ref 0–1)
BILIRUB SERPL-MCNC: 0.86 MG/DL (ref 0.2–1)
BUN SERPL-MCNC: 21 MG/DL (ref 5–25)
CALCIUM SERPL-MCNC: 9.7 MG/DL (ref 8.4–10.2)
CHLORIDE SERPL-SCNC: 92 MMOL/L (ref 96–108)
CO2 SERPL-SCNC: 24 MMOL/L (ref 21–32)
CREAT SERPL-MCNC: 0.98 MG/DL (ref 0.6–1.3)
CRP SERPL QL: 144.4 MG/L
EOSINOPHIL # BLD AUTO: 0.02 THOUSAND/ÂΜL (ref 0–0.61)
EOSINOPHIL NFR BLD AUTO: 0 % (ref 0–6)
ERYTHROCYTE [DISTWIDTH] IN BLOOD BY AUTOMATED COUNT: 11.9 % (ref 11.6–15.1)
ERYTHROCYTE [SEDIMENTATION RATE] IN BLOOD: 52 MM/HOUR (ref 0–19)
GFR SERPL CREATININE-BSD FRML MDRD: 78 ML/MIN/1.73SQ M
GLUCOSE SERPL-MCNC: 197 MG/DL (ref 65–140)
GLUCOSE SERPL-MCNC: 245 MG/DL (ref 65–140)
GLUCOSE SERPL-MCNC: 311 MG/DL (ref 65–140)
HCT VFR BLD AUTO: 50.6 % (ref 36.5–49.3)
HGB BLD-MCNC: 16.4 G/DL (ref 12–17)
IMM GRANULOCYTES # BLD AUTO: 0.02 THOUSAND/UL (ref 0–0.2)
IMM GRANULOCYTES NFR BLD AUTO: 0 % (ref 0–2)
LACTATE SERPL-SCNC: 1.2 MMOL/L (ref 0.5–2)
LYMPHOCYTES # BLD AUTO: 1.11 THOUSANDS/ÂΜL (ref 0.6–4.47)
LYMPHOCYTES NFR BLD AUTO: 13 % (ref 14–44)
MCH RBC QN AUTO: 31.8 PG (ref 26.8–34.3)
MCHC RBC AUTO-ENTMCNC: 32.4 G/DL (ref 31.4–37.4)
MCV RBC AUTO: 98 FL (ref 82–98)
MONOCYTES # BLD AUTO: 0.52 THOUSAND/ÂΜL (ref 0.17–1.22)
MONOCYTES NFR BLD AUTO: 6 % (ref 4–12)
NEUTROPHILS # BLD AUTO: 6.96 THOUSANDS/ÂΜL (ref 1.85–7.62)
NEUTS SEG NFR BLD AUTO: 81 % (ref 43–75)
NRBC BLD AUTO-RTO: 0 /100 WBCS
PLATELET # BLD AUTO: 198 THOUSANDS/UL (ref 149–390)
PMV BLD AUTO: 10.1 FL (ref 8.9–12.7)
POTASSIUM SERPL-SCNC: 4.3 MMOL/L (ref 3.5–5.3)
PROT SERPL-MCNC: 8.8 G/DL (ref 6.4–8.4)
RBC # BLD AUTO: 5.16 MILLION/UL (ref 3.88–5.62)
SODIUM SERPL-SCNC: 132 MMOL/L (ref 135–147)
WBC # BLD AUTO: 8.65 THOUSAND/UL (ref 4.31–10.16)

## 2023-11-29 PROCEDURE — 87147 CULTURE TYPE IMMUNOLOGIC: CPT | Performed by: ORTHOPAEDIC SURGERY

## 2023-11-29 PROCEDURE — 99223 1ST HOSP IP/OBS HIGH 75: CPT | Performed by: ORTHOPAEDIC SURGERY

## 2023-11-29 PROCEDURE — 85025 COMPLETE CBC W/AUTO DIFF WBC: CPT | Performed by: PHYSICIAN ASSISTANT

## 2023-11-29 PROCEDURE — 99223 1ST HOSP IP/OBS HIGH 75: CPT | Performed by: INTERNAL MEDICINE

## 2023-11-29 PROCEDURE — 87070 CULTURE OTHR SPECIMN AEROBIC: CPT | Performed by: ORTHOPAEDIC SURGERY

## 2023-11-29 PROCEDURE — 0Y9G0ZZ DRAINAGE OF LEFT KNEE REGION, OPEN APPROACH: ICD-10-PCS | Performed by: ORTHOPAEDIC SURGERY

## 2023-11-29 PROCEDURE — 99213 OFFICE O/P EST LOW 20 MIN: CPT | Performed by: STUDENT IN AN ORGANIZED HEALTH CARE EDUCATION/TRAINING PROGRAM

## 2023-11-29 PROCEDURE — 82948 REAGENT STRIP/BLOOD GLUCOSE: CPT

## 2023-11-29 PROCEDURE — 99285 EMERGENCY DEPT VISIT HI MDM: CPT | Performed by: PHYSICIAN ASSISTANT

## 2023-11-29 PROCEDURE — 99283 EMERGENCY DEPT VISIT LOW MDM: CPT

## 2023-11-29 PROCEDURE — 36415 COLL VENOUS BLD VENIPUNCTURE: CPT | Performed by: PHYSICIAN ASSISTANT

## 2023-11-29 PROCEDURE — 86140 C-REACTIVE PROTEIN: CPT | Performed by: PHYSICIAN ASSISTANT

## 2023-11-29 PROCEDURE — 20610 DRAIN/INJ JOINT/BURSA W/O US: CPT | Performed by: STUDENT IN AN ORGANIZED HEALTH CARE EDUCATION/TRAINING PROGRAM

## 2023-11-29 PROCEDURE — 87075 CULTR BACTERIA EXCEPT BLOOD: CPT | Performed by: ORTHOPAEDIC SURGERY

## 2023-11-29 PROCEDURE — 87070 CULTURE OTHR SPECIMN AEROBIC: CPT | Performed by: STUDENT IN AN ORGANIZED HEALTH CARE EDUCATION/TRAINING PROGRAM

## 2023-11-29 PROCEDURE — 87205 SMEAR GRAM STAIN: CPT | Performed by: STUDENT IN AN ORGANIZED HEALTH CARE EDUCATION/TRAINING PROGRAM

## 2023-11-29 PROCEDURE — 87205 SMEAR GRAM STAIN: CPT | Performed by: ORTHOPAEDIC SURGERY

## 2023-11-29 PROCEDURE — 87186 SC STD MICRODIL/AGAR DIL: CPT | Performed by: ORTHOPAEDIC SURGERY

## 2023-11-29 PROCEDURE — 83605 ASSAY OF LACTIC ACID: CPT | Performed by: PHYSICIAN ASSISTANT

## 2023-11-29 PROCEDURE — 87040 BLOOD CULTURE FOR BACTERIA: CPT | Performed by: PHYSICIAN ASSISTANT

## 2023-11-29 PROCEDURE — 87077 CULTURE AEROBIC IDENTIFY: CPT | Performed by: ORTHOPAEDIC SURGERY

## 2023-11-29 PROCEDURE — 80053 COMPREHEN METABOLIC PANEL: CPT | Performed by: PHYSICIAN ASSISTANT

## 2023-11-29 PROCEDURE — 87077 CULTURE AEROBIC IDENTIFY: CPT | Performed by: STUDENT IN AN ORGANIZED HEALTH CARE EDUCATION/TRAINING PROGRAM

## 2023-11-29 PROCEDURE — 87186 SC STD MICRODIL/AGAR DIL: CPT | Performed by: STUDENT IN AN ORGANIZED HEALTH CARE EDUCATION/TRAINING PROGRAM

## 2023-11-29 PROCEDURE — 27301 DRAIN THIGH/KNEE LESION: CPT | Performed by: PHYSICIAN ASSISTANT

## 2023-11-29 PROCEDURE — 85652 RBC SED RATE AUTOMATED: CPT | Performed by: PHYSICIAN ASSISTANT

## 2023-11-29 PROCEDURE — 27301 DRAIN THIGH/KNEE LESION: CPT | Performed by: ORTHOPAEDIC SURGERY

## 2023-11-29 RX ORDER — INSULIN LISPRO 100 [IU]/ML
1-6 INJECTION, SOLUTION INTRAVENOUS; SUBCUTANEOUS
Status: DISCONTINUED | OUTPATIENT
Start: 2023-11-29 | End: 2023-12-03 | Stop reason: HOSPADM

## 2023-11-29 RX ORDER — PHENYLEPHRINE HCL IN 0.9% NACL 1 MG/10 ML
SYRINGE (ML) INTRAVENOUS AS NEEDED
Status: DISCONTINUED | OUTPATIENT
Start: 2023-11-29 | End: 2023-11-29

## 2023-11-29 RX ORDER — POLYETHYLENE GLYCOL 3350 17 G/17G
17 POWDER, FOR SOLUTION ORAL DAILY
Status: DISCONTINUED | OUTPATIENT
Start: 2023-11-30 | End: 2023-12-03 | Stop reason: HOSPADM

## 2023-11-29 RX ORDER — ONDANSETRON 2 MG/ML
4 INJECTION INTRAMUSCULAR; INTRAVENOUS EVERY 4 HOURS PRN
Status: DISCONTINUED | OUTPATIENT
Start: 2023-11-29 | End: 2023-12-03 | Stop reason: HOSPADM

## 2023-11-29 RX ORDER — PROPOFOL 10 MG/ML
INJECTION, EMULSION INTRAVENOUS AS NEEDED
Status: DISCONTINUED | OUTPATIENT
Start: 2023-11-29 | End: 2023-11-29

## 2023-11-29 RX ORDER — FENTANYL CITRATE/PF 50 MCG/ML
25 SYRINGE (ML) INJECTION
Status: DISCONTINUED | OUTPATIENT
Start: 2023-11-29 | End: 2023-11-29 | Stop reason: HOSPADM

## 2023-11-29 RX ORDER — CEFAZOLIN SODIUM 2 G/50ML
2000 SOLUTION INTRAVENOUS
Status: DISCONTINUED | OUTPATIENT
Start: 2023-11-30 | End: 2023-11-29

## 2023-11-29 RX ORDER — SUCCINYLCHOLINE/SOD CL,ISO/PF 100 MG/5ML
SYRINGE (ML) INTRAVENOUS AS NEEDED
Status: DISCONTINUED | OUTPATIENT
Start: 2023-11-29 | End: 2023-11-29

## 2023-11-29 RX ORDER — HYDROMORPHONE HCL/PF 1 MG/ML
SYRINGE (ML) INJECTION AS NEEDED
Status: DISCONTINUED | OUTPATIENT
Start: 2023-11-29 | End: 2023-11-29

## 2023-11-29 RX ORDER — FENTANYL CITRATE 50 UG/ML
INJECTION, SOLUTION INTRAMUSCULAR; INTRAVENOUS AS NEEDED
Status: DISCONTINUED | OUTPATIENT
Start: 2023-11-29 | End: 2023-11-29

## 2023-11-29 RX ORDER — LISINOPRIL 10 MG/1
10 TABLET ORAL DAILY
Status: DISCONTINUED | OUTPATIENT
Start: 2023-11-30 | End: 2023-12-03 | Stop reason: HOSPADM

## 2023-11-29 RX ORDER — HYDROMORPHONE HCL/PF 1 MG/ML
0.5 SYRINGE (ML) INJECTION
Status: COMPLETED | OUTPATIENT
Start: 2023-11-29 | End: 2023-11-29

## 2023-11-29 RX ORDER — ATORVASTATIN CALCIUM 40 MG/1
40 TABLET, FILM COATED ORAL
Status: DISCONTINUED | OUTPATIENT
Start: 2023-11-29 | End: 2023-12-03 | Stop reason: HOSPADM

## 2023-11-29 RX ORDER — ENOXAPARIN SODIUM 100 MG/ML
30 INJECTION SUBCUTANEOUS EVERY 12 HOURS SCHEDULED
Status: DISCONTINUED | OUTPATIENT
Start: 2023-11-29 | End: 2023-11-29

## 2023-11-29 RX ORDER — INSULIN GLARGINE 100 [IU]/ML
30 INJECTION, SOLUTION SUBCUTANEOUS
Status: DISCONTINUED | OUTPATIENT
Start: 2023-11-29 | End: 2023-12-03 | Stop reason: HOSPADM

## 2023-11-29 RX ORDER — AMOXICILLIN 250 MG
1 CAPSULE ORAL
Status: DISCONTINUED | OUTPATIENT
Start: 2023-11-29 | End: 2023-12-03 | Stop reason: HOSPADM

## 2023-11-29 RX ORDER — ACETAMINOPHEN 325 MG/1
650 TABLET ORAL EVERY 4 HOURS PRN
Status: DISCONTINUED | OUTPATIENT
Start: 2023-11-29 | End: 2023-12-03 | Stop reason: HOSPADM

## 2023-11-29 RX ORDER — VANCOMYCIN HYDROCHLORIDE 1 G/20ML
INJECTION, POWDER, LYOPHILIZED, FOR SOLUTION INTRAVENOUS AS NEEDED
Status: DISCONTINUED | OUTPATIENT
Start: 2023-11-29 | End: 2023-11-29 | Stop reason: HOSPADM

## 2023-11-29 RX ORDER — INSULIN LISPRO 100 [IU]/ML
10 INJECTION, SOLUTION INTRAVENOUS; SUBCUTANEOUS
Status: DISCONTINUED | OUTPATIENT
Start: 2023-11-29 | End: 2023-12-03 | Stop reason: HOSPADM

## 2023-11-29 RX ORDER — MIDAZOLAM HYDROCHLORIDE 2 MG/2ML
INJECTION, SOLUTION INTRAMUSCULAR; INTRAVENOUS AS NEEDED
Status: DISCONTINUED | OUTPATIENT
Start: 2023-11-29 | End: 2023-11-29

## 2023-11-29 RX ORDER — ONDANSETRON 2 MG/ML
4 INJECTION INTRAMUSCULAR; INTRAVENOUS ONCE
Status: COMPLETED | OUTPATIENT
Start: 2023-11-29 | End: 2023-11-29

## 2023-11-29 RX ORDER — LIDOCAINE HYDROCHLORIDE 20 MG/ML
INJECTION, SOLUTION EPIDURAL; INFILTRATION; INTRACAUDAL; PERINEURAL AS NEEDED
Status: DISCONTINUED | OUTPATIENT
Start: 2023-11-29 | End: 2023-11-29

## 2023-11-29 RX ORDER — ONDANSETRON 2 MG/ML
4 INJECTION INTRAMUSCULAR; INTRAVENOUS ONCE AS NEEDED
Status: DISCONTINUED | OUTPATIENT
Start: 2023-11-29 | End: 2023-11-29 | Stop reason: HOSPADM

## 2023-11-29 RX ORDER — CLOPIDOGREL BISULFATE 75 MG/1
75 TABLET ORAL DAILY
Status: DISCONTINUED | OUTPATIENT
Start: 2023-11-30 | End: 2023-12-01

## 2023-11-29 RX ORDER — ONDANSETRON 2 MG/ML
INJECTION INTRAMUSCULAR; INTRAVENOUS AS NEEDED
Status: DISCONTINUED | OUTPATIENT
Start: 2023-11-29 | End: 2023-11-29

## 2023-11-29 RX ORDER — MAGNESIUM HYDROXIDE 1200 MG/15ML
LIQUID ORAL AS NEEDED
Status: DISCONTINUED | OUTPATIENT
Start: 2023-11-29 | End: 2023-11-29 | Stop reason: HOSPADM

## 2023-11-29 RX ORDER — OXYCODONE HYDROCHLORIDE 10 MG/1
10 TABLET ORAL EVERY 4 HOURS PRN
Status: DISCONTINUED | OUTPATIENT
Start: 2023-11-29 | End: 2023-12-03 | Stop reason: HOSPADM

## 2023-11-29 RX ORDER — HYDROMORPHONE HCL/PF 1 MG/ML
0.5 SYRINGE (ML) INJECTION EVERY 4 HOURS PRN
Status: DISCONTINUED | OUTPATIENT
Start: 2023-11-29 | End: 2023-11-30

## 2023-11-29 RX ORDER — SODIUM CHLORIDE, SODIUM LACTATE, POTASSIUM CHLORIDE, CALCIUM CHLORIDE 600; 310; 30; 20 MG/100ML; MG/100ML; MG/100ML; MG/100ML
INJECTION, SOLUTION INTRAVENOUS CONTINUOUS PRN
Status: DISCONTINUED | OUTPATIENT
Start: 2023-11-29 | End: 2023-11-29

## 2023-11-29 RX ORDER — OXYCODONE HYDROCHLORIDE 5 MG/1
5 TABLET ORAL EVERY 4 HOURS PRN
Status: DISCONTINUED | OUTPATIENT
Start: 2023-11-29 | End: 2023-12-03 | Stop reason: HOSPADM

## 2023-11-29 RX ADMIN — HYDROMORPHONE HYDROCHLORIDE 0.5 MG: 1 INJECTION, SOLUTION INTRAMUSCULAR; INTRAVENOUS; SUBCUTANEOUS at 15:31

## 2023-11-29 RX ADMIN — CEFEPIME 2000 MG: 2 INJECTION, POWDER, FOR SOLUTION INTRAVENOUS at 22:10

## 2023-11-29 RX ADMIN — VANCOMYCIN HYDROCHLORIDE 750 MG: 750 INJECTION, SOLUTION INTRAVENOUS at 20:29

## 2023-11-29 RX ADMIN — HYDROMORPHONE HYDROCHLORIDE 0.5 MG: 1 INJECTION, SOLUTION INTRAMUSCULAR; INTRAVENOUS; SUBCUTANEOUS at 22:31

## 2023-11-29 RX ADMIN — MIDAZOLAM 2 MG: 1 INJECTION INTRAMUSCULAR; INTRAVENOUS at 13:54

## 2023-11-29 RX ADMIN — INSULIN GLARGINE 30 UNITS: 100 INJECTION, SOLUTION SUBCUTANEOUS at 22:28

## 2023-11-29 RX ADMIN — HYDROMORPHONE HYDROCHLORIDE 0.5 MG: 1 INJECTION, SOLUTION INTRAMUSCULAR; INTRAVENOUS; SUBCUTANEOUS at 15:43

## 2023-11-29 RX ADMIN — MORPHINE SULFATE 2 MG: 2 INJECTION, SOLUTION INTRAMUSCULAR; INTRAVENOUS at 12:43

## 2023-11-29 RX ADMIN — Medication 100 MG: at 14:01

## 2023-11-29 RX ADMIN — SODIUM CHLORIDE, SODIUM LACTATE, POTASSIUM CHLORIDE, AND CALCIUM CHLORIDE: .6; .31; .03; .02 INJECTION, SOLUTION INTRAVENOUS at 13:34

## 2023-11-29 RX ADMIN — Medication 100 MCG: at 14:08

## 2023-11-29 RX ADMIN — HYDROMORPHONE HYDROCHLORIDE 0.5 MG: 1 INJECTION, SOLUTION INTRAMUSCULAR; INTRAVENOUS; SUBCUTANEOUS at 14:24

## 2023-11-29 RX ADMIN — OXYCODONE HYDROCHLORIDE 10 MG: 10 TABLET ORAL at 20:24

## 2023-11-29 RX ADMIN — ONDANSETRON 4 MG: 2 INJECTION INTRAMUSCULAR; INTRAVENOUS at 14:01

## 2023-11-29 RX ADMIN — Medication 100 MCG: at 14:14

## 2023-11-29 RX ADMIN — INSULIN LISPRO 10 UNITS: 100 INJECTION, SOLUTION INTRAVENOUS; SUBCUTANEOUS at 18:20

## 2023-11-29 RX ADMIN — VANCOMYCIN HYDROCHLORIDE 1750 MG: 1 INJECTION, POWDER, LYOPHILIZED, FOR SOLUTION INTRAVENOUS at 13:54

## 2023-11-29 RX ADMIN — LIDOCAINE HYDROCHLORIDE 100 MG: 20 INJECTION, SOLUTION EPIDURAL; INFILTRATION; INTRACAUDAL; PERINEURAL at 14:01

## 2023-11-29 RX ADMIN — PROPOFOL 150 MG: 10 INJECTION, EMULSION INTRAVENOUS at 14:01

## 2023-11-29 RX ADMIN — INSULIN LISPRO 3 UNITS: 100 INJECTION, SOLUTION INTRAVENOUS; SUBCUTANEOUS at 18:20

## 2023-11-29 RX ADMIN — SENNOSIDES, DOCUSATE SODIUM 1 TABLET: 8.6; 5 TABLET ORAL at 22:28

## 2023-11-29 RX ADMIN — FENTANYL CITRATE 25 MCG: 50 INJECTION INTRAMUSCULAR; INTRAVENOUS at 15:10

## 2023-11-29 RX ADMIN — RIVAROXABAN 20 MG: 20 TABLET, FILM COATED ORAL at 20:24

## 2023-11-29 RX ADMIN — ONDANSETRON 4 MG: 2 INJECTION INTRAMUSCULAR; INTRAVENOUS at 12:41

## 2023-11-29 RX ADMIN — CEFEPIME 2000 MG: 2 INJECTION, POWDER, FOR SOLUTION INTRAVENOUS at 12:45

## 2023-11-29 RX ADMIN — SODIUM CHLORIDE 1000 ML: 0.9 INJECTION, SOLUTION INTRAVENOUS at 12:39

## 2023-11-29 RX ADMIN — FENTANYL CITRATE 50 MCG: 50 INJECTION INTRAMUSCULAR; INTRAVENOUS at 14:01

## 2023-11-29 RX ADMIN — FENTANYL CITRATE 25 MCG: 50 INJECTION INTRAMUSCULAR; INTRAVENOUS at 15:20

## 2023-11-29 RX ADMIN — ATORVASTATIN CALCIUM 40 MG: 40 TABLET, FILM COATED ORAL at 18:20

## 2023-11-29 NOTE — CONSULTS
Orthopedics   Pam Clemente 70 y.o. male MRN: [id number]  Unit/Bed#: ED-19      Chief Complaint:   Left knee pain    HPI:  70 y.o. male seen an evaluated at bedside. Referred to the ED from the outpatient clinic with Dr. Cristel Wilson for septic knee prepatellar bursitis. The bursa was aspirated in the clinic prior to arrival and withdrew 30 cc of gross purulence. He reports onset of pain and redness beginning 11/25/23. He was previously seen in the ED on 11/25/23 for the left prepatellar bursitis and was started on keflex 500 daily. He reports the redness has mildly improved but the pain is spreading proximally up the left leg. PMH significant for uncontrolled DM2 with most recent A1c 11.6, left BKA, HTN, HLD, A-fib, PAD, and PVD. Review Of Systems:   Skin: Redness and swelling over the left knee  Neuro: See HPI  Musculoskeletal: See HPI  14 point review of systems negative except as stated above     Past Medical History:   Past Medical History:   Diagnosis Date    Arthritis     fingers    First degree AV block     Full dentures     Hypertension     PAD (peripheral artery disease) ([facility])     PVD (peripheral vascular disease) ([address])     Type 2 diabetes mellitus with diabetic peripheral angiopathy without gangrene ([address]) 5/18/2021    Per CMS ICD 10 guidelines--Per Physician    Wound, open     right foot- by the 5th toe       Past Surgical History:   Past Surgical History:   Procedure Laterality Date    CHOLECYSTECTOMY      COLONOSCOPY      IR AORTAGRAM WITH RUN-OFF  1/28/2021    IR AORTAGRAM WITH RUN-OFF  4/13/2021    LEG AMPUTATION THROUGH LOWER TIBIA AND FIBULA Left 7/17/2021    Procedure: AMPUTATION BELOW KNEE (BKA);   Surgeon: Alesha Ramos MD;  Location: [facility];  Service: Vascular    OR BYP FEM-ANT TIBL PST TIBL PRONEAL ART/OTH DSTL Left 7/14/2021    Procedure: BYPASS femoral-peroneal artery bypass with  reverse GSV and balloon angioplasty peroneal artery;  Surgeon: Alesha Ramos MD;  Location: [facility]; Service: Vascular    RI DEBRIDEMENT BONE MUSCLE &/FASCIA 20 SQ CM/< Right 12/18/2020    Procedure: DEBRIDMENT OF ULCER , REMOVAL OF DEVITALIZED SKIN, SOFT TISSUE, BONE AND APPLICATION OF INTEGRA GRAFT RIGHT FOOT.;  Surgeon: Van Regalado DPM;  Location: [facility];  Service: Podiatry    REPLANTATION THUMB Right     right tip reconnected    SKIN GRAFT      right thumb    TOE AMPUTATION      left foot all 5 toes removed    TOE AMPUTATION Right 2/2/2021    Procedure: Right partial 5th ray amputation;  Surgeon: Eh Singh DPM;  Location: [facility];  Service: Podiatry    TOE OSTEOTOMY Right 6/26/2020    Procedure: exostosis of right big toe;  Surgeon: Christine Dhillon DPM;  Location: [facility];  Service: Podiatry    TRIGGER FINGER RELEASE      bilateral hands    VEIN LIGATION      right       Family History:  Family history reviewed and non-contributory  Family History   Problem Relation Age of Onset    Arthritis Mother     Pancreatic cancer Mother     Cancer Father         colon    Alzheimer's disease Father        Social History:  Social History     Socioeconomic History    Marital status: /Civil Union     Spouse name: Eliz [facility]    Number of children: None    Years of education: 12    Highest education level: High school graduate   Occupational History    Occupation: [employer]   Tobacco Use    Smoking status: Never    Smokeless tobacco: Never   Vaping Use    Vaping Use: Never used   Substance and Sexual Activity    Alcohol use: Not Currently     Comment: occasional    Drug use: Never    Sexual activity: Yes     Partners: Female   Other Topics Concern    None   Social History Narrative         Social Determinants of Health     Financial Resource Strain: Low Risk  (9/15/2023)    Overall Financial Resource Strain (CARDIA)     Difficulty of Paying Living Expenses: Not hard at all   Food Insecurity: Not on file   Transportation Needs: No Transportation Needs (9/15/2023)    PRAPARE - Transportation     Lack of Transportation (Medical): No     Lack of Transportation (Non-Medical): No   Physical Activity: Not on file   Stress: Not on file   Social Connections: Not on file   Intimate Partner Violence: Not on file   Housing Stability: Not on file       Allergies:    Allergies   Allergen Reactions    Shellfish-Derived Products - Food Allergy Anaphylaxis     Throat closes    Sulfamethoxazole-Trimethoprim Rash           Labs:  0   Lab Value Date/Time    HCT 48.1 11/25/2023 1112    HCT 50.1 (H) 12/29/2022 0909    HCT 46.3 12/16/2022 1430    HCT 43.0 02/10/2014 0450    HCT 44.5 02/06/2014 1705    HCT 45.8 01/10/2014 1810    HGB 15.9 11/25/2023 1112    HGB 16.2 12/29/2022 0909    HGB 15.5 12/16/2022 1430    HGB 14.4 02/10/2014 0450    HGB 15.3 02/06/2014 1705    HGB 15.7 01/10/2014 1810    INR 0.97 07/07/2021 1056    INR 1.11 01/10/2014 1810    WBC 9.99 11/25/2023 1112    WBC 6.40 12/29/2022 0909    WBC 9.55 12/16/2022 1430    WBC 7.71 02/10/2014 0450    WBC 10.59 (H) 02/06/2014 1705    WBC 7.73 01/10/2014 1810    ESR 47 (H) 11/25/2023 1112    CRP 55.1 (H) 11/25/2023 1112       Meds:    Current Facility-Administered Medications:     cefepime (MAXIPIME) 2 g/50 mL dextrose IVPB, 2,000 mg, Intravenous, Once, Julien Mcqueen PA-C    morphine injection 2 mg, 2 mg, Intravenous, Once, Julien Mcqueen PA-C    ondansetron (ZOFRAN) injection 4 mg, 4 mg, Intravenous, Once, Julien Mcqueen PA-C    sodium chloride 0.9 % bolus 1,000 mL, 1,000 mL, Intravenous, Once, Julien Mcqueen PA-C    vancomycin (VANCOCIN) 1,750 mg in sodium chloride 0.9 % 500 mL IVPB, 25 mg/kg (Adjusted), Intravenous, Once, Julien Mcqueen PA-C    Current Outpatient Medications:     atorvastatin (LIPITOR) 40 mg tablet, Take 1 tablet (40 mg total) by mouth daily at bedtime, Disp: 90 tablet, Rfl: 1    betamethasone dipropionate (DIPROSONE) 0.05 % cream, Apply topically 2 (two) times a day, Disp: 30 g, Rfl: 0    Blood Glucose Monitoring Suppl (Accu-Chek Guide) w/Device KIT, Use in the morning, Disp: 1 kit, Rfl: 0    cephalexin (KEFLEX) 500 mg capsule, Take 1 capsule (500 mg total) by mouth every 6 (six) hours for 10 days, Disp: 40 capsule, Rfl: 0    clopidogrel (PLAVIX) 75 mg tablet, Take 1 tablet (75 mg total) by mouth daily, Disp: 90 tablet, Rfl: 0    glucose blood (Accu-Chek Guide) test strip, Check Blood Sugars QID as directed by physician., Disp: 400 strip, Rfl: 1    glucose blood (OneTouch Ultra) test strip, Use 1 each 4 (four) times a day To test blood sugars, Disp: 400 each, Rfl: 1    Insulin Glargine Solostar (Basaglar KwikPen) 100 UNIT/ML SOPN, Inject 0.5 mL (50 Units total) under the skin daily at bedtime, Disp: 15 mL, Rfl: 1    insulin lispro (HumaLOG KwikPen) 100 units/mL injection pen, Inject 25 Units under the skin 3 (three) times a day with meals, Disp: 15 mL, Rfl: 1    insulin NPH-insulin regular (NovoLIN 70/30) 100 units/mL subcutaneous injection, Inject:  44 units with breakfast, 20 units with lunch, 30 units with dinner (Patient taking differently: Inject:  44 units with breakfast, 20 units with lunch, 30 units with dinner), Disp: 15 mL, Rfl: 1    Insulin Pen Needle ( UltiCare Mini Pen Needles) 31G X 5 MM MISC, Use 4 (four) times a day, Disp: 200 each, Rfl: 2    Lancets (OneTouch Delica Plus MNVFFE06W) MISC, 1 Units by Does not apply route 2 (two) times a day Dx E11.9, Disp: 100 each, Rfl: 2    lisinopril (ZESTRIL) 10 mg tablet, Take 1 tablet (10 mg total) by mouth daily, Disp: 90 tablet, Rfl: 1    metFORMIN (GLUCOPHAGE) 1000 MG tablet, Take 1 tablet (1,000 mg total) by mouth 2 (two) times a day with meals, Disp: 60 tablet, Rfl: 5    oxyCODONE-acetaminophen (PERCOCET) 5-325 mg per tablet, Take 1 tablet by mouth every 6 (six) hours as needed for moderate pain Max Daily Amount: 4 tablets (Patient not taking: Reported on 11/29/2023), Disp: 120 tablet, Rfl: 0    oxyCODONE-acetaminophen (Percocet) 7.5-325 MG per tablet, Take 1 tablet by mouth every 4 (four) hours as needed for moderate pain Max Daily Amount: 6 tablets, Disp: 30 tablet, Rfl: 0    pantoprazole (PROTONIX) 40 mg tablet, Take 1 tablet (40 mg total) by mouth daily, Disp: 90 tablet, Rfl: 1    pioglitazone (ACTOS) 45 mg tablet, Take 1 tablet (45 mg total) by mouth daily, Disp: 90 tablet, Rfl: 1    rivaroxaban (Xarelto) 2.5 mg tablet, Take 1 tablet (2.5 mg total) by mouth daily with breakfast Last dose 6/21/20, Disp: 30 tablet, Rfl: 2    UltiCare Insulin Syringe 31G X 5/16" 1 ML MISC, Inject under the skin as needed (for injection), Disp: 100 each, Rfl: 1    Blood Culture:   No results found for: "BLOODCX"    Wound Culture:   Lab Results   Component Value Date    WOUNDCULT 1 colony Staphylococcus aureus (A) 10/23/2020       Ins and Outs:  No intake/output data recorded. Physical Exam:   /72 (BP Location: Right arm)   Pulse 101   Temp 98.5 °F (36.9 °C) (Oral)   Resp 18   SpO2 97%   Gen: No acute distress, resting comfortably in bed  HEENT: Eyes clear, moist mucus membranes, hearing intact  Respiratory: No audible wheezing or stridor  Cardiovascular: Well Perfused peripherally, 2+ distal pulse  Abdomen: nondistended, no peritoneal signs    Musculoskeletal: left lower extremity  Erythema and swelling present over the anterior knee. Palpable fluctuance over the inferior patella. Significant TTP throughout the anterior knee. Sensation intact to distal left lower extremity  Motor intact 5/5 strength with hip flexion/extension. Active knee flexion to 90 degrees, knee extension to neutral.  Distal lower extremity warm and well perfused  Musculature is soft and compressible, no pain with passive stretch    Tertiary: no tenderness over all other joints/long bones as except already stated.      _*_*_*_*_*_*_*_*_*_*_*_*_*_*_*_*_*_*_*_*_*_*_*_*_*_*_*_*_*_*_*_*_*_*_*_*_*_*_*_*_*    Assessment:  68 y.o.male septic prepatellar bursitis, failed outpatient oral antibiotics.  Significant uncontrolled DM.     Plan:   WBAT LLE  For operative intervention today. NPO for procedure. Consent obtained  Preoperative antibiotics ordered  Will obtain intraoperative cultures  Recommend strict glucose control  Antibiotics per primary  Pain control per primary  Medical management per primary  DVT ppx per primary  BMI 31.45 moderately obese. Recommend behavior modifications and nutrition.   Dispo: Ortho will follow  Case reviewed and discussed with Dr. Diane Limon PA-C

## 2023-11-29 NOTE — ANESTHESIA PROCEDURE NOTES
Anesthesia Notable Event    Date/Time: 11/29/2023 2:30 PM    Performed by: Ratna Greenberg CRNA  Authorized by: Clayton Meza MD

## 2023-11-29 NOTE — OP NOTE
OPERATIVE REPORT  PATIENT NAME: Promise Campoverde    :  1955  MRN: [id number]  Pt Location: AN OR ROOM 01    SURGERY DATE: 2023    Surgeon(s) and Role:     * Shantelle Lerma, DO - Primary  [facility] utilized during the case for assistance with positioning draping retraction closure and dressing application    Preop Diagnosis:  Septic prepatellar bursitis of left knee [M71.162]    Post-Op Diagnosis Codes:     * Septic prepatellar bursitis of left knee [M71.162]    Procedure(s):  Left - INCISION AND DRAINAGE (I&D) EXTREMITY. and all associated procedures    Specimen(s):  * No specimens in log *    Estimated Blood Loss:   Minimal    Drains:  * No LDAs found *    Anesthesia Type:   Choice    Operative Indications:  Septic prepatellar bursitis of left knee [M71.162]  Hemorrhagic septic bursa    Operative Findings:  Large hemorrhage clot    Complications:   None    Procedure and Technique:  Patient was seen and examined in the preoperative area the left lower extremities marked consent H&P had been reviewed. Patient was then brought back to the operative suite where he was sedated. Patient was already on cefepime and vancomycin no preoperative antibiotics were given because he was already given antibiotics within the 4-hour window of surgical intervention. Infused cefepime still being given in the preoperative area. Brought back to the operative suite where is intubated sedated and the left lower extremity was prepped and draped in sterile fashion. Appropriate timeout commenced identify the left lower extremity as the operative site. A midline incision over the patella and patellar tendon was made using a 10 blade down through the skin. As soon as we went into the prepatellar bursa hemorrhagic clot expelled with some purulent material as well. We irrigated and curettaged the area removing any bursal tissue as well. We irrigated using 3 L of saline through cystoscopy tubing.   Once completely irrigated we took cultures and sent them off we also placed vancomycin powder in the wound. We closed the incision with 0 Prolene. There was some undermining of the incision approximately 3 cm distally 2 cm laterally 2 cm medially and 1 cm proximally. We were able to remove any necrotic tissue through blunt curettage there was no debridement necessary on the incision line. We closed the incision and then placed a 3 cm packing tape into the superior aspect of the wound we then applied Xeroform 4 x 4 ABD web roll and an Ace wrap to the lower extremity. Anesthesia was reversed and patient was taken back to PACU in stable condition. I was present for the entire procedure. and A qualified resident physician was not available.     Patient Disposition:  PACU         SIGNATURE: Ananya Dueñas DO  DATE: November 29, 2023  TIME: 1:23 PM

## 2023-11-29 NOTE — ANESTHESIA POSTPROCEDURE EVALUATION
Post-Op Assessment Note    CV Status:  Stable  Pain Score: 0    Pain management: adequate       Mental Status:  Awake and alert   Hydration Status:  Stable   PONV Controlled:  None   Airway Patency:  Patent and adequate     Post Op Vitals Reviewed: Yes    No anethesia notable event occurred.     Staff: CRNA, Anesthesiologist               BP  142/89   Temp   98.9   Pulse 96   Resp   16   SpO2   97%

## 2023-11-29 NOTE — PROGRESS NOTES
Ortho Sports Medicine Knee Follow Up Visit     Assesment:     70 y.o. male left knee septic bursitis in the setting of uncontrolled diabetes and prior below-knee amputation    Plan:  The patient's diagnosis and treatment were discussed at length today. We discussed no treatment, non-operative treatment, and operative treatment. America Salazar presents today 5 days after presenting to the ER with septic bursitis. At that time he was treated conservatively with 5 days of Keflex and rest/activity modification. Aspiration of his prepatellar bursa today demonstrated gross purulence. In the setting of continued purulence, minimally improved erythema/cellulitis to the anterior knee, uncontrolled diabetes, history of below-knee amputation, and concern for progression of his infection I recommend the patient present to the [facility] emergency room for medical admission and orthopedic consultation. I did reach out to the orthopedic PA and physician on-call to discuss this case as they feel he may require a bedside versus formal operative I&D. 30 cc of purulent fluid were obtained today and sent for culture to assist in speciation/antibiotic management. The patient was instructed to remain n.p.o. Conservative treatment:  Minimal improvement after 5 days of Keflex    Imaging:    No imaging was available for review today. Injection:    Aspiration of the left knee yielding 29cc pus purulent fluid  Fluid was sent to labs Synovial fluid and cell count, gram stain. Surgery:     Recommend to go to the ED for an orthopedic consult to have an I&D of the left prepatellar bursitis due to the aspiration yielding     Follow up:    Return if symptoms worsen or fail to improve. Chief Complaint   Patient presents with   • Left Knee - Follow-up       History of Present Illness: The patient is returns for follow up of left knee osteoarthritis. Since the prior visit, He reports minimal improvement.   Patient reports that on 11/25/2023, he woke up with a swollen red hot to the touch left knee. He was then seen in the ED that same day where x-rays were taken. Patient was diagnosed with prepatellar bursitis and was placed on 10 days of Keflex. He is 5 days in to his antibiotic noticing that the redness and swelling has decreased, but continues to be tender to the touch, erythematous and erythema. Patient feels that his prosthetic is not fitting appropriately and is rubbing causing his pain. He has not worn his prosthetic since he has been in the hospital.  Patient had a Monovisc injection on 8/16/2023. Patient is a type II diabetic with last hemoglobin A1c of 11.6 on 11/24/2023. Pain is located anterior. Pain is improved by rest and Keflex. Pain is aggravated by touch. Symptoms include swelling and redness, tender the to touch. The patient has tried rest and Antibiotic. Knee Surgical History:  BKA left    Past Medical, Social and Family History:  Past Medical History:   Diagnosis Date   • Arthritis     fingers   • First degree AV block    • Full dentures    • Hypertension    • PAD (peripheral artery disease) ([address])    • PVD (peripheral vascular disease) ([address])    • Type 2 diabetes mellitus with diabetic peripheral angiopathy without gangrene ([address]) 5/18/2021    Per CMS ICD 10 guidelines--Per Physician   • Wound, open     right foot- by the 5th toe     Past Surgical History:   Procedure Laterality Date   • CHOLECYSTECTOMY     • COLONOSCOPY     • IR AORTAGRAM WITH RUN-OFF  1/28/2021   • IR AORTAGRAM WITH RUN-OFF  4/13/2021   • LEG AMPUTATION THROUGH LOWER TIBIA AND FIBULA Left 7/17/2021    Procedure: AMPUTATION BELOW KNEE (BKA);   Surgeon: Jadiel Griffin MD;  Location: [facility];  Service: Vascular   • [place] BYP FEM-ANT TIBL PST TIBL PRONEAL ART/OTH DSTL Left 7/14/2021    Procedure: BYPASS femoral-peroneal artery bypass with  reverse GSV and balloon angioplasty peroneal artery;  Surgeon: Jadiel Griffin MD; Location: [facility];  Service: Vascular   • GA DEBRIDEMENT BONE MUSCLE &/FASCIA 20 SQ CM/< Right 12/18/2020    Procedure: DEBRIDMENT OF ULCER , REMOVAL OF DEVITALIZED SKIN, SOFT TISSUE, BONE AND APPLICATION OF INTEGRA GRAFT RIGHT FOOT.;  Surgeon: Shaun Ledezma DPM;  Location: [facility] [place];  Service: Podiatry   • REPLANTATION THUMB Right     right tip reconnected   • SKIN GRAFT      right thumb   • TOE AMPUTATION      left foot all 5 toes removed   • TOE AMPUTATION Right 2/2/2021    Procedure: Right partial 5th ray amputation;  Surgeon: Maya Sharma DPM;  Location:  MAIN OR;  Service: Podiatry   • TOE OSTEOTOMY Right 6/26/2020    Procedure: exostosis of right big toe;  Surgeon: Diandra France DPM;  Location: [facility], [place];  Service: Podiatry   • TRIGGER FINGER RELEASE      bilateral hands   • VEIN LIGATION      right     Allergies   Allergen Reactions   • Shellfish-Derived Products - Food Allergy Anaphylaxis     Throat closes   • Sulfamethoxazole-Trimethoprim Rash     Current Outpatient Medications on File Prior to Visit   Medication Sig Dispense Refill   • atorvastatin (LIPITOR) 40 mg tablet Take 1 tablet (40 mg total) by mouth daily at bedtime 90 tablet 1   • betamethasone dipropionate (DIPROSONE) 0.05 % cream Apply topically 2 (two) times a day 30 g 0   • Blood Glucose Monitoring Suppl (Accu-Chek Guide) w/Device KIT Use in the morning 1 kit 0   • cephalexin (KEFLEX) 500 mg capsule Take 1 capsule (500 mg total) by mouth every 6 (six) hours for 10 days 40 capsule 0   • clopidogrel (PLAVIX) 75 mg tablet Take 1 tablet (75 mg total) by mouth daily 90 tablet 0   • glucose blood (Accu-Chek Guide) test strip Check Blood Sugars QID as directed by physician.  400 strip 1   • glucose blood (OneTouch Ultra) test strip Use 1 each 4 (four) times a day To test blood sugars 400 each 1   • Insulin Glargine Solostar (Basaglar KwikPen) 100 UNIT/ML SOPN Inject 0.5 mL (50 Units total) under the skin daily at bedtime 15 mL 1   • insulin lispro (HumaLOG KwikPen) 100 units/mL injection pen Inject 25 Units under the skin 3 (three) times a day with meals 15 mL 1   • insulin NPH-insulin regular (NovoLIN 70/30) 100 units/mL subcutaneous injection Inject:  44 units with breakfast, 20 units with lunch, 30 units with dinner (Patient taking differently: Inject:  44 units with breakfast, 20 units with lunch, 30 units with dinner) 15 mL 1   • Insulin Pen Needle ( UltiCare Mini Pen Needles) 31G X 5 MM MISC Use 4 (four) times a day 200 each 2   • Lancets (OneTouch Delica Plus CODNUQ64O) MISC 1 Units by Does not apply route 2 (two) times a day Dx E11.9 100 each 2   • lisinopril (ZESTRIL) 10 mg tablet Take 1 tablet (10 mg total) by mouth daily 90 tablet 1   • metFORMIN (GLUCOPHAGE) 1000 MG tablet Take 1 tablet (1,000 mg total) by mouth 2 (two) times a day with meals 60 tablet 5   • oxyCODONE-acetaminophen (Percocet) 7.5-325 MG per tablet Take 1 tablet by mouth every 4 (four) hours as needed for moderate pain Max Daily Amount: 6 tablets 30 tablet 0   • pantoprazole (PROTONIX) 40 mg tablet Take 1 tablet (40 mg total) by mouth daily 90 tablet 1   • pioglitazone (ACTOS) 45 mg tablet Take 1 tablet (45 mg total) by mouth daily 90 tablet 1   • rivaroxaban (Xarelto) 2.5 mg tablet Take 1 tablet (2.5 mg total) by mouth daily with breakfast Last dose 6/21/20 30 tablet 2   • UltiCare Insulin Syringe 31G X 5/16" 1 ML MISC Inject under the skin as needed (for injection) 100 each 1   • oxyCODONE-acetaminophen (PERCOCET) 5-325 mg per tablet Take 1 tablet by mouth every 6 (six) hours as needed for moderate pain Max Daily Amount: 4 tablets (Patient not taking: Reported on 11/29/2023) 120 tablet 0     No current facility-administered medications on file prior to visit.      Social History     Socioeconomic History   • Marital status: /Civil Union     Spouse name: Syd Jaeger   • Number of children: Not on file   • Years of education: 12   • Highest education level: High school graduate Occupational History   • Occupation: [employer]   Tobacco Use   • Smoking status: Never   • Smokeless tobacco: Never   Vaping Use   • Vaping Use: Never used   Substance and Sexual Activity   • Alcohol use: Not Currently     Comment: occasional   • Drug use: Never   • Sexual activity: Yes     Partners: Female   Other Topics Concern   • Not on file   Social History Narrative         Social Determinants of Health     Financial Resource Strain: Low Risk  (9/15/2023)    Overall Financial Resource Strain (CARDIA)    • Difficulty of Paying Living Expenses: Not hard at all   Food Insecurity: Not on file   Transportation Needs: No Transportation Needs (9/15/2023)    PRAPARE - Transportation    • Lack of Transportation (Medical): No    • Lack of Transportation (Non-Medical): No   Physical Activity: Not on file   Stress: Not on file   Social Connections: Not on file   Intimate Partner Violence: Not on file   Housing Stability: Not on file         I have reviewed the past medical, surgical, social and family history, medications and allergies as documented in the EMR. Review of systems: ROS is negative other than that noted in the HPI. Constitutional: Negative for fatigue and fever. Physical Exam:    Height 5' 4" (1.626 m), weight 83.1 kg (183 lb 3.2 oz). General/Constitutional: NAD, well developed, well nourished  HENT: Normocephalic, atraumatic  CV: Intact distal pulses, regular rate  Resp: No respiratory distress or labored breathing  Lymphatic: No lymphadenopathy palpated  Neuro: Alert and Oriented x 3, no focal deficits  Psych: Normal mood, normal affect, normal judgement, normal behavior  Skin: Warm, dry, no rashes, no erythema      Left Knee Exam (focused):  Visual inspection of the left knee demonstrates normal contour with atrophy. Well healed previous incisions at the BKA  There is no significant erythema or edema.     No significant joint effusion   Range of motion is full from 0-90 degrees of flexion   Able to straight leg raise   Anterior knee over the patella tender to palpation  + medial joint line tenderness, - lateral joint line tenderness       LE NV Exam: +2 DP/PT pulses bilaterally  Sensation intact to light touch L2-S1 bilaterally    No calf tenderness to palpation bilaterally    Large joint arthrocentesis: L prepatellar bursa  Universal Protocol:  Consent: Verbal consent obtained. Risks and benefits: risks, benefits and alternatives were discussed  Consent given by: patient  Time out: Immediately prior to procedure a "time out" was called to verify the correct patient, procedure, equipment, support staff and site/side marked as required.   Timeout called at: 11/29/2023 11:18 AM.  Patient understanding: patient states understanding of the procedure being performed  Site marked: the operative site was marked  Patient identity confirmed: verbally with patient  Supporting Documentation  Indications: pain and joint swelling   Procedure Details  Location: knee - L prepatellar bursa  Preparation: Patient was prepped and draped in the usual sterile fashion  Needle size: 18 G  Ultrasound guidance: no  Approach: anterolateral    Aspirate amount: 29 mL  Aspirate: purulent, cloudy and blood-tinged  Analysis: fluid sample sent for laboratory analysis (Culture and gram stain, White Cell count with Diff)  Patient tolerance: patient tolerated the procedure well with no immediate complications  Dressing:  Sterile dressing applied       Knee Imaging    No imaging was performed today      Scribe Attestation      I,:  Elizabeth Saldana am acting as a scribe while in the presence of the attending physician.:       I,:  Thiago Richards DO personally performed the services described in this documentation    as scribed in my presence.:

## 2023-11-29 NOTE — H&P
[address] [facility]  H&P  Name: Prakash Bradley 70 y.o. male I MRN: [id number]  Unit/Bed#: S -01 I Date of Admission: 11/29/2023   Date of Service: 11/29/2023 I Hospital Day: 0      Assessment/Plan   * Septic prepatellar bursitis of left knee  Assessment & Plan  Pt presents w/ infection of bursa in L knee region s/p ipsilateral BKA of 2021  Erythema, painful, swollen for past 1-2 weeks gradually worsening until ER visit 5 days ago  Received Keflex 500 q12 o/p + supportive measures, no change in sx to present day  O/p ortho visit aspirated 30 ml fluid, purulent & muddy appearing, sent pt to [facility] ER  Ortho incision & curettage today, admitted    Ortho to follow  Fluid analysis pending  Blood cx pending  Cef & vanc begun  Pharmacy consulted  Oxycodone for pain  Dilaudid for breakthrough  OT / PT requested  Watch for signs of sepsis    Poorly controlled type 2 diabetes mellitus ([address])  Assessment & Plan  Lab Results   Component Value Date    HGBA1C 11.6 (A) 11/24/2023       Recent Labs     11/29/23  1802   POCGLU 245*       Blood Sugar Average: Last 72 hrs:  (P) 245    Recent A1C of 11.6 w/ questionable compliance of meds & insulin  Will be on insulin regimen plus ISS while I/p  Carb 3 diet    Essential hypertension  Assessment & Plan  Chronic, MAP average day of admission 120s, medication compliance questionable  Will restart home dose lisinopril 10 mg   Will begin PRN Lopressor 25 mg q 6 hrs    Atrial fibrillation, unspecified type ([address])  Assessment & Plan  Not in Afib during physical exam  Has been on Xarelto for years at strange dose of 2.5 mg daily    PVD (peripheral vascular disease) ([address])  Assessment & Plan  Continues on clopidogrel         VTE Pharmacologic Prophylaxis: VTE Score: 5 High Risk (Score >/= 5) - Pharmacological DVT Prophylaxis Ordered: rivaroxaban (Xarelto). Sequential Compression Devices Ordered.   Code Status: Level 1 - Full Code Yes  Discussion with family: Updated contact person (wife) via phone. Anticipated Length of Stay: Patient will be admitted on an inpatient basis with an anticipated length of stay of greater than 2 midnights secondary to Septic Bursitis post op. Chief Complaint: Pain in R knee    History of Present Illness:  Promise Campoverde is a 70 y.o. male with a PMH of BKA L 2021, Uncontrolled DM2 on insulin recent A1C 11+. HTN of max  this admit, PVD, GERD, who presents with L knee pain, erythema, &.swelling of 1-2 weeks duration gradually worsening and not responding to 5 day course of keflex 500 BID given at recent ER visit 5 days ago for suspected cellulitis. Pt presented initially to o/p ortho clinic where fluid was aspirated from infected bursa that appeared muddy & purulent, was rec to go visit ER for further surgical intervention which has since been performed (irrigation & curettage). Pt describes that his prosthetic had been feeling uncomfortable & raw in the week leading up to initial ER visit. Pt also relates that he felt similar sx this past summer where he received doxycycline to relief. Relevant labs since presentation include +, ESR 52, Lac 1.2, CBC -, Glucose 300+. VSS but htn. Assessment & plan as detailed above. Review of Systems:  Review of Systems   Constitutional:  Negative for chills, diaphoresis, fatigue and fever. HENT:  Negative for congestion, postnasal drip, rhinorrhea, sinus pressure, sinus pain, sneezing and sore throat. Eyes:  Negative for photophobia and visual disturbance. Respiratory:  Negative for cough, choking, chest tightness and shortness of breath. Cardiovascular:  Positive for leg swelling. Negative for chest pain and palpitations. Gastrointestinal:  Negative for constipation, diarrhea, nausea and vomiting. Endocrine: Negative for cold intolerance and heat intolerance. Genitourinary:  Negative for dysuria, flank pain, frequency and urgency.    Musculoskeletal:  Positive for arthralgias and joint swelling. Negative for neck pain and neck stiffness. Skin:  Positive for color change and wound. Negative for pallor and rash. Allergic/Immunologic: Positive for food allergies. Negative for environmental allergies and immunocompromised state. Neurological:  Negative for dizziness, tremors, syncope, weakness, light-headedness, numbness and headaches. Hematological:  Negative for adenopathy. Does not bruise/bleed easily. Psychiatric/Behavioral:  Negative for agitation, behavioral problems and confusion. The patient is not nervous/anxious and is not hyperactive. All other systems reviewed and are negative. Past Medical and Surgical History:   Past Medical History:   Diagnosis Date    Arthritis     fingers    First degree AV block     Full dentures     Hypertension     PAD (peripheral artery disease) ([facility])     PVD (peripheral vascular disease) ([address])     Type 2 diabetes mellitus with diabetic peripheral angiopathy without gangrene ([address]) 5/18/2021    Per CMS ICD 10 guidelines--Per Physician    Wound, open     right foot- by the 5th toe       Past Surgical History:   Procedure Laterality Date    CHOLECYSTECTOMY      COLONOSCOPY      IR AORTAGRAM WITH RUN-OFF  1/28/2021    IR AORTAGRAM WITH RUN-OFF  4/13/2021    LEG AMPUTATION THROUGH LOWER TIBIA AND FIBULA Left 7/17/2021    Procedure: AMPUTATION BELOW KNEE (BKA);   Surgeon: Evette Fortune MD;  Location: [facility];  Service: Vascular    WA BYP FEM-ANT TIBL PST TIBL PRONEAL ART/OTH DSTL Left 7/14/2021    Procedure: BYPASS femoral-peroneal artery bypass with  reverse GSV and balloon angioplasty peroneal artery;  Surgeon: Evette Fortune MD;  Location: [facility];  Service: Vascular    WA DEBRIDEMENT BONE MUSCLE &/FASCIA 20 SQ CM/< Right 12/18/2020    Procedure: DEBRIDMENT OF ULCER , REMOVAL OF DEVITALIZED SKIN, SOFT TISSUE, BONE AND APPLICATION OF INTEGRA GRAFT RIGHT FOOT.;  Surgeon: Marilin Sexton DPM;  Location: [facility];  Service: Podiatry REPLANTATION THUMB Right     right tip reconnected    SKIN GRAFT      right thumb    TOE AMPUTATION      left foot all 5 toes removed    TOE AMPUTATION Right 2/2/2021    Procedure: Right partial 5th ray amputation;  Surgeon: Amari Campos DPM;  Location: [facility];  Service: Podiatry    TOE OSTEOTOMY Right 6/26/2020    Procedure: exostosis of right big toe;  Surgeon: Owen Kirby DPM;  Location: [facility];  Service: [address] [place] [address]      bilateral hands    VEIN LIGATION      right       Meds/Allergies:  Prior to Admission medications    Medication Sig Start Date End Date Taking?  Authorizing Provider   atorvastatin (LIPITOR) 40 mg tablet Take 1 tablet (40 mg total) by mouth daily at bedtime 1/17/21  Yes Nel Nuno MD   betamethasone dipropionate (DIPROSONE) 0.05 % cream Apply topically 2 (two) times a day 5/23/22  Yes Nel Nuno MD   cephalexin [facility]) 500 mg capsule Take 1 capsule (500 mg total) by mouth every 6 (six) hours for 10 days 11/25/23 12/5/23 Yes Chris Kee DO   clopidogrel (PLAVIX) 75 mg tablet Take 1 tablet (75 mg total) by mouth daily 5/25/21  Yes Nel Nuno MD   Insulin Glargine Solostar (Basaglar KwikPen) 100 UNIT/ML SOPN Inject 0.5 mL (50 Units total) under the skin daily at bedtime 11/24/23  Yes Nel Nuno MD   insulin lispro (HumaLOG KwikPen) 100 units/mL injection pen Inject 25 Units under the skin 3 (three) times a day with meals 11/24/23  Yes Nel Nuno MD   insulin NPH-insulin regular (NovoLIN 70/30) 100 units/mL subcutaneous injection Inject:  44 units with breakfast, 20 units with lunch, 30 units with dinner  Patient taking differently: Inject:  44 units with breakfast, 20 units with lunch, 30 units with dinner 8/6/21  Yes Aries Giron MD   lisinopril (ZESTRIL) 10 mg tablet Take 1 tablet (10 mg total) by mouth daily 4/27/21  Yes Griselda Guernsey, CRNP   metFORMIN (GLUCOPHAGE) 1000 MG tablet Take 1 tablet (1,000 mg total) by mouth 2 (two) times a day with meals 1/4/23  Yes Eduard Bailey MD   pantoprazole (PROTONIX) 40 mg tablet Take 1 tablet (40 mg total) by mouth daily 11/24/23  Yes Eduard Bailey MD   pioglitazone (ACTOS) 45 mg tablet Take 1 tablet (45 mg total) by mouth daily 5/15/23  Yes Eduard Bailey MD   rivaroxaban (Xarelto) 2.5 mg tablet Take 1 tablet (2.5 mg total) by mouth daily with breakfast Last dose 6/21/20 10/11/22  Yes Eduard Bailey MD   Blood Glucose Monitoring Suppl (Accu-Chek Guide) w/Device KIT Use in the morning 9/25/23   Eduard Bailey MD   glucose blood (Accu-Chek Guide) test strip Check Blood Sugars QID as directed by physician. 9/25/23   Eduard Bailey MD   glucose blood (OneTouch Ultra) test strip Use 1 each 4 (four) times a day To test blood sugars 9/25/23   Eduard Bailey MD   Insulin Pen Needle ( UltiCare Mini Pen Needles) 31G X 5 MM MISC Use 4 (four) times a day 11/24/23   Eduard Bailey MD   Lancets (OneTouch Delica Plus [id number]) MISC 1 Units by Does not apply route 2 (two) times a day Dx E11.9 11/5/20   Eduard Bailey MD   oxyCODONE-acetaminophen (Percocet) 7.5-325 MG per tablet Take 1 tablet by mouth every 4 (four) hours as needed for moderate pain Max Daily Amount: 6 tablets 11/24/23   Eduard Bailey MD   UltiCare Insulin Syringe 31G X 5/16" 1 ML MISC Inject under the skin as needed (for injection) 6/4/23   Eduard Bailey MD   oxyCODONE-acetaminophen (PERCOCET) 5-325 mg per tablet Take 1 tablet by mouth every 6 (six) hours as needed for moderate pain Max Daily Amount: 4 tablets  Patient not taking: Reported on 11/29/2023 8/2/23 11/29/23  Eduard Bailey MD     I have reviewed home medications with patient personally. Allergies:    Allergies   Allergen Reactions    Shellfish-Derived Products - Food Allergy Anaphylaxis     Throat closes    Sulfamethoxazole-Trimethoprim Rash       Social History:  Marital Status: /Civil Union   Occupation: [employer]  Patient Pre-hospital Living Situation: Home  Patient Pre-hospital Level of Mobility: walks  Patient Pre-hospital Diet Restrictions: None  Substance Use History: Only alcohol hx  Social History     Substance and Sexual Activity   Alcohol Use Not Currently    Comment: occasional     Social History     Tobacco Use   Smoking Status Never   Smokeless Tobacco Never     Social History     Substance and Sexual Activity   Drug Use Never       Family History:  Family History   Problem Relation Age of Onset    Arthritis Mother     Pancreatic cancer Mother     Cancer Father         colon    Alzheimer's disease Father        Physical Exam:     Vitals:   Blood Pressure: 157/75 (11/29/23 1823)  Pulse: 99 (11/29/23 1823)  Temperature: 98.6 °F (37 °C) (11/29/23 1646)  Temp Source: Temporal (11/29/23 1616)  Respirations: 18 (11/29/23 1646)  Height: 5' 4" (162.6 cm) (11/29/23 1308)  Weight - Scale: 86.2 kg (190 lb) (11/29/23 1308)  SpO2: 95 % (11/29/23 1823)    Physical Exam  Vitals and nursing note reviewed. Constitutional:       Appearance: He is not diaphoretic. HENT:      Head: Normocephalic and atraumatic. Nose: Nose normal. No congestion or rhinorrhea. Eyes:      General: No scleral icterus. Right eye: No discharge. Left eye: No discharge. Extraocular Movements: Extraocular movements intact. Conjunctiva/sclera: Conjunctivae normal.      Pupils: Pupils are equal, round, and reactive to light. Cardiovascular:      Rate and Rhythm: Normal rate and regular rhythm. Pulses: Normal pulses. Heart sounds: Normal heart sounds. Pulmonary:      Effort: Pulmonary effort is normal.      Breath sounds: Normal breath sounds. Abdominal:      General: Abdomen is flat. Bowel sounds are normal.      Palpations: Abdomen is soft. Musculoskeletal:         General: Swelling, tenderness and signs of injury present. Skin:     General: Skin is warm and dry. Findings: Erythema and lesion present.    Neurological:      General: No focal deficit present. Mental Status: He is alert and oriented to person, place, and time. Mental status is at baseline. Cranial Nerves: No cranial nerve deficit. Sensory: No sensory deficit. Motor: No weakness. Psychiatric:         Mood and Affect: Mood normal.         Behavior: Behavior normal.         Thought Content: Thought content normal.         Judgment: Judgment normal.          Additional Data:     Lab Results:  Results from last 7 days   Lab Units 11/29/23  1227   WBC Thousand/uL 8.65   HEMOGLOBIN g/dL 16.4   HEMATOCRIT % 50.6*   PLATELETS Thousands/uL 198   NEUTROS PCT % 81*   LYMPHS PCT % 13*   MONOS PCT % 6   EOS PCT % 0     Results from last 7 days   Lab Units 11/29/23  1227   SODIUM mmol/L 132*   POTASSIUM mmol/L 4.3   CHLORIDE mmol/L 92*   CO2 mmol/L 24   BUN mg/dL 21   CREATININE mg/dL 0.98   ANION GAP mmol/L 16   CALCIUM mg/dL 9.7   ALBUMIN g/dL 4.1   TOTAL BILIRUBIN mg/dL 0.86   ALK PHOS U/L 73   ALT U/L 108*   AST U/L 38   GLUCOSE RANDOM mg/dL 311*         Results from last 7 days   Lab Units 11/29/23  1802   POC GLUCOSE mg/dl 245*     Results from last 7 days   Lab Units 11/24/23  1016   HEMOGLOBIN A1C  11.6*     Results from last 7 days   Lab Units 11/29/23  1227   LACTIC ACID mmol/L 1.2       Lines/Drains:  Invasive Devices       Peripheral Intravenous Line  Duration             Peripheral IV 11/29/23 Right;Ventral (anterior) Forearm <1 day                        Imaging: No pertinent imaging reviewed. No orders to display       EKG and Other Studies Reviewed on Admission:   EKG: No EKG obtained. ** Please Note: This note has been constructed using a voice recognition system.  **

## 2023-11-29 NOTE — ASSESSMENT & PLAN NOTE
Pt presents w/ infection of bursa in L knee region s/p ipsilateral BKA of 2021  Erythema, painful, swollen for past 1-2 weeks gradually worsening until ER visit 5 days ago  Received Keflex 500 q12 o/p + supportive measures, no change in sx to present day  O/p ortho visit aspirated 30 ml fluid, purulent & muddy appearing, sent pt to [facility] ER  Ortho incision & curettage today, admitted    Ortho to follow  Fluid analysis pending  Blood cx pending  Cef & vanc begun  Pharmacy consulted  Oxycodone for pain  Dilaudid for breakthrough  OT / PT requested  Watch for signs of sepsis

## 2023-11-29 NOTE — ASSESSMENT & PLAN NOTE
Lab Results   Component Value Date    HGBA1C 11.6 (A) 11/24/2023       Recent Labs     11/29/23  1802   POCGLU 245*       Blood Sugar Average: Last 72 hrs:  (P) 245    Recent A1C of 11.6 w/ questionable compliance of meds & insulin  Will be on insulin regimen plus ISS while I/p  Carb 3 diet

## 2023-11-29 NOTE — PROGRESS NOTES
Vancomycin IV Pharmacy-to-Dose Consultation    Sarita Soto is a 70 y.o. male who is currently ordered Vancomycin IV with management by the Pharmacy Consult service. Relevant clinical data and objective / subjective history reviewed. Vancomycin Assessment:    Indication and Goal AUC/Trough: left knee septic prepatellar bursitis (goal -600, trough >10)    Clinical Status: post-op I&D    Micro:    anaerobic culture  synovial fluid from bursa cyst: in process   tissue culture  synovial fluid from bursa cyst: in process   blood culture x 2: in process   body fluid culture from left knee joint: in process    Renal Function:  SCr: 0.98 mg/dL  CrCl: 63 mL/min  Renal replacement: none    Days of Therapy: 1    Current Dose: 1750 mg IV once (loading dose)    Vancomycin Plan:    New Dosin mg IV q12h    Estimated AUC: 426 mcg*hr/mL    Estimated Trough: 14.1 mcg/mL    Next Level:  at 0600    Renal Function Monitoring: Daily BMP and  [address] will continue to follow closely for s/sx of nephrotoxicity, infusion reactions and appropriateness of therapy. BMP and CBC will be ordered per protocol. We will continue to follow the patient’s culture results and clinical progress daily.     Manual Brain CrawfordD  Pharmacist

## 2023-11-29 NOTE — DISCHARGE INSTR - AVS FIRST PAGE
Discharge Instructions - Orthopedics  Aakash Panchal 70 y.o. male MRN: [id number]  Unit/Bed#: S -01    Weight Bearing Status:                                           Weight bearing as tolerated left lower extremity    Antibiotics: Take as prescribed    Pain:  Continue analgesics as directed    Dressing Instructions:   Please keep clean, dry and intact until follow up     Appt Instructions: If you do not have your appointment, please call the clinic at [phone]  Otherwise follow up as scheduled. Contact the office sooner if you experience any increased numbness/tingling in the extremities. Miscellaneous:      Dear Aakash Panchal,     It was our pleasure to care for you here at [facility]. It is our hope that we were always able to exceed the expected standards for your care during your stay. You were hospitalized due to a right knee infection. You were cared for on the 4th floor by Juarez Soares MD under the service of Sapphire Tadeo MD with the [facility] who covers for your primary care physician (PCP), Migdalia Habermann, MD, while you were hospitalized. If you have any questions or concerns related to this hospitalization, you may contact us at [phone]. For follow up as well as any medication refills, we recommend that you follow up with your primary care physician. A registered nurse will reach out to you by phone within a few days after your discharge to answer any additional questions that you may have after going home. However, at this time we provide for you here, the most important instructions / recommendations at discharge:     Notable Medication Adjustments -   Please start taking cefadroxil (Duricef) 500 mg twice daily for an additional 10 days. Please start taking Flexeril 5 mg three times a day as needed for muscle spasms. For pain, you may continue to take oxycodone as needed.   Testing Required after Discharge - None  Important follow up information -   Please follow-up with your PCP in 1 week. Please follow-up with Dr. Pranav Rodgers within 10-14 days of discharge. Other Instructions -   If you begin to develop new fever, chills, or increasing pain and drainage from your right knee amputation site, contact Dr. Zeferino Rangel office and come to the emergency department promptly. Please review this entire after visit summary as additional general instructions including medication list, appointments, activity, diet, any pertinent wound care, and other additional recommendations from your care team that may be provided for you.       Sincerely,     Isadora Meek MD

## 2023-11-29 NOTE — ED PROVIDER NOTES
History  Chief Complaint   Patient presents with    Wound Check     Sent from PCP due to infection in left knee. Patient complains of pain     70-year-old male patient with a history of a left BKA. Was seen here over the weekend and was diagnosed with cellulitis of the superficial tissues of the knee . C-reactive protein 55 sed rate elevated negative x-ray. Was placed on Keflex. He went to orthopedics today and 19 mL of purulent material was aspirated from the suprapatellar bursa. Dr. Rosa Chacon the orthopedic surgeon sent him in for admission and washout. Kari Ortiz texted Dr. Rosa Chacon, the orthopedic PA, and the internal medicine doctor PA. Patient was given cefepime, vancomycin in the emergency department, CBC CMP blood cultures, lactic acid all pending. Prior to Admission Medications   Prescriptions Last Dose Informant Patient Reported? Taking?    Blood Glucose Monitoring Suppl (Accu-Chek Guide) w/Device KIT  Self No No   Sig: Use in the morning   Insulin Glargine Solostar (Basaglar KwikPen) 100 UNIT/ML SOPN  Self No No   Sig: Inject 0.5 mL (50 Units total) under the skin daily at bedtime   Insulin Pen Needle (HM UltiCare Mini Pen Needles) 31G X 5 MM MISC  Self No No   Sig: Use 4 (four) times a day   Lancets (OneTouch Delica Plus [id number]) MISC  Self No No   Si Units by Does not apply route 2 (two) times a day Dx E11.9   UltiCare Insulin Syringe 31G X 5/16" 1 ML MISC  Self No No   Sig: Inject under the skin as needed (for injection)   atorvastatin (LIPITOR) 40 mg tablet  Self No No   Sig: Take 1 tablet (40 mg total) by mouth daily at bedtime   betamethasone dipropionate (DIPROSONE) 0.05 % cream  Self No No   Sig: Apply topically 2 (two) times a day   cephalexin (KEFLEX) 500 mg capsule  Self No No   Sig: Take 1 capsule (500 mg total) by mouth every 6 (six) hours for 10 days   clopidogrel (PLAVIX) 75 mg tablet  Self No No   Sig: Take 1 tablet (75 mg total) by mouth daily   glucose blood (Accu-Chek Guide) test strip  Self No No   Sig: Check Blood Sugars QID as directed by physician.    glucose blood (OneTouch Ultra) test strip  Self No No   Sig: Use 1 each 4 (four) times a day To test blood sugars   insulin NPH-insulin regular (NovoLIN 70/30) 100 units/mL subcutaneous injection  Self No No   Sig: Inject:  44 units with breakfast, 20 units with lunch, 30 units with dinner   Patient taking differently: Inject:  44 units with breakfast, 20 units with lunch, 30 units with dinner   insulin lispro (HumaLOG KwikPen) 100 units/mL injection pen  Self No No   Sig: Inject 25 Units under the skin 3 (three) times a day with meals   lisinopril (ZESTRIL) 10 mg tablet  Self No No   Sig: Take 1 tablet (10 mg total) by mouth daily   metFORMIN (GLUCOPHAGE) 1000 MG tablet  Self No No   Sig: Take 1 tablet (1,000 mg total) by mouth 2 (two) times a day with meals   oxyCODONE-acetaminophen (PERCOCET) 5-325 mg per tablet  Self No No   Sig: Take 1 tablet by mouth every 6 (six) hours as needed for moderate pain Max Daily Amount: 4 tablets   Patient not taking: Reported on 11/29/2023   oxyCODONE-acetaminophen (Percocet) 7.5-325 MG per tablet  Self No No   Sig: Take 1 tablet by mouth every 4 (four) hours as needed for moderate pain Max Daily Amount: 6 tablets   pantoprazole (PROTONIX) 40 mg tablet  Self No No   Sig: Take 1 tablet (40 mg total) by mouth daily   pioglitazone (ACTOS) 45 mg tablet  Self No No   Sig: Take 1 tablet (45 mg total) by mouth daily   rivaroxaban (Xarelto) 2.5 mg tablet  Self No No   Sig: Take 1 tablet (2.5 mg total) by mouth daily with breakfast Last dose 6/21/20      Facility-Administered Medications: None       Past Medical History:   Diagnosis Date    Arthritis     fingers    First degree AV block     Full dentures     Hypertension     PAD (peripheral artery disease) ([facility])     PVD (peripheral vascular disease) ([facility])     Type 2 diabetes mellitus with diabetic peripheral angiopathy without gangrene ([address]) 5/18/2021    Per CMS ICD 10 guidelines--Per Physician    Wound, open     right foot- by the 5th toe       Past Surgical History:   Procedure Laterality Date    CHOLECYSTECTOMY      COLONOSCOPY      IR AORTAGRAM WITH RUN-OFF  1/28/2021    IR AORTAGRAM WITH RUN-OFF  4/13/2021    LEG AMPUTATION THROUGH LOWER TIBIA AND FIBULA Left 7/17/2021    Procedure: AMPUTATION BELOW KNEE (BKA); Surgeon: Kiersten Veliz MD;  Location: [facility];  Service: Vascular    NE BYP FEM-ANT TIBL PST TIBL PRONEAL ART/OTH DSTL Left 7/14/2021    Procedure: BYPASS femoral-peroneal artery bypass with  reverse GSV and balloon angioplasty peroneal artery;  Surgeon: Kiersten Veliz MD;  Location: [facility];  Service: Vascular    NE DEBRIDEMENT BONE MUSCLE &/FASCIA 20 SQ CM/< Right 12/18/2020    Procedure: DEBRIDMENT OF ULCER , REMOVAL OF DEVITALIZED SKIN, SOFT TISSUE, BONE AND APPLICATION OF INTEGRA GRAFT RIGHT FOOT.;  Surgeon: Addis Wilson DPM;  Location: [facility];  Service: Podiatry    REPLANTATION THUMB Right     right tip reconnected    SKIN GRAFT      right thumb    TOE AMPUTATION      left foot all 5 toes removed    TOE AMPUTATION Right 2/2/2021    Procedure: Right partial 5th ray amputation;  Surgeon: Hossein Sanford DPM;  Location:  MAIN OR;  Service: Podiatry    TOE OSTEOTOMY Right 6/26/2020    Procedure: exostosis of right big toe;  Surgeon: Kenyon Cohen DPM;  Location: [facility];  Service: Podiatry    TRIGGER FINGER RELEASE      bilateral hands    VEIN LIGATION      right       Family History   Problem Relation Age of Onset    Arthritis Mother     Pancreatic cancer Mother     Cancer Father         colon    Alzheimer's disease Father      I have reviewed and agree with the history as documented.     E-Cigarette/Vaping    E-Cigarette Use Never User      E-Cigarette/Vaping Substances    Nicotine No     THC No     CBD No     Flavoring No     Other No     Unknown No      Social History     Tobacco Use    Smoking status: Never    Smokeless tobacco: Never Vaping Use    Vaping Use: Never used   Substance Use Topics    Alcohol use: Not Currently     Comment: occasional    Drug use: Never       Review of Systems   Constitutional:  Negative for diaphoresis, fatigue and fever. HENT:  Negative for congestion, ear pain, nosebleeds and sore throat. Eyes:  Negative for photophobia, pain, discharge and visual disturbance. Respiratory:  Negative for cough, choking, chest tightness, shortness of breath and wheezing. Cardiovascular:  Negative for chest pain and palpitations. Gastrointestinal:  Negative for abdominal distention, abdominal pain, diarrhea and vomiting. Genitourinary:  Negative for dysuria, flank pain and frequency. Musculoskeletal:  Negative for back pain, gait problem and joint swelling. Left knee pain   Skin:  Negative for color change and rash. Neurological:  Negative for dizziness, syncope and headaches. Psychiatric/Behavioral:  Negative for behavioral problems and confusion. The patient is not nervous/anxious. All other systems reviewed and are negative. Physical Exam  Physical Exam  Vitals and nursing note reviewed. Constitutional:       General: He is not in acute distress. Appearance: Normal appearance. He is well-developed. He is not ill-appearing, toxic-appearing or diaphoretic. HENT:      Head: Normocephalic and atraumatic. Right Ear: Tympanic membrane, ear canal and external ear normal.      Left Ear: Tympanic membrane, ear canal and external ear normal.      Nose: Nose normal.      Mouth/Throat:      Mouth: Mucous membranes are moist.      Pharynx: Oropharynx is clear. No oropharyngeal exudate or posterior oropharyngeal erythema. Eyes:      General: No scleral icterus. Right eye: No discharge. Left eye: No discharge. Conjunctiva/sclera: Conjunctivae normal.      Pupils: Pupils are equal, round, and reactive to light.    Cardiovascular:      Rate and Rhythm: Normal rate and regular rhythm. Pulmonary:      Effort: Pulmonary effort is normal.      Breath sounds: Normal breath sounds. Abdominal:      General: Bowel sounds are normal.      Palpations: Abdomen is soft. Tenderness: There is no abdominal tenderness. Musculoskeletal:         General: Normal range of motion. Cervical back: Normal range of motion and neck supple. Left lower leg: No edema. Legs:    Skin:     General: Skin is warm. Capillary Refill: Capillary refill takes less than 2 seconds. Neurological:      General: No focal deficit present. Mental Status: He is alert and oriented to person, place, and time. Mental status is at baseline.    Psychiatric:         Mood and Affect: Mood normal.         Behavior: Behavior normal.         Vital Signs  ED Triage Vitals [11/29/23 1146]   Temperature Pulse Respirations Blood Pressure SpO2   98.5 °F (36.9 °C) 101 18 155/72 97 %      Temp Source Heart Rate Source Patient Position - Orthostatic VS BP Location FiO2 (%)   Oral Monitor Sitting Right arm --      Pain Score       --           Vitals:    11/29/23 1146   BP: 155/72   Pulse: 101   Patient Position - Orthostatic VS: Sitting         Visual Acuity      ED Medications  Medications   sodium chloride 0.9 % bolus 1,000 mL (1,000 mL Intravenous New Bag 11/29/23 1239)   vancomycin (VANCOCIN) 1,750 mg in sodium chloride 0.9 % 500 mL IVPB ( Intravenous MAR Hold 11/29/23 1257)   cefepime (MAXIPIME) 2 g/50 mL dextrose IVPB (2,000 mg Intravenous New Bag 11/29/23 1245)   morphine injection 2 mg (2 mg Intravenous Given 11/29/23 1243)   ondansetron (ZOFRAN) injection 4 mg (4 mg Intravenous Given 11/29/23 1241)       Diagnostic Studies  Results Reviewed       Procedure Component Value Units Date/Time    Comprehensive metabolic panel [[id number]]  (Abnormal) Collected: 11/29/23 1227    Lab Status: Final result Specimen: Blood from Arm, Right Updated: 11/29/23 1253     Sodium 132 mmol/L      Potassium 4.3 mmol/L Chloride 92 mmol/L      CO2 24 mmol/L      ANION GAP 16 mmol/L      BUN 21 mg/dL      Creatinine 0.98 mg/dL      Glucose 311 mg/dL      Calcium 9.7 mg/dL      AST 38 U/L       U/L      Alkaline Phosphatase 73 U/L      Total Protein 8.8 g/dL      Albumin 4.1 g/dL      Total Bilirubin 0.86 mg/dL      eGFR 78 ml/min/1.73sq m     Narrative:      [facility] guidelines for Chronic Kidney Disease (CKD):     Stage 1 with normal or high GFR (GFR > 90 mL/min/1.73 square meters)    Stage 2 Mild CKD (GFR = 60-89 mL/min/1.73 square meters)    Stage 3A Moderate CKD (GFR = 45-59 mL/min/1.73 square meters)    Stage 3B Moderate CKD (GFR = 30-44 mL/min/1.73 square meters)    Stage 4 Severe CKD (GFR = 15-29 mL/min/1.73 square meters)    Stage 5 End Stage CKD (GFR <15 mL/min/1.73 square meters)  Note: GFR calculation is accurate only with a steady state creatinine    C-reactive protein [[id number]]  (Abnormal) Collected: 11/29/23 1227    Lab Status: Final result Specimen: Blood from Arm, Right Updated: 11/29/23 1253     .4 mg/L     Lactic acid, plasma (w/reflex if result > 2.0) [[id number]]  (Normal) Collected: 11/29/23 1227    Lab Status: Final result Specimen: Blood from Arm, Right Updated: 11/29/23 1249     LACTIC ACID 1.2 mmol/L     Narrative:      Result may be elevated if tourniquet was used during collection. Blood culture #2 [[id number]] Collected: 11/29/23 1238    Lab Status:  In process Specimen: Blood from Arm, Left Updated: 11/29/23 1241    CBC and differential [[id number]]  (Abnormal) Collected: 11/29/23 1227    Lab Status: Final result Specimen: Blood from Arm, Right Updated: 11/29/23 1240     WBC 8.65 Thousand/uL      RBC 5.16 Million/uL      Hemoglobin 16.4 g/dL      Hematocrit 50.6 %      MCV 98 fL      MCH 31.8 pg      MCHC 32.4 g/dL      RDW 11.9 %      MPV 10.1 fL      Platelets 984 Thousands/uL      nRBC 0 /100 WBCs      Neutrophils Relative 81 %      Immat GRANS % 0 % Lymphocytes Relative 13 %      Monocytes Relative 6 %      Eosinophils Relative 0 %      Basophils Relative 0 %      Neutrophils Absolute 6.96 Thousands/µL      Immature Grans Absolute 0.02 Thousand/uL      Lymphocytes Absolute 1.11 Thousands/µL      Monocytes Absolute 0.52 Thousand/µL      Eosinophils Absolute 0.02 Thousand/µL      Basophils Absolute 0.02 Thousands/µL     Sedimentation rate, automated [[id number]] Collected: 11/29/23 1227    Lab Status: In process Specimen: Blood from Arm, Right Updated: 11/29/23 1231    Blood culture #1 [[id number]] Collected: 11/29/23 1227    Lab Status: In process Specimen: Blood from Arm, Right Updated: 11/29/23 1231                   No orders to display              Procedures  Procedures         ED Course                                             Medical Decision Making  This is an 50-year-old male patient who was seen here over the weekend started on Keflex for cellulitis of his left prepatellar area. Had a slight elevation of his C-reactive protein and sed rate. He had no fevers he did follow-up with orthopedic surgeon today had approximate 19 mL of purulent liquid drawn off. Orthopedic surgeon sent him in for prepatellar bursitis for washout. I started him on cefepime and vancomycin. Blood cultures, lactic acid CBC CMP were ordered as well as a sed rate and C-reactive protein. He will be admitted by medicine who I spoke with directly and consulted with orthopedics who also spoke with and is having a washout today at 2 PM.    Problems Addressed:  Septic prepatellar bursitis of left knee:     Details: Cefepime, vancomycin, washout today at 2 PM    Amount and/or Complexity of Data Reviewed  External Data Reviewed: labs and notes. Details: I reviewed previous notes and labs for comparison  Labs: ordered. Decision-making details documented in ED Course. Details: I reviewed the labs he does have an elevated C-reactive protein of 144 lactic acid less than 2. Glucose of approximately 311 with a serum sodium 132 which I believe is down because the elevated sugar and once corrected is not abnormal.  Discussion of management or test interpretation with external provider(s): Using your incision ring with the patient, orthopedic surgeon and internal medicine patient be admitted for washout today. Risk  Prescription drug management. Decision regarding hospitalization. Disposition  Final diagnoses:   Septic prepatellar bursitis of left knee     Time reflects when diagnosis was documented in both MDM as applicable and the Disposition within this note       Time User Action Codes Description Comment    11/29/2023 12:20 PM Shaila Arthur Add [J97.775] Septic prepatellar bursitis of left knee           ED Disposition       ED Disposition   Admit    Condition   Stable    Date/Time   Wed Nov 29, 2023 12:32 PM    Comment   Case was discussed with Dr. Ludivina Carpenter and the patient's admission status was agreed to be Admission Status: inpatient status to the service of Dr. Luz Nascimento .                Follow-up Information    None         Current Discharge Medication List        CONTINUE these medications which have NOT CHANGED    Details   atorvastatin (LIPITOR) 40 mg tablet Take 1 tablet (40 mg total) by mouth daily at bedtime  Qty: 90 tablet, Refills: 1    Associated Diagnoses: Mixed hyperlipidemia      betamethasone dipropionate (DIPROSONE) 0.05 % cream Apply topically 2 (two) times a day  Qty: 30 g, Refills: 0    Associated Diagnoses: Non-healing surgical wound, initial encounter      Blood Glucose Monitoring Suppl (Accu-Chek Guide) w/Device KIT Use in the morning  Qty: 1 kit, Refills: 0    Associated Diagnoses: Type 2 diabetes mellitus with diabetic peripheral angiopathy and gangrene, with long-term current use of insulin (HCC)      cephalexin (KEFLEX) 500 mg capsule Take 1 capsule (500 mg total) by mouth every 6 (six) hours for 10 days  Qty: 40 capsule, Refills: 0 Associated Diagnoses: Patellar bursitis of left knee      clopidogrel (PLAVIX) 75 mg tablet Take 1 tablet (75 mg total) by mouth daily  Qty: 90 tablet, Refills: 0    Associated Diagnoses: PVD (peripheral vascular disease) ([facility])      ! ! glucose blood (Accu-Chek Guide) test strip Check Blood Sugars QID as directed by physician. Qty: 400 strip, Refills: 1    Associated Diagnoses: Type 2 diabetes mellitus with diabetic peripheral angiopathy and gangrene, with long-term current use of insulin ([facility])      ! ! glucose blood (OneTouch Ultra) test strip Use 1 each 4 (four) times a day To test blood sugars  Qty: 400 each, Refills: 1    Associated Diagnoses: Type 2 diabetes mellitus with diabetic peripheral angiopathy and gangrene, with long-term current use of insulin (HCC)      Insulin Glargine Solostar (Basaglar KwikPen) 100 UNIT/ML SOPN Inject 0.5 mL (50 Units total) under the skin daily at bedtime  Qty: 15 mL, Refills: 1    Associated Diagnoses: Poorly controlled type 2 diabetes mellitus (HCC)      insulin lispro (HumaLOG KwikPen) 100 units/mL injection pen Inject 25 Units under the skin 3 (three) times a day with meals  Qty: 15 mL, Refills: 1    Associated Diagnoses: Poorly controlled type 2 diabetes mellitus (HCC)      insulin NPH-insulin regular (NovoLIN 70/30) 100 units/mL subcutaneous injection Inject:  44 units with breakfast, 20 units with lunch, 30 units with dinner  Qty: 15 mL, Refills: 1    Associated Diagnoses: Type 2 diabetes mellitus with foot ulcer, with long-term current use of insulin ([facility]);  Type 2 diabetes mellitus with diabetic peripheral angiopathy without gangrene, with long-term current use of insulin (HCC)      Insulin Pen Needle ( UltiCare Mini Pen Needles) 31G X 5 MM MISC Use 4 (four) times a day  Qty: 200 each, Refills: 2    Associated Diagnoses: Poorly controlled type 2 diabetes mellitus (HCC)      Lancets (OneTouch Delica Plus [id number]) MISC 1 Units by Does not apply route 2 (two) times a day Dx E11.9  Qty: 100 each, Refills: 2    Associated Diagnoses: Type 2 diabetes mellitus with diabetic peripheral angiopathy and gangrene, with long-term current use of insulin ([facility])      lisinopril (ZESTRIL) 10 mg tablet Take 1 tablet (10 mg total) by mouth daily  Qty: 90 tablet, Refills: 1    Associated Diagnoses: Essential hypertension; Poorly controlled type 2 diabetes mellitus ([facility])      metFORMIN (GLUCOPHAGE) 1000 MG tablet Take 1 tablet (1,000 mg total) by mouth 2 (two) times a day with meals  Qty: 60 tablet, Refills: 5    Associated Diagnoses: Poorly controlled type 2 diabetes mellitus ([facility])      oxyCODONE-acetaminophen (PERCOCET) 5-325 mg per tablet Take 1 tablet by mouth every 6 (six) hours as needed for moderate pain Max Daily Amount: 4 tablets  Qty: 120 tablet, Refills: 0    Associated Diagnoses: Chronic back pain, unspecified back location, unspecified back pain laterality      oxyCODONE-acetaminophen (Percocet) 7.5-325 MG per tablet Take 1 tablet by mouth every 4 (four) hours as needed for moderate pain Max Daily Amount: 6 tablets  Qty: 30 tablet, Refills: 0    Associated Diagnoses: Chronic back pain, unspecified back location, unspecified back pain laterality      pantoprazole (PROTONIX) 40 mg tablet Take 1 tablet (40 mg total) by mouth daily  Qty: 90 tablet, Refills: 1    Associated Diagnoses: Gastroesophageal reflux disease without esophagitis      pioglitazone (ACTOS) 45 mg tablet Take 1 tablet (45 mg total) by mouth daily  Qty: 90 tablet, Refills: 1    Associated Diagnoses: Type 2 diabetes mellitus with diabetic peripheral angiopathy without gangrene, with long-term current use of insulin ([facility])      rivaroxaban (Xarelto) 2.5 mg tablet Take 1 tablet (2.5 mg total) by mouth daily with breakfast Last dose 6/21/20  Qty: 30 tablet, Refills: 2    Associated Diagnoses: Atrial fibrillation, unspecified type ([facility])      UltiCare Insulin Syringe 31G X 5/16" 1 ML MISC Inject under the skin as needed (for injection)  Qty: 100 each, Refills: 1    Associated Diagnoses: Type 2 diabetes mellitus with diabetic peripheral angiopathy without gangrene, with long-term current use of insulin ([address])       ! ! - Potential duplicate medications found. Please discuss with provider. No discharge procedures on file.     PDMP Review         Value Time User    PDMP Reviewed  Yes 8/2/2023  9:33 PM Mark Hooper MD            ED Provider  Electronically Signed by             Karlos Tariq PA-C  11/29/23 7660

## 2023-11-29 NOTE — ASSESSMENT & PLAN NOTE
Chronic, MAP average day of admission 120s, medication compliance questionable  Will restart home dose lisinopril 10 mg   Will begin PRN Lopressor 25 mg q 6 hrs

## 2023-11-30 ENCOUNTER — TELEPHONE (OUTPATIENT)
Dept: LAB | Facility: HOSPITAL | Age: 68
End: 2023-11-30

## 2023-11-30 LAB
ANION GAP SERPL CALCULATED.3IONS-SCNC: 10 MMOL/L
BUN SERPL-MCNC: 15 MG/DL (ref 5–25)
CALCIUM SERPL-MCNC: 8.7 MG/DL (ref 8.4–10.2)
CHLORIDE SERPL-SCNC: 98 MMOL/L (ref 96–108)
CO2 SERPL-SCNC: 26 MMOL/L (ref 21–32)
CREAT SERPL-MCNC: 0.72 MG/DL (ref 0.6–1.3)
ERYTHROCYTE [DISTWIDTH] IN BLOOD BY AUTOMATED COUNT: 11.8 % (ref 11.6–15.1)
GFR SERPL CREATININE-BSD FRML MDRD: 95 ML/MIN/1.73SQ M
GLUCOSE SERPL-MCNC: 124 MG/DL (ref 65–140)
GLUCOSE SERPL-MCNC: 181 MG/DL (ref 65–140)
GLUCOSE SERPL-MCNC: 196 MG/DL (ref 65–140)
GLUCOSE SERPL-MCNC: 203 MG/DL (ref 65–140)
GLUCOSE SERPL-MCNC: 231 MG/DL (ref 65–140)
GLUCOSE SERPL-MCNC: 272 MG/DL (ref 65–140)
HCT VFR BLD AUTO: 42.6 % (ref 36.5–49.3)
HGB BLD-MCNC: 14 G/DL (ref 12–17)
MCH RBC QN AUTO: 32.3 PG (ref 26.8–34.3)
MCHC RBC AUTO-ENTMCNC: 32.9 G/DL (ref 31.4–37.4)
MCV RBC AUTO: 98 FL (ref 82–98)
PLATELET # BLD AUTO: 188 THOUSANDS/UL (ref 149–390)
PMV BLD AUTO: 10.2 FL (ref 8.9–12.7)
POTASSIUM SERPL-SCNC: 3.8 MMOL/L (ref 3.5–5.3)
RBC # BLD AUTO: 4.34 MILLION/UL (ref 3.88–5.62)
SODIUM SERPL-SCNC: 134 MMOL/L (ref 135–147)
WBC # BLD AUTO: 8.64 THOUSAND/UL (ref 4.31–10.16)

## 2023-11-30 PROCEDURE — 80048 BASIC METABOLIC PNL TOTAL CA: CPT | Performed by: PHYSICIAN ASSISTANT

## 2023-11-30 PROCEDURE — 99024 POSTOP FOLLOW-UP VISIT: CPT

## 2023-11-30 PROCEDURE — 85027 COMPLETE CBC AUTOMATED: CPT | Performed by: PHYSICIAN ASSISTANT

## 2023-11-30 PROCEDURE — 82948 REAGENT STRIP/BLOOD GLUCOSE: CPT

## 2023-11-30 PROCEDURE — 99232 SBSQ HOSP IP/OBS MODERATE 35: CPT | Performed by: HOSPITALIST

## 2023-11-30 RX ORDER — VANCOMYCIN HYDROCHLORIDE 1 G/200ML
12.5 INJECTION, SOLUTION INTRAVENOUS EVERY 12 HOURS
Status: DISCONTINUED | OUTPATIENT
Start: 2023-11-30 | End: 2023-12-01

## 2023-11-30 RX ORDER — HYDROMORPHONE HCL/PF 1 MG/ML
1 SYRINGE (ML) INJECTION EVERY 4 HOURS PRN
Status: DISCONTINUED | OUTPATIENT
Start: 2023-11-30 | End: 2023-11-30

## 2023-11-30 RX ORDER — HYDROMORPHONE HCL/PF 1 MG/ML
1 SYRINGE (ML) INJECTION EVERY 4 HOURS PRN
Status: DISCONTINUED | OUTPATIENT
Start: 2023-11-30 | End: 2023-12-03 | Stop reason: HOSPADM

## 2023-11-30 RX ADMIN — OXYCODONE HYDROCHLORIDE 10 MG: 10 TABLET ORAL at 17:44

## 2023-11-30 RX ADMIN — INSULIN LISPRO 2 UNITS: 100 INJECTION, SOLUTION INTRAVENOUS; SUBCUTANEOUS at 08:13

## 2023-11-30 RX ADMIN — VANCOMYCIN HYDROCHLORIDE 750 MG: 750 INJECTION, SOLUTION INTRAVENOUS at 08:11

## 2023-11-30 RX ADMIN — INSULIN LISPRO 10 UNITS: 100 INJECTION, SOLUTION INTRAVENOUS; SUBCUTANEOUS at 11:19

## 2023-11-30 RX ADMIN — INSULIN LISPRO 10 UNITS: 100 INJECTION, SOLUTION INTRAVENOUS; SUBCUTANEOUS at 16:28

## 2023-11-30 RX ADMIN — HYDROMORPHONE HYDROCHLORIDE 1 MG: 1 INJECTION, SOLUTION INTRAMUSCULAR; INTRAVENOUS; SUBCUTANEOUS at 12:23

## 2023-11-30 RX ADMIN — LISINOPRIL 10 MG: 10 TABLET ORAL at 08:11

## 2023-11-30 RX ADMIN — INSULIN GLARGINE 30 UNITS: 100 INJECTION, SOLUTION SUBCUTANEOUS at 21:32

## 2023-11-30 RX ADMIN — RIVAROXABAN 2.5 MG: 2.5 TABLET, FILM COATED ORAL at 16:27

## 2023-11-30 RX ADMIN — OXYCODONE HYDROCHLORIDE 5 MG: 5 TABLET ORAL at 21:44

## 2023-11-30 RX ADMIN — SENNOSIDES, DOCUSATE SODIUM 1 TABLET: 8.6; 5 TABLET ORAL at 21:32

## 2023-11-30 RX ADMIN — CLOPIDOGREL BISULFATE 75 MG: 75 TABLET ORAL at 08:11

## 2023-11-30 RX ADMIN — OXYCODONE HYDROCHLORIDE 10 MG: 10 TABLET ORAL at 05:02

## 2023-11-30 RX ADMIN — INSULIN LISPRO 10 UNITS: 100 INJECTION, SOLUTION INTRAVENOUS; SUBCUTANEOUS at 08:14

## 2023-11-30 RX ADMIN — INSULIN LISPRO 3 UNITS: 100 INJECTION, SOLUTION INTRAVENOUS; SUBCUTANEOUS at 11:19

## 2023-11-30 RX ADMIN — INSULIN LISPRO 2 UNITS: 100 INJECTION, SOLUTION INTRAVENOUS; SUBCUTANEOUS at 16:28

## 2023-11-30 RX ADMIN — OXYCODONE HYDROCHLORIDE 10 MG: 10 TABLET ORAL at 10:05

## 2023-11-30 RX ADMIN — OXYCODONE HYDROCHLORIDE 10 MG: 10 TABLET ORAL at 00:26

## 2023-11-30 RX ADMIN — CEFEPIME 2000 MG: 2 INJECTION, POWDER, FOR SOLUTION INTRAVENOUS at 10:11

## 2023-11-30 RX ADMIN — ATORVASTATIN CALCIUM 40 MG: 40 TABLET, FILM COATED ORAL at 16:27

## 2023-11-30 RX ADMIN — VANCOMYCIN HYDROCHLORIDE 1000 MG: 1 INJECTION, SOLUTION INTRAVENOUS at 21:32

## 2023-11-30 NOTE — PLAN OF CARE
Problem: PAIN - ADULT  Goal: Verbalizes/displays adequate comfort level or baseline comfort level  Description: Interventions:  - Encourage patient to monitor pain and request assistance  - Assess pain using appropriate pain scale  - Administer analgesics based on type and severity of pain and evaluate response  - Implement non-pharmacological measures as appropriate and evaluate response  - Consider cultural and social influences on pain and pain management  - Notify physician/advanced practitioner if interventions unsuccessful or patient reports new pain  11/30/2023 0953 by Bonnie Olivia RN  Outcome: Progressing  11/30/2023 0725 by Bonnie lOivia, RN  Outcome: Progressing

## 2023-11-30 NOTE — ASSESSMENT & PLAN NOTE
Pt presents w/ infection of bursa in L knee region s/p ipsilateral BKA of 2021  Erythema, painful, swollen for past 1-2 weeks gradually worsening until ER visit 5 days ago  Received Keflex 500 q12 o/p + supportive measures, no change in sx to present day  O/p ortho visit aspirated 30 ml fluid, purulent & muddy appearing, sent pt to [facility]  Ortho incision & curettage today, admitted    Ortho to follow  Fluid analysis pending  Blood cx pending  Cef & vanc begun  Pharmacy consulted  Oxycodone for pain  Dilaudid for breakthrough  OT / PT requested  Watch for signs of sepsis

## 2023-11-30 NOTE — PROGRESS NOTES
[address] [facility]  Progress Note  Name: Heaven Gomez  MRN: [id number]  Unit/Bed#: S -01 I Date of Admission: 11/29/2023   Date of Service: 11/30/2023 I Hospital Day: 1    Assessment/Plan   * Septic prepatellar bursitis of left knee  Assessment & Plan  Pt presents w/ infection of bursa in L knee region s/p ipsilateral BKA of 2021  Erythema, painful, swollen for past 1-2 weeks gradually worsening until ER visit 5 days ago  Received Keflex 500 q12 o/p + supportive measures, no change in sx to present day  O/p ortho visit aspirated 30 ml fluid, purulent & muddy appearing, sent pt to [facility] ER  Ortho incision & curettage today, admitted    Ortho to follow  Fluid analysis pending  Blood cx pending  Cef & vanc begun  Pharmacy consulted  Oxycodone for pain  Dilaudid for breakthrough  OT / PT requested  Watch for signs of sepsis    Atrial fibrillation, unspecified type [facility])  Assessment & Plan  Not in Afib during physical exam  Has been on Xarelto for years at strange dose of 2.5 mg daily    Essential hypertension  Assessment & Plan  Chronic, MAP average day of admission 120s, medication compliance questionable  Will restart home dose lisinopril 10 mg   Will begin PRN Lopressor 25 mg q 6 hrs    Poorly controlled type 2 diabetes mellitus ([address])  Assessment & Plan  Lab Results   Component Value Date    HGBA1C 11.6 (A) 11/24/2023       Recent Labs     11/29/23  1802 11/29/23  2125 11/30/23  0727   POCGLU 245* 197* 203*         Blood Sugar Average: Last 72 hrs:  (P) 215    Recent A1C of 11.6 w/ questionable compliance of meds & insulin  Will be on insulin regimen plus ISS while I/p  Carb 3 diet    PVD (peripheral vascular disease) ([facility])  Assessment & Plan  Continues on clopidogrel           VTE Pharmacologic Prophylaxis: VTE Score: 5 High Risk (Score >/= 5) - Pharmacological DVT Prophylaxis Ordered: rivaroxaban (Xarelto). Sequential Compression Devices Ordered.     Mobility:   Basic Mobility Inpatient Raw Score: 13  -HLM Goal: 4: Move to chair/commode  JH-HLM Achieved: 2: Bed activities/Dependent transfer  HLM Goal achieved. Continue to encourage appropriate mobility. Patient Centered Rounds: I performed bedside rounds with nursing staff today. Discussions with Specialists or Other Care Team Provider: Attending    Education and Discussions with Family / Patient: Updated  (wife) via phone. Current Length of Stay: 1 day(s)  Current Patient Status: Inpatient   Discharge Plan: Anticipate discharge in 48-72 hrs to discharge location to be determined pending rehab evaluations. Code Status: Level 1 - Full Code    Subjective:   Patient seen and examined at the bedside this morning. No acute events overnight. He does complain of 10/10 pain of his left knee that is worse with movement and palpation. Denies any fever, chills, chest pain, dyspnea, nausea, vomiting, fatigue, and abdominal pain. Objective:     Vitals:   Temp (24hrs), Av.4 °F (36.9 °C), Min:98 °F (36.7 °C), Max:99 °F (37.2 °C)    Temp:  [98 °F (36.7 °C)-99 °F (37.2 °C)] 98.2 °F (36.8 °C)  HR:  [] 84  Resp:  [17-18] 17  BP: (142-202)/() 149/75  SpO2:  [89 %-100 %] 94 %  Body mass index is 32.54 kg/m². Input and Output Summary (last 24 hours): Intake/Output Summary (Last 24 hours) at 2023 0749  Last data filed at 2023 0503  Gross per 24 hour   Intake 200 ml   Output 200 ml   Net 0 ml       Physical Exam:   Physical Exam  Vitals and nursing note reviewed. Constitutional:       Appearance: He is not diaphoretic. HENT:      Head: Normocephalic and atraumatic. Nose: Nose normal. No congestion or rhinorrhea. Eyes:      General: No scleral icterus. Right eye: No discharge. Left eye: No discharge. Extraocular Movements: Extraocular movements intact. Conjunctiva/sclera: Conjunctivae normal.      Pupils: Pupils are equal, round, and reactive to light.    Cardiovascular: Rate and Rhythm: Normal rate and regular rhythm. Pulses: Normal pulses. Heart sounds: Normal heart sounds. Pulmonary:      Effort: Pulmonary effort is normal.      Breath sounds: Normal breath sounds. Abdominal:      General: Abdomen is flat. Bowel sounds are normal.      Palpations: Abdomen is soft. Musculoskeletal:         General: Swelling, tenderness and signs of injury present. Skin:     General: Skin is warm and dry. Findings: Erythema and lesion present. Neurological:      General: No focal deficit present. Mental Status: He is alert and oriented to person, place, and time. Mental status is at baseline. Cranial Nerves: No cranial nerve deficit. Sensory: No sensory deficit. Motor: No weakness. Psychiatric:         Mood and Affect: Mood normal.         Behavior: Behavior normal.         Thought Content:  Thought content normal.         Judgment: Judgment normal.          Additional Data:     Labs:  Results from last 7 days   Lab Units 11/30/23  0603 11/29/23  1227   WBC Thousand/uL 8.64 8.65   HEMOGLOBIN g/dL 14.0 16.4   HEMATOCRIT % 42.6 50.6*   PLATELETS Thousands/uL 188 198   NEUTROS PCT %  --  81*   LYMPHS PCT %  --  13*   MONOS PCT %  --  6   EOS PCT %  --  0     Results from last 7 days   Lab Units 11/30/23  0603 11/29/23  1227   SODIUM mmol/L 134* 132*   POTASSIUM mmol/L 3.8 4.3   CHLORIDE mmol/L 98 92*   CO2 mmol/L 26 24   BUN mg/dL 15 21   CREATININE mg/dL 0.72 0.98   ANION GAP mmol/L 10 16   CALCIUM mg/dL 8.7 9.7   ALBUMIN g/dL  --  4.1   TOTAL BILIRUBIN mg/dL  --  0.86   ALK PHOS U/L  --  73   ALT U/L  --  108*   AST U/L  --  38   GLUCOSE RANDOM mg/dL 181* 311*         Results from last 7 days   Lab Units 11/30/23  0727 11/29/23  2125 11/29/23  1802   POC GLUCOSE mg/dl 203* 197* 245*     Results from last 7 days   Lab Units 11/24/23  1016   HEMOGLOBIN A1C  11.6*     Results from last 7 days   Lab Units 11/29/23  1227   LACTIC ACID mmol/L 1.2 Lines/Drains:  Invasive Devices       Peripheral Intravenous Line  Duration             Peripheral IV 11/29/23 Right;Ventral (anterior) Forearm <1 day                          Imaging: No pertinent imaging reviewed. Recent Cultures (last 7 days):   Results from last 7 days   Lab Units 11/29/23  1238 11/29/23  1227   BLOOD CULTURE  Received in Microbiology Lab. Culture in Progress. Received in Microbiology Lab. Culture in Progress. Last 24 Hours Medication List:   Current Facility-Administered Medications   Medication Dose Route Frequency Provider Last Rate    acetaminophen  650 mg Oral Q4H PRN Eppserafin Bill, DO      atorvastatin  40 mg Oral Daily With [employer], DO      cefepime  2,000 mg Intravenous Q12H Eppserafin Coreas, DO 2,000 mg (11/29/23 2210)    clopidogrel  75 mg Oral Daily Eppserafin Bill, DO      HYDROmorphone  0.5 mg Intravenous Q4H PRN Jane Bill, DO      insulin glargine  30 Units Subcutaneous HS Jane Coreas, DO      insulin lispro  1-6 Units Subcutaneous TID [facility], DO      insulin lispro  10 Units Subcutaneous TID With Meals Jane Coreas, DO      lisinopril  10 mg Oral Daily Jane Bill, DO      metoprolol tartrate  25 mg Oral Q6H PRN Jane Bill, DO      ondansetron  4 mg Intravenous Q4H PRN Jane Bill, DO      oxyCODONE  5 mg Oral Q4H PRN Jane Bill, DO      Or    oxyCODONE  10 mg Oral Q4H PRN Jane Bill, DO      polyethylene glycol  17 g Oral Daily Jane Coreas, DO      rivaroxaban  20 mg Oral Daily With [employer], DO      senna-docusate sodium  1 tablet Oral HS Jane Bill, DO      vancomycin  750 mg Intravenous Q12H Jane Coreas,  mg (11/29/23 2029)        Today, Patient Was Seen By: Charbel Bynum DO    **Please Note: This note may have been constructed using a voice recognition system. **

## 2023-11-30 NOTE — UTILIZATION REVIEW
Initial Clinical Review    Admission: Date/Time/Statement:   Admission Orders (From admission, onward)       Ordered        11/29/23 1232  INPATIENT ADMISSION  Once                          Orders Placed This Encounter   Procedures    INPATIENT ADMISSION     Standing Status:   Standing     Number of Occurrences:   1     Order Specific Question:   Level of Care     Answer:   Med Surg [16]     Order Specific Question:   Estimated length of stay     Answer:   More than 2 Midnights     Order Specific Question:   Certification     Answer:   I certify that inpatient services are medically necessary for this patient for a duration of greater than two midnights. See H&P and MD Progress Notes for additional information about the patient's course of treatment. ED Arrival Information       Expected   -    Arrival   11/29/2023 11:31    Acuity   Urgent              Means of arrival   Wheelchair    Escorted by   Spouse    Service   Hospitalist    Admission type   Emergency              Arrival complaint   L Knee Wound Care             Chief Complaint   Patient presents with    Wound Check     Sent from PCP due to infection in left knee. Patient complains of pain       Initial Presentation: 70 y.o. male  PMH of [facility] 2021, uncontrolled DM2 on insulin recent A1C 11+, HTN , PVD on plavix, GERD, A fib on Xarelto who presents to ED from ortho office with L knee pain, erythema, &.swelling of 1-2 weeks duration gradually worsening . Pt seen in ED 11/25-prescribed keflex at that time for suspected cellulitis -not responding to 5 day course. Pt had 30 ml fluid aspirated today by ortho as outpt at appt from infected bursa that appeared muddy & purulent . Pt reports prosthetic had been feeling uncomfortable & raw in the week leading up to initial ER visit. On exam,BP elevated . L BKA site with swelling, tenderness, erythema over suprapatellar area. Has full ROM . Labs +, ESR 52,  Glucose 300+ .  Given IVF, IV abx, IV analgesic in ED. Pt admitted as Inpatient with Septic prepatellar bursitis of left knee , poorly controlled DM. HTN . Plan - for I and D today, ortho consult. F/U fluid analysis. IV abx- cefepime, vanco. Pain control. PT/OT . Labs . F/U blood cx . Insulin regimen plus SSI. Will restart home dose lisinopril 10 mg . PRN Lopressor 25 mg q 6 hrs     Ortho consult Erythema and swelling present over the anterior knee. Palpable fluctuance over the inferior patella. TTP throughout the anterior knee. Sensation, motor intact LLE. PLan - to OR for I and D today. NPO. F/U intra-op cx that will be collected . Strict glucose control. 11/29/23 @ 1422    Left - INCISION AND DRAINAGE (I&D) EXTREMITY. and all associated procedures   Anesthesia- general   Operative Findings:  Large hemorrhage clot with some purulent material as well. Date: 11/30  POD #1  Day 2:   LLE- Surgical dressings in place without strikethrough. Moderate quita-incisional tenderness. No TTP of distal stump. Limited active knee flexion secondary to pain . Pt receiving prn po and prn IV narcotic analgesics for pain 10/10 per nsg . Pt afebrile. WBC WNL . PLan- WBAT LLE, for dressing change tomorrow. F/U wound cx . Pain control. PT/OT . Pt continues on dual IV abx . BP improved today from admission . Ongoing glucose monitoring. CBC, BMP in am .       Date: 12/1 POD #2   Day 3: Has surpassed a 2nd midnight with active treatments and services, which include :Ongoing IV abx    Pt had bad night of sleep mostly due to pain at site of L knee . Receiving prn po and prn IV narcotic analgesics for pain . Ortho changed dressing. Packing pulled & drained fluid,copious serosanguineous drainage noted,  + expressible hematoma, so will dc any active clopidogrel, xarelto, & enoxaparin meds for now until okayed by subsequent ortho eval. Pressure drsg applied. Pt having muscle spasm in quadriceps.  ID consult recommended for management of antibiotics in setting of patients known uncontrolled diabetes.   Glucose 172 this am . WBC 7.44 , Hgb 14.9 .        12/1 LLE    ED Triage Vitals   Temperature Pulse Respirations Blood Pressure SpO2   11/29/23 1146 11/29/23 1146 11/29/23 1146 11/29/23 1146 11/29/23 1146   98.5 °F (36.9 °C) 101 18 155/72 97 %      Temp Source Heart Rate Source Patient Position - Orthostatic VS BP Location FiO2 (%)   11/29/23 1146 11/29/23 1146 11/29/23 1146 11/29/23 1146 --   Oral Monitor Sitting Right arm       Pain Score       11/29/23 1308       No Pain          Wt Readings from Last 1 Encounters:   11/30/23 86 kg (189 lb 9.5 oz)     Additional Vital Signs:   ate/Time Temp Pulse Resp BP MAP (mmHg) SpO2 O2 Flow Rate (L/min)   11/30/23 07:27:50 98.2 °F (36.8 °C) 84 17 149/75 100 94 % --   11/29/23 2244 -- -- -- 162/84 -- -- --   11/29/23 22:38:02 -- 94 -- 171/86 Abnormal  114 95 % --   11/29/23 21:28:37 98.8 °F (37.1 °C) 100 18 175/76 Abnormal  109 95 % --   11/29/23 2100 98.4 °F (36.9 °C) 97 -- 162/76 105 94 % --   11/29/23 2024 -- -- -- -- -- -- --   11/29/23 2000 -- 97 -- 155/78 104 89 % Abnormal  --   11/29/23 1847 -- 105 -- 142/75 97 94 % --   11/29/23 18:23:40 -- 99 -- 157/75 102 95 % --   11/29/23 1746 -- 94 -- 148/69 95 90 % --   11/29/23 1715 -- 99 -- 180/94 Abnormal  123 96 % --   11/29/23 16:46:11 98.6 °F (37 °C) 99 18 169/94 119 95 % --   11/29/23 1616 98 °F (36.7 °C) 98 17 167/76 109 94 % --   11/29/23 1615 -- 96 -- 167/76 109 92 % --   11/29/23 1600 -- 100 -- 191/84 Abnormal  121 93 % --   11/29/23 1545 -- 98 -- 187/79 Abnormal   124 94 % --   BP: MD aware at 11/29/23 1545   11/29/23 1543 -- 98 -- -- -- 94 % --   11/29/23 1540 -- 102 -- 195/92 Abnormal  132 94 % --   11/29/23 1531 -- 100 -- -- 134 95 % --   11/29/23 1530 -- 100 -- 202/93 Abnormal  134 95 % --   11/29/23 1515 -- 102 -- 191/86 Abnormal  124 100 % --   11/29/23 1500 -- 100 -- 164/119 Abnormal  133 -- --   11/29/23 1453 98 °F (36.7 °C) 78 17 142/89 110 95 % 6 L/min simple mask   11/29/23 1308 99 °F (37.2 °C) 47 Abnormal  18 174/83 Abnormal  -- 94 % --     Pertinent Labs/Diagnostic Test Results:         Results from last 7 days   Lab Units 11/30/23  0603 11/29/23  1227 11/25/23  1112   WBC Thousand/uL 8.64 8.65 9.99   HEMOGLOBIN g/dL 14.0 16.4 15.9   HEMATOCRIT % 42.6 50.6* 48.1   PLATELETS Thousands/uL 188 198 141*   NEUTROS ABS Thousands/µL  --  6.96 8.58*         Results from last 7 days   Lab Units 11/30/23  0603 11/29/23  1227 11/25/23  1112   SODIUM mmol/L 134* 132* 131*   POTASSIUM mmol/L 3.8 4.3 4.4   CHLORIDE mmol/L 98 92* 97   CO2 mmol/L 26 24 27   ANION GAP mmol/L 10 16 7   BUN mg/dL 15 21 14   CREATININE mg/dL 0.72 0.98 1.00   EGFR ml/min/1.73sq m 95 78 76   CALCIUM mg/dL 8.7 9.7 9.4     Results from last 7 days   Lab Units 11/29/23  1227   AST U/L 38   ALT U/L 108*   ALK PHOS U/L 73   TOTAL PROTEIN g/dL 8.8*   ALBUMIN g/dL 4.1   TOTAL BILIRUBIN mg/dL 0.86     Results from last 7 days   Lab Units 11/30/23  1029 11/30/23  0727 11/29/23  2125 11/29/23  1802 11/29/23  1313   POC GLUCOSE mg/dl 231* 203* 197* 245* 272*     Results from last 7 days   Lab Units 11/30/23  0603 11/29/23  1227 11/25/23  1112   GLUCOSE RANDOM mg/dL 181* 311* 286*         Results from last 7 days   Lab Units 11/24/23  1016   HEMOGLOBIN A1C  11.6*                 Results from last 7 days   Lab Units 11/29/23  1227   LACTIC ACID mmol/L 1.2               Results from last 7 days   Lab Units 11/29/23  1227 11/25/23  1112   CRP mg/L 144.4* 55.1*   SED RATE mm/hour 52* 47*             Results from last 7 days   Lab Units 11/24/23  1016   CREATININE UR mg/dL 45.0                                 Results from last 7 days   Lab Units 11/29/23  1436 11/29/23  1238 11/29/23  1227 11/29/23  1119   BLOOD CULTURE   --  Received in Microbiology Lab. Culture in Progress. Received in Microbiology Lab. Culture in Progress.   --    GRAM STAIN RESULT  No Polys or Bacteria seen  --   --  2+ Polys  No bacteria seen                   ED Treatment:   Medication Administration from 11/29/2023 1131 to 11/29/2023 1257         Date/Time Order Dose Route Action     11/29/2023 1239 EST sodium chloride 0.9 % bolus 1,000 mL 1,000 mL Intravenous New Bag     11/29/2023 1257 EST vancomycin (VANCOCIN) 1,750 mg in sodium chloride 0.9 % 500 mL IVPB -- Intravenous MAR Hold     11/29/2023 1245 EST cefepime (MAXIPIME) 2 g/50 mL dextrose IVPB 2,000 mg Intravenous New Bag     11/29/2023 1243 EST morphine injection 2 mg 2 mg Intravenous Given     11/29/2023 1241 EST ondansetron (ZOFRAN) injection 4 mg 4 mg Intravenous Given          Past Medical History:   Diagnosis Date    Arthritis     fingers    First degree AV block     Full dentures     Hypertension     PAD (peripheral artery disease) ([facility])     PVD (peripheral vascular disease) ([facility])     Type 2 diabetes mellitus with diabetic peripheral angiopathy without gangrene ([address]) 5/18/2021    Per CMS ICD 10 guidelines--Per Physician    Wound, open     right foot- by the 5th toe     Present on Admission:   PVD (peripheral vascular disease) ([address])   Poorly controlled type 2 diabetes mellitus ([address])   Essential hypertension   Atrial fibrillation, unspecified type ([address])   Septic prepatellar bursitis of left knee      Admitting Diagnosis: Encounter for wound care [Z51.89]  Septic prepatellar bursitis of left knee [M71.162]  Age/Sex: 70 y.o. male  Admission Orders:  Scheduled Medications:  atorvastatin, 40 mg, Oral, Daily With Dinner  cefepime, 2,000 mg, Intravenous, Q12H  clopidogrel, 75 mg, Oral, Daily  insulin glargine, 30 Units, Subcutaneous, HS  insulin lispro, 1-6 Units, Subcutaneous, TID AC  insulin lispro, 10 Units, Subcutaneous, TID With Meals  lisinopril, 10 mg, Oral, Daily  polyethylene glycol, 17 g, Oral, Daily  rivaroxaban, 2.5 mg, Oral, Daily With Dinner  senna-docusate sodium, 1 tablet, Oral, HS  vancomycin, 12.5 mg/kg, Intravenous, Q12H      Continuous IV Infusions:     PRN Meds:  acetaminophen, 650 mg, Oral, Q4H PRN  HYDROmorphone, 1 mg, Intravenous, Q4H PRN x1 11/30 , x2 12/1  metoprolol tartrate, 25 mg, Oral, Q6H PRN  ondansetron, 4 mg, Intravenous, Q4H PRN  oxyCODONE, 5 mg, Oral, Q4H PRN x1 11/30    Or  oxyCODONE, 10 mg, Oral, Q4H PRN x1 11/29, x 4 11/30, x1 12/1  HYDROmorphone (DILAUDID) injection 0.5 mg  Dose: 0.5 mg  Freq: Every 5 minutes PRN Route: IV  PRN Reason: Pain - PACU  PRN Comment: Breakthrough Pain. Second Line  Start: 11/29/23 1500 End: 11/29/23 1543  x2 11/29     HYDROmorphone (DILAUDID) injection 0.5 mg  Dose: 0.5 mg  Freq: Every 4 hours PRN Route: IV  PRN Reasons: breakthrough pain,other  PRN Comment: or if patient qualifies for treatment of moderate or severe pain but cannot take oral medications  Start: 11/29/23 1714 End: 11/30/23 0804  x1 11/29   fentaNYL (SUBLIMAZE) injection 25 mcg  Dose: 25 mcg  Freq: Every 5 minutes PRN Route: IV  PRN Reason: Pain - PACU  PRN Comment: Breakthrough - first line  Start: 11/29/23 1500 End: 11/29/23 1636  x2 11/29       Level 3 carb diet   WBAT LLE    SCD   OOB as jodie    IP CONSULT TO ORTHOPEDIC SURGERY  IP CONSULT TO PHARMACY    Network Utilization Review Department  ATTENTION: Please call with any questions or concerns to [phone] and carefully listen to the prompts so that you are directed to the right person. All voicemails are confidential.   For Discharge needs, contact Care Management DC Support Team at [phone] opt. [phone]  Send all requests for admission clinical reviews, approved or denied determinations and any other requests to dedicated fax number below belonging to the campus where the patient is receiving treatment.  List of dedicated fax numbers for the Facilities:  [place] DENIALS (Administrative/Medical Necessity) [phone]   DISCHARGE SUPPORT TEAM (NETWORK) [address] [facility] (Maternity/NICU/Pediatrics) [phone]   [address] [place] [address] Drive [phone]   [facility] [phone] [address] [address] [facility] [address] [address] [facility] [phone]

## 2023-11-30 NOTE — PROGRESS NOTES
Daniela Syed is a 70 y.o. male who is currently ordered Vancomycin IV with management by the Pharmacy Consult service. Relevant clinical data and objective / subjective history reviewed. Vancomycin Assessment:  Indication and Goal AUC/Trough: Bone/joint infection (goal -600, trough >10)  Clinical Status: stable  Micro: In progress  Renal Function:  SCr: 0.72 mg/dL  CrCl: 95.7 mL/min  Renal replacement: Not on dialysis  Days of Therapy: 2  Current Dose: 750mg IV q12 hours  Vancomycin Plan:  New Dosing IV q12 hours  Estimated AUC: 450 mcg*hr/mL  Estimated Trough: 13.9 mcg/mL  Next Level:  @ 0600  Renal Function Monitoring: Daily BMP and [place] will continue to follow closely for s/sx of nephrotoxicity, infusion reactions and appropriateness of therapy. BMP and CBC will be ordered per protocol. We will continue to follow the patient’s culture results and clinical progress daily.     Hung Najera, Pharmacist

## 2023-11-30 NOTE — PLAN OF CARE
Problem: PAIN - ADULT  Goal: Verbalizes/displays adequate comfort level or baseline comfort level  Description: Interventions:  - Encourage patient to monitor pain and request assistance  - Assess pain using appropriate pain scale  - Administer analgesics based on type and severity of pain and evaluate response  - Implement non-pharmacological measures as appropriate and evaluate response  - Consider cultural and social influences on pain and pain management  - Notify physician/advanced practitioner if interventions unsuccessful or patient reports new pain  Outcome: Progressing     Problem: INFECTION - ADULT  Goal: Absence or prevention of progression during hospitalization  Description: INTERVENTIONS:  - Assess and monitor for signs and symptoms of infection  - Monitor lab/diagnostic results  - Monitor all insertion sites, i.e. indwelling lines, tubes, and drains  - Monitor endotracheal if appropriate and nasal secretions for changes in amount and color  - White appropriate cooling/warming therapies per order  - Administer medications as ordered  - Instruct and encourage patient and family to use good hand hygiene technique  - Identify and instruct in appropriate isolation precautions for identified infection/condition  Outcome: Progressing  Goal: Absence of fever/infection during neutropenic period  Description: INTERVENTIONS:  - Monitor WBC    Outcome: Progressing     Problem: SAFETY ADULT  Goal: Patient will remain free of falls  Description: INTERVENTIONS:  - Educate patient/family on patient safety including physical limitations  - Instruct patient to call for assistance with activity   - Consult OT/PT to assist with strengthening/mobility   - Keep Call bell within reach  - Keep bed low and locked with side rails adjusted as appropriate  - Keep care items and personal belongings within reach  - Initiate and maintain comfort rounds  - Make Fall Risk Sign visible to staff  - Offer Toileting every 2 Hours, in advance of need  - Initiate/Maintain bed alarm  - Obtain necessary fall risk management equipment  - Apply yellow socks and bracelet for high fall risk patients  - Consider moving patient to room near nurses station  Outcome: Progressing  Goal: Maintain or return to baseline ADL function  Description: INTERVENTIONS:  -  Assess patient's ability to carry out ADLs; assess patient's baseline for ADL function and identify physical deficits which impact ability to perform ADLs (bathing, care of mouth/teeth, toileting, grooming, dressing, etc.)  - Assess/evaluate cause of self-care deficits   - Assess range of motion  - Assess patient's mobility; develop plan if impaired  - Assess patient's need for assistive devices and provide as appropriate  - Encourage maximum independence but intervene and supervise when necessary  - Involve family in performance of ADLs  - Assess for home care needs following discharge   - Consider OT consult to assist with ADL evaluation and planning for discharge  - Provide patient education as appropriate  Outcome: Progressing  Goal: Maintains/Returns to pre admission functional level  Description: INTERVENTIONS:  - Perform AM-PAC 6 Click Basic Mobility/ Daily Activity assessment daily.  - Set and communicate daily mobility goal to care team and patient/family/caregiver. - Collaborate with rehabilitation services on mobility goals if consulted  - Perform Range of Motion 3 times a day. - Reposition patient every 2 hours.   - Dangle patient 3 times a day  - Stand patient 3 times a day  - Ambulate patient 3 times a day  - Out of bed to chair 3 times a day   - Out of bed for meals 3 times a day  - Out of bed for toileting  - Record patient progress and toleration of activity level   Outcome: Progressing     Problem: DISCHARGE PLANNING  Goal: Discharge to home or other facility with appropriate resources  Description: INTERVENTIONS:  - Identify barriers to discharge w/patient and caregiver  - Arrange for needed discharge resources and transportation as appropriate  - Identify discharge learning needs (meds, wound care, etc.)  - Arrange for interpretive services to assist at discharge as needed  - Refer to Case Management Department for coordinating discharge planning if the patient needs post-hospital services based on physician/advanced practitioner order or complex needs related to functional status, cognitive ability, or social support system  Outcome: Progressing     Problem: Knowledge Deficit  Goal: Patient/family/caregiver demonstrates understanding of disease process, treatment plan, medications, and discharge instructions  Description: Complete learning assessment and assess knowledge base.   Interventions:  - Provide teaching at level of understanding  - Provide teaching via preferred learning methods  Outcome: Progressing     Problem: Prexisting or High Potential for Compromised Skin Integrity  Goal: Skin integrity is maintained or improved  Description: INTERVENTIONS:  - Identify patients at risk for skin breakdown  - Assess and monitor skin integrity  - Assess and monitor nutrition and hydration status  - Monitor labs   - Assess for incontinence   - Turn and reposition patient  - Assist with mobility/ambulation  - Relieve pressure over bony prominences  - Avoid friction and shearing  - Provide appropriate hygiene as needed including keeping skin clean and dry  - Evaluate need for skin moisturizer/barrier cream  - Collaborate with interdisciplinary team   - Patient/family teaching  - Consider wound care consult   Outcome: Progressing

## 2023-11-30 NOTE — ASSESSMENT & PLAN NOTE
Lab Results   Component Value Date    HGBA1C 11.6 (A) 11/24/2023       Recent Labs     11/29/23  1802 11/29/23 2125 11/30/23  0727   POCGLU 245* 197* 203*         Blood Sugar Average: Last 72 hrs:  (P) 215    Recent A1C of 11.6 w/ questionable compliance of meds & insulin  Will be on insulin regimen plus ISS while I/p  Carb 3 diet

## 2023-11-30 NOTE — PROGRESS NOTES
Progress Note - Orthopedics   Azalea Fulling 70 y.o. male MRN: [id number]  Unit/Bed#: S -01      Subjective:    68 y.o.male seen and evaluated at bedside. He complains of a headache and soreness of the left lower extremity today. He denies numbness/tingling.      Labs:  0   Lab Value Date/Time    HCT 42.6 11/30/2023 0603    HCT 50.6 (H) 11/29/2023 1227    HCT 48.1 11/25/2023 1112    HCT 43.0 02/10/2014 0450    HCT 44.5 02/06/2014 1705    HCT 45.8 01/10/2014 1810    HGB 14.0 11/30/2023 0603    HGB 16.4 11/29/2023 1227    HGB 15.9 11/25/2023 1112    HGB 14.4 02/10/2014 0450    HGB 15.3 02/06/2014 1705    HGB 15.7 01/10/2014 1810    INR 0.97 07/07/2021 1056    INR 1.11 01/10/2014 1810    WBC 8.64 11/30/2023 0603    WBC 8.65 11/29/2023 1227    WBC 9.99 11/25/2023 1112    WBC 7.71 02/10/2014 0450    WBC 10.59 (H) 02/06/2014 1705    WBC 7.73 01/10/2014 1810    ESR 52 (H) 11/29/2023 1227    .4 (H) 11/29/2023 1227       Meds:    Current Facility-Administered Medications:     acetaminophen (TYLENOL) tablet 650 mg, 650 mg, Oral, Q4H PRN, Mary Davis DO    atorvastatin (LIPITOR) tablet 40 mg, 40 mg, Oral, Daily With Ethel Johnson DO, 40 mg at 11/29/23 1820    ceFEPime (MAXIPIME) 2,000 mg in dextrose 5 % 50 mL IVPB, 2,000 mg, Intravenous, Q12H, Mary Davis DO, Last Rate: 100 mL/hr at 11/29/23 2210, 2,000 mg at 11/29/23 2210    clopidogrel (PLAVIX) tablet 75 mg, 75 mg, Oral, Daily, Mary Davis DO, 75 mg at 11/30/23 0517    HYDROmorphone (DILAUDID) injection 1 mg, 1 mg, Intravenous, Q4H PRN, Julius Perez [place]arefoot, DO    insulin glargine (LANTUS) subcutaneous injection 30 Units 0.3 mL, 30 Units, Subcutaneous, HS, Mary Davis DO, 30 Units at 11/29/23 2228    insulin lispro (HumaLOG) 100 units/mL subcutaneous injection 1-6 Units, 1-6 Units, Subcutaneous, TID AC, 2 Units at 11/30/23 0813 **AND** Fingerstick Glucose (POCT), , , TID AC, Valentino Bangura DO    insulin lispro (HumaLOG) 100 units/mL subcutaneous injection 10 Units, 10 Units, Subcutaneous, TID With Meals, Erlin Miller, DO, 10 Units at 11/30/23 0814    lisinopril (ZESTRIL) tablet 10 mg, 10 mg, Oral, Daily, Erlin Miller, DO, 10 mg at 11/30/23 0811    metoprolol tartrate (LOPRESSOR) tablet 25 mg, 25 mg, Oral, Q6H PRN, Erlin Miller, DO    ondansetron (ZOFRAN) injection 4 mg, 4 mg, Intravenous, Q4H PRN, Erlni Miller,     oxyCODONE (ROXICODONE) IR tablet 5 mg, 5 mg, Oral, Q4H PRN **OR** oxyCODONE (ROXICODONE) immediate release tablet 10 mg, 10 mg, Oral, Q4H PRN, Erlin Miller, , 10 mg at 11/30/23 0502    polyethylene glycol (MIRALAX) packet 17 g, 17 g, Oral, Daily, Erlin Miller,     rivaroxaban (XARELTO) tablet 2.5 mg, 2.5 mg, Oral, Daily With Dinner, [facility], DO    senna-docusate sodium (SENOKOT S) 8.6-50 mg per tablet 1 tablet, 1 tablet, Oral, HS, Erlin Miller DO, 1 tablet at 11/29/23 2228    vancomycin (VANCOCIN) IVPB (premix in dextrose) 1,000 mg 200 mL, 12.5 mg/kg, Intravenous, Q12H, Erlin Miller,     Blood Culture:   Lab Results   Component Value Date    BLOODCX Received in Microbiology Lab. Culture in Progress. 11/29/2023       Wound Culture:   Lab Results   Component Value Date    WOUNDCULT 1 colony Staphylococcus aureus (A) 10/23/2020       Ins and Outs:  I/O last 24 hours: In: 200 [I.V.:200]  Out: 200 [Urine:200]          Physical:  Vitals:    11/30/23 0727   BP: 149/75   Pulse: 84   Resp: 17   Temp: 98.2 °F (36.8 °C)   SpO2: 94%     Musculoskeletal: left Lower Extremity  Surgical dressings in place without strikethrough. Moderate quita-incisional tenderness. No TTP of distal stump. Sensation intact over thigh and stump. Limited active knee flexion secondary to pain. Muscles of thigh and stump soft and compressible. Assessment:    70 y.o.male s/p  I&D left lower extremity for septic prepatellar bursitis with Dr. Alie Mcduffie on 11/30 (POD 1).      Plan:  WBAT left LE  Dressing change on 12/1  Wound cultures pending  PT/OT  Abx per primary  Pain control per primary  DVT ppx per primary  Medical co-morbidities include uncontrolled DM, HTN, PVD, A-fib, which are being managed per primary team  Dispo: Ortho will follow  Patient should follow up outpatient with Dr. Michael Conteh upon discharge.       Keven Stanford PA-C

## 2023-12-01 LAB
ANION GAP SERPL CALCULATED.3IONS-SCNC: 8 MMOL/L
BACTERIA SPEC BFLD CULT: ABNORMAL
BUN SERPL-MCNC: 14 MG/DL (ref 5–25)
CALCIUM SERPL-MCNC: 8.8 MG/DL (ref 8.4–10.2)
CHLORIDE SERPL-SCNC: 101 MMOL/L (ref 96–108)
CO2 SERPL-SCNC: 26 MMOL/L (ref 21–32)
CREAT SERPL-MCNC: 0.72 MG/DL (ref 0.6–1.3)
ERYTHROCYTE [DISTWIDTH] IN BLOOD BY AUTOMATED COUNT: 11.8 % (ref 11.6–15.1)
GFR SERPL CREATININE-BSD FRML MDRD: 95 ML/MIN/1.73SQ M
GLUCOSE SERPL-MCNC: 156 MG/DL (ref 65–140)
GLUCOSE SERPL-MCNC: 172 MG/DL (ref 65–140)
GLUCOSE SERPL-MCNC: 175 MG/DL (ref 65–140)
GLUCOSE SERPL-MCNC: 186 MG/DL (ref 65–140)
GLUCOSE SERPL-MCNC: 195 MG/DL (ref 65–140)
GRAM STN SPEC: ABNORMAL
GRAM STN SPEC: ABNORMAL
HCT VFR BLD AUTO: 45.2 % (ref 36.5–49.3)
HGB BLD-MCNC: 14.9 G/DL (ref 12–17)
MCH RBC QN AUTO: 32 PG (ref 26.8–34.3)
MCHC RBC AUTO-ENTMCNC: 33 G/DL (ref 31.4–37.4)
MCV RBC AUTO: 97 FL (ref 82–98)
PLATELET # BLD AUTO: 198 THOUSANDS/UL (ref 149–390)
PMV BLD AUTO: 10 FL (ref 8.9–12.7)
POTASSIUM SERPL-SCNC: 3.6 MMOL/L (ref 3.5–5.3)
RBC # BLD AUTO: 4.65 MILLION/UL (ref 3.88–5.62)
SODIUM SERPL-SCNC: 135 MMOL/L (ref 135–147)
VANCOMYCIN SERPL-MCNC: 12.9 UG/ML (ref 10–20)
WBC # BLD AUTO: 7.44 THOUSAND/UL (ref 4.31–10.16)

## 2023-12-01 PROCEDURE — 80048 BASIC METABOLIC PNL TOTAL CA: CPT | Performed by: PHYSICIAN ASSISTANT

## 2023-12-01 PROCEDURE — 85027 COMPLETE CBC AUTOMATED: CPT | Performed by: PHYSICIAN ASSISTANT

## 2023-12-01 PROCEDURE — 97167 OT EVAL HIGH COMPLEX 60 MIN: CPT

## 2023-12-01 PROCEDURE — 97163 PT EVAL HIGH COMPLEX 45 MIN: CPT

## 2023-12-01 PROCEDURE — 99223 1ST HOSP IP/OBS HIGH 75: CPT | Performed by: STUDENT IN AN ORGANIZED HEALTH CARE EDUCATION/TRAINING PROGRAM

## 2023-12-01 PROCEDURE — 99232 SBSQ HOSP IP/OBS MODERATE 35: CPT | Performed by: HOSPITALIST

## 2023-12-01 PROCEDURE — 80202 ASSAY OF VANCOMYCIN: CPT | Performed by: INTERNAL MEDICINE

## 2023-12-01 PROCEDURE — 99024 POSTOP FOLLOW-UP VISIT: CPT | Performed by: PHYSICIAN ASSISTANT

## 2023-12-01 PROCEDURE — 82948 REAGENT STRIP/BLOOD GLUCOSE: CPT

## 2023-12-01 RX ORDER — ENOXAPARIN SODIUM 100 MG/ML
30 INJECTION SUBCUTANEOUS EVERY 12 HOURS SCHEDULED
Status: DISCONTINUED | OUTPATIENT
Start: 2023-12-01 | End: 2023-12-01 | Stop reason: SDUPTHER

## 2023-12-01 RX ORDER — ENOXAPARIN SODIUM 100 MG/ML
40 INJECTION SUBCUTANEOUS
Status: DISCONTINUED | OUTPATIENT
Start: 2023-12-01 | End: 2023-12-01

## 2023-12-01 RX ORDER — CEFAZOLIN SODIUM 2 G/50ML
2000 SOLUTION INTRAVENOUS EVERY 8 HOURS
Status: DISCONTINUED | OUTPATIENT
Start: 2023-12-01 | End: 2023-12-03 | Stop reason: HOSPADM

## 2023-12-01 RX ORDER — CYCLOBENZAPRINE HCL 10 MG
5 TABLET ORAL 3 TIMES DAILY PRN
Status: DISCONTINUED | OUTPATIENT
Start: 2023-12-01 | End: 2023-12-03 | Stop reason: HOSPADM

## 2023-12-01 RX ADMIN — CEFEPIME 2000 MG: 2 INJECTION, POWDER, FOR SOLUTION INTRAVENOUS at 10:58

## 2023-12-01 RX ADMIN — CEFAZOLIN SODIUM 2000 MG: 2 SOLUTION INTRAVENOUS at 19:46

## 2023-12-01 RX ADMIN — OXYCODONE HYDROCHLORIDE 10 MG: 10 TABLET ORAL at 17:53

## 2023-12-01 RX ADMIN — INSULIN LISPRO 10 UNITS: 100 INJECTION, SOLUTION INTRAVENOUS; SUBCUTANEOUS at 08:15

## 2023-12-01 RX ADMIN — CYCLOBENZAPRINE HYDROCHLORIDE 5 MG: 10 TABLET, FILM COATED ORAL at 23:28

## 2023-12-01 RX ADMIN — INSULIN LISPRO 1 UNITS: 100 INJECTION, SOLUTION INTRAVENOUS; SUBCUTANEOUS at 08:16

## 2023-12-01 RX ADMIN — OXYCODONE HYDROCHLORIDE 10 MG: 10 TABLET ORAL at 23:23

## 2023-12-01 RX ADMIN — OXYCODONE HYDROCHLORIDE 10 MG: 10 TABLET ORAL at 03:46

## 2023-12-01 RX ADMIN — INSULIN LISPRO 10 UNITS: 100 INJECTION, SOLUTION INTRAVENOUS; SUBCUTANEOUS at 16:38

## 2023-12-01 RX ADMIN — HYDROMORPHONE HYDROCHLORIDE 1 MG: 1 INJECTION, SOLUTION INTRAMUSCULAR; INTRAVENOUS; SUBCUTANEOUS at 09:24

## 2023-12-01 RX ADMIN — OXYCODONE HYDROCHLORIDE 10 MG: 10 TABLET ORAL at 12:00

## 2023-12-01 RX ADMIN — CLOPIDOGREL BISULFATE 75 MG: 75 TABLET ORAL at 08:15

## 2023-12-01 RX ADMIN — ATORVASTATIN CALCIUM 40 MG: 40 TABLET, FILM COATED ORAL at 15:38

## 2023-12-01 RX ADMIN — HYDROMORPHONE HYDROCHLORIDE 1 MG: 1 INJECTION, SOLUTION INTRAMUSCULAR; INTRAVENOUS; SUBCUTANEOUS at 00:27

## 2023-12-01 RX ADMIN — INSULIN LISPRO 2 UNITS: 100 INJECTION, SOLUTION INTRAVENOUS; SUBCUTANEOUS at 16:37

## 2023-12-01 RX ADMIN — HYDROMORPHONE HYDROCHLORIDE 1 MG: 1 INJECTION, SOLUTION INTRAMUSCULAR; INTRAVENOUS; SUBCUTANEOUS at 20:03

## 2023-12-01 RX ADMIN — VANCOMYCIN HYDROCHLORIDE 1000 MG: 1 INJECTION, SOLUTION INTRAVENOUS at 07:39

## 2023-12-01 RX ADMIN — HYDROMORPHONE HYDROCHLORIDE 1 MG: 1 INJECTION, SOLUTION INTRAMUSCULAR; INTRAVENOUS; SUBCUTANEOUS at 14:53

## 2023-12-01 RX ADMIN — CEFEPIME 2000 MG: 2 INJECTION, POWDER, FOR SOLUTION INTRAVENOUS at 00:22

## 2023-12-01 RX ADMIN — INSULIN LISPRO 1 UNITS: 100 INJECTION, SOLUTION INTRAVENOUS; SUBCUTANEOUS at 12:01

## 2023-12-01 RX ADMIN — INSULIN LISPRO 10 UNITS: 100 INJECTION, SOLUTION INTRAVENOUS; SUBCUTANEOUS at 12:01

## 2023-12-01 RX ADMIN — LISINOPRIL 10 MG: 10 TABLET ORAL at 08:15

## 2023-12-01 RX ADMIN — INSULIN GLARGINE 30 UNITS: 100 INJECTION, SOLUTION SUBCUTANEOUS at 22:11

## 2023-12-01 NOTE — CONSULTS
Consultation - Infectious Disease   Aida Alejo 70 y.o. male MRN: [id number]  Unit/Bed#: S -01 Encounter: [id number]      IMPRESSION & RECOMMENDATIONS:     1. Left prepatellar bursitis. Likely due to irritation from the patient's prosthesis for BKA. Patient's symptoms did not improve with 5 days of p.o. Keflex; suspect that this was more an issue of source control than failure of p.o. antibiotics. The patient states that he was taking the Keflex appropriately. Status post aspiration outpatient by orthopedic surgery 11/29 with removal of 30 cc purulent fluid. Cultures growing MSSA. Status post OR I&D by orthopedic surgery later that day with evacuation of an infected hemorrhagic clot from the bursa. No evidence of septic arthritis. The patient is afebrile without any systemic signs of infection. Recurrent hematoma was drained at bedside by orthopedic surgery today and anticoagulation is now held. -Stop IV vancomycin and cefepime   -Start IV cefazolin 2 g every 8 hours   -Continue close follow-up with orthopedic surgery   -Given adherence concerns, plan for transition to p.o. antibiotics at the time of discharge with less frequent dosing   -At discharge, patient can be transitioned to p.o. Duricef 500 mg twice daily; if cost prohibitive can use doxycycline 100 mg twice daily   -Anticipate a 14-day total course of antibiotics from I&D, through 12/13/2023    2. Poorly controlled diabetes mellitus. Hemoglobin A1c 11.6%. This is a risk factor for infection and poor wound healing.   -Tighter glycemic management per primary team    3. Peripheral vascular disease. Patient is status post left BKA. This is also a risk factor for poor wound healing    4. Atrial fibrillation. Anticoagulation management per primary team    I have discussed the above management plan in detail with the primary service, patient at bedside. ID will follow.     I have performed an extensive review of the medical records in Epic including review of the notes, radiographs, and laboratory results     HISTORY OF PRESENT ILLNESS:  Reason for Consult: Left prepatellar bursitis  HPI: Olivia Craig is a 70 y.o. man with a history of left BKA in 2021, poorly controlled diabetes mellitus with recent hemoglobin A1c of 11.6%, PAD who presented to [address] [place] [address] on 11/29/2023 with 1 week of worsening left knee erythema, swelling, pain. The patient has a history of left BKA and does wear prosthesis but he has had skin irritation related to rubbing from the prosthesis. The patient was seen in the ED 11/25 and given a 10-day course of Keflex 500 mg every 6 hours for prepatellar bursitis. The patient states that he was taking the antibiotic every 6 hours but there is concern for adherence issues per the primary team.  He was seen outpatient by orthopedic surgery 11/29; due to ongoing erythema and swelling orthopedic surgery aspirated the prepatellar bursa and there was 30 cc purulent fluid obtained so he was sent to the ED. The patient was started on IV vancomycin and cefepime which she is currently receiving. Fluid culture now shows growth of 2+ MSSA. Patient has been afebrile, without leukocytosis. The patient was taken to the OR 11/29 for I&D. A large hemorrhagic clot along with some purulent material was drained from the prepatellar bursa. There is no evidence of joint involvement. Today on evaluation by orthopedic surgery, there was increased swelling so orthopedic surgery expressed more bloody fluid. His anticoagulation is now held. ID is consulted for further antibiotic management. REVIEW OF SYSTEMS:  A complete review of systems is negative other than that noted in the HPI.     PAST MEDICAL HISTORY:  Past Medical History:   Diagnosis Date    Arthritis     fingers    First degree AV block     Full dentures     Hypertension     PAD (peripheral artery disease) ([facility])     PVD (peripheral vascular disease) ([address])     Type 2 diabetes mellitus with diabetic peripheral angiopathy without gangrene ([address]) 5/18/2021    Per CMS ICD 10 guidelines--Per Physician    Wound, open     right foot- by the 5th toe     Past Surgical History:   Procedure Laterality Date    CHOLECYSTECTOMY      COLONOSCOPY      INCISION AND DRAINAGE OF WOUND Left 11/29/2023    Procedure: INCISION AND DRAINAGE (I&D) LEFT KNEE PRE-PATELLA BURSA; Surgeon: Alberto Lu DO;  Location: [facility];  Service: Orthopedics    IR AORTAGRAM WITH RUN-OFF  1/28/2021    IR AORTAGRAM WITH RUN-OFF  4/13/2021    LEG AMPUTATION THROUGH LOWER TIBIA AND FIBULA Left 7/17/2021    Procedure: AMPUTATION BELOW KNEE (BKA);   Surgeon: Olivia Segovia MD;  Location: [facility];  Service: Vascular    NH BYP FEM-ANT TIBL PST TIBL PRONEAL ART/OTH DSTL Left 7/14/2021    Procedure: BYPASS femoral-peroneal artery bypass with  reverse GSV and balloon angioplasty peroneal artery;  Surgeon: Olivia Segovia MD;  Location[facility];  Service: Vascular    NH DEBRIDEMENT BONE MUSCLE &/FASCIA 20 SQ CM/< Right 12/18/2020    Procedure: DEBRIDMENT OF ULCER , REMOVAL OF DEVITALIZED SKIN, SOFT TISSUE, BONE AND APPLICATION OF INTEGRA GRAFT RIGHT FOOT.;  Surgeon: Elizabeth Junior DPM;  Location: [facility];  Service: Podiatry    REPLANTATION THUMB Right     right tip reconnected    SKIN GRAFT      right thumb    TOE AMPUTATION      left foot all 5 toes removed    TOE AMPUTATION Right 2/2/2021    Procedure: Right partial 5th ray amputation;  Surgeon: Deyvi Ospina DPM;  Location: [facility];  Service: Podiatry    TOE OSTEOTOMY Right 6/26/2020    Procedure: exostosis of right big toe;  Surgeon: Tasha Proctor DPM;  Location: [facility];  Service: Podiatry    TRIGGER FINGER RELEASE      bilateral hands    VEIN LIGATION      right       FAMILY HISTORY:  Non-contributory    SOCIAL HISTORY:  Social History   Social History     Substance and Sexual Activity   Alcohol Use Not Currently    Comment: occasional     Social History Substance and Sexual Activity   Drug Use Never     Social History     Tobacco Use   Smoking Status Never   Smokeless Tobacco Never       ALLERGIES:  Allergies   Allergen Reactions    Shellfish-Derived Products - Food Allergy Anaphylaxis     Throat closes    Sulfamethoxazole-Trimethoprim Rash       MEDICATIONS:  All current active medications have been reviewed. PHYSICAL EXAM:  Temp:  [97.9 °F (36.6 °C)-98.6 °F (37 °C)] 98.4 °F (36.9 °C)  HR:  [76-92] 90  Resp:  [15-18] 17  BP: (130-155)/(64-74) 148/73  SpO2:  [95 %-96 %] 95 %  Temp (24hrs), Av.3 °F (36.8 °C), Min:97.9 °F (36.6 °C), Max:98.6 °F (37 °C)  Current: Temperature: 98.4 °F (36.9 °C)    Intake/Output Summary (Last 24 hours) at 2023 1635  Last data filed at 2023 1536  Gross per 24 hour   Intake --   Output 825 ml   Net -825 ml       General Appearance:  Appearing well, nontoxic, and in no distress   Head:  Normocephalic, without obvious abnormality, atraumatic   Eyes:  Conjunctiva pink and sclera anicteric, both eyes   Nose: Nares normal, mucosa normal, no drainage   Throat: Oropharynx moist without lesions   Neck: Supple, symmetrical, no adenopathy, no tenderness/mass/nodules   Back:   Symmetric, no curvature, ROM normal, no CVA tenderness   Lungs:   Clear to auscultation bilaterally, respirations unlabored   Chest Wall:  No tenderness or deformity   Heart:  RRR; no murmur, rub or gallop   Abdomen:   Soft, non-tender, non-distended, positive bowel sounds    Extremities: Left BKA   Skin: Erythema, mild swelling of the left prepatellar bursa with sutures in place   Lymph nodes: Cervical, supraclavicular nodes normal   Neurologic: Alert and oriented times 3, extremity strength 5/5 and symmetric       LABS, IMAGING, & OTHER STUDIES:  Lab Results:  I have personally reviewed pertinent labs.   Results from last 7 days   Lab Units 23  0544 23  0603 23  1227   WBC Thousand/uL 7.44 8.64 8.65   HEMOGLOBIN g/dL 14.9 14.0 16.4 PLATELETS Thousands/uL 198 188 198     Results from last 7 days   Lab Units 12/01/23  0544 11/30/23  0603 11/29/23  1227   SODIUM mmol/L 135 134* 132*   POTASSIUM mmol/L 3.6 3.8 4.3   CHLORIDE mmol/L 101 98 92*   CO2 mmol/L 26 26 24   BUN mg/dL 14 15 21   CREATININE mg/dL 0.72 0.72 0.98   EGFR ml/min/1.73sq m 95 95 78   CALCIUM mg/dL 8.8 8.7 9.7   AST U/L  --   --  38   ALT U/L  --   --  108*   ALK PHOS U/L  --   --  73     Results from last 7 days   Lab Units 11/29/23  1436 11/29/23  1238 11/29/23  1227 11/29/23  1119   BLOOD CULTURE   --  No Growth at 24 hrs. No Growth at 24 hrs.  --    GRAM STAIN RESULT  No Polys or Bacteria seen  --   --  2+ Polys  No bacteria seen   BODY FLUID CULTURE, STERILE   --   --   --  2+ Growth of Staphylococcus aureus*         Results from last 7 days   Lab Units 11/29/23  1227 11/25/23  1112   CRP mg/L 144.4* 55.1*               Imaging Studies:   I have personally reviewed pertinent imaging study reports and images in PACS.     Left knee x-ray-mild prepatellar soft tissue swelling

## 2023-12-01 NOTE — ASSESSMENT & PLAN NOTE
Pt presents w/ infection of bursa in L knee region s/p ipsilateral BKA of 2021  Erythema, painful, swollen for past 1-2 weeks gradually worsening until ER visit 5 days ago  Received Keflex 500 q12 o/p + supportive measures, no change in sx to present day  O/p ortho visit aspirated 30 ml fluid, purulent & muddy appearing, sent pt to [facility] ER  Ortho incision & curettage today, admitted    Ortho to follow  Fluid analysis pending  Blood cx pending  Cef & vanc begun  Pharmacy consulted  Oxycodone for pain  Dilaudid for breakthrough  OT / PT requested  Watch for signs of sepsis

## 2023-12-01 NOTE — PHYSICAL THERAPY NOTE
PHYSICAL THERAPY EVALUATION  NAME:  Luis Washington  DATE: 12/01/23    AGE:   70 y.o.  Mrn:   [id number]  Principal problem: Principal Problem:    Septic prepatellar bursitis of left knee  Active Problems:    PVD (peripheral vascular disease) ([address])    Poorly controlled type 2 diabetes mellitus (HCC)    Essential hypertension    Atrial fibrillation, unspecified type ([address])      Vitals:    12/01/23 0219 12/01/23 0551 12/01/23 0722 12/01/23 1521   BP: 130/64  155/74 148/73   BP Location:    Left arm   Pulse: 81  76 90   Resp:   18 17   Temp: 97.9 °F (36.6 °C)  98 °F (36.7 °C) 98.4 °F (36.9 °C)   TempSrc:    Oral   SpO2: 96%  96% 95%   Weight:  86.4 kg (190 lb 7.6 oz)     Height:           Length Of Stay: 2  Performed at least 2 patient identifiers during session: Name and Birthday  PHYSICAL THERAPY EVALUATION :    12/01/23 1534   PT Last Visit   PT Visit Date 12/01/23   Note Type   Note type Evaluation   Pain Assessment   Pain Assessment Tool 0-10   Pain Score 8   Pain Location/Orientation Orientation: Left; Location: Knee   Effect of Pain on Daily Activities limits speed and indep of mobility, limits ROM   Patient's Stated Pain Goal No pain   Hospital Pain Intervention(s) Repositioned; Ambulation/increased activity; Emotional support;Medication (See MAR); Heat applied;Cold applied   Restrictions/Precautions   Weight Bearing Precautions Per Order Yes   LLE Weight Bearing Per Order (S)    (L BKA, WBAT per ortho note, however Adrienne ortho KURT verbalized before session she does not weight through residual limb at this time. NWB maintained)   Other Precautions Bed Alarm; Chair Alarm; Fall Risk;O2;WBS;Pain   Home Living   Type of [address] [place] [address] Two level;Bed/bath upstairs; Ramped entrance  (3 vs 4 [place], full bath on both floors. bedroom/"mancave" on 2nd floor. Patient reports not using prosthesis immediately prior to admission.)   [place]; Wheelchair-manual;Crutches  (L prosthetic, however reporting improper fit currently.)   Additional Comments (S)  Patient reports having prosthesis fit at [facility], and last prosthetic was given to him in the summer 2021. He stated that Ning Laureano made some adjustments due to his ill fit. He reports losing a substantial amount of weight recently and had an ill fitting prosthesis (wore 10 ply-2 green ply socks). 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 General   Additional Pertinent History Pt is a 70 yr old male admitted 11/29/23 sent by OP ortho for L knee infection & pain. Previously seen in the ED on 11/25/23 for L prepatellar bursitis. Underwent L LE I&D 11/29/23 by Dr. Ananya Murray, [address]. Today:Copious serosanguineous drainage noted.    Family/Caregiver Present No   Cognition   Overall Cognitive Status WFL   Arousal/Participation Alert   Attention Within functional limits   Orientation Level Oriented to person;Oriented to place;Oriented to situation   Memory Decreased recall of precautions   Following Commands Follows one step commands without difficulty   RUE Assessment   RUE Assessment [facility] LUE Assessment   LUE Assessment WFL   RLE Overall AROM   R Knee Flexion WFL   R Knee Extension WFL   Strength RLE   R Hip Flexion 4/5   R Knee Extension 4/5   R Ankle Dorsiflexion 4/5   LLE Overall AROM   L Knee Flexion < neutral   L Knee Extension < 90   Strength LLE   L Hip Flexion   (tested to 4-)   L Knee Extension   (tested to 3)   L Ankle Dorsiflexion   (NA)   Light Touch   RLE Light Touch Impaired   RLE Light Touch Comments toes   LLE Light Touch Not tested  (Dressings intact)   Bed Mobility   Rolling R 6  Modified independent   Additional items Bedrails   Rolling L 6  Modified independent   Additional items Bedrails   Additional Comments declined performance and further bed mobility, transfers, and gait on first session due to eating lunch, and not during second session due to fatigue   Transfers   Sit to Stand Unable to assess   Ambulation/Elevation   Gait pattern Not tested   Wheelchair Activities   Propulsion No  (However OT reports one WC break does not stay engaged, so provided pt with another std size WC for future use as pt prefers to use WC for toileting as opposed to crutches/walker.)   Balance   Static Sitting Good  (in recliner @ end of first session)   Endurance Deficit   Endurance Deficit Yes   Endurance Deficit Description limited activity tolerance, declined PM session due to fatigue   Activity Tolerance   Activity Tolerance Patient limited by pain; Patient limited by fatigue   Medical Staff Made Aware care coordination w/ Latesha Peñaloza from OT. Spoke to Trevor and later TT her from ortho re: how aggressive to be with knee rom. She also stated to keep pt NWB (ideally pt SHOULD NOT wear prosthesis now anyway)     Assessment:   Pt is a 70 y.o. male seen for PT evaluation s/p admit to [facility] on 11/29/2023 w/ Septic prepatellar bursitis of left knee. Patient had above procedure, was originally recommended for WBAT, but is now NWB per verbal instructions from Ortho.   Additionally Patient had hematoma expressed this morning. Order placed for PT. 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 Up until recently he used his prosthesis for mobility, and has DME including crutches, wheelchair, walker. Upon evaluation: Pt only agreeable to rolling with bilateral rails, and did not participate in edge of bed nor out of bed mobility due to pain/fatigue. However, he did participate with OT earlier in the day and needed little to no assistance for sit to stand and pivot transfers. Pt's clinical presentation is currently unstable/unpredictable given the functional mobility deficits above, especially (but not limited to) decreased ROM, pain, decreased functional mobility tolerance, and orthopedic restrictions, and combined with medical complications including hypertension , abnormal sodium values, and post op hematoma. Pt IS NOT at his/her mobility baseline. He is at risk for falls based on hx of falls, impaired balance, obesity, and potential limited ability to wear prosthesis until cleared by sessions. During this admission, pt would benefit from continued skilled inpatient PT in the acute care setting in order to address deficits as defined above to maximize function and mobility. Recommendations:    From a PT standpoint, recommend next several sessions focus on patient of edge of bed and out of bed mobility, assess gait and wheelchair mobility, perform gentle ROM to L knee, LE strengthening as able     Prognosis Fair   Problem List Decreased strength;Decreased range of motion;Decreased endurance; Impaired balance;Decreased mobility;Pain;Orthopedic restrictions;Decreased skin integrity;Obesity   Barriers to Discharge Decreased caregiver support; Inaccessible home environment   Goals   Patient Goals to return home ,to get back to using crutches   STG Expiration Date 12/11/23   Short Term Goal #1 Pt will: Perform rolling  and supine<>sit bed mobility tasks with modified I to prepare for transfers and reposition in bed. Perform transfers with modified I to decrease risk for falls and promote proper hand placement and approach. Assess ambulation, WC mobility, and stairs as indicated and set goals to progress to more indep LOF. Increase left knee ROM at least 0-90 to promote eventual functional ROM when patient is able to wear prosthesis. PT Treatment Day 0   Plan   Treatment/Interventions Functional transfer training;LE strengthening/ROM; Therapeutic exercise; Endurance training;Cognitive reorientation;Patient/family training;Equipment eval/education; Bed mobility;Continued evaluation;Spoke to advanced practitioner;OT   PT Frequency 4-6x/wk   Discharge Recommendation   Rehab Resource Intensity Level, PT II (Moderate Resource Intensity)   Equipment Recommended Wheelchair  (and walker vs crutches)   Wheelchair Package Recommended   (Pt has DME)   Additional Comments 2 Co-morbidities affecting pt's physical performance at time of assessment include but are not limited to: Arthritis, hypertension, PAD, PVD, DM type II, right toe amputation, ,debridement bone muscle &/fascia 20 sq cm/< (Right, 12/18/2020); byp fem-ant tibl pst tibl proneal art/oth dstl (Left, 7/14/2021); Leg amputation, lower tibia/fibula (Left, 7/17/2021). Personal factors affecting pt at time of IE include: unable to perform caregiver support/tasks, steps to enter environment, multi-level environment, past experience, inability to ambulate household distances, inability to navigate community distances, and recent fall(s).    AM-PAC Basic Mobility Inpatient   Turning in Flat Bed Without Bedrails 4   Lying on Back to Sitting on Edge of Flat Bed Without Bedrails 3   Moving Bed to Chair 3   Standing Up From Chair Using Arms 3   Walk in Room 1   Climb 3-5 Stairs With Railing 1   Basic Mobility Inpatient Raw Score 15   Basic Mobility Standardized Score 36.97   Highest Level Of Mobility   -[facility] Goal 4: Move to chair/commode   -HLM Achieved 2: Bed activities/Dependent transfer  (due to fatigue and pain)   End of Consult   Patient Position at End of Consult Supine; All needs within reach;Bed/Chair alarm activated   Pt requires PT /OT co-eval for portions of session due to required skilled interventions of at least 2 clinicians for care delivery, medical complexity, limited activity tolerance, and cognitive-behavioral impairments. PT and OT goals addressed separately. (Please find full objective findings from PT assessment regarding body systems outlined above). The patient's -[facility] Basic Mobility Inpatient Short Form Raw Score is 15. A Raw score of less than or equal to 16 suggests the patient may benefit from discharge to post-acute rehabilitation services, which DOES coincide with CURRENT above PT recommendations. However please refer to therapist recommendation for discharge planning given other factors that may influence destination. Adapted from [employer] of AM-PAC “6-Clicks” Basic Mobility and Daily Activity Scores With Discharge Destination. Physical Therapy, 2021;101:1-9. DOI: 10.1093/ptj/jsjp159    Portions of the record may have been created with voice recognition software. Occasional wrong word or "sound a like" substitutions may have occurred due to the inherent limitations of voice recognition software.   Read the chart carefully and recognize, using context, where substitutions have occurred

## 2023-12-01 NOTE — PLAN OF CARE
Problem: PAIN - ADULT  Goal: Verbalizes/displays adequate comfort level or baseline comfort level  Description: Interventions:  - Encourage patient to monitor pain and request assistance  - Assess pain using appropriate pain scale  - Administer analgesics based on type and severity of pain and evaluate response  - Implement non-pharmacological measures as appropriate and evaluate response  - Consider cultural and social influences on pain and pain management  - Notify physician/advanced practitioner if interventions unsuccessful or patient reports new pain  Outcome: Progressing     Problem: INFECTION - ADULT  Goal: Absence or prevention of progression during hospitalization  Description: INTERVENTIONS:  - Assess and monitor for signs and symptoms of infection  - Monitor lab/diagnostic results  - Monitor all insertion sites, i.e. indwelling lines, tubes, and drains  - Monitor endotracheal if appropriate and nasal secretions for changes in amount and color  - [place] appropriate cooling/warming therapies per order  - Administer medications as ordered  - Instruct and encourage patient and family to use good hand hygiene technique  - Identify and instruct in appropriate isolation precautions for identified infection/condition  Outcome: Progressing  Goal: Absence of fever/infection during neutropenic period  Description: INTERVENTIONS:  - Monitor WBC    Outcome: Progressing     Problem: SAFETY ADULT  Goal: Patient will remain free of falls  Description: INTERVENTIONS:  - Educate patient/family on patient safety including physical limitations  - Instruct patient to call for assistance with activity   - Consult OT/PT to assist with strengthening/mobility   - Keep Call bell within reach  - Keep bed low and locked with side rails adjusted as appropriate  - Keep care items and personal belongings within reach  - Initiate and maintain comfort rounds  - Make Fall Risk Sign visible to staff  - Apply yellow socks and bracelet for high fall risk patients  - Consider moving patient to room near nurses station  Outcome: Progressing  Goal: Maintain or return to baseline ADL function  Description: INTERVENTIONS:  -  Assess patient's ability to carry out ADLs; assess patient's baseline for ADL function and identify physical deficits which impact ability to perform ADLs (bathing, care of mouth/teeth, toileting, grooming, dressing, etc.)  - Assess/evaluate cause of self-care deficits   - Assess range of motion  - Assess patient's mobility; develop plan if impaired  - Assess patient's need for assistive devices and provide as appropriate  - Encourage maximum independence but intervene and supervise when necessary  - Involve family in performance of ADLs  - Assess for home care needs following discharge   - Consider OT consult to assist with ADL evaluation and planning for discharge  - Provide patient education as appropriate  Outcome: Progressing  Goal: Maintains/Returns to pre admission functional level  Description: INTERVENTIONS:  - Perform AM-PAC 6 Click Basic Mobility/ Daily Activity assessment daily.  - Set and communicate daily mobility goal to care team and patient/family/caregiver.    - Collaborate with rehabilitation services on mobility goals if consulted  - Out of bed for toileting  - Record patient progress and toleration of activity level   Outcome: Progressing     Problem: DISCHARGE PLANNING  Goal: Discharge to home or other facility with appropriate resources  Description: INTERVENTIONS:  - Identify barriers to discharge w/patient and caregiver  - Arrange for needed discharge resources and transportation as appropriate  - Identify discharge learning needs (meds, wound care, etc.)  - Arrange for interpretive services to assist at discharge as needed  - Refer to Case Management Department for coordinating discharge planning if the patient needs post-hospital services based on physician/advanced practitioner order or complex needs related to functional status, cognitive ability, or social support system  Outcome: Progressing     Problem: Knowledge Deficit  Goal: Patient/family/caregiver demonstrates understanding of disease process, treatment plan, medications, and discharge instructions  Description: Complete learning assessment and assess knowledge base.   Interventions:  - Provide teaching at level of understanding  - Provide teaching via preferred learning methods  Outcome: Progressing     Problem: Prexisting or High Potential for Compromised Skin Integrity  Goal: Skin integrity is maintained or improved  Description: INTERVENTIONS:  - Identify patients at risk for skin breakdown  - Assess and monitor skin integrity  - Assess and monitor nutrition and hydration status  - Monitor labs   - Assess for incontinence   - Turn and reposition patient  - Assist with mobility/ambulation  - Relieve pressure over bony prominences  - Avoid friction and shearing  - Provide appropriate hygiene as needed including keeping skin clean and dry  - Evaluate need for skin moisturizer/barrier cream  - Collaborate with interdisciplinary team   - Patient/family teaching  - Consider wound care consult   Outcome: Progressing

## 2023-12-01 NOTE — PROGRESS NOTES
Fan Espinoza is a 70 y.o. male who is currently ordered Vancomycin IV with management by the Pharmacy Consult service. Relevant clinical data and objective / subjective history reviewed. Vancomycin Assessment:  Indication and Goal AUC/Trough: Soft tissue (goal -600, trough >10)  Clinical Status: stable  Micro:     Renal Function:  SCr: 0.72 mg/dL  CrCl: 95.9 mL/min  Renal replacement: Not on dialysis  Days of Therapy: 3  Current Dose: 1000mg IV q12h  Vancomycin Plan:  New Dosinmg IV q12h  Estimated AUC: 412 mcg*hr/mL  Estimated Trough: 12.4 mcg/mL  Next Level:  @0600  Renal Function Monitoring: Daily BMP and UOP  Pharmacy will continue to follow closely for s/sx of nephrotoxicity, infusion reactions and appropriateness of therapy. BMP and CBC will be ordered per protocol. We will continue to follow the patient’s culture results and clinical progress daily.     Adilson Kaur, Pharmacist

## 2023-12-01 NOTE — PLAN OF CARE
Problem: OCCUPATIONAL THERAPY ADULT  Goal: Performs self-care activities at highest level of function for planned discharge setting. See evaluation for individualized goals. Description: Treatment Interventions: ADL retraining, Functional transfer training, UE strengthening/ROM, Endurance training, Patient/family training, Equipment evaluation/education, Compensatory technique education, Continued evaluation          See flowsheet documentation for full assessment, interventions and recommendations. Note: Limitation: Decreased ADL status, Decreased UE strength, Decreased Safe judgement during ADL, Decreased endurance, Decreased self-care trans, Decreased high-level ADLs (+ pain)  Prognosis: Good  Assessment: Patient is a 70 y.o. male seen for OT evaluation at [address] [place] [address] following admission on 11/29/2023  s/p Septic prepatellar bursitis of left knee. Please see above for comprehensive list of comorbidities and significant PMHx impacting functional performance. Upon initial evaluation, pt appears to be performing below baseline functional status. Occupational performance is affected by the following deficits: endurance ,  decreased muscular strength , acute change in mobility status , decreased standing tolerance for self care tasks , decreased dynamic balance impacting functional reach, decreased trunk control , decreased activity tolerance , impaired safety awareness , and (+) pain . Personal/Environmental factors impacting D/C include: (+) Hx of falls  and steps to enter/navigate the home, inability to wear prosthetic at this time d/t wound healing. Supporting factors include: able to maintain FFSU and attitude towards recovery Patient would benefit from OT services within the acute care setting to maximize level of functional independence in the following areas self-care transfers, functional mobility, and ADLs.   From OT standpoint, recommendation at time of D/C would be Level 3: minimum resource intensity .      Rehab Resource Intensity Level, OT: III (Minimum Resource Intensity)        Braden Loyd OT

## 2023-12-01 NOTE — PROGRESS NOTES
Brief orthopedics note    Recommend ID consult for management of antibiotics in setting of patients known uncontrolled diabetes.      Adrienne Cadet PA-C

## 2023-12-01 NOTE — PROGRESS NOTES
Patient's vancomycin therapy has been completed/discontinued. Thank you for allowing us to take part in this patient's care. Pharmacy will sign-off now. Please call or re-consult if there are any questions or changes.

## 2023-12-01 NOTE — ASSESSMENT & PLAN NOTE
Lab Results   Component Value Date    HGBA1C 11.6 (A) 11/24/2023       Recent Labs     11/30/23  0727 11/30/23  1029 11/30/23  1615 11/30/23  2040   POCGLU 203* 231* 196* 124         Blood Sugar Average: Last 72 hrs:  (P) [id number]    Recent A1C of 11.6 w/ questionable compliance of meds & insulin  Will be on insulin regimen plus ISS while I/p  Carb 3 diet

## 2023-12-01 NOTE — PROGRESS NOTES
Progress Note - Orthopedics   Aida Alejo 70 y.o. male MRN: [id number]  Unit/Bed#: S -01      Subjective:    68 y.o.male seen and evaluated at bedside. He complains of pain in the left knee. Feels that this is a bit more intense today. More throbbing in nature.      Labs:  0   Lab Value Date/Time    HCT 45.2 12/01/2023 0544    HCT 42.6 11/30/2023 0603    HCT 50.6 (H) 11/29/2023 1227    HCT 43.0 02/10/2014 0450    HCT 44.5 02/06/2014 1705    HCT 45.8 01/10/2014 1810    HGB 14.9 12/01/2023 0544    HGB 14.0 11/30/2023 0603    HGB 16.4 11/29/2023 1227    HGB 14.4 02/10/2014 0450    HGB 15.3 02/06/2014 1705    HGB 15.7 01/10/2014 1810    INR 0.97 07/07/2021 1056    INR 1.11 01/10/2014 1810    WBC 7.44 12/01/2023 0544    WBC 8.64 11/30/2023 0603    WBC 8.65 11/29/2023 1227    WBC 7.71 02/10/2014 0450    WBC 10.59 (H) 02/06/2014 1705    WBC 7.73 01/10/2014 1810    ESR 52 (H) 11/29/2023 1227    .4 (H) 11/29/2023 1227       Meds:    Current Facility-Administered Medications:     acetaminophen (TYLENOL) tablet 650 mg, 650 mg, Oral, Q4H PRN, Pierre Lagos DO    atorvastatin (LIPITOR) tablet 40 mg, 40 mg, Oral, Daily With Estefani Esteban DO, 40 mg at 11/30/23 1627    ceFEPime (MAXIPIME) 2,000 mg in dextrose 5 % 50 mL IVPB, 2,000 mg, Intravenous, Q12H, Pierre Lagos DO, Last Rate: 100 mL/hr at 12/01/23 0022, 2,000 mg at 12/01/23 0022    clopidogrel (PLAVIX) tablet 75 mg, 75 mg, Oral, Daily, Donnella Macleod, DO, 75 mg at 12/01/23 0815    cyclobenzaprine (FLEXERIL) tablet 5 mg, 5 mg, Oral, TID PRN, Jass Oneal PA-C    enoxaparin (LOVENOX) subcutaneous injection 40 mg, 40 mg, Subcutaneous, Q24H [facility], Pierre Lagos DO    HYDROmorphone (DILAUDID) injection 1 mg, 1 mg, Intravenous, Q4H PRN, Julius Smalls JulianeDO rob, 1 mg at 12/01/23 0924    insulin glargine (LANTUS) subcutaneous injection 30 Units 0.3 mL, 30 Units, Subcutaneous, HS, Pierre Lagos DO, 30 Units at 11/30/23 2132    insulin lispro (HumaLOG) 100 units/mL subcutaneous injection 1-6 Units, 1-6 Units, Subcutaneous, TID AC, 1 Units at 12/01/23 0816 **AND** Fingerstick Glucose (POCT), , , TID AC, Valentino Bangura DO    insulin lispro (HumaLOG) 100 units/mL subcutaneous injection 10 Units, 10 Units, Subcutaneous, TID With Meals, Fabi Hymen, DO, 10 Units at 12/01/23 0815    lisinopril (ZESTRIL) tablet 10 mg, 10 mg, Oral, Daily, Fabi Hymen, DO, 10 mg at 12/01/23 0815    metoprolol tartrate (LOPRESSOR) tablet 25 mg, 25 mg, Oral, Q6H PRN, Fabi Hymen, DO    ondansetron (ZOFRAN) injection 4 mg, 4 mg, Intravenous, Q4H PRN, Fabi Hymen, DO    oxyCODONE (ROXICODONE) IR tablet 5 mg, 5 mg, Oral, Q4H PRN, 5 mg at 11/30/23 2144 **OR** oxyCODONE (ROXICODONE) immediate release tablet 10 mg, 10 mg, Oral, Q4H PRN, Fabi Hymen, DO, 10 mg at 12/01/23 0346    polyethylene glycol (MIRALAX) packet 17 g, 17 g, Oral, Daily, Fabi Hymen, DO    senna-docusate sodium (SENOKOT S) 8.6-50 mg per tablet 1 tablet, 1 tablet, Oral, HS, Fabi Hymen, DO, 1 tablet at 11/30/23 2132    vancomycin (VANCOCIN) IVPB (premix in dextrose) 1,000 mg 200 mL, 12.5 mg/kg, Intravenous, Q12H, Fabi Hymen, DO, Last Rate: 200 mL/hr at 12/01/23 0739, 1,000 mg at 12/01/23 0739    Blood Culture:   Lab Results   Component Value Date    BLOODCX No Growth at 24 hrs. 11/29/2023       Wound Culture:   Lab Results   Component Value Date    WOUNDCULT 1 colony Staphylococcus aureus (A) 10/23/2020       Ins and Outs:  I/O last 24 hours: In: -   Out: 2185 [Urine:1775]          Physical:  Vitals:    12/01/23 0722   BP: 155/74   Pulse: 76   Resp: 18   Temp: 98 °F (36.7 °C)   SpO2: 96%     Musculoskeletal: left Lower Extremity  Surgical dressings in place without strikethrough. Dressings taken down, packing pulled. Copious serosanguineous drainage noted. Expressible hematoma. Pressure dressing applied. Moderate quita-incisional tenderness. No TTP of distal stump. Patient with muscle spasm in quadriceps.    Sensation intact over thigh and stump. Limited active knee flexion secondary to pain. Muscles of thigh and stump soft and compressible. Assessment:    70 y.o.male s/p  I&D left lower extremity for septic prepatellar bursitis with Dr. Ana Rosa Glez on 11/30 (POD 2). Plan:  WBAT left LE  Wound cultures pending  PT/OT  Abx per primary  Pain control per primary  DVT ppx per primary - recommend holding blood thinners at this time given hematoma and worsening pain noted on knee exam today.    Discussed with ortho attg, and with primary medicine team.   Medical management per primary team.   Dispo: Ortho will follow  Case reviewed and discussed with Dr. Pili Álvarez PA-C

## 2023-12-01 NOTE — UTILIZATION REVIEW
NOTIFICATION OF INPATIENT ADMISSION   AUTHORIZATION REQUEST   SERVICING FACILITY:   [address] [place] [address]  Tax ID: [id number]  NPI: [id number]   ATTENDING PROVIDER:  Attending Name and NPI#: Tad Mancini Md [[id number]]  Address: [address] [place] [address]  Phone: [phone]     ADMISSION INFORMATION:  Place of Service: Inpatient [facility]  Place of Service Code: 21  Inpatient Admission Date/Time: 11/29/23 12:32 PM  Discharge Date/Time: No discharge date for patient encounter. Admitting Diagnosis Code/Description:  Encounter for wound care [Z51.89]  Septic prepatellar bursitis of left knee [M71.162]     UTILIZATION REVIEW CONTACT:  Yasir Ramirez, Utilization   Network Utilization Review Department  Phone: [phone]  Fax: [phone]  Email: [email or web address]  Contact for approvals/pending authorizations, clinical reviews, and discharge. PHYSICIAN ADVISORY SERVICES:  Medical Necessity Denial & Onds-qg-Omim Review  Phone: [phone]  Fax: [phone]  Email: [email or web address]     DISCHARGE SUPPORT TEAM:  For Patients Discharge Needs & Updates  Phone: [phone] opt. 2 Fax: [phone]  Email: [email or web address]

## 2023-12-01 NOTE — PLAN OF CARE
Problem: PHYSICAL THERAPY ADULT  Goal: Performs mobility at highest level of function for planned discharge setting. See evaluation for individualized goals. Description: Treatment/Interventions: Functional transfer training, LE strengthening/ROM, Therapeutic exercise, Endurance training, Cognitive reorientation, Patient/family training, Equipment eval/education, Bed mobility, Continued evaluation, Spoke to advanced practitioner, OT  Equipment Recommended: Wheelchair (and walker vs crutches)       See flowsheet documentation for full assessment, interventions and recommendations. Note: Prognosis: Fair  Problem List: Decreased strength, Decreased range of motion, Decreased endurance, Impaired balance, Decreased mobility, Pain, Orthopedic restrictions, Decreased skin integrity, Obesity  Assessment: Pt is a 70 y.o. male seen for PT evaluation s/p admit to [facility] on 11/29/2023 w/ Septic prepatellar bursitis of left knee. Patient had above procedure, was originally recommended for WBAT, but is now NWB per verbal instructions from Ortho. Additionally Patient had hematoma expressed this morning. Order placed for PT. 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 Up until recently he used his prosthesis for mobility, and has DME including crutches, wheelchair, walker. Upon evaluation: Pt only agreeable to rolling with bilateral rails, and did not participate in edge of bed nor out of bed mobility due to pain/fatigue. However, he did participate with OT earlier in the day and needed little to no assistance for sit to stand and pivot transfers.      Pt's clinical presentation is currently unstable/unpredictable given the functional mobility deficits above, especially (but not limited to) decreased ROM, pain, decreased functional mobility tolerance, and orthopedic restrictions, and combined with medical complications including hypertension , abnormal sodium values, and post op hematoma . Pt IS NOT at his/her mobility baseline. He is at risk for falls based on hx of falls, impaired balance, obesity, and potential limited ability to wear prosthesis until cleared by sessions . During this admission, pt would benefit from continued skilled inpatient PT in the acute care setting in order to address deficits as defined above to maximize function and mobility. Recommendations:     From a PT standpoint, recommend next several sessions focus on patient of edge of bed and out of bed mobility, assess gait and wheelchair mobility, perform gentle ROM to L knee, LE strengthening as able  Barriers to Discharge: Decreased caregiver support, Inaccessible home environment     Rehab Resource Intensity Level, PT: II (Moderate Resource Intensity)    See flowsheet documentation for full assessment.

## 2023-12-01 NOTE — PLAN OF CARE
Problem: PHYSICAL THERAPY ADULT  Goal: Performs mobility at highest level of function for planned discharge setting. See evaluation for individualized goals. Description: Treatment/Interventions: Functional transfer training, LE strengthening/ROM, Therapeutic exercise, Endurance training, Cognitive reorientation, Patient/family training, Equipment eval/education, Bed mobility, Continued evaluation, Spoke to advanced practitioner, OT  Equipment Recommended: Wheelchair (and walker vs crutches)       See flowsheet documentation for full assessment, interventions and recommendations. 12/1/2023 1716 by Meredith Shelton PT  Note: Prognosis: Fair  Problem List: Decreased strength, Decreased range of motion, Decreased endurance, Impaired balance, Decreased mobility, Pain, Orthopedic restrictions, Decreased skin integrity, Obesity  Assessment: Pt is a 70 y.o. male seen for PT evaluation s/p admit to [facility] on 11/29/2023 w/ Septic prepatellar bursitis of left knee. Patient had above procedure, was originally recommended for WBAT, but is now NWB per verbal instructions from Ortho. Additionally Patient had hematoma expressed this morning. Order placed for PT. 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 Up until recently he used his prosthesis for mobility, and has DME including crutches, wheelchair, walker. Upon evaluation: Pt only agreeable to rolling with bilateral rails, and did not participate in edge of bed nor out of bed mobility due to pain/fatigue. However, he did participate with OT earlier in the day and needed little to no assistance for sit to stand and pivot transfers.      Pt's clinical presentation is currently unstable/unpredictable given the functional mobility deficits above, especially (but not limited to) decreased ROM, pain, decreased functional mobility tolerance, and orthopedic restrictions, and combined with medical complications including hypertension , abnormal sodium values, and post op hematoma . Pt IS NOT at his/her mobility baseline. He is at risk for falls based on hx of falls, impaired balance, obesity, and potential limited ability to wear prosthesis until cleared by sessions . During this admission, pt would benefit from continued skilled inpatient PT in the acute care setting in order to address deficits as defined above to maximize function and mobility. Recommendations:     From a PT standpoint, recommend next several sessions focus on patient of edge of bed and out of bed mobility, assess gait and wheelchair mobility, perform gentle ROM to L knee, LE strengthening as able  Barriers to Discharge: Decreased caregiver support, Inaccessible home environment     Rehab Resource Intensity Level, PT: II (Moderate Resource Intensity)    See flowsheet documentation for full assessment.

## 2023-12-01 NOTE — PROGRESS NOTES
[address] [facility]  Progress Note  Name: Eladio Wells  MRN: [id number]  Unit/Bed#: S -01 I Date of Admission: 11/29/2023   Date of Service: 12/1/2023 I Hospital Day: 2    Assessment/Plan   * Septic prepatellar bursitis of left knee  Assessment & Plan  Pt presents w/ infection of bursa in L knee region s/p ipsilateral BKA of 2021  Erythema, painful, swollen for past 1-2 weeks gradually worsening until ER visit 5 days ago  Received Keflex 500 q12 o/p + supportive measures, no change in sx to present day  O/p ortho visit aspirated 30 ml fluid, purulent & muddy appearing, sent pt to [facility] ER  Ortho incision & curettage today, admitted    Ortho to follow  Fluid analysis pending  Blood cx pending  Cef & vanc begun  Pharmacy consulted  Oxycodone for pain  Dilaudid for breakthrough  OT / PT requested  Watch for signs of sepsis    Poorly controlled type 2 diabetes mellitus [facility])  Assessment & Plan  Lab Results   Component Value Date    HGBA1C 11.6 (A) 11/24/2023       Recent Labs     11/30/23  0727 11/30/23  1029 11/30/23  1615 11/30/23  2040   POCGLU 203* 231* 196* 124         Blood Sugar Average: Last 72 hrs:  (P) [id number]    Recent A1C of 11.6 w/ questionable compliance of meds & insulin  Will be on insulin regimen plus ISS while I/p  Carb 3 diet    Essential hypertension  Assessment & Plan  Chronic, MAP average day of admission 120s, medication compliance questionable  Will restart home dose lisinopril 10 mg   Will begin PRN Lopressor 25 mg q 6 hrs    Atrial fibrillation, unspecified type (HCC)  Assessment & Plan  Not in Afib during physical exam  Has been on Xarelto for years at strange dose of 2.5 mg daily    PVD (peripheral vascular disease) (HCC)  Assessment & Plan  Continues on clopidogrel               VTE Pharmacologic Prophylaxis: VTE Score: 5 High Risk (Score >/= 5) - Pharmacological DVT Prophylaxis Ordered: rivaroxaban (Xarelto).  Sequential Compression Devices Ordered. Mobility:   Basic Mobility Inpatient Raw Score: 19  -HLM Goal: 6: Walk 10 steps or more  JH-HLM Achieved: 2: Bed activities/Dependent transfer  HLM Goal NOT achieved. Continue with multidisciplinary rounding and encourage appropriate mobility to improve upon [facility] goals. Patient Centered Rounds: I performed bedside rounds with nursing staff today. Discussions with Specialists or Other Care Team Provider: Ortho, IM    Education and Discussions with Family / Patient: Updated  (wife) via phone. Current Length of Stay: 2 day(s)  Current Patient Status: Inpatient   Discharge Plan: Anticipate discharge in 48-72 hrs to discharge location to be determined pending rehab evaluations. Code Status: Level 1 - Full Code    Subjective:   Pt describes bad night of sleep mostly due to pain at site of L knee, but otherwise no new complaints. UMA o/n, but was given more of his PRN pain meds as had been ordered prior. Ortho changed dressing & drained fluid, + expressible hematoma, so will dc any active clopidogrel, xarelto, & enoxaparin meds for now until okayed by subsequent ortho eval.    Objective:     Vitals:   Temp (24hrs), Av.3 °F (36.8 °C), Min:97.9 °F (36.6 °C), Max:98.6 °F (37 °C)    Temp:  [97.9 °F (36.6 °C)-98.6 °F (37 °C)] 98 °F (36.7 °C)  HR:  [76-92] 76  Resp:  [14-18] 18  BP: (130-155)/(64-74) 155/74  SpO2:  [95 %-96 %] 96 %  Body mass index is 32.7 kg/m². Input and Output Summary (last 24 hours): Intake/Output Summary (Last 24 hours) at 2023 1028  Last data filed at 2023 6289  Gross per 24 hour   Intake --   Output 825 ml   Net -825 ml       Physical Exam:   Physical Exam  Constitutional:       General: He is awake. He is not in acute distress. Appearance: Normal appearance. He is not ill-appearing, toxic-appearing or diaphoretic. HENT:      Head: Normocephalic and atraumatic. Nose: Nose normal. No congestion or rhinorrhea.    Eyes:      General: No visual field deficit or scleral icterus. Right eye: No discharge. Left eye: No discharge. Extraocular Movements: Extraocular movements intact. Conjunctiva/sclera: Conjunctivae normal.      Pupils: Pupils are equal, round, and reactive to light. Neck:      Thyroid: No thyroid tenderness. Vascular: No carotid bruit. Trachea: Trachea normal.   Cardiovascular:      Rate and Rhythm: Normal rate and regular rhythm. Pulses: Normal pulses. Heart sounds: Normal heart sounds. Pulmonary:      Effort: Pulmonary effort is normal.      Breath sounds: Normal breath sounds and air entry. Abdominal:      General: Bowel sounds are normal.      Tenderness: There is no abdominal tenderness. There is no guarding. Musculoskeletal:         General: Swelling and tenderness present. Cervical back: Normal range of motion and neck supple. Neurological:      General: No focal deficit present. Mental Status: He is alert and oriented to person, place, and time. Mental status is at baseline. Cranial Nerves: No cranial nerve deficit, dysarthria or facial asymmetry. Sensory: Sensation is intact. No sensory deficit. Motor: Motor function is intact. No weakness. Coordination: Coordination is intact. Gait: Gait abnormal.   Psychiatric:         Attention and Perception: Attention and perception normal.         Mood and Affect: Mood and affect normal.         Speech: Speech normal.         Behavior: Behavior normal.         Thought Content:  Thought content normal.         Cognition and Memory: Cognition and memory normal.         Judgment: Judgment normal.          Additional Data:     Labs:  Results from last 7 days   Lab Units 12/01/23  0544 11/30/23  0603 11/29/23  1227   WBC Thousand/uL 7.44   < > 8.65   HEMOGLOBIN g/dL 14.9   < > 16.4   HEMATOCRIT % 45.2   < > 50.6*   PLATELETS Thousands/uL 198   < > 198   NEUTROS PCT %  --   --  81*   LYMPHS PCT %  --   -- 13*   MONOS PCT %  --   --  6   EOS PCT %  --   --  0    < > = values in this interval not displayed. Results from last 7 days   Lab Units 12/01/23  0544 11/30/23  0603 11/29/23  1227   SODIUM mmol/L 135   < > 132*   POTASSIUM mmol/L 3.6   < > 4.3   CHLORIDE mmol/L 101   < > 92*   CO2 mmol/L 26   < > 24   BUN mg/dL 14   < > 21   CREATININE mg/dL 0.72   < > 0.98   ANION GAP mmol/L 8   < > 16   CALCIUM mg/dL 8.8   < > 9.7   ALBUMIN g/dL  --   --  4.1   TOTAL BILIRUBIN mg/dL  --   --  0.86   ALK PHOS U/L  --   --  73   ALT U/L  --   --  108*   AST U/L  --   --  38   GLUCOSE RANDOM mg/dL 172*   < > 311*    < > = values in this interval not displayed. Results from last 7 days   Lab Units 12/01/23  0723 11/30/23  2040 11/30/23  1615 11/30/23  1029 11/30/23  0727 11/29/23  2125 11/29/23  1802 11/29/23  1313   POC GLUCOSE mg/dl 175* 124 196* 231* 203* 197* 245* 272*           Results from last 7 days   Lab Units 11/29/23  1227   LACTIC ACID mmol/L 1.2       Lines/Drains:  Invasive Devices       Peripheral Intravenous Line  Duration             Peripheral IV 11/29/23 Right;Ventral (anterior) Forearm 1 day                          Imaging: No pertinent imaging reviewed. Recent Cultures (last 7 days):   Results from last 7 days   Lab Units 11/29/23  1436 11/29/23  1238 11/29/23  1227 11/29/23  1119   BLOOD CULTURE   --  No Growth at 24 hrs.  No Growth at 24 hrs.  --    GRAM STAIN RESULT  No Polys or Bacteria seen  --   --  2+ Polys  No bacteria seen       Last 24 Hours Medication List:   Current Facility-Administered Medications   Medication Dose Route Frequency Provider Last Rate    acetaminophen  650 mg Oral Q4H PRN Rubbie Sat, DO      atorvastatin  40 mg Oral Daily With [employer], DO      cefepime  2,000 mg Intravenous Q12H Rubbie Sat, DO 2,000 mg (12/01/23 0022)    cyclobenzaprine  5 mg Oral TID PRN Jefe Purdy PA-C      HYDROmorphone  1 mg Intravenous Q4H PRN Julius Alaniz DO insulin glargine  30 Units Subcutaneous HS Sharmin Roberto, DO      insulin lispro  1-6 Units Subcutaneous TID AC Valentino Bangura, DO      insulin lispro  10 Units Subcutaneous TID With Meals Sharmin Roberto, DO      lisinopril  10 mg Oral Daily Sharmin Sea, DO      metoprolol tartrate  25 mg Oral Q6H PRN Sharmin Sea, DO      ondansetron  4 mg Intravenous Q4H PRN Sharmin Sea, DO      oxyCODONE  5 mg Oral Q4H PRN Sharmin Sea, DO      Or    oxyCODONE  10 mg Oral Q4H PRN Sharmin Sea, DO      polyethylene glycol  17 g Oral Daily Sharmin Sea, DO      senna-docusate sodium  1 tablet Oral HS Sharmin Sea, DO      vancomycin  12.5 mg/kg Intravenous Q12H Sharmin Sea, DO 1,000 mg (12/01/23 0755)        Today, Patient Was Seen By: Sharmin Mejia DO    **Please Note: This note may have been constructed using a voice recognition system. **

## 2023-12-01 NOTE — OCCUPATIONAL THERAPY NOTE
Occupational Therapy Evaluation     Patient Name: Aida Alejo  Today's Date: 12/1/2023  Problem List  Principal Problem:    Septic prepatellar bursitis of left knee  Active Problems:    PVD (peripheral vascular disease) ([address])    Poorly controlled type 2 diabetes mellitus ([address])    Essential hypertension    Atrial fibrillation, unspecified type ([address])    Past Medical History  Past Medical History:   Diagnosis Date    Arthritis     fingers    First degree AV block     Full dentures     Hypertension     PAD (peripheral artery disease) ([facility])     PVD (peripheral vascular disease) ([address])     Type 2 diabetes mellitus with diabetic peripheral angiopathy without gangrene ([address]) 5/18/2021    Per CMS ICD 10 guidelines--Per Physician    Wound, open     right foot- by the 5th toe     Past Surgical History  Past Surgical History:   Procedure Laterality Date    CHOLECYSTECTOMY      COLONOSCOPY      INCISION AND DRAINAGE OF WOUND Left 11/29/2023    Procedure: INCISION AND DRAINAGE (I&D) LEFT KNEE PRE-PATELLA BURSA; Surgeon: Connie Givens DO;  Location[facility];  Service: Orthopedics    IR AORTAGRAM WITH RUN-OFF  1/28/2021    IR AORTAGRAM WITH RUN-OFF  4/13/2021    LEG AMPUTATION THROUGH LOWER TIBIA AND FIBULA Left 7/17/2021    Procedure: AMPUTATION BELOW KNEE (BKA);   Surgeon: Juan Alberto Donato MD;  Location: [facility];  Service: Vascular    VT BYP FEM-ANT TIBL PST TIBL PRONEAL ART/OTH DSTL Left 7/14/2021    Procedure: BYPASS femoral-peroneal artery bypass with  reverse GSV and balloon angioplasty peroneal artery;  Surgeon: Juan Alberto Donato MD;  Location[facility];  Service: Vascular    VT DEBRIDEMENT BONE MUSCLE &/FASCIA 20 SQ CM/< Right 12/18/2020    Procedure: DEBRIDMENT OF ULCER , REMOVAL OF DEVITALIZED SKIN, SOFT TISSUE, BONE AND APPLICATION OF INTEGRA GRAFT RIGHT FOOT.;  Surgeon: Chris Purdy DPM;  Location: [facility];  Service: Podiatry    REPLANTATION THUMB Right     right tip reconnected    SKIN GRAFT      right thumb    TOE AMPUTATION      left foot all 5 toes removed    TOE AMPUTATION Right 2/2/2021    Procedure: Right partial 5th ray amputation;  Surgeon: Leodan Thompson DPM;  Location:  MAIN OR;  Service: Podiatry    TOE OSTEOTOMY Right 6/26/2020    Procedure: exostosis of right big toe;  Surgeon: Marko Willson DPM;  Location: [facility];  Service: Podiatry    TRIGGER FINGER RELEASE      bilateral hands    VEIN LIGATION      right         12/01/23 1110   OT Last Visit   OT Visit Date 12/01/23   Note Type   Note type Evaluation   Pain Assessment   Pain Assessment Tool FLACC   Pain Location/Orientation Orientation: Left; Location: Leg  (residual limb)   Pain Onset/Description Onset: Ongoing;Frequency: Constant/Continuous   Effect of Pain on Daily Activities comfort   Hospital Pain Intervention(s) Repositioned;Elevated;Cold applied   Pain Rating: FLACC (Rest) - Face 0   Pain Rating: FLACC (Rest) - Legs 0   Pain Rating: FLACC (Rest) - Activity 0   Pain Rating: FLACC (Rest) - Cry 0   Pain Rating: FLACC (Rest) - Consolability 0   Score: FLACC (Rest) 0   Pain Rating: FLACC (Activity) - Face 1   Pain Rating: FLACC (Activity) - Legs 0   Pain Rating: FLACC (Activity) - Activity 0   Pain Rating: FLACC (Activity) - Cry 1   Pain Rating: FLACC (Activity) - Consolability 0   Score: FLACC (Activity) 2   Restrictions/Precautions   Weight Bearing Precautions Per Order Yes   LLE Weight Bearing Per Order   (L BKA, WBAT per ortho note, however Adrienne ortho PA-C verbalized before session she does not weight through residual limb at this time. NWB maintained)   Other Precautions Chair Alarm; Bed Alarm; Fall Risk;Multiple lines;Pain  (L BKA, IV)   Home Living   Type of [address] [place] [address] Dr Two level;Bed/bath upstairs  (3 vs 4 [place], full bath on both floors.  bedroom/"mancave" on 2nd floor)   Bathroom Shower/Tub Tub/shower unit  (tub on first floor, walk in shower on 2nd)   [address] bars around toilet Bathroom Accessibility Accessible  (pt unsure if w/c will fit in 1st floor bathroom, would fit in 2nd floor bathroom)   [place]; Wheelchair-manual;Crutches  (L prosthetic, however reporting improper fit currently.)   Additional Comments crutches c prosthetic used at baseline, reports has not worn prosthetic a few days leading up to admission 2/2 limited fit, pain   Prior Function   Level of [place] Independent with ADLs; Independent with functional mobility; Independent with IADLS   Lives With Spouse   Receives Help From Family   IADLs Independent with driving; Independent with meal prep; Independent with medication management   Falls in the last 6 months   (1 while assisting father with a transfer, reports hurting L shoulder during fall.)   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 Lifestyle   [facility], pt is (I) with ADL/IADLs, crutches c prosthetic, + driving, 1 fall. 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 L BKA 2021, hx of uncontrolled DM2, PVD, AFIB, HTN,   Family/Caregiver Present No   Subjective   Subjective Pt reports feeling slightly weak since being in bed multiple days.  [place] Rg to get OOB, requests to listen to music throughout session   ADL   Eating Assistance 6  Modified independent   Eating Deficit   (set up c meal tray at end of session)   Grooming Assistance 6  Modified Independent  (washed hands at sink in W/C)   Grooming Deficit Setup;Supervision/safety  (seated)   UB Bathing Assistance 6  Modified Independent   LB Bathing Assistance 5  Supervision/Setup UB Dressing Assistance 6  Modified independent   LB Dressing Assistance 5  Supervision/Setup   Toileting Assistance  5  Supervision/Setup   Toileting Deficit Clothing management up;Clothing management down; Increased time to complete;Verbal cueing;Supervison/safety;Setup  (clothing management and pericare (I). )   Functional Assistance 5  Supervision/Setup  (mod (I) W/c propulsion, supervision transfers)   Additional Comments Did not observe UB bathing, LB bathing, and UB dressing at time of evaluation, with use of clinical reasoning, pt's performance throughout evaluation indicates that pt may be able to perform these tasks at the levels listed above   Bed Mobility   Supine to Sit 6  Modified independent   Additional items HOB elevated  (increased effort)   Sit to Supine   (seated OOB in recliner at end of session)   Transfers   Sit to Stand 5  Supervision   Additional items Assist x 1; Increased time required   Stand to Sit 5  Supervision   Additional items Assist x 1; Increased time required;Verbal cues   Stand pivot 5  Supervision   Additional items Verbal cues; Assist x 1; Increased time required   Toilet transfer 5  Supervision   Additional items Assist x 1; Increased time required;Standard toilet;Verbal cues   Additional Comments SPT completed bed > w/c, W/C <>toilet, W/C > recliner. RW vs grab bar vs bed rail used during transfers. Assistance for IV line management, cues for safety, and A for stabilizing W/C d/t poor brakes. Functional Mobility   Functional Mobility 6  Modified independent   Additional Comments mod (I) W/C level mobility with BUE self propulsion within room/bathroom, increased time.  Plan to f/u for Rw vs crutches mobility   Additional items   (W/C)   Balance   Static Sitting Good   Dynamic Sitting Good   Static Standing Fair +   Dynamic Standing Fair +   Activity Tolerance   Activity Tolerance Patient tolerated treatment well   Medical Staff Made Aware PT [facility]   Nurse Made Aware MARCOS Castillo, [address] RUE Assessment   RUE Assessment WFL  (Full AROM, MMT 4+/5)   LUE Assessment   LUE Assessment WFL  (Full AROM, MMT 4+/5)   Hand Function   Gross Motor Coordination Functional   Fine Motor Coordination Functional   Cognition   Overall Cognitive Status WFL   Arousal/Participation Alert; Cooperative   Attention Within functional limits   Orientation Level Oriented to person;Oriented to place;Oriented to situation   Memory Decreased recall of precautions   Following Commands Follows one step commands without difficulty   Comments Pt agreeable to OT session, intermittent cues for safety awareness   Assessment   Limitation Decreased ADL status; Decreased UE strength;Decreased Safe judgement during ADL;Decreased endurance;Decreased self-care trans;Decreased high-level ADLs  (+ pain)   Prognosis Good   Assessment Patient is a 70 y.o. male seen for OT evaluation at [address] [place] [address] following admission on 11/29/2023  s/p Septic prepatellar bursitis of left knee. Please see above for comprehensive list of comorbidities and significant PMHx impacting functional performance. Upon initial evaluation, pt appears to be performing below baseline functional status. Occupational performance is affected by the following deficits: endurance ,  decreased muscular strength , acute change in mobility status , decreased standing tolerance for self care tasks , decreased dynamic balance impacting functional reach, decreased trunk control , decreased activity tolerance , impaired safety awareness , and (+) pain . Personal/Environmental factors impacting D/C include: (+) Hx of falls  and steps to enter/navigate the home, inability to wear prosthetic at this time d/t wound healing.   Supporting factors include: able to maintain FFSU and attitude towards recovery Patient would benefit from OT services within the acute care setting to maximize level of functional independence in the following areas self-care transfers, functional mobility, and ADLs. From OT standpoint, recommendation at time of D/C would be Level 3: minimum resource intensity . Goals   Patient Goals to return home ,to get back to using crutches   Plan   Treatment Interventions ADL retraining;Functional transfer training;UE strengthening/ROM; Endurance training;Patient/family training;Equipment evaluation/education; Compensatory technique education;Continued evaluation   Goal Expiration Date 12/11/23   OT Treatment Day 0   OT Frequency 3-5x/wk   Discharge Recommendation   Rehab Resource Intensity Level, OT III (Minimum Resource Intensity)   Additional Comments  The patient's raw score on the AM-PAC Daily Activity Inpatient Short Form is 20. A raw score of greater than or equal to 19 suggests the patient may benefit from discharge to home. Please refer to the recommendation of the Occupational Therapist for safe discharge planning. AM-PAC Daily Activity Inpatient   Lower Body Dressing 3   Bathing 3   Toileting 3   Upper Body Dressing 3   Grooming 4   Eating 4   Daily Activity Raw Score 20   Daily Activity Standardized Score (Calc for Raw Score >=11) 42.03   AM-[facility] Applied Cognition Inpatient   Following a Speech/Presentation 4   Understanding Ordinary Conversation 4   Taking Medications 3   Remembering Where Things Are Placed or Put Away 4   Remembering List of 4-5 Errands 4   Taking Care of Complicated Tasks 3   Applied Cognition Raw Score 22   Applied Cognition Standardized Score 47.83   End of Consult   Education Provided Yes   Patient Position at End of Consult Bedside chair; All needs within reach;Bed/Chair alarm activated  (meal tray set up)   Nurse Communication Nurse aware of consult  (MARCOS García)   Goals established on initial evaluation in order to achieve pt's goal of returning home, walking on crutches     Pt will complete grooming tasks Mod independent  in STANCE at sink for increased ADL independence within 10 days.      Pt will complete LB ADLs Mod independent for increased ADL independence within 10 days. Pt will complete toileting Mod independent   with use of DME for increased ADL independence within 10 days. Pt will demonstrate proper body mechanics to complete self-care transfers and functional mobility c RW vs SPC with Mod independent  for increased safety and functional independence within 10 days. Pt will demonstrate proper body mechanics and fall prevention strategies during 100% of tx sessions for increased safety awareness during ADL/IADLs    Pt will demonstrate OOB sitting tolerance of 2-4 hr/day for increased activity tolerance and engagement in leisure activities within 10 days.        Jelena Patel, OT

## 2023-12-02 LAB
ANION GAP SERPL CALCULATED.3IONS-SCNC: 9 MMOL/L
BACTERIA TISS AEROBE CULT: ABNORMAL
BUN SERPL-MCNC: 12 MG/DL (ref 5–25)
CALCIUM SERPL-MCNC: 9 MG/DL (ref 8.4–10.2)
CHLORIDE SERPL-SCNC: 96 MMOL/L (ref 96–108)
CO2 SERPL-SCNC: 30 MMOL/L (ref 21–32)
CREAT SERPL-MCNC: 0.76 MG/DL (ref 0.6–1.3)
ERYTHROCYTE [DISTWIDTH] IN BLOOD BY AUTOMATED COUNT: 11.6 % (ref 11.6–15.1)
GFR SERPL CREATININE-BSD FRML MDRD: 93 ML/MIN/1.73SQ M
GLUCOSE SERPL-MCNC: 169 MG/DL (ref 65–140)
GLUCOSE SERPL-MCNC: 184 MG/DL (ref 65–140)
GLUCOSE SERPL-MCNC: 201 MG/DL (ref 65–140)
GLUCOSE SERPL-MCNC: 222 MG/DL (ref 65–140)
GLUCOSE SERPL-MCNC: 249 MG/DL (ref 65–140)
GRAM STN SPEC: ABNORMAL
HCT VFR BLD AUTO: 42.9 % (ref 36.5–49.3)
HGB BLD-MCNC: 14.1 G/DL (ref 12–17)
MCH RBC QN AUTO: 32.1 PG (ref 26.8–34.3)
MCHC RBC AUTO-ENTMCNC: 32.9 G/DL (ref 31.4–37.4)
MCV RBC AUTO: 98 FL (ref 82–98)
PLATELET # BLD AUTO: 227 THOUSANDS/UL (ref 149–390)
PMV BLD AUTO: 9.9 FL (ref 8.9–12.7)
POTASSIUM SERPL-SCNC: 3.6 MMOL/L (ref 3.5–5.3)
RBC # BLD AUTO: 4.39 MILLION/UL (ref 3.88–5.62)
SODIUM SERPL-SCNC: 135 MMOL/L (ref 135–147)
WBC # BLD AUTO: 8.52 THOUSAND/UL (ref 4.31–10.16)

## 2023-12-02 PROCEDURE — 99232 SBSQ HOSP IP/OBS MODERATE 35: CPT | Performed by: INTERNAL MEDICINE

## 2023-12-02 PROCEDURE — 99024 POSTOP FOLLOW-UP VISIT: CPT | Performed by: PHYSICIAN ASSISTANT

## 2023-12-02 PROCEDURE — 85027 COMPLETE CBC AUTOMATED: CPT | Performed by: PHYSICIAN ASSISTANT

## 2023-12-02 PROCEDURE — 82948 REAGENT STRIP/BLOOD GLUCOSE: CPT

## 2023-12-02 PROCEDURE — 80048 BASIC METABOLIC PNL TOTAL CA: CPT | Performed by: PHYSICIAN ASSISTANT

## 2023-12-02 PROCEDURE — 99232 SBSQ HOSP IP/OBS MODERATE 35: CPT | Performed by: HOSPITALIST

## 2023-12-02 RX ORDER — CLOPIDOGREL BISULFATE 75 MG/1
75 TABLET ORAL DAILY
Status: DISCONTINUED | OUTPATIENT
Start: 2023-12-02 | End: 2023-12-03 | Stop reason: HOSPADM

## 2023-12-02 RX ORDER — CYCLOBENZAPRINE HCL 5 MG
5 TABLET ORAL 3 TIMES DAILY PRN
Qty: 10 TABLET | Refills: 0 | Status: SHIPPED | OUTPATIENT
Start: 2023-12-02 | End: 2023-12-12

## 2023-12-02 RX ORDER — CEFADROXIL 500 MG/1
500 CAPSULE ORAL EVERY 12 HOURS SCHEDULED
Qty: 20 CAPSULE | Refills: 0 | Status: SHIPPED | OUTPATIENT
Start: 2023-12-02 | End: 2023-12-03 | Stop reason: SDUPTHER

## 2023-12-02 RX ADMIN — OXYCODONE HYDROCHLORIDE 10 MG: 10 TABLET ORAL at 22:02

## 2023-12-02 RX ADMIN — OXYCODONE HYDROCHLORIDE 10 MG: 10 TABLET ORAL at 08:59

## 2023-12-02 RX ADMIN — OXYCODONE HYDROCHLORIDE 10 MG: 10 TABLET ORAL at 15:58

## 2023-12-02 RX ADMIN — INSULIN LISPRO 10 UNITS: 100 INJECTION, SOLUTION INTRAVENOUS; SUBCUTANEOUS at 17:08

## 2023-12-02 RX ADMIN — LISINOPRIL 10 MG: 10 TABLET ORAL at 08:59

## 2023-12-02 RX ADMIN — RIVAROXABAN 2.5 MG: 2.5 TABLET, FILM COATED ORAL at 13:15

## 2023-12-02 RX ADMIN — INSULIN LISPRO 3 UNITS: 100 INJECTION, SOLUTION INTRAVENOUS; SUBCUTANEOUS at 17:08

## 2023-12-02 RX ADMIN — CEFAZOLIN SODIUM 2000 MG: 2 SOLUTION INTRAVENOUS at 20:30

## 2023-12-02 RX ADMIN — HYDROMORPHONE HYDROCHLORIDE 1 MG: 1 INJECTION, SOLUTION INTRAMUSCULAR; INTRAVENOUS; SUBCUTANEOUS at 18:57

## 2023-12-02 RX ADMIN — INSULIN LISPRO 2 UNITS: 100 INJECTION, SOLUTION INTRAVENOUS; SUBCUTANEOUS at 13:16

## 2023-12-02 RX ADMIN — CEFAZOLIN SODIUM 2000 MG: 2 SOLUTION INTRAVENOUS at 04:43

## 2023-12-02 RX ADMIN — CEFAZOLIN SODIUM 2000 MG: 2 SOLUTION INTRAVENOUS at 13:15

## 2023-12-02 RX ADMIN — CLOPIDOGREL BISULFATE 75 MG: 75 TABLET ORAL at 08:59

## 2023-12-02 RX ADMIN — INSULIN LISPRO 10 UNITS: 100 INJECTION, SOLUTION INTRAVENOUS; SUBCUTANEOUS at 13:16

## 2023-12-02 RX ADMIN — ATORVASTATIN CALCIUM 40 MG: 40 TABLET, FILM COATED ORAL at 15:58

## 2023-12-02 RX ADMIN — INSULIN LISPRO 2 UNITS: 100 INJECTION, SOLUTION INTRAVENOUS; SUBCUTANEOUS at 09:01

## 2023-12-02 RX ADMIN — OXYCODONE HYDROCHLORIDE 10 MG: 10 TABLET ORAL at 03:26

## 2023-12-02 RX ADMIN — HYDROMORPHONE HYDROCHLORIDE 1 MG: 1 INJECTION, SOLUTION INTRAMUSCULAR; INTRAVENOUS; SUBCUTANEOUS at 10:30

## 2023-12-02 RX ADMIN — HYDROMORPHONE HYDROCHLORIDE 1 MG: 1 INJECTION, SOLUTION INTRAMUSCULAR; INTRAVENOUS; SUBCUTANEOUS at 01:02

## 2023-12-02 RX ADMIN — INSULIN GLARGINE 30 UNITS: 100 INJECTION, SOLUTION SUBCUTANEOUS at 21:58

## 2023-12-02 NOTE — PROGRESS NOTES
[facility]  Progress Note  Name: Carlyle Parish  MRN: [id number]  Unit/Bed#: S -01 I Date of Admission: 11/29/2023   Date of Service: 12/2/2023 I Hospital Day: 3    Assessment/Plan   * Septic prepatellar bursitis of left knee  Assessment & Plan  Pt presents w/ infection of bursa in L knee region s/p ipsilateral BKA of 2021  Erythema, painful, swollen for past 1-2 weeks gradually worsening until ER visit 5 days ago  Received Keflex 500 q12 o/p + supportive measures, no change in sx to present day  O/p ortho visit aspirated 30 ml fluid, purulent & muddy appearing, [facility]  Ortho incision & curettage today, admitted    Ortho has cleared the patient for discharge; follow-up with Dr. Gisela Overton as outpatient in 2 weeks  Fluid analysis pending  Blood cx pending  [place] consulted  Oxycodone for pain  Dilaudid for breakthrough  OT / PT recommending post-acute rehab  Discharge home with home health  Anticipate discharge on [facility] for an additional 9 days to complete 14 day course    Atrial fibrillation, unspecified type [facility])  Assessment & Plan  Not in Afib during physical exam  Has been on Xarelto for years at strange dose of 2.5 mg daily    Essential hypertension  Assessment & Plan  Chronic, MAP average day of admission 120s, medication compliance questionable  Will restart home dose lisinopril 10 mg   Will begin PRN Lopressor 25 mg q 6 hrs    Poorly controlled type 2 diabetes mellitus [facility])  Assessment & Plan  Lab Results   Component Value Date    HGBA1C 11.6 (A) 11/24/2023       Recent Labs     12/01/23  1556 12/01/23  2103 12/02/23  0621 12/02/23  1127   POCGLU 195* 186* 201* 222*       Blood Sugar Average: Last 72 hrs:  (P) 200.9334887657870879    Recent A1C of 11.6 w/ questionable compliance of meds & insulin  Will be on insulin regimen plus ISS while I/p  Carb 3 diet    PVD (peripheral vascular disease) ([address])  Assessment & Plan  Continues on clopidogrel VTE Pharmacologic Prophylaxis: VTE Score: 5 High Risk (Score >/= 5) - Pharmacological DVT Prophylaxis Ordered: rivaroxaban (Xarelto). Sequential Compression Devices Ordered. Mobility:   Basic Mobility Inpatient Raw Score: 15  JH-HLM Goal: 4: Move to chair/commode  JH-HLM Achieved: 2: Bed activities/Dependent transfer  HLM Goal achieved. Continue to encourage appropriate mobility. Patient Centered Rounds: I performed bedside rounds with nursing staff today. Discussions with Specialists or Other Care Team Provider: Attending    Education and Discussions with Family / Patient: Updated  (wife) via phone. Current Length of Stay: 3 day(s)  Current Patient Status: Inpatient   Discharge Plan: Anticipate discharge tomorrow to home with home services. Code Status: Level 1 - Full Code    Subjective:   Patient seen and examined at the bedside this morning. No acute events overnight. Patient states that he did not have an eventful night as orthopedic surgery came at around 3 AM to assess the knee the dressings. Anticoagulation was resumed today as no evidence of bleeding. Patient is aware that orthopedics has cleared him for discharge. Patient expressed interest in continuing physical therapy as an outpatient, so home health services will be arranged. He states he does a lot of transfer today and would rather be discharged tomorrow. Objective:     Vitals:   Temp (24hrs), Av.2 °F (36.8 °C), Min:98.1 °F (36.7 °C), Max:98.4 °F (36.9 °C)    Temp:  [98.1 °F (36.7 °C)-98.4 °F (36.9 °C)] 98.3 °F (36.8 °C)  HR:  [73-90] 73  Resp:  [17-18] 18  BP: (137-172)/(73-91) 158/77  SpO2:  [95 %-98 %] 97 %  Body mass index is 33 kg/m². Input and Output Summary (last 24 hours):      Intake/Output Summary (Last 24 hours) at 2023 1436  Last data filed at 2023 1315  Gross per 24 hour   Intake 120 ml   Output 1350 ml   Net -1230 ml       Physical Exam:   Physical Exam  Constitutional: General: He is awake. He is not in acute distress. Appearance: Normal appearance. He is not ill-appearing, toxic-appearing or diaphoretic. HENT:      Head: Normocephalic and atraumatic. Nose: Nose normal. No congestion or rhinorrhea. Eyes:      General: No visual field deficit or scleral icterus. Right eye: No discharge. Left eye: No discharge. Extraocular Movements: Extraocular movements intact. Conjunctiva/sclera: Conjunctivae normal.      Pupils: Pupils are equal, round, and reactive to light. Neck:      Thyroid: No thyroid tenderness. Vascular: No carotid bruit. Trachea: Trachea normal.   Cardiovascular:      Rate and Rhythm: Normal rate and regular rhythm. Pulses: Normal pulses. Heart sounds: Normal heart sounds. Pulmonary:      Effort: Pulmonary effort is normal.      Breath sounds: Normal breath sounds and air entry. Abdominal:      General: Bowel sounds are normal.      Tenderness: There is no abdominal tenderness. There is no guarding. Musculoskeletal:         General: Swelling and tenderness present. Cervical back: Normal range of motion and neck supple. Neurological:      General: No focal deficit present. Mental Status: He is alert and oriented to person, place, and time. Mental status is at baseline. Cranial Nerves: No cranial nerve deficit, dysarthria or facial asymmetry. Sensory: Sensation is intact. No sensory deficit. Motor: Motor function is intact. No weakness. Coordination: Coordination is intact. Gait: Gait abnormal.   Psychiatric:         Attention and Perception: Attention and perception normal.         Mood and Affect: Mood and affect normal.         Speech: Speech normal.         Behavior: Behavior normal.         Thought Content:  Thought content normal.         Cognition and Memory: Cognition and memory normal.         Judgment: Judgment normal.          Additional Data:     Labs:  Results from last 7 days   Lab Units 12/02/23  0520 11/30/23  0603 11/29/23  1227   WBC Thousand/uL 8.52   < > 8.65   HEMOGLOBIN g/dL 14.1   < > 16.4   HEMATOCRIT % 42.9   < > 50.6*   PLATELETS Thousands/uL 227   < > 198   NEUTROS PCT %  --   --  81*   LYMPHS PCT %  --   --  13*   MONOS PCT %  --   --  6   EOS PCT %  --   --  0    < > = values in this interval not displayed. Results from last 7 days   Lab Units 12/02/23  0520 11/30/23  0603 11/29/23  1227   SODIUM mmol/L 135   < > 132*   POTASSIUM mmol/L 3.6   < > 4.3   CHLORIDE mmol/L 96   < > 92*   CO2 mmol/L 30   < > 24   BUN mg/dL 12   < > 21   CREATININE mg/dL 0.76   < > 0.98   ANION GAP mmol/L 9   < > 16   CALCIUM mg/dL 9.0   < > 9.7   ALBUMIN g/dL  --   --  4.1   TOTAL BILIRUBIN mg/dL  --   --  0.86   ALK PHOS U/L  --   --  73   ALT U/L  --   --  108*   AST U/L  --   --  38   GLUCOSE RANDOM mg/dL 184*   < > 311*    < > = values in this interval not displayed. Results from last 7 days   Lab Units 12/02/23  1127 12/02/23  0621 12/01/23  2103 12/01/23  1556 12/01/23  1114 12/01/23  0723 11/30/23  2040 11/30/23  1615 11/30/23  1029 11/30/23  0727 11/29/23  2125 11/29/23  1802   POC GLUCOSE mg/dl 222* 201* 186* 195* 156* 175* 124 196* 231* 203* 197* 245*         Results from last 7 days   Lab Units 11/29/23  1227   LACTIC ACID mmol/L 1.2       Lines/Drains:  Invasive Devices       Peripheral Intravenous Line  Duration             Peripheral IV 12/02/23 Dorsal (posterior); Left Forearm <1 day                          Imaging: All radiological imaging reports reviewed from this admission. No new imaging today. Recent Cultures (last 7 days):   Results from last 7 days   Lab Units 11/29/23  1436 11/29/23  1238 11/29/23  1227 11/29/23  1119   BLOOD CULTURE   --  No Growth at 48 hrs.  No Growth at 48 hrs.  --    GRAM STAIN RESULT  No Polys or Bacteria seen  --   --  2+ Polys  No bacteria seen   BODY FLUID CULTURE, STERILE   --   --   --  2+ Growth of Staphylococcus aureus*       Last 24 Hours Medication List:   Current Facility-Administered Medications   Medication Dose Route Frequency Provider Last Rate    acetaminophen  650 mg Oral Q4H PRN Henry Lemmings, DO      atorvastatin  40 mg Oral Daily With [employer], DO      cefazolin  2,000 mg Intravenous Q8H Mandy Lanes, MD 2,000 mg (12/02/23 1315)    clopidogrel  75 mg Oral Daily Julius Bass, DO      cyclobenzaprine  5 mg Oral TID PRN [place] PA-C      HYDROmorphone  1 mg Intravenous Q4H PRN Julius Bass, DO      insulin glargine  30 Units Subcutaneous HS [facility], DO      insulin lispro  1-6 Units Subcutaneous TID AC Valentino Bangura, DO      insulin lispro  10 Units Subcutaneous TID With Meals Henry Cristofers, DO      lisinopril  10 mg Oral Daily Henry Lemmings, DO      metoprolol tartrate  25 mg Oral Q6H PRN Henry Lemmings, DO      ondansetron  4 mg Intravenous Q4H PRN Henry Lemmings, DO      oxyCODONE  5 mg Oral Q4H PRN Henry Lemmings, DO      Or    oxyCODONE  10 mg Oral Q4H PRN Henry Lemmings, DO      polyethylene glycol  17 g Oral Daily [facility], DO      rivaroxaban  2.5 mg Oral Daily With Breakfast Julius Bass, DO      senna-docusate sodium  1 tablet Oral HS Henry Cristofers, DO          Today, Patient Was Seen By: Marylee Fothergill, DO    **Please Note: This note may have been constructed using a voice recognition system. **

## 2023-12-02 NOTE — ASSESSMENT & PLAN NOTE
Lab Results   Component Value Date    HGBA1C 11.6 (A) 11/24/2023       Recent Labs     12/01/23  1556 12/01/23  2103 12/02/23  0621 12/02/23  1127   POCGLU 195* 186* 201* 222*       Blood Sugar Average: Last 72 hrs:  (P) [id number]    Recent A1C of 11.6 w/ questionable compliance of meds & insulin  Will be on insulin regimen plus ISS while I/p  Carb 3 diet

## 2023-12-02 NOTE — CASE MANAGEMENT
Case Management Progress Note    Patient name Dov Holguin  Location S /S -44 MRN [id number]  : 1955 Date 2023       LOS (days): 3  Geometric Mean LOS (GMLOS) (days): 3.10  Days to [address]:        Current admission status: Inpatient  Preferred Pharmacy:   [address] [place] [address]  Phone: [phone] Fax: [phone]    Primary Care Provider: Jazmin Hawkins MD    Primary Insurance: [facility]  Secondary Insurance:     PROGRESS NOTE:  Pt has [facility].

## 2023-12-02 NOTE — DISCHARGE SUMMARY
[facility]  Discharge- Lopez Oshea 1955, 70 y.o. male MRN: [id number]  Unit/Bed#: S -01 Encounter: [id number]  Primary Care Provider: Nel Nuno MD   Date and time admitted to hospital: 11/29/2023 12:02 PM    * Septic prepatellar bursitis of left knee  Assessment & Plan  Pt presented w/ infection of bursa in L knee region s/p ipsilateral BKA of 2021  Erythema, painful, swollen for past 1-2 weeks gradually worsening until ER visit > Keflex with no improvement  O/p ortho visit aspirated 30 ml fluid, purulent & muddy appearing, [facility] pt to [facility]  S/p I&D by Orthopedics  Blood cultures with no growth  Outpatient aspiration cultures - 2+ Staph aureus  Anaerobic culture - Staph aures growth in broth culture only.  No anaerobes isolated  Infectious Disease was consulted    Plan  Patient deemed stable for discharge by orthopedic  Follow-up with Dr. Gisela Overton as outpatient in 2 weeks  Duricef 500 mg q12h for 10 days  Tissue culture with growth of E coli in broth culture only - sensitivities pending  Will continue to follow  Patient aware that depending on results, he may need to return for hospitalization and is agreeable with this plan  Oxycodone for pain  Discharged home with home health    Atrial fibrillation, unspecified type [facility])  Assessment & Plan  Continue home regimen Xarelto    Essential hypertension  Assessment & Plan  Continue home regimen   Follow-up with PCP    Poorly controlled type 2 diabetes mellitus [facility])  Assessment & Plan  Lab Results   Component Value Date    HGBA1C 11.6 (A) 11/24/2023       Recent Labs     12/02/23  1622 12/02/23  2145 12/03/23  0736 12/03/23  1104   POCGLU 249* 169* 157* 167*         Blood Sugar Average: Last 72 hrs:  (P) [id number]    Continue home regimen  Follow-up with PCP    PVD (peripheral vascular disease) ([address])  Assessment & Plan  Continues on clopidogrel      Medical Problems       Resolved Problems  Date Reviewed: 12/2/2023   None       Discharging Resident: Chrystal Sosa MD  Discharging Attending: No att. providers found  PCP: Nel Nuno MD  Admission Date:   Admission Orders (From admission, onward)       Ordered        11/29/23 [address] [place] [address]  Once                          Discharge Date: 12/03/23    Consultations During Hospital Stay:  Orthopedic surgery, infectious disease    Procedures Performed:   Incision and drainage    Significant Findings / Test Results:   Outpatient aspiration cultures - 2+ Staph aureus  Tissue culture with growth of E coli in broth culture only  Anaerobic culture - No anaerobes isolated. Staph aures growth in broth culture only. Incidental Findings:   None    Test Results Pending at Discharge (will require follow up): Tissue culture and Gram stain growing E. coli in broth culture only (sensitivities pending)     Outpatient Tests Requested:  None    Complications:  None    Reason for Admission: Right knee pain    Hospital Course:   Lopez Oshea is a 70 y.o. male patient with PMH of left BKA in 2021, uncontrolled T2DM on insulin, HTN, PAD, and GERD who originally presented to the hospital on 11/29/2023 due to pain in his left knee with erythema and swelling for the past one to two weeks. He was prescribed Keflex as an outpatient for suspected cellulitis with no significant symptom improvement. He presented to Dr. Pamela Benjamin office on 11/29 and had fluid aspirated from the left knee bursa with removal of muddy and purulent fluid. He was recommended to come to the ED for further surgical intervention for septic prepatellar bursitis. Incision and drainage was performed on 11/29 with removal of necrotic tissue through curettage. The patient was admitted to the medical service and was started on broad spectrum antibiotics with cefepime and vancomycin for the septic prepatellar bursitis.  Blood cultures were obtained with no growth, however fluid cultures grew staphlococcus aureus and tissue culture grew E. Coli. Given the culture growth, infectious disease was consulted and recommended discontinuation of vancomycin and cefepime and starting cefazolin. The patient was noted to have a developing hematoma of the left BKA site, which was managed by orthopedics and holding of anticoagulants. Dressing changes and site inspection were performed by orthopedics and the patient was cleared for discharge on 12/2. Anticoagulation was resumed given no further bleeding from the BKA. PT/OT had evaluated the patient and recommended post-acute rehab, however the patient expressed a desire to go home with home health. The patient was discharged to home with [facility] on 12/3/2023. He is to take [facility] for 10 days for a total 14 day course of antibiotics. We will follow tissue culture sensitivities and update the patient if he needs to return. Follow-up with PCP and Orthopedics. Please see above list of diagnoses and related plan for additional information. Condition at Discharge: good    Discharge Day Visit / Exam:   Subjective:  No acute overnight events. Patient was seen and examined at bedside. He is eager for discharge, as he has some family issues to take care of given his mother's death two days ago. Cleared for discharge by Orthopedics. Patient was made aware that a sensitivities for a tissue culture that grew E coli in broth are still pending. He would prefer to go home. He was made aware that we will continue to follow the pending tissue culture sensitivities, and he may be asked to return to the hospital if MDRO. Patient voices understanding, and agrees to this plan.   Vitals: Blood Pressure: 149/73 (12/03/23 0738)  Pulse: 71 (12/03/23 0738)  Temperature: 98.1 °F (36.7 °C) (12/03/23 0738)  Temp Source: Oral (12/02/23 2147)  Respirations: 18 (12/03/23 0738)  Height: 5' 4" (162.6 cm) (11/29/23 1308)  Weight - Scale: 80.2 kg (176 lb 12.9 oz) (12/03/23 0600)  SpO2: 95 % (12/03/23 0738)  Exam:   Physical Exam  Constitutional:       General: He is awake. He is not in acute distress. Appearance: Normal appearance. He is not ill-appearing, toxic-appearing or diaphoretic. HENT:      Head: Normocephalic and atraumatic. Nose: Nose normal. No congestion or rhinorrhea. Eyes:      General: No visual field deficit or scleral icterus. Right eye: No discharge. Left eye: No discharge. Extraocular Movements: Extraocular movements intact. Conjunctiva/sclera: Conjunctivae normal.      Pupils: Pupils are equal, round, and reactive to light. Neck:      Thyroid: No thyroid tenderness. Vascular: No carotid bruit. Trachea: Trachea normal.   Cardiovascular:      Rate and Rhythm: Normal rate and regular rhythm. Pulses: Normal pulses. Heart sounds: Normal heart sounds. Pulmonary:      Effort: Pulmonary effort is normal.      Breath sounds: Normal breath sounds and air entry. Abdominal:      General: Bowel sounds are normal.      Tenderness: There is no abdominal tenderness. There is no guarding. Musculoskeletal:         General: Tenderness present. Cervical back: Normal range of motion and neck supple. Comments: Left BKA   Neurological:      General: No focal deficit present. Mental Status: He is alert and oriented to person, place, and time. Mental status is at baseline. Cranial Nerves: No cranial nerve deficit, dysarthria or facial asymmetry. Sensory: Sensation is intact. No sensory deficit. Motor: Motor function is intact. No weakness. Coordination: Coordination is intact. Psychiatric:         Attention and Perception: Attention and perception normal.         Mood and Affect: Mood and affect normal.         Speech: Speech normal.         Behavior: Behavior normal.         Cognition and Memory: Cognition and memory normal.        Discussion with Family: Patient declined call to .      Discharge instructions/Information to patient and family:   See after visit summary for information provided to patient and family. Provisions for Follow-Up Care:  See after visit summary for information related to follow-up care and any pertinent home health orders. Mobility at time of Discharge:   Basic Mobility Inpatient Raw Score: 18  JH-HLM Goal: 6: Walk 10 steps or more  JH-HLM Achieved: 1: Laying in bed  [facility] Goal achieved. Continue to encourage appropriate mobility. Disposition:   Home with VNA Services (Reminder: Complete face to face encounter)    Planned Readmission: None    Discharge Medications:  See after visit summary for reconciled discharge medications provided to patient and/or family.       **Please Note: This note may have been constructed using a voice recognition system**

## 2023-12-02 NOTE — PROGRESS NOTES
Progress Note - Infectious Disease   Promise Campoverde 70 y.o. male MRN: [id number]  Unit/Bed#: S -01 Encounter: [id number]      Impression/Plan:  1. Left prepatellar septic bursitis. Likely secondary to irritation from patient's BKA prosthesis. Unfortunately did not improve on 5 days of oral Keflex and I suspect this may have been more of a source control issue. Cultures with aspiration on 11/29 isolated MSSA. Underwent incision and drainage and now cultures also with broth growth of E. coli. No evidence of septic arthritis on exams. Patient otherwise afebrile and hemodynamically stable. Continue Ancef 2 g every 8 hours  Continue to trend fever curve/vitals  Repeat CBC and chemistry tomorrow  Follow-up pending OR and blood cultures  Ongoing follow-up by orthopedics  Anticipate transition to oral antibiotic once cultures finalize  Plan is for total of 14 days of therapy from procedure  Additional supportive care as per primary  Additional interventions pending clinical course    2. Poorly controlled type 2 diabetes. Hemoglobin A1c noted to be 11.6. Likely risk factor for the above. Also risk factor for poor wound healing. Ongoing glucose management as per primary    3. Peripheral vascular disease. Primary risk factor for patient's BKA. Again also affects wound healing. Recommend close follow-up with orthopedics and consider wound care evaluation outpatient. Above plan discussed with the patient and his family at bedside. ID consult service will continue to follow. Antibiotics:  Ancef 2  Total antibiotic 4    24 Hour events:  Yesterday and overnight notes reviewed and no acute events noted    Subjective:  Patient seen at bedside and he denies having any nausea, vomiting, chest pain or shortness of breath. Tolerating current antibiotic without issue. Reviewed pending cultures thus far.     Objective:  Vitals:  Temp:  [98.1 °F (36.7 °C)-98.4 °F (36.9 °C)] 98.3 °F (36.8 °C)  HR:  [73-90] 73  Resp:  [17-18] 18  BP: (137-172)/(73-91) 158/77  SpO2:  [95 %-98 %] 97 %  Temp (24hrs), Av.2 °F (36.8 °C), Min:98.1 °F (36.7 °C), Max:98.4 °F (36.9 °C)  Current: Temperature: 98.3 °F (36.8 °C)    Physical Exam:   General Appearance:  Alert, interactive, nontoxic, no acute distress. Throat: Oropharynx moist without lesions. Lungs:   Clear to auscultation bilaterally; no wheezes, rhonchi or rales; respirations unlabored on room air   Heart:  RRR; no murmur, rub or gallop noted   Abdomen:   Soft, non-tender, non-distended, positive bowel sounds. Extremities: No clubbing, cyanosis or edema   Skin: No new rashes or lesions. No new draining wounds noted. Surgical site dressed at this time. Labs, Imaging, & Other studies:   All pertinent labs and imaging studies in PACS were personally reviewed as below. Results from last 7 days   Lab Units 23  0520 23  0544 23  0603   WBC Thousand/uL 8.52 7.44 8.64   HEMOGLOBIN g/dL 14.1 14.9 14.0   PLATELETS Thousands/uL 227 198 188     Results from last 7 days   Lab Units 23  0520 23  0603 23  1227   POTASSIUM mmol/L 3.6   < > 4.3   CHLORIDE mmol/L 96   < > 92*   CO2 mmol/L 30   < > 24   BUN mg/dL 12   < > 21   CREATININE mg/dL 0.76   < > 0.98   EGFR ml/min/1.73sq m 93   < > 78   CALCIUM mg/dL 9.0   < > 9.7   AST U/L  --   --  38   ALT U/L  --   --  108*   ALK PHOS U/L  --   --  73    < > = values in this interval not displayed. Results from last 7 days   Lab Units 23  1436 23  1238 23  1227 23  1119   BLOOD CULTURE   --  No Growth at 48 hrs.  No Growth at 48 hrs.  --    GRAM STAIN RESULT  No Polys or Bacteria seen  --   --  2+ Polys  No bacteria seen   BODY FLUID CULTURE, STERILE   --   --   --  2+ Growth of Staphylococcus aureus*       Lab interpretation/comments: No leukocytosis    Imaging interpretation/comments: No new imaging overnight    Culture data: Prior cultures with MSSA and now OR cultures with broth growth of E. coli. Culture still preliminary. Blood cultures without growth.

## 2023-12-02 NOTE — ASSESSMENT & PLAN NOTE
Pt presents w/ infection of bursa in L knee region s/p ipsilateral BKA of 2021  Erythema, painful, swollen for past 1-2 weeks gradually worsening until ER visit 5 days ago  Received Keflex 500 q12 o/p + supportive measures, no change in sx to present day  O/p ortho visit aspirated 30 ml fluid, purulent & muddy appearing, sent pt to [facility] ER  Ortho incision & curettage today, admitted    Ortho has cleared the patient for discharge; follow-up with Dr. Krystal Restrepo as outpatient in 2 weeks  Fluid analysis pending  Blood cx pending  [place] consulted  Oxycodone for pain  Dilaudid for breakthrough  OT / PT recommending post-acute rehab  Discharge home with home health  Anticipate discharge on [address] for an additional 9 days to complete 14 day course

## 2023-12-02 NOTE — PLAN OF CARE
Problem: PAIN - ADULT  Goal: Verbalizes/displays adequate comfort level or baseline comfort level  Description: Interventions:  - Encourage patient to monitor pain and request assistance  - Assess pain using appropriate pain scale  - Administer analgesics based on type and severity of pain and evaluate response  - Implement non-pharmacological measures as appropriate and evaluate response  - Consider cultural and social influences on pain and pain management  - Notify physician/advanced practitioner if interventions unsuccessful or patient reports new pain  Outcome: Progressing     Problem: SAFETY ADULT  Goal: Patient will remain free of falls  Description: INTERVENTIONS:  - Educate patient/family on patient safety including physical limitations  - Instruct patient to call for assistance with activity   - Consult OT/PT to assist with strengthening/mobility   - Keep Call bell within reach  - Keep bed low and locked with side rails adjusted as appropriate  - Keep care items and personal belongings within reach  - Initiate and maintain comfort rounds  - Make Fall Risk Sign visible to staff  - Apply yellow socks and bracelet for high fall risk patients  - Consider moving patient to room near nurses station  Outcome: Progressing  Goal: Maintain or return to baseline ADL function  Description: INTERVENTIONS:  -  Assess patient's ability to carry out ADLs; assess patient's baseline for ADL function and identify physical deficits which impact ability to perform ADLs (bathing, care of mouth/teeth, toileting, grooming, dressing, etc.)  - Assess/evaluate cause of self-care deficits   - Assess range of motion  - Assess patient's mobility; develop plan if impaired  - Assess patient's need for assistive devices and provide as appropriate  - Encourage maximum independence but intervene and supervise when necessary  - Involve family in performance of ADLs  - Assess for home care needs following discharge   - Consider OT consult to assist with ADL evaluation and planning for discharge  - Provide patient education as appropriate  Outcome: Progressing

## 2023-12-02 NOTE — CASE MANAGEMENT
Case Management Discharge Planning Note    Patient name Carl Washington  Location S /S -50 MRN [id number]  : 1955 Date 2023       Current Admission Date: 2023  Current Admission Diagnosis:Septic prepatellar bursitis of left knee   Patient Active Problem List    Diagnosis Date Noted    Septic prepatellar bursitis of left knee 2023    Embolism and thrombosis of arteries of the lower extremities ([address]) 2023    Atrial fibrillation, unspecified type ([address]) 2023    Osteomyelitis, unspecified site, unspecified type ([address]) 2022    Continuous opioid dependence ([address]) 2022    Insomnia 2021    S/P BKA (below knee amputation), left ([address]) 2021    Gastroesophageal reflux disease without esophagitis 2021    Platelets decreased ([address]) 2021    Type 2 diabetes mellitus with diabetic peripheral angiopathy without gangrene ([address]) 2021    Poorly controlled type 2 diabetes mellitus ([address]) 2021    Essential hypertension 2021    Mixed hyperlipidemia 2021    PVD (peripheral vascular disease) ([address]) 2021    Right foot ulcer, with fat layer exposed ([address])     Diabetic ulcer of toe of right foot associated with type 2 diabetes mellitus, limited to breakdown of skin ([address]) 2019    Bony exostosis 2019    Ingrown toenail 2018    Paronychia of toenail of right foot 2018    Dermatophytosis 2018    Onychomycosis 03/15/2018    Tinea pedis of both feet 03/15/2018    Acquired deformity of foot 02/15/2018    Diabetic polyneuropathy associated with type 2 diabetes mellitus ([address]) 02/15/2018    Pain in both feet 02/15/2018    Peripheral arteriosclerosis ([address]) 02/15/2018      LOS (days): 3  Geometric Mean LOS (GMLOS) (days): 3.10  Days to GMLOS:0     OBJECTIVE:  Risk of Unplanned Readmission Score: 16.88         Current admission status: Inpatient   Preferred Pharmacy:   [address] [place] [address], [place] - [address] KHALIF Agustin  Phone: [phone] Fax: [phone]    Primary Care Provider: Emil Villavicencio MD    Primary Insurance: [facility]  Secondary Insurance:     DISCHARGE DETAILS:    Discharge planning discussed with[de-identified] Patient  Freedom of Choice: Yes  Comments - Freedom of Choice: CM met with to discuss SNF versus [address] services. Pt reported he would prefer to go home with [address] services for home PT and OT, and gave permission for a blanket referral.       [address]         Is the patient interested in [address] at discharge?: Yes  [facility] requested[de-identified] Occupational Therapy, Physical [facility] Name[de-identified] Other  Home Health Services Needed[de-identified] Gait/ADL Training, Strengthening/Theraputic Exercises to Improve Function  Homebound Criteria Met[de-identified] Uses an Assist Device (i.e. cane, walker, etc)  Supporting Clincal Findings[de-identified] Fatigues Easliy in [employer], Limited Endurance    DME Referral Provided  Referral made for DME?: No    Other Referral/Resources/Interventions Provided:  Interventions: HHC  Referral Comments: [facility] referral made for home PT and OT services.          Treatment Team Recommendation: Home with [address]   Discharge Destination Plan[de-identified] Home with [address]

## 2023-12-02 NOTE — PLAN OF CARE
Problem: PAIN - ADULT  Goal: Verbalizes/displays adequate comfort level or baseline comfort level  Description: Interventions:  - Encourage patient to monitor pain and request assistance  - Assess pain using appropriate pain scale  - Administer analgesics based on type and severity of pain and evaluate response  - Implement non-pharmacological measures as appropriate and evaluate response  - Consider cultural and social influences on pain and pain management  - Notify physician/advanced practitioner if interventions unsuccessful or patient reports new pain  Outcome: Progressing     Problem: INFECTION - ADULT  Goal: Absence or prevention of progression during hospitalization  Description: INTERVENTIONS:  - Assess and monitor for signs and symptoms of infection  - Monitor lab/diagnostic results  - Monitor all insertion sites, i.e. indwelling lines, tubes, and drains  - Monitor endotracheal if appropriate and nasal secretions for changes in amount and color  - [place] appropriate cooling/warming therapies per order  - Administer medications as ordered  - Instruct and encourage patient and family to use good hand hygiene technique  - Identify and instruct in appropriate isolation precautions for identified infection/condition  Outcome: Progressing  Goal: Absence of fever/infection during neutropenic period  Description: INTERVENTIONS:  - Monitor WBC    Outcome: Progressing     Problem: DISCHARGE PLANNING  Goal: Discharge to home or other facility with appropriate resources  Description: INTERVENTIONS:  - Identify barriers to discharge w/patient and caregiver  - Arrange for needed discharge resources and transportation as appropriate  - Identify discharge learning needs (meds, wound care, etc.)  - Arrange for interpretive services to assist at discharge as needed  - Refer to Case Management Department for coordinating discharge planning if the patient needs post-hospital services based on physician/advanced practitioner order or complex needs related to functional status, cognitive ability, or social support system  Outcome: Progressing     Problem: Knowledge Deficit  Goal: Patient/family/caregiver demonstrates understanding of disease process, treatment plan, medications, and discharge instructions  Description: Complete learning assessment and assess knowledge base.   Interventions:  - Provide teaching at level of understanding  - Provide teaching via preferred learning methods  Outcome: Progressing     Problem: SAFETY ADULT  Goal: Patient will remain free of falls  Description: INTERVENTIONS:  - Educate patient/family on patient safety including physical limitations  - Instruct patient to call for assistance with activity   - Consult OT/PT to assist with strengthening/mobility   - Keep Call bell within reach  - Keep bed low and locked with side rails adjusted as appropriate  - Keep care items and personal belongings within reach  - Initiate and maintain comfort rounds  - Make Fall Risk Sign visible to staff  - Initiate/Maintain bed alarm  - Apply yellow socks and bracelet for high fall risk patients  - Consider moving patient to room near nurses station  Outcome: Progressing  Goal: Maintain or return to baseline ADL function  Description: INTERVENTIONS:  -  Assess patient's ability to carry out ADLs; assess patient's baseline for ADL function and identify physical deficits which impact ability to perform ADLs (bathing, care of mouth/teeth, toileting, grooming, dressing, etc.)  - Assess/evaluate cause of self-care deficits   - Assess range of motion  - Assess patient's mobility; develop plan if impaired  - Assess patient's need for assistive devices and provide as appropriate  - Encourage maximum independence but intervene and supervise when necessary  - Involve family in performance of ADLs  - Assess for home care needs following discharge   - Consider OT consult to assist with ADL evaluation and planning for discharge  - Provide patient education as appropriate  Outcome: Progressing  Goal: Maintains/Returns to pre admission functional level  Description: INTERVENTIONS:  - Perform AM-PAC 6 Click Basic Mobility/ Daily Activity assessment daily.  - Set and communicate daily mobility goal to care team and patient/family/caregiver. - Collaborate with rehabilitation services on mobility goals if consulted  - Perform Range of Motion 3 times a day.   - Dangle patient 3 times a day  - Stand patient 3 times a day  - Ambulate patient 3 times a day  - Out of bed to chair 3 times a day   - Out of bed for meals 3 times a day  - Out of bed for toileting  - Record patient progress and toleration of activity level   Outcome: Progressing

## 2023-12-02 NOTE — PROGRESS NOTES
Progress Note - Orthopedics   Promise Campoverde 70 y.o. male MRN: [id number]  Unit/Bed#: S -01      Subjective:    68 y.o.male seen and evaluated bedside this morning. He notes as compared to yesterday and he feels slightly better is not worsened. He does not plan to pain with range of motion of the knee. Denies any fevers or chills.     Labs:  0   Lab Value Date/Time    HCT 45.2 12/01/2023 0544    HCT 42.6 11/30/2023 0603    HCT 50.6 (H) 11/29/2023 1227    HCT 43.0 02/10/2014 0450    HCT 44.5 02/06/2014 1705    HCT 45.8 01/10/2014 1810    HGB 14.9 12/01/2023 0544    HGB 14.0 11/30/2023 0603    HGB 16.4 11/29/2023 1227    HGB 14.4 02/10/2014 0450    HGB 15.3 02/06/2014 1705    HGB 15.7 01/10/2014 1810    INR 0.97 07/07/2021 1056    INR 1.11 01/10/2014 1810    WBC 7.44 12/01/2023 0544    WBC 8.64 11/30/2023 0603    WBC 8.65 11/29/2023 1227    WBC 7.71 02/10/2014 0450    WBC 10.59 (H) 02/06/2014 1705    WBC 7.73 01/10/2014 1810    ESR 52 (H) 11/29/2023 1227    .4 (H) 11/29/2023 1227       Meds:    Current Facility-Administered Medications:     acetaminophen (TYLENOL) tablet 650 mg, 650 mg, Oral, Q4H PRN, Tereza Yadav DO    atorvastatin (LIPITOR) tablet 40 mg, 40 mg, Oral, Daily With Elliott Carlos DO, 40 mg at 12/01/23 1538    ceFAZolin (ANCEF) IVPB (premix in dextrose) 2,000 mg 50 mL, 2,000 mg, Intravenous, Q8H, Chi Cueva MD, Last Rate: 100 mL/hr at 12/02/23 0443, 2,000 mg at 12/02/23 0443    cyclobenzaprine (FLEXERIL) tablet 5 mg, 5 mg, Oral, TID PRN, Lahoma Hodgkin, PA-C, 5 mg at 12/01/23 2328    HYDROmorphone (DILAUDID) injection 1 mg, 1 mg, Intravenous, Q4H PRN, Julius Calabrese DO, 1 mg at 12/02/23 0102    insulin glargine (LANTUS) subcutaneous injection 30 Units 0.3 mL, 30 Units, Subcutaneous, HS, Tereza Yadav DO, 30 Units at 12/01/23 2211    insulin lispro (HumaLOG) 100 units/mL subcutaneous injection 1-6 Units, 1-6 Units, Subcutaneous, TID AC, 2 Units at 12/01/23 1637 **AND** Fingerstick Glucose (POCT), , , TID AC, Valentino Bangura,     insulin lispro (HumaLOG) 100 units/mL subcutaneous injection 10 Units, 10 Units, Subcutaneous, TID With Meals, Sharmin Sea, DO, 10 Units at 12/01/23 1638    lisinopril (ZESTRIL) tablet 10 mg, 10 mg, Oral, Daily, Sharmin Sea, DO, 10 mg at 12/01/23 0815    metoprolol tartrate (LOPRESSOR) tablet 25 mg, 25 mg, Oral, Q6H PRN, Sharmin Sea, DO    ondansetron (ZOFRAN) injection 4 mg, 4 mg, Intravenous, Q4H PRN, Sharmin Sea, DO    oxyCODONE (ROXICODONE) IR tablet 5 mg, 5 mg, Oral, Q4H PRN, 5 mg at 11/30/23 2144 **OR** oxyCODONE (ROXICODONE) immediate release tablet 10 mg, 10 mg, Oral, Q4H PRN, Sharmin Sea, DO, 10 mg at 12/02/23 0326    polyethylene glycol (MIRALAX) packet 17 g, 17 g, Oral, Daily, Sharmin Sea, DO    senna-docusate sodium (SENOKOT S) 8.6-50 mg per tablet 1 tablet, 1 tablet, Oral, HS, Sharmin Sea, DO, 1 tablet at 11/30/23 2132    Blood Culture:   Lab Results   Component Value Date    BLOODCX No Growth at 48 hrs. 11/29/2023       Wound Culture:   Lab Results   Component Value Date    WOUNDCULT 1 colony Staphylococcus aureus (A) 10/23/2020       Ins and Outs:  I/O last 24 hours: In: -   Out: 6194 [Urine:1425]          Physical:  Vitals:    12/02/23 0255   BP: 166/87   Pulse: 80   Resp:    Temp:    SpO2: 97%     Musculoskeletal: left Lower Extremity  Surgical dressings in place,   Sensation intact over thigh and stump. Passive range of motion of the knee from full extension to nearly 90 degrees of flexion with minimal pain. No micromotion tenderness  Muscles of thigh and stump soft and compressible. Skin warm and well-perfused        Assessment:    68 y.o.male s/p  I&D left lower extremity for septic prepatellar bursitis with Dr. Adelfo Turner on 11/30 (POD 3).      Plan:  WBAT left LE  Office cultures obtained by Dr. Raz Cintron from aspiration with 2+ growth of Staph aureus, operative cultures obtained after irrigation with no growth currently results not finalized yet we will continue to follow  PT/OT  Abx per infectious disease recommendations  Pain control per primary  DVT ppx per primary -no evidence of bleeding or drainage on dressings today, may reinitiate DVT prophylaxis  Discussed with ortho attg, and with primary medicine team.   Medical management per primary team.   Dispo: Ortho stable  Follow-up with Dr. Hebert Cisneros in 2 weeks for suture removal    Humera Hazel PA-C

## 2023-12-03 VITALS
HEIGHT: 64 IN | HEART RATE: 71 BPM | DIASTOLIC BLOOD PRESSURE: 73 MMHG | WEIGHT: 176.81 LBS | BODY MASS INDEX: 30.19 KG/M2 | RESPIRATION RATE: 18 BRPM | OXYGEN SATURATION: 95 % | SYSTOLIC BLOOD PRESSURE: 149 MMHG | TEMPERATURE: 98.1 F

## 2023-12-03 LAB
ALBUMIN SERPL BCP-MCNC: 3.5 G/DL (ref 3.5–5)
ALP SERPL-CCNC: 72 U/L (ref 34–104)
ALT SERPL W P-5'-P-CCNC: 34 U/L (ref 7–52)
ANION GAP SERPL CALCULATED.3IONS-SCNC: 8 MMOL/L
AST SERPL W P-5'-P-CCNC: 20 U/L (ref 13–39)
BACTERIA SPEC ANAEROBE CULT: ABNORMAL
BACTERIA SPEC ANAEROBE CULT: ABNORMAL
BASOPHILS # BLD AUTO: 0.03 THOUSANDS/ÂΜL (ref 0–0.1)
BASOPHILS NFR BLD AUTO: 0 % (ref 0–1)
BILIRUB SERPL-MCNC: 0.57 MG/DL (ref 0.2–1)
BUN SERPL-MCNC: 13 MG/DL (ref 5–25)
CALCIUM SERPL-MCNC: 9.1 MG/DL (ref 8.4–10.2)
CHLORIDE SERPL-SCNC: 99 MMOL/L (ref 96–108)
CO2 SERPL-SCNC: 29 MMOL/L (ref 21–32)
CREAT SERPL-MCNC: 0.72 MG/DL (ref 0.6–1.3)
EOSINOPHIL # BLD AUTO: 0.11 THOUSAND/ÂΜL (ref 0–0.61)
EOSINOPHIL NFR BLD AUTO: 1 % (ref 0–6)
ERYTHROCYTE [DISTWIDTH] IN BLOOD BY AUTOMATED COUNT: 11.6 % (ref 11.6–15.1)
GFR SERPL CREATININE-BSD FRML MDRD: 95 ML/MIN/1.73SQ M
GLUCOSE SERPL-MCNC: 151 MG/DL (ref 65–140)
GLUCOSE SERPL-MCNC: 157 MG/DL (ref 65–140)
GLUCOSE SERPL-MCNC: 167 MG/DL (ref 65–140)
HCT VFR BLD AUTO: 43.3 % (ref 36.5–49.3)
HGB BLD-MCNC: 14.7 G/DL (ref 12–17)
IMM GRANULOCYTES # BLD AUTO: 0.05 THOUSAND/UL (ref 0–0.2)
IMM GRANULOCYTES NFR BLD AUTO: 1 % (ref 0–2)
LYMPHOCYTES # BLD AUTO: 1.82 THOUSANDS/ÂΜL (ref 0.6–4.47)
LYMPHOCYTES NFR BLD AUTO: 21 % (ref 14–44)
MCH RBC QN AUTO: 32.8 PG (ref 26.8–34.3)
MCHC RBC AUTO-ENTMCNC: 33.9 G/DL (ref 31.4–37.4)
MCV RBC AUTO: 97 FL (ref 82–98)
MONOCYTES # BLD AUTO: 0.9 THOUSAND/ÂΜL (ref 0.17–1.22)
MONOCYTES NFR BLD AUTO: 10 % (ref 4–12)
NEUTROPHILS # BLD AUTO: 5.72 THOUSANDS/ÂΜL (ref 1.85–7.62)
NEUTS SEG NFR BLD AUTO: 67 % (ref 43–75)
NRBC BLD AUTO-RTO: 0 /100 WBCS
PLATELET # BLD AUTO: 252 THOUSANDS/UL (ref 149–390)
PMV BLD AUTO: 9.9 FL (ref 8.9–12.7)
POTASSIUM SERPL-SCNC: 3.5 MMOL/L (ref 3.5–5.3)
PROT SERPL-MCNC: 7.4 G/DL (ref 6.4–8.4)
RBC # BLD AUTO: 4.48 MILLION/UL (ref 3.88–5.62)
SODIUM SERPL-SCNC: 136 MMOL/L (ref 135–147)
WBC # BLD AUTO: 8.63 THOUSAND/UL (ref 4.31–10.16)

## 2023-12-03 PROCEDURE — 85025 COMPLETE CBC W/AUTO DIFF WBC: CPT | Performed by: INTERNAL MEDICINE

## 2023-12-03 PROCEDURE — 82948 REAGENT STRIP/BLOOD GLUCOSE: CPT

## 2023-12-03 PROCEDURE — 99024 POSTOP FOLLOW-UP VISIT: CPT | Performed by: PHYSICIAN ASSISTANT

## 2023-12-03 PROCEDURE — 80053 COMPREHEN METABOLIC PANEL: CPT | Performed by: INTERNAL MEDICINE

## 2023-12-03 PROCEDURE — 99239 HOSP IP/OBS DSCHRG MGMT >30: CPT | Performed by: HOSPITALIST

## 2023-12-03 RX ORDER — OXYCODONE HYDROCHLORIDE 10 MG/1
10 TABLET ORAL EVERY 6 HOURS PRN
Qty: 20 TABLET | Refills: 0 | Status: SHIPPED | OUTPATIENT
Start: 2023-12-03 | End: 2023-12-08

## 2023-12-03 RX ORDER — CEFADROXIL 500 MG/1
500 CAPSULE ORAL EVERY 12 HOURS SCHEDULED
Qty: 20 CAPSULE | Refills: 0 | Status: SHIPPED | OUTPATIENT
Start: 2023-12-03 | End: 2023-12-04 | Stop reason: SDUPTHER

## 2023-12-03 RX ADMIN — INSULIN LISPRO 10 UNITS: 100 INJECTION, SOLUTION INTRAVENOUS; SUBCUTANEOUS at 08:17

## 2023-12-03 RX ADMIN — CEFAZOLIN SODIUM 2000 MG: 2 SOLUTION INTRAVENOUS at 11:36

## 2023-12-03 RX ADMIN — CLOPIDOGREL BISULFATE 75 MG: 75 TABLET ORAL at 08:15

## 2023-12-03 RX ADMIN — OXYCODONE HYDROCHLORIDE 10 MG: 10 TABLET ORAL at 04:53

## 2023-12-03 RX ADMIN — RIVAROXABAN 2.5 MG: 2.5 TABLET, FILM COATED ORAL at 08:16

## 2023-12-03 RX ADMIN — INSULIN LISPRO 1 UNITS: 100 INJECTION, SOLUTION INTRAVENOUS; SUBCUTANEOUS at 08:16

## 2023-12-03 RX ADMIN — CEFAZOLIN SODIUM 2000 MG: 2 SOLUTION INTRAVENOUS at 04:53

## 2023-12-03 RX ADMIN — LISINOPRIL 10 MG: 10 TABLET ORAL at 08:15

## 2023-12-03 NOTE — PLAN OF CARE
Problem: PAIN - ADULT  Goal: Verbalizes/displays adequate comfort level or baseline comfort level  Description: Interventions:  - Encourage patient to monitor pain and request assistance  - Assess pain using appropriate pain scale  - Administer analgesics based on type and severity of pain and evaluate response  - Implement non-pharmacological measures as appropriate and evaluate response  - Consider cultural and social influences on pain and pain management  - Notify physician/advanced practitioner if interventions unsuccessful or patient reports new pain  Outcome: Progressing     Problem: INFECTION - ADULT  Goal: Absence or prevention of progression during hospitalization  Description: INTERVENTIONS:  - Assess and monitor for signs and symptoms of infection  - Monitor lab/diagnostic results  - Monitor all insertion sites, i.e. indwelling lines, tubes, and drains  - Monitor endotracheal if appropriate and nasal secretions for changes in amount and color  - [place] appropriate cooling/warming therapies per order  - Administer medications as ordered  - Instruct and encourage patient and family to use good hand hygiene technique  - Identify and instruct in appropriate isolation precautions for identified infection/condition  Outcome: Progressing  Goal: Absence of fever/infection during neutropenic period  Description: INTERVENTIONS:  - Monitor WBC    Outcome: Progressing     Problem: SAFETY ADULT  Goal: Patient will remain free of falls  Description: INTERVENTIONS:  - Educate patient/family on patient safety including physical limitations  - Instruct patient to call for assistance with activity   - Consult OT/PT to assist with strengthening/mobility   - Keep Call bell within reach  - Keep bed low and locked with side rails adjusted as appropriate  - Keep care items and personal belongings within reach  - Initiate and maintain comfort rounds  - Make Fall Risk Sign visible to staff  - Apply yellow socks and bracelet for high fall risk patients  - Consider moving patient to room near nurses station  Outcome: Progressing  Goal: Maintain or return to baseline ADL function  Description: INTERVENTIONS:  -  Assess patient's ability to carry out ADLs; assess patient's baseline for ADL function and identify physical deficits which impact ability to perform ADLs (bathing, care of mouth/teeth, toileting, grooming, dressing, etc.)  - Assess/evaluate cause of self-care deficits   - Assess range of motion  - Assess patient's mobility; develop plan if impaired  - Assess patient's need for assistive devices and provide as appropriate  - Encourage maximum independence but intervene and supervise when necessary  - Involve family in performance of ADLs  - Assess for home care needs following discharge   - Consider OT consult to assist with ADL evaluation and planning for discharge  - Provide patient education as appropriate  Outcome: Progressing  Goal: Maintains/Returns to pre admission functional level  Description: INTERVENTIONS:  - Perform AM-PAC 6 Click Basic Mobility/ Daily Activity assessment daily.  - Set and communicate daily mobility goal to care team and patient/family/caregiver.    - Collaborate with rehabilitation services on mobility goals if consulted  - Out of bed for toileting  - Record patient progress and toleration of activity level   Outcome: Progressing     Problem: DISCHARGE PLANNING  Goal: Discharge to home or other facility with appropriate resources  Description: INTERVENTIONS:  - Identify barriers to discharge w/patient and caregiver  - Arrange for needed discharge resources and transportation as appropriate  - Identify discharge learning needs (meds, wound care, etc.)  - Arrange for interpretive services to assist at discharge as needed  - Refer to Case Management Department for coordinating discharge planning if the patient needs post-hospital services based on physician/advanced practitioner order or complex needs related to functional status, cognitive ability, or social support system  Outcome: Progressing     Problem: Prexisting or High Potential for Compromised Skin Integrity  Goal: Skin integrity is maintained or improved  Description: INTERVENTIONS:  - Identify patients at risk for skin breakdown  - Assess and monitor skin integrity  - Assess and monitor nutrition and hydration status  - Monitor labs   - Assess for incontinence   - Turn and reposition patient  - Assist with mobility/ambulation  - Relieve pressure over bony prominences  - Avoid friction and shearing  - Provide appropriate hygiene as needed including keeping skin clean and dry  - Evaluate need for skin moisturizer/barrier cream  - Collaborate with interdisciplinary team   - Patient/family teaching  - Consider wound care consult   Outcome: Progressing     Problem: Nutrition/Hydration-ADULT  Goal: Nutrient/Hydration intake appropriate for improving, restoring or maintaining nutritional needs  Description: Monitor and assess patient's nutrition/hydration status for malnutrition. Collaborate with interdisciplinary team and initiate plan and interventions as ordered. Monitor patient's weight and dietary intake as ordered or per policy. Utilize nutrition screening tool and intervene as necessary. Determine patient's food preferences and provide high-protein, high-caloric foods as appropriate.      INTERVENTIONS:  - Monitor oral intake, urinary output, labs, and treatment plans  - Assess nutrition and hydration status and recommend course of action  - Evaluate amount of meals eaten  - Assist patient with eating if necessary   - Allow adequate time for meals  - Recommend/ encourage appropriate diets, oral nutritional supplements, and vitamin/mineral supplements  - Order, calculate, and assess calorie counts as needed  - Recommend, monitor, and adjust tube feedings and TPN/PPN based on assessed needs  - Assess need for intravenous fluids  - Provide specific nutrition/hydration education as appropriate  - Include patient/family/caregiver in decisions related to nutrition  Outcome: Progressing

## 2023-12-03 NOTE — ASSESSMENT & PLAN NOTE
Pt presented w/ infection of bursa in L knee region s/p ipsilateral BKA of 2021  Erythema, painful, swollen for past 1-2 weeks gradually worsening until ER visit > Keflex with no improvement  O/p ortho visit aspirated 30 ml fluid, purulent & muddy appearing, sent pt to [facility] ER  S/p I&D by Orthopedics  Blood cultures with no growth  Outpatient aspiration cultures - 2+ Staph aureus  Anaerobic culture - Staph aures growth in broth culture only.  No anaerobes isolated  Infectious Disease was consulted    Plan  Patient deemed stable for discharge by orthopedic  Follow-up with Dr. Deja Gardiner as outpatient in 2 weeks  Duricef 500 mg q12h for 10 days  Tissue culture with growth of E coli in broth culture only - sensitivities pending  Will continue to follow  Patient aware that depending on results, he may need to return for hospitalization and is agreeable with this plan  Oxycodone for pain  Discharged home with home health

## 2023-12-03 NOTE — ASSESSMENT & PLAN NOTE
Lab Results   Component Value Date    HGBA1C 11.6 (A) 11/24/2023       Recent Labs     12/02/23  1622 12/02/23  2145 12/03/23  0736 12/03/23  1104   POCGLU 249* 169* 157* 167*         Blood Sugar Average: Last 72 hrs:  (P) [id number]    Continue home regimen  Follow-up with PCP

## 2023-12-03 NOTE — PROGRESS NOTES
Progress Note - Orthopedics   Johanna Fenton 70 y.o. male MRN: [id number]  Unit/Bed#: S -01      Subjective:    68 y.o.male seen and evaluated this morning. He notes that he has continued improvement in pain in the left knee over the past 24 hours. Denies any fevers or chills. No chest pain or shortness of breath.     Labs:  0   Lab Value Date/Time    HCT 43.3 12/03/2023 0457    HCT 42.9 12/02/2023 0520    HCT 45.2 12/01/2023 0544    HCT 43.0 02/10/2014 0450    HCT 44.5 02/06/2014 1705    HCT 45.8 01/10/2014 1810    HGB 14.7 12/03/2023 0457    HGB 14.1 12/02/2023 0520    HGB 14.9 12/01/2023 0544    HGB 14.4 02/10/2014 0450    HGB 15.3 02/06/2014 1705    HGB 15.7 01/10/2014 1810    INR 0.97 07/07/2021 1056    INR 1.11 01/10/2014 1810    WBC 8.63 12/03/2023 0457    WBC 8.52 12/02/2023 0520    WBC 7.44 12/01/2023 0544    WBC 7.71 02/10/2014 0450    WBC 10.59 (H) 02/06/2014 1705    WBC 7.73 01/10/2014 1810    ESR 52 (H) 11/29/2023 1227    .4 (H) 11/29/2023 1227       Meds:    Current Facility-Administered Medications:     acetaminophen (TYLENOL) tablet 650 mg, 650 mg, Oral, Q4H PRN, Mingo Barrios DO    atorvastatin (LIPITOR) tablet 40 mg, 40 mg, Oral, Daily With Emory Schaffer DO, 40 mg at 12/02/23 1558    ceFAZolin (ANCEF) IVPB (premix in dextrose) 2,000 mg 50 mL, 2,000 mg, Intravenous, Q8H, Farheen Jaime MD, Last Rate: 100 mL/hr at 12/03/23 0453, 2,000 mg at 12/03/23 0453    clopidogrel (PLAVIX) tablet 75 mg, 75 mg, Oral, Daily, Julius Lopes DO, 75 mg at 12/03/23 0815    cyclobenzaprine (FLEXERIL) tablet 5 mg, 5 mg, Oral, TID PRN, Jyothi Huerta PA-C, 5 mg at 12/01/23 2328    HYDROmorphone (DILAUDID) injection 1 mg, 1 mg, Intravenous, Q4H PRN, Julius Lopes DO, 1 mg at 12/02/23 1857    insulin glargine (LANTUS) subcutaneous injection 30 Units 0.3 mL, 30 Units, Subcutaneous, HS, Mingo [facility], DO, 30 Units at 12/02/23 2158    insulin lispro (HumaLOG) 100 units/mL subcutaneous injection 1-6 Units, 1-6 Units, Subcutaneous, TID AC, 1 Units at 12/03/23 0816 **AND** Fingerstick Glucose (POCT), , , TID AC, Valentino Bangura, DO    insulin lispro (HumaLOG) 100 units/mL subcutaneous injection 10 Units, 10 Units, Subcutaneous, TID With Meals, Tonny Echols, DO, 10 Units at 12/03/23 0817    lisinopril (ZESTRIL) tablet 10 mg, 10 mg, Oral, Daily, Georganna Echols, DO, 10 mg at 12/03/23 0815    metoprolol tartrate (LOPRESSOR) tablet 25 mg, 25 mg, Oral, Q6H PRN, Georganna Echols, DO    ondansetron (ZOFRAN) injection 4 mg, 4 mg, Intravenous, Q4H PRN, Georganna Echols, DO    oxyCODONE (ROXICODONE) IR tablet 5 mg, 5 mg, Oral, Q4H PRN, 5 mg at 11/30/23 2144 **OR** oxyCODONE (ROXICODONE) immediate release tablet 10 mg, 10 mg, Oral, Q4H PRN, Eloisaganna Echols, DO, 10 mg at 12/03/23 0453    polyethylene glycol (MIRALAX) packet 17 g, 17 g, Oral, Daily, Georganna Echols, DO    rivaroxaban (XARELTO) tablet 2.5 mg, 2.5 mg, Oral, Daily With Breakfast, [facility], DO, 2.5 mg at 12/03/23 0816    senna-docusate sodium (SENOKOT S) 8.6-50 mg per tablet 1 tablet, 1 tablet, Oral, HS, Georganna Echols, DO, 1 tablet at 11/30/23 2132    Blood Culture:   Lab Results   Component Value Date    BLOODCX No Growth at 72 hrs. 11/29/2023       Wound Culture:   Lab Results   Component Value Date    WOUNDCULT 1 colony Staphylococcus aureus (A) 10/23/2020       Ins and Outs:  I/O last 24 hours: In: 480 [P.O.:480]  Out: 1925 [Urine:1925]          Physical:  Vitals:    12/03/23 0738   BP: 149/73   Pulse: 71   Resp: 18   Temp: 98.1 °F (36.7 °C)   SpO2: 95%     Musculoskeletal: left Lower Extremity  Surgical dressings clean dry and intact  Incision is clean dry intact without any active drainage or palpable bursal fluid reaccumulation or bleeding, there is some ecchymosis superiorly and that might be early evolving eschar  Sensation intact over thigh and stump. Passive range of motion of the knee from full extension to nearly 90 degrees of flexion with minimal pain. No micromotion tenderness  Muscles of thigh and stump soft and compressible. Skin warm and well-perfused        Assessment:    68 y.o.male s/p  I&D left lower extremity for septic prepatellar bursitis with Dr. Elmira Freedman on 11/30 (POD 4).      Plan:  WBAT left LE  Office cultures obtained by Dr. Apryl Mcmahon from aspiration with 2+ growth of Staph aureus, operative cultures obtained after irrigation with no growth of E. coli in broth culture only, however anaerobic cultures show growth in broth only Staph aureus  PT/OT  Abx per infectious disease recommendations  Pain control per primary  DVT ppx per primary -no evidence of bleeding or drainage on dressings today  Medical management per primary team.   Dispo: Ortho stable  Follow-up with Dr. Elmira Freedman in 10 days for suture removal    Sekou Ordoñez PA-C

## 2023-12-03 NOTE — PLAN OF CARE
Problem: PAIN - ADULT  Goal: Verbalizes/displays adequate comfort level or baseline comfort level  Description: Interventions:  - Encourage patient to monitor pain and request assistance  - Assess pain using appropriate pain scale  - Administer analgesics based on type and severity of pain and evaluate response  - Implement non-pharmacological measures as appropriate and evaluate response  - Consider cultural and social influences on pain and pain management  - Notify physician/advanced practitioner if interventions unsuccessful or patient reports new pain  Outcome: Progressing     Problem: INFECTION - ADULT  Goal: Absence or prevention of progression during hospitalization  Description: INTERVENTIONS:  - Assess and monitor for signs and symptoms of infection  - Monitor lab/diagnostic results  - Monitor all insertion sites, i.e. indwelling lines, tubes, and drains  - Monitor endotracheal if appropriate and nasal secretions for changes in amount and color  - [place] appropriate cooling/warming therapies per order  - Administer medications as ordered  - Instruct and encourage patient and family to use good hand hygiene technique  - Identify and instruct in appropriate isolation precautions for identified infection/condition  Outcome: Progressing  Goal: Absence of fever/infection during neutropenic period  Description: INTERVENTIONS:  - Monitor WBC    Outcome: Progressing     Problem: SAFETY ADULT  Goal: Patient will remain free of falls  Description: INTERVENTIONS:  - Educate patient/family on patient safety including physical limitations  - Instruct patient to call for assistance with activity   - Consult OT/PT to assist with strengthening/mobility   - Keep Call bell within reach  - Keep bed low and locked with side rails adjusted as appropriate  - Keep care items and personal belongings within reach  - Initiate and maintain comfort rounds  - Make Fall Risk Sign visible to staff  - Offer Toileting every  Hours, in advance of need  - Initiate/Maintain alarm  - Obtain necessary fall risk management equipment:   - Apply yellow socks and bracelet for high fall risk patients  - Consider moving patient to room near nurses station  Outcome: Progressing  Goal: Maintain or return to baseline ADL function  Description: INTERVENTIONS:  -  Assess patient's ability to carry out ADLs; assess patient's baseline for ADL function and identify physical deficits which impact ability to perform ADLs (bathing, care of mouth/teeth, toileting, grooming, dressing, etc.)  - Assess/evaluate cause of self-care deficits   - Assess range of motion  - Assess patient's mobility; develop plan if impaired  - Assess patient's need for assistive devices and provide as appropriate  - Encourage maximum independence but intervene and supervise when necessary  - Involve family in performance of ADLs  - Assess for home care needs following discharge   - Consider OT consult to assist with ADL evaluation and planning for discharge  - Provide patient education as appropriate  Outcome: Progressing  Goal: Maintains/Returns to pre admission functional level  Description: INTERVENTIONS:  - Perform AM-PAC 6 Click Basic Mobility/ Daily Activity assessment daily.  - Set and communicate daily mobility goal to care team and patient/family/caregiver. - Collaborate with rehabilitation services on mobility goals if consulted  - Perform Range of Motion  times a day. - Reposition patient every  hours.   - Dangle patient  times a day  - Stand patient  times a day  - Ambulate patient  times a day  - Out of bed to chair  times a day   - Out of bed for meal times a day  - Out of bed for toileting  - Record patient progress and toleration of activity level   Outcome: Progressing     Problem: DISCHARGE PLANNING  Goal: Discharge to home or other facility with appropriate resources  Description: INTERVENTIONS:  - Identify barriers to discharge w/patient and caregiver  - Arrange for needed discharge resources and transportation as appropriate  - Identify discharge learning needs (meds, wound care, etc.)  - Arrange for interpretive services to assist at discharge as needed  - Refer to Case Management Department for coordinating discharge planning if the patient needs post-hospital services based on physician/advanced practitioner order or complex needs related to functional status, cognitive ability, or social support system  Outcome: Progressing     Problem: Knowledge Deficit  Goal: Patient/family/caregiver demonstrates understanding of disease process, treatment plan, medications, and discharge instructions  Description: Complete learning assessment and assess knowledge base. Interventions:  - Provide teaching at level of understanding  - Provide teaching via preferred learning methods  Outcome: Progressing     Problem: Prexisting or High Potential for Compromised Skin Integrity  Goal: Skin integrity is maintained or improved  Description: INTERVENTIONS:  - Identify patients at risk for skin breakdown  - Assess and monitor skin integrity  - Assess and monitor nutrition and hydration status  - Monitor labs   - Assess for incontinence   - Turn and reposition patient  - Assist with mobility/ambulation  - Relieve pressure over bony prominences  - Avoid friction and shearing  - Provide appropriate hygiene as needed including keeping skin clean and dry  - Evaluate need for skin moisturizer/barrier cream  - Collaborate with interdisciplinary team   - Patient/family teaching  - Consider wound care consult   Outcome: Progressing     Problem: Nutrition/Hydration-ADULT  Goal: Nutrient/Hydration intake appropriate for improving, restoring or maintaining nutritional needs  Description: Monitor and assess patient's nutrition/hydration status for malnutrition. Collaborate with interdisciplinary team and initiate plan and interventions as ordered.   Monitor patient's weight and dietary intake as ordered or per policy. Utilize nutrition screening tool and intervene as necessary. Determine patient's food preferences and provide high-protein, high-caloric foods as appropriate.      INTERVENTIONS:  - Monitor oral intake, urinary output, labs, and treatment plans  - Assess nutrition and hydration status and recommend course of action  - Evaluate amount of meals eaten  - Assist patient with eating if necessary   - Allow adequate time for meals  - Recommend/ encourage appropriate diets, oral nutritional supplements, and vitamin/mineral supplements  - Order, calculate, and assess calorie counts as needed  - Recommend, monitor, and adjust tube feedings and TPN/PPN based on assessed needs  - Assess need for intravenous fluids  - Provide specific nutrition/hydration education as appropriate  - Include patient/family/caregiver in decisions related to nutrition  Outcome: Progressing

## 2023-12-04 ENCOUNTER — TRANSITIONAL CARE MANAGEMENT (OUTPATIENT)
Dept: FAMILY MEDICINE CLINIC | Facility: CLINIC | Age: 68
End: 2023-12-04

## 2023-12-04 ENCOUNTER — TELEPHONE (OUTPATIENT)
Dept: OTHER | Facility: HOSPITAL | Age: 68
End: 2023-12-04

## 2023-12-04 ENCOUNTER — TELEMEDICINE (OUTPATIENT)
Dept: FAMILY MEDICINE CLINIC | Facility: CLINIC | Age: 68
End: 2023-12-04
Payer: COMMERCIAL

## 2023-12-04 DIAGNOSIS — M71.162 SEPTIC PREPATELLAR BURSITIS OF LEFT KNEE: ICD-10-CM

## 2023-12-04 DIAGNOSIS — I73.9 PVD (PERIPHERAL VASCULAR DISEASE) (HCC): ICD-10-CM

## 2023-12-04 DIAGNOSIS — I48.91 ATRIAL FIBRILLATION, UNSPECIFIED TYPE (HCC): ICD-10-CM

## 2023-12-04 LAB
BACTERIA BLD CULT: NORMAL
BACTERIA BLD CULT: NORMAL

## 2023-12-04 PROCEDURE — 99496 TRANSJ CARE MGMT HIGH F2F 7D: CPT | Performed by: FAMILY MEDICINE

## 2023-12-04 RX ORDER — CEFADROXIL 500 MG/1
1000 CAPSULE ORAL EVERY 12 HOURS SCHEDULED
Qty: 32 CAPSULE | Refills: 0 | Status: SHIPPED | OUTPATIENT
Start: 2023-12-04 | End: 2023-12-12

## 2023-12-04 RX ORDER — CLOPIDOGREL BISULFATE 75 MG/1
75 TABLET ORAL DAILY
Qty: 90 TABLET | Refills: 0 | Status: SHIPPED | OUTPATIENT
Start: 2023-12-04

## 2023-12-04 NOTE — PROGRESS NOTES
Virtual TCM Visit:    Verification of patient location:    Patient is located at Home in the following state in which I hold an active license [place]    Assessment/Plan:        Problem List Items Addressed This Visit     PVD (peripheral vascular disease) ([address])    Relevant Medications    clopidogrel (PLAVIX) 75 mg tablet    Atrial fibrillation, unspecified type (HCC)    Relevant Medications    rivaroxaban (Xarelto) 2.5 mg tablet    clopidogrel (PLAVIX) 75 mg tablet        Reason for visit is TCM  TCM Call     Date and time call was made  12/4/2023 10:53 AM    Hospital care reviewed  Records reviewed    [facility]    Date of Admission  11/29/23    Date of discharge  12/03/23    Diagnosis  Septic prepatellar bursitis of left knee    Disposition  Home    Were the patients medications reviewed and updated  No    Current Symptoms  None    Incisional pain severity  Moderate      TCM Call     Post hospital issues  None    Should patient be enrolled in anticoag monitoring? No    Scheduled for follow up?   Yes    Patient refusal reason  wants to see vascular before following up with us, has to reschedule that appointment and will call us    Did you obtain your prescribed medications  Yes    Do you need help managing your prescriptions or medications  No    Is transportation to your appointment needed  No    I have advised the patient to call PCP with any new or worsening symptoms  [address] or [employer] other    Support System  Spouse    The type of support provided  Emotional; Financial; Physical    Do you have social support  Yes, as much as I need    Are you recieving any outpatient services  No    Are you recieving home care services  No    Types of home care services  Nurse visit    Are you using any community resources  No    Current waiver services  No    Have you fallen in the last 12 months  No    Interperter language line needed  No Counseling  Patient         Encounter provider Neeru Ochoa MD     Provider located at [address] [place] [address],[address] [address] [phone]    Recent Visits  No visits were found meeting these conditions. Showing recent visits within past 7 days and meeting all other requirements  Today's Visits  Date Type Provider Dept   12/04/23 Telemedicine Neeru Ochoa MD [id number] today's visits and meeting all other requirements  Future Appointments  No visits were found meeting these conditions. Showing future appointments within next 150 days and meeting all other requirements       After connecting through [employer], the patient was identified by name and date of birth. Arvel Heimlich was informed that this is a telemedicine visit and that the visit is being conducted through Telephone. My office door was closed. No one else was in the room. He acknowledged consent and understanding of privacy and security of the video platform. The patient has agreed to participate and understands they can discontinue the visit at any time. Patient is aware this is a billable service. Transitional Care Management Review:  Arvel Heimlich is a 70 y.o. male here for TCM follow up. During the TCM phone call patient stated:    TCM Call     Date and time call was made  12/4/2023 10:53 AM    Hospital care reviewed  Records reviewed    Patient was hospitialized at  [address] [place] [address]    Date of Admission  11/29/23    Date of discharge  12/03/23    Diagnosis  Septic prepatellar bursitis of left knee    Disposition  Home    Were the patients medications reviewed and updated  No    Current Symptoms  None    Incisional pain severity  Moderate      TCM Call     Post hospital issues  None    Should patient be enrolled in anticoag monitoring? No    Scheduled for follow up?   Yes    Patient refusal reason  wants to see vascular before following up with us, has to reschedule that appointment and will call us    Did you obtain your prescribed medications  Yes    Do you need help managing your prescriptions or medications  No    Is transportation to your appointment needed  No    I have advised the patient to call PCP with any new or worsening symptoms  Sakina [address] or Dina other    Support System  Spouse    The type of support provided  Emotional; Financial; Physical    Do you have social support  Yes, as much as I need    Are you recieving any outpatient services  No    Are you recieving home care services  No    Types of home care services  Nurse visit    Are you using any community resources  No    Current waiver services  No    Have you fallen in the last 12 months  No    Interperter language line needed  No    Counseling  Patient        Subjective:     Patient ID: Prakash Bradley is a 70 y.o. male. 39-year-old male for TCM visit for recent hospitalization for infected bursitis prepatellar on his left leg. Patient was hospitalized had an I&D of this done surgically drained and he is now home with biotics. Patient is to take that to it is finished. Patient states he is doing better with this he is having no fever chills or sweats his appetite is good and he is going to be starting his new diabetic medicine. Patient also is having a problem with ambulating he does have an amputation below the knee on that left side he was having some problems with his prosthesis fitting which was rubbing which was causing what I was afraid it may be causing cellulitis. Patient now has contacted with the prosthetic suppliers and they have contacted him back to see about getting this problem rectified. I told the patient he may have to wait now that they had a surgery on his stump for that to heal before they would do anything with fitting him for a replacement prosthesis.   Patient also was asking about a possible use of a stair glide because he is having difficulty going up and down steps. I discussed with patient that I would gladly provide a prescription for this if he has coverage for but he needs to find out from his insurance carrier if he has coverage for stair glide and also needs refills on his Plavix and Xarelto. .      Review of Systems   Constitutional:  Negative for activity change, appetite change, chills, fatigue, fever and unexpected weight change. HENT:  Negative for congestion, ear pain, hearing loss, mouth sores, postnasal drip, sinus pressure, sinus pain, sneezing and sore throat. Respiratory:  Negative for apnea, cough, shortness of breath and wheezing. Cardiovascular:  Negative for chest pain, palpitations and leg swelling. Gastrointestinal:  Negative for abdominal pain, constipation, diarrhea, nausea and vomiting. Endocrine: Negative for cold intolerance and heat intolerance. Genitourinary:  Negative for dysuria, frequency and hematuria. Musculoskeletal:  Positive for gait problem. Negative for arthralgias, back pain, joint swelling and neck pain. Skin:  Negative for rash. Neurological:  Positive for weakness. Negative for dizziness and numbness. Hematological:  Does not bruise/bleed easily. Psychiatric/Behavioral:  Negative for agitation, behavioral problems, confusion, hallucinations and sleep disturbance. The patient is not nervous/anxious. Objective: There were no vitals filed for this visit. Physical Exam  Neurological:      Mental Status: He is alert and oriented to person, place, and time. Psychiatric:         Mood and Affect: Mood normal.         Thought Content: Thought content normal.         Judgment: Judgment normal.             I spent 15 minutes with the patient during this visit. Sheliah Primrose, MD      VIRTUAL VISIT DISCLAIMER    Sophie Dempsey verbally agrees to participate in GBM.  Pt is aware that Virtual Care Services could be limited without vital signs or the ability to perform a full hands-on physical exam. Janelle Lainez understands he or the provider may request at any time to terminate the video visit and request the patient to seek care or treatment in person.

## 2023-12-04 NOTE — TELEPHONE ENCOUNTER
Contacted patient per Dr. Kirsty Rocha. Patient was sent home on keflex Patient sent home on incorrect dosing. He wants us to send more medication to his [facility] [place]. He confirms that he doesn't need follow up but will call if questions, and we will call him if anything changes based on his cultures.

## 2023-12-04 NOTE — TELEPHONE ENCOUNTER
Was able to contact the patient myself after reviewing his updated cultures from 11/29. Patient currently denies having any systemic complaints although he did not have any significant systemic issues during this whole course of infection. Reviewed with him discussion with my office earlier today that he was sent out on [address] but that we would recommend 1000 mg every 12 hours. He reports his wife is going to  the new prescription. We discussed that he would be taking the antibiotic through 12/12. I let him know that there would be no plans for further antibiotics thereafter. He plans to contact orthopedics for follow-up. We reviewed his cultures and my suspicion overall is that there may have been contamination of the broth culture as the patient isolated a multidrug-resistant E. coli in broth cultures but was not on any antibiotics that would have inhibited growth of this organism in the actual culture plates. Again we discussed monitoring but should there be progression or concern for relapsing infection that he should present to the hospital for evaluation given the resistance seen on this organism. Patient requested that I leave a message on his phone with these instructions as well. I did also leave for him our office number should he have any further questions. Will forward phone note to office staff for reference.

## 2023-12-04 NOTE — UTILIZATION REVIEW
NOTIFICATION OF ADMISSION DISCHARGE   This is a Notification of Discharge from [facility]. Please be advised that this patient has been discharge from our facility. Below you will find the admission and discharge date and time including the patient’s disposition. UTILIZATION REVIEW CONTACT:  [employer]  Utilization   Network Utilization Review Department  Phone: [phone] x carefully listen to the prompts. All voicemails are confidential.  Email: [email or web address]     ADMISSION INFORMATION  PRESENTATION DATE: 11/29/2023 12:02 PM  OBERVATION ADMISSION DATE:   INPATIENT ADMISSION DATE: 11/29/23 12:32 PM   DISCHARGE DATE: 12/3/2023  1:55 PM   DISPOSITION:Home with [address] [place] [address] Utilization Review Department  ATTENTION: Please call with any questions or concerns to [phone] and carefully listen to the prompts so that you are directed to the right person. All voicemails are confidential.   For Discharge needs, contact Care Management DC Support Team at [phone] opt. 2  Send all requests for admission clinical reviews, approved or denied determinations and any other requests to dedicated fax number below belonging to the campus where the patient is receiving treatment.  List of dedicated fax numbers for the Facilities:  [place] DENIALS (Administrative/Medical Necessity) [phone]   DISCHARGE SUPPORT TEAM (Network) [phone] [facility] (Maternity/NICU/Pediatrics) [phone]   [address] [facility] [address] [place] [address] [address] [phone]

## 2023-12-05 LAB
BACTERIA TISS AEROBE CULT: ABNORMAL
BACTERIA TISS AEROBE CULT: ABNORMAL
GRAM STN SPEC: ABNORMAL

## 2023-12-14 ENCOUNTER — OFFICE VISIT (OUTPATIENT)
Dept: OBGYN CLINIC | Facility: CLINIC | Age: 68
End: 2023-12-14

## 2023-12-14 VITALS — HEIGHT: 64 IN | WEIGHT: 176.81 LBS | BODY MASS INDEX: 30.19 KG/M2

## 2023-12-14 DIAGNOSIS — Z98.890 STATUS POST INCISION AND DRAINAGE: ICD-10-CM

## 2023-12-14 DIAGNOSIS — R23.4 ESCHAR OF LOWER LEG: Primary | ICD-10-CM

## 2023-12-14 PROCEDURE — 99024 POSTOP FOLLOW-UP VISIT: CPT | Performed by: ORTHOPAEDIC SURGERY

## 2023-12-14 NOTE — PROGRESS NOTES
Assessment:   Status Post  Incision And Drainage (i&d) Left Knee Pre-patella Bursa - Left on 11/29/2023 , LEFT BKA in 2021     Plan:   Patient to see wound care secondary to eschar in the superior portion of incisional site  Discussed patient is doing well outside of poor healing of superior portion of incision site    Discussed diabetes contributes to poor wound healing  Discussed risk of further amputation if he does not get diabetes under control  Recommend seeing Endocrinology for better control of diabetes. Discussed patient should speak with PCP regarding referral.    Completed 10 day course Cefadroxil BID 12/13/23      Follow Up:  3 weeks     To Do Next Visit:  Reevaluation of current issue       CHIEF COMPLAINT:  Chief Complaint   Patient presents with    Left Knee - Post-op         SUBJECTIVE:  Sarita Soto is a 70y.o. year old male who presents for follow up after Incision And Drainage (i&d) Left [address] [facility] - Left, LEFT BkA 2021. Today patient has stiffness in the left knee but denies pain. Patient notes 10 day course of Cefadroxil BID finished 12/13/2023. Pt denies any fevers or chills. None Denies any numbness or tingling.     Review of Systems - General ROS: negative  Respiratory ROS: no cough, shortness of breath, or wheezing  Cardiovascular ROS: no chest pain or dyspnea on exertion  Gastrointestinal ROS: no abdominal pain, change in bowel habits, or black or bloody stools  Musculoskeletal ROS: negative  Neurological ROS: No headaches       PHYSICAL EXAMINATION:    MUSCULOSKELETAL EXAMINATION: LLE  General: Alert and oriented x 3, WNWD, NAD  Incision: Eschar of superior portion of incision site 3x2 inches  Mild swelling of medial portion of knee  No drainage or purulence  Range of Motion: As expected  Neurovascular status: Sensation intact to thigh and stump   Skin warm and well perfused     Done today: Sutures out          PROCEDURES PERFORMED:  Suture removal    Date/Time: 12/14/2023 10:30 AM    Performed by: Stan Rosen PA-C  Authorized by: DO Nory Torres Protocol:  Consent: Verbal consent obtained. Risks and benefits: risks, benefits and alternatives were discussed  Consent given by: patient  Patient understanding: patient states understanding of the procedure being performed  Patient identity confirmed: verbally with patient      Patient location:  Clinic  Location:     Laterality:  Left    Location:  Lower extremity    Lower extremity location:  Knee    Knee location:  L knee  Procedure details: Tools used:  Suture removal kit    Number of sutures removed:  3  Post-procedure details:     Post-removal:  Steri-Strips applied    Patient tolerance of procedure:   Tolerated well, no immediate complications

## 2023-12-14 NOTE — PROGRESS NOTES
Assessment:  No diagnosis found. Patient Active Problem List   Diagnosis    Acquired deformity of foot    Diabetic polyneuropathy associated with type 2 diabetes mellitus (HCC)    Pain in both feet    Peripheral arteriosclerosis (HCC)    Onychomycosis    Tinea pedis of both feet    Dermatophytosis    Ingrown toenail    Paronychia of toenail of right foot    Diabetic ulcer of toe of right foot associated with type 2 diabetes mellitus, limited to breakdown of skin (HCC)    Bony exostosis    Right foot ulcer, with fat layer exposed ([address])    PVD (peripheral vascular disease) ([address])    Poorly controlled type 2 diabetes mellitus ([address])    Essential hypertension    Mixed hyperlipidemia    Type 2 diabetes mellitus with diabetic peripheral angiopathy without gangrene (HCC)    Platelets decreased (HCC)    Gastroesophageal reflux disease without esophagitis    S/P BKA (below knee amputation), left (HCC)    Insomnia    Continuous opioid dependence (HCC)    Osteomyelitis, unspecified site, unspecified type (HCC)    Embolism and thrombosis of arteries of the lower extremities (HCC)    Atrial fibrillation, unspecified type ([address])    Septic prepatellar bursitis of left knee       Plan:    70 y.o. male with ***    ***    {viscolist:95180}    The patient was seen and examined by Dr. Aylin Varner and myself. The assessment and plan were formulated by Dr. Aylin Varner and I assisted in carrying it out. To Do Next Visit:  {To do next visit:70966::" "}      Subjective:   Patient ID: Fan Espinoza is a 70 y.o. male . HPI    Patient comes in today with regards to ***. Patient is referred to us by Ponce Grande MD for further evaluation. The patient reports that the pain been going on for ***. Patient rates their pain as ***/10. Injury or trauma prior to onset of pain: ***. Pain is located in the ***. It is worsened with ***, and is made better with ***. Treatments tried: *** . The pain *** radiate ***. Old injuries or prior surgeries: ***. Numbness or tingling: *** . The following portions of the patient's history were reviewed and updated as appropriate: allergies, current medications, past family history, past social history, past surgical history and problem list.    Social History     Socioeconomic History    Marital status: /Civil Union     Spouse name: Elva Martins    Number of children: Not on file    Years of education: 12    Highest education level: High school graduate   Occupational History    Occupation: [employer]   Tobacco Use    Smoking status: Never    Smokeless tobacco: Never   Vaping Use    Vaping status: Never Used   Substance and Sexual Activity    Alcohol use: Not Currently     Comment: occasional    Drug use: Never    Sexual activity: Yes     Partners: Female   Other Topics Concern    Not on file   Social History Narrative         Social Determinants of Health     Financial Resource Strain: Low Risk  (9/15/2023)    Overall Financial Resource Strain (CARDIA)     Difficulty of Paying Living Expenses: Not hard at all   Food Insecurity: Not on file   Transportation Needs: No Transportation Needs (9/15/2023)    PRAPARE - Transportation     Lack of Transportation (Medical): No     Lack of Transportation (Non-Medical):  No   Physical Activity: Not on file   Stress: Not on file   Social Connections: Not on file   Intimate Partner Violence: Not on file   Housing Stability: Not on file     Past Medical History:   Diagnosis Date    Arthritis     fingers    First degree AV block     Full dentures     Hypertension     PAD (peripheral artery disease) ([facility])     PVD (peripheral vascular disease) ([address])     Type 2 diabetes mellitus with diabetic peripheral angiopathy without gangrene ([address]) 5/18/2021    Per CMS ICD 10 guidelines--Per Physician    Wound, open     right foot- by the 5th toe     Past Surgical History:   Procedure Laterality Date    CHOLECYSTECTOMY      COLONOSCOPY      INCISION AND DRAINAGE OF WOUND Left 11/29/2023    Procedure: INCISION AND DRAINAGE (I&D) LEFT KNEE PRE-PATELLA BURSA; Surgeon: Andrew Irvin DO;  [facility];  Service: Orthopedics    IR AORTAGRAM WITH RUN-OFF  1/28/2021    IR AORTAGRAM WITH RUN-OFF  4/13/2021    LEG AMPUTATION THROUGH LOWER TIBIA AND FIBULA Left 7/17/2021    Procedure: AMPUTATION BELOW KNEE (BKA);   Surgeon: Kenan Henderson MD;  [facility];  Service: Vascular    SD BYP FEM-ANT TIBL PST TIBL PRONEAL ART/OTH DSTL Left 7/14/2021    Procedure: BYPASS femoral-peroneal artery bypass with  reverse GSV and balloon angioplasty peroneal artery;  Surgeon: Kenan Henderson MD;  [facility];  Service: Vascular    SD DEBRIDEMENT BONE MUSCLE &/FASCIA 20 SQ CM/< Right 12/18/2020    Procedure: DEBRIDMENT OF ULCER , REMOVAL OF DEVITALIZED SKIN, SOFT TISSUE, BONE AND APPLICATION OF INTEGRA GRAFT RIGHT FOOT.;  Surgeon: Jackeline Pathak DPM;  Location: [facility];  Service: Podiatry    REPLANTATION THUMB Right     right tip reconnected    SKIN GRAFT      right thumb    TOE AMPUTATION      left foot all 5 toes removed    TOE AMPUTATION Right 2/2/2021    Procedure: Right partial 5th ray amputation;  Surgeon: Armando Fuentes DPM;  [facility];  Service: Podiatry    TOE OSTEOTOMY Right 6/26/2020    Procedure: exostosis of right big toe;  Surgeon: Diann Ding DPM;  Location: [facility];  Service: Podiatry    TRIGGER FINGER RELEASE      bilateral hands    VEIN LIGATION      right     Allergies   Allergen Reactions    Shellfish-Derived Products - Food Allergy Anaphylaxis     Throat closes    Sulfamethoxazole-Trimethoprim Rash     Current Outpatient Medications on File Prior to Visit   Medication Sig Dispense Refill    atorvastatin (LIPITOR) 40 mg tablet Take 1 tablet (40 mg total) by mouth daily at bedtime 90 tablet 1    betamethasone dipropionate (DIPROSONE) 0.05 % cream Apply topically 2 (two) times a day 30 g 0    Blood Glucose Monitoring Suppl (Accu-Chek Guide) w/Device KIT Use in the morning 1 kit 0 clopidogrel (PLAVIX) 75 mg tablet Take 1 tablet (75 mg total) by mouth daily 90 tablet 0    cyclobenzaprine (FLEXERIL) 5 mg tablet Take 1 tablet (5 mg total) by mouth 3 (three) times a day as needed for muscle spasms for up to 10 days 10 tablet 0    glucose blood (Accu-Chek Guide) test strip Check Blood Sugars QID as directed by physician.  400 strip 1    glucose blood (OneTouch Ultra) test strip Use 1 each 4 (four) times a day To test blood sugars 400 each 1    Insulin Glargine Solostar (Basaglar KwikPen) 100 UNIT/ML SOPN Inject 0.5 mL (50 Units total) under the skin daily at bedtime 15 mL 1    insulin lispro (HumaLOG KwikPen) 100 units/mL injection pen Inject 25 Units under the skin 3 (three) times a day with meals 15 mL 1    insulin NPH-insulin regular (NovoLIN 70/30) 100 units/mL subcutaneous injection Inject:  44 units with breakfast, 20 units with lunch, 30 units with dinner (Patient taking differently: Inject:  44 units with breakfast, 20 units with lunch, 30 units with dinner) 15 mL 1    Insulin Pen Needle (HM UltiCare Mini Pen Needles) 31G X 5 MM MISC Use 4 (four) times a day 200 each 2    Lancets (OneTouch Delica Plus VSMYRO94F) MISC 1 Units by Does not apply route 2 (two) times a day Dx E11.9 100 each 2    lisinopril (ZESTRIL) 10 mg tablet Take 1 tablet (10 mg total) by mouth daily 90 tablet 1    metFORMIN (GLUCOPHAGE) 1000 MG tablet Take 1 tablet (1,000 mg total) by mouth 2 (two) times a day with meals 60 tablet 5    oxyCODONE-acetaminophen (Percocet) 7.5-325 MG per tablet Take 1 tablet by mouth every 4 (four) hours as needed for moderate pain Max Daily Amount: 6 tablets 30 tablet 0    pantoprazole (PROTONIX) 40 mg tablet Take 1 tablet (40 mg total) by mouth daily 90 tablet 1    pioglitazone (ACTOS) 45 mg tablet Take 1 tablet (45 mg total) by mouth daily 90 tablet 1    rivaroxaban (Xarelto) 2.5 mg tablet Take 1 tablet (2.5 mg total) by mouth daily with breakfast Last dose 6/21/20 30 tablet 2    UltiCare Insulin Syringe 31G X 5/16" 1 ML MISC Inject under the skin as needed (for injection) 100 each 1     No current facility-administered medications on file prior to visit. Review of Systems      Objective: There were no vitals filed for this visit. Physical Exam    Ortho Exam    {Imaging Review Statement:[id number]}    Procedures  {Was Procdo done:74019::"No Procedures performed today"}    Scribe Attestation      I,:   am acting as a scribe while in the presence of the attending physician.:       I,:   personally performed the services described in this documentation    as scribed in my presence.:               Portions of the record may have been created with voice recognition software. Occasional wrong word or "sound a like" substitutions may have occurred due to the inherent limitations of voice recognition software. Read the chart carefully and recognize, using context, where substitutions have occurred.

## 2023-12-20 ENCOUNTER — OFFICE VISIT (OUTPATIENT)
Dept: WOUND CARE | Facility: HOSPITAL | Age: 68
End: 2023-12-20
Payer: COMMERCIAL

## 2023-12-20 VITALS
TEMPERATURE: 97.6 F | DIASTOLIC BLOOD PRESSURE: 75 MMHG | HEIGHT: 64 IN | SYSTOLIC BLOOD PRESSURE: 115 MMHG | HEART RATE: 89 BPM | RESPIRATION RATE: 15 BRPM | WEIGHT: 176 LBS | BODY MASS INDEX: 30.05 KG/M2

## 2023-12-20 DIAGNOSIS — F11.20 CONTINUOUS OPIOID DEPENDENCE (HCC): ICD-10-CM

## 2023-12-20 DIAGNOSIS — E11.65 POORLY CONTROLLED TYPE 2 DIABETES MELLITUS (HCC): ICD-10-CM

## 2023-12-20 DIAGNOSIS — I10 ESSENTIAL HYPERTENSION: ICD-10-CM

## 2023-12-20 DIAGNOSIS — Z98.890 PERIPHERAL ARTERIAL DISEASE WITH HISTORY OF REVASCULARIZATION: ICD-10-CM

## 2023-12-20 DIAGNOSIS — E11.42 DIABETIC POLYNEUROPATHY ASSOCIATED WITH TYPE 2 DIABETES MELLITUS (HCC): ICD-10-CM

## 2023-12-20 DIAGNOSIS — I73.9 PERIPHERAL ARTERIAL DISEASE WITH HISTORY OF REVASCULARIZATION: ICD-10-CM

## 2023-12-20 DIAGNOSIS — L97.929 DIABETIC ULCER OF LEFT LOWER LEG (HCC): Primary | ICD-10-CM

## 2023-12-20 DIAGNOSIS — E78.2 MIXED HYPERLIPIDEMIA: ICD-10-CM

## 2023-12-20 DIAGNOSIS — Z98.890 HISTORY OF INCISION AND DRAINAGE: ICD-10-CM

## 2023-12-20 DIAGNOSIS — E11.622 DIABETIC ULCER OF LEFT LOWER LEG (HCC): Primary | ICD-10-CM

## 2023-12-20 PROCEDURE — 99214 OFFICE O/P EST MOD 30 MIN: CPT | Performed by: STUDENT IN AN ORGANIZED HEALTH CARE EDUCATION/TRAINING PROGRAM

## 2023-12-20 PROCEDURE — 97597 DBRDMT OPN WND 1ST 20 CM/<: CPT | Performed by: STUDENT IN AN ORGANIZED HEALTH CARE EDUCATION/TRAINING PROGRAM

## 2023-12-20 PROCEDURE — 99213 OFFICE O/P EST LOW 20 MIN: CPT | Performed by: STUDENT IN AN ORGANIZED HEALTH CARE EDUCATION/TRAINING PROGRAM

## 2023-12-20 RX ORDER — LISINOPRIL 10 MG/1
10 TABLET ORAL DAILY
Qty: 90 TABLET | Refills: 1 | Status: SHIPPED | OUTPATIENT
Start: 2023-12-20

## 2023-12-20 RX ORDER — ATORVASTATIN CALCIUM 40 MG/1
40 TABLET, FILM COATED ORAL
Qty: 90 TABLET | Refills: 1 | Status: SHIPPED | OUTPATIENT
Start: 2023-12-20

## 2023-12-20 NOTE — PATIENT INSTRUCTIONS
Orders Placed This Encounter   Procedures    Wound miscellaneous orders Diabetic Ulcer Anterior;Left Knee     Protein: Eat protein with each meal to promote healing.  Examples of protein are fish, meat, chicken, nuts, peanut butter, eggs, lentils, edamame or a protein shake.    Wound infection:  If you have signs of infection please call the wound center.  If the wound center is closed- please go to the Emergency department.  Some signs of infection:  fever, chills, increased redness, red streaks, increase in pain, increased drainage.  Drainage with an odor, Change in drainage color: white/milky/green/tan/yellow,  an increase in swelling, chest pain and/or shortness of breath.     Standing Status:   Future     Standing Expiration Date:   12/20/2024    Wound miscellaneous orders Diabetic Ulcer Anterior;Left Knee     Please try to get blood sugars into within target range to promote wound healing.     Standing Status:   Future     Standing Expiration Date:   12/20/2024    Wound cleansing and dressings Diabetic Ulcer Anterior;Left Knee     Left Knee Wound:    Wash your hands with soap and water.  Remove old dressing, discard into plastic bag and place in trash.  Cleanse the wound with normal saline prior to applying a clean dressing. Do not use tissue or cotton balls. Do not scrub the wound. Pat dry using gauze.  Shower yes   Apply moisturizer to skin surrounding wound  Apply Betadine to the black area of the wound.  Cover with gauze.  Secure with rolled gauze and tape.  Change dressing daily.    This was done today.     Standing Status:   Future     Standing Expiration Date:   12/20/2024

## 2023-12-20 NOTE — TELEPHONE ENCOUNTER
Reason for call:   [x] Refill   [] Prior Auth  [] Other:     Office:   [x] PCP/Provider - [facility]  [] Specialty/Provider -     Medication: atorvastatin (LIPITOR) 40 mg tablet , lisinopril (ZESTRIL) 10 mg tablet     Quantity: 90    Pharmacy: [id number] - [place] - [address]     Does the patient have enough for 3 days?   [x] Yes   [] No - Send as HP to POD

## 2023-12-20 NOTE — PROGRESS NOTES
Patient ID: Kan Juan is a 68 y.o. male Date of Birth 1955     Chief Complaint  Chief Complaint   Patient presents with    New Patient Visit     Left Knee       Allergies  Shellfish-derived products - food allergy and Sulfamethoxazole-trimethoprim    Assessment:     Diagnoses and all orders for this visit:    Diabetic ulcer of left lower leg (HCC)  -     Wound miscellaneous orders Diabetic Ulcer Anterior;Left Knee; Future  -     Wound miscellaneous orders Diabetic Ulcer Anterior;Left Knee; Future  -     Wound cleansing and dressings Diabetic Ulcer Anterior;Left Knee; Future  -     Debridement Diabetic Ulcer Anterior;Left Knee    History of incision and drainage  -     Wound miscellaneous orders Diabetic Ulcer Anterior;Left Knee; Future  -     Wound miscellaneous orders Diabetic Ulcer Anterior;Left Knee; Future  -     Wound cleansing and dressings Diabetic Ulcer Anterior;Left Knee; Future    Continuous opioid dependence (HCC)  -     Wound miscellaneous orders Diabetic Ulcer Anterior;Left Knee; Future  -     Wound miscellaneous orders Diabetic Ulcer Anterior;Left Knee; Future  -     Wound cleansing and dressings Diabetic Ulcer Anterior;Left Knee; Future    Poorly controlled type 2 diabetes mellitus (HCC)  -     Wound miscellaneous orders Diabetic Ulcer Anterior;Left Knee; Future  -     Wound miscellaneous orders Diabetic Ulcer Anterior;Left Knee; Future  -     Wound cleansing and dressings Diabetic Ulcer Anterior;Left Knee; Future    Peripheral arterial disease with history of revascularization   -     Wound miscellaneous orders Diabetic Ulcer Anterior;Left Knee; Future  -     Wound miscellaneous orders Diabetic Ulcer Anterior;Left Knee; Future  -     Wound cleansing and dressings Diabetic Ulcer Anterior;Left Knee; Future    Diabetic polyneuropathy associated with type 2 diabetes mellitus (HCC)  -     Wound miscellaneous orders Diabetic Ulcer Anterior;Left Knee; Future  -     Wound miscellaneous orders  "Diabetic Ulcer Anterior;Left Knee; Future  -     Wound cleansing and dressings Diabetic Ulcer Anterior;Left Knee; Future              Debridement   Wound 12/20/23 Diabetic Ulcer Knee Anterior;Left    Universal Protocol:  Consent: Verbal consent obtained.  Risks and benefits: risks, benefits and alternatives were discussed  Consent given by: patient  Time out: Immediately prior to procedure a \"time out\" was called to verify the correct patient, procedure, equipment, support staff and site/side marked as required.  Patient identity confirmed: verbally with patient    Debridement Details  Performed by: physician  Debridement type: selective  Pain control: lidocaine 4%      Post-debridement measurements  Length (cm): 8.5  Width (cm): 3  Depth (cm): 0.1  Percent debrided: 20%  Surface Area (cm^2): 25.5  Area Debrided (cm^2): 5.1  Volume (cm^3): 2.55    Devitalized tissue debrided: biofilm, exudate and eschar  Instrument(s) utilized: curette  Bleeding: small  Hemostasis obtained with: pressure  Procedural pain (0-10): 2  Post-procedural pain: 1   Response to treatment: procedure was tolerated well        Plan:   It was a pleasure to see Kan Juan for wound care consult today  Selective debridement performed today as above  Start plan of care as noted below with betadine paint to eschar.  Wound to remain covered with gauze at all times and dry as there is a small area that was debrided to healthy wound bed today.  Plan at this time is to have eschar removal in stages over several visits.   A1C results reviewed with the patient today.  Patient understands that uncontrolled BSGs will results in overall poor wound healing along with other sequelae of DM. Patient advised to maintain tight glycemic control and work towards this goal with PCP and/or endocrinology.  Follow-up with  scheduled orthopedic surgery and endocrinology physicians in January  No signs or symptoms of infection today. Patient understands that if any " signs of infection start (such as increased redness, drainage, pain, fever, chills, diaphoresis), they should call our office or proceed to the ER or Urgent Care.  Patient should continue a high protein diet to facilitate wound healing  Patient is advised to not submerge wound or leave wound open to air.  Follow up in 2 weeks.  Advised 1 week follow-up with CRNP however patient states that he would be like to be seen by me only.  I am out of the office next week thus the patient will be seen in 2 weeks  Given the multi-factorial nature of wound care, additional time was taken to review patient's treatment plan with other specialties and most recent pertinent lab work and imaging.   All plans of care discussed with patient at bedside who verbalized understanding with treatment plan.    Wound 12/20/23 Diabetic Ulcer Knee Anterior;Left (Active)   Wound Image Images linked 12/20/23 1319   Wound Description Eschar;Black;Yellow;Epithelialization;Pink 12/20/23 1319   Dominga-wound Assessment Dry;Scaly 12/20/23 1319   Wound Length (cm) 8.5 cm 12/20/23 1319   Wound Width (cm) 3 cm 12/20/23 1319   Wound Depth (cm) 0 cm (0.1 after debridement) 12/20/23 1319   Wound Surface Area (cm^2) 25.5 cm^2 12/20/23 1319   Wound Volume (cm^3) 0 cm^3 12/20/23 1319   Calculated Wound Volume (cm^3) 0 cm^3 12/20/23 1319   Wound Site Closure Other (Comment) (steri strips intact; at this time pt unsure if he will allow any debridement to be done. Will retake photo if he allows removal of steri strips.) 12/20/23 1319   Drainage Amount None (small serosanguinous drainage after debridement) 12/20/23 1319   Non-staged Wound Description Full thickness 12/20/23 1319   Dressing Status Intact (upon arrival) 12/20/23 1319       Wound 12/20/23 Diabetic Ulcer Knee Anterior;Left (Active)   Date First Assessed/Time First Assessed: 12/20/23 1312   Primary Wound Type: Diabetic Ulcer  Location: Knee  Wound Location Orientation: Anterior;Left  Wound Description  (Comments): CAO 4       [REMOVED] Wound 11/29/23 Knee Left (Removed)   Resolved Date: 12/20/23  Date First Assessed/Time First Assessed: 11/29/23 3612   Location: Knee  Wound Location Orientation: Left  Incision's 1st Dressing: PACKING PLAIN 1/4 IN, DRESSING XEROFORM 1 X 8, SPONGE GAUZE 4 X 4 16 PLY STRL PLASTIC TRAY LF,...       Subjective:      .    12/20/23: Consult - Gunner is a pleasant 68-year-old male with a past medical history of type 2 diabetes mellitus most recent A1c 11.6% 3 weeks ago, diabetic  polyneuropathy, peripheral arterial disease with history of re-vascularization, atrial fibrillation on chronic anticoagulation, hx of osteomyelitis requiring left sided BKA, continued opioid dependence here today for initial wound care consult.  Patient was referred by the orthopedic surgeon, Dr. Daryl Shay.  He had incision and drainage of the left knee prepatellar bursa on 11/29/23 to 4-day hospital course at [facility] at which time he was hospitalized for septic prepatellar bursitis.  Following this he had left knee stiffness and pain and was given a 10-day course of such cefadroxil.  He was seen for his suture removal postop visit On 12/14/2023 at Which Time It Was Noted That There Is an Eschar in the Superior Portion of the Incision Site.  He was told that he needs to have better glycemic control and to speak to endocrinology.  Also advised that he if he does not hear he may require further amputation.  Wound care referral was placed at that visit.  Denies any local or systemic symptoms of infection today including fever chills diaphoresis.    BL LEAD Feb 2023:  RIGHT LOWER LIMB:  Diffuse disease noted throughout the femoral-popliteal arteries without  significant focal stenosis.  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  Ankle/Brachial index: 1.12, within the normal disease category (Prior 1.2).  Metatarsal pressure was unable to be obtained due to non-compressible vessels.  Great  "toe pressure of 82 mmHg, within the healing range (Prior 46 mmHg).  PVR/ PPG tracings are dampened at the ankle and metatarsal.     LEFT LOWER LIMB:  Diffuse disease noted throughout the femoral-popliteal arteries without  significant focal stenosis.  Below knee amputation.     Compared to previous study on 12/6/2021 and 05/26/2021, there is no significant  change in the disease process.  Recommend repeat testing in 6 months as per protocol unless otherwise  indicated.          The following portions of the patient's history were reviewed and updated as appropriate: allergies, current medications, past family history, past medical history, past social history, past surgical history, and problem list.    Review of Systems   Constitutional:  Negative for chills, diaphoresis and fever.   Skin:  Positive for wound.   All other systems reviewed and are negative.        Objective:       Wound 12/20/23 Diabetic Ulcer Knee Anterior;Left (Active)   Wound Image Images linked 12/20/23 1319   Wound Description Eschar;Black;Yellow;Epithelialization;Pink 12/20/23 1319   Dominga-wound Assessment Dry;Scaly 12/20/23 1319   Wound Length (cm) 8.5 cm 12/20/23 1319   Wound Width (cm) 3 cm 12/20/23 1319   Wound Depth (cm) 0 cm (0.1 after debridement) 12/20/23 1319   Wound Surface Area (cm^2) 25.5 cm^2 12/20/23 1319   Wound Volume (cm^3) 0 cm^3 12/20/23 1319   Calculated Wound Volume (cm^3) 0 cm^3 12/20/23 1319   Wound Site Closure Other (Comment) (steri strips intact; at this time pt unsure if he will allow any debridement to be done. Will retake photo if he allows removal of steri strips.) 12/20/23 1319   Drainage Amount None (small serosanguinous drainage after debridement) 12/20/23 1319   Non-staged Wound Description Full thickness 12/20/23 1319   Dressing Status Intact (upon arrival) 12/20/23 1319       /75   Pulse 89   Temp 97.6 °F (36.4 °C)   Resp 15   Ht 5' 4\" (1.626 m)   Wt 79.8 kg (176 lb)   BMI 30.21 kg/m²     Physical " Exam  Vitals reviewed.   Constitutional:       Appearance: Normal appearance.   HENT:      Head: Normocephalic and atraumatic.   Eyes:      Extraocular Movements: Extraocular movements intact.   Pulmonary:      Effort: Pulmonary effort is normal.   Musculoskeletal:      Cervical back: Neck supple.      Comments: L BKA   Skin:     Comments: 2 areas of eschar along vertical left knee incision wound.  The more distal 1 has epithelialized tissue underneath it.  Eschar was debrided today.  Regarding the more proximal extensive eschar directly along surgical line, there are no overt signs of infection.  More medial aspect is able to be debrided today down to healthy wound bed.  Wound appears to involve fat only at this time.  The remainder of eschar was well-adhered and not debrided at this visit   Neurological:      Mental Status: He is alert.   Psychiatric:         Mood and Affect: Mood normal.                 Wound Instructions:  Orders Placed This Encounter   Procedures    Wound miscellaneous orders Diabetic Ulcer Anterior;Left Knee     Protein: Eat protein with each meal to promote healing.  Examples of protein are fish, meat, chicken, nuts, peanut butter, eggs, lentils, edamame or a protein shake.    Wound infection:  If you have signs of infection please call the wound center.  If the wound center is closed- please go to the Emergency department.  Some signs of infection:  fever, chills, increased redness, red streaks, increase in pain, increased drainage.  Drainage with an odor, Change in drainage color: white/milky/green/tan/yellow,  an increase in swelling, chest pain and/or shortness of breath.     Standing Status:   Future     Standing Expiration Date:   12/20/2024    Wound miscellaneous orders Diabetic Ulcer Anterior;Left Knee     Please try to get blood sugars into within target range to promote wound healing.     Standing Status:   Future     Standing Expiration Date:   12/20/2024    Wound cleansing and  dressings Diabetic Ulcer Anterior;Left Knee     Left Knee Wound:    Wash your hands with soap and water.  Remove old dressing, discard into plastic bag and place in trash.  Cleanse the wound with normal saline prior to applying a clean dressing. Do not use tissue or cotton balls. Do not scrub the wound. Pat dry using gauze.  Shower yes   Apply moisturizer to skin surrounding wound  Apply Betadine to the black area of the wound.  Cover with gauze.  Secure with rolled gauze and tape.  Change dressing daily.    This was done today.     Standing Status:   Future     Standing Expiration Date:   12/20/2024    Debridement Diabetic Ulcer Anterior;Left Knee     This order was created via procedure documentation        Diagnosis ICD-10-CM Associated Orders   1. Diabetic ulcer of left lower leg ([facility])  E11.622 Wound miscellaneous orders Diabetic Ulcer Anterior;Left Knee    L97.929 Wound miscellaneous orders Diabetic Ulcer Anterior;Left Knee     Wound cleansing and dressings Diabetic Ulcer Anterior;Left Knee     Debridement Diabetic Ulcer Anterior;Left Knee      2. History of incision and drainage  Z98.890 Wound miscellaneous orders Diabetic Ulcer Anterior;Left Knee     Wound miscellaneous orders Diabetic Ulcer Anterior;Left Knee     Wound cleansing and dressings Diabetic Ulcer Anterior;Left Knee      3. Continuous opioid dependence ([facility])  F11.20 Wound miscellaneous orders Diabetic Ulcer Anterior;Left Knee     Wound miscellaneous orders Diabetic Ulcer Anterior;Left Knee     Wound cleansing and dressings Diabetic Ulcer Anterior;Left Knee      4. Poorly controlled type 2 diabetes mellitus ([facility])  E11.65 Wound miscellaneous orders Diabetic Ulcer Anterior;Left Knee     Wound miscellaneous orders Diabetic Ulcer Anterior;Left Knee     Wound cleansing and dressings Diabetic Ulcer Anterior;Left Knee      5. Peripheral arterial disease with history of revascularization   I73.9 Wound miscellaneous orders Diabetic Ulcer Anterior;Left Knee  "   Z98.890 Wound miscellaneous orders Diabetic Ulcer Anterior;Left Knee     Wound cleansing and dressings Diabetic Ulcer Anterior;Left Knee      6. Diabetic polyneuropathy associated with type 2 diabetes mellitus (HCC)  E11.42 Wound miscellaneous orders Diabetic Ulcer Anterior;Left Knee     Wound miscellaneous orders Diabetic Ulcer Anterior;Left Knee     Wound cleansing and dressings Diabetic Ulcer Anterior;Left Knee          --  Ismael Prasad MD    \"This note has been constructed using a voice recognition system. Therefore there may be syntax, spelling, and/or grammatical errors. Occasional wrong word or \"sound alike\" substitutions may have occurred due to the inherent limitations of voice recognition software. Read the chart carefully and recognize, using context, where substitutions have occurred. Please call if you have any questions.\"     "

## 2023-12-21 ENCOUNTER — TELEPHONE (OUTPATIENT)
Dept: FAMILY MEDICINE CLINIC | Facility: CLINIC | Age: 68
End: 2023-12-21

## 2023-12-21 DIAGNOSIS — Z79.4 TYPE 2 DIABETES MELLITUS WITH DIABETIC PERIPHERAL ANGIOPATHY AND GANGRENE, WITH LONG-TERM CURRENT USE OF INSULIN (HCC): ICD-10-CM

## 2023-12-21 DIAGNOSIS — K21.9 GASTROESOPHAGEAL REFLUX DISEASE WITHOUT ESOPHAGITIS: ICD-10-CM

## 2023-12-21 DIAGNOSIS — E11.52 TYPE 2 DIABETES MELLITUS WITH DIABETIC PERIPHERAL ANGIOPATHY AND GANGRENE, WITH LONG-TERM CURRENT USE OF INSULIN (HCC): ICD-10-CM

## 2023-12-21 DIAGNOSIS — G89.29 CHRONIC BACK PAIN, UNSPECIFIED BACK LOCATION, UNSPECIFIED BACK PAIN LATERALITY: Primary | ICD-10-CM

## 2023-12-21 DIAGNOSIS — I73.9 PVD (PERIPHERAL VASCULAR DISEASE) (HCC): ICD-10-CM

## 2023-12-21 DIAGNOSIS — M54.9 CHRONIC BACK PAIN, UNSPECIFIED BACK LOCATION, UNSPECIFIED BACK PAIN LATERALITY: Primary | ICD-10-CM

## 2023-12-21 RX ORDER — OXYCODONE HYDROCHLORIDE AND ACETAMINOPHEN 5; 325 MG/1; MG/1
1 TABLET ORAL EVERY 6 HOURS PRN
Qty: 120 TABLET | Refills: 0 | Status: SHIPPED | OUTPATIENT
Start: 2023-12-21

## 2023-12-21 RX ORDER — LANCETS
EACH MISCELLANEOUS
Qty: 400 EACH | Refills: 5 | Status: SHIPPED | OUTPATIENT
Start: 2023-12-21

## 2023-12-21 RX ORDER — BLOOD SUGAR DIAGNOSTIC
STRIP MISCELLANEOUS
Qty: 400 STRIP | Refills: 1 | Status: SHIPPED | OUTPATIENT
Start: 2023-12-21

## 2023-12-21 RX ORDER — ISOPROPYL ALCOHOL 0.75 G/1
SWAB TOPICAL 4 TIMES DAILY
Qty: 400 EACH | Refills: 5 | Status: SHIPPED | OUTPATIENT
Start: 2023-12-21

## 2023-12-21 RX ORDER — CLOPIDOGREL BISULFATE 75 MG/1
75 TABLET ORAL DAILY
Qty: 90 TABLET | Refills: 1 | Status: SHIPPED | OUTPATIENT
Start: 2023-12-21

## 2023-12-21 RX ORDER — PANTOPRAZOLE SODIUM 40 MG/1
40 TABLET, DELAYED RELEASE ORAL DAILY
Qty: 90 TABLET | Refills: 1 | Status: SHIPPED | OUTPATIENT
Start: 2023-12-21

## 2023-12-21 RX ORDER — BLOOD-GLUCOSE METER
EACH MISCELLANEOUS 4 TIMES DAILY
Qty: 1 KIT | Refills: 0 | Status: SHIPPED | OUTPATIENT
Start: 2023-12-21

## 2023-12-28 ENCOUNTER — TELEPHONE (OUTPATIENT)
Age: 68
End: 2023-12-28

## 2023-12-28 NOTE — TELEPHONE ENCOUNTER
[facility] called to notify that they have not received the Physician's Orders (See 12/21) and ask that it be signed and faxed back to them at: [phone]

## 2024-01-01 DIAGNOSIS — Z79.4 TYPE 2 DIABETES MELLITUS WITH DIABETIC PERIPHERAL ANGIOPATHY AND GANGRENE, WITH LONG-TERM CURRENT USE OF INSULIN (HCC): Primary | ICD-10-CM

## 2024-01-01 DIAGNOSIS — I73.9 PVD (PERIPHERAL VASCULAR DISEASE) (HCC): ICD-10-CM

## 2024-01-01 DIAGNOSIS — K21.9 GASTROESOPHAGEAL REFLUX DISEASE WITHOUT ESOPHAGITIS: ICD-10-CM

## 2024-01-01 DIAGNOSIS — E11.52 TYPE 2 DIABETES MELLITUS WITH DIABETIC PERIPHERAL ANGIOPATHY AND GANGRENE, WITH LONG-TERM CURRENT USE OF INSULIN (HCC): Primary | ICD-10-CM

## 2024-01-01 RX ORDER — PANTOPRAZOLE SODIUM 40 MG/1
40 TABLET, DELAYED RELEASE ORAL DAILY
Qty: 90 TABLET | Refills: 1 | Status: SHIPPED | OUTPATIENT
Start: 2024-01-01

## 2024-01-01 RX ORDER — BLOOD-GLUCOSE METER
EACH MISCELLANEOUS 4 TIMES DAILY
Qty: 1 KIT | Refills: 0 | Status: SHIPPED | OUTPATIENT
Start: 2024-01-01

## 2024-01-01 RX ORDER — BLOOD GLUCOSE CONTROL HIGH,LOW
EACH MISCELLANEOUS DAILY PRN
Qty: 3 EACH | Refills: 3 | Status: SHIPPED | OUTPATIENT
Start: 2024-01-01

## 2024-01-01 RX ORDER — CLOPIDOGREL BISULFATE 75 MG/1
75 TABLET ORAL DAILY
Qty: 90 TABLET | Refills: 1 | Status: SHIPPED | OUTPATIENT
Start: 2024-01-01

## 2024-01-03 ENCOUNTER — OFFICE VISIT (OUTPATIENT)
Dept: WOUND CARE | Facility: HOSPITAL | Age: 69
End: 2024-01-03
Payer: COMMERCIAL

## 2024-01-03 VITALS
RESPIRATION RATE: 18 BRPM | SYSTOLIC BLOOD PRESSURE: 162 MMHG | TEMPERATURE: 97.2 F | HEART RATE: 91 BPM | DIASTOLIC BLOOD PRESSURE: 80 MMHG

## 2024-01-03 DIAGNOSIS — E11.42 DIABETIC POLYNEUROPATHY ASSOCIATED WITH TYPE 2 DIABETES MELLITUS (HCC): ICD-10-CM

## 2024-01-03 DIAGNOSIS — E11.622 DIABETIC ULCER OF LEFT LOWER LEG (HCC): Primary | ICD-10-CM

## 2024-01-03 DIAGNOSIS — L97.929 DIABETIC ULCER OF LEFT LOWER LEG (HCC): Primary | ICD-10-CM

## 2024-01-03 DIAGNOSIS — E11.65 POORLY CONTROLLED TYPE 2 DIABETES MELLITUS (HCC): ICD-10-CM

## 2024-01-03 DIAGNOSIS — F11.20 CONTINUOUS OPIOID DEPENDENCE (HCC): ICD-10-CM

## 2024-01-03 DIAGNOSIS — I73.9 PERIPHERAL ARTERIAL DISEASE WITH HISTORY OF REVASCULARIZATION: ICD-10-CM

## 2024-01-03 DIAGNOSIS — Z98.890 PERIPHERAL ARTERIAL DISEASE WITH HISTORY OF REVASCULARIZATION: ICD-10-CM

## 2024-01-03 PROCEDURE — 97597 DBRDMT OPN WND 1ST 20 CM/<: CPT | Performed by: STUDENT IN AN ORGANIZED HEALTH CARE EDUCATION/TRAINING PROGRAM

## 2024-01-03 RX ORDER — LIDOCAINE HYDROCHLORIDE 40 MG/ML
5 SOLUTION TOPICAL ONCE
Status: COMPLETED | OUTPATIENT
Start: 2024-01-03 | End: 2024-01-03

## 2024-01-03 RX ADMIN — LIDOCAINE HYDROCHLORIDE 5 ML: 40 SOLUTION TOPICAL at 14:58

## 2024-01-03 NOTE — PROGRESS NOTES
"Patient ID: Kan Juan is a 68 y.o. male Date of Birth 1955     Chief Complaint  Chief Complaint   Patient presents with    Follow Up Wound Care Visit       Allergies  Shellfish-derived products - food allergy and Sulfamethoxazole-trimethoprim    Assessment:     Diagnoses and all orders for this visit:    Diabetic ulcer of left lower leg (HCC)  -     Wound cleansing and dressings; Future  -     lidocaine (XYLOCAINE) 4 % topical solution 5 mL  -     Debridement    Continuous opioid dependence (HCC)  -     Wound cleansing and dressings; Future  -     lidocaine (XYLOCAINE) 4 % topical solution 5 mL    Poorly controlled type 2 diabetes mellitus (HCC)  -     Wound cleansing and dressings; Future  -     lidocaine (XYLOCAINE) 4 % topical solution 5 mL    Peripheral arterial disease with history of revascularization   -     Wound cleansing and dressings; Future  -     lidocaine (XYLOCAINE) 4 % topical solution 5 mL    Diabetic polyneuropathy associated with type 2 diabetes mellitus (HCC)  -     Wound cleansing and dressings; Future  -     lidocaine (XYLOCAINE) 4 % topical solution 5 mL              Debridement   Wound 12/20/23 Diabetic Ulcer Knee Anterior;Left    Universal Protocol:  Consent: Verbal consent obtained.  Risks and benefits: risks, benefits and alternatives were discussed  Consent given by: patient  Time out: Immediately prior to procedure a \"time out\" was called to verify the correct patient, procedure, equipment, support staff and site/side marked as required.  Patient identity confirmed: verbally with patient    Debridement Details  Performed by: physician  Debridement type: selective  Pain control: lidocaine 4%      Post-debridement measurements  Length (cm): 5  Width (cm): 2.8  Depth (cm): 0.1  Percent debrided: 20%  Surface Area (cm^2): 14  Area Debrided (cm^2): 2.8  Volume (cm^3): 1.4    Devitalized tissue debrided: biofilm, exudate and eschar  Instrument(s) utilized: curette  Bleeding: " small  Hemostasis obtained with: pressure  Procedural pain (0-10): 1  Post-procedural pain: 0   Response to treatment: procedure was tolerated well        Plan:   It was a pleasure to see Kan Yessica for wound care follow up today  Selective debridement performed today as above  Wound is improving   Continue plan of care as noted below with betadine, gauze   A1C results reviewed with the patient today.   No signs or symptoms of infection today. Patient understands that if any signs of infection start (such as increased redness, drainage, pain, fever, chills, diaphoresis), they should call our office or proceed to the ER or Urgent Care.  Patient should continue a high protein diet to facilitate wound healing  Patient is advised to not submerge wound or leave wound open to air.  Follow up in 1 weeks  Given the multi-factorial nature of wound care, additional time was taken to review patient's treatment plan with other specialties and most recent pertinent lab work and imaging.   All plans of care discussed with patient at bedside who verbalized understanding with treatment plan.    Wound 12/20/23 Diabetic Ulcer Knee Anterior;Left (Active)   Wound Image Images linked 01/03/24 1432   Wound Description Eschar;Black 01/03/24 1432   Dominga-wound Assessment Dry;Scaly 01/03/24 1432   Wound Length (cm) 5 cm 01/03/24 1432   Wound Width (cm) 2.8 cm 01/03/24 1432   Wound Depth (cm) 0 cm 01/03/24 1432   Wound Surface Area (cm^2) 14 cm^2 01/03/24 1432   Wound Volume (cm^3) 0 cm^3 01/03/24 1432   Calculated Wound Volume (cm^3) 0 cm^3 01/03/24 1432   Drainage Amount None 01/03/24 1432   Non-staged Wound Description Full thickness 01/03/24 1432   Treatments Irrigation with NSS 01/03/24 1432       Wound 12/20/23 Diabetic Ulcer Knee Anterior;Left (Active)   Date First Assessed/Time First Assessed: 12/20/23 1312   Primary Wound Type: Diabetic Ulcer  Location: Knee  Wound Location Orientation: Anterior;Left  Wound Description (Comments):  CAO 4       [REMOVED] Wound 11/29/23 Knee Left (Removed)   Resolved Date: 12/20/23  Date First Assessed/Time First Assessed: 11/29/23 1452   Location: Knee  Wound Location Orientation: Left  Incision's 1st Dressing: PACKING PLAIN 1/4 IN, DRESSING XEROFORM 1 X 8, SPONGE GAUZE 4 X 4 16 PLY STRL PLASTIC TRAY LF,...       Subjective:      .    1/3/24: Applying Betadine daily as directed.  No symptoms of infection.    12/20/23: Consult - Johnathon is a pleasant 68-year-old male with a past medical history of type 2 diabetes mellitus most recent A1c 11.6% 3 weeks ago, diabetic  polyneuropathy, peripheral arterial disease with history of re-vascularization, atrial fibrillation on chronic anticoagulation, hx of osteomyelitis requiring left sided BKA, continued opioid dependence here today for initial wound care consult.  Patient was referred by the orthopedic surgeon, Dr. Daryl Shay.  He had incision and drainage of the left knee prepatellar bursa on 11/29/23 to 4-day hospital course at [facility] at which time he was hospitalized for septic prepatellar bursitis.  Following this he had left knee stiffness and pain and was given a 10-day course of such cefadroxil.  He was seen for his suture removal postop visit On 12/14/2023 at Which Time It Was Noted That There Is an Eschar in the Superior Portion of the Incision Site.  He was told that he needs to have better glycemic control and to speak to endocrinology.  Also advised that he if he does not hear he may require further amputation.  Wound care referral was placed at that visit.  Denies any local or systemic symptoms of infection today including fever chills diaphoresis.     BL LEAD Feb 2023:  RIGHT LOWER LIMB:  Diffuse disease noted throughout the femoral-popliteal arteries without  significant focal stenosis.  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  Ankle/Brachial index: 1.12, within the normal disease category (Prior 1.2).  Metatarsal pressure was  unable to be obtained due to non-compressible vessels.  Great toe pressure of 82 mmHg, within the healing range (Prior 46 mmHg).  PVR/ PPG tracings are dampened at the ankle and metatarsal.     LEFT LOWER LIMB:  Diffuse disease noted throughout the femoral-popliteal arteries without  significant focal stenosis.  Below knee amputation.     Compared to previous study on 12/6/2021 and 05/26/2021, there is no significant  change in the disease process.  Recommend repeat testing in 6 months as per protocol unless otherwise  indicated.          The following portions of the patient's history were reviewed and updated as appropriate: allergies, current medications, past family history, past medical history, past social history, past surgical history, and problem list.    Review of Systems   Constitutional:  Negative for chills, diaphoresis and fever.   Skin:  Positive for wound.   All other systems reviewed and are negative.        Objective:       Wound 12/20/23 Diabetic Ulcer Knee Anterior;Left (Active)   Wound Image Images linked 01/03/24 1432   Wound Description Eschar;Black 01/03/24 1432   Dominga-wound Assessment Dry;Scaly 01/03/24 1432   Wound Length (cm) 5 cm 01/03/24 1432   Wound Width (cm) 2.8 cm 01/03/24 1432   Wound Depth (cm) 0 cm 01/03/24 1432   Wound Surface Area (cm^2) 14 cm^2 01/03/24 1432   Wound Volume (cm^3) 0 cm^3 01/03/24 1432   Calculated Wound Volume (cm^3) 0 cm^3 01/03/24 1432   Drainage Amount None 01/03/24 1432   Non-staged Wound Description Full thickness 01/03/24 1432   Treatments Irrigation with NSS 01/03/24 1432       /80   Pulse 91   Temp (!) 97.2 °F (36.2 °C)   Resp 18     Physical Exam  Vitals reviewed.   Constitutional:       Appearance: Normal appearance.   HENT:      Head: Normocephalic and atraumatic.   Eyes:      Extraocular Movements: Extraocular movements intact.   Pulmonary:      Effort: Pulmonary effort is normal.   Musculoskeletal:      Cervical back: Neck supple.       "Comments: L BKA   Skin:     Comments: Left knee wound with stable eschar in place.  Medial and distal edges are lifting and this portion of eschar was sharply debrided.  No signs of infection   Neurological:      Mental Status: He is alert.   Psychiatric:         Mood and Affect: Mood normal.           Wound Instructions:  Orders Placed This Encounter   Procedures    Wound cleansing and dressings     Left Knee Wound:     Wash your hands with soap and water.  Remove old dressing, discard into plastic bag and place in trash.  Cleanse the wound with normal saline prior to applying a clean dressing. Do not use tissue or cotton balls. Do not scrub the wound. Pat dry using gauze.  Shower yes   Apply moisturizer to skin surrounding wound  Apply Betadine to the black area of the wound.  Cover with gauze.  Secure with rolled gauze and tape.  Change dressing daily.     This was done today.     Standing Status:   Future     Standing Expiration Date:   1/3/2025    Debridement     This order was created via procedure documentation        Diagnosis ICD-10-CM Associated Orders   1. Diabetic ulcer of left lower leg ([facility])  E11.622 Wound cleansing and dressings    L97.929 lidocaine (XYLOCAINE) 4 % topical solution 5 mL     Debridement      2. Continuous opioid dependence ([facility])  F11.20 Wound cleansing and dressings     lidocaine (XYLOCAINE) 4 % topical solution 5 mL      3. Poorly controlled type 2 diabetes mellitus ([facility])  E11.65 Wound cleansing and dressings     lidocaine (XYLOCAINE) 4 % topical solution 5 mL      4. Peripheral arterial disease with history of revascularization   I73.9 Wound cleansing and dressings    Z98.890 lidocaine (XYLOCAINE) 4 % topical solution 5 mL      5. Diabetic polyneuropathy associated with type 2 diabetes mellitus ([facility])  E11.42 Wound cleansing and dressings     lidocaine (XYLOCAINE) 4 % topical solution 5 mL          --  Ismael Prasad MD    \"This note has been constructed using a voice recognition " "system. Therefore there may be syntax, spelling, and/or grammatical errors. Occasional wrong word or \"sound alike\" substitutions may have occurred due to the inherent limitations of voice recognition software. Read the chart carefully and recognize, using context, where substitutions have occurred. Please call if you have any questions.\"     "

## 2024-01-03 NOTE — PATIENT INSTRUCTIONS
Orders Placed This Encounter   Procedures    Wound cleansing and dressings     Left Knee Wound:     Wash your hands with soap and water.  Remove old dressing, discard into plastic bag and place in trash.  Cleanse the wound with normal saline prior to applying a clean dressing. Do not use tissue or cotton balls. Do not scrub the wound. Pat dry using gauze.  Shower yes   Apply moisturizer to skin surrounding wound  Apply Betadine to the black area of the wound.  Cover with gauze.  Secure with rolled gauze and tape.  Change dressing daily.     This was done today.     Standing Status:   Future     Standing Expiration Date:   1/3/2025

## 2024-01-10 ENCOUNTER — TELEPHONE (OUTPATIENT)
Age: 69
End: 2024-01-10

## 2024-01-10 NOTE — TELEPHONE ENCOUNTER
Patient called in stating he needs a scripts for new prosthetic shoes.    [facility]  Phone number is [phone]  Fax number is [phone]  ATTN: Trey    Please advise once done. Thank you!

## 2024-01-11 ENCOUNTER — OFFICE VISIT (OUTPATIENT)
Dept: WOUND CARE | Facility: HOSPITAL | Age: 69
End: 2024-01-11
Payer: COMMERCIAL

## 2024-01-11 ENCOUNTER — TELEPHONE (OUTPATIENT)
Age: 69
End: 2024-01-11

## 2024-01-11 VITALS — DIASTOLIC BLOOD PRESSURE: 83 MMHG | HEART RATE: 88 BPM | TEMPERATURE: 97.9 F | SYSTOLIC BLOOD PRESSURE: 145 MMHG

## 2024-01-11 DIAGNOSIS — E11.622 DIABETIC ULCER OF LEFT LOWER LEG (HCC): Primary | ICD-10-CM

## 2024-01-11 DIAGNOSIS — F11.20 CONTINUOUS OPIOID DEPENDENCE (HCC): ICD-10-CM

## 2024-01-11 DIAGNOSIS — L97.929 DIABETIC ULCER OF LEFT LOWER LEG (HCC): Primary | ICD-10-CM

## 2024-01-11 DIAGNOSIS — Z98.890 PERIPHERAL ARTERIAL DISEASE WITH HISTORY OF REVASCULARIZATION: ICD-10-CM

## 2024-01-11 DIAGNOSIS — E11.65 POORLY CONTROLLED TYPE 2 DIABETES MELLITUS (HCC): ICD-10-CM

## 2024-01-11 DIAGNOSIS — E11.42 DIABETIC POLYNEUROPATHY ASSOCIATED WITH TYPE 2 DIABETES MELLITUS (HCC): ICD-10-CM

## 2024-01-11 DIAGNOSIS — I73.9 PERIPHERAL ARTERIAL DISEASE WITH HISTORY OF REVASCULARIZATION: ICD-10-CM

## 2024-01-11 PROCEDURE — 97597 DBRDMT OPN WND 1ST 20 CM/<: CPT | Performed by: STUDENT IN AN ORGANIZED HEALTH CARE EDUCATION/TRAINING PROGRAM

## 2024-01-11 RX ORDER — LIDOCAINE HYDROCHLORIDE 40 MG/ML
5 SOLUTION TOPICAL ONCE
Status: COMPLETED | OUTPATIENT
Start: 2024-01-11 | End: 2024-01-11

## 2024-01-11 RX ADMIN — LIDOCAINE HYDROCHLORIDE 5 ML: 40 SOLUTION TOPICAL at 11:09

## 2024-01-11 NOTE — PROGRESS NOTES
"Patient ID: Kan Juan is a 68 y.o. male Date of Birth 1955     Chief Complaint  Chief Complaint   Patient presents with    Follow Up Wound Care Visit     Open wound L knee       Allergies  Shellfish-derived products - food allergy and Sulfamethoxazole-trimethoprim    Assessment:     Diagnoses and all orders for this visit:    Diabetic ulcer of left lower leg (HCC)  -     lidocaine (XYLOCAINE) 4 % topical solution 5 mL  -     Wound cleansing and dressings Diabetic Ulcer Anterior;Left Knee; Future  -     Debridement    Continuous opioid dependence (HCC)  -     lidocaine (XYLOCAINE) 4 % topical solution 5 mL  -     Wound cleansing and dressings Diabetic Ulcer Anterior;Left Knee; Future    Poorly controlled type 2 diabetes mellitus (HCC)  -     lidocaine (XYLOCAINE) 4 % topical solution 5 mL  -     Wound cleansing and dressings Diabetic Ulcer Anterior;Left Knee; Future    Peripheral arterial disease with history of revascularization   -     lidocaine (XYLOCAINE) 4 % topical solution 5 mL  -     Wound cleansing and dressings Diabetic Ulcer Anterior;Left Knee; Future    Diabetic polyneuropathy associated with type 2 diabetes mellitus (HCC)  -     lidocaine (XYLOCAINE) 4 % topical solution 5 mL  -     Wound cleansing and dressings Diabetic Ulcer Anterior;Left Knee; Future              Debridement   Wound 12/20/23 Diabetic Ulcer Knee Anterior;Left    Universal Protocol:  Consent: Verbal consent obtained.  Risks and benefits: risks, benefits and alternatives were discussed  Consent given by: patient  Time out: Immediately prior to procedure a \"time out\" was called to verify the correct patient, procedure, equipment, support staff and site/side marked as required.  Patient identity confirmed: verbally with patient    Debridement Details  Performed by: physician  Debridement type: selective  Pain control: lidocaine 4%      Post-debridement measurements  Length (cm): 4.1  Width (cm): 2.5  Depth (cm): 0.2  Percent " debrided: 90%  Surface Area (cm^2): 10.25  Area Debrided (cm^2): 9.23  Volume (cm^3): 2.05    Devitalized tissue debrided: eschar  Instrument(s) utilized: curette  Bleeding: small  Hemostasis obtained with: pressure  Procedural pain (0-10): 1  Post-procedural pain: 0   Response to treatment: procedure was tolerated well        Plan:  It was a pleasure to see Kan Juan for wound care follow up today  Selective debridement performed today as above  Wound is improving   Continue plan of care as noted below with betadine applied directly to eschar   A1C results reviewed with the patient today.   No signs or symptoms of infection today. Patient understands that if any signs of infection start (such as increased redness, drainage, pain, fever, chills, diaphoresis), they should call our office or proceed to the ER or Urgent Care.  Patient should continue a high protein diet to facilitate wound healing  Patient is advised to not submerge wound or leave wound open to air.  Follow up in 1 weeks  Given the multi-factorial nature of wound care, additional time was taken to review patient's treatment plan with other specialties and most recent pertinent lab work and imaging.   All plans of care discussed with patient at bedside who verbalized understanding with treatment plan.       Wound 12/20/23 Diabetic Ulcer Knee Anterior;Left (Active)   Wound Image Images linked 01/11/24 1106   Wound Description Eschar;Black;Dry 01/11/24 1106   Dominga-wound Assessment Dry;Intact 01/11/24 1106   Wound Length (cm) 4.1 cm 01/11/24 1106   Wound Width (cm) 2.5 cm 01/11/24 1106   Wound Depth (cm) 0.2 cm 01/11/24 1106   Wound Surface Area (cm^2) 10.25 cm^2 01/11/24 1106   Wound Volume (cm^3) 2.05 cm^3 01/11/24 1106   Calculated Wound Volume (cm^3) 2.05 cm^3 01/11/24 1106   Drainage Amount None 01/11/24 1106   Non-staged Wound Description Full thickness 01/11/24 1106   Dressing Status Intact;Clean;Dry (upon arrival) 01/11/24 1106       Wound  12/20/23 Diabetic Ulcer Knee Anterior;Left (Active)   Date First Assessed/Time First Assessed: 12/20/23 1312   Primary Wound Type: Diabetic Ulcer  Location: Knee  Wound Location Orientation: Anterior;Left  Wound Description (Comments): CAO 4       [REMOVED] Wound 11/29/23 Knee Left (Removed)   Resolved Date: 12/20/23  Date First Assessed/Time First Assessed: 11/29/23 1452   Location: Knee  Wound Location Orientation: Left  Incision's 1st Dressing: PACKING PLAIN 1/4 IN, DRESSING XEROFORM 1 X 8, SPONGE GAUZE 4 X 4 16 PLY STRL PLASTIC TRAY LF,...       Subjective:      .    1/11/24, 1/3/24: Applying Betadine daily as directed.  No symptoms of infection.     12/20/23: Consult - Johnathon is a pleasant 68-year-old male with a past medical history of type 2 diabetes mellitus most recent A1c 11.6% 3 weeks ago, diabetic  polyneuropathy, peripheral arterial disease with history of re-vascularization, atrial fibrillation on chronic anticoagulation, hx of osteomyelitis requiring left sided BKA, continued opioid dependence here today for initial wound care consult.  Patient was referred by the orthopedic surgeon, Dr. Daryl Shay.  He had incision and drainage of the left knee prepatellar bursa on 11/29/23 to 4-day hospital course at [facility] at which time he was hospitalized for septic prepatellar bursitis.  Following this he had left knee stiffness and pain and was given a 10-day course of such cefadroxil.  He was seen for his suture removal postop visit On 12/14/2023 at Which Time It Was Noted That There Is an Eschar in the Superior Portion of the Incision Site.  He was told that he needs to have better glycemic control and to speak to endocrinology.  Also advised that he if he does not hear he may require further amputation.  Wound care referral was placed at that visit.  Denies any local or systemic symptoms of infection today including fever chills diaphoresis.     BL LEAD Feb 2023:  RIGHT LOWER LIMB:  Diffuse  disease noted throughout the femoral-popliteal arteries without  significant focal stenosis.  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  Ankle/Brachial index: 1.12, within the normal disease category (Prior 1.2).  Metatarsal pressure was unable to be obtained due to non-compressible vessels.  Great toe pressure of 82 mmHg, within the healing range (Prior 46 mmHg).  PVR/ PPG tracings are dampened at the ankle and metatarsal.     LEFT LOWER LIMB:  Diffuse disease noted throughout the femoral-popliteal arteries without  significant focal stenosis.  Below knee amputation.     Compared to previous study on 12/6/2021 and 05/26/2021, there is no significant  change in the disease process.  Recommend repeat testing in 6 months as per protocol unless otherwise  indicated.          The following portions of the patient's history were reviewed and updated as appropriate: allergies, current medications, past family history, past medical history, past social history, past surgical history, and problem list.    Review of Systems   Constitutional:  Negative for appetite change, chills, diaphoresis, fatigue and fever.   Skin:  Positive for wound.   All other systems reviewed and are negative.        Objective:       Wound 12/20/23 Diabetic Ulcer Knee Anterior;Left (Active)   Wound Image Images linked 01/11/24 1106   Wound Description Eschar;Black;Dry 01/11/24 1106   Dominga-wound Assessment Dry;Intact 01/11/24 1106   Wound Length (cm) 4.1 cm 01/11/24 1106   Wound Width (cm) 2.5 cm 01/11/24 1106   Wound Depth (cm) 0.2 cm 01/11/24 1106   Wound Surface Area (cm^2) 10.25 cm^2 01/11/24 1106   Wound Volume (cm^3) 2.05 cm^3 01/11/24 1106   Calculated Wound Volume (cm^3) 2.05 cm^3 01/11/24 1106   Drainage Amount None 01/11/24 1106   Non-staged Wound Description Full thickness 01/11/24 1106   Dressing Status Intact;Clean;Dry (upon arrival) 01/11/24 1106       /83   Pulse 88   Temp 97.9 °F (36.6 °C) (Temporal)     Physical  Exam  Vitals reviewed.   Constitutional:       Appearance: Normal appearance.   HENT:      Head: Normocephalic and atraumatic.   Eyes:      Extraocular Movements: Extraocular movements intact.   Pulmonary:      Effort: Pulmonary effort is normal.   Musculoskeletal:      Cervical back: Neck supple.   Skin:     Comments:  Left lower extremity wound slightly smaller than last exam.  Dry stable eschar.  Lifting along most distal and most proximal edges.  This portion of the eschar was debrided today to reveal healthy wound bed.  No signs of infection.   Neurological:      Mental Status: He is alert.   Psychiatric:         Mood and Affect: Mood normal.           Wound Instructions:  Orders Placed This Encounter   Procedures    Wound cleansing and dressings Diabetic Ulcer Anterior;Left Knee     Left Knee Wound:     Wash your hands with soap and water.  Remove old dressing, discard into plastic bag and place in trash.  Cleanse the wound with normal saline prior to applying a clean dressing. Do not use tissue or cotton balls. Do not scrub the wound. Pat dry using gauze.  Shower yes   Apply moisturizer to skin surrounding wound  Apply Betadine to the black area of the wound.  Cover with gauze.  Secure with rolled gauze and tape.  Change dressing 2x/day.     This was done today.     Standing Status:   Future     Standing Expiration Date:   1/11/2025    Debridement     This order was created via procedure documentation        Diagnosis ICD-10-CM Associated Orders   1. Diabetic ulcer of left lower leg ([facility])  E11.622 lidocaine (XYLOCAINE) 4 % topical solution 5 mL    L97.929 Wound cleansing and dressings Diabetic Ulcer Anterior;Left Knee     Debridement      2. Continuous opioid dependence ([facility])  F11.20 lidocaine (XYLOCAINE) 4 % topical solution 5 mL     Wound cleansing and dressings Diabetic Ulcer Anterior;Left Knee      3. Poorly controlled type 2 diabetes mellitus ([facility])  E11.65 lidocaine (XYLOCAINE) 4 % topical solution 5 mL  "    Wound cleansing and dressings Diabetic Ulcer Anterior;Left Knee      4. Peripheral arterial disease with history of revascularization   I73.9 lidocaine (XYLOCAINE) 4 % topical solution 5 mL    Z98.890 Wound cleansing and dressings Diabetic Ulcer Anterior;Left Knee      5. Diabetic polyneuropathy associated with type 2 diabetes mellitus (HCC)  E11.42 lidocaine (XYLOCAINE) 4 % topical solution 5 mL     Wound cleansing and dressings Diabetic Ulcer Anterior;Left Knee          --  Ismael Prasad MD    \"This note has been constructed using a voice recognition system. Therefore there may be syntax, spelling, and/or grammatical errors. Occasional wrong word or \"sound alike\" substitutions may have occurred due to the inherent limitations of voice recognition software. Read the chart carefully and recognize, using context, where substitutions have occurred. Please call if you have any questions.\"     "

## 2024-01-11 NOTE — PATIENT INSTRUCTIONS
Orders Placed This Encounter   Procedures    Wound cleansing and dressings Diabetic Ulcer Anterior;Left Knee     Left Knee Wound:     Wash your hands with soap and water.  Remove old dressing, discard into plastic bag and place in trash.  Cleanse the wound with normal saline prior to applying a clean dressing. Do not use tissue or cotton balls. Do not scrub the wound. Pat dry using gauze.  Shower yes   Apply moisturizer to skin surrounding wound  Apply Betadine to the black area of the wound.  Cover with gauze.  Secure with rolled gauze and tape.  Change dressing 2x/day.     This was done today.     Standing Status:   Future     Standing Expiration Date:   1/11/2025

## 2024-01-11 NOTE — LETTER
[facility]  [address] [place] [id number]  Phone#  [phone]  Fax#  [phone]    Patient:  Kan Juan  YOB: 1955  Phone:  [phone]  Date of Visit:  1/11/2024    Orders Placed This Encounter   Procedures   • Wound cleansing and dressings Diabetic Ulcer Anterior;Left Knee     Left Knee Wound:     Wash your hands with soap and water.  Remove old dressing, discard into plastic bag and place in trash.  Cleanse the wound with normal saline prior to applying a clean dressing. Do not use tissue or cotton balls. Do not scrub the wound. Pat dry using gauze.  Shower yes   Apply moisturizer to skin surrounding wound  Apply Betadine to the black area of the wound.  Cover with gauze.  Secure with rolled gauze and tape.  Change dressing 2x/day.     This was done today.     Standing Status:   Future     Standing Expiration Date:   1/11/2025         Electronically signed by Ismael Prasad MD

## 2024-01-11 NOTE — TELEPHONE ENCOUNTER
Alok from [facility] nurse called to inform patient declined nursing 1/11/2024, states has another appointment  elsewhere. Will be seeing patient next week. Any questions Alok can be reached at [phone]

## 2024-01-18 ENCOUNTER — OFFICE VISIT (OUTPATIENT)
Dept: WOUND CARE | Facility: HOSPITAL | Age: 69
End: 2024-01-18
Payer: COMMERCIAL

## 2024-01-18 VITALS — SYSTOLIC BLOOD PRESSURE: 134 MMHG | TEMPERATURE: 98.2 F | DIASTOLIC BLOOD PRESSURE: 77 MMHG | HEART RATE: 87 BPM

## 2024-01-18 DIAGNOSIS — I73.9 PERIPHERAL ARTERIAL DISEASE WITH HISTORY OF REVASCULARIZATION: ICD-10-CM

## 2024-01-18 DIAGNOSIS — E11.622 DIABETIC ULCER OF LEFT LOWER LEG (HCC): Primary | ICD-10-CM

## 2024-01-18 DIAGNOSIS — E11.65 POORLY CONTROLLED TYPE 2 DIABETES MELLITUS (HCC): ICD-10-CM

## 2024-01-18 DIAGNOSIS — E11.42 DIABETIC POLYNEUROPATHY ASSOCIATED WITH TYPE 2 DIABETES MELLITUS (HCC): ICD-10-CM

## 2024-01-18 DIAGNOSIS — Z98.890 PERIPHERAL ARTERIAL DISEASE WITH HISTORY OF REVASCULARIZATION: ICD-10-CM

## 2024-01-18 DIAGNOSIS — L97.929 DIABETIC ULCER OF LEFT LOWER LEG (HCC): Primary | ICD-10-CM

## 2024-01-18 PROCEDURE — 97597 DBRDMT OPN WND 1ST 20 CM/<: CPT | Performed by: STUDENT IN AN ORGANIZED HEALTH CARE EDUCATION/TRAINING PROGRAM

## 2024-01-18 RX ORDER — LIDOCAINE HYDROCHLORIDE 40 MG/ML
5 SOLUTION TOPICAL ONCE
Status: COMPLETED | OUTPATIENT
Start: 2024-01-18 | End: 2024-01-18

## 2024-01-18 RX ADMIN — LIDOCAINE HYDROCHLORIDE 5 ML: 40 SOLUTION TOPICAL at 10:50

## 2024-01-18 NOTE — LETTER
[facility]  [address] [place] [address]  Phone#  [phone]  Fax#  [phone]    Patient:  Kan Juan  YOB: 1955  Phone:  [phone]  Date of Visit:  1/18/2024    Orders Placed This Encounter   Procedures   • Wound cleansing and dressings Diabetic Ulcer Anterior;Left Knee     Left Knee Wound:     Wash your hands with soap and water.  Remove old dressing, discard into plastic bag and place in trash.  Cleanse the wound with normal saline prior to applying a clean dressing. Do not use tissue or cotton balls. Do not scrub the wound. Pat dry using gauze.  Shower yes   Apply moisturizer to skin surrounding wound  Apply Betadine to the black area of the wound.    Apply Adaptic to open area at 7:00.  Cover with gauze.  Secure with rolled gauze and tape.  Change dressing 2x/day.     This was done today.     Standing Status:   Future     Standing Expiration Date:   1/18/2025         Electronically signed by Ismael Prasad MD

## 2024-01-18 NOTE — LETTER
[facility]  [address] [place] [address]  Phone#  [phone]  Fax#  [phone]    Patient:  Kan Juan  YOB: 1955  Phone:  [phone]  Date of Visit:  1/18/2024    Orders Placed This Encounter   Procedures   • Wound cleansing and dressings Diabetic Ulcer Anterior;Left Knee     Left Knee Wound:     Wash your hands with soap and water.  Remove old dressing, discard into plastic bag and place in trash.  Cleanse the wound with normal saline prior to applying a clean dressing. Do not use tissue or cotton balls. Do not scrub the wound. Pat dry using gauze.  Shower yes   Apply moisturizer to skin surrounding wound  Apply Betadine to the black area of the wound.    Apply Adaptic to open area at 7:00.  Cover with gauze.  Secure with rolled gauze and tape.  Change dressing 2x/day.     This was done today.     Standing Status:   Future     Standing Expiration Date:   1/18/2025         Electronically signed by Ismael Prasad MD

## 2024-01-18 NOTE — PROGRESS NOTES
"Patient ID: Kan Juan is a 68 y.o. male Date of Birth 1955     Chief Complaint  Chief Complaint   Patient presents with    Follow Up Wound Care Visit     Open wound L LE       Allergies  Shellfish-derived products - food allergy and Sulfamethoxazole-trimethoprim    Assessment:     Diagnoses and all orders for this visit:    Diabetic ulcer of left lower leg (HCC)  -     lidocaine (XYLOCAINE) 4 % topical solution 5 mL  -     Wound cleansing and dressings Diabetic Ulcer Anterior;Left Knee; Future  -     Debridement    Poorly controlled type 2 diabetes mellitus (HCC)  -     lidocaine (XYLOCAINE) 4 % topical solution 5 mL  -     Wound cleansing and dressings Diabetic Ulcer Anterior;Left Knee; Future    Peripheral arterial disease with history of revascularization   -     lidocaine (XYLOCAINE) 4 % topical solution 5 mL  -     Wound cleansing and dressings Diabetic Ulcer Anterior;Left Knee; Future    Diabetic polyneuropathy associated with type 2 diabetes mellitus (HCC)  -     lidocaine (XYLOCAINE) 4 % topical solution 5 mL  -     Wound cleansing and dressings Diabetic Ulcer Anterior;Left Knee; Future              Debridement   Wound 12/20/23 Diabetic Ulcer Knee Anterior;Left    Universal Protocol:  Consent: Verbal consent obtained.  Risks and benefits: risks, benefits and alternatives were discussed  Consent given by: patient  Time out: Immediately prior to procedure a \"time out\" was called to verify the correct patient, procedure, equipment, support staff and site/side marked as required.  Patient identity confirmed: verbally with patient    Debridement Details  Performed by: physician  Debridement type: selective  Pain control: lidocaine 4%      Post-debridement measurements  Length (cm): 6.5  Width (cm): 2  Depth (cm): 0.1  Percent debrided: 10%  Surface Area (cm^2): 13  Area Debrided (cm^2): 1.3  Volume (cm^3): 1.3    Devitalized tissue debrided: biofilm, exudate and eschar  Instrument(s) utilized: " curette  Bleeding: small  Hemostasis obtained with: pressure  Procedural pain (0-10): 1  Post-procedural pain: 0   Response to treatment: procedure was tolerated well        Plan:  It was a pleasure to see Kan Steinerteresa for wound care follow up today  Selective debridement performed today as above  Wound is improving   Continue plan of care as noted below with betadine BID  No signs or symptoms of infection today. Patient understands that if any signs of infection start (such as increased redness, drainage, pain, fever, chills, diaphoresis), they should call our office or proceed to the ER or Urgent Care.  Patient should continue a high protein diet to facilitate wound healing  Patient is advised to not submerge wound or leave wound open to air.  Follow up in 1 weeks  Given the multi-factorial nature of wound care, additional time was taken to review patient's treatment plan with other specialties and most recent pertinent lab work and imaging.   All plans of care discussed with patient at bedside who verbalized understanding with treatment plan.       Wound 12/20/23 Diabetic Ulcer Knee Anterior;Left (Active)   Wound Image Images linked 01/18/24 1048   Wound Description Eschar;Black;Dry;Granulation tissue;Pink (small open PT area at 7:00) 01/18/24 1048   Dominga-wound Assessment Dry;Intact 01/18/24 1048   Wound Length (cm) 6.5 cm 01/18/24 1048   Wound Width (cm) 2 cm 01/18/24 1048   Wound Depth (cm) 0.1 cm 01/18/24 1048   Wound Surface Area (cm^2) 13 cm^2 01/18/24 1048   Wound Volume (cm^3) 1.3 cm^3 01/18/24 1048   Calculated Wound Volume (cm^3) 1.3 cm^3 01/18/24 1048   Drainage Amount Scant 01/18/24 1048   Drainage Description Serosanguineous 01/18/24 1048   Non-staged Wound Description Full thickness 01/18/24 1048   Dressing Status Intact;Old drainage (upon arrival) 01/18/24 1048       Wound 12/20/23 Diabetic Ulcer Knee Anterior;Left (Active)   Date First Assessed/Time First Assessed: 12/20/23 1312   Primary Wound  Type: Diabetic Ulcer  Location: Knee  Wound Location Orientation: Anterior;Left  Wound Description (Comments): CAO 4       [REMOVED] Wound 11/29/23 Knee Left (Removed)   Resolved Date: 12/20/23  Date First Assessed/Time First Assessed: 11/29/23 1452   Location: Knee  Wound Location Orientation: Left  Incision's 1st Dressing: PACKING PLAIN 1/4 IN, DRESSING XEROFORM 1 X 8, SPONGE GAUZE 4 X 4 16 PLY STRL PLASTIC TRAY LF,...       Subjective:      .    1/18/24, 1/11/24, 1/3/24: Applying Betadine daily as directed.  No symptoms of infection.     12/20/23: Consult - Johnathon is a pleasant 68-year-old male with a past medical history of type 2 diabetes mellitus most recent A1c 11.6% 3 weeks ago, diabetic  polyneuropathy, peripheral arterial disease with history of re-vascularization, atrial fibrillation on chronic anticoagulation, hx of osteomyelitis requiring left sided BKA, continued opioid dependence here today for initial wound care consult.  Patient was referred by the orthopedic surgeon, Dr. Daryl Shay.  He had incision and drainage of the left knee prepatellar bursa on 11/29/23 to 4-day hospital course at [facility] at which time he was hospitalized for septic prepatellar bursitis.  Following this he had left knee stiffness and pain and was given a 10-day course of such cefadroxil.  He was seen for his suture removal postop visit On 12/14/2023 at Which Time It Was Noted That There Is an Eschar in the Superior Portion of the Incision Site.  He was told that he needs to have better glycemic control and to speak to endocrinology.  Also advised that he if he does not hear he may require further amputation.  Wound care referral was placed at that visit.  Denies any local or systemic symptoms of infection today including fever chills diaphoresis.     BL LEAD Feb 2023:  RIGHT LOWER LIMB:  Diffuse disease noted throughout the femoral-popliteal arteries without  significant focal stenosis.  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  Ankle/Brachial index: 1.12, within the normal disease category (Prior 1.2).  Metatarsal pressure was unable to be obtained due to non-compressible vessels.  Great toe pressure of 82 mmHg, within the healing range (Prior 46 mmHg).  PVR/ PPG tracings are dampened at the ankle and metatarsal.     LEFT LOWER LIMB:  Diffuse disease noted throughout the femoral-popliteal arteries without  significant focal stenosis.  Below knee amputation.     Compared to previous study on 12/6/2021 and 05/26/2021, there is no significant  change in the disease process.  Recommend repeat testing in 6 months as per protocol unless otherwise  indicated.             The following portions of the patient's history were reviewed and updated as appropriate: allergies, current medications, past family history, past medical history, past social history, past surgical history, and problem list.    Review of Systems   Constitutional:  Negative for chills, diaphoresis and fever.   Skin:  Positive for wound.   All other systems reviewed and are negative.        Objective:       Wound 12/20/23 Diabetic Ulcer Knee Anterior;Left (Active)   Wound Image Images linked 01/18/24 1048   Wound Description Eschar;Black;Dry;Granulation tissue;Pink (small open PT area at 7:00) 01/18/24 1048   Dominga-wound Assessment Dry;Intact 01/18/24 1048   Wound Length (cm) 6.5 cm 01/18/24 1048   Wound Width (cm) 2 cm 01/18/24 1048   Wound Depth (cm) 0.1 cm 01/18/24 1048   Wound Surface Area (cm^2) 13 cm^2 01/18/24 1048   Wound Volume (cm^3) 1.3 cm^3 01/18/24 1048   Calculated Wound Volume (cm^3) 1.3 cm^3 01/18/24 1048   Drainage Amount Scant 01/18/24 1048   Drainage Description Serosanguineous 01/18/24 1048   Non-staged Wound Description Full thickness 01/18/24 1048   Dressing Status Intact;Old drainage (upon arrival) 01/18/24 1048       /77   Pulse 87   Temp 98.2 °F (36.8 °C) (Temporal)     Physical Exam  Vitals reviewed.    Constitutional:       Appearance: Normal appearance.   HENT:      Head: Normocephalic and atraumatic.   Eyes:      Extraocular Movements: Extraocular movements intact.   Pulmonary:      Effort: Pulmonary effort is normal.   Musculoskeletal:      Cervical back: Neck supple.      Comments: L BKA   Skin:     Comments: Gone anterior left knee.  Unchanged since last exam.  Distal portion of the lifting and were sharply debrided today.  No signs of infection.  Approximately 3 cm distal to the eschar to the very small skin opening.  Healthy wound bed without any signs of infection.   Neurological:      Mental Status: He is alert.   Psychiatric:         Mood and Affect: Mood normal.           Wound Instructions:  Orders Placed This Encounter   Procedures    Wound cleansing and dressings Diabetic Ulcer Anterior;Left Knee     Left Knee Wound:     Wash your hands with soap and water.  Remove old dressing, discard into plastic bag and place in trash.  Cleanse the wound with normal saline prior to applying a clean dressing. Do not use tissue or cotton balls. Do not scrub the wound. Pat dry using gauze.  Shower yes   Apply moisturizer to skin surrounding wound  Apply Betadine to the black area of the wound.    Apply Adaptic to open area at 7:00.  Cover with gauze.  Secure with rolled gauze and tape.  Change dressing 2x/day.     This was done today.     Standing Status:   Future     Standing Expiration Date:   1/18/2025    Debridement     This order was created via procedure documentation        Diagnosis ICD-10-CM Associated Orders   1. Diabetic ulcer of left lower leg ([facility])  E11.622 lidocaine (XYLOCAINE) 4 % topical solution 5 mL    L97.929 Wound cleansing and dressings Diabetic Ulcer Anterior;Left Knee     Debridement      2. Poorly controlled type 2 diabetes mellitus ([facility])  E11.65 lidocaine (XYLOCAINE) 4 % topical solution 5 mL     Wound cleansing and dressings Diabetic Ulcer Anterior;Left Knee      3. Peripheral arterial  "disease with history of revascularization   I73.9 lidocaine (XYLOCAINE) 4 % topical solution 5 mL    Z98.890 Wound cleansing and dressings Diabetic Ulcer Anterior;Left Knee      4. Diabetic polyneuropathy associated with type 2 diabetes mellitus (HCC)  E11.42 lidocaine (XYLOCAINE) 4 % topical solution 5 mL     Wound cleansing and dressings Diabetic Ulcer Anterior;Left Knee          --  Ismael Prasad MD    \"This note has been constructed using a voice recognition system. Therefore there may be syntax, spelling, and/or grammatical errors. Occasional wrong word or \"sound alike\" substitutions may have occurred due to the inherent limitations of voice recognition software. Read the chart carefully and recognize, using context, where substitutions have occurred. Please call if you have any questions.\"     "

## 2024-01-18 NOTE — PATIENT INSTRUCTIONS
Orders Placed This Encounter   Procedures    Wound cleansing and dressings Diabetic Ulcer Anterior;Left Knee     Left Knee Wound:     Wash your hands with soap and water.  Remove old dressing, discard into plastic bag and place in trash.  Cleanse the wound with normal saline prior to applying a clean dressing. Do not use tissue or cotton balls. Do not scrub the wound. Pat dry using gauze.  Shower yes   Apply moisturizer to skin surrounding wound  Apply Betadine to the black area of the wound.    Apply Adaptic to open area at 7:00.  Cover with gauze.  Secure with rolled gauze and tape.  Change dressing 2x/day.     This was done today.     Standing Status:   Future     Standing Expiration Date:   1/18/2025

## 2024-01-25 ENCOUNTER — OFFICE VISIT (OUTPATIENT)
Dept: WOUND CARE | Facility: HOSPITAL | Age: 69
End: 2024-01-25
Payer: COMMERCIAL

## 2024-01-25 VITALS
HEART RATE: 94 BPM | SYSTOLIC BLOOD PRESSURE: 131 MMHG | DIASTOLIC BLOOD PRESSURE: 80 MMHG | RESPIRATION RATE: 14 BRPM | TEMPERATURE: 97.6 F

## 2024-01-25 DIAGNOSIS — L97.929 DIABETIC ULCER OF LEFT LOWER LEG (HCC): Primary | ICD-10-CM

## 2024-01-25 DIAGNOSIS — F11.20 CONTINUOUS OPIOID DEPENDENCE (HCC): ICD-10-CM

## 2024-01-25 DIAGNOSIS — E11.42 DIABETIC POLYNEUROPATHY ASSOCIATED WITH TYPE 2 DIABETES MELLITUS (HCC): ICD-10-CM

## 2024-01-25 DIAGNOSIS — I73.9 PERIPHERAL ARTERIAL DISEASE WITH HISTORY OF REVASCULARIZATION: ICD-10-CM

## 2024-01-25 DIAGNOSIS — E11.65 POORLY CONTROLLED TYPE 2 DIABETES MELLITUS (HCC): ICD-10-CM

## 2024-01-25 DIAGNOSIS — Z98.890 PERIPHERAL ARTERIAL DISEASE WITH HISTORY OF REVASCULARIZATION: ICD-10-CM

## 2024-01-25 DIAGNOSIS — E11.622 DIABETIC ULCER OF LEFT LOWER LEG (HCC): Primary | ICD-10-CM

## 2024-01-25 PROCEDURE — 97597 DBRDMT OPN WND 1ST 20 CM/<: CPT | Performed by: STUDENT IN AN ORGANIZED HEALTH CARE EDUCATION/TRAINING PROGRAM

## 2024-01-25 RX ORDER — LIDOCAINE HYDROCHLORIDE 40 MG/ML
5 SOLUTION TOPICAL ONCE
Status: COMPLETED | OUTPATIENT
Start: 2024-01-25 | End: 2024-01-25

## 2024-01-25 RX ADMIN — LIDOCAINE HYDROCHLORIDE 5 ML: 40 SOLUTION TOPICAL at 11:15

## 2024-01-25 NOTE — PROGRESS NOTES
Patient ID: Kan Juan is a 68 y.o. male Date of Birth 1955     Chief Complaint  Chief Complaint   Patient presents with    Follow Up Wound Care Visit     LLE       Allergies  Shellfish-derived products - food allergy and Sulfamethoxazole-trimethoprim    Assessment:     Diagnoses and all orders for this visit:    Diabetic ulcer of left lower leg (HCC)  -     lidocaine (XYLOCAINE) 4 % topical solution 5 mL  -     Wound cleansing and dressings Diabetic Ulcer Anterior;Left Knee; Future  -     Wound off loading Diabetic Ulcer Anterior;Left Knee; Future  -     Wound home care Diabetic Ulcer Anterior;Left Knee; Future  -     Debridement    Poorly controlled type 2 diabetes mellitus (HCC)  -     lidocaine (XYLOCAINE) 4 % topical solution 5 mL  -     Wound cleansing and dressings Diabetic Ulcer Anterior;Left Knee; Future  -     Wound off loading Diabetic Ulcer Anterior;Left Knee; Future  -     Wound home care Diabetic Ulcer Anterior;Left Knee; Future    Peripheral arterial disease with history of revascularization   -     lidocaine (XYLOCAINE) 4 % topical solution 5 mL  -     Wound cleansing and dressings Diabetic Ulcer Anterior;Left Knee; Future  -     Wound off loading Diabetic Ulcer Anterior;Left Knee; Future  -     Wound home care Diabetic Ulcer Anterior;Left Knee; Future    Diabetic polyneuropathy associated with type 2 diabetes mellitus (HCC)  -     lidocaine (XYLOCAINE) 4 % topical solution 5 mL  -     Wound cleansing and dressings Diabetic Ulcer Anterior;Left Knee; Future  -     Wound off loading Diabetic Ulcer Anterior;Left Knee; Future  -     Wound home care Diabetic Ulcer Anterior;Left Knee; Future    Continuous opioid dependence (HCC)  -     lidocaine (XYLOCAINE) 4 % topical solution 5 mL  -     Wound cleansing and dressings Diabetic Ulcer Anterior;Left Knee; Future  -     Wound off loading Diabetic Ulcer Anterior;Left Knee; Future  -     Wound home care Diabetic Ulcer Anterior;Left Knee; Future         "      Debridement   Wound 12/20/23 Diabetic Ulcer Knee Anterior;Left    Universal Protocol:  Consent: Verbal consent obtained.  Risks and benefits: risks, benefits and alternatives were discussed  Consent given by: patient  Time out: Immediately prior to procedure a \"time out\" was called to verify the correct patient, procedure, equipment, support staff and site/side marked as required.  Patient identity confirmed: verbally with patient    Debridement Details  Performed by: physician  Debridement type: selective  Pain control: lidocaine 4%      Post-debridement measurements  Length (cm): 4.5  Width (cm): 2  Depth (cm): 0.1  Percent debrided: 20%  Surface Area (cm^2): 9  Area Debrided (cm^2): 1.8  Volume (cm^3): 0.9    Devitalized tissue debrided: exudate and eschar  Instrument(s) utilized: curette  Bleeding: small  Hemostasis obtained with: pressure  Procedural pain (0-10): 1  Post-procedural pain: 0   Response to treatment: procedure was tolerated well        Plan:   It was a pleasure to see Kan Juan for wound care follow up today  Selective debridement performed today as above  Wound is improving   Continue plan of care as noted below with adaptic to wound areas, betadine BID to eschar  No signs or symptoms of infection today. Patient understands that if any signs of infection start (such as increased redness, drainage, pain, fever, chills, diaphoresis), they should call our office or proceed to the ER or Urgent Care.  Patient should continue a high protein diet to facilitate wound healing  Patient is advised to not submerge wound or leave wound open to air.  Follow up in 1 weeks  Given the multi-factorial nature of wound care, additional time was taken to review patient's treatment plan with other specialties and most recent pertinent lab work and imaging.   All plans of care discussed with patient at bedside who verbalized understanding with treatment plan.    Wound 12/20/23 Diabetic Ulcer Knee Anterior;Left " (Active)   Wound Image Images linked 01/25/24 1112   Wound Description Eschar;Black;Dry;Pink 01/25/24 1112   Dominga-wound Assessment Dry;Intact 01/25/24 1112   Wound Length (cm) 4.5 cm 01/25/24 1112   Wound Width (cm) 2 cm 01/25/24 1112   Wound Depth (cm) 0.1 cm 01/25/24 1112   Wound Surface Area (cm^2) 9 cm^2 01/25/24 1112   Wound Volume (cm^3) 0.9 cm^3 01/25/24 1112   Calculated Wound Volume (cm^3) 0.9 cm^3 01/25/24 1112   Drainage Amount None 01/25/24 1112   Dressing Status Intact (upon arrival) 01/25/24 1112       Wound 12/20/23 Diabetic Ulcer Knee Anterior;Left (Active)   Date First Assessed/Time First Assessed: 12/20/23 1312   Primary Wound Type: Diabetic Ulcer  Location: Knee  Wound Location Orientation: Anterior;Left  Wound Description (Comments): CAO 4       [REMOVED] Wound 11/29/23 Knee Left (Removed)   Resolved Date: 12/20/23  Date First Assessed/Time First Assessed: 11/29/23 1452   Location: Knee  Wound Location Orientation: Left  Incision's 1st Dressing: PACKING PLAIN 1/4 IN, DRESSING XEROFORM 1 X 8, SPONGE GAUZE 4 X 4 16 PLY STRL PLASTIC TRAY LF,...       Subjective:      .    1/25/2, 1/18/24, 1/11/24, 1/3/24: Applying Betadine daily as directed.  No symptoms of infection.     12/20/23: Consult - Johnathon is a pleasant 68-year-old male with a past medical history of type 2 diabetes mellitus most recent A1c 11.6% 3 weeks ago, diabetic  polyneuropathy, peripheral arterial disease with history of re-vascularization, atrial fibrillation on chronic anticoagulation, hx of osteomyelitis requiring left sided BKA, continued opioid dependence here today for initial wound care consult.  Patient was referred by the orthopedic surgeon, Dr. Daryl Shay.  He had incision and drainage of the left knee prepatellar bursa on 11/29/23 to 4-day hospital course at [facility] at which time he was hospitalized for septic prepatellar bursitis.  Following this he had left knee stiffness and pain and was given a 10-day  course of such cefadroxil.  He was seen for his suture removal postop visit On 12/14/2023 at Which Time It Was Noted That There Is an Eschar in the Superior Portion of the Incision Site.  He was told that he needs to have better glycemic control and to speak to endocrinology.  Also advised that he if he does not hear he may require further amputation.  Wound care referral was placed at that visit.  Denies any local or systemic symptoms of infection today including fever chills diaphoresis.     BL LEAD Feb 2023:  RIGHT LOWER LIMB:  Diffuse disease noted throughout the femoral-popliteal arteries without  significant focal stenosis.  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  Ankle/Brachial index: 1.12, within the normal disease category (Prior 1.2).  Metatarsal pressure was unable to be obtained due to non-compressible vessels.  Great toe pressure of 82 mmHg, within the healing range (Prior 46 mmHg).  PVR/ PPG tracings are dampened at the ankle and metatarsal.     LEFT LOWER LIMB:  Diffuse disease noted throughout the femoral-popliteal arteries without  significant focal stenosis.  Below knee amputation.     Compared to previous study on 12/6/2021 and 05/26/2021, there is no significant  change in the disease process.  Recommend repeat testing in 6 months as per protocol unless otherwise  indicated.                The following portions of the patient's history were reviewed and updated as appropriate: allergies, current medications, past family history, past medical history, past social history, past surgical history, and problem list.    Review of Systems   Constitutional:  Negative for chills, diaphoresis and fever.   Skin:  Positive for wound.   All other systems reviewed and are negative.        Objective:       Wound 12/20/23 Diabetic Ulcer Knee Anterior;Left (Active)   Wound Image Images linked 01/25/24 1112   Wound Description Eschar;Black;Dry;Pink 01/25/24 1112   Dominga-wound Assessment Dry;Intact  01/25/24 1112   Wound Length (cm) 4.5 cm 01/25/24 1112   Wound Width (cm) 2 cm 01/25/24 1112   Wound Depth (cm) 0.1 cm 01/25/24 1112   Wound Surface Area (cm^2) 9 cm^2 01/25/24 1112   Wound Volume (cm^3) 0.9 cm^3 01/25/24 1112   Calculated Wound Volume (cm^3) 0.9 cm^3 01/25/24 1112   Drainage Amount None 01/25/24 1112   Dressing Status Intact (upon arrival) 01/25/24 1112       /80   Pulse 94   Temp 97.6 °F (36.4 °C)   Resp 14     Physical Exam  Vitals reviewed.   Constitutional:       Appearance: Normal appearance.   HENT:      Head: Normocephalic and atraumatic.   Eyes:      Extraocular Movements: Extraocular movements intact.   Pulmonary:      Effort: Pulmonary effort is normal.   Musculoskeletal:      Cervical back: Neck supple.   Skin:     Comments: Stable eschar on anterior left knee.  Edges that were lifting debrided today.  Most of what was revealed is epithelialized tissue with some open wound areas.  No overt signs of infection.   Neurological:      Mental Status: He is alert.   Psychiatric:         Mood and Affect: Mood normal.           Wound Instructions:  Orders Placed This Encounter   Procedures    Wound cleansing and dressings Diabetic Ulcer Anterior;Left Knee     Left Knee Wound:     Wash your hands with soap and water.  Remove old dressing, discard into plastic bag and place in trash.  Cleanse the wound with normal saline prior to applying a clean dressing. Do not use tissue or cotton balls. Do not scrub the wound. Pat dry using gauze.  Shower yes   Apply moisturizer to skin surrounding wound  Apply Betadine to the black area of the wound.    Apply Adaptic to open areas.  Cover with gauze.  Secure with rolled gauze and tape.  Change dressing 2x/day.     This was done today.     Standing Status:   Future     Standing Expiration Date:   1/25/2025    Wound off loading Diabetic Ulcer Anterior;Left Knee     Off-loading Instructions:    Keep weight and pressure off wound at all times.      "Standing Status:   Future     Standing Expiration Date:   1/25/2025    Wound home care Diabetic Ulcer Anterior;Left Knee     [facility]:  Please see pt for wound care     Standing Status:   Future     Standing Expiration Date:   1/25/2025    Debridement     This order was created via procedure documentation        Diagnosis ICD-10-CM Associated Orders   1. Diabetic ulcer of left lower leg ([facility])  E11.622 lidocaine (XYLOCAINE) 4 % topical solution 5 mL    L97.929 Wound cleansing and dressings Diabetic Ulcer Anterior;Left Knee     Wound off loading Diabetic Ulcer Anterior;Left Knee     Wound home care Diabetic Ulcer Anterior;Left Knee     Debridement      2. Poorly controlled type 2 diabetes mellitus ([facility])  E11.65 lidocaine (XYLOCAINE) 4 % topical solution 5 mL     Wound cleansing and dressings Diabetic Ulcer Anterior;Left Knee     Wound off loading Diabetic Ulcer Anterior;Left Knee     Wound home care Diabetic Ulcer Anterior;Left Knee      3. Peripheral arterial disease with history of revascularization   I73.9 lidocaine (XYLOCAINE) 4 % topical solution 5 mL    Z98.890 Wound cleansing and dressings Diabetic Ulcer Anterior;Left Knee     Wound off loading Diabetic Ulcer Anterior;Left Knee     Wound home care Diabetic Ulcer Anterior;Left Knee      4. Diabetic polyneuropathy associated with type 2 diabetes mellitus ([facility])  E11.42 lidocaine (XYLOCAINE) 4 % topical solution 5 mL     Wound cleansing and dressings Diabetic Ulcer Anterior;Left Knee     Wound off loading Diabetic Ulcer Anterior;Left Knee     Wound home care Diabetic Ulcer Anterior;Left Knee      5. Continuous opioid dependence ([facility])  F11.20 lidocaine (XYLOCAINE) 4 % topical solution 5 mL     Wound cleansing and dressings Diabetic Ulcer Anterior;Left Knee     Wound off loading Diabetic Ulcer Anterior;Left Knee     Wound home care Diabetic Ulcer Anterior;Left Knee          --  Ismael Prasad MD    \"This note has been constructed using a voice " "recognition system. Therefore there may be syntax, spelling, and/or grammatical errors. Occasional wrong word or \"sound alike\" substitutions may have occurred due to the inherent limitations of voice recognition software. Read the chart carefully and recognize, using context, where substitutions have occurred. Please call if you have any questions.\"     "

## 2024-01-25 NOTE — PATIENT INSTRUCTIONS
Orders Placed This Encounter   Procedures    Wound cleansing and dressings Diabetic Ulcer Anterior;Left Knee     Left Knee Wound:     Wash your hands with soap and water.  Remove old dressing, discard into plastic bag and place in trash.  Cleanse the wound with normal saline prior to applying a clean dressing. Do not use tissue or cotton balls. Do not scrub the wound. Pat dry using gauze.  Shower yes   Apply moisturizer to skin surrounding wound  Apply Betadine to the black area of the wound.    Apply Adaptic to open areas.  Cover with gauze.  Secure with rolled gauze and tape.  Change dressing 2x/day.     This was done today.     Standing Status:   Future     Standing Expiration Date:   1/25/2025    Wound off loading Diabetic Ulcer Anterior;Left Knee     Off-loading Instructions:    Keep weight and pressure off wound at all times.     Standing Status:   Future     Standing Expiration Date:   1/25/2025    Wound home care Diabetic Ulcer Anterior;Left Knee     [facility]:  Please see pt for wound care     Standing Status:   Future     Standing Expiration Date:   1/25/2025

## 2024-01-25 NOTE — LETTER
[facility]  [address] [address]  Phone#  [phone]  Fax#  [phone]    Patient:  Kan Juan  YOB: 1955  Phone:  [phone]  Date of Visit:  1/25/2024    Orders Placed This Encounter   Procedures   • Wound cleansing and dressings Diabetic Ulcer Anterior;Left Knee     Left Knee Wound:     Wash your hands with soap and water.  Remove old dressing, discard into plastic bag and place in trash.  Cleanse the wound with normal saline prior to applying a clean dressing. Do not use tissue or cotton balls. Do not scrub the wound. Pat dry using gauze.  Shower yes   Apply moisturizer to skin surrounding wound  Apply Betadine to the black area of the wound.    Apply Adaptic to open areas.  Cover with gauze.  Secure with rolled gauze and tape.  Change dressing 2x/day.     This was done today.     Standing Status:   Future     Standing Expiration Date:   1/25/2025   • Wound off loading Diabetic Ulcer Anterior;Left Knee     Off-loading Instructions:    Keep weight and pressure off wound at all times.     Standing Status:   Future     Standing Expiration Date:   1/25/2025   • Wound home care Diabetic Ulcer Anterior;Left Knee     [facility]:  Please see pt for wound care     Standing Status:   Future     Standing Expiration Date:   1/25/2025         Electronically signed by Ismael Prasad MD

## 2024-01-29 DIAGNOSIS — E11.65 POORLY CONTROLLED TYPE 2 DIABETES MELLITUS (HCC): ICD-10-CM

## 2024-01-29 RX ORDER — INSULIN GLARGINE 100 [IU]/ML
50 INJECTION, SOLUTION SUBCUTANEOUS
Qty: 15 ML | Refills: 1 | Status: SHIPPED | OUTPATIENT
Start: 2024-01-29

## 2024-01-29 RX ORDER — INSULIN LISPRO 100 [IU]/ML
25 INJECTION, SOLUTION INTRAVENOUS; SUBCUTANEOUS
Qty: 15 ML | Refills: 1 | Status: SHIPPED | OUTPATIENT
Start: 2024-01-29

## 2024-01-29 NOTE — TELEPHONE ENCOUNTER
Reason for call:   [x] Refill   [] Prior Auth  [] Other:     Office:   [x] PCP/Provider -   [] Specialty/Provider -     Medication:     Insulin Lispro 100 units.mL injection pen. 25 units under the skin 3x a day with meals #15mL    Insuline Glargine Solostar 100 unit/ml SOPN 50 units subcutaneous at bedtime #15 mL    [facility] [place] [address] [facility]     Does the patient have enough for 3 days?   [x] Yes   [] No - Send as HP to POD

## 2024-02-01 ENCOUNTER — OFFICE VISIT (OUTPATIENT)
Dept: WOUND CARE | Facility: HOSPITAL | Age: 69
End: 2024-02-01
Payer: COMMERCIAL

## 2024-02-01 ENCOUNTER — APPOINTMENT (OUTPATIENT)
Dept: RADIOLOGY | Facility: MEDICAL CENTER | Age: 69
End: 2024-02-01
Payer: COMMERCIAL

## 2024-02-01 VITALS — SYSTOLIC BLOOD PRESSURE: 129 MMHG | DIASTOLIC BLOOD PRESSURE: 74 MMHG | TEMPERATURE: 98.4 F | HEART RATE: 80 BPM

## 2024-02-01 DIAGNOSIS — T81.89XA NON-HEALING SURGICAL WOUND, INITIAL ENCOUNTER: ICD-10-CM

## 2024-02-01 DIAGNOSIS — M25.512 ACUTE PAIN OF LEFT SHOULDER: ICD-10-CM

## 2024-02-01 DIAGNOSIS — G89.29 CHRONIC BACK PAIN, UNSPECIFIED BACK LOCATION, UNSPECIFIED BACK PAIN LATERALITY: ICD-10-CM

## 2024-02-01 DIAGNOSIS — L97.929 DIABETIC ULCER OF LEFT LOWER LEG (HCC): Primary | ICD-10-CM

## 2024-02-01 DIAGNOSIS — M54.9 CHRONIC BACK PAIN, UNSPECIFIED BACK LOCATION, UNSPECIFIED BACK PAIN LATERALITY: ICD-10-CM

## 2024-02-01 DIAGNOSIS — Z98.890 PERIPHERAL ARTERIAL DISEASE WITH HISTORY OF REVASCULARIZATION: ICD-10-CM

## 2024-02-01 DIAGNOSIS — E11.65 POORLY CONTROLLED TYPE 2 DIABETES MELLITUS (HCC): ICD-10-CM

## 2024-02-01 DIAGNOSIS — E11.622 DIABETIC ULCER OF LEFT LOWER LEG (HCC): Primary | ICD-10-CM

## 2024-02-01 DIAGNOSIS — E11.42 DIABETIC POLYNEUROPATHY ASSOCIATED WITH TYPE 2 DIABETES MELLITUS (HCC): ICD-10-CM

## 2024-02-01 DIAGNOSIS — I73.9 PERIPHERAL ARTERIAL DISEASE WITH HISTORY OF REVASCULARIZATION: ICD-10-CM

## 2024-02-01 PROCEDURE — 97597 DBRDMT OPN WND 1ST 20 CM/<: CPT | Performed by: STUDENT IN AN ORGANIZED HEALTH CARE EDUCATION/TRAINING PROGRAM

## 2024-02-01 PROCEDURE — 73030 X-RAY EXAM OF SHOULDER: CPT

## 2024-02-01 RX ORDER — OXYCODONE HYDROCHLORIDE AND ACETAMINOPHEN 5; 325 MG/1; MG/1
1 TABLET ORAL EVERY 6 HOURS PRN
Qty: 120 TABLET | Refills: 0 | Status: SHIPPED | OUTPATIENT
Start: 2024-02-01

## 2024-02-01 RX ORDER — PEN NEEDLE, DIABETIC 31 GX3/16"
NEEDLE, DISPOSABLE MISCELLANEOUS 4 TIMES DAILY
Qty: 400 EACH | Refills: 1 | Status: SHIPPED | OUTPATIENT
Start: 2024-02-01

## 2024-02-01 RX ORDER — LIDOCAINE HYDROCHLORIDE 40 MG/ML
5 SOLUTION TOPICAL ONCE
Status: COMPLETED | OUTPATIENT
Start: 2024-02-01 | End: 2024-02-08

## 2024-02-01 RX ORDER — LIDOCAINE HYDROCHLORIDE 40 MG/ML
5 SOLUTION TOPICAL ONCE
Status: COMPLETED | OUTPATIENT
Start: 2024-02-01 | End: 2024-02-01

## 2024-02-01 RX ADMIN — LIDOCAINE HYDROCHLORIDE 5 ML: 40 SOLUTION TOPICAL at 11:27

## 2024-02-01 NOTE — LETTER
[facility]  [address] [address]  Phone#  [phone]  Fax#  [phone]    Patient:  Kan Juan  YOB: 1955  Phone:  [phone]  Date of Visit:  2/1/2024    Orders Placed This Encounter   Procedures   • Wound cleansing and dressings Diabetic Ulcer Anterior;Left Knee     Left Knee Wound:     Wash your hands with soap and water.  Remove old dressing, discard into plastic bag and place in trash.  Cleanse the wound with normal saline prior to applying a clean dressing. Do not use tissue or cotton balls. Do not scrub the wound. Pat dry using gauze.  Shower yes   Apply moisturizer to skin surrounding wound  Apply Betadine to the black area of the wound.    Apply Adaptic to open areas.  Cover with gauze.  Secure with rolled gauze and tape.  Change dressing 2x/day.     This was done today.     Standing Status:   Future     Standing Expiration Date:   2/1/2025   • Wound off loading Diabetic Ulcer Anterior;Left Knee     Keep weight and pressure off wound at all times.     Standing Status:   Future     Standing Expiration Date:   2/1/2025   • Wound home care Diabetic Ulcer Anterior;Left Knee     [facility]:  Please see pt for wound care     Standing Status:   Future     Standing Expiration Date:   2/1/2025         Electronically signed by Ismael Prasad MD

## 2024-02-01 NOTE — PROGRESS NOTES
Patient ID: Kan Juan is a 68 y.o. male Date of Birth 1955     Chief Complaint  Chief Complaint   Patient presents with    Follow Up Wound Care Visit     Open wound LLE       Allergies  Shellfish-derived products - food allergy and Sulfamethoxazole-trimethoprim    Assessment:     Diagnoses and all orders for this visit:    Diabetic ulcer of left lower leg (HCC)  -     lidocaine (XYLOCAINE) 4 % topical solution 5 mL  -     lidocaine (XYLOCAINE) 4 % topical solution 5 mL  -     Wound cleansing and dressings Diabetic Ulcer Anterior;Left Knee; Future  -     Wound off loading Diabetic Ulcer Anterior;Left Knee; Future  -     Wound home care Diabetic Ulcer Anterior;Left Knee; Future  -     Debridement    Poorly controlled type 2 diabetes mellitus (HCC)  -     lidocaine (XYLOCAINE) 4 % topical solution 5 mL  -     lidocaine (XYLOCAINE) 4 % topical solution 5 mL  -     Wound cleansing and dressings Diabetic Ulcer Anterior;Left Knee; Future  -     Wound off loading Diabetic Ulcer Anterior;Left Knee; Future  -     Wound home care Diabetic Ulcer Anterior;Left Knee; Future    Peripheral arterial disease with history of revascularization   -     lidocaine (XYLOCAINE) 4 % topical solution 5 mL  -     lidocaine (XYLOCAINE) 4 % topical solution 5 mL  -     Wound cleansing and dressings Diabetic Ulcer Anterior;Left Knee; Future  -     Wound off loading Diabetic Ulcer Anterior;Left Knee; Future  -     Wound home care Diabetic Ulcer Anterior;Left Knee; Future    Diabetic polyneuropathy associated with type 2 diabetes mellitus (HCC)  -     lidocaine (XYLOCAINE) 4 % topical solution 5 mL  -     lidocaine (XYLOCAINE) 4 % topical solution 5 mL  -     Wound cleansing and dressings Diabetic Ulcer Anterior;Left Knee; Future  -     Wound off loading Diabetic Ulcer Anterior;Left Knee; Future  -     Wound home care Diabetic Ulcer Anterior;Left Knee; Future    Non-healing surgical wound, initial encounter  -     lidocaine (XYLOCAINE) 4 %  "topical solution 5 mL  -     lidocaine (XYLOCAINE) 4 % topical solution 5 mL  -     Wound cleansing and dressings Diabetic Ulcer Anterior;Left Knee; Future  -     Wound off loading Diabetic Ulcer Anterior;Left Knee; Future  -     Wound home care Diabetic Ulcer Anterior;Left Knee; Future              Debridement   Wound 12/20/23 Diabetic Ulcer Knee Anterior;Left    Universal Protocol:  Consent: Verbal consent obtained.  Risks and benefits: risks, benefits and alternatives were discussed  Consent given by: patient  Time out: Immediately prior to procedure a \"time out\" was called to verify the correct patient, procedure, equipment, support staff and site/side marked as required.  Patient identity confirmed: verbally with patient    Debridement Details  Performed by: physician  Debridement type: selective  Pain control: lidocaine 4%      Post-debridement measurements  Length (cm): 3.9  Width (cm): 2  Depth (cm): 0.1  Percent debrided: 10%  Surface Area (cm^2): 7.8  Area Debrided (cm^2): 0.78  Volume (cm^3): 0.78    Devitalized tissue debrided: biofilm, exudate and eschar  Instrument(s) utilized: curette  Bleeding: small  Hemostasis obtained with: pressure  Procedural pain (0-10): 1  Post-procedural pain: 0   Response to treatment: procedure was tolerated well        Plan:  It was a pleasure to see Kan Juan for wound care follow up today  Selective debridement performed today as above  Wound is improving   Continue plan of care as noted below with betadine   A1C results reviewed with the patient today.   No signs or symptoms of infection today. Patient understands that if any signs of infection start (such as increased redness, drainage, pain, fever, chills, diaphoresis), they should call our office or proceed to the ER or Urgent Care.  Patient should continue a high protein diet to facilitate wound healing  Patient is advised to not submerge wound or leave wound open to air.  Follow up in 1 weeks  Given the " multi-factorial nature of wound care, additional time was taken to review patient's treatment plan with other specialties and most recent pertinent lab work and imaging.   All plans of care discussed with patient at bedside who verbalized understanding with treatment plan.       Wound 12/20/23 Diabetic Ulcer Knee Anterior;Left (Active)   Wound Image Images linked 02/01/24 1122   Wound Description Eschar;Black;Dry;Pink 02/01/24 1122   Dominga-wound Assessment Dry;Intact 02/01/24 1122   Wound Length (cm) 3.9 cm 02/01/24 1122   Wound Width (cm) 2 cm 02/01/24 1122   Wound Depth (cm) 0.1 cm 02/01/24 1122   Wound Surface Area (cm^2) 7.8 cm^2 02/01/24 1122   Wound Volume (cm^3) 0.78 cm^3 02/01/24 1122   Calculated Wound Volume (cm^3) 0.78 cm^3 02/01/24 1122   Drainage Amount Small 02/01/24 1122   Drainage Description Serosanguineous 02/01/24 1122   Non-staged Wound Description Full thickness 02/01/24 1122   Dressing Status Intact;Old drainage (upon arrival) 02/01/24 1122       Wound 12/20/23 Diabetic Ulcer Knee Anterior;Left (Active)   Date First Assessed/Time First Assessed: 12/20/23 1312   Primary Wound Type: Diabetic Ulcer  Location: Knee  Wound Location Orientation: Anterior;Left  Wound Description (Comments): CAO 4       [REMOVED] Wound 11/29/23 Knee Left (Removed)   Resolved Date: 12/20/23  Date First Assessed/Time First Assessed: 11/29/23 1452   Location: Knee  Wound Location Orientation: Left  Incision's 1st Dressing: PACKING PLAIN 1/4 IN, DRESSING XEROFORM 1 X 8, SPONGE GAUZE 4 X 4 16 PLY STRL PLASTIC TRAY LF,...       Subjective:      .    2/1/24, 1/25/2, 1/18/24, 1/11/24, 1/3/24: Applying Betadine daily as directed.  No symptoms of infection.     12/20/23: Consult - Johnathon is a pleasant 68-year-old male with a past medical history of type 2 diabetes mellitus most recent A1c 11.6% 3 weeks ago, diabetic  polyneuropathy, peripheral arterial disease with history of re-vascularization, atrial fibrillation on chronic  anticoagulation, hx of osteomyelitis requiring left sided BKA, continued opioid dependence here today for initial wound care consult.  Patient was referred by the orthopedic surgeon, Dr. Daryl Shay.  He had incision and drainage of the left knee prepatellar bursa on 11/29/23 to 4-day hospital course at [facility] at which time he was hospitalized for septic prepatellar bursitis.  Following this he had left knee stiffness and pain and was given a 10-day course of such cefadroxil.  He was seen for his suture removal postop visit On 12/14/2023 at Which Time It Was Noted That There Is an Eschar in the Superior Portion of the Incision Site.  He was told that he needs to have better glycemic control and to speak to endocrinology.  Also advised that he if he does not hear he may require further amputation.  Wound care referral was placed at that visit.  Denies any local or systemic symptoms of infection today including fever chills diaphoresis.     BL LEAD Feb 2023:  RIGHT LOWER LIMB:  Diffuse disease noted throughout the femoral-popliteal arteries without  significant focal stenosis.  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  Ankle/Brachial index: 1.12, within the normal disease category (Prior 1.2).  Metatarsal pressure was unable to be obtained due to non-compressible vessels.  Great toe pressure of 82 mmHg, within the healing range (Prior 46 mmHg).  PVR/ PPG tracings are dampened at the ankle and metatarsal.     LEFT LOWER LIMB:  Diffuse disease noted throughout the femoral-popliteal arteries without  significant focal stenosis.  Below knee amputation.     Compared to previous study on 12/6/2021 and 05/26/2021, there is no significant  change in the disease process.  Recommend repeat testing in 6 months as per protocol unless otherwise  indicated.             The following portions of the patient's history were reviewed and updated as appropriate: allergies, current medications, past family  history, past medical history, past social history, past surgical history, and problem list.    Review of Systems   Constitutional:  Negative for chills, diaphoresis and fever.   Skin:  Positive for wound.   All other systems reviewed and are negative.        Objective:       Wound 12/20/23 Diabetic Ulcer Knee Anterior;Left (Active)   Wound Image Images linked 02/01/24 1122   Wound Description Eschar;Black;Dry;Pink 02/01/24 1122   Dominga-wound Assessment Dry;Intact 02/01/24 1122   Wound Length (cm) 3.9 cm 02/01/24 1122   Wound Width (cm) 2 cm 02/01/24 1122   Wound Depth (cm) 0.1 cm 02/01/24 1122   Wound Surface Area (cm^2) 7.8 cm^2 02/01/24 1122   Wound Volume (cm^3) 0.78 cm^3 02/01/24 1122   Calculated Wound Volume (cm^3) 0.78 cm^3 02/01/24 1122   Drainage Amount Small 02/01/24 1122   Drainage Description Serosanguineous 02/01/24 1122   Non-staged Wound Description Full thickness 02/01/24 1122   Dressing Status Intact;Old drainage (upon arrival) 02/01/24 1122       /74   Pulse 80   Temp 98.4 °F (36.9 °C) (Temporal)     Physical Exam  Vitals reviewed.   Constitutional:       Appearance: Normal appearance.   HENT:      Head: Normocephalic and atraumatic.   Eyes:      Extraocular Movements: Extraocular movements intact.   Pulmonary:      Effort: Pulmonary effort is normal.   Musculoskeletal:      Cervical back: Neck supple.   Skin:     Comments: Left knee wound still with stable eschar.  No signs of infection.   Neurological:      Mental Status: He is alert.   Psychiatric:         Mood and Affect: Mood normal.           Wound Instructions:  Orders Placed This Encounter   Procedures    Wound cleansing and dressings Diabetic Ulcer Anterior;Left Knee     Left Knee Wound:     Wash your hands with soap and water.  Remove old dressing, discard into plastic bag and place in trash.  Cleanse the wound with normal saline prior to applying a clean dressing. Do not use tissue or cotton balls. Do not scrub the wound. Pat dry  using gauze.  Shower yes   Apply moisturizer to skin surrounding wound  Apply Betadine to the black area of the wound.    Apply Adaptic to open areas.  Cover with gauze.  Secure with rolled gauze and tape.  Change dressing 2x/day.     This was done today.     Standing Status:   Future     Standing Expiration Date:   2/1/2025    Wound off loading Diabetic Ulcer Anterior;Left Knee     Keep weight and pressure off wound at all times.     Standing Status:   Future     Standing Expiration Date:   2/1/2025    Wound home care Diabetic Ulcer Anterior;Left Knee     [facility]:  Please see pt for wound care     Standing Status:   Future     Standing Expiration Date:   2/1/2025    Debridement     This order was created via procedure documentation        Diagnosis ICD-10-CM Associated Orders   1. Diabetic ulcer of left lower leg ([facility])  E11.622 lidocaine (XYLOCAINE) 4 % topical solution 5 mL    L97.929 lidocaine (XYLOCAINE) 4 % topical solution 5 mL     Wound cleansing and dressings Diabetic Ulcer Anterior;Left Knee     Wound off loading Diabetic Ulcer Anterior;Left Knee     Wound home care Diabetic Ulcer Anterior;Left Knee     Debridement      2. Poorly controlled type 2 diabetes mellitus ([facility])  E11.65 lidocaine (XYLOCAINE) 4 % topical solution 5 mL     lidocaine (XYLOCAINE) 4 % topical solution 5 mL     Wound cleansing and dressings Diabetic Ulcer Anterior;Left Knee     Wound off loading Diabetic Ulcer Anterior;Left Knee     Wound home care Diabetic Ulcer Anterior;Left Knee      3. Peripheral arterial disease with history of revascularization   I73.9 lidocaine (XYLOCAINE) 4 % topical solution 5 mL    Z98.890 lidocaine (XYLOCAINE) 4 % topical solution 5 mL     Wound cleansing and dressings Diabetic Ulcer Anterior;Left Knee     Wound off loading Diabetic Ulcer Anterior;Left Knee     Wound home care Diabetic Ulcer Anterior;Left Knee      4. Diabetic polyneuropathy associated with type 2 diabetes mellitus ([facility])   "E11.42 lidocaine (XYLOCAINE) 4 % topical solution 5 mL     lidocaine (XYLOCAINE) 4 % topical solution 5 mL     Wound cleansing and dressings Diabetic Ulcer Anterior;Left Knee     Wound off loading Diabetic Ulcer Anterior;Left Knee     Wound home care Diabetic Ulcer Anterior;Left Knee      5. Non-healing surgical wound, initial encounter  T81.89XA lidocaine (XYLOCAINE) 4 % topical solution 5 mL     lidocaine (XYLOCAINE) 4 % topical solution 5 mL     Wound cleansing and dressings Diabetic Ulcer Anterior;Left Knee     Wound off loading Diabetic Ulcer Anterior;Left Knee     Wound home care Diabetic Ulcer Anterior;Left Knee          --  Ismael Prasad MD    \"This note has been constructed using a voice recognition system. Therefore there may be syntax, spelling, and/or grammatical errors. Occasional wrong word or \"sound alike\" substitutions may have occurred due to the inherent limitations of voice recognition software. Read the chart carefully and recognize, using context, where substitutions have occurred. Please call if you have any questions.\"     "

## 2024-02-01 NOTE — TELEPHONE ENCOUNTER
Reason for call:   [x] Refill   [] Prior Auth  [] Other:     Office:   [x] PCP/Provider - [facility]  [] Specialty/Provider -     Medication: oxyCODONE-acetaminophen (Percocet) 5-325 mg ,   Insulin Pen Needle (HM UltiCare Mini Pen Needles) 31G X 5 MM MISC     Quantity: #120 tabs, 400 pen needles with 1 refill    Pharmacy:   [id number] - [place], [place] - [address]  P: [phone]  F: [phone]    Does the patient have enough for 3 days?   [x] Yes   [] No - Send as HP to POD

## 2024-02-01 NOTE — PATIENT INSTRUCTIONS
Orders Placed This Encounter   Procedures    Wound cleansing and dressings Diabetic Ulcer Anterior;Left Knee     Left Knee Wound:     Wash your hands with soap and water.  Remove old dressing, discard into plastic bag and place in trash.  Cleanse the wound with normal saline prior to applying a clean dressing. Do not use tissue or cotton balls. Do not scrub the wound. Pat dry using gauze.  Shower yes   Apply moisturizer to skin surrounding wound  Apply Betadine to the black area of the wound.    Apply Adaptic to open areas.  Cover with gauze.  Secure with rolled gauze and tape.  Change dressing 2x/day.     This was done today.     Standing Status:   Future     Standing Expiration Date:   2/1/2025    Wound off loading Diabetic Ulcer Anterior;Left Knee     Keep weight and pressure off wound at all times.     Standing Status:   Future     Standing Expiration Date:   2/1/2025    Wound home care Diabetic Ulcer Anterior;Left Knee     [facility]:  Please see pt for wound care     Standing Status:   Future     Standing Expiration Date:   2/1/2025

## 2024-02-07 ENCOUNTER — OFFICE VISIT (OUTPATIENT)
Dept: FAMILY MEDICINE CLINIC | Facility: CLINIC | Age: 69
End: 2024-02-07
Payer: COMMERCIAL

## 2024-02-07 VITALS
BODY MASS INDEX: 31.07 KG/M2 | DIASTOLIC BLOOD PRESSURE: 70 MMHG | HEIGHT: 64 IN | WEIGHT: 182 LBS | HEART RATE: 90 BPM | TEMPERATURE: 98.3 F | SYSTOLIC BLOOD PRESSURE: 138 MMHG | OXYGEN SATURATION: 98 %

## 2024-02-07 DIAGNOSIS — I10 ESSENTIAL HYPERTENSION: ICD-10-CM

## 2024-02-07 DIAGNOSIS — I73.9 PVD (PERIPHERAL VASCULAR DISEASE) (HCC): ICD-10-CM

## 2024-02-07 DIAGNOSIS — Z89.512 S/P BKA (BELOW KNEE AMPUTATION), LEFT (HCC): Primary | ICD-10-CM

## 2024-02-07 DIAGNOSIS — K21.9 GASTROESOPHAGEAL REFLUX DISEASE WITHOUT ESOPHAGITIS: ICD-10-CM

## 2024-02-07 DIAGNOSIS — E78.2 MIXED HYPERLIPIDEMIA: ICD-10-CM

## 2024-02-07 DIAGNOSIS — E11.51 TYPE 2 DIABETES MELLITUS WITH DIABETIC PERIPHERAL ANGIOPATHY WITHOUT GANGRENE, WITH LONG-TERM CURRENT USE OF INSULIN (HCC): ICD-10-CM

## 2024-02-07 DIAGNOSIS — I48.91 ATRIAL FIBRILLATION, UNSPECIFIED TYPE (HCC): ICD-10-CM

## 2024-02-07 DIAGNOSIS — M86.9 OSTEOMYELITIS, UNSPECIFIED SITE, UNSPECIFIED TYPE (HCC): ICD-10-CM

## 2024-02-07 DIAGNOSIS — D69.6 PLATELETS DECREASED (HCC): ICD-10-CM

## 2024-02-07 DIAGNOSIS — Z79.4 TYPE 2 DIABETES MELLITUS WITH DIABETIC PERIPHERAL ANGIOPATHY WITHOUT GANGRENE, WITH LONG-TERM CURRENT USE OF INSULIN (HCC): ICD-10-CM

## 2024-02-07 LAB — SL AMB POCT HEMOGLOBIN AIC: 9 (ref ?–6.5)

## 2024-02-07 PROCEDURE — 83036 HEMOGLOBIN GLYCOSYLATED A1C: CPT | Performed by: FAMILY MEDICINE

## 2024-02-07 PROCEDURE — 99214 OFFICE O/P EST MOD 30 MIN: CPT | Performed by: FAMILY MEDICINE

## 2024-02-07 NOTE — PROGRESS NOTES
Name: Kan Juan      : 1955      MRN: [id number]  Encounter Provider: Macairo Goyal MD  Encounter Date: 2024   Encounter department: [facility]    Assessment & Plan     1. S/P BKA (below knee amputation), left (Formerly [facility])  Assessment & Plan:  Patient is stable  and will continue present plan of care and reassess at next routine visit. All questions about this problem from patient were answered today.       2. Gastroesophageal reflux disease without esophagitis  Assessment & Plan:  Patient to continue with present therapy and decrease caffeine, avoid ETOH and smoking to decrease acid production. Pt should also cease eating prior to bedtime and avoid excessive fluid intake prior to sleep. May use antacids as needed for breakthrough GERD. All pateint questions answered today about this condition.       3. Atrial fibrillation, unspecified type (Formerly [facility])  Assessment & Plan:  Continue with anticogulation and rate control of heart rate. Concerns about condition were addressed today.       4. Type 2 diabetes mellitus with diabetic peripheral angiopathy without gangrene, with long-term current use of insulin (Formerly [facility])  Assessment & Plan:  needs better control  Lab Results   Component Value Date    HGBA1C 11.6 (A) 2023     Orders:  -     POCT hemoglobin A1c    5. Mixed hyperlipidemia  Assessment & Plan:  Patient  is stable with current medication and we discussed a low fat low cholesterol diet. Weight loss also discussed for this will help lower cholesterol also. Recheck lipids in 6 months.       6. Essential hypertension  Assessment & Plan:  Patient is stable with current anti-hypertensive medicine and continue to follow a low sodium diet and take current medication. All questions about this condition were answered today.       7. PVD (peripheral vascular disease) (Formerly [facility])  Assessment & Plan:  Patient is stable  and will continue present plan of care and reassess at next routine visit. All  questions about this problem from patient were answered today.       8. Osteomyelitis, unspecified site, unspecified type ([facility])    9. Platelets decreased ([facility])        Falls Plan of Care: balance, strength, and gait training instructions were provided. Recommended assistive device to help with gait and balance. Home safety education provided.       Subjective     68-year-old male here today for checkup on multimedical process patient with type 2 diabetes peripheral vascular disease history of left BKA and with recent infection with his stump.  Patient has eschar night and is seeing wound care as well as orthopedics.  Patient would have his A1c done today his sugars have been high we may have to see about increasing his Basaglar as well as watching of close to her diet.  Will see patient back in approximately 3 months to reassess him for his diabetes at that point.      Review of Systems    Past Medical History:   Diagnosis Date   • Arthritis     fingers   • First degree AV block    • Full dentures    • Hypertension    • PAD (peripheral artery disease) ([facility])    • PVD (peripheral vascular disease) ([facility])    • Type 2 diabetes mellitus with diabetic peripheral angiopathy without gangrene ([facility]) 5/18/2021    Per CMS ICD 10 guidelines--Per Physician   • Wound, open     right foot- by the 5th toe     Past Surgical History:   Procedure Laterality Date   • CHOLECYSTECTOMY     • COLONOSCOPY     • INCISION AND DRAINAGE OF WOUND Left 11/29/2023    Procedure: INCISION AND DRAINAGE (I&D) LEFT KNEE PRE-PATELLA BURSA;  Surgeon: Daryl Shay DO;  Location: [facility];  Service: Orthopedics   • IR AORTAGRAM WITH RUN-OFF  1/28/2021   • IR AORTAGRAM WITH RUN-OFF  4/13/2021   • LEG AMPUTATION THROUGH LOWER TIBIA AND FIBULA Left 7/17/2021    Procedure: AMPUTATION BELOW KNEE (BKA);  Surgeon: Jose Mary MD;  Location: [facility];  Service: Vascular   • CO BYP FEM-ANT TIBL PST TIBL PRONEAL ART/OTH DSTL Left 7/14/2021    Procedure: BYPASS  femoral-peroneal artery bypass with  reverse GSV and balloon angioplasty peroneal artery;  Surgeon: Jose Mary MD;  Location:  MAIN OR;  Service: Vascular   • NJ DEBRIDEMENT BONE 1ST 20 SQ CM/< Right 12/18/2020    Procedure: DEBRIDMENT OF ULCER , REMOVAL OF DEVITALIZED SKIN, SOFT TISSUE, BONE AND APPLICATION OF INTEGRA GRAFT RIGHT FOOT.;  Surgeon: Daquan Ellis DPM;  Location: [facility];  Service: Podiatry   • REPLANTATION THUMB Right     right tip reconnected   • SKIN GRAFT      right thumb   • TOE AMPUTATION      left foot all 5 toes removed   • TOE AMPUTATION Right 2/2/2021    Procedure: Right partial 5th ray amputation;  Surgeon: Gabriel Garcia DPM;  Location:  MAIN OR;  Service: Podiatry   • TOE OSTEOTOMY Right 6/26/2020    Procedure: exostosis of right big toe;  Surgeon: Trey Shirley DPM;  Location: [facility];  Service: Podiatry   • TRIGGER FINGER RELEASE      bilateral hands   • VEIN LIGATION      right     Family History   Problem Relation Age of Onset   • Arthritis Mother    • Pancreatic cancer Mother    • Cancer Father         colon   • Alzheimer's disease Father      Social History     Socioeconomic History   • Marital status: /Civil Union     Spouse name: Angeles   • Number of children: 1   • Years of education: 12   • Highest education level: High school graduate   Occupational History   • Occupation: Cook   Tobacco Use   • Smoking status: Never   • Smokeless tobacco: Never   Vaping Use   • Vaping status: Never Used   Substance and Sexual Activity   • Alcohol use: Not Currently     Comment: occasional   • Drug use: Never   • Sexual activity: Yes     Partners: Female   Other Topics Concern   • None   Social History Narrative         Social Determinants of Health     Financial Resource Strain: Low Risk  (9/15/2023)    Overall Financial Resource Strain (CARDIA)    • Difficulty of Paying Living Expenses: Not hard at all   Food Insecurity: Not on file   Transportation Needs: No Transportation  Needs (9/15/2023)    PRAPARE - Transportation    • Lack of Transportation (Medical): No    • Lack of Transportation (Non-Medical): No   Physical Activity: Not on file   Stress: Not on file   Social Connections: Not on file   Intimate Partner Violence: Not on file   Housing Stability: Not on file     Current Outpatient Medications on File Prior to Visit   Medication Sig   • Accu-Chek Softclix Lancets lancets Use as instructed qid Dx E11.9   • Alcohol Swabs (B-D SINGLE USE SWABS REGULAR) PADS Use 4 (four) times a day Dx E11.9   • atorvastatin (LIPITOR) 40 mg tablet Take 1 tablet (40 mg total) by mouth daily at bedtime   • betamethasone dipropionate (DIPROSONE) 0.05 % cream Apply topically 2 (two) times a day   • Blood Glucose Calibration (Accu-Chek Guide Control) LIQD Test daily as needed (abnormal sugar reading)   • Blood Glucose Monitoring Suppl (Accu-Chek Guide) w/Device KIT Use 4 (four) times a day   • Blood Glucose Monitoring Suppl (Accu-Chek Guide) w/Device KIT Use 4 (four) times a day Dx E11.9   • clopidogrel (PLAVIX) 75 mg tablet Take 1 tablet (75 mg total) by mouth daily   • cyclobenzaprine (FLEXERIL) 5 mg tablet Take 1 tablet (5 mg total) by mouth 3 (three) times a day as needed for muscle spasms for up to 10 days   • glucose blood (Accu-Chek Guide) test strip Check Blood Sugars QID as directed by physician. Dx E11.9   • Insulin Glargine Solostar (Basaglar KwikPen) 100 UNIT/ML SOPN Inject 0.5 mL (50 Units total) under the skin daily at bedtime   • insulin lispro (HumaLOG KwikPen) 100 units/mL injection pen Inject 25 Units under the skin 3 (three) times a day with meals   • Insulin Pen Needle ( UltiCare Mini Pen Needles) 31G X 5 MM MISC Use 4 (four) times a day   • lisinopril (ZESTRIL) 10 mg tablet Take 1 tablet (10 mg total) by mouth daily   • metFORMIN (GLUCOPHAGE) 1000 MG tablet Take 1 tablet (1,000 mg total) by mouth 2 (two) times a day with meals (Patient not taking: Reported on 12/20/2023)   •  "oxyCODONE-acetaminophen (Percocet) 5-325 mg per tablet Take 1 tablet by mouth every 6 (six) hours as needed for moderate pain Max Daily Amount: 4 tablets   • pantoprazole (PROTONIX) 40 mg tablet Take 1 tablet (40 mg total) by mouth daily   • pioglitazone (ACTOS) 45 mg tablet Take 1 tablet (45 mg total) by mouth daily   • rivaroxaban (Xarelto) 2.5 mg tablet Take 1 tablet (2.5 mg total) by mouth daily with breakfast Last dose 6/21/20   • UltiCare Insulin Syringe 31G X 5/16\" 1 ML MISC Inject under the skin as needed (for injection)   • [DISCONTINUED] insulin NPH-insulin regular (NovoLIN 70/30) 100 units/mL subcutaneous injection Inject:  44 units with breakfast, 20 units with lunch, 30 units with dinner (Patient not taking: Reported on 12/20/2023)     Allergies   Allergen Reactions   • Shellfish-Derived Products - Food Allergy Anaphylaxis     Throat closes   • Sulfamethoxazole-Trimethoprim Rash     Immunization History   Administered Date(s) Administered   • COVID-19 PFIZER VACCINE 0.3 ML IM 03/02/2021, 03/22/2021   • INFLUENZA 11/02/2018, 10/14/2021, 10/17/2022, 11/24/2023   • Influenza, high dose seasonal 0.7 mL 11/05/2020, 10/14/2021, 10/17/2022, 11/24/2023       Objective     /70 (BP Location: Left arm, Patient Position: Sitting, Cuff Size: Large)   Pulse 90   Temp 98.3 °F (36.8 °C)   Ht 5' 4\" (1.626 m)   Wt 82.6 kg (182 lb)   SpO2 98%   BMI 31.24 kg/m²     Physical Exam  Vitals and nursing note reviewed.   Constitutional:       Appearance: He is well-developed.   HENT:      Head: Normocephalic and atraumatic.      Nose: Nose normal.   Eyes:      General: No scleral icterus.     Conjunctiva/sclera: Conjunctivae normal.      Pupils: Pupils are equal, round, and reactive to light.   Neck:      Thyroid: No thyromegaly.   Cardiovascular:      Rate and Rhythm: Normal rate and regular rhythm.      Heart sounds: Normal heart sounds.   Pulmonary:      Effort: Pulmonary effort is normal. No respiratory distress. "      Breath sounds: Normal breath sounds. No wheezing.   Abdominal:      General: Bowel sounds are normal.      Palpations: Abdomen is soft.      Tenderness: There is no abdominal tenderness. There is no guarding or rebound.   Musculoskeletal:         General: Normal range of motion.      Cervical back: Normal range of motion and neck supple.      Comments: LBKA dressed   Skin:     General: Skin is warm and dry.      Findings: No rash.   Neurological:      Mental Status: He is alert and oriented to person, place, and time.   Psychiatric:         Mood and Affect: Mood normal.         Behavior: Behavior normal.         Thought Content: Thought content normal.         Judgment: Judgment normal.       Macario Goyal MD

## 2024-02-08 ENCOUNTER — OFFICE VISIT (OUTPATIENT)
Dept: WOUND CARE | Facility: HOSPITAL | Age: 69
End: 2024-02-08
Payer: COMMERCIAL

## 2024-02-08 VITALS — TEMPERATURE: 97.8 F | DIASTOLIC BLOOD PRESSURE: 75 MMHG | SYSTOLIC BLOOD PRESSURE: 146 MMHG | HEART RATE: 89 BPM

## 2024-02-08 DIAGNOSIS — Z98.890 PERIPHERAL ARTERIAL DISEASE WITH HISTORY OF REVASCULARIZATION: ICD-10-CM

## 2024-02-08 DIAGNOSIS — E11.622 DIABETIC ULCER OF LEFT LOWER LEG (HCC): Primary | ICD-10-CM

## 2024-02-08 DIAGNOSIS — E11.65 POORLY CONTROLLED TYPE 2 DIABETES MELLITUS (HCC): ICD-10-CM

## 2024-02-08 DIAGNOSIS — I73.9 PERIPHERAL ARTERIAL DISEASE WITH HISTORY OF REVASCULARIZATION: ICD-10-CM

## 2024-02-08 DIAGNOSIS — L97.929 DIABETIC ULCER OF LEFT LOWER LEG (HCC): Primary | ICD-10-CM

## 2024-02-08 DIAGNOSIS — E11.42 DIABETIC POLYNEUROPATHY ASSOCIATED WITH TYPE 2 DIABETES MELLITUS (HCC): ICD-10-CM

## 2024-02-08 PROCEDURE — 97597 DBRDMT OPN WND 1ST 20 CM/<: CPT | Performed by: STUDENT IN AN ORGANIZED HEALTH CARE EDUCATION/TRAINING PROGRAM

## 2024-02-08 RX ORDER — LIDOCAINE HYDROCHLORIDE 40 MG/ML
5 SOLUTION TOPICAL ONCE
Status: COMPLETED | OUTPATIENT
Start: 2024-02-08 | End: 2024-02-15

## 2024-02-08 RX ADMIN — LIDOCAINE HYDROCHLORIDE 5 ML: 40 SOLUTION TOPICAL at 10:39

## 2024-02-08 NOTE — PATIENT INSTRUCTIONS
Orders Placed This Encounter   Procedures    Wound cleansing and dressings Diabetic Ulcer Anterior;Left Knee     Left Knee Wound:     Wash your hands with soap and water.  Remove old dressing, discard into plastic bag and place in trash.  Cleanse the wound with normal saline prior to applying a clean dressing. Do not use tissue or cotton balls. Do not scrub the wound. Pat dry using gauze.  Shower yes   Apply moisturizer to skin surrounding wound  Apply Betadine to the black area of the wound.    Apply Adaptic to open areas.  Cover with gauze.  Secure with rolled gauze and tape.  Change dressing 2x/day.     This was done today.     Standing Status:   Future     Standing Expiration Date:   2/8/2025    Wound off loading Diabetic Ulcer Anterior;Left Knee     Keep weight and pressure off wound at all times.     Standing Status:   Future     Standing Expiration Date:   2/8/2025

## 2024-02-08 NOTE — PROGRESS NOTES
"Patient ID: Kan Juan is a 68 y.o. male Date of Birth 1955     Chief Complaint  Chief Complaint   Patient presents with    Follow Up Wound Care Visit     Open wound LLE       Allergies  Shellfish-derived products - food allergy and Sulfamethoxazole-trimethoprim    Assessment:     Diagnoses and all orders for this visit:    Diabetic ulcer of left lower leg (HCC)  -     lidocaine (XYLOCAINE) 4 % topical solution 5 mL  -     Wound cleansing and dressings Diabetic Ulcer Anterior;Left Knee; Future  -     Wound off loading Diabetic Ulcer Anterior;Left Knee; Future  -     Debridement    Poorly controlled type 2 diabetes mellitus (HCC)  -     lidocaine (XYLOCAINE) 4 % topical solution 5 mL  -     Wound cleansing and dressings Diabetic Ulcer Anterior;Left Knee; Future  -     Wound off loading Diabetic Ulcer Anterior;Left Knee; Future    Peripheral arterial disease with history of revascularization   -     lidocaine (XYLOCAINE) 4 % topical solution 5 mL  -     Wound cleansing and dressings Diabetic Ulcer Anterior;Left Knee; Future  -     Wound off loading Diabetic Ulcer Anterior;Left Knee; Future    Diabetic polyneuropathy associated with type 2 diabetes mellitus (HCC)  -     lidocaine (XYLOCAINE) 4 % topical solution 5 mL  -     Wound cleansing and dressings Diabetic Ulcer Anterior;Left Knee; Future  -     Wound off loading Diabetic Ulcer Anterior;Left Knee; Future              Debridement   Wound 12/20/23 Diabetic Ulcer Knee Anterior;Left    Universal Protocol:  Consent: Verbal consent obtained.  Risks and benefits: risks, benefits and alternatives were discussed  Consent given by: patient  Time out: Immediately prior to procedure a \"time out\" was called to verify the correct patient, procedure, equipment, support staff and site/side marked as required.  Patient identity confirmed: verbally with patient    Debridement Details  Performed by: physician  Debridement type: selective  Pain control: lidocaine " 4%      Post-debridement measurements  Length (cm): 3.7  Width (cm): 2  Depth (cm): 0.1  Percent debrided: 20%  Surface Area (cm^2): 7.4  Area Debrided (cm^2): 1.48  Volume (cm^3): 0.74    Devitalized tissue debrided: biofilm and exudate  Instrument(s) utilized: curette  Bleeding: small  Hemostasis obtained with: pressure  Procedural pain (0-10): 1  Post-procedural pain: 0   Response to treatment: procedure was tolerated well        Plan:   It was a pleasure to see Kan Juan for wound care follow up today  Selective debridement performed today as above  Wound is improving   Continue plan of care as noted below with betadine   A1C results reviewed with the patient today.   No signs or symptoms of infection today. Patient understands that if any signs of infection start (such as increased redness, drainage, pain, fever, chills, diaphoresis), they should call our office or proceed to the ER or Urgent Care.  Patient should continue a high protein diet to facilitate wound healing  Patient is advised to not submerge wound or leave wound open to air.  Follow up in 1 weeks  Given the multi-factorial nature of wound care, additional time was taken to review patient's treatment plan with other specialties and most recent pertinent lab work and imaging.   All plans of care discussed with patient at bedside who verbalized understanding with treatment plan.    Wound 12/20/23 Diabetic Ulcer Knee Anterior;Left (Active)   Wound Image Images linked 02/08/24 1034   Wound Description Eschar;Black;Dry 02/08/24 1034   Dominga-wound Assessment Dry;Intact 02/08/24 1034   Wound Length (cm) 3.9 cm 02/08/24 1034   Wound Width (cm) 2 cm 02/08/24 1034   Wound Depth (cm) 0.1 cm 02/08/24 1034   Wound Surface Area (cm^2) 7.8 cm^2 02/08/24 1034   Wound Volume (cm^3) 0.78 cm^3 02/08/24 1034   Calculated Wound Volume (cm^3) 0.78 cm^3 02/08/24 1034   Drainage Amount Scant 02/08/24 1034   Drainage Description Serous;Yellow 02/08/24 1034   Non-staged  Wound Description Full thickness 02/08/24 1034   Dressing Status Intact;Old drainage (upon arrival) 02/08/24 1034       Wound 12/20/23 Diabetic Ulcer Knee Anterior;Left (Active)   Date First Assessed/Time First Assessed: 12/20/23 1312   Primary Wound Type: Diabetic Ulcer  Location: Knee  Wound Location Orientation: Anterior;Left  Wound Description (Comments): CAO 4       [REMOVED] Wound 11/29/23 Knee Left (Removed)   Resolved Date: 12/20/23  Date First Assessed/Time First Assessed: 11/29/23 1452   Location: Knee  Wound Location Orientation: Left  Incision's 1st Dressing: PACKING PLAIN 1/4 IN, DRESSING XEROFORM 1 X 8, SPONGE GAUZE 4 X 4 16 PLY STRL PLASTIC TRAY LF,...       Subjective:      .    2/8/24, 2/1/24, 1/25/2, 1/18/24, 1/11/24, 1/3/24: Applying Betadine daily as directed.  No symptoms of infection.     12/20/23: Consult - Johnathon is a pleasant 68-year-old male with a past medical history of type 2 diabetes mellitus most recent A1c 11.6% 3 weeks ago, diabetic  polyneuropathy, peripheral arterial disease with history of re-vascularization, atrial fibrillation on chronic anticoagulation, hx of osteomyelitis requiring left sided BKA, continued opioid dependence here today for initial wound care consult.  Patient was referred by the orthopedic surgeon, Dr. Daryl Shay.  He had incision and drainage of the left knee prepatellar bursa on 11/29/23 to 4-day hospital course at [facility] at which time he was hospitalized for septic prepatellar bursitis.  Following this he had left knee stiffness and pain and was given a 10-day course of such cefadroxil.  He was seen for his suture removal postop visit On 12/14/2023 at Which Time It Was Noted That There Is an Eschar in the Superior Portion of the Incision Site.  He was told that he needs to have better glycemic control and to speak to endocrinology.  Also advised that he if he does not hear he may require further amputation.  Wound care referral was placed at  that visit.  Denies any local or systemic symptoms of infection today including fever chills diaphoresis.     BL LEAD Feb 2023:  RIGHT LOWER LIMB:  Diffuse disease noted throughout the femoral-popliteal arteries without  significant focal stenosis.  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  Ankle/Brachial index: 1.12, within the normal disease category (Prior 1.2).  Metatarsal pressure was unable to be obtained due to non-compressible vessels.  Great toe pressure of 82 mmHg, within the healing range (Prior 46 mmHg).  PVR/ PPG tracings are dampened at the ankle and metatarsal.     LEFT LOWER LIMB:  Diffuse disease noted throughout the femoral-popliteal arteries without  significant focal stenosis.  Below knee amputation.     Compared to previous study on 12/6/2021 and 05/26/2021, there is no significant  change in the disease process.  Recommend repeat testing in 6 months as per protocol unless otherwise  indicated.                The following portions of the patient's history were reviewed and updated as appropriate: allergies, current medications, past family history, past medical history, past social history, past surgical history, and problem list.    Review of Systems   Constitutional:  Negative for chills, diaphoresis and fever.   Skin:  Positive for wound.   All other systems reviewed and are negative.        Objective:       Wound 12/20/23 Diabetic Ulcer Knee Anterior;Left (Active)   Wound Image Images linked 02/08/24 1034   Wound Description Eschar;Black;Dry 02/08/24 1034   Dominga-wound Assessment Dry;Intact 02/08/24 1034   Wound Length (cm) 3.9 cm 02/08/24 1034   Wound Width (cm) 2 cm 02/08/24 1034   Wound Depth (cm) 0.1 cm 02/08/24 1034   Wound Surface Area (cm^2) 7.8 cm^2 02/08/24 1034   Wound Volume (cm^3) 0.78 cm^3 02/08/24 1034   Calculated Wound Volume (cm^3) 0.78 cm^3 02/08/24 1034   Drainage Amount Scant 02/08/24 1034   Drainage Description Serous;Yellow 02/08/24 1034   Non-staged Wound  Description Full thickness 02/08/24 1034   Dressing Status Intact;Old drainage (upon arrival) 02/08/24 1034       /75   Pulse 89   Temp 97.8 °F (36.6 °C) (Temporal)     Physical Exam  Vitals reviewed.   Constitutional:       Appearance: Normal appearance.   HENT:      Head: Normocephalic and atraumatic.   Eyes:      Extraocular Movements: Extraocular movements intact.   Pulmonary:      Effort: Pulmonary effort is normal.   Musculoskeletal:      Cervical back: Neck supple.   Skin:     Comments: Left knee wound with large eschar in place.  Edges debrided sharply today.  No signs of infection.  Healthy wound bed beneath.   Neurological:      Mental Status: He is alert.   Psychiatric:         Mood and Affect: Mood normal.           Wound Instructions:  Orders Placed This Encounter   Procedures    Wound cleansing and dressings Diabetic Ulcer Anterior;Left Knee     Left Knee Wound:     Wash your hands with soap and water.  Remove old dressing, discard into plastic bag and place in trash.  Cleanse the wound with normal saline prior to applying a clean dressing. Do not use tissue or cotton balls. Do not scrub the wound. Pat dry using gauze.  Shower yes   Apply moisturizer to skin surrounding wound  Apply Betadine to the black area of the wound.    Apply Adaptic to open areas.  Cover with gauze.  Secure with rolled gauze and tape.  Change dressing 2x/day.     This was done today.     Standing Status:   Future     Standing Expiration Date:   2/8/2025    Wound off loading Diabetic Ulcer Anterior;Left Knee     Keep weight and pressure off wound at all times.     Standing Status:   Future     Standing Expiration Date:   2/8/2025    Debridement     This order was created via procedure documentation        Diagnosis ICD-10-CM Associated Orders   1. Diabetic ulcer of left lower leg ([facility])  E11.622 lidocaine (XYLOCAINE) 4 % topical solution 5 mL    L97.929 Wound cleansing and dressings Diabetic Ulcer Anterior;Left Knee      "Wound off loading Diabetic Ulcer Anterior;Left Knee     Debridement      2. Poorly controlled type 2 diabetes mellitus (HCC)  E11.65 lidocaine (XYLOCAINE) 4 % topical solution 5 mL     Wound cleansing and dressings Diabetic Ulcer Anterior;Left Knee     Wound off loading Diabetic Ulcer Anterior;Left Knee      3. Peripheral arterial disease with history of revascularization   I73.9 lidocaine (XYLOCAINE) 4 % topical solution 5 mL    Z98.890 Wound cleansing and dressings Diabetic Ulcer Anterior;Left Knee     Wound off loading Diabetic Ulcer Anterior;Left Knee      4. Diabetic polyneuropathy associated with type 2 diabetes mellitus ([facility])  E11.42 lidocaine (XYLOCAINE) 4 % topical solution 5 mL     Wound cleansing and dressings Diabetic Ulcer Anterior;Left Knee     Wound off loading Diabetic Ulcer Anterior;Left Knee          --  Ismael Prasad MD    \"This note has been constructed using a voice recognition system. Therefore there may be syntax, spelling, and/or grammatical errors. Occasional wrong word or \"sound alike\" substitutions may have occurred due to the inherent limitations of voice recognition software. Read the chart carefully and recognize, using context, where substitutions have occurred. Please call if you have any questions.\"     "

## 2024-02-15 ENCOUNTER — OFFICE VISIT (OUTPATIENT)
Dept: WOUND CARE | Facility: HOSPITAL | Age: 69
End: 2024-02-15
Payer: COMMERCIAL

## 2024-02-15 VITALS
RESPIRATION RATE: 18 BRPM | SYSTOLIC BLOOD PRESSURE: 145 MMHG | DIASTOLIC BLOOD PRESSURE: 83 MMHG | HEART RATE: 90 BPM | TEMPERATURE: 97 F

## 2024-02-15 DIAGNOSIS — I73.9 PERIPHERAL ARTERIAL DISEASE WITH HISTORY OF REVASCULARIZATION: ICD-10-CM

## 2024-02-15 DIAGNOSIS — E11.622 DIABETIC ULCER OF LEFT LOWER LEG (HCC): Primary | ICD-10-CM

## 2024-02-15 DIAGNOSIS — Z98.890 PERIPHERAL ARTERIAL DISEASE WITH HISTORY OF REVASCULARIZATION: ICD-10-CM

## 2024-02-15 DIAGNOSIS — L97.929 DIABETIC ULCER OF LEFT LOWER LEG (HCC): Primary | ICD-10-CM

## 2024-02-15 DIAGNOSIS — E11.42 DIABETIC POLYNEUROPATHY ASSOCIATED WITH TYPE 2 DIABETES MELLITUS (HCC): ICD-10-CM

## 2024-02-15 DIAGNOSIS — E11.65 POORLY CONTROLLED TYPE 2 DIABETES MELLITUS (HCC): ICD-10-CM

## 2024-02-15 PROCEDURE — 97597 DBRDMT OPN WND 1ST 20 CM/<: CPT | Performed by: STUDENT IN AN ORGANIZED HEALTH CARE EDUCATION/TRAINING PROGRAM

## 2024-02-15 RX ORDER — LIDOCAINE HYDROCHLORIDE 40 MG/ML
5 SOLUTION TOPICAL ONCE
Status: COMPLETED | OUTPATIENT
Start: 2024-02-15 | End: 2024-02-15

## 2024-02-15 RX ADMIN — LIDOCAINE HYDROCHLORIDE 5 ML: 40 SOLUTION TOPICAL at 11:17

## 2024-02-15 RX ADMIN — LIDOCAINE HYDROCHLORIDE 5 ML: 40 SOLUTION TOPICAL at 11:16

## 2024-02-15 NOTE — PATIENT INSTRUCTIONS
Orders Placed This Encounter   Procedures    Wound cleansing and dressings     Left Knee Wound:     Wash your hands with soap and water.  Remove old dressing, discard into plastic bag and place in trash.  Cleanse the wound with normal saline prior to applying a clean dressing. Do not use tissue or cotton balls. Do not scrub the wound. Pat dry using gauze.  Shower yes   Apply moisturizer to skin surrounding wound  Apply Betadine to the black area of the wound.    Cover with gauze.  Secure with rolled gauze and tape.  Change dressing 2x/day.     This was done today.     Standing Status:   Future     Standing Expiration Date:   2/15/2025    Wound off loading     Wound off loading Diabetic Ulcer Anterior;Left Knee      Keep weight and pressure off wound at all times.     Standing Status:   Future     Standing Expiration Date:   2/15/2025

## 2024-02-15 NOTE — PROGRESS NOTES
"Patient ID: Kan Juan is a 68 y.o. male Date of Birth 1955     Chief Complaint  Chief Complaint   Patient presents with    Follow Up Wound Care Visit       Allergies  Shellfish-derived products - food allergy and Sulfamethoxazole-trimethoprim    Assessment:     Diagnoses and all orders for this visit:    Diabetic ulcer of left lower leg (HCC)  -     Wound cleansing and dressings; Future  -     Wound off loading; Future  -     lidocaine (XYLOCAINE) 4 % topical solution 5 mL  -     Debridement    Poorly controlled type 2 diabetes mellitus (HCC)  -     Wound cleansing and dressings; Future  -     Wound off loading; Future  -     lidocaine (XYLOCAINE) 4 % topical solution 5 mL    Peripheral arterial disease with history of revascularization   -     Wound cleansing and dressings; Future  -     Wound off loading; Future  -     lidocaine (XYLOCAINE) 4 % topical solution 5 mL    Diabetic polyneuropathy associated with type 2 diabetes mellitus (HCC)  -     Wound cleansing and dressings; Future  -     Wound off loading; Future  -     lidocaine (XYLOCAINE) 4 % topical solution 5 mL              Debridement   Wound 12/20/23 Diabetic Ulcer Knee Anterior;Left    Universal Protocol:  Consent: Verbal consent obtained.  Risks and benefits: risks, benefits and alternatives were discussed  Consent given by: patient  Time out: Immediately prior to procedure a \"time out\" was called to verify the correct patient, procedure, equipment, support staff and site/side marked as required.  Patient identity confirmed: verbally with patient    Debridement Details  Performed by: physician  Debridement type: selective  Pain control: lidocaine 4%      Post-debridement measurements  Length (cm): 3.5  Width (cm): 2  Depth (cm): 0.1  Percent debrided: 90%  Surface Area (cm^2): 7  Area Debrided (cm^2): 6.3  Volume (cm^3): 0.7    Devitalized tissue debrided: exudate and eschar  Instrument(s) utilized: curette  Bleeding: small  Hemostasis " obtained with: pressure  Procedural pain (0-10): 1  Post-procedural pain: 0   Response to treatment: procedure was tolerated well        Plan:   It was a pleasure to see Kan Juan for wound care follow up today  Selective debridement performed today as above  Wound is improving   Continue plan of care as noted below with betadine, gauze   A1C results reviewed with the patient today. Patient understands that uncontrolled BSGs will results in overall poor wound healing along with other sequelae of DM. Patient advised to maintain tight glycemic control and work towards this goal with PCP and/or endocrinology.  Unfortunately, due to uncontrolled diabetes, patient is not a candidate for hyperbaric oxygen therapy which would benefit this wound.  No signs or symptoms of infection today. Patient understands that if any signs of infection start (such as increased redness, drainage, pain, fever, chills, diaphoresis), they should call our office or proceed to the ER or Urgent Care.  Patient should continue a high protein diet to facilitate wound healing  Patient is advised to not submerge wound or leave wound open to air.  Follow up in 1 weeks  Given the multi-factorial nature of wound care, additional time was taken to review patient's treatment plan with other specialties and most recent pertinent lab work and imaging.   All plans of care discussed with patient at bedside who verbalized understanding with treatment plan.    Wound 12/20/23 Diabetic Ulcer Knee Anterior;Left (Active)   Wound Image Images linked 02/15/24 1111   Wound Description Eschar;Black;Dry 02/15/24 1111   Dominga-wound Assessment Dry;Intact 02/15/24 1111   Wound Length (cm) 3.5 cm 02/15/24 1111   Wound Width (cm) 2 cm 02/15/24 1111   Wound Depth (cm) 0.1 cm 02/15/24 1111   Wound Surface Area (cm^2) 7 cm^2 02/15/24 1111   Wound Volume (cm^3) 0.7 cm^3 02/15/24 1111   Calculated Wound Volume (cm^3) 0.7 cm^3 02/15/24 1111   Drainage Amount Scant 02/15/24 1111    Drainage Description Serous;Yellow 02/15/24 1111   Non-staged Wound Description Full thickness 02/15/24 1111   Treatments Irrigation with NSS 02/15/24 1111       Wound 12/20/23 Diabetic Ulcer Knee Anterior;Left (Active)   Date First Assessed/Time First Assessed: 12/20/23 1312   Primary Wound Type: Diabetic Ulcer  Location: Knee  Wound Location Orientation: Anterior;Left  Wound Description (Comments): CAO 4       [REMOVED] Wound 11/29/23 Knee Left (Removed)   Resolved Date: 12/20/23  Date First Assessed/Time First Assessed: 11/29/23 1452   Location: Knee  Wound Location Orientation: Left  Incision's 1st Dressing: PACKING PLAIN 1/4 IN, DRESSING XEROFORM 1 X 8, SPONGE GAUZE 4 X 4 16 PLY STRL PLASTIC TRAY LF,...       Subjective:      .    2/15/24, 2/8/24, 2/1/24, 1/25/2, 1/18/24, 1/11/24, 1/3/24: Applying Betadine daily as directed.  No symptoms of infection.     12/20/23: Consult - Johnathon is a pleasant 68-year-old male with a past medical history of type 2 diabetes mellitus most recent A1c 11.6% 3 weeks ago, diabetic  polyneuropathy, peripheral arterial disease with history of re-vascularization, atrial fibrillation on chronic anticoagulation, hx of osteomyelitis requiring left sided BKA, continued opioid dependence here today for initial wound care consult.  Patient was referred by the orthopedic surgeon, Dr. Daryl Shay.  He had incision and drainage of the left knee prepatellar bursa on 11/29/23 to 4-day hospital course at [facility] at which time he was hospitalized for septic prepatellar bursitis.  Following this he had left knee stiffness and pain and was given a 10-day course of such cefadroxil.  He was seen for his suture removal postop visit On 12/14/2023 at Which Time It Was Noted That There Is an Eschar in the Superior Portion of the Incision Site.  He was told that he needs to have better glycemic control and to speak to endocrinology.  Also advised that he if he does not hear he may require  further amputation.  Wound care referral was placed at that visit.  Denies any local or systemic symptoms of infection today including fever chills diaphoresis.     BL LEAD Feb 2023:  RIGHT LOWER LIMB:  Diffuse disease noted throughout the femoral-popliteal arteries without  significant focal stenosis.  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  Ankle/Brachial index: 1.12, within the normal disease category (Prior 1.2).  Metatarsal pressure was unable to be obtained due to non-compressible vessels.  Great toe pressure of 82 mmHg, within the healing range (Prior 46 mmHg).  PVR/ PPG tracings are dampened at the ankle and metatarsal.     LEFT LOWER LIMB:  Diffuse disease noted throughout the femoral-popliteal arteries without  significant focal stenosis.  Below knee amputation.     Compared to previous study on 12/6/2021 and 05/26/2021, there is no significant  change in the disease process.  Recommend repeat testing in 6 months as per protocol unless otherwise  indicated.                The following portions of the patient's history were reviewed and updated as appropriate: allergies, current medications, past family history, past medical history, past social history, past surgical history, and problem list.    Review of Systems   Constitutional:  Negative for chills, diaphoresis and fever.   Skin:  Positive for wound.   All other systems reviewed and are negative.        Objective:       Wound 12/20/23 Diabetic Ulcer Knee Anterior;Left (Active)   Wound Image Images linked 02/15/24 1111   Wound Description Eschar;Black;Dry 02/15/24 1111   Dominga-wound Assessment Dry;Intact 02/15/24 1111   Wound Length (cm) 3.5 cm 02/15/24 1111   Wound Width (cm) 2 cm 02/15/24 1111   Wound Depth (cm) 0.1 cm 02/15/24 1111   Wound Surface Area (cm^2) 7 cm^2 02/15/24 1111   Wound Volume (cm^3) 0.7 cm^3 02/15/24 1111   Calculated Wound Volume (cm^3) 0.7 cm^3 02/15/24 1111   Drainage Amount Scant 02/15/24 1111   Drainage  Description Serous;Yellow 02/15/24 1111   Non-staged Wound Description Full thickness 02/15/24 1111   Treatments Irrigation with NSS 02/15/24 1111       /83   Pulse 90   Temp (!) 97 °F (36.1 °C)   Resp 18     Physical Exam  Vitals reviewed.   Constitutional:       Appearance: Normal appearance.   HENT:      Head: Normocephalic and atraumatic.   Eyes:      Extraocular Movements: Extraocular movements intact.   Pulmonary:      Effort: Pulmonary effort is normal.   Musculoskeletal:      Cervical back: Neck supple.   Skin:     Comments: Knee wound with eschar in place.  Edges debrided.  Underneath there is mostly epithelialization.  No signs of infection.   Neurological:      Mental Status: He is alert.   Psychiatric:         Mood and Affect: Mood normal.           Wound Instructions:  Orders Placed This Encounter   Procedures    Wound cleansing and dressings     Left Knee Wound:     Wash your hands with soap and water.  Remove old dressing, discard into plastic bag and place in trash.  Cleanse the wound with normal saline prior to applying a clean dressing. Do not use tissue or cotton balls. Do not scrub the wound. Pat dry using gauze.  Shower yes   Apply moisturizer to skin surrounding wound  Apply Betadine to the black area of the wound.    Cover with gauze.  Secure with rolled gauze and tape.  Change dressing 2x/day.     This was done today.     Standing Status:   Future     Standing Expiration Date:   2/15/2025    Wound off loading     Wound off loading Diabetic Ulcer Anterior;Left Knee      Keep weight and pressure off wound at all times.     Standing Status:   Future     Standing Expiration Date:   2/15/2025    Debridement     This order was created via procedure documentation        Diagnosis ICD-10-CM Associated Orders   1. Diabetic ulcer of left lower leg ([facility])  E11.622 Wound cleansing and dressings    L97.929 Wound off loading     lidocaine (XYLOCAINE) 4 % topical solution 5 mL     Debridement     "  2. Poorly controlled type 2 diabetes mellitus (HCC)  E11.65 Wound cleansing and dressings     Wound off loading     lidocaine (XYLOCAINE) 4 % topical solution 5 mL      3. Peripheral arterial disease with history of revascularization   I73.9 Wound cleansing and dressings    Z98.890 Wound off loading     lidocaine (XYLOCAINE) 4 % topical solution 5 mL      4. Diabetic polyneuropathy associated with type 2 diabetes mellitus (HCC)  E11.42 Wound cleansing and dressings     Wound off loading     lidocaine (XYLOCAINE) 4 % topical solution 5 mL          --  Ismael Prasad MD    \"This note has been constructed using a voice recognition system. Therefore there may be syntax, spelling, and/or grammatical errors. Occasional wrong word or \"sound alike\" substitutions may have occurred due to the inherent limitations of voice recognition software. Read the chart carefully and recognize, using context, where substitutions have occurred. Please call if you have any questions.\"     "

## 2024-02-16 ENCOUNTER — TELEPHONE (OUTPATIENT)
Age: 69
End: 2024-02-16

## 2024-02-16 NOTE — TELEPHONE ENCOUNTER
[facility] requesting office notes for diabetic shoes to be faxed over to  [phone]    States they faxed over a request for office notes on 1/26/24 and 2/6/24 and have not received a response    Any additional questions, please call them back

## 2024-02-19 DIAGNOSIS — Z79.4 TYPE 2 DIABETES MELLITUS WITH DIABETIC PERIPHERAL ANGIOPATHY AND GANGRENE, WITH LONG-TERM CURRENT USE OF INSULIN (HCC): ICD-10-CM

## 2024-02-19 DIAGNOSIS — E11.52 TYPE 2 DIABETES MELLITUS WITH DIABETIC PERIPHERAL ANGIOPATHY AND GANGRENE, WITH LONG-TERM CURRENT USE OF INSULIN (HCC): ICD-10-CM

## 2024-02-19 RX ORDER — BLOOD SUGAR DIAGNOSTIC
STRIP MISCELLANEOUS
Qty: 200 STRIP | Refills: 1 | Status: SHIPPED | OUTPATIENT
Start: 2024-02-19 | End: 2024-02-20 | Stop reason: SDUPTHER

## 2024-02-19 NOTE — TELEPHONE ENCOUNTER
Patient called rx refill line to request a new prescription for his test strips. Advised a request was sent on his behalf and is pending approval.

## 2024-02-20 ENCOUNTER — TELEPHONE (OUTPATIENT)
Age: 69
End: 2024-02-20

## 2024-02-20 DIAGNOSIS — E11.52 TYPE 2 DIABETES MELLITUS WITH DIABETIC PERIPHERAL ANGIOPATHY AND GANGRENE, WITH LONG-TERM CURRENT USE OF INSULIN (HCC): ICD-10-CM

## 2024-02-20 DIAGNOSIS — Z79.4 TYPE 2 DIABETES MELLITUS WITH DIABETIC PERIPHERAL ANGIOPATHY AND GANGRENE, WITH LONG-TERM CURRENT USE OF INSULIN (HCC): ICD-10-CM

## 2024-02-20 RX ORDER — BLOOD SUGAR DIAGNOSTIC
1 STRIP MISCELLANEOUS 4 TIMES DAILY
Qty: 400 STRIP | Refills: 3 | Status: SHIPPED | OUTPATIENT
Start: 2024-02-20

## 2024-02-20 RX ORDER — BLOOD SUGAR DIAGNOSTIC
1 STRIP MISCELLANEOUS 4 TIMES DAILY
Qty: 200 STRIP | Refills: 1 | Status: SHIPPED | OUTPATIENT
Start: 2024-02-20 | End: 2024-02-20 | Stop reason: SDUPTHER

## 2024-02-20 NOTE — TELEPHONE ENCOUNTER
Melanie at [facility] [phone] received order for accu chek test strips but in directions there is two. Which is correct, QID or BID for testing. Please resend with correct instructions

## 2024-02-20 NOTE — TELEPHONE ENCOUNTER
Telephone call from patient that he just received his test strips and the SIG got changed to 2x daily when it should be 4x daily. So he would need that changed and a new script sent in so he gets the correct amount.

## 2024-02-21 ENCOUNTER — TELEPHONE (OUTPATIENT)
Age: 69
End: 2024-02-21

## 2024-02-21 NOTE — TELEPHONE ENCOUNTER
Pt is having a diabetic foot exam on Tues and he will need the information sent to [facility] once completed. He said the office has that info. FYI.

## 2024-02-22 ENCOUNTER — OFFICE VISIT (OUTPATIENT)
Dept: WOUND CARE | Facility: HOSPITAL | Age: 69
End: 2024-02-22
Payer: COMMERCIAL

## 2024-02-22 VITALS — RESPIRATION RATE: 18 BRPM | TEMPERATURE: 97.5 F

## 2024-02-22 DIAGNOSIS — Z98.890 PERIPHERAL ARTERIAL DISEASE WITH HISTORY OF REVASCULARIZATION: ICD-10-CM

## 2024-02-22 DIAGNOSIS — E11.65 POORLY CONTROLLED TYPE 2 DIABETES MELLITUS (HCC): ICD-10-CM

## 2024-02-22 DIAGNOSIS — L97.929 DIABETIC ULCER OF LEFT LOWER LEG (HCC): Primary | ICD-10-CM

## 2024-02-22 DIAGNOSIS — I73.9 PERIPHERAL ARTERIAL DISEASE WITH HISTORY OF REVASCULARIZATION: ICD-10-CM

## 2024-02-22 DIAGNOSIS — E11.622 DIABETIC ULCER OF LEFT LOWER LEG (HCC): Primary | ICD-10-CM

## 2024-02-22 PROCEDURE — 97597 DBRDMT OPN WND 1ST 20 CM/<: CPT | Performed by: STUDENT IN AN ORGANIZED HEALTH CARE EDUCATION/TRAINING PROGRAM

## 2024-02-22 RX ORDER — LIDOCAINE HYDROCHLORIDE 40 MG/ML
5 SOLUTION TOPICAL ONCE
Status: COMPLETED | OUTPATIENT
Start: 2024-02-22 | End: 2024-02-22

## 2024-02-22 RX ADMIN — LIDOCAINE HYDROCHLORIDE 5 ML: 40 SOLUTION TOPICAL at 11:07

## 2024-02-22 NOTE — PROGRESS NOTES
"Patient ID: Kan Juan is a 68 y.o. male Date of Birth 1955     Chief Complaint  Chief Complaint   Patient presents with    Follow Up Wound Care Visit     LLE wound       Allergies  Shellfish-derived products - food allergy and Sulfamethoxazole-trimethoprim    Assessment:     Diagnoses and all orders for this visit:    Diabetic ulcer of left lower leg (HCC)  -     lidocaine (XYLOCAINE) 4 % topical solution 5 mL  -     Wound cleansing and dressings Diabetic Ulcer Anterior;Left Knee; Future  -     Wound off loading Diabetic Ulcer Anterior;Left Knee; Future  -     Debridement    Poorly controlled type 2 diabetes mellitus (HCC)    Peripheral arterial disease with history of revascularization               Debridement   Wound 12/20/23 Diabetic Ulcer Knee Anterior;Left    Universal Protocol:  Consent: Verbal consent obtained.  Risks and benefits: risks, benefits and alternatives were discussed  Consent given by: patient  Time out: Immediately prior to procedure a \"time out\" was called to verify the correct patient, procedure, equipment, support staff and site/side marked as required.  Patient identity confirmed: verbally with patient    Debridement Details  Performed by: physician  Debridement type: selective  Pain control: lidocaine 4%      Post-debridement measurements  Length (cm): 3.4  Width (cm): 1.8  Depth (cm): 0.1  Percent debrided: 20%  Surface Area (cm^2): 6.12  Area Debrided (cm^2): 1.22  Volume (cm^3): 0.61    Devitalized tissue debrided: biofilm, exudate and eschar  Instrument(s) utilized: curette  Bleeding: small  Hemostasis obtained with: pressure  Procedural pain (0-10): 1  Post-procedural pain: 0   Response to treatment: procedure was tolerated well        Plan:   It was a pleasure to see Kan Juan for wound care follow up today  Selective debridement performed today as above  Wound is improving   Continue plan of care as noted below with betadine, rolled gauze   A1C results reviewed with the " patient today.   No signs or symptoms of infection today. Patient understands that if any signs of infection start (such as increased redness, drainage, pain, fever, chills, diaphoresis), they should call our office or proceed to the ER or Urgent Care.  Patient should continue a high protein diet to facilitate wound healing  Patient is advised to not submerge wound or leave wound open to air.  Follow up in 1 weeks  Given the multi-factorial nature of wound care, additional time was taken to review patient's treatment plan with other specialties and most recent pertinent lab work and imaging.   All plans of care discussed with patient at bedside who verbalized understanding with treatment plan.    Wound 12/20/23 Diabetic Ulcer Knee Anterior;Left (Active)   Wound Image Images linked 02/22/24 1104   Wound Description Eschar;Black;Dry 02/22/24 1104   Dominga-wound Assessment Dry;Intact;Scar Tissue 02/22/24 1104   Wound Length (cm) 3.4 cm 02/22/24 1104   Wound Width (cm) 1.8 cm 02/22/24 1104   Wound Depth (cm) 0 cm 02/22/24 1104   Wound Surface Area (cm^2) 6.12 cm^2 02/22/24 1104   Wound Volume (cm^3) 0 cm^3 02/22/24 1104   Calculated Wound Volume (cm^3) 0 cm^3 02/22/24 1104   Drainage Amount None 02/22/24 1104   Non-staged Wound Description Not applicable 02/22/24 1104   Dressing Status Intact 02/22/24 1104       Wound 12/20/23 Diabetic Ulcer Knee Anterior;Left (Active)   Date First Assessed/Time First Assessed: 12/20/23 1312   Primary Wound Type: Diabetic Ulcer  Location: Knee  Wound Location Orientation: Anterior;Left  Wound Description (Comments): CAO 4       [REMOVED] Wound 11/29/23 Knee Left (Removed)   Resolved Date: 12/20/23  Date First Assessed/Time First Assessed: 11/29/23 1452   Location: Knee  Wound Location Orientation: Left  Incision's 1st Dressing: PACKING PLAIN 1/4 IN, DRESSING XEROFORM 1 X 8, SPONGE GAUZE 4 X 4 16 PLY STRL PLASTIC TRAY LF,...       Subjective:      .    2/22/24, 2/15/24, 2/8/24, 2/1/24,  1/25/2, 1/18/24, 1/11/24, 1/3/24: Applying Betadine daily as directed.  No symptoms of infection.     12/20/23: Consult - Johnathon is a pleasant 68-year-old male with a past medical history of type 2 diabetes mellitus most recent A1c 11.6% 3 weeks ago, diabetic  polyneuropathy, peripheral arterial disease with history of re-vascularization, atrial fibrillation on chronic anticoagulation, hx of osteomyelitis requiring left sided BKA, continued opioid dependence here today for initial wound care consult.  Patient was referred by the orthopedic surgeon, Dr. Daryl Shay.  He had incision and drainage of the left knee prepatellar bursa on 11/29/23 to 4-day hospital course at [facility] at which time he was hospitalized for septic prepatellar bursitis.  Following this he had left knee stiffness and pain and was given a 10-day course of such cefadroxil.  He was seen for his suture removal postop visit On 12/14/2023 at Which Time It Was Noted That There Is an Eschar in the Superior Portion of the Incision Site.  He was told that he needs to have better glycemic control and to speak to endocrinology.  Also advised that he if he does not hear he may require further amputation.  Wound care referral was placed at that visit.  Denies any local or systemic symptoms of infection today including fever chills diaphoresis.     BL LEAD Feb 2023:  RIGHT LOWER LIMB:  Diffuse disease noted throughout the femoral-popliteal arteries without  significant focal stenosis.  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  Ankle/Brachial index: 1.12, within the normal disease category (Prior 1.2).  Metatarsal pressure was unable to be obtained due to non-compressible vessels.  Great toe pressure of 82 mmHg, within the healing range (Prior 46 mmHg).  PVR/ PPG tracings are dampened at the ankle and metatarsal.     LEFT LOWER LIMB:  Diffuse disease noted throughout the femoral-popliteal arteries without  significant focal  stenosis.  Below knee amputation.     Compared to previous study on 12/6/2021 and 05/26/2021, there is no significant  change in the disease process.  Recommend repeat testing in 6 months as per protocol unless otherwise  indicated.          The following portions of the patient's history were reviewed and updated as appropriate: allergies, current medications, past family history, past medical history, past social history, past surgical history, and problem list.    Review of Systems   Constitutional:  Negative for chills, diaphoresis and fever.   Skin:  Positive for wound.   All other systems reviewed and are negative.        Objective:       Wound 12/20/23 Diabetic Ulcer Knee Anterior;Left (Active)   Wound Image Images linked 02/22/24 1104   Wound Description Eschar;Black;Dry 02/22/24 1104   Dominga-wound Assessment Dry;Intact;Scar Tissue 02/22/24 1104   Wound Length (cm) 3.4 cm 02/22/24 1104   Wound Width (cm) 1.8 cm 02/22/24 1104   Wound Depth (cm) 0 cm 02/22/24 1104   Wound Surface Area (cm^2) 6.12 cm^2 02/22/24 1104   Wound Volume (cm^3) 0 cm^3 02/22/24 1104   Calculated Wound Volume (cm^3) 0 cm^3 02/22/24 1104   Drainage Amount None 02/22/24 1104   Non-staged Wound Description Not applicable 02/22/24 1104   Dressing Status Intact 02/22/24 1104       Temp 97.5 °F (36.4 °C)   Resp 18     Physical Exam  Vitals reviewed.   Constitutional:       Appearance: Normal appearance.   HENT:      Head: Normocephalic and atraumatic.   Eyes:      Extraocular Movements: Extraocular movements intact.   Pulmonary:      Effort: Pulmonary effort is normal.   Musculoskeletal:      Cervical back: Neck supple.   Skin:     Comments: Edges of eschar debrided of ant L knee wound. No signs of infection.   Neurological:      Mental Status: He is alert.   Psychiatric:         Mood and Affect: Mood normal.           Wound Instructions:  Orders Placed This Encounter   Procedures    Wound cleansing and dressings Diabetic Ulcer Anterior;Left  "Knee     Left Knee Wound: Wash your hands with soap and water. Remove old dressing, discard into plastic bag and place in trash. Cleanse the wound with normal saline prior to applying a clean dressing. Do not use tissue or cotton balls. Do not scrub the wound. Pat dry using gauze.   Shower yes   Apply moisturizer to skin surrounding wound   Apply Betadine to the black area of the wound.   Cover with gauze.   Secure with rolled gauze and tape.   Change dressing 2x/day.   This was done today.     Standing Status:   Future     Standing Expiration Date:   2/22/2025    Wound off loading Diabetic Ulcer Anterior;Left Knee     Diabetic Ulcer Anterior;Left Knee Keep weight and pressure off wound at all times.     Standing Status:   Future     Standing Expiration Date:   2/22/2025    Debridement     This order was created via procedure documentation        Diagnosis ICD-10-CM Associated Orders   1. Diabetic ulcer of left lower leg ([facility])  E11.622 lidocaine (XYLOCAINE) 4 % topical solution 5 mL    L97.929 Wound cleansing and dressings Diabetic Ulcer Anterior;Left Knee     Wound off loading Diabetic Ulcer Anterior;Left Knee     Debridement      2. Poorly controlled type 2 diabetes mellitus ([facility])  E11.65       3. Peripheral arterial disease with history of revascularization   I73.9     Z98.890           --  Ismael Prasad MD    \"This note has been constructed using a voice recognition system. Therefore there may be syntax, spelling, and/or grammatical errors. Occasional wrong word or \"sound alike\" substitutions may have occurred due to the inherent limitations of voice recognition software. Read the chart carefully and recognize, using context, where substitutions have occurred. Please call if you have any questions.\"     "

## 2024-02-22 NOTE — PATIENT INSTRUCTIONS
Orders Placed This Encounter   Procedures    Wound cleansing and dressings Diabetic Ulcer Anterior;Left Knee     Left Knee Wound: Wash your hands with soap and water. Remove old dressing, discard into plastic bag and place in trash. Cleanse the wound with normal saline prior to applying a clean dressing. Do not use tissue or cotton balls. Do not scrub the wound. Pat dry using gauze.   Shower yes   Apply moisturizer to skin surrounding wound   Apply Betadine to the black area of the wound.   Cover with gauze.   Secure with rolled gauze and tape.   Change dressing 2x/day.   This was done today.     Standing Status:   Future     Standing Expiration Date:   2/22/2025    Wound off loading Diabetic Ulcer Anterior;Left Knee     Diabetic Ulcer Anterior;Left Knee Keep weight and pressure off wound at all times.     Standing Status:   Future     Standing Expiration Date:   2/22/2025

## 2024-02-27 ENCOUNTER — OFFICE VISIT (OUTPATIENT)
Dept: FAMILY MEDICINE CLINIC | Facility: CLINIC | Age: 69
End: 2024-02-27
Payer: COMMERCIAL

## 2024-02-27 ENCOUNTER — OFFICE VISIT (OUTPATIENT)
Dept: WOUND CARE | Facility: HOSPITAL | Age: 69
End: 2024-02-27
Payer: COMMERCIAL

## 2024-02-27 VITALS
DIASTOLIC BLOOD PRESSURE: 76 MMHG | SYSTOLIC BLOOD PRESSURE: 132 MMHG | TEMPERATURE: 98.4 F | HEART RATE: 83 BPM | RESPIRATION RATE: 18 BRPM

## 2024-02-27 VITALS
HEIGHT: 64 IN | RESPIRATION RATE: 16 BRPM | DIASTOLIC BLOOD PRESSURE: 58 MMHG | OXYGEN SATURATION: 98 % | TEMPERATURE: 98.6 F | WEIGHT: 179 LBS | SYSTOLIC BLOOD PRESSURE: 110 MMHG | HEART RATE: 86 BPM | BODY MASS INDEX: 30.56 KG/M2

## 2024-02-27 DIAGNOSIS — L97.929 DIABETIC ULCER OF LEFT LOWER LEG (HCC): Primary | ICD-10-CM

## 2024-02-27 DIAGNOSIS — E11.65 POORLY CONTROLLED TYPE 2 DIABETES MELLITUS (HCC): Primary | ICD-10-CM

## 2024-02-27 DIAGNOSIS — E11.42 DIABETIC POLYNEUROPATHY ASSOCIATED WITH TYPE 2 DIABETES MELLITUS (HCC): ICD-10-CM

## 2024-02-27 DIAGNOSIS — E11.622 DIABETIC ULCER OF LEFT LOWER LEG (HCC): Primary | ICD-10-CM

## 2024-02-27 DIAGNOSIS — I73.9 PERIPHERAL ARTERIAL DISEASE WITH HISTORY OF REVASCULARIZATION: ICD-10-CM

## 2024-02-27 DIAGNOSIS — Z98.890 PERIPHERAL ARTERIAL DISEASE WITH HISTORY OF REVASCULARIZATION: ICD-10-CM

## 2024-02-27 DIAGNOSIS — E11.65 POORLY CONTROLLED TYPE 2 DIABETES MELLITUS (HCC): ICD-10-CM

## 2024-02-27 PROCEDURE — 97597 DBRDMT OPN WND 1ST 20 CM/<: CPT | Performed by: STUDENT IN AN ORGANIZED HEALTH CARE EDUCATION/TRAINING PROGRAM

## 2024-02-27 PROCEDURE — 99213 OFFICE O/P EST LOW 20 MIN: CPT | Performed by: NURSE PRACTITIONER

## 2024-02-27 RX ORDER — LIDOCAINE HYDROCHLORIDE 40 MG/ML
5 SOLUTION TOPICAL ONCE
Status: COMPLETED | OUTPATIENT
Start: 2024-02-27 | End: 2024-02-27

## 2024-02-27 RX ADMIN — LIDOCAINE HYDROCHLORIDE 5 ML: 40 SOLUTION TOPICAL at 14:51

## 2024-02-27 NOTE — PROGRESS NOTES
Assessment/Plan:      Diagnoses and all orders for this visit:    Poorly controlled type 2 diabetes mellitus (HCC)        Pt here for R foot assessment to qualify for diabetic shoes supplied by his podiatrist.PA was acceptable as indicated. 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      Subjective:     Patient ID: Kan Juan is a 68 y.o. male.    HPI    Review of Systems      Objective:  Patient's shoes and socks removed.    Right Foot/Ankle   Right Foot Inspection  Skin Exam: dry skin. Skin not intact, no warmth, no callus, no pre-ulcer, no ulcer and no callus.     Toe Exam: ROM and strength within normal limits.     Sensory   Proprioception: intact  Monofilament testing: intact    Vascular  Capillary refills: < 3 seconds  The right DP pulse is 1+. The right PT pulse is 1+.     Left Foot/Ankle  Left Foot Inspection  Amputation: amputation left foot     Assign Risk Category  Deformity present  No loss of protective sensation  No weak pulses  Risk: 0       Physical Exam  Cardiovascular:      Rate and Rhythm: Normal rate and regular rhythm.      Pulses: no weak pulses.           Dorsalis pedis pulses are 1+ on the right side.        Posterior tibial pulses are 1+ on the right side.      Heart sounds: Normal heart sounds.   Pulmonary:      Effort: Pulmonary effort is normal.      Breath sounds: Normal breath sounds.   Musculoskeletal:         General: Deformity (surgically abscent L foot BKA) present.   Feet:      Right foot:      Skin integrity: Dry skin present. No ulcer, warmth or callus.      Left foot: amputated  Neurological:      Mental Status: He is alert.

## 2024-02-27 NOTE — PATIENT INSTRUCTIONS
Orders Placed This Encounter   Procedures    Wound cleansing and dressings Diabetic Ulcer Anterior;Left Knee     Left Knee Wound:   Wash your hands with soap and water. Remove old dressing, discard into plastic bag and place in trash. Cleanse the wound with normal saline prior to applying a clean dressing. Do not use tissue or cotton balls. Do not scrub the wound. Pat dry using gauze.   Shower yes   Apply moisturizer to skin surrounding wound   Apply Betadine to the black area of the wound.   Cover with gauze.   Secure with rolled gauze and tape.   Change dressing 2x/day.   This was done today.     Standing Status:   Future     Standing Expiration Date:   2/27/2025    Wound off loading Diabetic Ulcer Anterior;Left Knee     Left Knee Keep weight and pressure off wound at all times.     Standing Status:   Future     Standing Expiration Date:   2/27/2025

## 2024-02-27 NOTE — PROGRESS NOTES
"Patient ID: Kan Juan is a 68 y.o. male Date of Birth 1955     Chief Complaint  Chief Complaint   Patient presents with    Follow Up Wound Care Visit     Left knee wound        Allergies  Shellfish-derived products - food allergy and Sulfamethoxazole-trimethoprim    Assessment:     Diagnoses and all orders for this visit:    Diabetic ulcer of left lower leg (HCC)  -     lidocaine (XYLOCAINE) 4 % topical solution 5 mL  -     Wound cleansing and dressings Diabetic Ulcer Anterior;Left Knee; Future  -     Wound off loading Diabetic Ulcer Anterior;Left Knee; Future    Poorly controlled type 2 diabetes mellitus (HCC)  -     lidocaine (XYLOCAINE) 4 % topical solution 5 mL  -     Wound cleansing and dressings Diabetic Ulcer Anterior;Left Knee; Future  -     Wound off loading Diabetic Ulcer Anterior;Left Knee; Future    Peripheral arterial disease with history of revascularization   -     lidocaine (XYLOCAINE) 4 % topical solution 5 mL  -     Wound cleansing and dressings Diabetic Ulcer Anterior;Left Knee; Future  -     Wound off loading Diabetic Ulcer Anterior;Left Knee; Future    Diabetic polyneuropathy associated with type 2 diabetes mellitus (HCC)  -     lidocaine (XYLOCAINE) 4 % topical solution 5 mL  -     Wound cleansing and dressings Diabetic Ulcer Anterior;Left Knee; Future  -     Wound off loading Diabetic Ulcer Anterior;Left Knee; Future    Other orders  -     Debridement              Debridement   Wound 12/20/23 Diabetic Ulcer Knee Anterior;Left    Universal Protocol:  Consent: Verbal consent obtained.  Risks and benefits: risks, benefits and alternatives were discussed  Consent given by: patient  Time out: Immediately prior to procedure a \"time out\" was called to verify the correct patient, procedure, equipment, support staff and site/side marked as required.  Patient identity confirmed: verbally with patient    Debridement Details  Performed by: physician  Debridement type: selective  Pain control: " lidocaine 4%      Post-debridement measurements  Length (cm): 3.4  Width (cm): 1.8  Depth (cm): 0.1  Percent debrided: 90%  Surface Area (cm^2): 6.12  Area Debrided (cm^2): 5.51  Volume (cm^3): 0.61    Devitalized tissue debrided: biofilm, exudate, fibrin and slough  Instrument(s) utilized: curette  Bleeding: small  Hemostasis obtained with: pressure  Procedural pain (0-10): 1  Post-procedural pain: 0   Response to treatment: procedure was tolerated well        Plan:   It was a pleasure to see Kan Juan for wound care follow up today  Selective debridement performed today as above  Wound is improving   Continue plan of care as noted below with betadine, rolled gauze  I advised patient to schedule follow-up with orthopedic surgery as is recommended.   A1C results reviewed with the patient today. Patient understands that uncontrolled BSGs will results in overall poor wound healing along with other sequelae of DM. Patient advised to maintain tight glycemic control and work towards this goal with PCP and/or endocrinology.  No signs or symptoms of infection today. Patient understands that if any signs of infection start (such as increased redness, drainage, pain, fever, chills, diaphoresis), they should call our office or proceed to the ER or Urgent Care.  Patient should continue a high protein diet to facilitate wound healing  Patient is advised to not submerge wound or leave wound open to air.  Follow up in 1 weeks  Given the multi-factorial nature of wound care, additional time was taken to review patient's treatment plan with other specialties and most recent pertinent lab work and imaging.   All plans of care discussed with patient at bedside who verbalized understanding with treatment plan.    Wound 12/20/23 Diabetic Ulcer Knee Anterior;Left (Active)   Wound Image Images linked 02/27/24 1448   Wound Description Eschar;Black;Dry 02/27/24 1448   Dominga-wound Assessment Dry;Intact;Scar Tissue 02/27/24 1448   Wound  Length (cm) 3.4 cm 02/27/24 1448   Wound Width (cm) 1.8 cm 02/27/24 1448   Wound Depth (cm) 0 cm 02/27/24 1448   Wound Surface Area (cm^2) 6.12 cm^2 02/27/24 1448   Wound Volume (cm^3) 0 cm^3 02/27/24 1448   Calculated Wound Volume (cm^3) 0 cm^3 02/27/24 1448   Drainage Amount None 02/27/24 1448   Non-staged Wound Description Not applicable 02/27/24 1448   Dressing Status Intact 02/27/24 1448       Wound 12/20/23 Diabetic Ulcer Knee Anterior;Left (Active)   Date First Assessed/Time First Assessed: 12/20/23 1312   Primary Wound Type: Diabetic Ulcer  Location: Knee  Wound Location Orientation: Anterior;Left  Wound Description (Comments): CAO 4       [REMOVED] Wound 11/29/23 Knee Left (Removed)   Resolved Date: 12/20/23  Date First Assessed/Time First Assessed: 11/29/23 1452   Location: Knee  Wound Location Orientation: Left  Incision's 1st Dressing: PACKING PLAIN 1/4 IN, DRESSING XEROFORM 1 X 8, SPONGE GAUZE 4 X 4 16 PLY STRL PLASTIC TRAY LF,...       Subjective:      .    2/27/24: Patient presents today after calling yesterday regarding drainage coming from the wound.  Today notes that that has calmed down some but he continues to have some yellow drainage.  No symptoms of infection.  No skin tightness directly above the wound site.    2/22/24, 2/15/24, 2/8/24, 2/1/24, 1/25/2, 1/18/24, 1/11/24, 1/3/24: Applying Betadine daily as directed.  No symptoms of infection.     12/20/23: Consult - Johnathon is a pleasant 68-year-old male with a past medical history of type 2 diabetes mellitus most recent A1c 11.6% 3 weeks ago, diabetic  polyneuropathy, peripheral arterial disease with history of re-vascularization, atrial fibrillation on chronic anticoagulation, hx of osteomyelitis requiring left sided BKA, continued opioid dependence here today for initial wound care consult.  Patient was referred by the orthopedic surgeon, Dr. Daryl Shay.  He had incision and drainage of the left knee prepatellar bursa on 11/29/23 to 4-day  hospital course at [facility] at which time he was hospitalized for septic prepatellar bursitis.  Following this he had left knee stiffness and pain and was given a 10-day course of such cefadroxil.  He was seen for his suture removal postop visit On 12/14/2023 at Which Time It Was Noted That There Is an Eschar in the Superior Portion of the Incision Site.  He was told that he needs to have better glycemic control and to speak to endocrinology.  Also advised that he if he does not hear he may require further amputation.  Wound care referral was placed at that visit.  Denies any local or systemic symptoms of infection today including fever chills diaphoresis.     BL LEAD Feb 2023:  RIGHT LOWER LIMB:  Diffuse disease noted throughout the femoral-popliteal arteries without  significant focal stenosis.  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  Ankle/Brachial index: 1.12, within the normal disease category (Prior 1.2).  Metatarsal pressure was unable to be obtained due to non-compressible vessels.  Great toe pressure of 82 mmHg, within the healing range (Prior 46 mmHg).  PVR/ PPG tracings are dampened at the ankle and metatarsal.     LEFT LOWER LIMB:  Diffuse disease noted throughout the femoral-popliteal arteries without  significant focal stenosis.  Below knee amputation.     Compared to previous study on 12/6/2021 and 05/26/2021, there is no significant  change in the disease process.  Recommend repeat testing in 6 months as per protocol unless otherwise  indicated.                The following portions of the patient's history were reviewed and updated as appropriate: allergies, current medications, past family history, past medical history, past social history, past surgical history, and problem list.    Review of Systems   Constitutional:  Negative for chills, diaphoresis and fever.   Skin:  Positive for wound.   All other systems reviewed and are negative.        Objective:       Wound  12/20/23 Diabetic Ulcer Knee Anterior;Left (Active)   Wound Image Images linked 02/27/24 1448   Wound Description Eschar;Black;Dry 02/27/24 1448   Dominga-wound Assessment Dry;Intact;Scar Tissue 02/27/24 1448   Wound Length (cm) 3.4 cm 02/27/24 1448   Wound Width (cm) 1.8 cm 02/27/24 1448   Wound Depth (cm) 0 cm 02/27/24 1448   Wound Surface Area (cm^2) 6.12 cm^2 02/27/24 1448   Wound Volume (cm^3) 0 cm^3 02/27/24 1448   Calculated Wound Volume (cm^3) 0 cm^3 02/27/24 1448   Drainage Amount None 02/27/24 1448   Non-staged Wound Description Not applicable 02/27/24 1448   Dressing Status Intact 02/27/24 1448       /76   Pulse 83   Temp 98.4 °F (36.9 °C)   Resp 18     Physical Exam  Vitals reviewed.   Constitutional:       Appearance: Normal appearance.   HENT:      Head: Normocephalic and atraumatic.   Eyes:      Extraocular Movements: Extraocular movements intact.   Pulmonary:      Effort: Pulmonary effort is normal.   Musculoskeletal:      Cervical back: Neck supple.   Skin:     Comments: Like eschar on anterior left knee.  Surrounding this there is some open wound with bordering epithelialization that is filled in since my last exam.  More distal part of eschar was debrided.  Yellow serosanguineous exudate noted where wound is open.  No signs of infection.   Neurological:      Mental Status: He is alert.   Psychiatric:         Mood and Affect: Mood normal.           Wound Instructions:  Orders Placed This Encounter   Procedures    Wound cleansing and dressings Diabetic Ulcer Anterior;Left Knee     Left Knee Wound:   Wash your hands with soap and water. Remove old dressing, discard into plastic bag and place in trash. Cleanse the wound with normal saline prior to applying a clean dressing. Do not use tissue or cotton balls. Do not scrub the wound. Pat dry using gauze.   Shower yes   Apply moisturizer to skin surrounding wound   Apply Betadine to the black area of the wound.   Cover with gauze.   Secure with  "rolled gauze and tape.   Change dressing 2x/day.   This was done today.     Standing Status:   Future     Standing Expiration Date:   2/27/2025    Wound off loading Diabetic Ulcer Anterior;Left Knee     Left Knee Keep weight and pressure off wound at all times.     Standing Status:   Future     Standing Expiration Date:   2/27/2025    Debridement     This order was created via procedure documentation        Diagnosis ICD-10-CM Associated Orders   1. Diabetic ulcer of left lower leg ([facility])  E11.622 lidocaine (XYLOCAINE) 4 % topical solution 5 mL    L97.929 Wound cleansing and dressings Diabetic Ulcer Anterior;Left Knee     Wound off loading Diabetic Ulcer Anterior;Left Knee      2. Poorly controlled type 2 diabetes mellitus ([facility])  E11.65 lidocaine (XYLOCAINE) 4 % topical solution 5 mL     Wound cleansing and dressings Diabetic Ulcer Anterior;Left Knee     Wound off loading Diabetic Ulcer Anterior;Left Knee      3. Peripheral arterial disease with history of revascularization   I73.9 lidocaine (XYLOCAINE) 4 % topical solution 5 mL    Z98.890 Wound cleansing and dressings Diabetic Ulcer Anterior;Left Knee     Wound off loading Diabetic Ulcer Anterior;Left Knee      4. Diabetic polyneuropathy associated with type 2 diabetes mellitus ([facility])  E11.42 lidocaine (XYLOCAINE) 4 % topical solution 5 mL     Wound cleansing and dressings Diabetic Ulcer Anterior;Left Knee     Wound off loading Diabetic Ulcer Anterior;Left Knee          --  Ismael Prasad MD    \"This note has been constructed using a voice recognition system. Therefore there may be syntax, spelling, and/or grammatical errors. Occasional wrong word or \"sound alike\" substitutions may have occurred due to the inherent limitations of voice recognition software. Read the chart carefully and recognize, using context, where substitutions have occurred. Please call if you have any questions.\"     " 97.5

## 2024-03-05 ENCOUNTER — TELEPHONE (OUTPATIENT)
Age: 69
End: 2024-03-05

## 2024-03-07 ENCOUNTER — OFFICE VISIT (OUTPATIENT)
Dept: WOUND CARE | Facility: HOSPITAL | Age: 69
End: 2024-03-07
Payer: COMMERCIAL

## 2024-03-07 VITALS
DIASTOLIC BLOOD PRESSURE: 83 MMHG | HEART RATE: 77 BPM | TEMPERATURE: 97.3 F | SYSTOLIC BLOOD PRESSURE: 162 MMHG | RESPIRATION RATE: 18 BRPM

## 2024-03-07 DIAGNOSIS — L97.929 DIABETIC ULCER OF LEFT LOWER LEG (HCC): Primary | ICD-10-CM

## 2024-03-07 DIAGNOSIS — E11.42 DIABETIC POLYNEUROPATHY ASSOCIATED WITH TYPE 2 DIABETES MELLITUS (HCC): ICD-10-CM

## 2024-03-07 DIAGNOSIS — E11.622 DIABETIC ULCER OF LEFT LOWER LEG (HCC): Primary | ICD-10-CM

## 2024-03-07 DIAGNOSIS — Z98.890 PERIPHERAL ARTERIAL DISEASE WITH HISTORY OF REVASCULARIZATION: ICD-10-CM

## 2024-03-07 DIAGNOSIS — I73.9 PERIPHERAL ARTERIAL DISEASE WITH HISTORY OF REVASCULARIZATION: ICD-10-CM

## 2024-03-07 DIAGNOSIS — E11.65 POORLY CONTROLLED TYPE 2 DIABETES MELLITUS (HCC): ICD-10-CM

## 2024-03-07 PROCEDURE — 97597 DBRDMT OPN WND 1ST 20 CM/<: CPT | Performed by: STUDENT IN AN ORGANIZED HEALTH CARE EDUCATION/TRAINING PROGRAM

## 2024-03-07 RX ORDER — LIDOCAINE HYDROCHLORIDE 40 MG/ML
5 SOLUTION TOPICAL ONCE
Status: COMPLETED | OUTPATIENT
Start: 2024-03-07 | End: 2024-03-07

## 2024-03-07 RX ADMIN — LIDOCAINE HYDROCHLORIDE 5 ML: 40 SOLUTION TOPICAL at 11:22

## 2024-03-07 NOTE — PATIENT INSTRUCTIONS
Orders Placed This Encounter   Procedures    Wound off loading     Left Knee Keep weight and pressure off wound at all times.     Standing Status:   Future     Standing Expiration Date:   3/7/2025    Wound cleansing and dressings     Left Knee Wound:   Wash your hands with soap and water. Remove old dressing, discard into plastic bag and place in trash. Cleanse the wound with prophase prior to applying a clean dressing. Do not use tissue or cotton balls. Do not scrub the wound. Pat dry using gauze.   Shower yes   Apply moisturizer to skin surrounding wound   [employer] to the wound. Add Hydrogel if there is not enough drainage  Cover with gauze.   Secure with rolled gauze and tape.   Change dressing 3x/day.   This was done today.     Standing Status:   Future     Standing Expiration Date:   3/7/2025

## 2024-03-07 NOTE — PROGRESS NOTES
"Patient ID: Kan Juan is a 68 y.o. male Date of Birth 1955     Chief Complaint  Chief Complaint   Patient presents with    Follow Up Wound Care Visit       Allergies  Shellfish-derived products - food allergy and Sulfamethoxazole-trimethoprim    Assessment:   Diagnoses and all orders for this visit:    Diabetic ulcer of left lower leg (HCC)  -     Cancel: Wound cleansing and dressings; Future  -     Wound off loading; Future  -     lidocaine (XYLOCAINE) 4 % topical solution 5 mL  -     Wound cleansing and dressings; Future  -     Debridement    Poorly controlled type 2 diabetes mellitus (HCC)  -     Cancel: Wound cleansing and dressings; Future  -     Wound off loading; Future  -     lidocaine (XYLOCAINE) 4 % topical solution 5 mL  -     Wound cleansing and dressings; Future    Peripheral arterial disease with history of revascularization   -     Cancel: Wound cleansing and dressings; Future  -     Wound off loading; Future  -     lidocaine (XYLOCAINE) 4 % topical solution 5 mL  -     Wound cleansing and dressings; Future    Diabetic polyneuropathy associated with type 2 diabetes mellitus (HCC)  -     Cancel: Wound cleansing and dressings; Future  -     Wound off loading; Future  -     lidocaine (XYLOCAINE) 4 % topical solution 5 mL  -     Wound cleansing and dressings; Future              Debridement   Wound 12/20/23 Diabetic Ulcer Knee Anterior;Left    Universal Protocol:  Consent: Verbal consent obtained.  Risks and benefits: risks, benefits and alternatives were discussed  Consent given by: patient  Time out: Immediately prior to procedure a \"time out\" was called to verify the correct patient, procedure, equipment, support staff and site/side marked as required.  Patient identity confirmed: verbally with patient    Debridement Details  Performed by: physician  Debridement type: selective  Pain control: lidocaine 4%      Post-debridement measurements  Length (cm): 3.4  Width (cm): 1.8  Depth (cm): " 0.2  Percent debrided: 90%  Surface Area (cm^2): 6.12  Area Debrided (cm^2): 5.51  Volume (cm^3): 1.22    Devitalized tissue debrided: biofilm, exudate and eschar  Instrument(s) utilized: curette  Bleeding: small  Hemostasis obtained with: pressure  Procedural pain (0-10): 1  Post-procedural pain: 0   Response to treatment: procedure was tolerated well        Plan:  It was a pleasure to see Kan Juan for wound care follow up today  Selective debridement performed today as above  Wound is improving   Continue plan of care as noted below with collagen, rolled gauze  No signs or symptoms of infection today. Patient understands that if any signs of infection start (such as increased redness, drainage, pain, fever, chills, diaphoresis), they should call our office or proceed to the ER or Urgent Care.  Patient should continue a high protein diet to facilitate wound healing  Patient is advised to not submerge wound or leave wound open to air.  Follow up in 1 weeks  Given the multi-factorial nature of wound care, additional time was taken to review patient's treatment plan with other specialties and most recent pertinent lab work and imaging.   All plans of care discussed with patient at bedside who verbalized understanding with treatment plan.       Wound 12/20/23 Diabetic Ulcer Knee Anterior;Left (Active)   Wound Image Images linked 03/07/24 1129   Wound Description Eschar;Black;Dry 03/07/24 1107   Dominga-wound Assessment Dry;Intact;Scar Tissue 03/07/24 1107   Wound Length (cm) 3.4 cm 03/07/24 1107   Wound Width (cm) 1.8 cm 03/07/24 1107   Wound Depth (cm) 0 cm 03/07/24 1107   Wound Surface Area (cm^2) 6.12 cm^2 03/07/24 1107   Wound Volume (cm^3) 0 cm^3 03/07/24 1107   Calculated Wound Volume (cm^3) 0 cm^3 03/07/24 1107   Drainage Amount None 03/07/24 1107       Wound 12/20/23 Diabetic Ulcer Knee Anterior;Left (Active)   Date First Assessed/Time First Assessed: 12/20/23 1312   Primary Wound Type: Diabetic Ulcer   Location: Knee  Wound Location Orientation: Anterior;Left  Wound Description (Comments): CAO 4       [REMOVED] Wound 11/29/23 Knee Left (Removed)   Resolved Date: 12/20/23  Date First Assessed/Time First Assessed: 11/29/23 2832   Location: Knee  Wound Location Orientation: Left  Incision's 1st Dressing: PACKING PLAIN 1/4 IN, DRESSING XEROFORM 1 X 8, SPONGE GAUZE 4 X 4 16 PLY STRL PLASTIC TRAY LF,...       Subjective:      .    3/7/24: Applying Betadine as directed.  Happy with wound healing.  No symptoms of infection.    2/27/24: Patient presents today after calling yesterday regarding drainage coming from the wound.  Today notes that that has calmed down some but he continues to have some yellow drainage.  No symptoms of infection.  No skin tightness directly above the wound site.     2/22/24, 2/15/24, 2/8/24, 2/1/24, 1/25/2, 1/18/24, 1/11/24, 1/3/24: Applying Betadine daily as directed.  No symptoms of infection.     12/20/23: Consult - Johnathon is a pleasant 68-year-old male with a past medical history of type 2 diabetes mellitus most recent A1c 11.6% 3 weeks ago, diabetic  polyneuropathy, peripheral arterial disease with history of re-vascularization, atrial fibrillation on chronic anticoagulation, hx of osteomyelitis requiring left sided BKA, continued opioid dependence here today for initial wound care consult.  Patient was referred by the orthopedic surgeon, Dr. Daryl Shay.  He had incision and drainage of the left knee prepatellar bursa on 11/29/23 to 4-day hospital course at [facility] at which time he was hospitalized for septic prepatellar bursitis.  Following this he had left knee stiffness and pain and was given a 10-day course of such cefadroxil.  He was seen for his suture removal postop visit On 12/14/2023 at Which Time It Was Noted That There Is an Eschar in the Superior Portion of the Incision Site.  He was told that he needs to have better glycemic control and to speak to endocrinology.   Also advised that he if he does not hear he may require further amputation.  Wound care referral was placed at that visit.  Denies any local or systemic symptoms of infection today including fever chills diaphoresis.     BL LEAD Feb 2023:  RIGHT LOWER LIMB:  Diffuse disease noted throughout the femoral-popliteal arteries without  significant focal stenosis.  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  Ankle/Brachial index: 1.12, within the normal disease category (Prior 1.2).  Metatarsal pressure was unable to be obtained due to non-compressible vessels.  Great toe pressure of 82 mmHg, within the healing range (Prior 46 mmHg).  PVR/ PPG tracings are dampened at the ankle and metatarsal.     LEFT LOWER LIMB:  Diffuse disease noted throughout the femoral-popliteal arteries without  significant focal stenosis.  Below knee amputation.     Compared to previous study on 12/6/2021 and 05/26/2021, there is no significant  change in the disease process.  Recommend repeat testing in 6 months as per protocol unless otherwise  indicated.                   The following portions of the patient's history were reviewed and updated as appropriate: allergies, current medications, past family history, past medical history, past social history, past surgical history, and problem list.    Review of Systems   Constitutional:  Negative for chills, diaphoresis and fever.   Skin:  Positive for wound.   All other systems reviewed and are negative.        Objective:       Wound 12/20/23 Diabetic Ulcer Knee Anterior;Left (Active)   Wound Image Images linked 03/07/24 1129   Wound Description Eschar;Black;Dry 03/07/24 1107   Dominga-wound Assessment Dry;Intact;Scar Tissue 03/07/24 1107   Wound Length (cm) 3.4 cm 03/07/24 1107   Wound Width (cm) 1.8 cm 03/07/24 1107   Wound Depth (cm) 0 cm 03/07/24 1107   Wound Surface Area (cm^2) 6.12 cm^2 03/07/24 1107   Wound Volume (cm^3) 0 cm^3 03/07/24 1107   Calculated Wound Volume (cm^3) 0 cm^3  03/07/24 1107   Drainage Amount None 03/07/24 1107       /83   Pulse 77   Temp (!) 97.3 °F (36.3 °C)   Resp 18     Physical Exam  Vitals reviewed.   Constitutional:       Appearance: Normal appearance.   HENT:      Head: Normocephalic and atraumatic.   Eyes:      Extraocular Movements: Extraocular movements intact.   Pulmonary:      Effort: Pulmonary effort is normal.   Musculoskeletal:      Cervical back: Neck supple.      Comments: L BKA   Skin:     Comments: Anterior Left knee wound with eschar lifting today.  Entirety of eschar was debrided down to wound base.  Not probing to bone.  Wound bed itself is fatty.  No signs of infection.   Neurological:      Mental Status: He is alert.   Psychiatric:         Mood and Affect: Mood normal.                 Wound Instructions:  Orders Placed This Encounter   Procedures    Wound off loading     Left Knee Keep weight and pressure off wound at all times.     Standing Status:   Future     Standing Expiration Date:   3/7/2025    Wound cleansing and dressings     Left Knee Wound:   Wash your hands with soap and water. Remove old dressing, discard into plastic bag and place in trash. Cleanse the wound with prophase prior to applying a clean dressing. Do not use tissue or cotton balls. Do not scrub the wound. Pat dry using gauze.   Shower yes   Apply moisturizer to skin surrounding wound   Purachal AG to the wound. Add Hydrogel if there is not enough drainage  Cover with gauze.   Secure with rolled gauze and tape.   Change dressing 3x/day.   This was done today.     Standing Status:   Future     Standing Expiration Date:   3/7/2025    Debridement     This order was created via procedure documentation        Diagnosis ICD-10-CM Associated Orders   1. Diabetic ulcer of left lower leg (HCC)  E11.622 Wound off loading    L97.929 lidocaine (XYLOCAINE) 4 % topical solution 5 mL     Wound cleansing and dressings     Debridement      2. Poorly controlled type 2 diabetes  "mellitus (HCC)  E11.65 Wound off loading     lidocaine (XYLOCAINE) 4 % topical solution 5 mL     Wound cleansing and dressings      3. Peripheral arterial disease with history of revascularization   I73.9 Wound off loading    Z98.890 lidocaine (XYLOCAINE) 4 % topical solution 5 mL     Wound cleansing and dressings      4. Diabetic polyneuropathy associated with type 2 diabetes mellitus (HCC)  E11.42 Wound off loading     lidocaine (XYLOCAINE) 4 % topical solution 5 mL     Wound cleansing and dressings          --  Ismael Prasad MD    \"This note has been constructed using a voice recognition system. Therefore there may be syntax, spelling, and/or grammatical errors. Occasional wrong word or \"sound alike\" substitutions may have occurred due to the inherent limitations of voice recognition software. Read the chart carefully and recognize, using context, where substitutions have occurred. Please call if you have any questions.\"     "

## 2024-03-08 ENCOUNTER — OFFICE VISIT (OUTPATIENT)
Dept: FAMILY MEDICINE CLINIC | Facility: CLINIC | Age: 69
End: 2024-03-08
Payer: COMMERCIAL

## 2024-03-08 VITALS
DIASTOLIC BLOOD PRESSURE: 82 MMHG | TEMPERATURE: 99.1 F | WEIGHT: 189 LBS | HEART RATE: 81 BPM | BODY MASS INDEX: 32.44 KG/M2 | SYSTOLIC BLOOD PRESSURE: 124 MMHG | OXYGEN SATURATION: 97 %

## 2024-03-08 DIAGNOSIS — I48.91 ATRIAL FIBRILLATION, UNSPECIFIED TYPE (HCC): ICD-10-CM

## 2024-03-08 DIAGNOSIS — E11.52 TYPE 2 DIABETES MELLITUS WITH DIABETIC PERIPHERAL ANGIOPATHY AND GANGRENE, WITH LONG-TERM CURRENT USE OF INSULIN (HCC): Primary | ICD-10-CM

## 2024-03-08 DIAGNOSIS — I10 ESSENTIAL HYPERTENSION: ICD-10-CM

## 2024-03-08 DIAGNOSIS — G89.29 CHRONIC BACK PAIN, UNSPECIFIED BACK LOCATION, UNSPECIFIED BACK PAIN LATERALITY: ICD-10-CM

## 2024-03-08 DIAGNOSIS — M54.9 CHRONIC BACK PAIN, UNSPECIFIED BACK LOCATION, UNSPECIFIED BACK PAIN LATERALITY: ICD-10-CM

## 2024-03-08 DIAGNOSIS — Z79.4 TYPE 2 DIABETES MELLITUS WITH DIABETIC PERIPHERAL ANGIOPATHY AND GANGRENE, WITH LONG-TERM CURRENT USE OF INSULIN (HCC): Primary | ICD-10-CM

## 2024-03-08 DIAGNOSIS — E11.65 POORLY CONTROLLED TYPE 2 DIABETES MELLITUS (HCC): ICD-10-CM

## 2024-03-08 PROCEDURE — 99214 OFFICE O/P EST MOD 30 MIN: CPT | Performed by: FAMILY MEDICINE

## 2024-03-08 PROCEDURE — G2211 COMPLEX E/M VISIT ADD ON: HCPCS | Performed by: FAMILY MEDICINE

## 2024-03-08 RX ORDER — LISINOPRIL 10 MG/1
10 TABLET ORAL DAILY
Qty: 90 TABLET | Refills: 1 | Status: SHIPPED | OUTPATIENT
Start: 2024-03-08

## 2024-03-08 RX ORDER — OXYCODONE HYDROCHLORIDE AND ACETAMINOPHEN 5; 325 MG/1; MG/1
1 TABLET ORAL EVERY 6 HOURS PRN
Qty: 120 TABLET | Refills: 0 | Status: SHIPPED | OUTPATIENT
Start: 2024-03-08

## 2024-03-08 NOTE — PROGRESS NOTES
Name: Kan Juan      : 1955      MRN: [id number]  Encounter Provider: Macario Goyal MD  Encounter Date: 3/8/2024   Encounter department: [facility]    Assessment & Plan     1. Type 2 diabetes mellitus with diabetic peripheral angiopathy and gangrene, with long-term current use of insulin (HCC)  -     tirzepatide (Mounjaro) 2.5 MG/0.5ML; Inject 0.5 mL (2.5 mg total) under the skin every 7 days    2. Chronic back pain, unspecified back location, unspecified back pain laterality  -     oxyCODONE-acetaminophen (Percocet) 5-325 mg per tablet; Take 1 tablet by mouth every 6 (six) hours as needed for moderate pain Max Daily Amount: 4 tablets    3. Atrial fibrillation, unspecified type (HCC)  -     rivaroxaban (Xarelto) 2.5 mg tablet; Take 1 tablet (2.5 mg total) by mouth daily with breakfast Last dose 20    4. Essential hypertension  -     lisinopril (ZESTRIL) 10 mg tablet; Take 1 tablet (10 mg total) by mouth daily    5. Poorly controlled type 2 diabetes mellitus (HCC)  -     lisinopril (ZESTRIL) 10 mg tablet; Take 1 tablet (10 mg total) by mouth daily           Subjective     68-year-old male here today for checkup on multimedical process patient with type 2 diabetes chronic back pain A-fib hypertension hyperlipidemia peripheral vascular disease.  Patient is here today for discussion of starting a GLP-1 patient is asked about starting on some Ozempic for better control of his diabetes as well as some weight loss talk to the patient about maybe starting Mounjaro if he really wants to see about getting a better agent for some weight loss and we will see about getting him on 2.5 mg weekly we will see him back in about 6 weeks and we will get him started on that provide he can find coverage as well as getting the agent at the pharmacy.  Patient has refills on his lisinopril his blood pressure is doing very well.  Patient is also going for wound care and his left stump wound is  getting better.  Patient also needs a refills on his Xarelto which was also done for him today.  Will see patient back in approximately 6 weeks and we will see how he is doing with his Mounjaro.      Review of Systems   Constitutional:  Negative for activity change, appetite change, chills, fatigue, fever and unexpected weight change.   HENT:  Negative for congestion, ear pain, hearing loss, mouth sores, postnasal drip, sinus pressure, sinus pain, sneezing and sore throat.    Respiratory:  Negative for apnea, cough, shortness of breath and wheezing.    Cardiovascular:  Negative for chest pain, palpitations and leg swelling.   Gastrointestinal:  Negative for abdominal pain, constipation, diarrhea, nausea and vomiting.   Endocrine: Negative for cold intolerance and heat intolerance.   Genitourinary:  Negative for dysuria, frequency and hematuria.   Musculoskeletal:  Positive for gait problem. Negative for arthralgias, back pain, joint swelling and neck pain.   Skin:  Positive for wound. Negative for rash.   Neurological:  Negative for dizziness, weakness and numbness.   Hematological:  Does not bruise/bleed easily.   Psychiatric/Behavioral:  Negative for agitation, behavioral problems, confusion, hallucinations and sleep disturbance. The patient is not nervous/anxious.        Past Medical History:   Diagnosis Date   • Arthritis     fingers   • First degree AV block    • Full dentures    • Hypertension    • PAD (peripheral artery disease) ([facility])    • PVD (peripheral vascular disease) ([facility])    • Type 2 diabetes mellitus with diabetic peripheral angiopathy without gangrene ([facility]) 5/18/2021    Per CMS ICD 10 guidelines--Per Physician   • Wound, open     right foot- by the 5th toe     Past Surgical History:   Procedure Laterality Date   • CHOLECYSTECTOMY     • COLONOSCOPY     • INCISION AND DRAINAGE OF WOUND Left 11/29/2023    Procedure: INCISION AND DRAINAGE (I&D) LEFT KNEE PRE-PATELLA BURSA;  Surgeon: Daryl Shay DO;   Location:  Main OR;  Service: Orthopedics   • IR AORTAGRAM WITH RUN-OFF  1/28/2021   • IR AORTAGRAM WITH RUN-OFF  4/13/2021   • LEG AMPUTATION THROUGH LOWER TIBIA AND FIBULA Left 7/17/2021    Procedure: AMPUTATION BELOW KNEE (BKA);  Surgeon: Jose Mary MD;  Location: [facility];  Service: Vascular   • NE BYP FEM-ANT TIBL PST TIBL PRONEAL ART/OTH DSTL Left 7/14/2021    Procedure: BYPASS femoral-peroneal artery bypass with  reverse GSV and balloon angioplasty peroneal artery;  Surgeon: Jose Mary MD;  Location: [facility];  Service: Vascular   • NE DEBRIDEMENT BONE 1ST 20 SQ CM/< Right 12/18/2020    Procedure: DEBRIDMENT OF ULCER , REMOVAL OF DEVITALIZED SKIN, SOFT TISSUE, BONE AND APPLICATION OF INTEGRA GRAFT RIGHT FOOT.;  Surgeon: Daquan Ellis DPM;  Location: [facility];  Service: Podiatry   • REPLANTATION THUMB Right     right tip reconnected   • SKIN GRAFT      right thumb   • TOE AMPUTATION      left foot all 5 toes removed   • TOE AMPUTATION Right 2/2/2021    Procedure: Right partial 5th ray amputation;  Surgeon: Gabriel Garcia DPM;  Location: [facility];  Service: Podiatry   • TOE OSTEOTOMY Right 6/26/2020    Procedure: exostosis of right big toe;  Surgeon: Trey Shirley DPM;  Location: [facility];  Service: Podiatry   • TRIGGER FINGER RELEASE      bilateral hands   • VEIN LIGATION      right     Family History   Problem Relation Age of Onset   • Arthritis Mother    • Pancreatic cancer Mother    • Cancer Father         colon   • Alzheimer's disease Father      Social History     Socioeconomic History   • Marital status: /Civil Union     Spouse name: Angeles   • Number of children: 1   • Years of education: 12   • Highest education level: High school graduate   Occupational History   • Occupation: Cook   Tobacco Use   • Smoking status: Never   • Smokeless tobacco: Never   Vaping Use   • Vaping status: Never Used   Substance and Sexual Activity   • Alcohol use: Not Currently     Comment: occasional  "  • Drug use: Never   • Sexual activity: Yes     Partners: Female   Other Topics Concern   • None   Social History Narrative         Social Determinants of Health     Financial Resource Strain: Low Risk  (9/15/2023)    Overall Financial Resource Strain (CARDIA)    • Difficulty of Paying Living Expenses: Not hard at all   Food Insecurity: Not on file   Transportation Needs: No Transportation Needs (9/15/2023)    PRAPARE - Transportation    • Lack of Transportation (Medical): No    • Lack of Transportation (Non-Medical): No   Physical Activity: Not on file   Stress: Not on file   Social Connections: Not on file   Intimate Partner Violence: Not on file   Housing Stability: Not on file     Current Outpatient Medications on File Prior to Visit   Medication Sig   • Accu-Chek Softclix Lancets lancets Use as instructed qid Dx E11.9   • Alcohol Swabs (B-D SINGLE USE SWABS REGULAR) PADS Use 4 (four) times a day Dx E11.9   • atorvastatin (LIPITOR) 40 mg tablet Take 1 tablet (40 mg total) by mouth daily at bedtime   • Blood Glucose Monitoring Suppl (Accu-Chek Guide) w/Device KIT Use 4 (four) times a day   • clopidogrel (PLAVIX) 75 mg tablet Take 1 tablet (75 mg total) by mouth daily   • glucose blood (Accu-Chek Guide) test strip Use 1 each 4 (four) times a day   • Insulin Glargine Solostar (Basaglar KwikPen) 100 UNIT/ML SOPN Inject 0.5 mL (50 Units total) under the skin daily at bedtime   • insulin lispro (HumaLOG KwikPen) 100 units/mL injection pen Inject 25 Units under the skin 3 (three) times a day with meals   • Insulin Pen Needle ( UltiCare Mini Pen Needles) 31G X 5 MM MISC Use 4 (four) times a day   • pantoprazole (PROTONIX) 40 mg tablet Take 1 tablet (40 mg total) by mouth daily   • pioglitazone (ACTOS) 45 mg tablet Take 1 tablet (45 mg total) by mouth daily   • UltiCare Insulin Syringe 31G X 5/16\" 1 ML MISC Inject under the skin as needed (for injection)   • [DISCONTINUED] lisinopril (ZESTRIL) 10 mg tablet Take 1 " tablet (10 mg total) by mouth daily   • [DISCONTINUED] oxyCODONE-acetaminophen (Percocet) 5-325 mg per tablet Take 1 tablet by mouth every 6 (six) hours as needed for moderate pain Max Daily Amount: 4 tablets   • [DISCONTINUED] rivaroxaban (Xarelto) 2.5 mg tablet Take 1 tablet (2.5 mg total) by mouth daily with breakfast Last dose 6/21/20   • betamethasone dipropionate (DIPROSONE) 0.05 % cream Apply topically 2 (two) times a day (Patient not taking: Reported on 3/8/2024)   • Blood Glucose Calibration (Accu-Chek Guide Control) LIQD Test daily as needed (abnormal sugar reading) (Patient not taking: Reported on 3/8/2024)   • Blood Glucose Monitoring Suppl (Accu-Chek Guide) w/Device KIT Use 4 (four) times a day Dx E11.9 (Patient not taking: Reported on 3/8/2024)   • cyclobenzaprine (FLEXERIL) 5 mg tablet Take 1 tablet (5 mg total) by mouth 3 (three) times a day as needed for muscle spasms for up to 10 days   • metFORMIN (GLUCOPHAGE) 1000 MG tablet Take 1 tablet (1,000 mg total) by mouth 2 (two) times a day with meals (Patient not taking: Reported on 12/20/2023)     Allergies   Allergen Reactions   • Shellfish-Derived Products - Food Allergy Anaphylaxis     Throat closes   • Sulfamethoxazole-Trimethoprim Rash     Immunization History   Administered Date(s) Administered   • COVID-19 PFIZER VACCINE 0.3 ML IM 03/02/2021, 03/22/2021   • INFLUENZA 11/02/2018, 10/14/2021, 10/17/2022, 11/24/2023   • Influenza, high dose seasonal 0.7 mL 11/05/2020, 10/14/2021, 10/17/2022, 11/24/2023       Objective     /82 (BP Location: Left arm, Patient Position: Sitting, Cuff Size: Standard)   Pulse 81   Temp 99.1 °F (37.3 °C) (Skin)   Wt 85.7 kg (189 lb)   SpO2 97%   BMI 32.44 kg/m²     Physical Exam  Vitals and nursing note reviewed.   Constitutional:       Appearance: He is well-developed.   HENT:      Head: Normocephalic and atraumatic.      Nose: Nose normal.   Eyes:      General: No scleral icterus.     Conjunctiva/sclera:  Conjunctivae normal.      Pupils: Pupils are equal, round, and reactive to light.   Neck:      Thyroid: No thyromegaly.   Cardiovascular:      Rate and Rhythm: Normal rate and regular rhythm.      Heart sounds: Normal heart sounds.   Pulmonary:      Effort: Pulmonary effort is normal. No respiratory distress.      Breath sounds: Normal breath sounds. No wheezing.   Abdominal:      General: Bowel sounds are normal.      Palpations: Abdomen is soft.      Tenderness: There is no abdominal tenderness. There is no guarding or rebound.   Musculoskeletal:      Cervical back: Normal range of motion and neck supple.      Comments: L BKA   Skin:     General: Skin is warm and dry.      Findings: No rash.   Neurological:      Mental Status: He is alert and oriented to person, place, and time.      Gait: Gait abnormal.   Psychiatric:         Mood and Affect: Mood normal.         Behavior: Behavior normal.         Thought Content: Thought content normal.         Judgment: Judgment normal.       Macario Goyal MD

## 2024-03-14 ENCOUNTER — OFFICE VISIT (OUTPATIENT)
Dept: WOUND CARE | Facility: HOSPITAL | Age: 69
End: 2024-03-14
Payer: COMMERCIAL

## 2024-03-14 VITALS
RESPIRATION RATE: 18 BRPM | HEART RATE: 89 BPM | SYSTOLIC BLOOD PRESSURE: 147 MMHG | DIASTOLIC BLOOD PRESSURE: 77 MMHG | TEMPERATURE: 97.6 F

## 2024-03-14 DIAGNOSIS — E11.42 DIABETIC POLYNEUROPATHY ASSOCIATED WITH TYPE 2 DIABETES MELLITUS (HCC): ICD-10-CM

## 2024-03-14 DIAGNOSIS — I73.9 PERIPHERAL ARTERIAL DISEASE WITH HISTORY OF REVASCULARIZATION: ICD-10-CM

## 2024-03-14 DIAGNOSIS — L97.929 DIABETIC ULCER OF LEFT LOWER LEG (HCC): Primary | ICD-10-CM

## 2024-03-14 DIAGNOSIS — Z98.890 PERIPHERAL ARTERIAL DISEASE WITH HISTORY OF REVASCULARIZATION: ICD-10-CM

## 2024-03-14 DIAGNOSIS — E11.65 POORLY CONTROLLED TYPE 2 DIABETES MELLITUS (HCC): ICD-10-CM

## 2024-03-14 DIAGNOSIS — E11.622 DIABETIC ULCER OF LEFT LOWER LEG (HCC): Primary | ICD-10-CM

## 2024-03-14 PROCEDURE — 11042 DBRDMT SUBQ TIS 1ST 20SQCM/<: CPT | Performed by: STUDENT IN AN ORGANIZED HEALTH CARE EDUCATION/TRAINING PROGRAM

## 2024-03-14 RX ORDER — LIDOCAINE 40 MG/G
CREAM TOPICAL ONCE
Status: COMPLETED | OUTPATIENT
Start: 2024-03-14 | End: 2024-03-14

## 2024-03-14 RX ADMIN — LIDOCAINE 1 APPLICATION: 40 CREAM TOPICAL at 11:17

## 2024-03-14 NOTE — PATIENT INSTRUCTIONS
Orders Placed This Encounter   Procedures    Wound cleansing and dressings Diabetic Ulcer Anterior;Left Knee     Left Knee Wound:     Wash your hands with soap and water. Remove old dressing, discard into plastic bag and place in trash. Cleanse the wound with prophase prior to applying a clean dressing. Do not use tissue or cotton balls. Do not scrub the wound. Pat dry using gauze.     Shower yes     Cover with hydrocolloid.   Secure with tubifast    Change dressing 3x/day.     This was done today.     Standing Status:   Future     Standing Expiration Date:   3/14/2025    Wound off loading Diabetic Ulcer Anterior;Left Knee     Left Knee Keep weight and pressure off wound at all times, do not wear orthotic     Standing Status:   Future     Standing Expiration Date:   3/14/2025

## 2024-03-14 NOTE — PROGRESS NOTES
"Patient ID: Kan Juan is a 68 y.o. male Date of Birth 1955     Chief Complaint  Chief Complaint   Patient presents with    Follow Up Wound Care Visit     L knee wound       Allergies  Shellfish-derived products - food allergy and Sulfamethoxazole-trimethoprim    Assessment:     Diagnoses and all orders for this visit:    Diabetic ulcer of left lower leg (HCC)  -     lidocaine (LMX) 4 % cream  -     Wound cleansing and dressings Diabetic Ulcer Anterior;Left Knee; Future  -     Wound off loading Diabetic Ulcer Anterior;Left Knee; Future  -     Debridement    Poorly controlled type 2 diabetes mellitus (HCC)  -     Wound cleansing and dressings Diabetic Ulcer Anterior;Left Knee; Future  -     Wound off loading Diabetic Ulcer Anterior;Left Knee; Future    Peripheral arterial disease with history of revascularization   -     Wound cleansing and dressings Diabetic Ulcer Anterior;Left Knee; Future  -     Wound off loading Diabetic Ulcer Anterior;Left Knee; Future    Diabetic polyneuropathy associated with type 2 diabetes mellitus (HCC)  -     Wound cleansing and dressings Diabetic Ulcer Anterior;Left Knee; Future  -     Wound off loading Diabetic Ulcer Anterior;Left Knee; Future            Debridement   Wound 12/20/23 Diabetic Ulcer Knee Anterior;Left    Universal Protocol:  Consent: Verbal consent obtained.  Risks and benefits: risks, benefits and alternatives were discussed  Consent given by: patient  Time out: Immediately prior to procedure a \"time out\" was called to verify the correct patient, procedure, equipment, support staff and site/side marked as required.  Patient identity confirmed: verbally with patient    Debridement Details  Performed by: physician  Debridement type: surgical  Level of debridement: subcutaneous tissue  Pain control: lidocaine 4%      Post-debridement measurements  Length (cm): 2.9  Width (cm): 1.7  Depth (cm): 0.1  Percent debrided: 90%  Surface Area (cm^2): 4.93  Area Debrided " (cm^2): 4.44  Volume (cm^3): 0.49    Tissue and other material debrided: adipose, dermis, epidermis and subcutaneous tissue  Devitalized tissue debrided: biofilm, exudate, fibrin, necrotic debris and slough  Instrument(s) utilized: curette  Bleeding: small  Hemostasis obtained with: pressure  Procedural pain (0-10): 1  Post-procedural pain: 0   Response to treatment: procedure was tolerated well        Plan:   It was a pleasure to see Kan Juan for wound care follow up today  Subcu debridement performed today as above  Wound is improving   Continue plan of care as noted below with change to hydrocolloid   A1C results reviewed with the patient today.   No signs or symptoms of infection today. Patient understands that if any signs of infection start (such as increased redness, drainage, pain, fever, chills, diaphoresis), they should call our office or proceed to the ER or Urgent Care.  Patient should continue a high protein diet to facilitate wound healing  Patient is advised to not submerge wound or leave wound open to air.  Follow up in 1 weeks  Given the multi-factorial nature of wound care, additional time was taken to review patient's treatment plan with other specialties and most recent pertinent lab work and imaging.   All plans of care discussed with patient at bedside who verbalized understanding with treatment plan.    Wound 12/20/23 Diabetic Ulcer Knee Anterior;Left (Active)   Wound Image Images linked 03/14/24 1129   Wound Description Yellow;Brown 03/14/24 1115   Dominga-wound Assessment Dry;Intact;Scar Tissue 03/14/24 1115   Wound Length (cm) 2.9 cm 03/14/24 1115   Wound Width (cm) 1.7 cm 03/14/24 1115   Wound Depth (cm) 0.1 cm 03/14/24 1115   Wound Surface Area (cm^2) 4.93 cm^2 03/14/24 1115   Wound Volume (cm^3) 0.493 cm^3 03/14/24 1115   Calculated Wound Volume (cm^3) 0.49 cm^3 03/14/24 1115   Drainage Amount Small 03/14/24 1115   Drainage Description Yellow;Milky 03/14/24 1115   Non-staged Wound  Description Full thickness 03/14/24 1115   Dressing Status Intact 03/14/24 1115       Wound 12/20/23 Diabetic Ulcer Knee Anterior;Left (Active)   Date First Assessed/Time First Assessed: 12/20/23 1312   Primary Wound Type: Diabetic Ulcer  Location: Knee  Wound Location Orientation: Anterior;Left  Wound Description (Comments): CAO 4       [REMOVED] Wound 11/29/23 Knee Left (Removed)   Resolved Date: 12/20/23  Date First Assessed/Time First Assessed: 11/29/23 1452   Location: Knee  Wound Location Orientation: Left  Incision's 1st Dressing: PACKING PLAIN 1/4 IN, DRESSING XEROFORM 1 X 8, SPONGE GAUZE 4 X 4 16 PLY STRL PLASTIC TRAY LF,...       Subjective:      .    3/14/24: Applying collagen as directed.  Notes that collagen is not really absorbing into the wound bed and is adhered to the wound instead.  No symptoms of infection today.     3/7/24: Applying Betadine as directed.  Happy with wound healing.  No symptoms of infection.     2/27/24: Patient presents today after calling yesterday regarding drainage coming from the wound.  Today notes that that has calmed down some but he continues to have some yellow drainage.  No symptoms of infection.  No skin tightness directly above the wound site.     2/22/24, 2/15/24, 2/8/24, 2/1/24, 1/25/2, 1/18/24, 1/11/24, 1/3/24: Applying Betadine daily as directed.  No symptoms of infection.     12/20/23: Consult - Johnathon is a pleasant 68-year-old male with a past medical history of type 2 diabetes mellitus most recent A1c 11.6% 3 weeks ago, diabetic  polyneuropathy, peripheral arterial disease with history of re-vascularization, atrial fibrillation on chronic anticoagulation, hx of osteomyelitis requiring left sided BKA, continued opioid dependence here today for initial wound care consult.  Patient was referred by the orthopedic surgeon, Dr. Daryl Shay.  He had incision and drainage of the left knee prepatellar bursa on 11/29/23 to 4-day hospital course at [facility] at  which time he was hospitalized for septic prepatellar bursitis.  Following this he had left knee stiffness and pain and was given a 10-day course of such cefadroxil.  He was seen for his suture removal postop visit On 12/14/2023 at Which Time It Was Noted That There Is an Eschar in the Superior Portion of the Incision Site.  He was told that he needs to have better glycemic control and to speak to endocrinology.  Also advised that he if he does not hear he may require further amputation.  Wound care referral was placed at that visit.  Denies any local or systemic symptoms of infection today including fever chills diaphoresis.     BL LEAD Feb 2023:  RIGHT LOWER LIMB:  Diffuse disease noted throughout the femoral-popliteal arteries without  significant focal stenosis.  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  Ankle/Brachial index: 1.12, within the normal disease category (Prior 1.2).  Metatarsal pressure was unable to be obtained due to non-compressible vessels.  Great toe pressure of 82 mmHg, within the healing range (Prior 46 mmHg).  PVR/ PPG tracings are dampened at the ankle and metatarsal.     LEFT LOWER LIMB:  Diffuse disease noted throughout the femoral-popliteal arteries without  significant focal stenosis.  Below knee amputation.     Compared to previous study on 12/6/2021 and 05/26/2021, there is no significant  change in the disease process.  Recommend repeat testing in 6 months as per protocol unless otherwise  indicated.          The following portions of the patient's history were reviewed and updated as appropriate: allergies, current medications, past family history, past medical history, past social history, past surgical history, and problem list.    Review of Systems   Constitutional:  Negative for chills, diaphoresis and fever.   Skin:  Positive for wound.   All other systems reviewed and are negative.        Objective:       Wound 12/20/23 Diabetic Ulcer Knee Anterior;Left (Active)    Wound Image Images linked 03/14/24 1129   Wound Description Yellow;Brown 03/14/24 1115   Dominga-wound Assessment Dry;Intact;Scar Tissue 03/14/24 1115   Wound Length (cm) 2.9 cm 03/14/24 1115   Wound Width (cm) 1.7 cm 03/14/24 1115   Wound Depth (cm) 0.1 cm 03/14/24 1115   Wound Surface Area (cm^2) 4.93 cm^2 03/14/24 1115   Wound Volume (cm^3) 0.493 cm^3 03/14/24 1115   Calculated Wound Volume (cm^3) 0.49 cm^3 03/14/24 1115   Drainage Amount Small 03/14/24 1115   Drainage Description Yellow;Milky 03/14/24 1115   Non-staged Wound Description Full thickness 03/14/24 1115   Dressing Status Intact 03/14/24 1115       /77   Pulse 89   Temp 97.6 °F (36.4 °C)   Resp 18     Physical Exam  Vitals reviewed.   Constitutional:       Appearance: Normal appearance.   HENT:      Head: Normocephalic and atraumatic.   Eyes:      Extraocular Movements: Extraocular movements intact.   Pulmonary:      Effort: Pulmonary effort is normal.   Musculoskeletal:      Cervical back: Neck supple.   Skin:     Comments: Anterior left knee wound smaller than last exam.  Has gone down in length and width.  There is dried exudate, fibrin, and wound care product in the wound bed which was debrided today.  No signs of infection.   Neurological:      Mental Status: He is alert.   Psychiatric:         Mood and Affect: Mood normal.                 Wound Instructions:  Orders Placed This Encounter   Procedures    Wound cleansing and dressings Diabetic Ulcer Anterior;Left Knee     Left Knee Wound:     Wash your hands with soap and water. Remove old dressing, discard into plastic bag and place in trash. Cleanse the wound with prophase prior to applying a clean dressing. Do not use tissue or cotton balls. Do not scrub the wound. Pat dry using gauze.     Shower yes     Cover with hydrocolloid.   Secure with tubifast    Change dressing 3x/day.     This was done today.     Standing Status:   Future     Standing Expiration Date:   3/14/2025    Wound off  "loading Diabetic Ulcer Anterior;Left Knee     Left Knee Keep weight and pressure off wound at all times, do not wear orthotic     Standing Status:   Future     Standing Expiration Date:   3/14/2025    Debridement     This order was created via procedure documentation        Diagnosis ICD-10-CM Associated Orders   1. Diabetic ulcer of left lower leg ([facility])  E11.622 lidocaine (LMX) 4 % cream    L97.929 Wound cleansing and dressings Diabetic Ulcer Anterior;Left Knee     Wound off loading Diabetic Ulcer Anterior;Left Knee     Debridement      2. Poorly controlled type 2 diabetes mellitus ([facility])  E11.65 Wound cleansing and dressings Diabetic Ulcer Anterior;Left Knee     Wound off loading Diabetic Ulcer Anterior;Left Knee      3. Peripheral arterial disease with history of revascularization   I73.9 Wound cleansing and dressings Diabetic Ulcer Anterior;Left Knee    Z98.890 Wound off loading Diabetic Ulcer Anterior;Left Knee      4. Diabetic polyneuropathy associated with type 2 diabetes mellitus ([facility])  E11.42 Wound cleansing and dressings Diabetic Ulcer Anterior;Left Knee     Wound off loading Diabetic Ulcer Anterior;Left Knee          --  Ismael Prasad MD    \"This note has been constructed using a voice recognition system. Therefore there may be syntax, spelling, and/or grammatical errors. Occasional wrong word or \"sound alike\" substitutions may have occurred due to the inherent limitations of voice recognition software. Read the chart carefully and recognize, using context, where substitutions have occurred. Please call if you have any questions.\"     "

## 2024-03-15 DIAGNOSIS — E11.65 POORLY CONTROLLED TYPE 2 DIABETES MELLITUS (HCC): ICD-10-CM

## 2024-03-15 RX ORDER — INSULIN LISPRO 100 [IU]/ML
INJECTION, SOLUTION INTRAVENOUS; SUBCUTANEOUS
Qty: 15 ML | Refills: 5 | Status: CANCELLED | OUTPATIENT
Start: 2024-03-15

## 2024-03-15 RX ORDER — INSULIN LISPRO 100 [IU]/ML
INJECTION, SOLUTION INTRAVENOUS; SUBCUTANEOUS
Qty: 15 ML | Refills: 5 | Status: SHIPPED | OUTPATIENT
Start: 2024-03-15

## 2024-03-15 NOTE — TELEPHONE ENCOUNTER
Reason for call: PATIENT IS COMPLETELY OUT OF INSULIN  [x] Refill   [] Prior Auth  [] Other:     Office:   [x] PCP/Provider - [facility]  [] Specialty/Provider -     Medication: Humalog KwikPen 100 units/mL    Quantity: 15 mL with 6 refills    Pharmacy: [id number]    Does the patient have enough for 3 days?   [] Yes   [x] No - Send as HP to POD

## 2024-03-21 ENCOUNTER — OFFICE VISIT (OUTPATIENT)
Dept: WOUND CARE | Facility: HOSPITAL | Age: 69
End: 2024-03-21
Payer: COMMERCIAL

## 2024-03-21 VITALS
HEART RATE: 83 BPM | TEMPERATURE: 97.5 F | RESPIRATION RATE: 18 BRPM | DIASTOLIC BLOOD PRESSURE: 78 MMHG | SYSTOLIC BLOOD PRESSURE: 146 MMHG

## 2024-03-21 DIAGNOSIS — Z98.890 PERIPHERAL ARTERIAL DISEASE WITH HISTORY OF REVASCULARIZATION  (HCC): ICD-10-CM

## 2024-03-21 DIAGNOSIS — I73.9 PERIPHERAL ARTERIAL DISEASE WITH HISTORY OF REVASCULARIZATION  (HCC): ICD-10-CM

## 2024-03-21 DIAGNOSIS — E11.65 POORLY CONTROLLED TYPE 2 DIABETES MELLITUS (HCC): ICD-10-CM

## 2024-03-21 DIAGNOSIS — L97.929 DIABETIC ULCER OF LEFT LOWER LEG (HCC): Primary | ICD-10-CM

## 2024-03-21 DIAGNOSIS — E11.622 DIABETIC ULCER OF LEFT LOWER LEG (HCC): Primary | ICD-10-CM

## 2024-03-21 DIAGNOSIS — E11.42 DIABETIC POLYNEUROPATHY ASSOCIATED WITH TYPE 2 DIABETES MELLITUS (HCC): ICD-10-CM

## 2024-03-21 PROCEDURE — 97597 DBRDMT OPN WND 1ST 20 CM/<: CPT | Performed by: STUDENT IN AN ORGANIZED HEALTH CARE EDUCATION/TRAINING PROGRAM

## 2024-03-21 PROCEDURE — 97598 DBRDMT OPN WND ADDL 20CM/<: CPT | Performed by: STUDENT IN AN ORGANIZED HEALTH CARE EDUCATION/TRAINING PROGRAM

## 2024-03-21 NOTE — PATIENT INSTRUCTIONS
Orders Placed This Encounter   Procedures    Wound cleansing and dressings     Left Knee Wound:      Wash your hands with soap and water. Remove old dressing, discard into plastic bag and place in trash. Cleanse the wound with saline prior to applying a clean dressing. Do not use tissue or cotton balls. Do not scrub the wound. Pat dry using gauze.      Shower yes   Apply calmaseptine to surrounding skin  Cover with polymem AG  Secure with emily and tape     Change dressing 3 times a week.      This was done today.     Standing Status:   Future     Standing Expiration Date:   3/21/2025    Wound off loading     Left Knee Keep weight and pressure off wound at all times, do not wear orthotic     Standing Status:   Future     Standing Expiration Date:   3/21/2025

## 2024-03-21 NOTE — PROGRESS NOTES
"Patient ID: Kan Juan is a 68 y.o. male Date of Birth 1955     Chief Complaint  Chief Complaint   Patient presents with    Follow Up Wound Care Visit       Allergies  Shellfish-derived products - food allergy and Sulfamethoxazole-trimethoprim    Assessment:     Diagnoses and all orders for this visit:    Diabetic ulcer of left lower leg (HCC)  -     Wound cleansing and dressings; Future  -     Wound off loading; Future  -     Debridement    Poorly controlled type 2 diabetes mellitus (HCC)  -     Wound cleansing and dressings; Future  -     Wound off loading; Future    Peripheral arterial disease with history of revascularization     Diabetic polyneuropathy associated with type 2 diabetes mellitus (HCC)              Debridement   Wound 12/20/23 Diabetic Ulcer Knee Anterior;Left    Universal Protocol:  Consent: Verbal consent obtained.  Risks and benefits: risks, benefits and alternatives were discussed  Consent given by: patient  Time out: Immediately prior to procedure a \"time out\" was called to verify the correct patient, procedure, equipment, support staff and site/side marked as required.  Patient identity confirmed: verbally with patient    Debridement Details  Performed by: physician  Debridement type: selective  Pain control: lidocaine 4%      Post-debridement measurements  Length (cm): 3.5  Width (cm): 2.7  Depth (cm): 0.1  Percent debrided: 90%  Surface Area (cm^2): 9.45  Area Debrided (cm^2): 8.5  Volume (cm^3): 0.95    Devitalized tissue debrided: biofilm and exudate  Instrument(s) utilized: curette  Bleeding: small  Hemostasis obtained with: pressure  Procedural pain (0-10): 1  Post-procedural pain: 0   Response to treatment: procedure was tolerated well        Plan:   It was a pleasure to see Kan Juan for wound care follow up today  Selective debridement performed today as above  Wound is improving.  Measuring slightly larger however wound bed itself is significantly healthier.  Continue " plan of care as noted below with hydrocolloid to PolyMem Max with silver.  Patient is having more exudate today.   A1C results reviewed with the patient today.   No signs or symptoms of infection today. Patient understands that if any signs of infection start (such as increased redness, drainage, pain, fever, chills, diaphoresis), they should call our office or proceed to the ER or Urgent Care.  Patient should continue a high protein diet to facilitate wound healing  Patient is advised to not submerge wound or leave wound open to air.  Follow up in 1 weeks  Given the multi-factorial nature of wound care, additional time was taken to review patient's treatment plan with other specialties and most recent pertinent lab work and imaging.   All plans of care discussed with patient at bedside who verbalized understanding with treatment plan.    Wound 12/20/23 Diabetic Ulcer Knee Anterior;Left (Active)   Wound Image Images linked 03/21/24 1100   Wound Description Slough;Yellow;Beefy red 03/21/24 1100   Dominga-wound Assessment Scar Tissue;[place] 03/21/24 1100   Wound Length (cm) 3.5 cm 03/21/24 1100   Wound Width (cm) 2.7 cm 03/21/24 1100   Wound Depth (cm) 0.1 cm 03/21/24 1100   Wound Surface Area (cm^2) 9.45 cm^2 03/21/24 1100   Wound Volume (cm^3) 0.945 cm^3 03/21/24 1100   Calculated Wound Volume (cm^3) 0.95 cm^3 03/21/24 1100   Drainage Amount Moderate 03/21/24 1100   Drainage Description Serosanguineous 03/21/24 1100   Non-staged Wound Description Full thickness 03/21/24 1100       Wound 12/20/23 Diabetic Ulcer Knee Anterior;Left (Active)   Date First Assessed/Time First Assessed: 12/20/23 1312   Primary Wound Type: Diabetic Ulcer  Location: Knee  Wound Location Orientation: Anterior;Left  Wound Description (Comments): CAO 4       [REMOVED] Wound 11/29/23 Knee Left (Removed)   Resolved Date: 12/20/23  Date First Assessed/Time First Assessed: 11/29/23 1452   Location: Knee  Wound Location Orientation: Left  Incision's  1st Dressing: PACKING PLAIN 1/4 IN, DRESSING XEROFORM 1 X 8, SPONGE GAUZE 4 X 4 16 PLY STRL PLASTIC TRAY LF,...       Subjective:      .    3/21/24: With applying hydrocolloid as directed this past week.  Does notice more pink in the wound bed however notes that he is having more exudate and does find this wound care product to be irritating to his skin.  Denies any symptoms of infection today including fever chills diaphoresis.    3/14/24: Applying collagen as directed.  Notes that collagen is not really absorbing into the wound bed and is adhered to the wound instead.  No symptoms of infection today.     3/7/24: Applying Betadine as directed.  Happy with wound healing.  No symptoms of infection.     2/27/24: Patient presents today after calling yesterday regarding drainage coming from the wound.  Today notes that that has calmed down some but he continues to have some yellow drainage.  No symptoms of infection.  No skin tightness directly above the wound site.     2/22/24, 2/15/24, 2/8/24, 2/1/24, 1/25/2, 1/18/24, 1/11/24, 1/3/24: Applying Betadine daily as directed.  No symptoms of infection.     12/20/23: Consult - Johnathon is a pleasant 68-year-old male with a past medical history of type 2 diabetes mellitus most recent A1c 11.6% 3 weeks ago, diabetic  polyneuropathy, peripheral arterial disease with history of re-vascularization, atrial fibrillation on chronic anticoagulation, hx of osteomyelitis requiring left sided BKA, continued opioid dependence here today for initial wound care consult.  Patient was referred by the orthopedic surgeon, Dr. Daryl Shay.  He had incision and drainage of the left knee prepatellar bursa on 11/29/23 to 4-day hospital course at [facility] at which time he was hospitalized for septic prepatellar bursitis.  Following this he had left knee stiffness and pain and was given a 10-day course of such cefadroxil.  He was seen for his suture removal postop visit On 12/14/2023 at Which  Time It Was Noted That There Is an Eschar in the Superior Portion of the Incision Site.  He was told that he needs to have better glycemic control and to speak to endocrinology.  Also advised that he if he does not hear he may require further amputation.  Wound care referral was placed at that visit.  Denies any local or systemic symptoms of infection today including fever chills diaphoresis.     BL LEAD Feb 2023:  RIGHT LOWER LIMB:  Diffuse disease noted throughout the femoral-popliteal arteries without  significant focal stenosis.  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  Ankle/Brachial index: 1.12, within the normal disease category (Prior 1.2).  Metatarsal pressure was unable to be obtained due to non-compressible vessels.  Great toe pressure of 82 mmHg, within the healing range (Prior 46 mmHg).  PVR/ PPG tracings are dampened at the ankle and metatarsal.     LEFT LOWER LIMB:  Diffuse disease noted throughout the femoral-popliteal arteries without  significant focal stenosis.  Below knee amputation.     Compared to previous study on 12/6/2021 and 05/26/2021, there is no significant  change in the disease process.  Recommend repeat testing in 6 months as per protocol unless otherwise  indicated.                The following portions of the patient's history were reviewed and updated as appropriate: allergies, current medications, past family history, past medical history, past social history, past surgical history, and problem list.    Review of Systems   Constitutional:  Negative for chills, diaphoresis and fever.   Skin:  Positive for wound.   All other systems reviewed and are negative.        Objective:       Wound 12/20/23 Diabetic Ulcer Knee Anterior;Left (Active)   Wound Image Images linked 03/21/24 1100   Wound Description Slough;Yellow;Beefy red 03/21/24 1100   Dominga-wound Assessment Scar Tissue;[place] 03/21/24 1100   Wound Length (cm) 3.5 cm 03/21/24 1100   Wound Width (cm) 2.7 cm 03/21/24  1100   Wound Depth (cm) 0.1 cm 03/21/24 1100   Wound Surface Area (cm^2) 9.45 cm^2 03/21/24 1100   Wound Volume (cm^3) 0.945 cm^3 03/21/24 1100   Calculated Wound Volume (cm^3) 0.95 cm^3 03/21/24 1100   Drainage Amount Moderate 03/21/24 1100   Drainage Description Serosanguineous 03/21/24 1100   Non-staged Wound Description Full thickness 03/21/24 1100       /78   Pulse 83   Temp 97.5 °F (36.4 °C)   Resp 18     Physical Exam  Vitals reviewed.   Constitutional:       Appearance: Normal appearance.   HENT:      Head: Normocephalic and atraumatic.   Eyes:      Extraocular Movements: Extraocular movements intact.   Pulmonary:      Effort: Pulmonary effort is normal.   Musculoskeletal:      Cervical back: Neck supple.   Skin:     Comments: Anterior left knee wound measuring slightly larger than last exam.  Minimal periwound maceration.  Large areas of the wound bed have healthy red granulation tissue as opposed to yellow fibrin that was noted at last visit.  Still with fibrin and fatty tissue in the wound bed.  Overall wound appears healthy and without odor.   Neurological:      Mental Status: He is alert.   Psychiatric:         Mood and Affect: Mood normal.           Wound Instructions:  Orders Placed This Encounter   Procedures    Wound cleansing and dressings     Left Knee Wound:      Wash your hands with soap and water. Remove old dressing, discard into plastic bag and place in trash. Cleanse the wound with saline prior to applying a clean dressing. Do not use tissue or cotton balls. Do not scrub the wound. Pat dry using gauze.      Shower yes   Apply calmaseptine to surrounding skin  Cover with polymem AG  Secure with emily and tape     Change dressing 3 times a week.      This was done today.     Standing Status:   Future     Standing Expiration Date:   3/21/2025    Wound off loading     Left Knee Keep weight and pressure off wound at all times, do not wear orthotic     Standing Status:   Future      "Standing Expiration Date:   3/21/2025    Debridement     This order was created via procedure documentation        Diagnosis ICD-10-CM Associated Orders   1. Diabetic ulcer of left lower leg (HCC)  E11.622 Wound cleansing and dressings    L97.929 Wound off loading     Debridement      2. Poorly controlled type 2 diabetes mellitus (HCC)  E11.65 Wound cleansing and dressings     Wound off loading      3. Peripheral arterial disease with history of revascularization   I73.9     Z98.890       4. Diabetic polyneuropathy associated with type 2 diabetes mellitus (HCC)  E11.42           --  Ismael Prasad MD    \"This note has been constructed using a voice recognition system. Therefore there may be syntax, spelling, and/or grammatical errors. Occasional wrong word or \"sound alike\" substitutions may have occurred due to the inherent limitations of voice recognition software. Read the chart carefully and recognize, using context, where substitutions have occurred. Please call if you have any questions.\"     "

## 2024-03-28 ENCOUNTER — OFFICE VISIT (OUTPATIENT)
Dept: WOUND CARE | Facility: HOSPITAL | Age: 69
End: 2024-03-28
Payer: COMMERCIAL

## 2024-03-28 VITALS
HEART RATE: 82 BPM | DIASTOLIC BLOOD PRESSURE: 79 MMHG | TEMPERATURE: 98.1 F | SYSTOLIC BLOOD PRESSURE: 144 MMHG | RESPIRATION RATE: 18 BRPM

## 2024-03-28 DIAGNOSIS — E11.65 POORLY CONTROLLED TYPE 2 DIABETES MELLITUS (HCC): ICD-10-CM

## 2024-03-28 DIAGNOSIS — L97.929 DIABETIC ULCER OF LEFT LOWER LEG (HCC): Primary | ICD-10-CM

## 2024-03-28 DIAGNOSIS — E11.622 DIABETIC ULCER OF LEFT LOWER LEG (HCC): Primary | ICD-10-CM

## 2024-03-28 DIAGNOSIS — Z98.890 PERIPHERAL ARTERIAL DISEASE WITH HISTORY OF REVASCULARIZATION  (HCC): ICD-10-CM

## 2024-03-28 DIAGNOSIS — I73.9 PERIPHERAL ARTERIAL DISEASE WITH HISTORY OF REVASCULARIZATION  (HCC): ICD-10-CM

## 2024-03-28 PROCEDURE — 97597 DBRDMT OPN WND 1ST 20 CM/<: CPT | Performed by: STUDENT IN AN ORGANIZED HEALTH CARE EDUCATION/TRAINING PROGRAM

## 2024-03-28 NOTE — PROGRESS NOTES
"Patient ID: Kan Juan is a 68 y.o. male Date of Birth 1955     Chief Complaint  Chief Complaint   Patient presents with    Follow Up Wound Care Visit       Allergies  Shellfish-derived products - food allergy and Sulfamethoxazole-trimethoprim    Assessment:     Diagnoses and all orders for this visit:    Diabetic ulcer of left lower leg (HCC)  -     Wound cleansing and dressings; Future  -     Wound off loading; Future  -     Debridement    Poorly controlled type 2 diabetes mellitus (HCC)  -     Wound cleansing and dressings; Future  -     Wound off loading; Future    Peripheral arterial disease with history of revascularization   -     Wound cleansing and dressings; Future  -     Wound off loading; Future              Debridement   Wound 12/20/23 Diabetic Ulcer Knee Anterior;Left    Universal Protocol:  Consent: Verbal consent obtained.  Risks and benefits: risks, benefits and alternatives were discussed  Consent given by: patient  Time out: Immediately prior to procedure a \"time out\" was called to verify the correct patient, procedure, equipment, support staff and site/side marked as required.  Patient identity confirmed: verbally with patient    Debridement Details  Performed by: physician  Debridement type: selective  Pain control: lidocaine 4%      Post-debridement measurements  Length (cm): 3  Width (cm): 2.1  Depth (cm): 0.1  Percent debrided: 90%  Surface Area (cm^2): 6.3  Area Debrided (cm^2): 5.67  Volume (cm^3): 0.63    Devitalized tissue debrided: biofilm, exudate, fibrin and slough  Instrument(s) utilized: curette  Bleeding: small  Hemostasis obtained with: pressure  Procedural pain (0-10): 1  Post-procedural pain: 0   Response to treatment: procedure was tolerated well        Plan:  It was a pleasure to see Kan Juan for wound care follow up today  Selective debridement performed today as above  Wound is improving   Continue plan of care as noted below with polymem max ag  No signs or " symptoms of infection today. Patient understands that if any signs of infection start (such as increased redness, drainage, pain, fever, chills, diaphoresis), they should call our office or proceed to the ER or Urgent Care.  Patient should continue a high protein diet to facilitate wound healing  Patient is advised to not submerge wound or leave wound open to air.  Follow up in 2 weeks  Given the multi-factorial nature of wound care, additional time was taken to review patient's treatment plan with other specialties and most recent pertinent lab work and imaging.   All plans of care discussed with patient at bedside who verbalized understanding with treatment plan.       Wound 12/20/23 Diabetic Ulcer Knee Anterior;Left (Active)   Wound Image Images linked 03/28/24 1511   Wound Length (cm) 3 cm 03/28/24 1511   Wound Width (cm) 2.1 cm 03/28/24 1511   Wound Depth (cm) 0.1 cm 03/28/24 1511   Wound Surface Area (cm^2) 6.3 cm^2 03/28/24 1511   Wound Volume (cm^3) 0.63 cm^3 03/28/24 1511   Calculated Wound Volume (cm^3) 0.63 cm^3 03/28/24 1511       Wound 12/20/23 Diabetic Ulcer Knee Anterior;Left (Active)   Date First Assessed/Time First Assessed: 12/20/23 1312   Primary Wound Type: Diabetic Ulcer  Location: Knee  Wound Location Orientation: Anterior;Left  Wound Description (Comments): CAO 4       [REMOVED] Wound 11/29/23 Knee Left (Removed)   Resolved Date: 12/20/23  Date First Assessed/Time First Assessed: 11/29/23 1452   Location: Knee  Wound Location Orientation: Left  Incision's 1st Dressing: PACKING PLAIN 1/4 IN, DRESSING XEROFORM 1 X 8, SPONGE GAUZE 4 X 4 16 PLY STRL PLASTIC TRAY LF,...       Subjective:      .    3/28/24: Applying PolyMem as directed.  Happy with wound healing.  Notes that wound looks smaller to him today.  No symptoms of infection.    3/21/24: applying hydrocolloid as directed this past week.  Does notice more pink in the wound bed however notes that he is having more exudate and does find  this wound care product to be irritating to his skin.  Denies any symptoms of infection today including fever chills diaphoresis.     3/14/24: Applying collagen as directed.  Notes that collagen is not really absorbing into the wound bed and is adhered to the wound instead.  No symptoms of infection today.     3/7/24: Applying Betadine as directed.  Happy with wound healing.  No symptoms of infection.     2/27/24: Patient presents today after calling yesterday regarding drainage coming from the wound.  Today notes that that has calmed down some but he continues to have some yellow drainage.  No symptoms of infection.  No skin tightness directly above the wound site.     2/22/24, 2/15/24, 2/8/24, 2/1/24, 1/25/2, 1/18/24, 1/11/24, 1/3/24: Applying Betadine daily as directed.  No symptoms of infection.     12/20/23: Consult - Johnathon is a pleasant 68-year-old male with a past medical history of type 2 diabetes mellitus most recent A1c 11.6% 3 weeks ago, diabetic  polyneuropathy, peripheral arterial disease with history of re-vascularization, atrial fibrillation on chronic anticoagulation, hx of osteomyelitis requiring left sided BKA, continued opioid dependence here today for initial wound care consult.  Patient was referred by the orthopedic surgeon, Dr. Daryl Shay.  He had incision and drainage of the left knee prepatellar bursa on 11/29/23 to 4-day hospital course at [facility] at which time he was hospitalized for septic prepatellar bursitis.  Following this he had left knee stiffness and pain and was given a 10-day course of such cefadroxil.  He was seen for his suture removal postop visit On 12/14/2023 at Which Time It Was Noted That There Is an Eschar in the Superior Portion of the Incision Site.  He was told that he needs to have better glycemic control and to speak to endocrinology.  Also advised that he if he does not hear he may require further amputation.  Wound care referral was placed at that visit.   Denies any local or systemic symptoms of infection today including fever chills diaphoresis.     BL LEAD Feb 2023:  RIGHT LOWER LIMB:  Diffuse disease noted throughout the femoral-popliteal arteries without  significant focal stenosis.  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  Ankle/Brachial index: 1.12, within the normal disease category (Prior 1.2).  Metatarsal pressure was unable to be obtained due to non-compressible vessels.  Great toe pressure of 82 mmHg, within the healing range (Prior 46 mmHg).  PVR/ PPG tracings are dampened at the ankle and metatarsal.     LEFT LOWER LIMB:  Diffuse disease noted throughout the femoral-popliteal arteries without  significant focal stenosis.  Below knee amputation.     Compared to previous study on 12/6/2021 and 05/26/2021, there is no significant  change in the disease process.  Recommend repeat testing in 6 months as per protocol unless otherwise  indicated.             The following portions of the patient's history were reviewed and updated as appropriate: allergies, current medications, past family history, past medical history, past social history, past surgical history, and problem list.    Review of Systems   Constitutional:  Negative for chills, diaphoresis and fever.   Skin:  Positive for wound.   All other systems reviewed and are negative.        Objective:       Wound 12/20/23 Diabetic Ulcer Knee Anterior;Left (Active)   Wound Image Images linked 03/28/24 1511   Wound Length (cm) 3 cm 03/28/24 1511   Wound Width (cm) 2.1 cm 03/28/24 1511   Wound Depth (cm) 0.1 cm 03/28/24 1511   Wound Surface Area (cm^2) 6.3 cm^2 03/28/24 1511   Wound Volume (cm^3) 0.63 cm^3 03/28/24 1511   Calculated Wound Volume (cm^3) 0.63 cm^3 03/28/24 1511       /79   Pulse 82   Temp 98.1 °F (36.7 °C)   Resp 18     Physical Exam  Vitals reviewed.   Constitutional:       Appearance: Normal appearance.   HENT:      Head: Normocephalic and atraumatic.   Eyes:       Extraocular Movements: Extraocular movements intact.   Pulmonary:      Effort: Pulmonary effort is normal.   Musculoskeletal:      Cervical back: Neck supple.   Skin:     Comments: Anterior left knee wound smaller than last exam.  Some pink granulation tissue at the borders otherwise with fibrin deposition in the wound bed centrally.  No signs of infection.   Neurological:      Mental Status: He is alert.   Psychiatric:         Mood and Affect: Mood normal.           Wound Instructions:  Orders Placed This Encounter   Procedures    Wound cleansing and dressings     Left Knee Wound:      Wash your hands with soap and water. Remove old dressing, discard into plastic bag and place in trash. Cleanse the wound with saline prior to applying a clean dressing. Do not use tissue or cotton balls. Do not scrub the wound. Pat dry using gauze.      Shower yes   Apply calmaseptine to surrounding skin  Cover with polymem AG  Secure with emily and tape     Change dressing 3 times a week.      This was done today.     Standing Status:   Future     Standing Expiration Date:   3/28/2025    Wound off loading     Left Knee Wound:      Wash your hands with soap and water. Remove old dressing, discard into plastic bag and place in trash. Cleanse the wound with saline prior to applying a clean dressing. Do not use tissue or cotton balls. Do not scrub the wound. Pat dry using gauze.      Shower yes   Apply calmaseptine to surrounding skin  Cover with polymem AG  Secure with emily and tape     Change dressing 3 times a week.      This was done today.        Standing Status:   Future      Standing Expiration Date:   3/21/2025  · Wound off loading      Left Knee Keep weight and pressure off wound at all times, do not wear orthotic      Standing Status:   Future     Standing Status:   Future     Standing Expiration Date:   3/28/2025    Debridement     This order was created via procedure documentation        Diagnosis ICD-10-CM Associated  "Orders   1. Diabetic ulcer of left lower leg (HCC)  E11.622 Wound cleansing and dressings    L97.929 Wound off loading     Debridement      2. Poorly controlled type 2 diabetes mellitus (HCC)  E11.65 Wound cleansing and dressings     Wound off loading      3. Peripheral arterial disease with history of revascularization   I73.9 Wound cleansing and dressings    Z98.890 Wound off loading          --  Ismael Prasad MD    \"This note has been constructed using a voice recognition system. Therefore there may be syntax, spelling, and/or grammatical errors. Occasional wrong word or \"sound alike\" substitutions may have occurred due to the inherent limitations of voice recognition software. Read the chart carefully and recognize, using context, where substitutions have occurred. Please call if you have any questions.\"     "

## 2024-03-28 NOTE — PATIENT INSTRUCTIONS
Orders Placed This Encounter   Procedures    Wound cleansing and dressings     Left Knee Wound:      Wash your hands with soap and water. Remove old dressing, discard into plastic bag and place in trash. Cleanse the wound with saline prior to applying a clean dressing. Do not use tissue or cotton balls. Do not scrub the wound. Pat dry using gauze.      Shower yes   Apply calmaseptine to surrounding skin  Cover with polymem AG  Secure with emily and tape     Change dressing 3 times a week.      This was done today.     Standing Status:   Future     Standing Expiration Date:   3/28/2025    Wound off loading     Left Knee Wound:      Wash your hands with soap and water. Remove old dressing, discard into plastic bag and place in trash. Cleanse the wound with saline prior to applying a clean dressing. Do not use tissue or cotton balls. Do not scrub the wound. Pat dry using gauze.      Shower yes   Apply calmaseptine to surrounding skin  Cover with polymem AG  Secure with emily and tape     Change dressing 3 times a week.      This was done today.        Standing Status:   Future      Standing Expiration Date:   3/21/2025  · Wound off loading      Left Knee Keep weight and pressure off wound at all times, do not wear orthotic      Standing Status:   Future     Standing Status:   Future     Standing Expiration Date:   3/28/2025

## 2024-04-06 DIAGNOSIS — E11.52 TYPE 2 DIABETES MELLITUS WITH DIABETIC PERIPHERAL ANGIOPATHY AND GANGRENE, WITH LONG-TERM CURRENT USE OF INSULIN (HCC): ICD-10-CM

## 2024-04-06 DIAGNOSIS — Z79.4 TYPE 2 DIABETES MELLITUS WITH DIABETIC PERIPHERAL ANGIOPATHY AND GANGRENE, WITH LONG-TERM CURRENT USE OF INSULIN (HCC): ICD-10-CM

## 2024-04-08 RX ORDER — TIRZEPATIDE 2.5 MG/.5ML
INJECTION, SOLUTION SUBCUTANEOUS
Qty: 2 ML | Refills: 5 | Status: SHIPPED | OUTPATIENT
Start: 2024-04-08

## 2024-04-11 ENCOUNTER — OFFICE VISIT (OUTPATIENT)
Dept: WOUND CARE | Facility: HOSPITAL | Age: 69
End: 2024-04-11
Payer: COMMERCIAL

## 2024-04-11 VITALS
DIASTOLIC BLOOD PRESSURE: 79 MMHG | RESPIRATION RATE: 18 BRPM | SYSTOLIC BLOOD PRESSURE: 128 MMHG | HEART RATE: 82 BPM | TEMPERATURE: 97 F

## 2024-04-11 DIAGNOSIS — E11.65 POORLY CONTROLLED TYPE 2 DIABETES MELLITUS (HCC): ICD-10-CM

## 2024-04-11 DIAGNOSIS — Z98.890 PERIPHERAL ARTERIAL DISEASE WITH HISTORY OF REVASCULARIZATION  (HCC): ICD-10-CM

## 2024-04-11 DIAGNOSIS — E11.622 DIABETIC ULCER OF LEFT LOWER LEG (HCC): Primary | ICD-10-CM

## 2024-04-11 DIAGNOSIS — L97.929 DIABETIC ULCER OF LEFT LOWER LEG (HCC): Primary | ICD-10-CM

## 2024-04-11 DIAGNOSIS — I73.9 PERIPHERAL ARTERIAL DISEASE WITH HISTORY OF REVASCULARIZATION  (HCC): ICD-10-CM

## 2024-04-11 PROCEDURE — 97597 DBRDMT OPN WND 1ST 20 CM/<: CPT | Performed by: STUDENT IN AN ORGANIZED HEALTH CARE EDUCATION/TRAINING PROGRAM

## 2024-04-11 RX ORDER — LIDOCAINE 40 MG/G
CREAM TOPICAL ONCE
Status: COMPLETED | OUTPATIENT
Start: 2024-04-11 | End: 2024-04-11

## 2024-04-11 RX ADMIN — LIDOCAINE 1 APPLICATION: 40 CREAM TOPICAL at 10:15

## 2024-04-11 NOTE — PROGRESS NOTES
"Patient ID: Kan Juan is a 68 y.o. male Date of Birth 1955     Chief Complaint  Chief Complaint   Patient presents with    Follow Up Wound Care Visit     Left knee wound       Allergies  Shellfish-derived products - food allergy and Sulfamethoxazole-trimethoprim    Assessment:   Diagnoses and all orders for this visit:    Diabetic ulcer of left lower leg (HCC)  -     lidocaine (LMX) 4 % cream  -     Wound Procedure Treatment Diabetic Ulcer Anterior;Left Knee  -     Wound cleansing and dressings Diabetic Ulcer Anterior;Left Knee; Future  -     Debridement    Poorly controlled type 2 diabetes mellitus (HCC)    Peripheral arterial disease with history of revascularization  (HCC)              Debridement   Wound 12/20/23 Diabetic Ulcer Knee Anterior;Left    Universal Protocol:  Consent: Verbal consent obtained.  Risks and benefits: risks, benefits and alternatives were discussed  Consent given by: patient  Time out: Immediately prior to procedure a \"time out\" was called to verify the correct patient, procedure, equipment, support staff and site/side marked as required.  Patient identity confirmed: verbally with patient    Debridement Details  Performed by: physician  Debridement type: selective  Pain control: lidocaine 4%      Post-debridement measurements  Length (cm): 2.4  Width (cm): 2  Depth (cm): 0.1  Percent debrided: 90%  Surface Area (cm^2): 4.8  Area Debrided (cm^2): 4.32  Volume (cm^3): 0.48    Devitalized tissue debrided: biofilm, exudate, fibrin and slough  Instrument(s) utilized: curette  Bleeding: small  Hemostasis obtained with: pressure  Procedural pain (0-10): 0  Post-procedural pain: 0   Response to treatment: procedure was tolerated well        Plan:  It was a pleasure to see Kan Juan for wound care follow up today  Selective debridement performed today as above  Wound is improving   Continue plan of care as noted below with polymem max ag  No signs or symptoms of infection today. Patient " understands that if any signs of infection start (such as increased redness, drainage, pain, fever, chills, diaphoresis), they should call our office or proceed to the ER or Urgent Care.  Patient should continue a high protein diet to facilitate wound healing  Patient is advised to not submerge wound or leave wound open to air.  Follow up in 1 weeks  Given the multi-factorial nature of wound care, additional time was taken to review patient's treatment plan with other specialties and most recent pertinent lab work and imaging.   All plans of care discussed with patient at bedside who verbalized understanding with treatment plan.       Wound 12/20/23 Diabetic Ulcer Knee Anterior;Left (Active)   Wound Image Images linked 04/11/24 1013   Wound Description Slough;Yellow;Pink 04/11/24 1013   Dominga-wound Assessment Scar Tissue;Pink 04/11/24 1013   Wound Length (cm) 2.4 cm 04/11/24 1013   Wound Width (cm) 2 cm 04/11/24 1013   Wound Depth (cm) 0.1 cm 04/11/24 1013   Wound Surface Area (cm^2) 4.8 cm^2 04/11/24 1013   Wound Volume (cm^3) 0.48 cm^3 04/11/24 1013   Calculated Wound Volume (cm^3) 0.48 cm^3 04/11/24 1013   Drainage Amount Moderate 04/11/24 1013   Drainage Description Serosanguineous;Yellow 04/11/24 1013   Non-staged Wound Description Full thickness 04/11/24 1013   Dressing Status Intact 04/11/24 1013       Wound 12/20/23 Diabetic Ulcer Knee Anterior;Left (Active)   Date First Assessed/Time First Assessed: 12/20/23 1312   Primary Wound Type: Diabetic Ulcer  Location: Knee  Wound Location Orientation: Anterior;Left  Wound Description (Comments): CAO 4       [REMOVED] Wound 11/29/23 Knee Left (Removed)   Resolved Date: 12/20/23  Date First Assessed/Time First Assessed: 11/29/23 1452   Location: Knee  Wound Location Orientation: Left  Incision's 1st Dressing: PACKING PLAIN 1/4 IN, DRESSING XEROFORM 1 X 8, SPONGE GAUZE 4 X 4 16 PLY STRL PLASTIC TRAY LF,...       Subjective:      .    4/11/24, 3/28/24: Applying  PolyMem as directed.  Happy with wound healing.  Notes that wound looks smaller to him today.  No symptoms of infection.     3/21/24: applying hydrocolloid as directed this past week.  Does notice more pink in the wound bed however notes that he is having more exudate and does find this wound care product to be irritating to his skin.  Denies any symptoms of infection today including fever chills diaphoresis.     3/14/24: Applying collagen as directed.  Notes that collagen is not really absorbing into the wound bed and is adhered to the wound instead.  No symptoms of infection today.     3/7/24: Applying Betadine as directed.  Happy with wound healing.  No symptoms of infection.     2/27/24: Patient presents today after calling yesterday regarding drainage coming from the wound.  Today notes that that has calmed down some but he continues to have some yellow drainage.  No symptoms of infection.  No skin tightness directly above the wound site.     2/22/24, 2/15/24, 2/8/24, 2/1/24, 1/25/2, 1/18/24, 1/11/24, 1/3/24: Applying Betadine daily as directed.  No symptoms of infection.     12/20/23: Consult - Johnathon is a pleasant 68-year-old male with a past medical history of type 2 diabetes mellitus most recent A1c 11.6% 3 weeks ago, diabetic  polyneuropathy, peripheral arterial disease with history of re-vascularization, atrial fibrillation on chronic anticoagulation, hx of osteomyelitis requiring left sided BKA, continued opioid dependence here today for initial wound care consult.  Patient was referred by the orthopedic surgeon, Dr. Daryl Shay.  He had incision and drainage of the left knee prepatellar bursa on 11/29/23 to 4-day hospital course at [facility] at which time he was hospitalized for septic prepatellar bursitis.  Following this he had left knee stiffness and pain and was given a 10-day course of such cefadroxil.  He was seen for his suture removal postop visit On 12/14/2023 at Which Time It Was Noted  That There Is an Eschar in the Superior Portion of the Incision Site.  He was told that he needs to have better glycemic control and to speak to endocrinology.  Also advised that he if he does not hear he may require further amputation.  Wound care referral was placed at that visit.  Denies any local or systemic symptoms of infection today including fever chills diaphoresis.     BL LEAD Feb 2023:  RIGHT LOWER LIMB:  Diffuse disease noted throughout the femoral-popliteal arteries without  significant focal stenosis.  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  Ankle/Brachial index: 1.12, within the normal disease category (Prior 1.2).  Metatarsal pressure was unable to be obtained due to non-compressible vessels.  Great toe pressure of 82 mmHg, within the healing range (Prior 46 mmHg).  PVR/ PPG tracings are dampened at the ankle and metatarsal.     LEFT LOWER LIMB:  Diffuse disease noted throughout the femoral-popliteal arteries without  significant focal stenosis.  Below knee amputation.     Compared to previous study on 12/6/2021 and 05/26/2021, there is no significant  change in the disease process.  Recommend repeat testing in 6 months as per protocol unless otherwise  indicated.             The following portions of the patient's history were reviewed and updated as appropriate: allergies, current medications, past family history, past medical history, past social history, past surgical history, and problem list.    Review of Systems   Constitutional:  Negative for chills, diaphoresis and fever.   Skin:  Positive for wound.   All other systems reviewed and are negative.        Objective:       Wound 12/20/23 Diabetic Ulcer Knee Anterior;Left (Active)   Wound Image Images linked 04/11/24 1013   Wound Description Slough;Yellow;Pink 04/11/24 1013   Dominga-wound Assessment Scar Tissue;Pink 04/11/24 1013   Wound Length (cm) 2.4 cm 04/11/24 1013   Wound Width (cm) 2 cm 04/11/24 1013   Wound Depth (cm) 0.1  cm 04/11/24 1013   Wound Surface Area (cm^2) 4.8 cm^2 04/11/24 1013   Wound Volume (cm^3) 0.48 cm^3 04/11/24 1013   Calculated Wound Volume (cm^3) 0.48 cm^3 04/11/24 1013   Drainage Amount Moderate 04/11/24 1013   Drainage Description Serosanguineous;Yellow 04/11/24 1013   Non-staged Wound Description Full thickness 04/11/24 1013   Dressing Status Intact 04/11/24 1013       /79   Pulse 82   Temp (!) 97 °F (36.1 °C)   Resp 18     Physical Exam  Vitals reviewed.   Constitutional:       Appearance: Normal appearance.   HENT:      Head: Normocephalic and atraumatic.   Eyes:      Extraocular Movements: Extraocular movements intact.   Pulmonary:      Effort: Pulmonary effort is normal.   Musculoskeletal:      Cervical back: Neck supple.   Skin:     Comments: Anterior left knee wound smaller than last exam.  Fibrin deposition in the wound bed with some slough and biofilm.  No signs of infection.   Neurological:      Mental Status: He is alert.   Psychiatric:         Mood and Affect: Mood normal.           Wound Instructions:  Orders Placed This Encounter   Procedures    Wound Procedure Treatment Diabetic Ulcer Anterior;Left Knee     This order was created via procedure documentation    Wound cleansing and dressings Diabetic Ulcer Anterior;Left Knee     Left Knee Wound:     Wash your hands with soap and water. Remove old dressing, discard into plastic bag and place in trash. Cleanse the wound with saline prior to applying a clean dressing. Do not use tissue or cotton balls. Do not scrub the wound. Pat dry using gauze.   Shower yes   Apply calmaseptine to surrounding skin     Cover with polymem AG   Secure with emily and tape   Change dressing 3 times a week.     This was done today.       Wound off loading Left Knee Keep weight and pressure off wound at all times, do not wear orthotics     Standing Status:   Future     Standing Expiration Date:   4/18/2024    Debridement     This order was created via procedure  "documentation        Diagnosis ICD-10-CM Associated Orders   1. Diabetic ulcer of left lower leg ([facility])  E11.622 lidocaine (LMX) 4 % cream    L97.929 Wound Procedure Treatment Diabetic Ulcer Anterior;Left Knee     Wound cleansing and dressings Diabetic Ulcer Anterior;Left Knee     Debridement      2. Poorly controlled type 2 diabetes mellitus (HCC)  E11.65       3. Peripheral arterial disease with history of revascularization  ([facility])  I73.9     Z98.890         --  Ismael Prasad MD    \"This note has been constructed using a voice recognition system. Therefore there may be syntax, spelling, and/or grammatical errors. Occasional wrong word or \"sound alike\" substitutions may have occurred due to the inherent limitations of voice recognition software. Read the chart carefully and recognize, using context, where substitutions have occurred. Please call if you have any questions.\"       "

## 2024-04-11 NOTE — PROGRESS NOTES
Wound Procedure Treatment Diabetic Ulcer Anterior;Left Knee    Performed by: Sandra Traore RN  Authorized by: Ismael Prasad MD  Associated wounds:   Wound 12/20/23 Diabetic Ulcer Knee Anterior;Left  Wound cleansed with:  NSS  Applied primary dressing:  Polymem foam  Applied secondary dressing:  ABD  Wound secured with:  Janice and Tape  Polymem max AG to wound bed

## 2024-04-11 NOTE — PATIENT INSTRUCTIONS
Orders Placed This Encounter   Procedures    Wound Procedure Treatment Diabetic Ulcer Anterior;Left Knee     This order was created via procedure documentation    Wound cleansing and dressings Diabetic Ulcer Anterior;Left Knee     Left Knee Wound:     Wash your hands with soap and water. Remove old dressing, discard into plastic bag and place in trash. Cleanse the wound with saline prior to applying a clean dressing. Do not use tissue or cotton balls. Do not scrub the wound. Pat dry using gauze.   Shower yes   Apply calmaseptine to surrounding skin     Cover with polymem AG   Secure with emily and tape   Change dressing 3 times a week.     This was done today.       Wound off loading Left Knee Keep weight and pressure off wound at all times, do not wear orthotics     Standing Status:   Future     Standing Expiration Date:   4/18/2024

## 2024-04-12 ENCOUNTER — RA CDI HCC (OUTPATIENT)
Dept: OTHER | Facility: HOSPITAL | Age: 69
End: 2024-04-12

## 2024-04-19 ENCOUNTER — OFFICE VISIT (OUTPATIENT)
Dept: FAMILY MEDICINE CLINIC | Facility: CLINIC | Age: 69
End: 2024-04-19
Payer: COMMERCIAL

## 2024-04-19 VITALS
RESPIRATION RATE: 16 BRPM | WEIGHT: 182 LBS | DIASTOLIC BLOOD PRESSURE: 80 MMHG | HEART RATE: 88 BPM | SYSTOLIC BLOOD PRESSURE: 126 MMHG | TEMPERATURE: 98.1 F | HEIGHT: 64 IN | BODY MASS INDEX: 31.07 KG/M2 | OXYGEN SATURATION: 99 %

## 2024-04-19 DIAGNOSIS — Z79.4 TYPE 2 DIABETES MELLITUS WITH DIABETIC PERIPHERAL ANGIOPATHY WITHOUT GANGRENE, WITH LONG-TERM CURRENT USE OF INSULIN (HCC): ICD-10-CM

## 2024-04-19 DIAGNOSIS — K21.9 GASTROESOPHAGEAL REFLUX DISEASE WITHOUT ESOPHAGITIS: ICD-10-CM

## 2024-04-19 DIAGNOSIS — I48.91 ATRIAL FIBRILLATION, UNSPECIFIED TYPE (HCC): Primary | ICD-10-CM

## 2024-04-19 DIAGNOSIS — I10 ESSENTIAL HYPERTENSION: ICD-10-CM

## 2024-04-19 DIAGNOSIS — Z79.4 TYPE 2 DIABETES MELLITUS WITH DIABETIC PERIPHERAL ANGIOPATHY AND GANGRENE, WITH LONG-TERM CURRENT USE OF INSULIN (HCC): ICD-10-CM

## 2024-04-19 DIAGNOSIS — Z89.512 S/P BKA (BELOW KNEE AMPUTATION), LEFT (HCC): ICD-10-CM

## 2024-04-19 DIAGNOSIS — G89.29 CHRONIC BACK PAIN, UNSPECIFIED BACK LOCATION, UNSPECIFIED BACK PAIN LATERALITY: ICD-10-CM

## 2024-04-19 DIAGNOSIS — M54.9 CHRONIC BACK PAIN, UNSPECIFIED BACK LOCATION, UNSPECIFIED BACK PAIN LATERALITY: ICD-10-CM

## 2024-04-19 DIAGNOSIS — E11.52 TYPE 2 DIABETES MELLITUS WITH DIABETIC PERIPHERAL ANGIOPATHY AND GANGRENE, WITH LONG-TERM CURRENT USE OF INSULIN (HCC): ICD-10-CM

## 2024-04-19 DIAGNOSIS — E11.65 POORLY CONTROLLED TYPE 2 DIABETES MELLITUS (HCC): ICD-10-CM

## 2024-04-19 DIAGNOSIS — E11.51 TYPE 2 DIABETES MELLITUS WITH DIABETIC PERIPHERAL ANGIOPATHY WITHOUT GANGRENE, WITH LONG-TERM CURRENT USE OF INSULIN (HCC): ICD-10-CM

## 2024-04-19 DIAGNOSIS — E78.2 MIXED HYPERLIPIDEMIA: ICD-10-CM

## 2024-04-19 DIAGNOSIS — G47.00 INSOMNIA, UNSPECIFIED TYPE: ICD-10-CM

## 2024-04-19 PROCEDURE — G2211 COMPLEX E/M VISIT ADD ON: HCPCS | Performed by: FAMILY MEDICINE

## 2024-04-19 PROCEDURE — 99214 OFFICE O/P EST MOD 30 MIN: CPT | Performed by: FAMILY MEDICINE

## 2024-04-19 RX ORDER — INSULIN GLARGINE 100 [IU]/ML
75 INJECTION, SOLUTION SUBCUTANEOUS
Start: 2024-04-19

## 2024-04-19 RX ORDER — OXYCODONE HYDROCHLORIDE AND ACETAMINOPHEN 5; 325 MG/1; MG/1
1 TABLET ORAL EVERY 6 HOURS PRN
Qty: 120 TABLET | Refills: 0 | Status: SHIPPED | OUTPATIENT
Start: 2024-04-19

## 2024-04-19 NOTE — PROGRESS NOTES
Name: Kan Juan      : 1955      MRN: [id number]  Encounter Provider: Macario Goyal MD  Encounter Date: 2024   Encounter department: [facility]    Assessment & Plan     1. Atrial fibrillation, unspecified type (HCC)  Assessment & Plan:  Continue with anticogulation and rate control of heart rate. Concerns about condition were addressed today.       2. Essential hypertension  Assessment & Plan:  Patient is stable with current anti-hypertensive medicine and continue to follow a low sodium diet and take current medication. All questions about this condition were answered today.       3. Gastroesophageal reflux disease without esophagitis  Assessment & Plan:  Patient to continue with present therapy and decrease caffeine, avoid ETOH and smoking to decrease acid production. Pt should also cease eating prior to bedtime and avoid excessive fluid intake prior to sleep. May use antacids as needed for breakthrough GERD. All pateint questions answered today about this condition.       4. Insomnia, unspecified type  Assessment & Plan:  Discussed with patient use of sedative  medications and good  sleep hygiene to maximize treatment for this problem. Pt  to use sedatives only prior to sleep and cautioned them about usage at any other time. All patient questions about this problem answered today.       5. Mixed hyperlipidemia  Assessment & Plan:  Patient  is stable with current medication and we discussed a low fat low cholesterol diet. Weight loss also discussed for this will help lower cholesterol also. Recheck lipids in 6 months.       6. S/P BKA (below knee amputation), left (HCC)  Assessment & Plan:  Patient is stable  and will continue present plan of care and reassess at next routine visit. All questions about this problem from patient were answered today.       7. Type 2 diabetes mellitus with diabetic peripheral angiopathy without gangrene, with long-term current use of  insulin ([facility])  Assessment & Plan:  Needs better glucose control  Lab Results   Component Value Date    HGBA1C 9.0 (A) 02/07/2024     Orders:  -     Hemoglobin A1C; Future; Expected date: 07/18/2024  -     Comprehensive metabolic panel; Future; Expected date: 07/18/2024  -     tirzepatide (Mounjaro) 5 MG/0.5ML; Inject 0.5 mL (5 mg total) under the skin every 7 days    8. Poorly controlled type 2 diabetes mellitus ([facility])  -     Insulin Glargine Solostar (Basaglar KwikPen) 100 UNIT/ML SOPN; Inject 0.75 mL (75 Units total) under the skin daily at bedtime    9. Type 2 diabetes mellitus with diabetic peripheral angiopathy and gangrene, with long-term current use of insulin ([facility])    10. Chronic back pain, unspecified back location, unspecified back pain laterality  -     oxyCODONE-acetaminophen (Percocet) 5-325 mg per tablet; Take 1 tablet by mouth every 6 (six) hours as needed for moderate pain Max Daily Amount: 4 tablets           Subjective     HPI  Review of Systems    Past Medical History:   Diagnosis Date   • Arthritis     fingers   • First degree AV block    • Full dentures    • Hypertension    • PAD (peripheral artery disease) ([facility])    • PVD (peripheral vascular disease) ([facility])    • Type 2 diabetes mellitus with diabetic peripheral angiopathy without gangrene ([facility]) 5/18/2021    Per CMS ICD 10 guidelines--Per Physician   • Wound, open     right foot- by the 5th toe     Past Surgical History:   Procedure Laterality Date   • CHOLECYSTECTOMY     • COLONOSCOPY     • INCISION AND DRAINAGE OF WOUND Left 11/29/2023    Procedure: INCISION AND DRAINAGE (I&D) LEFT KNEE PRE-PATELLA BURSA;  Surgeon: Daryl Shay DO;  Location: [facility];  Service: Orthopedics   • IR AORTAGRAM WITH RUN-OFF  1/28/2021   • IR AORTAGRAM WITH RUN-OFF  4/13/2021   • LEG AMPUTATION THROUGH LOWER TIBIA AND FIBULA Left 7/17/2021    Procedure: AMPUTATION BELOW KNEE (BKA);  Surgeon: Jose Mary MD;  Location: [facility];  Service: Vascular   • [place] BYP  FEM-ANT TIBL PST TIBL PRONEAL ART/OTH DSTL Left 7/14/2021    Procedure: BYPASS femoral-peroneal artery bypass with  reverse GSV and balloon angioplasty peroneal artery;  Surgeon: Jose Mary MD;  Location:  MAIN OR;  Service: Vascular   • CO DEBRIDEMENT BONE 1ST 20 SQ CM/< Right 12/18/2020    Procedure: DEBRIDMENT OF ULCER , REMOVAL OF DEVITALIZED SKIN, SOFT TISSUE, BONE AND APPLICATION OF INTEGRA GRAFT RIGHT FOOT.;  Surgeon: Daquan Ellis DPM;  Location: [facility];  Service: Podiatry   • REPLANTATION THUMB Right     right tip reconnected   • SKIN GRAFT      right thumb   • TOE AMPUTATION      left foot all 5 toes removed   • TOE AMPUTATION Right 2/2/2021    Procedure: Right partial 5th ray amputation;  Surgeon: Gabriel Garcia DPM;  Location:  MAIN OR;  Service: Podiatry   • TOE OSTEOTOMY Right 6/26/2020    Procedure: exostosis of right big toe;  Surgeon: Trey Shirley DPM;  Location: [facility];  Service: Podiatry   • TRIGGER FINGER RELEASE      bilateral hands   • VEIN LIGATION      right     Family History   Problem Relation Age of Onset   • Arthritis Mother    • Pancreatic cancer Mother    • Cancer Father         colon   • Alzheimer's disease Father      Social History     Socioeconomic History   • Marital status: /Civil Union     Spouse name: Angeles   • Number of children: 1   • Years of education: 12   • Highest education level: High school graduate   Occupational History   • Occupation: [employer]   Tobacco Use   • Smoking status: Never   • Smokeless tobacco: Never   Vaping Use   • Vaping status: Never Used   Substance and Sexual Activity   • Alcohol use: Not Currently     Comment: occasional   • Drug use: Never   • Sexual activity: Yes     Partners: Female   Other Topics Concern   • None   Social History Narrative         Social Determinants of Health     Financial Resource Strain: Low Risk  (9/15/2023)    Overall Financial Resource Strain (CARDIA)    • Difficulty of Paying Living Expenses: Not hard at  "all   Food Insecurity: Not on file   Transportation Needs: No Transportation Needs (9/15/2023)    PRAPARE - Transportation    • Lack of Transportation (Medical): No    • Lack of Transportation (Non-Medical): No   Physical Activity: Not on file   Stress: Not on file   Social Connections: Not on file   Intimate Partner Violence: Not on file   Housing Stability: Not on file     Current Outpatient Medications on File Prior to Visit   Medication Sig   • Accu-Chek Softclix Lancets lancets Use as instructed qid Dx E11.9   • Alcohol Swabs (B-D SINGLE USE SWABS REGULAR) PADS Use 4 (four) times a day Dx E11.9   • atorvastatin (LIPITOR) 40 mg tablet Take 1 tablet (40 mg total) by mouth daily at bedtime   • Blood Glucose Monitoring Suppl (Accu-Chek Guide) w/Device KIT Use 4 (four) times a day   • clopidogrel (PLAVIX) 75 mg tablet Take 1 tablet (75 mg total) by mouth daily   • glucose blood (Accu-Chek Guide) test strip Use 1 each 4 (four) times a day   • insulin lispro (HumaLOG KwikPen) 100 units/mL injection pen Inject 25 Units under the skin 3 (three) times a day with meals   • Insulin Pen Needle (HM UltiCare Mini Pen Needles) 31G X 5 MM MISC Use 4 (four) times a day   • lisinopril (ZESTRIL) 10 mg tablet Take 1 tablet (10 mg total) by mouth daily   • pantoprazole (PROTONIX) 40 mg tablet Take 1 tablet (40 mg total) by mouth daily   • pioglitazone (ACTOS) 45 mg tablet Take 1 tablet (45 mg total) by mouth daily   • rivaroxaban (Xarelto) 2.5 mg tablet Take 1 tablet (2.5 mg total) by mouth daily with breakfast Last dose 6/21/20   • tirzepatide (Mounjaro) 2.5 MG/0.5ML Inject 0.5 mL (2.5 mg total) under the skin every 7 days   • UltiCare Insulin Syringe 31G X 5/16\" 1 ML MISC Inject under the skin as needed (for injection)   • [DISCONTINUED] Insulin Glargine Solostar (Basaglar KwikPen) 100 UNIT/ML SOPN Inject 0.5 mL (50 Units total) under the skin daily at bedtime   • [DISCONTINUED] oxyCODONE-acetaminophen (Percocet) 5-325 mg per " "tablet Take 1 tablet by mouth every 6 (six) hours as needed for moderate pain Max Daily Amount: 4 tablets   • betamethasone dipropionate (DIPROSONE) 0.05 % cream Apply topically 2 (two) times a day (Patient not taking: Reported on 3/8/2024)   • Blood Glucose Calibration (Accu-Chek Guide Control) LIQD Test daily as needed (abnormal sugar reading) (Patient not taking: Reported on 3/8/2024)   • Blood Glucose Monitoring Suppl (Accu-Chek Guide) w/Device KIT Use 4 (four) times a day Dx E11.9 (Patient not taking: Reported on 3/8/2024)   • cyclobenzaprine (FLEXERIL) 5 mg tablet Take 1 tablet (5 mg total) by mouth 3 (three) times a day as needed for muscle spasms for up to 10 days   • [DISCONTINUED] metFORMIN (GLUCOPHAGE) 1000 MG tablet Take 1 tablet (1,000 mg total) by mouth 2 (two) times a day with meals (Patient not taking: Reported on 12/20/2023)     Allergies   Allergen Reactions   • Shellfish-Derived Products - Food Allergy Anaphylaxis     Throat closes   • Sulfamethoxazole-Trimethoprim Rash     Immunization History   Administered Date(s) Administered   • COVID-19 PFIZER VACCINE 0.3 ML IM 03/02/2021, 03/22/2021   • INFLUENZA 11/02/2018, 10/14/2021, 10/17/2022, 11/24/2023   • Influenza, high dose seasonal 0.7 mL 11/05/2020, 10/14/2021, 10/17/2022, 11/24/2023       Objective     /80 (BP Location: Left arm, Patient Position: Sitting, Cuff Size: Large)   Pulse 88   Temp 98.1 °F (36.7 °C) (Temporal)   Resp 16   Ht 5' 4\" (1.626 m)   Wt 82.6 kg (182 lb)   SpO2 99%   BMI 31.24 kg/m²     Physical Exam  Vitals and nursing note reviewed.   Constitutional:       Appearance: He is well-developed.   HENT:      Head: Normocephalic and atraumatic.      Nose: Nose normal.      Mouth/Throat:      Mouth: Mucous membranes are moist.   Eyes:      General: No scleral icterus.     Conjunctiva/sclera: Conjunctivae normal.      Pupils: Pupils are equal, round, and reactive to light.   Neck:      Thyroid: No thyromegaly. "   Cardiovascular:      Rate and Rhythm: Normal rate and regular rhythm.      Heart sounds: Normal heart sounds.   Pulmonary:      Effort: Pulmonary effort is normal. No respiratory distress.      Breath sounds: Normal breath sounds. No wheezing.   Abdominal:      General: Bowel sounds are normal.      Palpations: Abdomen is soft.      Tenderness: There is no abdominal tenderness. There is no guarding or rebound.   Musculoskeletal:      Cervical back: Normal range of motion and neck supple.      Comments: L BKA and wound on stump is healing.    Skin:     General: Skin is warm and dry.      Findings: No rash.   Neurological:      Mental Status: He is alert and oriented to person, place, and time.   Psychiatric:         Mood and Affect: Mood normal.         Behavior: Behavior normal.         Thought Content: Thought content normal.         Judgment: Judgment normal.       Macario Goyal MD

## 2024-04-25 ENCOUNTER — OFFICE VISIT (OUTPATIENT)
Dept: WOUND CARE | Facility: HOSPITAL | Age: 69
End: 2024-04-25
Payer: COMMERCIAL

## 2024-04-25 VITALS
DIASTOLIC BLOOD PRESSURE: 81 MMHG | SYSTOLIC BLOOD PRESSURE: 150 MMHG | TEMPERATURE: 97.4 F | HEART RATE: 90 BPM | RESPIRATION RATE: 15 BRPM

## 2024-04-25 DIAGNOSIS — E11.622 DIABETIC ULCER OF LEFT LOWER LEG (HCC): Primary | ICD-10-CM

## 2024-04-25 DIAGNOSIS — L97.929 DIABETIC ULCER OF LEFT LOWER LEG (HCC): Primary | ICD-10-CM

## 2024-04-25 DIAGNOSIS — E11.65 POORLY CONTROLLED TYPE 2 DIABETES MELLITUS (HCC): ICD-10-CM

## 2024-04-25 DIAGNOSIS — I73.9 PERIPHERAL ARTERIAL DISEASE WITH HISTORY OF REVASCULARIZATION  (HCC): ICD-10-CM

## 2024-04-25 DIAGNOSIS — Z98.890 PERIPHERAL ARTERIAL DISEASE WITH HISTORY OF REVASCULARIZATION  (HCC): ICD-10-CM

## 2024-04-25 PROCEDURE — 11042 DBRDMT SUBQ TIS 1ST 20SQCM/<: CPT | Performed by: FAMILY MEDICINE

## 2024-04-25 PROCEDURE — NC001 PR NO CHARGE: Performed by: FAMILY MEDICINE

## 2024-04-25 RX ORDER — LIDOCAINE HYDROCHLORIDE 40 MG/ML
5 SOLUTION TOPICAL ONCE
Status: COMPLETED | OUTPATIENT
Start: 2024-04-25 | End: 2024-04-25

## 2024-04-25 RX ADMIN — LIDOCAINE HYDROCHLORIDE 5 ML: 40 SOLUTION TOPICAL at 11:08

## 2024-04-25 NOTE — PROGRESS NOTES
Patient ID: Kan Juan is a 68 y.o. male Date of Birth 1955       Chief Complaint   Patient presents with   • Follow Up Wound Care Visit     LLE       Allergies:  Shellfish-derived products - food allergy and Sulfamethoxazole-trimethoprim    Diagnosis:      Diagnosis ICD-10-CM Associated Orders   1. Diabetic ulcer of left lower leg (Former[facility])  E11.622 lidocaine (XYLOCAINE) 4 % topical solution 5 mL    L97.929 Wound cleansing and dressings Diabetic Ulcer Anterior;Left Knee     Wound Procedure Treatment Diabetic Ulcer Anterior;Left Knee     Debridement Diabetic Ulcer Anterior;Left Knee      2. Poorly controlled type 2 diabetes mellitus (Former[facility])  E11.65 lidocaine (XYLOCAINE) 4 % topical solution 5 mL     Wound cleansing and dressings Diabetic Ulcer Anterior;Left Knee     Wound Procedure Treatment Diabetic Ulcer Anterior;Left Knee      3. Peripheral arterial disease with history of revascularization  (Formerly [facility])  I73.9 lidocaine (XYLOCAINE) 4 % topical solution 5 mL    Z98.890 Wound cleansing and dressings Diabetic Ulcer Anterior;Left Knee     Wound Procedure Treatment Diabetic Ulcer Anterior;Left Knee              Assessment & Plan:  Nonhealing postop wound of the left knee.  Ulceration, diabetes and history of PAD.  Improvement is noted today with decrease in size.  Surgical debridement.  Continue with PolyMem Max AG.  Follow-up in 2 weeks.  Type 2 diabetes.  Last A1c was 9.0  A1C results reviewed with the patient today.         Subjective:   4/25/2024: Follow-up postop wound of the left knee, poorly controlled diabetes.  No new complaints.  Using PolyMem Max AG.    4/11/24, 3/28/24: Applying PolyMem as directed.  Happy with wound healing.  Notes that wound looks smaller to him today.  No symptoms of infection.     3/21/24: applying hydrocolloid as directed this past week.  Does notice more pink in the wound bed however notes that he is having more exudate and does find this wound care product to be irritating to his skin.   Denies any symptoms of infection today including fever chills diaphoresis.     3/14/24: Applying collagen as directed.  Notes that collagen is not really absorbing into the wound bed and is adhered to the wound instead.  No symptoms of infection today.     3/7/24: Applying Betadine as directed.  Happy with wound healing.  No symptoms of infection.     2/27/24: Patient presents today after calling yesterday regarding drainage coming from the wound.  Today notes that that has calmed down some but he continues to have some yellow drainage.  No symptoms of infection.  No skin tightness directly above the wound site.     2/22/24, 2/15/24, 2/8/24, 2/1/24, 1/25/2, 1/18/24, 1/11/24, 1/3/24: Applying Betadine daily as directed.  No symptoms of infection.     12/20/23: Consult - Johnathon is a pleasant 68-year-old male with a past medical history of type 2 diabetes mellitus most recent A1c 11.6% 3 weeks ago, diabetic  polyneuropathy, peripheral arterial disease with history of re-vascularization, atrial fibrillation on chronic anticoagulation, hx of osteomyelitis requiring left sided BKA, continued opioid dependence here today for initial wound care consult.  Patient was referred by the orthopedic surgeon, Dr. Daryl Shay.  He had incision and drainage of the left knee prepatellar bursa on 11/29/23 to 4-day hospital course at [facility] at which time he was hospitalized for septic prepatellar bursitis.  Following this he had left knee stiffness and pain and was given a 10-day course of such cefadroxil.  He was seen for his suture removal postop visit On 12/14/2023 at Which Time It Was Noted That There Is an Eschar in the Superior Portion of the Incision Site.  He was told that he needs to have better glycemic control and to speak to endocrinology.  Also advised that he if he does not hear he may require further amputation.  Wound care referral was placed at that visit.  Denies any local or systemic symptoms of infection  today including fever chills diaphoresis.     BL LEAD Feb 2023:  RIGHT LOWER LIMB:  Diffuse disease noted throughout the femoral-popliteal arteries without  significant focal stenosis.  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  Ankle/Brachial index: 1.12, within the normal disease category (Prior 1.2).  Metatarsal pressure was unable to be obtained due to non-compressible vessels.  Great toe pressure of 82 mmHg, within the healing range (Prior 46 mmHg).  PVR/ PPG tracings are dampened at the ankle and metatarsal.     LEFT LOWER LIMB:  Diffuse disease noted throughout the femoral-popliteal arteries without  significant focal stenosis.  Below knee amputation.     Compared to previous study on 12/6/2021 and 05/26/2021, there is no significant  change in the disease process.  Recommend repeat testing in 6 months as per protocol unless otherwise  indicated.             The following portions of the patient's history were reviewed and updated as appropriate:   Patient Active Problem List   Diagnosis   • Acquired deformity of foot   • Diabetic polyneuropathy associated with type 2 diabetes mellitus ([facility])   • Pain in both feet   • Peripheral arteriosclerosis ([facility])   • Onychomycosis   • Tinea pedis of both feet   • Dermatophytosis   • Ingrown toenail   • Paronychia of toenail of right foot   • Diabetic ulcer of toe of right foot associated with type 2 diabetes mellitus, limited to breakdown of skin ([facility])   • Bony exostosis   • Right foot ulcer, with fat layer exposed ([facility])   • PVD (peripheral vascular disease) ([facility])   • Poorly controlled type 2 diabetes mellitus ([facility])   • Essential hypertension   • Mixed hyperlipidemia   • Type 2 diabetes mellitus with diabetic peripheral angiopathy without gangrene ([facility])   • Platelets decreased ([facility])   • Gastroesophageal reflux disease without esophagitis   • S/P BKA (below knee amputation), left ([facility])   • Insomnia   • Continuous opioid dependence ([facility])   • Osteomyelitis,  unspecified site, unspecified type ([facility])   • Embolism and thrombosis of arteries of the lower extremities ([facility])   • Atrial fibrillation, unspecified type ([facility])   • Septic prepatellar bursitis of left knee     Past Medical History:   Diagnosis Date   • Arthritis     fingers   • First degree AV block    • Full dentures    • Hypertension    • PAD (peripheral artery disease) ([facility])    • PVD (peripheral vascular disease) ([facility])    • Type 2 diabetes mellitus with diabetic peripheral angiopathy without gangrene ([facility]) 5/18/2021    Per CMS ICD 10 guidelines--Per Physician   • Wound, open     right foot- by the 5th toe     Past Surgical History:   Procedure Laterality Date   • CHOLECYSTECTOMY     • COLONOSCOPY     • INCISION AND DRAINAGE OF WOUND Left 11/29/2023    Procedure: INCISION AND DRAINAGE (I&D) LEFT KNEE PRE-PATELLA BURSA;  Surgeon: Daryl Shay DO;  Location:  Main OR;  Service: Orthopedics   • IR AORTAGRAM WITH RUN-OFF  1/28/2021   • IR AORTAGRAM WITH RUN-OFF  4/13/2021   • LEG AMPUTATION THROUGH LOWER TIBIA AND FIBULA Left 7/17/2021    Procedure: AMPUTATION BELOW KNEE (BKA);  Surgeon: Jose Mary MD;  Location: [facility];  Service: Vascular   • MA BYP FEM-ANT TIBL PST TIBL PRONEAL ART/OTH DSTL Left 7/14/2021    Procedure: BYPASS femoral-peroneal artery bypass with  reverse GSV and balloon angioplasty peroneal artery;  Surgeon: Jose Mary MD;  Location:  MAIN OR;  Service: Vascular   • MA DEBRIDEMENT BONE 1ST 20 SQ CM/< Right 12/18/2020    Procedure: DEBRIDMENT OF ULCER , REMOVAL OF DEVITALIZED SKIN, SOFT TISSUE, BONE AND APPLICATION OF INTEGRA GRAFT RIGHT FOOT.;  Surgeon: Daquan Ellis DPM;  Location: [facility];  Service: Podiatry   • REPLANTATION THUMB Right     right tip reconnected   • SKIN GRAFT      right thumb   • TOE AMPUTATION      left foot all 5 toes removed   • TOE AMPUTATION Right 2/2/2021    Procedure: Right partial 5th ray amputation;  Surgeon: Gabriel Garcia DPM;  Location:  MAIN OR;   Service: Podiatry   • TOE OSTEOTOMY Right 6/26/2020    Procedure: exostosis of right big toe;  Surgeon: Trey Shirley DPM;  Location: [facility];  Service: Podiatry   • TRIGGER FINGER RELEASE      bilateral hands   • VEIN LIGATION      right     Family History   Problem Relation Age of Onset   • Arthritis Mother    • Pancreatic cancer Mother    • Cancer Father         colon   • Alzheimer's disease Father       Social History     Socioeconomic History   • Marital status: /Civil Union     Spouse name: Angeles   • Number of children: 1   • Years of education: 12   • Highest education level: High school graduate   Occupational History   • Occupation: [employer]   Tobacco Use   • Smoking status: Never   • Smokeless tobacco: Never   Vaping Use   • Vaping status: Never Used   Substance and Sexual Activity   • Alcohol use: Not Currently     Comment: occasional   • Drug use: Never   • Sexual activity: Yes     Partners: Female   Other Topics Concern   • None   Social History Narrative         Social Determinants of Health     Financial Resource Strain: Low Risk  (9/15/2023)    Overall Financial Resource Strain (CARDIA)    • Difficulty of Paying Living Expenses: Not hard at all   Food Insecurity: Not on file   Transportation Needs: No Transportation Needs (9/15/2023)    PRAPARE - Transportation    • Lack of Transportation (Medical): No    • Lack of Transportation (Non-Medical): No   Physical Activity: Not on file   Stress: Not on file   Social Connections: Not on file   Intimate Partner Violence: Not on file   Housing Stability: Not on file        Current Outpatient Medications:   •  Accu-Chek Softclix Lancets lancets, Use as instructed qid Dx E11.9, Disp: 400 each, Rfl: 5  •  Alcohol Swabs (B-D SINGLE USE SWABS REGULAR) PADS, Use 4 (four) times a day Dx E11.9, Disp: 400 each, Rfl: 5  •  atorvastatin (LIPITOR) 40 mg tablet, Take 1 tablet (40 mg total) by mouth daily at bedtime, Disp: 90 tablet, Rfl: 1  •  betamethasone  dipropionate (DIPROSONE) 0.05 % cream, Apply topically 2 (two) times a day (Patient not taking: Reported on 3/8/2024), Disp: 30 g, Rfl: 0  •  Blood Glucose Calibration (Accu-Chek Guide Control) LIQD, Test daily as needed (abnormal sugar reading) (Patient not taking: Reported on 3/8/2024), Disp: 3 each, Rfl: 3  •  Blood Glucose Monitoring Suppl (Accu-Chek Guide) w/Device KIT, Use 4 (four) times a day, Disp: 1 kit, Rfl: 0  •  Blood Glucose Monitoring Suppl (Accu-Chek Guide) w/Device KIT, Use 4 (four) times a day Dx E11.9 (Patient not taking: Reported on 3/8/2024), Disp: 1 kit, Rfl: 0  •  clopidogrel (PLAVIX) 75 mg tablet, Take 1 tablet (75 mg total) by mouth daily, Disp: 90 tablet, Rfl: 1  •  cyclobenzaprine (FLEXERIL) 5 mg tablet, Take 1 tablet (5 mg total) by mouth 3 (three) times a day as needed for muscle spasms for up to 10 days, Disp: 10 tablet, Rfl: 0  •  glucose blood (Accu-Chek Guide) test strip, Use 1 each 4 (four) times a day, Disp: 400 strip, Rfl: 3  •  Insulin Glargine Solostar (Basaglar KwikPen) 100 UNIT/ML SOPN, Inject 0.75 mL (75 Units total) under the skin daily at bedtime, Disp: , Rfl:   •  insulin lispro (HumaLOG KwikPen) 100 units/mL injection pen, Inject 25 Units under the skin 3 (three) times a day with meals, Disp: 15 mL, Rfl: 5  •  Insulin Pen Needle ( UltiCare Mini Pen Needles) 31G X 5 MM MISC, Use 4 (four) times a day, Disp: 400 each, Rfl: 1  •  lisinopril (ZESTRIL) 10 mg tablet, Take 1 tablet (10 mg total) by mouth daily, Disp: 90 tablet, Rfl: 1  •  oxyCODONE-acetaminophen (Percocet) 5-325 mg per tablet, Take 1 tablet by mouth every 6 (six) hours as needed for moderate pain Max Daily Amount: 4 tablets, Disp: 120 tablet, Rfl: 0  •  pantoprazole (PROTONIX) 40 mg tablet, Take 1 tablet (40 mg total) by mouth daily, Disp: 90 tablet, Rfl: 1  •  pioglitazone (ACTOS) 45 mg tablet, Take 1 tablet (45 mg total) by mouth daily, Disp: 90 tablet, Rfl: 1  •  rivaroxaban (Xarelto) 2.5 mg tablet, Take 1  "tablet (2.5 mg total) by mouth daily with breakfast Last dose 6/21/20, Disp: 30 tablet, Rfl: 2  •  tirzepatide (Mounjaro) 2.5 MG/0.5ML, Inject 0.5 mL (2.5 mg total) under the skin every 7 days, Disp: 2 mL, Rfl: 5  •  tirzepatide (Mounjaro) 5 MG/0.5ML, Inject 0.5 mL (5 mg total) under the skin every 7 days, Disp: 2 mL, Rfl: 0  •  UltiCare Insulin Syringe 31G X 5/16\" 1 ML MISC, Inject under the skin as needed (for injection), Disp: 100 each, Rfl: 1  No current facility-administered medications for this visit.    Review of Systems   Constitutional:  Negative for chills, diaphoresis and fever.   Musculoskeletal:  Positive for gait problem (Left BKA).   Skin:  Positive for wound (Left knee).   Neurological:  Positive for numbness.   All other systems reviewed and are negative.      Objective:  /81   Pulse 90   Temp (!) 97.4 °F (36.3 °C)   Resp 15   Pain Score: 0-No pain     Physical Exam  Vitals reviewed.   Constitutional:       Appearance: Normal appearance.   HENT:      Head: Normocephalic and atraumatic.   Cardiovascular:      Rate and Rhythm: Normal rate.   Pulmonary:      Effort: Pulmonary effort is normal.   Musculoskeletal:      Cervical back: Neck supple.   Skin:     Findings: Wound present. No erythema.             Comments: Anterior left knee wound slightly smaller than last exam.  Fibrin deposition in the wound bed with some slough.  No malodor and no erythema.   Neurological:      Mental Status: He is alert.   Psychiatric:         Mood and Affect: Mood normal.         Wound 12/20/23 Diabetic Ulcer Knee Anterior;Left (Active)   Wound Image Images linked 04/25/24 1121   Wound Description Slough;Yellow;Pink 04/25/24 1102   Dominga-wound Assessment Scar Tissue;Kennett 04/25/24 1102   Wound Length (cm) 2.4 cm 04/25/24 1102   Wound Width (cm) 1.5 cm 04/25/24 1102   Wound Depth (cm) 0.1 cm 04/25/24 1102   Wound Surface Area (cm^2) 3.6 cm^2 04/25/24 1102   Wound Volume (cm^3) 0.36 cm^3 04/25/24 1102   Calculated " "Wound Volume (cm^3) 0.36 cm^3 04/25/24 1102   Drainage Amount Moderate 04/25/24 1102   Drainage Description Serosanguineous;Yellow 04/25/24 1102   Non-staged Wound Description Full thickness 04/25/24 1102   Dressing Status Intact (upon arrival) 04/25/24 1102        Debridement   Wound 12/20/23 Diabetic Ulcer Knee Anterior;Left    Universal Protocol:  Consent: Verbal consent obtained. Written consent obtained.  Consent given by: patient  Time out: Immediately prior to procedure a \"time out\" was called to verify the correct patient, procedure, equipment, support staff and site/side marked as required.  Patient understanding: patient states understanding of the procedure being performed  Patient identity confirmed: verbally with patient    Debridement Details  Performed by: physician  Debridement type: surgical  Level of debridement: subcutaneous tissue  Pain control: lidocaine 4%      Post-debridement measurements  Length (cm): 2.4  Width (cm): 1.5  Depth (cm): 0.2  Percent debrided: 100%  Surface Area (cm^2): 3.6  Area Debrided (cm^2): 3.6  Volume (cm^3): 0.72    Tissue and other material debrided: adipose, dermis and subcutaneous tissue  Devitalized tissue debrided: fibrin and slough  Instrument(s) utilized: curette  Bleeding: medium  Hemostasis obtained with: pressure  Procedural pain (0-10): 0  Post-procedural pain: 0   Response to treatment: procedure was tolerated well               Lab Results   Component Value Date    HGBA1C 9.0 (A) 02/07/2024       Wound Instructions:  Orders Placed This Encounter   Procedures   • Wound cleansing and dressings Diabetic Ulcer Anterior;Left Knee     Left Knee Wound:      Wash your hands with soap and water. Remove old dressing, discard into plastic bag and place in trash. Cleanse the wound with saline prior to applying a clean dressing. Do not use tissue or cotton balls. Do not scrub the wound. Pat dry using gauze.   Shower yes   Apply moisturizer to surrounding skin      Cover " "with polymem Max AG   Secure with emily and tape   Change dressing 3 times a week.      This was done today.         Wound off loading Left Knee Keep weight and pressure off wound at all times, do not wear     Standing Status:   Future     Standing Expiration Date:   5/2/2024   • Wound Procedure Treatment Diabetic Ulcer Anterior;Left Knee     This order was created via procedure documentation   • Debridement Diabetic Ulcer Anterior;Left Knee     This order was created via procedure documentation             Karson Juan MD, CHT, CWS    Portions of the record may have been created with voice recognition software. Occasional wrong word or \"sound alike\" substitutions may have occurred due to the inherent limitations of voice recognition software. Read the chart carefully and recognize, using context, where substitutions have occurred.    "

## 2024-04-25 NOTE — PATIENT INSTRUCTIONS
Orders Placed This Encounter   Procedures    Wound cleansing and dressings Diabetic Ulcer Anterior;Left Knee     Left Knee Wound:      Wash your hands with soap and water. Remove old dressing, discard into plastic bag and place in trash. Cleanse the wound with saline prior to applying a clean dressing. Do not use tissue or cotton balls. Do not scrub the wound. Pat dry using gauze.   Shower yes   Apply moisturizer to surrounding skin      Cover with polymem Max AG   Secure with emily and tape   Change dressing 3 times a week.      This was done today.         Wound off loading Left Knee Keep weight and pressure off wound at all times, do not wear     Standing Status:   Future     Standing Expiration Date:   5/2/2024

## 2024-04-25 NOTE — PROGRESS NOTES
Wound Procedure Treatment Diabetic Ulcer Anterior;Left Knee    Performed by: Clementina Adler RN  Authorized by: Karson Juan MD  Associated wounds:   Wound 12/20/23 Diabetic Ulcer Knee Anterior;Left  Wound cleansed with:  NSS  Applied to periwound:  Moisture lotion  Applied primary dressing:  Polymem foam and Silver  Applied secondary dressing:  Gauze  Wound secured with:  Janice and Tape

## 2024-05-06 ENCOUNTER — APPOINTMENT (OUTPATIENT)
Dept: LAB | Facility: MEDICAL CENTER | Age: 69
End: 2024-05-06
Payer: COMMERCIAL

## 2024-05-06 DIAGNOSIS — Z79.4 TYPE 2 DIABETES MELLITUS WITH DIABETIC PERIPHERAL ANGIOPATHY AND GANGRENE, WITH LONG-TERM CURRENT USE OF INSULIN (HCC): ICD-10-CM

## 2024-05-06 DIAGNOSIS — Z79.4 TYPE 2 DIABETES MELLITUS WITH DIABETIC PERIPHERAL ANGIOPATHY WITHOUT GANGRENE, WITH LONG-TERM CURRENT USE OF INSULIN (HCC): ICD-10-CM

## 2024-05-06 DIAGNOSIS — E11.51 TYPE 2 DIABETES MELLITUS WITH DIABETIC PERIPHERAL ANGIOPATHY WITHOUT GANGRENE, WITH LONG-TERM CURRENT USE OF INSULIN (HCC): ICD-10-CM

## 2024-05-06 DIAGNOSIS — E11.52 TYPE 2 DIABETES MELLITUS WITH DIABETIC PERIPHERAL ANGIOPATHY AND GANGRENE, WITH LONG-TERM CURRENT USE OF INSULIN (HCC): ICD-10-CM

## 2024-05-06 LAB
ALBUMIN SERPL BCP-MCNC: 4.5 G/DL (ref 3.5–5)
ALP SERPL-CCNC: 73 U/L (ref 34–104)
ALT SERPL W P-5'-P-CCNC: 54 U/L (ref 7–52)
ANION GAP SERPL CALCULATED.3IONS-SCNC: 11 MMOL/L (ref 4–13)
AST SERPL W P-5'-P-CCNC: 30 U/L (ref 13–39)
BILIRUB SERPL-MCNC: 0.54 MG/DL (ref 0.2–1)
BUN SERPL-MCNC: 18 MG/DL (ref 5–25)
CALCIUM SERPL-MCNC: 9.5 MG/DL (ref 8.4–10.2)
CHLORIDE SERPL-SCNC: 101 MMOL/L (ref 96–108)
CO2 SERPL-SCNC: 29 MMOL/L (ref 21–32)
CREAT SERPL-MCNC: 1.08 MG/DL (ref 0.6–1.3)
EST. AVERAGE GLUCOSE BLD GHB EST-MCNC: 171 MG/DL
GFR SERPL CREATININE-BSD FRML MDRD: 69 ML/MIN/1.73SQ M
GLUCOSE P FAST SERPL-MCNC: 196 MG/DL (ref 65–99)
HBA1C MFR BLD: 7.6 %
POTASSIUM SERPL-SCNC: 4.5 MMOL/L (ref 3.5–5.3)
PROT SERPL-MCNC: 7.8 G/DL (ref 6.4–8.4)
SODIUM SERPL-SCNC: 141 MMOL/L (ref 135–147)

## 2024-05-06 PROCEDURE — 80053 COMPREHEN METABOLIC PANEL: CPT

## 2024-05-06 PROCEDURE — 36415 COLL VENOUS BLD VENIPUNCTURE: CPT

## 2024-05-06 PROCEDURE — 83036 HEMOGLOBIN GLYCOSYLATED A1C: CPT

## 2024-05-09 ENCOUNTER — OFFICE VISIT (OUTPATIENT)
Dept: FAMILY MEDICINE CLINIC | Facility: CLINIC | Age: 69
End: 2024-05-09
Payer: COMMERCIAL

## 2024-05-09 ENCOUNTER — OFFICE VISIT (OUTPATIENT)
Dept: WOUND CARE | Facility: HOSPITAL | Age: 69
End: 2024-05-09
Payer: COMMERCIAL

## 2024-05-09 VITALS
HEART RATE: 70 BPM | DIASTOLIC BLOOD PRESSURE: 72 MMHG | RESPIRATION RATE: 18 BRPM | SYSTOLIC BLOOD PRESSURE: 122 MMHG | TEMPERATURE: 97.5 F

## 2024-05-09 VITALS
DIASTOLIC BLOOD PRESSURE: 64 MMHG | HEART RATE: 53 BPM | WEIGHT: 184 LBS | BODY MASS INDEX: 31.41 KG/M2 | HEIGHT: 64 IN | TEMPERATURE: 98.2 F | OXYGEN SATURATION: 99 % | RESPIRATION RATE: 18 BRPM | SYSTOLIC BLOOD PRESSURE: 120 MMHG

## 2024-05-09 DIAGNOSIS — E11.622 DIABETIC ULCER OF LEFT LOWER LEG (HCC): Primary | ICD-10-CM

## 2024-05-09 DIAGNOSIS — L97.929 DIABETIC ULCER OF LEFT LOWER LEG (HCC): Primary | ICD-10-CM

## 2024-05-09 DIAGNOSIS — I73.9 PERIPHERAL ARTERIAL DISEASE WITH HISTORY OF REVASCULARIZATION  (HCC): ICD-10-CM

## 2024-05-09 DIAGNOSIS — E11.65 POORLY CONTROLLED TYPE 2 DIABETES MELLITUS (HCC): ICD-10-CM

## 2024-05-09 DIAGNOSIS — Z79.4 TYPE 2 DIABETES MELLITUS WITH DIABETIC PERIPHERAL ANGIOPATHY WITHOUT GANGRENE, WITH LONG-TERM CURRENT USE OF INSULIN (HCC): ICD-10-CM

## 2024-05-09 DIAGNOSIS — Z98.890 PERIPHERAL ARTERIAL DISEASE WITH HISTORY OF REVASCULARIZATION  (HCC): ICD-10-CM

## 2024-05-09 DIAGNOSIS — E78.2 MIXED HYPERLIPIDEMIA: ICD-10-CM

## 2024-05-09 DIAGNOSIS — E11.51 TYPE 2 DIABETES MELLITUS WITH DIABETIC PERIPHERAL ANGIOPATHY WITHOUT GANGRENE, WITH LONG-TERM CURRENT USE OF INSULIN (HCC): ICD-10-CM

## 2024-05-09 DIAGNOSIS — Z89.512 S/P BKA (BELOW KNEE AMPUTATION), LEFT (HCC): ICD-10-CM

## 2024-05-09 DIAGNOSIS — I48.91 ATRIAL FIBRILLATION, UNSPECIFIED TYPE (HCC): Primary | ICD-10-CM

## 2024-05-09 DIAGNOSIS — I10 ESSENTIAL HYPERTENSION: ICD-10-CM

## 2024-05-09 DIAGNOSIS — E11.42 DIABETIC POLYNEUROPATHY ASSOCIATED WITH TYPE 2 DIABETES MELLITUS (HCC): ICD-10-CM

## 2024-05-09 DIAGNOSIS — K21.9 GASTROESOPHAGEAL REFLUX DISEASE WITHOUT ESOPHAGITIS: ICD-10-CM

## 2024-05-09 PROCEDURE — 99214 OFFICE O/P EST MOD 30 MIN: CPT | Performed by: FAMILY MEDICINE

## 2024-05-09 PROCEDURE — 97597 DBRDMT OPN WND 1ST 20 CM/<: CPT | Performed by: STUDENT IN AN ORGANIZED HEALTH CARE EDUCATION/TRAINING PROGRAM

## 2024-05-09 PROCEDURE — G2211 COMPLEX E/M VISIT ADD ON: HCPCS | Performed by: FAMILY MEDICINE

## 2024-05-09 RX ORDER — LIDOCAINE 40 MG/G
CREAM TOPICAL ONCE
Status: COMPLETED | OUTPATIENT
Start: 2024-05-09 | End: 2024-05-09

## 2024-05-09 RX ADMIN — LIDOCAINE 1 APPLICATION: 40 CREAM TOPICAL at 11:19

## 2024-05-09 NOTE — PROGRESS NOTES
Wound Procedure Treatment Diabetic Ulcer Anterior;Left Knee    Performed by: Sandra Traore RN  Authorized by: Ismael Prasad MD  Associated wounds:   Wound 12/20/23 Diabetic Ulcer Knee Anterior;Left  Wound cleansed with:  NSS  Applied primary dressing:  Polymem foam and Silver  Applied secondary dressing:  ABD  Wound secured with:  Janice and Tape

## 2024-05-09 NOTE — ASSESSMENT & PLAN NOTE
Patient is stable with current meds and discussed a low carb diet. Pt  recommended to see eye doctor and foot doctor for routine screening as per protocol. Recheck A1C  and Cr in 3 months. Patient questions answered today about this condtion.   Lab Results   Component Value Date    HGBA1C 7.6 (H) 05/06/2024

## 2024-05-09 NOTE — PATIENT INSTRUCTIONS
Orders Placed This Encounter   Procedures    Wound Procedure Treatment     This order was created via procedure documentation    Wound cleansing and dressings Diabetic Ulcer Anterior;Left Knee     Left Knee Wound:     Wash your hands with soap and water. Remove old dressing, discard into plastic bag and place in trash. Cleanse the wound with saline prior to applying a clean dressing. Do not use tissue or cotton balls. Do not scrub the wound. Pat dry using gauze.     Shower yes     Apply moisturizer to surrounding skin   Appy  polymem Max AG to open wound  Cover with  Secure with emily and tape     Change dressing 3 times a week.   This was done today.     Wound off loading Left Knee Keep weight and pressure off wound at all times, do not wear     Standing Status:   Future     Standing Expiration Date:   5/16/2024

## 2024-05-09 NOTE — ASSESSMENT & PLAN NOTE
Patient is stable  and will continue present plan of care and reassess at next routine visit. All questions about this problem from patient were answered today.   Lab Results   Component Value Date    HGBA1C 7.6 (H) 05/06/2024

## 2024-05-09 NOTE — PROGRESS NOTES
"Patient ID: Kan Juan is a 68 y.o. male Date of Birth 1955     Chief Complaint  Chief Complaint   Patient presents with    Follow Up Wound Care Visit     LLE wound       Allergies  Shellfish-derived products - food allergy and Sulfamethoxazole-trimethoprim    Assessment:     Diagnoses and all orders for this visit:    Diabetic ulcer of left lower leg (HCC)  -     lidocaine (LMX) 4 % cream  -     Cancel: Wound cleansing and dressings Diabetic Ulcer Anterior;Left Knee; Future  -     Wound Procedure Treatment Diabetic Ulcer Anterior;Left Knee  -     Wound cleansing and dressings Diabetic Ulcer Anterior;Left Knee; Future  -     Debridement    Poorly controlled type 2 diabetes mellitus (HCC)    Peripheral arterial disease with history of revascularization  (HCC)              Debridement   Wound 12/20/23 Diabetic Ulcer Knee Anterior;Left    Universal Protocol:  Consent: Verbal consent obtained.  Risks and benefits: risks, benefits and alternatives were discussed  Consent given by: patient  Time out: Immediately prior to procedure a \"time out\" was called to verify the correct patient, procedure, equipment, support staff and site/side marked as required.  Patient identity confirmed: verbally with patient    Debridement Details  Performed by: physician  Debridement type: selective  Pain control: lidocaine 4%      Post-debridement measurements  Length (cm): 2.2  Width (cm): 1.1  Depth (cm): 0.1  Percent debrided: 90%  Surface Area (cm^2): 2.42  Area Debrided (cm^2): 2.18  Volume (cm^3): 0.24    Devitalized tissue debrided: biofilm, exudate and fibrin  Instrument(s) utilized: curette  Bleeding: small  Hemostasis obtained with: pressure  Procedural pain (0-10): 1  Post-procedural pain: 0   Response to treatment: procedure was tolerated well        Plan:   It was a pleasure to see Kan Juan for wound care follow up today  Selective debridement performed today as above  Wound is improving   Continue plan of care as " noted below with polymem   A1C results reviewed with the patient today.   No signs or symptoms of infection today. Patient understands that if any signs of infection start (such as increased redness, drainage, pain, fever, chills, diaphoresis), they should call our office or proceed to the ER or Urgent Care.  Patient should continue a high protein diet to facilitate wound healing  Patient is advised to not submerge wound or leave wound open to air.  Follow up in 2 weeks  Given the multi-factorial nature of wound care, additional time was taken to review patient's treatment plan with other specialties and most recent pertinent lab work and imaging.   All plans of care discussed with patient at bedside who verbalized understanding with treatment plan.    Wound 12/20/23 Diabetic Ulcer Knee Anterior;Left (Active)   Wound Image Images linked 05/09/24 1115   Wound Description Slough;Yellow;Pink 05/09/24 1115   Dominga-wound Assessment Scar Tissue;[facility] 05/09/24 1115   Wound Length (cm) 2.2 cm 05/09/24 1115   Wound Width (cm) 1.1 cm 05/09/24 1115   Wound Surface Area (cm^2) 2.42 cm^2 05/09/24 1115   Drainage Amount Moderate 05/09/24 1115   Drainage Description Serosanguineous;Yellow 05/09/24 1115   Non-staged Wound Description Full thickness 05/09/24 1115   Dressing Status Intact 05/09/24 1115       Wound 12/20/23 Diabetic Ulcer Knee Anterior;Left (Active)   Date First Assessed/Time First Assessed: 12/20/23 1312   Primary Wound Type: Diabetic Ulcer  Location: Knee  Wound Location Orientation: Anterior;Left  Wound Description (Comments): CAO 4       [REMOVED] Wound 11/29/23 Knee Left (Removed)   Resolved Date: 12/20/23  Date First Assessed/Time First Assessed: 11/29/23 1452   Location: Knee  Wound Location Orientation: Left  Incision's 1st Dressing: PACKING PLAIN 1/4 IN, DRESSING XEROFORM 1 X 8, SPONGE GAUZE 4 X 4 16 PLY STRL PLASTIC TRAY LF,...       Subjective:      .    5/9/24: Seen by covering wound care physician at  last visit.  PolyMem continued.  Patient happy with wound healing.  No symptoms of infection.    4/11/24, 3/28/24: Applying PolyMem as directed.  Happy with wound healing.  Notes that wound looks smaller to him today.  No symptoms of infection.     3/21/24: applying hydrocolloid as directed this past week.  Does notice more pink in the wound bed however notes that he is having more exudate and does find this wound care product to be irritating to his skin.  Denies any symptoms of infection today including fever chills diaphoresis.     3/14/24: Applying collagen as directed.  Notes that collagen is not really absorbing into the wound bed and is adhered to the wound instead.  No symptoms of infection today.     3/7/24: Applying Betadine as directed.  Happy with wound healing.  No symptoms of infection.     2/27/24: Patient presents today after calling yesterday regarding drainage coming from the wound.  Today notes that that has calmed down some but he continues to have some yellow drainage.  No symptoms of infection.  No skin tightness directly above the wound site.     2/22/24, 2/15/24, 2/8/24, 2/1/24, 1/25/2, 1/18/24, 1/11/24, 1/3/24: Applying Betadine daily as directed.  No symptoms of infection.     12/20/23: Consult - Johnathon is a pleasant 68-year-old male with a past medical history of type 2 diabetes mellitus most recent A1c 11.6% 3 weeks ago, diabetic  polyneuropathy, peripheral arterial disease with history of re-vascularization, atrial fibrillation on chronic anticoagulation, hx of osteomyelitis requiring left sided BKA, continued opioid dependence here today for initial wound care consult.  Patient was referred by the orthopedic surgeon, Dr. Drayl Shay.  He had incision and drainage of the left knee prepatellar bursa on 11/29/23 to 4-day hospital course at [facility] at which time he was hospitalized for septic prepatellar bursitis.  Following this he had left knee stiffness and pain and was given a  10-day course of such cefadroxil.  He was seen for his suture removal postop visit On 12/14/2023 at Which Time It Was Noted That There Is an Eschar in the Superior Portion of the Incision Site.  He was told that he needs to have better glycemic control and to speak to endocrinology.  Also advised that he if he does not hear he may require further amputation.  Wound care referral was placed at that visit.  Denies any local or systemic symptoms of infection today including fever chills diaphoresis.     BL LEAD Feb 2023:  RIGHT LOWER LIMB:  Diffuse disease noted throughout the femoral-popliteal arteries without  significant focal stenosis.  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  Ankle/Brachial index: 1.12, within the normal disease category (Prior 1.2).  Metatarsal pressure was unable to be obtained due to non-compressible vessels.  Great toe pressure of 82 mmHg, within the healing range (Prior 46 mmHg).  PVR/ PPG tracings are dampened at the ankle and metatarsal.     LEFT LOWER LIMB:  Diffuse disease noted throughout the femoral-popliteal arteries without  significant focal stenosis.  Below knee amputation.     Compared to previous study on 12/6/2021 and 05/26/2021, there is no significant  change in the disease process.  Recommend repeat testing in 6 months as per protocol unless otherwise  indicated.                The following portions of the patient's history were reviewed and updated as appropriate: allergies, current medications, past family history, past medical history, past social history, past surgical history, and problem list.    Review of Systems   Constitutional:  Negative for chills, diaphoresis and fever.   Skin:  Positive for wound.   All other systems reviewed and are negative.        Objective:       Wound 12/20/23 Diabetic Ulcer Knee Anterior;Left (Active)   Wound Image Images linked 05/09/24 1115   Wound Description Slough;Yellow;Pink 05/09/24 1115   Dominga-wound Assessment Scar  Tissue;[place] 05/09/24 1115   Wound Length (cm) 2.2 cm 05/09/24 1115   Wound Width (cm) 1.1 cm 05/09/24 1115   Wound Surface Area (cm^2) 2.42 cm^2 05/09/24 1115   Drainage Amount Moderate 05/09/24 1115   Drainage Description Serosanguineous;Yellow 05/09/24 1115   Non-staged Wound Description Full thickness 05/09/24 1115   Dressing Status Intact 05/09/24 1115       /72   Pulse 70   Temp 97.5 °F (36.4 °C)   Resp 18     Physical Exam  Vitals reviewed.   Constitutional:       Appearance: Normal appearance.   HENT:      Head: Normocephalic and atraumatic.   Eyes:      Extraocular Movements: Extraocular movements intact.   Pulmonary:      Effort: Pulmonary effort is normal.   Musculoskeletal:      Cervical back: Neck supple.   Skin:     Comments: Left knee wound significantly smaller than last exam.  Less fibrin deposition and slough in wound bed.  Good granulation tissue noted and bordering epithelialization   Neurological:      Mental Status: He is alert.   Psychiatric:         Mood and Affect: Mood normal.           Wound Instructions:  Orders Placed This Encounter   Procedures    Wound Procedure Treatment Diabetic Ulcer Anterior;Left Knee     This order was created via procedure documentation    Wound cleansing and dressings Diabetic Ulcer Anterior;Left Knee     Left Knee Wound:     Wash your hands with soap and water. Remove old dressing, discard into plastic bag and place in trash. Cleanse the wound with saline prior to applying a clean dressing. Do not use tissue or cotton balls. Do not scrub the wound. Pat dry using gauze.     Shower yes     Apply moisturizer to surrounding skin   Appy  polymem Max AG to open wound  Cover with  Secure with emily and tape     Change dressing 3 times a week.   This was done today.     Wound off loading Left Knee Keep weight and pressure off wound at all times, do not wear     Standing Status:   Future     Standing Expiration Date:   5/16/2024    Debridement     This order was  "created via procedure documentation        Diagnosis ICD-10-CM Associated Orders   1. Diabetic ulcer of left lower leg ([facility])  E11.622 lidocaine (LMX) 4 % cream    L97.929 Wound Procedure Treatment Diabetic Ulcer Anterior;Left Knee     Wound cleansing and dressings Diabetic Ulcer Anterior;Left Knee     Debridement      2. Poorly controlled type 2 diabetes mellitus ([facility])  E11.65       3. Peripheral arterial disease with history of revascularization  ([facility])  I73.9     Z98.890           --  Ismael Prasad MD    \"This note has been constructed using a voice recognition system. Therefore there may be syntax, spelling, and/or grammatical errors. Occasional wrong word or \"sound alike\" substitutions may have occurred due to the inherent limitations of voice recognition software. Read the chart carefully and recognize, using context, where substitutions have occurred. Please call if you have any questions.\"     "

## 2024-05-09 NOTE — PROGRESS NOTES
Name: Kan Juan      : 1955      MRN: [id number]  Encounter Provider: Macario Goyal MD  Encounter Date: 2024   Encounter department: [facility]    Assessment & Plan     1. Atrial fibrillation, unspecified type (HCC)  Assessment & Plan:  Continue with anticogulation and rate control of heart rate. Concerns about condition were addressed today.       2. Diabetic polyneuropathy associated with type 2 diabetes mellitus (HCC)  Assessment & Plan:  Patient is stable  and will continue present plan of care and reassess at next routine visit. All questions about this problem from patient were answered today.   Lab Results   Component Value Date    HGBA1C 7.6 (H) 2024       3. Essential hypertension  Assessment & Plan:  Patient is stable with current anti-hypertensive medicine and continue to follow a low sodium diet and take current medication. All questions about this condition were answered today.       4. Gastroesophageal reflux disease without esophagitis  Assessment & Plan:  Patient to continue with present therapy and decrease caffeine, avoid ETOH and smoking to decrease acid production. Pt should also cease eating prior to bedtime and avoid excessive fluid intake prior to sleep. May use antacids as needed for breakthrough GERD. All pateint questions answered today about this condition.       5. Mixed hyperlipidemia  Assessment & Plan:  Patient  is stable with current medication and we discussed a low fat low cholesterol diet. Weight loss also discussed for this will help lower cholesterol also. Recheck lipids in 6 months.       6. S/P BKA (below knee amputation), left (HCC)  Assessment & Plan:  Patient is stable  and will continue present plan of care and reassess at next routine visit. All questions about this problem from patient were answered today.       7. Type 2 diabetes mellitus with diabetic peripheral angiopathy without gangrene, with long-term current use  of insulin ([facility])  Assessment & Plan:  Patient is stable with current meds and discussed a low carb diet. Pt  recommended to see eye doctor and foot doctor for routine screening as per protocol. Recheck A1C  and Cr in 3 months. Patient questions answered today about this condtion.   Lab Results   Component Value Date    HGBA1C 7.6 (H) 05/06/2024     Orders:  -     Hemoglobin A1C; Future  -     Comprehensive metabolic panel; Future           Subjective     68-year-old male here today for checkup for multimedical problems patient with type 2 diabetes peripheral vascular disease hypertension hyperlipidemia atrial fibrillation polyneuropathy GERD status post below-knee amputation on the left side and also with stump wound.  Patient is doing well with his medication and had lab work done today is A1c for the first time is well-controlled.  This is a milestone for this patient this is the first time he had his A1c controlled in a couple years.  Patient is very proud of that and he is doing great job with his sugar I told him to continue with what he is currently doing to keep that sugar down and where it belongs.  Patient will call when he needs refills and we will see him back in approximately 3 months with the good job he is doing with his sugar.      Review of Systems   Constitutional:  Negative for activity change, appetite change, chills, fatigue, fever and unexpected weight change.   HENT:  Negative for congestion, ear pain, hearing loss, mouth sores, postnasal drip, sinus pressure, sinus pain, sneezing and sore throat.    Respiratory:  Negative for apnea, cough, shortness of breath and wheezing.    Cardiovascular:  Negative for chest pain, palpitations and leg swelling.   Gastrointestinal:  Negative for abdominal pain, constipation, diarrhea, nausea and vomiting.   Endocrine: Negative for cold intolerance and heat intolerance.   Genitourinary:  Negative for dysuria, frequency and hematuria.   Musculoskeletal:   Negative for arthralgias, back pain, gait problem, joint swelling and neck pain.   Skin:  Negative for rash.   Neurological:  Negative for dizziness, weakness and numbness.   Hematological:  Does not bruise/bleed easily.   Psychiatric/Behavioral:  Negative for agitation, behavioral problems, confusion, hallucinations and sleep disturbance. The patient is not nervous/anxious.        Past Medical History:   Diagnosis Date   • Arthritis     fingers   • First degree AV block    • Full dentures    • Hypertension    • PAD (peripheral artery disease) ([facility])    • PVD (peripheral vascular disease) ([facility])    • Type 2 diabetes mellitus with diabetic peripheral angiopathy without gangrene ([facility]) 5/18/2021    Per CMS ICD 10 guidelines--Per Physician   • Wound, open     right foot- by the 5th toe     Past Surgical History:   Procedure Laterality Date   • CHOLECYSTECTOMY     • COLONOSCOPY     • INCISION AND DRAINAGE OF WOUND Left 11/29/2023    Procedure: INCISION AND DRAINAGE (I&D) LEFT KNEE PRE-PATELLA BURSA;  Surgeon: Daryl Shay DO;  Location: [facility];  Service: Orthopedics   • IR AORTAGRAM WITH RUN-OFF  1/28/2021   • IR AORTAGRAM WITH RUN-OFF  4/13/2021   • LEG AMPUTATION THROUGH LOWER TIBIA AND FIBULA Left 7/17/2021    Procedure: AMPUTATION BELOW KNEE (BKA);  Surgeon: Jose Mary MD;  Location: [facility];  Service: Vascular   • CO BYP FEM-ANT TIBL PST TIBL PRONEAL ART/OTH DSTL Left 7/14/2021    Procedure: BYPASS femoral-peroneal artery bypass with  reverse GSV and balloon angioplasty peroneal artery;  Surgeon: Jose Mary MD;  Location: [facility];  Service: Vascular   • CO DEBRIDEMENT BONE 1ST 20 SQ CM/< Right 12/18/2020    Procedure: DEBRIDMENT OF ULCER , REMOVAL OF DEVITALIZED SKIN, SOFT TISSUE, BONE AND APPLICATION OF INTEGRA GRAFT RIGHT FOOT.;  Surgeon: Daquan Ellis DPM;  Location: [facility];  Service: Podiatry   • REPLANTATION THUMB Right     right tip reconnected   • SKIN GRAFT      right thumb   • TOE  AMPUTATION      left foot all 5 toes removed   • TOE AMPUTATION Right 2/2/2021    Procedure: Right partial 5th ray amputation;  Surgeon: Gabriel Garcia DPM;  Location:  MAIN OR;  Service: Podiatry   • TOE OSTEOTOMY Right 6/26/2020    Procedure: exostosis of right big toe;  Surgeon: Trey Shirley DPM;  Location: [facility];  Service: Podiatry   • TRIGGER FINGER RELEASE      bilateral hands   • VEIN LIGATION      right     Family History   Problem Relation Age of Onset   • Arthritis Mother    • Pancreatic cancer Mother    • Cancer Father         colon   • Alzheimer's disease Father      Social History     Socioeconomic History   • Marital status: /Civil Union     Spouse name: Angeles   • Number of children: 1   • Years of education: 12   • Highest education level: High school graduate   Occupational History   • Occupation: Cook   Tobacco Use   • Smoking status: Never   • Smokeless tobacco: Never   Vaping Use   • Vaping status: Never Used   Substance and Sexual Activity   • Alcohol use: Not Currently     Comment: occasional   • Drug use: Never   • Sexual activity: Yes     Partners: Female   Other Topics Concern   • None   Social History Narrative         Social Determinants of Health     Financial Resource Strain: Low Risk  (9/15/2023)    Overall Financial Resource Strain (CARDIA)    • Difficulty of Paying Living Expenses: Not hard at all   Food Insecurity: Not on file   Transportation Needs: No Transportation Needs (9/15/2023)    PRAPARE - Transportation    • Lack of Transportation (Medical): No    • Lack of Transportation (Non-Medical): No   Physical Activity: Not on file   Stress: Not on file   Social Connections: Not on file   Intimate Partner Violence: Not on file   Housing Stability: Not on file     Current Outpatient Medications on File Prior to Visit   Medication Sig   • Accu-Chek Softclix Lancets lancets Use as instructed qid Dx E11.9   • Alcohol Swabs (B-D SINGLE USE SWABS REGULAR) PADS Use 4 (four) times a  "day Dx E11.9   • atorvastatin (LIPITOR) 40 mg tablet Take 1 tablet (40 mg total) by mouth daily at bedtime   • Blood Glucose Monitoring Suppl (Accu-Chek Guide) w/Device KIT Use 4 (four) times a day   • clopidogrel (PLAVIX) 75 mg tablet Take 1 tablet (75 mg total) by mouth daily   • glucose blood (Accu-Chek Guide) test strip Use 1 each 4 (four) times a day   • Insulin Glargine Solostar (Basaglar KwikPen) 100 UNIT/ML SOPN Inject 0.75 mL (75 Units total) under the skin daily at bedtime   • insulin lispro (HumaLOG KwikPen) 100 units/mL injection pen Inject 25 Units under the skin 3 (three) times a day with meals   • Insulin Pen Needle (HM UltiCare Mini Pen Needles) 31G X 5 MM MISC Use 4 (four) times a day   • lisinopril (ZESTRIL) 10 mg tablet Take 1 tablet (10 mg total) by mouth daily   • oxyCODONE-acetaminophen (Percocet) 5-325 mg per tablet Take 1 tablet by mouth every 6 (six) hours as needed for moderate pain Max Daily Amount: 4 tablets   • pantoprazole (PROTONIX) 40 mg tablet Take 1 tablet (40 mg total) by mouth daily   • pioglitazone (ACTOS) 45 mg tablet Take 1 tablet (45 mg total) by mouth daily   • rivaroxaban (Xarelto) 2.5 mg tablet Take 1 tablet (2.5 mg total) by mouth daily with breakfast Last dose 6/21/20   • tirzepatide (Mounjaro) 5 MG/0.5ML Inject 0.5 mL (5 mg total) under the skin every 7 days   • UltiCare Insulin Syringe 31G X 5/16\" 1 ML MISC Inject under the skin as needed (for injection)   • betamethasone dipropionate (DIPROSONE) 0.05 % cream Apply topically 2 (two) times a day (Patient not taking: Reported on 3/8/2024)   • Blood Glucose Calibration (Accu-Chek Guide Control) LIQD Test daily as needed (abnormal sugar reading) (Patient not taking: Reported on 3/8/2024)   • Blood Glucose Monitoring Suppl (Accu-Chek Guide) w/Device KIT Use 4 (four) times a day Dx E11.9 (Patient not taking: Reported on 3/8/2024)   • cyclobenzaprine (FLEXERIL) 5 mg tablet Take 1 tablet (5 mg total) by mouth 3 (three) times a " "day as needed for muscle spasms for up to 10 days   • tirzepatide (Mounjaro) 2.5 MG/0.5ML Inject 0.5 mL (2.5 mg total) under the skin every 7 days (Patient not taking: Reported on 5/9/2024)     Allergies   Allergen Reactions   • Shellfish-Derived Products - Food Allergy Anaphylaxis     Throat closes   • Sulfamethoxazole-Trimethoprim Rash     Immunization History   Administered Date(s) Administered   • COVID-19 PFIZER VACCINE 0.3 ML IM 03/02/2021, 03/22/2021   • INFLUENZA 11/02/2018, 10/14/2021, 10/17/2022, 11/24/2023   • Influenza, high dose seasonal 0.7 mL 11/05/2020, 10/14/2021, 10/17/2022, 11/24/2023       Objective     /64 (BP Location: Left arm, Patient Position: Sitting, Cuff Size: Large)   Pulse (!) 53   Temp 98.2 °F (36.8 °C) (Temporal)   Resp 18   Ht 5' 4\" (1.626 m)   Wt 83.5 kg (184 lb)   SpO2 99%   BMI 31.58 kg/m²     Physical Exam  Vitals and nursing note reviewed.   Constitutional:       Appearance: He is well-developed.   HENT:      Head: Normocephalic and atraumatic.      Nose: Nose normal.   Eyes:      General: No scleral icterus.     Conjunctiva/sclera: Conjunctivae normal.      Pupils: Pupils are equal, round, and reactive to light.   Neck:      Thyroid: No thyromegaly.   Cardiovascular:      Rate and Rhythm: Normal rate and regular rhythm.      Heart sounds: Normal heart sounds.   Pulmonary:      Effort: Pulmonary effort is normal. No respiratory distress.      Breath sounds: Normal breath sounds. No wheezing.   Abdominal:      General: Bowel sounds are normal.      Palpations: Abdomen is soft.      Tenderness: There is no abdominal tenderness. There is no guarding or rebound.   Musculoskeletal:         General: Normal range of motion.      Cervical back: Normal range of motion and neck supple.   Skin:     General: Skin is warm and dry.      Findings: No rash.   Neurological:      Mental Status: He is alert and oriented to person, place, and time.   Psychiatric:         Mood and " Affect: Mood normal.         Behavior: Behavior normal.         Thought Content: Thought content normal.         Judgment: Judgment normal.       Macario Goyal MD

## 2024-05-13 ENCOUNTER — TELEPHONE (OUTPATIENT)
Dept: ADMINISTRATIVE | Facility: OTHER | Age: 69
End: 2024-05-13

## 2024-05-13 NOTE — TELEPHONE ENCOUNTER
----- Message from Mari Samayoa sent at 5/10/2024 10:16 AM EDT -----  Regarding: Care Gap Request  05/10/24 10:16 AM    Hello, our patient Kan Juan has had Diabetic Eye Exam completed/performed. Please assist in updating the patient chart by making an External outreach to Dr Deanna Page facility located in [place]. The date of service is 3 months [phone]    Thank you,  Mari Samayoa [facility]

## 2024-05-13 NOTE — LETTER
Diabetic Eye Exam Form     Date Requested: 24  Patient: Kan Juan  Patient : 1955   Referring Provider: Macario Goyal MD      DIABETIC Eye Exam Date _______________________________      Type of Exam MUST be documented for Diabetic Eye Exams. Please CHECK ONE.     Retinal Exam       Dilated Retinal Exam       OCT       Optomap-Iris Exam      Fundus Photography       Left Eye - Please check Retinopathy or No Retinopathy        Exam did show retinopathy    Exam did not show retinopathy       Right Eye - Please check Retinopathy or No Retinopathy       Exam did show retinopathy    Exam did not show retinopathy       Comments __________________________________________________________    Practice Providing Exam ______________________________________________    Exam Performed By (print name) _______________________________________      Provider Signature ___________________________________________________      These reports are needed for  compliance.  Please fax this completed form and a copy of the Diabetic Eye Exam report to our office located at [address] [place] [address] as soon as possible via Fax [phone] attention Torie: Phone [phone]  We thank you for your assistance in treating our mutual patient.

## 2024-05-13 NOTE — TELEPHONE ENCOUNTER
Upon review of the In Basket request and the patient's chart, initial outreach has been made via fax to facility. Please see Contacts section for details.     Thank you  Torie Bourne

## 2024-05-13 NOTE — TELEPHONE ENCOUNTER
Upon review of the In Basket request we have found as a result of outreach that patient did not have the requested item(s) completed.  1-12-24 No Diabetic Eye Exam was done on 1-12-24 as per office.See letter in the media tab.    Any additional questions or concerns should be emailed to the Practice Liaisons via the appropriate education email address, please do not reply via In Basket.    Thank you  Torie Bourne

## 2024-05-15 DIAGNOSIS — Z79.4 TYPE 2 DIABETES MELLITUS WITH DIABETIC PERIPHERAL ANGIOPATHY AND GANGRENE, WITH LONG-TERM CURRENT USE OF INSULIN (HCC): ICD-10-CM

## 2024-05-15 DIAGNOSIS — E11.52 TYPE 2 DIABETES MELLITUS WITH DIABETIC PERIPHERAL ANGIOPATHY AND GANGRENE, WITH LONG-TERM CURRENT USE OF INSULIN (HCC): ICD-10-CM

## 2024-05-15 RX ORDER — BLOOD SUGAR DIAGNOSTIC
STRIP MISCELLANEOUS
Qty: 400 STRIP | Refills: 1 | Status: SHIPPED | OUTPATIENT
Start: 2024-05-15

## 2024-05-21 ENCOUNTER — TELEPHONE (OUTPATIENT)
Age: 69
End: 2024-05-21

## 2024-05-21 NOTE — TELEPHONE ENCOUNTER
Warm transferred to office . Dexcom is falling off his arm. Please call patient Discconected from office

## 2024-05-21 NOTE — TELEPHONE ENCOUNTER
POD call from patient's PCP office requesting an consult appt ASAP due to Dexcom falling off patient's arm at recent visit. Soonest appointment is early Septempber. Please advise, thank you.

## 2024-05-24 ENCOUNTER — OFFICE VISIT (OUTPATIENT)
Dept: WOUND CARE | Facility: HOSPITAL | Age: 69
End: 2024-05-24
Payer: COMMERCIAL

## 2024-05-24 VITALS
RESPIRATION RATE: 18 BRPM | SYSTOLIC BLOOD PRESSURE: 127 MMHG | HEART RATE: 75 BPM | TEMPERATURE: 97 F | DIASTOLIC BLOOD PRESSURE: 79 MMHG

## 2024-05-24 DIAGNOSIS — E11.65 POORLY CONTROLLED TYPE 2 DIABETES MELLITUS (HCC): ICD-10-CM

## 2024-05-24 DIAGNOSIS — I73.9 PERIPHERAL ARTERIAL DISEASE WITH HISTORY OF REVASCULARIZATION  (HCC): ICD-10-CM

## 2024-05-24 DIAGNOSIS — L97.929 DIABETIC ULCER OF LEFT LOWER LEG (HCC): Primary | ICD-10-CM

## 2024-05-24 DIAGNOSIS — Z98.890 PERIPHERAL ARTERIAL DISEASE WITH HISTORY OF REVASCULARIZATION  (HCC): ICD-10-CM

## 2024-05-24 DIAGNOSIS — E11.622 DIABETIC ULCER OF LEFT LOWER LEG (HCC): Primary | ICD-10-CM

## 2024-05-24 PROCEDURE — 97597 DBRDMT OPN WND 1ST 20 CM/<: CPT | Performed by: STUDENT IN AN ORGANIZED HEALTH CARE EDUCATION/TRAINING PROGRAM

## 2024-05-24 RX ORDER — LIDOCAINE 40 MG/G
CREAM TOPICAL ONCE
Status: COMPLETED | OUTPATIENT
Start: 2024-05-24 | End: 2024-05-24

## 2024-05-24 RX ADMIN — LIDOCAINE 1 APPLICATION: 40 CREAM TOPICAL at 10:58

## 2024-05-24 NOTE — PROGRESS NOTES
Wound Procedure Treatment Diabetic Ulcer Anterior;Left Knee    Performed by: Sandra Traore RN  Authorized by: Ismael Prasad MD    Associated wounds:   Wound 12/20/23 Diabetic Ulcer Knee Anterior;Left  Wound cleansed with:  NSS  Applied primary dressing:  Dermagran  Applied secondary dressing:  Gauze  Dressing secured with:  Janice and Tape

## 2024-05-24 NOTE — PROGRESS NOTES
"Patient ID: Kan Juan is a 68 y.o. male Date of Birth 1955     Chief Complaint  No chief complaint on file.      Allergies  Shellfish-derived products - food allergy and Sulfamethoxazole-trimethoprim    Assessment:       Diagnoses and all orders for this visit:    Diabetic ulcer of left lower leg (HCC)  -     lidocaine (LMX) 4 % cream  -     Wound cleansing and dressings Diabetic Ulcer Anterior;Left Knee; Future  -     Wound Procedure Treatment Diabetic Ulcer Anterior;Left Knee  -     Debridement    Poorly controlled type 2 diabetes mellitus (HCC)    Peripheral arterial disease with history of revascularization  (HCC)              Debridement   Wound 12/20/23 Diabetic Ulcer Knee Anterior;Left    Universal Protocol:  Consent: Verbal consent obtained.  Risks and benefits: risks, benefits and alternatives were discussed  Consent given by: patient  Time out: Immediately prior to procedure a \"time out\" was called to verify the correct patient, procedure, equipment, support staff and site/side marked as required.  Patient identity confirmed: verbally with patient    Debridement Details  Performed by: physician  Debridement type: selective  Pain control: lidocaine 4%      Post-debridement measurements  Length (cm): 2  Width (cm): 1  Depth (cm): 0.1  Percent debrided: 100%  Surface Area (cm^2): 2  Area Debrided (cm^2): 2  Volume (cm^3): 0.2    Devitalized tissue debrided: biofilm and exudate  Instrument(s) utilized: curette  Bleeding: small  Hemostasis obtained with: pressure  Procedural pain (0-10): 1  Post-procedural pain: 0   Response to treatment: procedure was tolerated well        Plan:   It was a pleasure to see Kan Juan for wound care follow up today  Selective debridement performed today as above  Wound is improving   Continue plan of care as noted below with dermagran  No signs or symptoms of infection today. Patient understands that if any signs of infection start (such as increased redness, " drainage, pain, fever, chills, diaphoresis), they should call our office or proceed to the ER or Urgent Care.  Patient should continue a high protein diet to facilitate wound healing  Patient is advised to not submerge wound or leave wound open to air.  Follow up in 1 weeks  Given the multi-factorial nature of wound care, additional time was taken to review patient's treatment plan with other specialties and most recent pertinent lab work and imaging.   All plans of care discussed with patient at bedside who verbalized understanding with treatment plan.    Wound 12/20/23 Diabetic Ulcer Knee Anterior;Left (Active)   Wound Image Images linked 05/24/24 1054   Wound Description Slough;Yellow;Pink 05/24/24 1054   Dominga-wound Assessment Scar Tissue;Pink 05/24/24 1054   Wound Length (cm) 2 cm 05/24/24 1054   Wound Width (cm) 1 cm 05/24/24 1054   Wound Depth (cm) 0.1 cm 05/24/24 1054   Wound Surface Area (cm^2) 2 cm^2 05/24/24 1054   Wound Volume (cm^3) 0.2 cm^3 05/24/24 1054   Calculated Wound Volume (cm^3) 0.2 cm^3 05/24/24 1054   Drainage Amount Moderate 05/24/24 1054   Drainage Description Serosanguineous;Yellow 05/24/24 1054   Non-staged Wound Description Full thickness 05/24/24 1054   Dressing Status Intact 05/24/24 1054       Wound 12/20/23 Diabetic Ulcer Knee Anterior;Left (Active)   Date First Assessed/Time First Assessed: 12/20/23 1312   Primary Wound Type: Diabetic Ulcer  Location: Knee  Wound Location Orientation: Anterior;Left  Wound Description (Comments): CAO 4       [REMOVED] Wound 11/29/23 Knee Left (Removed)   Resolved Date: 12/20/23  Date First Assessed/Time First Assessed: 11/29/23 1452   Location: Knee  Wound Location Orientation: Left  Incision's 1st Dressing: PACKING PLAIN 1/4 IN, DRESSING XEROFORM 1 X 8, SPONGE GAUZE 4 X 4 16 PLY STRL PLASTIC TRAY LF,...       Subjective:      .    5/24/24: Applying polymem as directed.  Happy with wound healing.  Notes that there is less drainage than prior on the  dressing    5/9/24: Seen by covering wound care physician at last visit.  PolyMem continued.  Patient happy with wound healing.  No symptoms of infection.     4/11/24, 3/28/24: Applying PolyMem as directed.  Happy with wound healing.  Notes that wound looks smaller to him today.  No symptoms of infection.     3/21/24: applying hydrocolloid as directed this past week.  Does notice more pink in the wound bed however notes that he is having more exudate and does find this wound care product to be irritating to his skin.  Denies any symptoms of infection today including fever chills diaphoresis.     3/14/24: Applying collagen as directed.  Notes that collagen is not really absorbing into the wound bed and is adhered to the wound instead.  No symptoms of infection today.     3/7/24: Applying Betadine as directed.  Happy with wound healing.  No symptoms of infection.     2/27/24: Patient presents today after calling yesterday regarding drainage coming from the wound.  Today notes that that has calmed down some but he continues to have some yellow drainage.  No symptoms of infection.  No skin tightness directly above the wound site.     2/22/24, 2/15/24, 2/8/24, 2/1/24, 1/25/2, 1/18/24, 1/11/24, 1/3/24: Applying Betadine daily as directed.  No symptoms of infection.     12/20/23: Consult - Johnathon is a pleasant 68-year-old male with a past medical history of type 2 diabetes mellitus most recent A1c 11.6% 3 weeks ago, diabetic  polyneuropathy, peripheral arterial disease with history of re-vascularization, atrial fibrillation on chronic anticoagulation, hx of osteomyelitis requiring left sided BKA, continued opioid dependence here today for initial wound care consult.  Patient was referred by the orthopedic surgeon, Dr. Daryl Shay.  He had incision and drainage of the left knee prepatellar bursa on 11/29/23 to 4-day hospital course at [facility] at which time he was hospitalized for septic prepatellar bursitis.   Following this he had left knee stiffness and pain and was given a 10-day course of such cefadroxil.  He was seen for his suture removal postop visit On 12/14/2023 at Which Time It Was Noted That There Is an Eschar in the Superior Portion of the Incision Site.  He was told that he needs to have better glycemic control and to speak to endocrinology.  Also advised that he if he does not hear he may require further amputation.  Wound care referral was placed at that visit.  Denies any local or systemic symptoms of infection today including fever chills diaphoresis.     BL LEAD Feb 2023:  RIGHT LOWER LIMB:  Diffuse disease noted throughout the femoral-popliteal arteries without  significant focal stenosis.  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  Ankle/Brachial index: 1.12, within the normal disease category (Prior 1.2).  Metatarsal pressure was unable to be obtained due to non-compressible vessels.  Great toe pressure of 82 mmHg, within the healing range (Prior 46 mmHg).  PVR/ PPG tracings are dampened at the ankle and metatarsal.     LEFT LOWER LIMB:  Diffuse disease noted throughout the femoral-popliteal arteries without  significant focal stenosis.  Below knee amputation.     Compared to previous study on 12/6/2021 and 05/26/2021, there is no significant  change in the disease process.  Recommend repeat testing in 6 months as per protocol unless otherwise  indicated.                The following portions of the patient's history were reviewed and updated as appropriate: allergies, current medications, past family history, past medical history, past social history, past surgical history, and problem list  .    Review of Systems   Constitutional:  Negative for chills, diaphoresis and fever.   Skin:  Positive for wound.   All other systems reviewed and are negative.        Objective:       Wound 12/20/23 Diabetic Ulcer Knee Anterior;Left (Active)   Wound Image Images linked 05/24/24 1554   Wound  Description Slough;Yellow;Pink 05/24/24 1054   Dominga-wound Assessment Scar Tissue;Pink 05/24/24 1054   Wound Length (cm) 2 cm 05/24/24 1054   Wound Width (cm) 1 cm 05/24/24 1054   Wound Depth (cm) 0.1 cm 05/24/24 1054   Wound Surface Area (cm^2) 2 cm^2 05/24/24 1054   Wound Volume (cm^3) 0.2 cm^3 05/24/24 1054   Calculated Wound Volume (cm^3) 0.2 cm^3 05/24/24 1054   Drainage Amount Moderate 05/24/24 1054   Drainage Description Serosanguineous;Yellow 05/24/24 1054   Non-staged Wound Description Full thickness 05/24/24 1054   Dressing Status Intact 05/24/24 1054       /79   Pulse 75   Temp (!) 97 °F (36.1 °C)   Resp 18     Physical Exam  Vitals reviewed.   Constitutional:       Appearance: Normal appearance.   HENT:      Head: Normocephalic and atraumatic.   Eyes:      Extraocular Movements: Extraocular movements intact.   Pulmonary:      Effort: Pulmonary effort is normal.   Musculoskeletal:      Cervical back: Neck supple.   Skin:     Comments: Teary left knee wound smaller than last exam.  Some fibrin deposition.  Otherwise healthy wound bed with no signs of infection.   Neurological:      Mental Status: He is alert.   Psychiatric:         Mood and Affect: Mood normal.           Wound Instructions:  Orders Placed This Encounter   Procedures    Wound cleansing and dressings Diabetic Ulcer Anterior;Left Knee     Left knee wound:     Wash your hands with soap and water.  Remove old dressing, discard into plastic bag and place in trash.  Cleanse the wound with mild soap and water prior to applying a clean dressing. Do not use tissue or cotton balls. Do not scrub the wound. Pat dry using gauze.  Shower yes     Apply dermagran cut to cover to the open wound.  Cover with guaze  Secure with rolled gauze and tape  Change dressing every other day     Standing Status:   Future     Standing Expiration Date:   6/7/2024    Wound Procedure Treatment Diabetic Ulcer Anterior;Left Knee     This order was created via  "procedure documentation    Debridement     This order was created via procedure documentation        Diagnosis ICD-10-CM Associated Orders   1. Diabetic ulcer of left lower leg ([facility])  E11.622 lidocaine (LMX) 4 % cream    L97.929 Wound cleansing and dressings Diabetic Ulcer Anterior;Left Knee     Wound Procedure Treatment Diabetic Ulcer Anterior;Left Knee     Debridement      2. Poorly controlled type 2 diabetes mellitus ([facility])  E11.65       3. Peripheral arterial disease with history of revascularization  ([facility])  I73.9     Z98.890           --  Ismael Prasad MD    \"This note has been constructed using a voice recognition system. Therefore there may be syntax, spelling, and/or grammatical errors. Occasional wrong word or \"sound alike\" substitutions may have occurred due to the inherent limitations of voice recognition software. Read the chart carefully and recognize, using context, where substitutions have occurred. Please call if you have any questions.\"     "

## 2024-05-24 NOTE — PATIENT INSTRUCTIONS
Orders Placed This Encounter   Procedures    Wound cleansing and dressings Diabetic Ulcer Anterior;Left Knee     Left knee wound:     Wash your hands with soap and water.  Remove old dressing, discard into plastic bag and place in trash.  Cleanse the wound with mild soap and water prior to applying a clean dressing. Do not use tissue or cotton balls. Do not scrub the wound. Pat dry using gauze.  Shower yes     Apply dermagran cut to cover to the open wound.  Cover with guaze  Secure with rolled gauze and tape  Change dressing every other day     Standing Status:   Future     Standing Expiration Date:   6/7/2024      Orders Placed This Encounter   Procedures    Wound cleansing and dressings Diabetic Ulcer Anterior;Left Knee     Left knee wound:     Wash your hands with soap and water.  Remove old dressing, discard into plastic bag and place in trash.  Cleanse the wound with mild soap and water prior to applying a clean dressing. Do not use tissue or cotton balls. Do not scrub the wound. Pat dry using gauze.  Shower yes     Apply dermagran cut to cover to the open wound.  Cover with guaze  Secure with rolled gauze and tape  Change dressing every other day     Standing Status:   Future     Standing Expiration Date:   6/7/2024    Wound Procedure Treatment Diabetic Ulcer Anterior;Left Knee     This order was created via procedure documentation

## 2024-05-26 DIAGNOSIS — E11.51 TYPE 2 DIABETES MELLITUS WITH DIABETIC PERIPHERAL ANGIOPATHY WITHOUT GANGRENE, WITH LONG-TERM CURRENT USE OF INSULIN (HCC): ICD-10-CM

## 2024-05-26 DIAGNOSIS — Z79.4 TYPE 2 DIABETES MELLITUS WITH DIABETIC PERIPHERAL ANGIOPATHY WITHOUT GANGRENE, WITH LONG-TERM CURRENT USE OF INSULIN (HCC): ICD-10-CM

## 2024-05-28 RX ORDER — TIRZEPATIDE 5 MG/.5ML
INJECTION, SOLUTION SUBCUTANEOUS
Qty: 2 ML | Refills: 5 | Status: SHIPPED | OUTPATIENT
Start: 2024-05-28

## 2024-05-31 ENCOUNTER — OFFICE VISIT (OUTPATIENT)
Dept: WOUND CARE | Facility: HOSPITAL | Age: 69
End: 2024-05-31
Payer: COMMERCIAL

## 2024-05-31 DIAGNOSIS — E11.622 DIABETIC ULCER OF LEFT LOWER LEG (HCC): Primary | ICD-10-CM

## 2024-05-31 DIAGNOSIS — L97.929 DIABETIC ULCER OF LEFT LOWER LEG (HCC): Primary | ICD-10-CM

## 2024-05-31 DIAGNOSIS — E11.65 POORLY CONTROLLED TYPE 2 DIABETES MELLITUS (HCC): ICD-10-CM

## 2024-05-31 PROCEDURE — 97597 DBRDMT OPN WND 1ST 20 CM/<: CPT | Performed by: STUDENT IN AN ORGANIZED HEALTH CARE EDUCATION/TRAINING PROGRAM

## 2024-05-31 RX ORDER — LIDOCAINE 40 MG/G
CREAM TOPICAL ONCE
Status: COMPLETED | OUTPATIENT
Start: 2024-05-31 | End: 2024-05-31

## 2024-05-31 RX ADMIN — LIDOCAINE: 40 CREAM TOPICAL at 11:34

## 2024-05-31 NOTE — PROGRESS NOTES
Wound Procedure Treatment Diabetic Ulcer Anterior;Left Knee    Performed by: Torie Baca LPN  Authorized by: Ismael Prasad MD    Associated wounds:   Wound 12/20/23 Diabetic Ulcer Knee Anterior;Left  Wound cleansed with:  NSS  Applied primary dressing:  Collagen dressing, Dermagran and Silicone bordered foam

## 2024-05-31 NOTE — PATIENT INSTRUCTIONS
Orders Placed This Encounter   Procedures    Wound cleansing and dressings     Left knee wound:      Wash your hands with soap and water.  Remove old dressing, discard into plastic bag and place in trash.  Cleanse the wound with mild soap and water prior to applying a clean dressing. Do not use tissue or cotton balls. Do not scrub the wound. Pat dry using gauze.  Shower yes   Apply dermagran cut to cover to the open wound.    Cover foam dressing  Change 3 times a week     Standing Status:   Future     Standing Expiration Date:   6/7/2024

## 2024-05-31 NOTE — PROGRESS NOTES
"Patient ID: Kan Juan is a 68 y.o. male Date of Birth 1955     Chief Complaint  Chief Complaint   Patient presents with    Follow Up Wound Care Visit       Allergies  Shellfish-derived products - food allergy and Sulfamethoxazole-trimethoprim    Assessment:       Diagnoses and all orders for this visit:    Diabetic ulcer of left lower leg (HCC)  -     lidocaine (LMX) 4 % cream  -     Wound cleansing and dressings; Future  -     Wound Procedure Treatment Diabetic Ulcer Anterior;Left Knee  -     Debridement    Poorly controlled type 2 diabetes mellitus (HCC)  -     lidocaine (LMX) 4 % cream  -     Wound cleansing and dressings; Future  -     Wound Procedure Treatment Diabetic Ulcer Anterior;Left Knee              Debridement   Wound 12/20/23 Diabetic Ulcer Knee Anterior;Left    Universal Protocol:  Consent: Verbal consent obtained.  Risks and benefits: risks, benefits and alternatives were discussed  Consent given by: patient  Time out: Immediately prior to procedure a \"time out\" was called to verify the correct patient, procedure, equipment, support staff and site/side marked as required.  Patient identity confirmed: verbally with patient    Debridement Details  Performed by: physician  Debridement type: selective  Pain control: lidocaine 4%      Post-debridement measurements  Length (cm): 1.6  Width (cm): 1.1  Depth (cm): 0.1  Percent debrided: 100%  Surface Area (cm^2): 1.76  Area Debrided (cm^2): 1.76  Volume (cm^3): 0.18    Devitalized tissue debrided: biofilm, exudate, fibrin and slough  Instrument(s) utilized: curette  Bleeding: small  Hemostasis obtained with: pressure  Procedural pain (0-10): 1  Post-procedural pain: 0   Response to treatment: procedure was tolerated well        Plan:   It was a pleasure to see Kan Juan for wound care follow up today  Selective debridement performed today as above  Wound is improving   Continue plan of care as noted below with puracol, dermagran, bordered " foam  No signs or symptoms of infection today. Patient understands that if any signs of infection start (such as increased redness, drainage, pain, fever, chills, diaphoresis), they should call our office or proceed to the ER or Urgent Care.  Patient should continue a high protein diet to facilitate wound healing  Patient is advised to not submerge wound or leave wound open to air.  Follow up in 2 weeks  Given the multi-factorial nature of wound care, additional time was taken to review patient's treatment plan with other specialties and most recent pertinent lab work and imaging.   All plans of care discussed with patient at bedside who verbalized understanding with treatment plan.    Wound 12/20/23 Diabetic Ulcer Knee Anterior;Left (Active)   Wound Image Images linked 05/31/24 1117   Wound Description Slough;Yellow;Pink 05/31/24 1117   Dominga-wound Assessment Scar Tissue;[place] 05/31/24 1117   Wound Length (cm) 1.6 cm 05/31/24 1117   Wound Width (cm) 1.1 cm 05/31/24 1117   Wound Depth (cm) 0.1 cm 05/31/24 1117   Wound Surface Area (cm^2) 1.76 cm^2 05/31/24 1117   Wound Volume (cm^3) 0.176 cm^3 05/31/24 1117   Calculated Wound Volume (cm^3) 0.18 cm^3 05/31/24 1117   Drainage Amount Moderate 05/31/24 1117   Drainage Description Serosanguineous;Yellow 05/31/24 1117   Non-staged Wound Description Full thickness 05/31/24 1117       Wound 12/20/23 Diabetic Ulcer Knee Anterior;Left (Active)   Date First Assessed/Time First Assessed: 12/20/23 1312   Primary Wound Type: Diabetic Ulcer  Location: Knee  Wound Location Orientation: Anterior;Left  Wound Description (Comments): CAO 4       [REMOVED] Wound 11/29/23 Knee Left (Removed)   Resolved Date: 12/20/23  Date First Assessed/Time First Assessed: 11/29/23 1452   Location: Knee  Wound Location Orientation: Left  Incision's 1st Dressing: PACKING PLAIN 1/4 IN, DRESSING XEROFORM 1 X 8, SPONGE GAUZE 4 X 4 16 PLY STRL PLASTIC TRAY LF,...       Subjective:      .    5/31/24:  Applying Dermagran as directed and happy with wound healing.  Notes that he has difficulty keeping the Dermagran in place as the rolled gauze is slipping and moving.    5/24/24: Applying polymem as directed.  Happy with wound healing.  Notes that there is less drainage than prior on the dressing     5/9/24: Seen by covering wound care physician at last visit.  PolyMem continued.  Patient happy with wound healing.  No symptoms of infection.     4/11/24, 3/28/24: Applying PolyMem as directed.  Happy with wound healing.  Notes that wound looks smaller to him today.  No symptoms of infection.     3/21/24: applying hydrocolloid as directed this past week.  Does notice more pink in the wound bed however notes that he is having more exudate and does find this wound care product to be irritating to his skin.  Denies any symptoms of infection today including fever chills diaphoresis.     3/14/24: Applying collagen as directed.  Notes that collagen is not really absorbing into the wound bed and is adhered to the wound instead.  No symptoms of infection today.     3/7/24: Applying Betadine as directed.  Happy with wound healing.  No symptoms of infection.     2/27/24: Patient presents today after calling yesterday regarding drainage coming from the wound.  Today notes that that has calmed down some but he continues to have some yellow drainage.  No symptoms of infection.  No skin tightness directly above the wound site.     2/22/24, 2/15/24, 2/8/24, 2/1/24, 1/25/2, 1/18/24, 1/11/24, 1/3/24: Applying Betadine daily as directed.  No symptoms of infection.     12/20/23: Consult - Johnathon is a pleasant 68-year-old male with a past medical history of type 2 diabetes mellitus most recent A1c 11.6% 3 weeks ago, diabetic  polyneuropathy, peripheral arterial disease with history of re-vascularization, atrial fibrillation on chronic anticoagulation, hx of osteomyelitis requiring left sided BKA, continued opioid dependence here today for  initial wound care consult.  Patient was referred by the orthopedic surgeon, Dr. Daryl Shay.  He had incision and drainage of the left knee prepatellar bursa on 11/29/23 to 4-day hospital course at [facility] at which time he was hospitalized for septic prepatellar bursitis.  Following this he had left knee stiffness and pain and was given a 10-day course of such cefadroxil.  He was seen for his suture removal postop visit On 12/14/2023 at Which Time It Was Noted That There Is an Eschar in the Superior Portion of the Incision Site.  He was told that he needs to have better glycemic control and to speak to endocrinology.  Also advised that he if he does not hear he may require further amputation.  Wound care referral was placed at that visit.  Denies any local or systemic symptoms of infection today including fever chills diaphoresis.     BL LEAD Feb 2023:  RIGHT LOWER LIMB:  Diffuse disease noted throughout the femoral-popliteal arteries without  significant focal stenosis.  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  Ankle/Brachial index: 1.12, within the normal disease category (Prior 1.2).  Metatarsal pressure was unable to be obtained due to non-compressible vessels.  Great toe pressure of 82 mmHg, within the healing range (Prior 46 mmHg).  PVR/ PPG tracings are dampened at the ankle and metatarsal.     LEFT LOWER LIMB:  Diffuse disease noted throughout the femoral-popliteal arteries without  significant focal stenosis.  Below knee amputation.     Compared to previous study on 12/6/2021 and 05/26/2021, there is no significant  change in the disease process.  Recommend repeat testing in 6 months as per protocol unless otherwise  indicated.                   The following portions of the patient's history were reviewed and updated as appropriate: allergies, current medications, past family history, past medical history, past social history, past surgical history, and problem list.    Review  of Systems   Constitutional:  Negative for chills, diaphoresis and fever.   Skin:  Positive for wound.   All other systems reviewed and are negative.        Objective:       Wound 12/20/23 Diabetic Ulcer Knee Anterior;Left (Active)   Wound Image Images linked 05/31/24 1117   Wound Description Slough;Yellow;Pink 05/31/24 1117   Dominga-wound Assessment Scar Tissue;[place] 05/31/24 1117   Wound Length (cm) 1.6 cm 05/31/24 1117   Wound Width (cm) 1.1 cm 05/31/24 1117   Wound Depth (cm) 0.1 cm 05/31/24 1117   Wound Surface Area (cm^2) 1.76 cm^2 05/31/24 1117   Wound Volume (cm^3) 0.176 cm^3 05/31/24 1117   Calculated Wound Volume (cm^3) 0.18 cm^3 05/31/24 1117   Drainage Amount Moderate 05/31/24 1117   Drainage Description Serosanguineous;Yellow 05/31/24 1117   Non-staged Wound Description Full thickness 05/31/24 1117       There were no vitals taken for this visit.    Physical Exam  Vitals reviewed.   Constitutional:       Appearance: Normal appearance.   HENT:      Head: Normocephalic and atraumatic.   Eyes:      Extraocular Movements: Extraocular movements intact.   Pulmonary:      Effort: Pulmonary effort is normal.   Musculoskeletal:      Cervical back: Neck supple.   Skin:     Comments: Anterior knee wound smaller than last exam.  Some slough and fibrin deposition in the wound bed which is debrided today.  No signs of infection.   Neurological:      Mental Status: He is alert.   Psychiatric:         Mood and Affect: Mood normal.           Wound Instructions:  Orders Placed This Encounter   Procedures    Wound cleansing and dressings     Left knee wound:      Wash your hands with soap and water.  Remove old dressing, discard into plastic bag and place in trash.  Cleanse the wound with mild soap and water prior to applying a clean dressing. Do not use tissue or cotton balls. Do not scrub the wound. Pat dry using gauze.  Shower yes   Apply dermagran cut to cover to the open wound.    Cover foam dressing  Change 3 times a  "week     Standing Status:   Future     Standing Expiration Date:   6/7/2024    Wound Procedure Treatment Diabetic Ulcer Anterior;Left Knee     This order was created via procedure documentation    Debridement     This order was created via procedure documentation        Diagnosis ICD-10-CM Associated Orders   1. Diabetic ulcer of left lower leg ([facility])  E11.622 lidocaine (LMX) 4 % cream    L97.929 Wound cleansing and dressings     Wound Procedure Treatment Diabetic Ulcer Anterior;Left Knee     Debridement      2. Poorly controlled type 2 diabetes mellitus ([facility])  E11.65 lidocaine (LMX) 4 % cream     Wound cleansing and dressings     Wound Procedure Treatment Diabetic Ulcer Anterior;Left Knee          --  Ismael Prasad MD    \"This note has been constructed using a voice recognition system. Therefore there may be syntax, spelling, and/or grammatical errors. Occasional wrong word or \"sound alike\" substitutions may have occurred due to the inherent limitations of voice recognition software. Read the chart carefully and recognize, using context, where substitutions have occurred. Please call if you have any questions.\"     "

## 2024-06-03 ENCOUNTER — TELEPHONE (OUTPATIENT)
Dept: WOUND CARE | Facility: HOSPITAL | Age: 69
End: 2024-06-03

## 2024-06-07 ENCOUNTER — TELEPHONE (OUTPATIENT)
Age: 69
End: 2024-06-07

## 2024-06-07 NOTE — TELEPHONE ENCOUNTER
Spoke with patient, patient is requesting a call back in regards to a shot for his shoulder. Please advise.

## 2024-06-12 ENCOUNTER — OFFICE VISIT (OUTPATIENT)
Dept: FAMILY MEDICINE CLINIC | Facility: CLINIC | Age: 69
End: 2024-06-12
Payer: COMMERCIAL

## 2024-06-12 VITALS
TEMPERATURE: 98.9 F | HEART RATE: 84 BPM | BODY MASS INDEX: 31.58 KG/M2 | OXYGEN SATURATION: 98 % | HEIGHT: 64 IN | WEIGHT: 185 LBS | DIASTOLIC BLOOD PRESSURE: 66 MMHG | RESPIRATION RATE: 16 BRPM | SYSTOLIC BLOOD PRESSURE: 120 MMHG

## 2024-06-12 DIAGNOSIS — M77.8 TENDINITIS OF LEFT SHOULDER: ICD-10-CM

## 2024-06-12 DIAGNOSIS — I10 ESSENTIAL HYPERTENSION: ICD-10-CM

## 2024-06-12 DIAGNOSIS — E78.2 MIXED HYPERLIPIDEMIA: ICD-10-CM

## 2024-06-12 DIAGNOSIS — E11.42 DIABETIC POLYNEUROPATHY ASSOCIATED WITH TYPE 2 DIABETES MELLITUS (HCC): ICD-10-CM

## 2024-06-12 DIAGNOSIS — Z89.512 S/P BKA (BELOW KNEE AMPUTATION), LEFT (HCC): ICD-10-CM

## 2024-06-12 DIAGNOSIS — I48.91 ATRIAL FIBRILLATION, UNSPECIFIED TYPE (HCC): Primary | ICD-10-CM

## 2024-06-12 DIAGNOSIS — G47.00 INSOMNIA, UNSPECIFIED TYPE: ICD-10-CM

## 2024-06-12 DIAGNOSIS — K21.9 GASTROESOPHAGEAL REFLUX DISEASE WITHOUT ESOPHAGITIS: ICD-10-CM

## 2024-06-12 PROCEDURE — 99214 OFFICE O/P EST MOD 30 MIN: CPT | Performed by: FAMILY MEDICINE

## 2024-06-12 PROCEDURE — G2211 COMPLEX E/M VISIT ADD ON: HCPCS | Performed by: FAMILY MEDICINE

## 2024-06-12 NOTE — PROGRESS NOTES
Ambulatory Visit  Name: Kan Juan      : 1955      MRN: [id number]  Encounter Provider: Macario Goyal MD  Encounter Date: 2024   Encounter department: [facility]    Assessment & Plan   1. Atrial fibrillation, unspecified type (HCC)  Assessment & Plan:  Continue with anticogulation and rate control of heart rate. Concerns about condition were addressed today.   2. Diabetic polyneuropathy associated with type 2 diabetes mellitus (HCC)  Assessment & Plan:  Patient is stable with current meds and discussed a low carb diet. Pt  recommended to see eye doctor and foot doctor for routine screening as per protocol. Recheck A1C  and Cr in 3 months. Patient questions answered today about this condtion.   Lab Results   Component Value Date    HGBA1C 7.6 (H) 2024     3. Essential hypertension  Assessment & Plan:  Patient is stable with current anti-hypertensive medicine and continue to follow a low sodium diet and take current medication. All questions about this condition were answered today.   4. Gastroesophageal reflux disease without esophagitis  Assessment & Plan:  Patient to continue with present therapy and decrease caffeine, avoid ETOH and smoking to decrease acid production. Pt should also cease eating prior to bedtime and avoid excessive fluid intake prior to sleep. May use antacids as needed for breakthrough GERD. All pateint questions answered today about this condition.   5. Insomnia, unspecified type  Assessment & Plan:  Discussed with patient use of sedative  medications and good  sleep hygiene to maximize treatment for this problem. Pt  to use sedatives only prior to sleep and cautioned them about usage at any other time. All patient questions about this problem answered today.   6. Mixed hyperlipidemia  7. S/P BKA (below knee amputation), left (HCC)  Assessment & Plan:  improving  8. Tendinitis of left shoulder  -     Ambulatory Referral to Orthopedic  Surgery; Future         History of Present Illness     68-year-old male here today for checkup on continued pain in his left shoulder.  Patient was seen back in February with pain in his shoulder after injury to it with his parents.  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  Having pain in the anterior portion of his shoulder it was felt as though he had like a calcific tendinitis or bursitis in his shoulder x-ray showed some mild to moderate arthritis.  Patient still been having some problems with it since then it is now June and he is having issues with the pain in his shoulder.  We will have him see one of the orthopedic surgeons for further evaluation of his shoulder this patient may benefit from a injection.  Patient also other multiple medical problems patient with type 2 diabetes hypertension per vascular disease and he is having slow improvement of his stump wound on his left BKA.  Patient doing well with his medications patient doing well with rest of his medications needs no refills other than his chronic pain medicine which was done for him today.      Review of Systems   Constitutional:  Negative for activity change, appetite change, chills, fatigue, fever and unexpected weight change.   HENT:  Negative for congestion, ear pain, hearing loss, mouth sores, postnasal drip, sinus pressure, sinus pain, sneezing and sore throat.    Respiratory:  Negative for apnea, cough, shortness of breath and wheezing.    Cardiovascular:  Negative for chest pain, palpitations and leg swelling.   Gastrointestinal:  Negative for abdominal pain, constipation, diarrhea, nausea and vomiting.   Endocrine: Negative for cold intolerance and heat intolerance.   Genitourinary:  Negative for dysuria, frequency and hematuria.   Musculoskeletal:  Positive for arthralgias and gait problem. Negative for back pain, joint swelling and neck pain.   Skin:  Negative for rash.   Neurological:   Negative for dizziness, weakness and numbness.   Hematological:  Does not bruise/bleed easily.   Psychiatric/Behavioral:  Negative for agitation, behavioral problems, confusion, hallucinations and sleep disturbance. The patient is not nervous/anxious.      Past Medical History:   Diagnosis Date   • Arthritis     fingers   • First degree AV block    • Full dentures    • Hypertension    • PAD (peripheral artery disease) ([facility])    • PVD (peripheral vascular disease) ([facility])    • Type 2 diabetes mellitus with diabetic peripheral angiopathy without gangrene ([facility]) 5/18/2021    Per CMS ICD 10 guidelines--Per Physician   • Wound, open     right foot- by the 5th toe     Past Surgical History:   Procedure Laterality Date   • CHOLECYSTECTOMY     • COLONOSCOPY     • INCISION AND DRAINAGE OF WOUND Left 11/29/2023    Procedure: INCISION AND DRAINAGE (I&D) LEFT KNEE PRE-PATELLA BURSA;  Surgeon: Daryl Shay DO;  Location:  Main OR;  Service: Orthopedics   • IR AORTAGRAM WITH RUN-OFF  1/28/2021   • IR AORTAGRAM WITH RUN-OFF  4/13/2021   • LEG AMPUTATION THROUGH LOWER TIBIA AND FIBULA Left 7/17/2021    Procedure: AMPUTATION BELOW KNEE (BKA);  Surgeon: Jose Mary MD;  Location: [facility];  Service: Vascular   • VT BYP FEM-ANT TIBL PST TIBL PRONEAL ART/OTH DSTL Left 7/14/2021    Procedure: BYPASS femoral-peroneal artery bypass with  reverse GSV and balloon angioplasty peroneal artery;  Surgeon: Jose Mary MD;  Location:  MAIN OR;  Service: Vascular   • VT DEBRIDEMENT BONE 1ST 20 SQ CM/< Right 12/18/2020    Procedure: DEBRIDMENT OF ULCER , REMOVAL OF DEVITALIZED SKIN, SOFT TISSUE, BONE AND APPLICATION OF INTEGRA GRAFT RIGHT FOOT.;  Surgeon: Daquan Ellis DPM;  Location: [facility];  Service: Podiatry   • REPLANTATION THUMB Right     right tip reconnected   • SKIN GRAFT      right thumb   • TOE AMPUTATION      left foot all 5 toes removed   • TOE AMPUTATION Right 2/2/2021    Procedure: Right partial 5th ray amputation;   Surgeon: Gabriel Garcia DPM;  Location:  MAIN OR;  Service: Podiatry   • TOE OSTEOTOMY Right 6/26/2020    Procedure: exostosis of right big toe;  Surgeon: Trey Shirley DPM;  Location: [facility];  Service: Podiatry   • TRIGGER FINGER RELEASE      bilateral hands   • VEIN LIGATION      right     Family History   Problem Relation Age of Onset   • Arthritis Mother    • Pancreatic cancer Mother    • Cancer Father         colon   • Alzheimer's disease Father      Social History     Tobacco Use   • Smoking status: Never   • Smokeless tobacco: Never   Vaping Use   • Vaping status: Never Used   Substance and Sexual Activity   • Alcohol use: Not Currently     Comment: occasional   • Drug use: Never   • Sexual activity: Yes     Partners: Female     Current Outpatient Medications on File Prior to Visit   Medication Sig   • Accu-Chek Softclix Lancets lancets Use as instructed qid Dx E11.9   • Alcohol Swabs (B-D SINGLE USE SWABS REGULAR) PADS Use 4 (four) times a day Dx E11.9   • atorvastatin (LIPITOR) 40 mg tablet Take 1 tablet (40 mg total) by mouth daily at bedtime   • betamethasone dipropionate (DIPROSONE) 0.05 % cream Apply topically 2 (two) times a day (Patient not taking: Reported on 3/8/2024)   • Blood Glucose Calibration (Accu-Chek Guide Control) LIQD Test daily as needed (abnormal sugar reading) (Patient not taking: Reported on 3/8/2024)   • Blood Glucose Monitoring Suppl (Accu-Chek Guide) w/Device KIT Use 4 (four) times a day   • Blood Glucose Monitoring Suppl (Accu-Chek Guide) w/Device KIT Use 4 (four) times a day Dx E11.9 (Patient not taking: Reported on 3/8/2024)   • clopidogrel (PLAVIX) 75 mg tablet Take 1 tablet (75 mg total) by mouth daily   • cyclobenzaprine (FLEXERIL) 5 mg tablet Take 1 tablet (5 mg total) by mouth 3 (three) times a day as needed for muscle spasms for up to 10 days   • glucose blood (Accu-Chek Guide) test strip CHECK BLOOD GLUCOSE FOUR TIMES DAILY AS DIRECTED   • Insulin Glargine Solostar  "(Basaglar KwikPen) 100 UNIT/ML SOPN Inject 0.75 mL (75 Units total) under the skin daily at bedtime   • insulin lispro (HumaLOG KwikPen) 100 units/mL injection pen Inject 25 Units under the skin 3 (three) times a day with meals   • Insulin Pen Needle ( UltiCare Mini Pen Needles) 31G X 5 MM MISC Use 4 (four) times a day   • lisinopril (ZESTRIL) 10 mg tablet Take 1 tablet (10 mg total) by mouth daily   • oxyCODONE-acetaminophen (Percocet) 5-325 mg per tablet Take 1 tablet by mouth every 6 (six) hours as needed for moderate pain Max Daily Amount: 4 tablets   • pantoprazole (PROTONIX) 40 mg tablet Take 1 tablet (40 mg total) by mouth daily   • pioglitazone (ACTOS) 45 mg tablet Take 1 tablet (45 mg total) by mouth daily   • rivaroxaban (Xarelto) 2.5 mg tablet Take 1 tablet (2.5 mg total) by mouth daily with breakfast Last dose 6/21/20   • tirzepatide (Mounjaro) 2.5 MG/0.5ML Inject 0.5 mL (2.5 mg total) under the skin every 7 days (Patient not taking: Reported on 5/9/2024)   • tirzepatide (Mounjaro) 5 MG/0.5ML Inject 0.5 mL (5 mg total) under the skin every 7 days   • UltiCare Insulin Syringe 31G X 5/16\" 1 ML MISC Inject under the skin as needed (for injection)     Allergies   Allergen Reactions   • Shellfish-Derived Products - Food Allergy Anaphylaxis     Throat closes   • Sulfamethoxazole-Trimethoprim Rash     Immunization History   Administered Date(s) Administered   • COVID-19 PFIZER VACCINE 0.3 ML IM 03/02/2021, 03/22/2021   • INFLUENZA 11/02/2018, 10/14/2021, 10/17/2022, 11/24/2023   • Influenza, high dose seasonal 0.7 mL 11/05/2020, 10/14/2021, 10/17/2022, 11/24/2023     Objective     /66 (BP Location: Left arm, Patient Position: Sitting, Cuff Size: Large)   Pulse 84   Temp 98.9 °F (37.2 °C) (Temporal)   Resp 16   Ht 5' 4\" (1.626 m)   Wt 83.9 kg (185 lb)   SpO2 98%   BMI 31.76 kg/m²     Physical Exam  Constitutional:       Appearance: He is well-developed.   HENT:      Head: Normocephalic and " atraumatic.      Right Ear: External ear normal.      Left Ear: External ear normal.      Nose: Nose normal.      Mouth/Throat:      Pharynx: Oropharynx is clear. No oropharyngeal exudate.   Eyes:      General: No scleral icterus.     Conjunctiva/sclera: Conjunctivae normal.      Pupils: Pupils are equal, round, and reactive to light.   Cardiovascular:      Rate and Rhythm: Normal rate and regular rhythm.      Heart sounds: Normal heart sounds.   Pulmonary:      Effort: Pulmonary effort is normal.      Breath sounds: Normal breath sounds. No wheezing or rales.   Abdominal:      General: Bowel sounds are normal.      Palpations: Abdomen is soft.      Tenderness: There is no abdominal tenderness. There is no guarding.   Musculoskeletal:         General: Normal range of motion.      Cervical back: Normal range of motion and neck supple.   Skin:     General: Skin is warm and dry.      Findings: No erythema or rash.   Neurological:      Mental Status: He is alert and oriented to person, place, and time. Mental status is at baseline.   Psychiatric:         Mood and Affect: Mood normal.         Behavior: Behavior normal.         Thought Content: Thought content normal.         Judgment: Judgment normal.       Administrative Statements

## 2024-06-14 ENCOUNTER — OFFICE VISIT (OUTPATIENT)
Dept: WOUND CARE | Facility: HOSPITAL | Age: 69
End: 2024-06-14
Payer: COMMERCIAL

## 2024-06-14 VITALS
SYSTOLIC BLOOD PRESSURE: 137 MMHG | RESPIRATION RATE: 15 BRPM | DIASTOLIC BLOOD PRESSURE: 77 MMHG | HEART RATE: 93 BPM | TEMPERATURE: 97.1 F

## 2024-06-14 DIAGNOSIS — E11.622 DIABETIC ULCER OF LEFT LOWER LEG (HCC): Primary | ICD-10-CM

## 2024-06-14 DIAGNOSIS — L97.929 DIABETIC ULCER OF LEFT LOWER LEG (HCC): Primary | ICD-10-CM

## 2024-06-14 DIAGNOSIS — Z98.890 PERIPHERAL ARTERIAL DISEASE WITH HISTORY OF REVASCULARIZATION  (HCC): ICD-10-CM

## 2024-06-14 DIAGNOSIS — I73.9 PERIPHERAL ARTERIAL DISEASE WITH HISTORY OF REVASCULARIZATION  (HCC): ICD-10-CM

## 2024-06-14 DIAGNOSIS — E11.65 POORLY CONTROLLED TYPE 2 DIABETES MELLITUS (HCC): ICD-10-CM

## 2024-06-14 PROCEDURE — 97597 DBRDMT OPN WND 1ST 20 CM/<: CPT | Performed by: STUDENT IN AN ORGANIZED HEALTH CARE EDUCATION/TRAINING PROGRAM

## 2024-06-14 RX ORDER — LIDOCAINE HYDROCHLORIDE 40 MG/ML
5 SOLUTION TOPICAL ONCE
Status: COMPLETED | OUTPATIENT
Start: 2024-06-14 | End: 2024-06-14

## 2024-06-14 RX ADMIN — LIDOCAINE HYDROCHLORIDE 5 ML: 40 SOLUTION TOPICAL at 11:10

## 2024-06-14 NOTE — PROGRESS NOTES
Wound Procedure Treatment Diabetic Ulcer Anterior;Left Knee    Performed by: Clementina Adler RN  Authorized by: Ismael Prasad MD    Associated wounds:   Wound 12/20/23 Diabetic Ulcer Knee Anterior;Left  Wound cleansed with:  NSS  Applied primary dressing:  Collagen dressing and Silicone bordered foam

## 2024-06-14 NOTE — PATIENT INSTRUCTIONS
Orders Placed This Encounter   Procedures    Wound cleansing and dressings Diabetic Ulcer Anterior;Left Knee     Left knee wound:      Wash your hands with soap and water.  Remove old dressing, discard into plastic bag and place in trash.  Cleanse the wound with mild soap and water prior to applying a clean dressing. Do not use tissue or cotton balls. Do not scrub the wound. Pat dry using gauze.  Shower yes   Apply Puracol to cover to the open wound.    Cover with bordered foam dressing  Change 3 times a week      Protein: Eat protein with each meal to promote healing.  Examples of protein are fish, meat, chicken, nuts, peanut butter, eggs, lentils, edamame or a protein shake.    Wound infection:  If you have signs of infection please call the wound center.  If the wound center is closed- please go to the Emergency department.  Some signs of infection:  fever, chills, increased redness, red streaks, increase in pain, increased drainage.  Drainage with an odor, Change in drainage color: white/milky/green/tan/yellow,  an increase in swelling, chest pain and/or shortness of breath.     Standing Status:   Future     Standing Expiration Date:   6/21/2024

## 2024-06-14 NOTE — PROGRESS NOTES
"Patient ID: Kan Juan is a 68 y.o. male Date of Birth 1955     Chief Complaint  Chief Complaint   Patient presents with    Follow Up Wound Care Visit     LLE       Allergies  Shellfish-derived products - food allergy and Sulfamethoxazole-trimethoprim    Assessment:   Diagnoses and all orders for this visit:    Diabetic ulcer of left lower leg (HCC)  -     lidocaine (XYLOCAINE) 4 % topical solution 5 mL  -     Wound cleansing and dressings Diabetic Ulcer Anterior;Left Knee; Future  -     Debridement    Poorly controlled type 2 diabetes mellitus (HCC)  -     lidocaine (XYLOCAINE) 4 % topical solution 5 mL  -     Wound cleansing and dressings Diabetic Ulcer Anterior;Left Knee; Future    Peripheral arterial disease with history of revascularization  (HCC)  -     lidocaine (XYLOCAINE) 4 % topical solution 5 mL  -     Wound cleansing and dressings Diabetic Ulcer Anterior;Left Knee; Future    Other orders  -     Wound Procedure Treatment Diabetic Ulcer Anterior;Left Knee              Debridement   Wound 12/20/23 Diabetic Ulcer Knee Anterior;Left    Universal Protocol:  Consent: Verbal consent obtained.  Risks and benefits: risks, benefits and alternatives were discussed  Consent given by: patient  Time out: Immediately prior to procedure a \"time out\" was called to verify the correct patient, procedure, equipment, support staff and site/side marked as required.  Patient identity confirmed: verbally with patient    Debridement Details  Performed by: physician  Debridement type: selective  Pain control: lidocaine 4%      Post-debridement measurements  Length (cm): 1.7  Width (cm): 0.8  Depth (cm): 0.1  Percent debrided: 100%  Surface Area (cm^2): 1.36  Area Debrided (cm^2): 1.36  Volume (cm^3): 0.14    Devitalized tissue debrided: biofilm and exudate  Instrument(s) utilized: curette  Bleeding: small  Hemostasis obtained with: pressure  Procedural pain (0-10): 1  Post-procedural pain: 0   Response to treatment: " procedure was tolerated well        Plan:   It was a pleasure to see Kan Juan for wound care follow up today  Selective debridement performed today as above  Wound is improving   Continue plan of care as noted below with puracol, bordered foam  No signs or symptoms of infection today. Patient understands that if any signs of infection start (such as increased redness, drainage, pain, fever, chills, diaphoresis), they should call our office or proceed to the ER or Urgent Care.  Patient should continue a high protein diet to facilitate wound healing  Patient is advised to not submerge wound or leave wound open to air.  Follow up in 1 weeks  Given the multi-factorial nature of wound care, additional time was taken to review patient's treatment plan with other specialties and most recent pertinent lab work and imaging.   All plans of care discussed with patient at bedside who verbalized understanding with treatment plan.    Wound 12/20/23 Diabetic Ulcer Knee Anterior;Left (Active)   Wound Image Images linked 06/14/24 1107   Wound Description Slough;Yellow;Pink 06/14/24 1107   Dominga-wound Assessment Scar Tissue;Maceration;[place] 06/14/24 1107   Wound Length (cm) 1.7 cm 06/14/24 1107   Wound Width (cm) 0.8 cm 06/14/24 1107   Wound Depth (cm) 0.1 cm 06/14/24 1107   Wound Surface Area (cm^2) 1.36 cm^2 06/14/24 1107   Wound Volume (cm^3) 0.136 cm^3 06/14/24 1107   Calculated Wound Volume (cm^3) 0.14 cm^3 06/14/24 1107   Drainage Amount Moderate 06/14/24 1107   Drainage Description Serosanguineous;Yellow 06/14/24 1107   Non-staged Wound Description Full thickness 06/14/24 1107   Dressing Status Intact (upon arrival) 06/14/24 1107       Wound 12/20/23 Diabetic Ulcer Knee Anterior;Left (Active)   Date First Assessed/Time First Assessed: 12/20/23 1312   Primary Wound Type: Diabetic Ulcer  Location: Knee  Wound Location Orientation: Anterior;Left  Wound Description (Comments): CAO 4       [REMOVED] Wound 11/29/23 Knee Left  (Removed)   Resolved Date: 12/20/23  Date First Assessed/Time First Assessed: 11/29/23 7940   Location: Knee  Wound Location Orientation: Left  Incision's 1st Dressing: PACKING PLAIN 1/4 IN, DRESSING XEROFORM 1 X 8, SPONGE GAUZE 4 X 4 16 PLY STRL PLASTIC TRAY LF,...       Subjective:      .    6/14/24: Continues to apply Dermagran as directed however notes that the foam that he received in the mail was not bordered foam.  In lieu of this has been applying Band-Aids.  Does note some periwound maceration and irritation.    5/31/24: Applying Dermagran as directed and happy with wound healing.  Notes that he has difficulty keeping the Dermagran in place as the rolled gauze is slipping and moving.     5/24/24: Applying polymem as directed.  Happy with wound healing.  Notes that there is less drainage than prior on the dressing     5/9/24: Seen by covering wound care physician at last visit.  PolyMem continued.  Patient happy with wound healing.  No symptoms of infection.     4/11/24, 3/28/24: Applying PolyMem as directed.  Happy with wound healing.  Notes that wound looks smaller to him today.  No symptoms of infection.     3/21/24: applying hydrocolloid as directed this past week.  Does notice more pink in the wound bed however notes that he is having more exudate and does find this wound care product to be irritating to his skin.  Denies any symptoms of infection today including fever chills diaphoresis.     3/14/24: Applying collagen as directed.  Notes that collagen is not really absorbing into the wound bed and is adhered to the wound instead.  No symptoms of infection today.     3/7/24: Applying Betadine as directed.  Happy with wound healing.  No symptoms of infection.     2/27/24: Patient presents today after calling yesterday regarding drainage coming from the wound.  Today notes that that has calmed down some but he continues to have some yellow drainage.  No symptoms of infection.  No skin tightness directly  above the wound site.     2/22/24, 2/15/24, 2/8/24, 2/1/24, 1/25/2, 1/18/24, 1/11/24, 1/3/24: Applying Betadine daily as directed.  No symptoms of infection.     12/20/23: Consult - Johnathon is a pleasant 68-year-old male with a past medical history of type 2 diabetes mellitus most recent A1c 11.6% 3 weeks ago, diabetic  polyneuropathy, peripheral arterial disease with history of re-vascularization, atrial fibrillation on chronic anticoagulation, hx of osteomyelitis requiring left sided BKA, continued opioid dependence here today for initial wound care consult.  Patient was referred by the orthopedic surgeon, Dr. Daryl Shay.  He had incision and drainage of the left knee prepatellar bursa on 11/29/23 to 4-day hospital course at [facility] at which time he was hospitalized for septic prepatellar bursitis.  Following this he had left knee stiffness and pain and was given a 10-day course of such cefadroxil.  He was seen for his suture removal postop visit On 12/14/2023 at Which Time It Was Noted That There Is an Eschar in the Superior Portion of the Incision Site.  He was told that he needs to have better glycemic control and to speak to endocrinology.  Also advised that he if he does not hear he may require further amputation.  Wound care referral was placed at that visit.  Denies any local or systemic symptoms of infection today including fever chills diaphoresis.     BL LEAD Feb 2023:  RIGHT LOWER LIMB:  Diffuse disease noted throughout the femoral-popliteal arteries without  significant focal stenosis.  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  Ankle/Brachial index: 1.12, within the normal disease category (Prior 1.2).  Metatarsal pressure was unable to be obtained due to non-compressible vessels.  Great toe pressure of 82 mmHg, within the healing range (Prior 46 mmHg).  PVR/ PPG tracings are dampened at the ankle and metatarsal.     LEFT LOWER LIMB:  Diffuse disease noted throughout the  femoral-popliteal arteries without  significant focal stenosis.  Below knee amputation.     Compared to previous study on 12/6/2021 and 05/26/2021, there is no significant  change in the disease process.  Recommend repeat testing in 6 months as per protocol unless otherwise  indicated.          The following portions of the patient's history were reviewed and updated as appropriate: allergies, current medications, past family history, past medical history, past social history, past surgical history, and problem list.    Review of Systems   Constitutional:  Negative for chills, diaphoresis and fever.   Skin:  Positive for wound.   All other systems reviewed and are negative.        Objective:       Wound 12/20/23 Diabetic Ulcer Knee Anterior;Left (Active)   Wound Image Images linked 06/14/24 1107   Wound Description Slough;Yellow;Pink 06/14/24 1107   Dominga-wound Assessment Scar Tissue;Maceration;[place] 06/14/24 1107   Wound Length (cm) 1.7 cm 06/14/24 1107   Wound Width (cm) 0.8 cm 06/14/24 1107   Wound Depth (cm) 0.1 cm 06/14/24 1107   Wound Surface Area (cm^2) 1.36 cm^2 06/14/24 1107   Wound Volume (cm^3) 0.136 cm^3 06/14/24 1107   Calculated Wound Volume (cm^3) 0.14 cm^3 06/14/24 1107   Drainage Amount Moderate 06/14/24 1107   Drainage Description Serosanguineous;Yellow 06/14/24 1107   Non-staged Wound Description Full thickness 06/14/24 1107   Dressing Status Intact (upon arrival) 06/14/24 1107       /77   Pulse 93   Temp (!) 97.1 °F (36.2 °C)   Resp 15     Physical Exam  Vitals reviewed.   Constitutional:       Appearance: Normal appearance.   HENT:      Head: Normocephalic and atraumatic.   Eyes:      Extraocular Movements: Extraocular movements intact.   Pulmonary:      Effort: Pulmonary effort is normal.   Musculoskeletal:      Cervical back: Neck supple.   Skin:     Comments: Anterior left knee wound smaller than last exam.  Healthy wound bed with some slough was debrided down to healthy granulation  tissue.  No signs of infection.   Neurological:      Mental Status: He is alert.   Psychiatric:         Mood and Affect: Mood normal.               Wound Instructions:  Orders Placed This Encounter   Procedures    Wound cleansing and dressings Diabetic Ulcer Anterior;Left Knee     Left knee wound:      Wash your hands with soap and water.  Remove old dressing, discard into plastic bag and place in trash.  Cleanse the wound with mild soap and water prior to applying a clean dressing. Do not use tissue or cotton balls. Do not scrub the wound. Pat dry using gauze.  Shower yes   Apply Puracol to cover to the open wound.    Cover with bordered foam dressing  Change 3 times a week      Protein: Eat protein with each meal to promote healing.  Examples of protein are fish, meat, chicken, nuts, peanut butter, eggs, lentils, edamame or a protein shake.    Wound infection:  If you have signs of infection please call the wound center.  If the wound center is closed- please go to the Emergency department.  Some signs of infection:  fever, chills, increased redness, red streaks, increase in pain, increased drainage.  Drainage with an odor, Change in drainage color: white/milky/green/tan/yellow,  an increase in swelling, chest pain and/or shortness of breath.     Standing Status:   Future     Standing Expiration Date:   6/21/2024    Wound Procedure Treatment Diabetic Ulcer Anterior;Left Knee     This order was created via procedure documentation    Debridement     This order was created via procedure documentation        Diagnosis ICD-10-CM Associated Orders   1. Diabetic ulcer of left lower leg ([facility])  E11.622 lidocaine (XYLOCAINE) 4 % topical solution 5 mL    L97.929 Wound cleansing and dressings Diabetic Ulcer Anterior;Left Knee     Debridement      2. Poorly controlled type 2 diabetes mellitus ([facility])  E11.65 lidocaine (XYLOCAINE) 4 % topical solution 5 mL     Wound cleansing and dressings Diabetic Ulcer Anterior;Left Knee     "  3. Peripheral arterial disease with history of revascularization  ([facility])  I73.9 lidocaine (XYLOCAINE) 4 % topical solution 5 mL    Z98.890 Wound cleansing and dressings Diabetic Ulcer Anterior;Left Knee          --  Ismael Prasad MD    \"This note has been constructed using a voice recognition system. Therefore there may be syntax, spelling, and/or grammatical errors. Occasional wrong word or \"sound alike\" substitutions may have occurred due to the inherent limitations of voice recognition software. Read the chart carefully and recognize, using context, where substitutions have occurred. Please call if you have any questions.\"     "

## 2024-06-17 DIAGNOSIS — M54.9 CHRONIC BACK PAIN, UNSPECIFIED BACK LOCATION, UNSPECIFIED BACK PAIN LATERALITY: ICD-10-CM

## 2024-06-17 DIAGNOSIS — G89.29 CHRONIC BACK PAIN, UNSPECIFIED BACK LOCATION, UNSPECIFIED BACK PAIN LATERALITY: Primary | ICD-10-CM

## 2024-06-17 DIAGNOSIS — G89.29 CHRONIC BACK PAIN, UNSPECIFIED BACK LOCATION, UNSPECIFIED BACK PAIN LATERALITY: ICD-10-CM

## 2024-06-17 DIAGNOSIS — M54.9 CHRONIC BACK PAIN, UNSPECIFIED BACK LOCATION, UNSPECIFIED BACK PAIN LATERALITY: Primary | ICD-10-CM

## 2024-06-17 RX ORDER — OXYCODONE HYDROCHLORIDE 5 MG/1
5 CAPSULE ORAL EVERY 4 HOURS PRN
Qty: 120 CAPSULE | Refills: 0 | Status: SHIPPED | OUTPATIENT
Start: 2024-06-17

## 2024-06-17 NOTE — TELEPHONE ENCOUNTER
Patient calling as he was seen on 6/12 by Dr. Goyal.  He was told his oxycodone was going to be be called into the [facility].  Patient stated that nothing was called in and patients chart reflects the same.  Patient did state that Dr. Goyal was going to lower the dose as discussed at the appointment.  Please have Dr. Goyal review and send RX to appropriate pharmacy.    J[place]

## 2024-06-19 RX ORDER — OXYCODONE HYDROCHLORIDE AND ACETAMINOPHEN 5; 325 MG/1; MG/1
1 TABLET ORAL EVERY 6 HOURS PRN
Qty: 120 TABLET | Refills: 0 | Status: SHIPPED | OUTPATIENT
Start: 2024-06-19

## 2024-06-20 ENCOUNTER — OFFICE VISIT (OUTPATIENT)
Dept: WOUND CARE | Facility: HOSPITAL | Age: 69
End: 2024-06-20
Payer: COMMERCIAL

## 2024-06-20 ENCOUNTER — TELEPHONE (OUTPATIENT)
Age: 69
End: 2024-06-20

## 2024-06-20 VITALS
RESPIRATION RATE: 18 BRPM | HEART RATE: 69 BPM | SYSTOLIC BLOOD PRESSURE: 174 MMHG | TEMPERATURE: 96.4 F | DIASTOLIC BLOOD PRESSURE: 74 MMHG

## 2024-06-20 DIAGNOSIS — E11.65 POORLY CONTROLLED TYPE 2 DIABETES MELLITUS (HCC): ICD-10-CM

## 2024-06-20 DIAGNOSIS — E11.622 DIABETIC ULCER OF LEFT LOWER LEG (HCC): Primary | ICD-10-CM

## 2024-06-20 DIAGNOSIS — L97.929 DIABETIC ULCER OF LEFT LOWER LEG (HCC): Primary | ICD-10-CM

## 2024-06-20 PROCEDURE — 97597 DBRDMT OPN WND 1ST 20 CM/<: CPT | Performed by: STUDENT IN AN ORGANIZED HEALTH CARE EDUCATION/TRAINING PROGRAM

## 2024-06-20 NOTE — PATIENT INSTRUCTIONS
Orders Placed This Encounter   Procedures    Wound cleansing and dressings     Left knee wound:      Wash your hands with soap and water.  Remove old dressing, discard into plastic bag and place in trash.  Cleanse the wound with mild soap and water prior to applying a clean dressing. Do not use tissue or cotton balls. Do not scrub the wound. Pat dry using gauze.  Shower yes   Apply Puracol to cover to the open wound.    Cover with bordered foam dressing  Change 3 times a week        Protein: Eat protein with each meal to promote healing.  Examples of protein are fish, meat, chicken, nuts, peanut butter, eggs, lentils, edamame or a protein shake.     Wound infection:  If you have signs of infection please call the wound center.  If the wound center is closed- please go to the Emergency department.  Some signs of infection:  fever, chills, increased redness, red streaks, increase in pain, increased drainage.  Drainage with an odor, Change in drainage color: white/milky/green/tan/yellow,  an increase in swelling, chest pain and/or shortness of breath.     Standing Status:   Future     Standing Expiration Date:   6/27/2024

## 2024-06-20 NOTE — TELEPHONE ENCOUNTER
[facility] called and wanted to clarify orders.    Patient was ordered OXY IR 5 mg every 4 hours, yesterday afternoon.     Last night another RX went in for oxycodone 5-325 mg every 6 hours.        Please advise if you want patient on both of these medications and approve with pharmacy.

## 2024-06-20 NOTE — PROGRESS NOTES
Wound Procedure Treatment Diabetic Ulcer Anterior;Left Knee    Performed by: Torie Baca LPN  Authorized by: Ismael Prasad MD    Associated wounds:   Wound 12/20/23 Diabetic Ulcer Knee Anterior;Left  Wound cleansed with:  NSS  Applied primary dressing:  Collagen dressing and Silicone bordered foam

## 2024-06-20 NOTE — PROGRESS NOTES
"Patient ID: Kan Juan is a 68 y.o. male Date of Birth 1955     Chief Complaint  Chief Complaint   Patient presents with    Follow Up Wound Care Visit       Allergies  Shellfish-derived products - food allergy and Sulfamethoxazole-trimethoprim    Assessment:       Diagnoses and all orders for this visit:    Diabetic ulcer of left lower leg (HCC)  -     Wound cleansing and dressings; Future  -     Wound Procedure Treatment Diabetic Ulcer Anterior;Left Knee  -     Debridement Diabetic Ulcer Anterior;Left Knee    Poorly controlled type 2 diabetes mellitus (HCC)  -     Wound cleansing and dressings; Future  -     Wound Procedure Treatment Diabetic Ulcer Anterior;Left Knee              Debridement   Wound 12/20/23 Diabetic Ulcer Knee Anterior;Left    Universal Protocol:  Consent: Verbal consent obtained.  Risks and benefits: risks, benefits and alternatives were discussed  Consent given by: patient  Time out: Immediately prior to procedure a \"time out\" was called to verify the correct patient, procedure, equipment, support staff and site/side marked as required.  Patient identity confirmed: verbally with patient    Debridement Details  Performed by: physician  Debridement type: selective  Pain control: lidocaine 4%      Post-debridement measurements  Length (cm): 1.3  Width (cm): 0.7  Depth (cm): 0.1  Percent debrided: 100%  Surface Area (cm^2): 0.91  Area Debrided (cm^2): 0.91  Volume (cm^3): 0.09    Devitalized tissue debrided: biofilm, exudate, fibrin and slough  Instrument(s) utilized: curette  Bleeding: small  Hemostasis obtained with: pressure  Procedural pain (0-10): 1  Post-procedural pain: 0   Response to treatment: procedure was tolerated well        Plan:   It was a pleasure to see Kan Juan for wound care follow up today  Selective debridement performed today as above  Wound is improving   Continue plan of care as noted below with puracol ag, bordered foam  No signs or symptoms of infection today. " Patient understands that if any signs of infection start (such as increased redness, drainage, pain, fever, chills, diaphoresis), they should call our office or proceed to the ER or Urgent Care.  Patient should continue a high protein diet to facilitate wound healing  Patient is advised to not submerge wound or leave wound open to air.  Follow up in 2 weeks  Given the multi-factorial nature of wound care, additional time was taken to review patient's treatment plan with other specialties and most recent pertinent lab work and imaging.   All plans of care discussed with patient at bedside who verbalized understanding with treatment plan.    Wound 12/20/23 Diabetic Ulcer Knee Anterior;Left (Active)   Wound Image Images linked 06/20/24 1048   Wound Description Slough;Yellow;Pink 06/20/24 1048   Dominga-wound Assessment Scar Tissue;Maceration;Pink 06/20/24 1048   Wound Length (cm) 1.3 cm 06/20/24 1048   Wound Width (cm) 0.7 cm 06/20/24 1048   Wound Depth (cm) 0.1 cm 06/20/24 1048   Wound Surface Area (cm^2) 0.91 cm^2 06/20/24 1048   Wound Volume (cm^3) 0.091 cm^3 06/20/24 1048   Calculated Wound Volume (cm^3) 0.09 cm^3 06/20/24 1048   Drainage Amount Moderate 06/20/24 1048   Drainage Description Serosanguineous;Yellow 06/20/24 1048   Non-staged Wound Description Full thickness 06/20/24 1048       Wound 12/20/23 Diabetic Ulcer Knee Anterior;Left (Active)   Date First Assessed/Time First Assessed: 12/20/23 1312   Primary Wound Type: Diabetic Ulcer  Location: Knee  Wound Location Orientation: Anterior;Left  Wound Description (Comments): CAO 4       [REMOVED] Wound 11/29/23 Knee Left (Removed)   Resolved Date: 12/20/23  Date First Assessed/Time First Assessed: 11/29/23 1452   Location: Knee  Wound Location Orientation: Left  Incision's 1st Dressing: PACKING PLAIN 1/4 IN, DRESSING XEROFORM 1 X 8, SPONGE GAUZE 4 X 4 16 PLY STRL PLASTIC TRAY LF,...       Subjective:      .    6/20/24: Plan Puracol and bordered foam as  directed.  Happy with wound healing.  No concerns today.    6/14/24: Continues to apply Dermagran as directed however notes that the foam that he received in the mail was not bordered foam.  In lieu of this has been applying Band-Aids.  Does note some periwound maceration and irritation.     5/31/24: Applying Dermagran as directed and happy with wound healing.  Notes that he has difficulty keeping the Dermagran in place as the rolled gauze is slipping and moving.     5/24/24: Applying polymem as directed.  Happy with wound healing.  Notes that there is less drainage than prior on the dressing     5/9/24: Seen by covering wound care physician at last visit.  PolyMem continued.  Patient happy with wound healing.  No symptoms of infection.     4/11/24, 3/28/24: Applying PolyMem as directed.  Happy with wound healing.  Notes that wound looks smaller to him today.  No symptoms of infection.     3/21/24: applying hydrocolloid as directed this past week.  Does notice more pink in the wound bed however notes that he is having more exudate and does find this wound care product to be irritating to his skin.  Denies any symptoms of infection today including fever chills diaphoresis.     3/14/24: Applying collagen as directed.  Notes that collagen is not really absorbing into the wound bed and is adhered to the wound instead.  No symptoms of infection today.     3/7/24: Applying Betadine as directed.  Happy with wound healing.  No symptoms of infection.     2/27/24: Patient presents today after calling yesterday regarding drainage coming from the wound.  Today notes that that has calmed down some but he continues to have some yellow drainage.  No symptoms of infection.  No skin tightness directly above the wound site.     2/22/24, 2/15/24, 2/8/24, 2/1/24, 1/25/2, 1/18/24, 1/11/24, 1/3/24: Applying Betadine daily as directed.  No symptoms of infection.     12/20/23: Stephany Arboleda Johnathon is a pleasant 68-year-old male with a past  medical history of type 2 diabetes mellitus most recent A1c 11.6% 3 weeks ago, diabetic  polyneuropathy, peripheral arterial disease with history of re-vascularization, atrial fibrillation on chronic anticoagulation, hx of osteomyelitis requiring left sided BKA, continued opioid dependence here today for initial wound care consult.  Patient was referred by the orthopedic surgeon, Dr. Daryl Shay.  He had incision and drainage of the left knee prepatellar bursa on 11/29/23 to 4-day hospital course at [facility] at which time he was hospitalized for septic prepatellar bursitis.  Following this he had left knee stiffness and pain and was given a 10-day course of such cefadroxil.  He was seen for his suture removal postop visit On 12/14/2023 at Which Time It Was Noted That There Is an Eschar in the Superior Portion of the Incision Site.  He was told that he needs to have better glycemic control and to speak to endocrinology.  Also advised that he if he does not hear he may require further amputation.  Wound care referral was placed at that visit.  Denies any local or systemic symptoms of infection today including fever chills diaphoresis.     BL LEAD Feb 2023:  RIGHT LOWER LIMB:  Diffuse disease noted throughout the femoral-popliteal arteries without  significant focal stenosis.  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  Ankle/Brachial index: 1.12, within the normal disease category (Prior 1.2).  Metatarsal pressure was unable to be obtained due to non-compressible vessels.  Great toe pressure of 82 mmHg, within the healing range (Prior 46 mmHg).  PVR/ PPG tracings are dampened at the ankle and metatarsal.     LEFT LOWER LIMB:  Diffuse disease noted throughout the femoral-popliteal arteries without  significant focal stenosis.  Below knee amputation.     Compared to previous study on 12/6/2021 and 05/26/2021, there is no significant  change in the disease process.  Recommend repeat testing in 6  months as per protocol unless otherwise  indicated.             The following portions of the patient's history were reviewed and updated as appropriate: allergies, current medications, past family history, past medical history, past social history, past surgical history, and problem list.    Review of Systems   Constitutional:  Negative for chills, diaphoresis and fever.   Skin:  Positive for wound.   All other systems reviewed and are negative.        Objective:       Wound 12/20/23 Diabetic Ulcer Knee Anterior;Left (Active)   Wound Image Images linked 06/20/24 1048   Wound Description Slough;Yellow;Pink 06/20/24 1048   Dominga-wound Assessment Scar Tissue;Maceration;Pink 06/20/24 1048   Wound Length (cm) 1.3 cm 06/20/24 1048   Wound Width (cm) 0.7 cm 06/20/24 1048   Wound Depth (cm) 0.1 cm 06/20/24 1048   Wound Surface Area (cm^2) 0.91 cm^2 06/20/24 1048   Wound Volume (cm^3) 0.091 cm^3 06/20/24 1048   Calculated Wound Volume (cm^3) 0.09 cm^3 06/20/24 1048   Drainage Amount Moderate 06/20/24 1048   Drainage Description Serosanguineous;Yellow 06/20/24 1048   Non-staged Wound Description Full thickness 06/20/24 1048       BP (!) 174/74   Pulse 69   Temp (!) 96.4 °F (35.8 °C)   Resp 18     Physical Exam  Vitals reviewed.   Constitutional:       Appearance: Normal appearance.   HENT:      Head: Normocephalic and atraumatic.   Eyes:      Extraocular Movements: Extraocular movements intact.   Pulmonary:      Effort: Pulmonary effort is normal.   Musculoskeletal:      Cervical back: Neck supple.   Skin:     Comments: Anterior left knee wound significantly smaller than last exam.  Healthy wound bed with granulation tissue.  No signs of infection.  No periwound maceration.   Neurological:      Mental Status: He is alert.   Psychiatric:         Mood and Affect: Mood normal.           Wound Instructions:  Orders Placed This Encounter   Procedures    Wound cleansing and dressings     Left knee wound:      Wash your hands  "with soap and water.  Remove old dressing, discard into plastic bag and place in trash.  Cleanse the wound with mild soap and water prior to applying a clean dressing. Do not use tissue or cotton balls. Do not scrub the wound. Pat dry using gauze.  Shower yes   Apply Puracol to cover to the open wound.    Cover with bordered foam dressing  Change 3 times a week        Protein: Eat protein with each meal to promote healing.  Examples of protein are fish, meat, chicken, nuts, peanut butter, eggs, lentils, edamame or a protein shake.     Wound infection:  If you have signs of infection please call the wound center.  If the wound center is closed- please go to the Emergency department.  Some signs of infection:  fever, chills, increased redness, red streaks, increase in pain, increased drainage.  Drainage with an odor, Change in drainage color: white/milky/green/tan/yellow,  an increase in swelling, chest pain and/or shortness of breath.     Standing Status:   Future     Standing Expiration Date:   6/27/2024    Wound Procedure Treatment Diabetic Ulcer Anterior;Left Knee     This order was created via procedure documentation    Debridement Diabetic Ulcer Anterior;Left Knee     This order was created via procedure documentation        Diagnosis ICD-10-CM Associated Orders   1. Diabetic ulcer of left lower leg ([facility])  E11.622 Wound cleansing and dressings    L97.929 Wound Procedure Treatment Diabetic Ulcer Anterior;Left Knee     Debridement Diabetic Ulcer Anterior;Left Knee      2. Poorly controlled type 2 diabetes mellitus ([facility])  E11.65 Wound cleansing and dressings     Wound Procedure Treatment Diabetic Ulcer Anterior;Left Knee          --  Ismael Prasad MD    \"This note has been constructed using a voice recognition system. Therefore there may be syntax, spelling, and/or grammatical errors. Occasional wrong word or \"sound alike\" substitutions may have occurred due to the inherent limitations of voice recognition " "software. Read the chart carefully and recognize, using context, where substitutions have occurred. Please call if you have any questions.\"     "

## 2024-06-21 NOTE — TELEPHONE ENCOUNTER
Spoke with Angelina at [facility] and she will deactivate the Percocet and fill Oxycodone.

## 2024-07-03 ENCOUNTER — OFFICE VISIT (OUTPATIENT)
Dept: WOUND CARE | Facility: HOSPITAL | Age: 69
End: 2024-07-03
Payer: COMMERCIAL

## 2024-07-03 VITALS
DIASTOLIC BLOOD PRESSURE: 81 MMHG | SYSTOLIC BLOOD PRESSURE: 136 MMHG | HEART RATE: 84 BPM | RESPIRATION RATE: 18 BRPM | TEMPERATURE: 97.8 F

## 2024-07-03 DIAGNOSIS — E11.622 DIABETIC ULCER OF LEFT LOWER LEG (HCC): Primary | ICD-10-CM

## 2024-07-03 DIAGNOSIS — L97.929 DIABETIC ULCER OF LEFT LOWER LEG (HCC): Primary | ICD-10-CM

## 2024-07-03 DIAGNOSIS — E11.65 POORLY CONTROLLED TYPE 2 DIABETES MELLITUS (HCC): ICD-10-CM

## 2024-07-03 PROCEDURE — 97597 DBRDMT OPN WND 1ST 20 CM/<: CPT | Performed by: STUDENT IN AN ORGANIZED HEALTH CARE EDUCATION/TRAINING PROGRAM

## 2024-07-03 RX ORDER — LIDOCAINE 40 MG/G
CREAM TOPICAL ONCE
Status: COMPLETED | OUTPATIENT
Start: 2024-07-03 | End: 2024-07-03

## 2024-07-03 RX ADMIN — LIDOCAINE: 40 CREAM TOPICAL at 11:09

## 2024-07-03 NOTE — PROGRESS NOTES
"Patient ID: Kan Juan is a 68 y.o. male Date of Birth 1955     Chief Complaint  Chief Complaint   Patient presents with    Follow Up Wound Care Visit       Allergies  Shellfish-derived products - food allergy and Sulfamethoxazole-trimethoprim    Assessment:     Diagnoses and all orders for this visit:    Diabetic ulcer of left lower leg (HCC)  -     Wound cleansing and dressings; Future  -     lidocaine (LMX) 4 % cream  -     Wound Procedure Treatment Diabetic Ulcer Anterior;Left Knee  -     Debridement    Poorly controlled type 2 diabetes mellitus (HCC)              Debridement   Wound 12/20/23 Diabetic Ulcer Knee Anterior;Left    Universal Protocol:  Consent: Verbal consent obtained.  Risks and benefits: risks, benefits and alternatives were discussed  Consent given by: patient  Time out: Immediately prior to procedure a \"time out\" was called to verify the correct patient, procedure, equipment, support staff and site/side marked as required.  Patient identity confirmed: verbally with patient    Debridement Details  Performed by: physician  Debridement type: selective  Pain control: lidocaine 4%      Post-debridement measurements  Length (cm): 0.6  Width (cm): 0.3  Depth (cm): 0.1  Percent debrided: 100%  Surface Area (cm^2): 0.18  Area Debrided (cm^2): 0.18  Volume (cm^3): 0.02    Devitalized tissue debrided: biofilm and exudate  Instrument(s) utilized: curette  Bleeding: small  Hemostasis obtained with: pressure  Procedural pain (0-10): 1  Post-procedural pain: 0   Response to treatment: procedure was tolerated well        Plan:   It was a pleasure to see Kan Juan for wound care follow up today  Selective debridement performed today as above  Wound is improving   Continue plan of care as noted below with dermagran  No signs or symptoms of infection today. Patient understands that if any signs of infection start (such as increased redness, drainage, pain, fever, chills, diaphoresis), they should call " our office or proceed to the ER or Urgent Care.  Patient should continue a high protein diet to facilitate wound healing  Patient is advised to not submerge wound or leave wound open to air.  Follow up in 1 weeks  Given the multi-factorial nature of wound care, additional time was taken to review patient's treatment plan with other specialties and most recent pertinent lab work and imaging.   All plans of care discussed with patient at bedside who verbalized understanding with treatment plan.    Wound 12/20/23 Diabetic Ulcer Knee Anterior;Left (Active)   Wound Image Images linked 07/03/24 1056   Wound Description Slough;Yellow;Pink 07/03/24 1056   Dominga-wound Assessment Scar Tissue;Maceration;Pink 07/03/24 1056   Wound Length (cm) 0.6 cm 07/03/24 1056   Wound Width (cm) 0.3 cm 07/03/24 1056   Wound Depth (cm) 0.1 cm 07/03/24 1056   Wound Surface Area (cm^2) 0.18 cm^2 07/03/24 1056   Wound Volume (cm^3) 0.018 cm^3 07/03/24 1056   Calculated Wound Volume (cm^3) 0.02 cm^3 07/03/24 1056   Drainage Amount Small 07/03/24 1056   Drainage Description Serosanguineous;Yellow 07/03/24 1056   Non-staged Wound Description Full thickness 07/03/24 1056   Treatments Cleansed 07/03/24 1056       Wound 12/20/23 Diabetic Ulcer Knee Anterior;Left (Active)   Date First Assessed/Time First Assessed: 12/20/23 1312   Primary Wound Type: Diabetic Ulcer  Location: Knee  Wound Location Orientation: Anterior;Left  Wound Description (Comments): CAO 4       [REMOVED] Wound 11/29/23 Knee Left (Removed)   Resolved Date: 12/20/23  Date First Assessed/Time First Assessed: 11/29/23 1452   Location: Knee  Wound Location Orientation: Left  Incision's 1st Dressing: PACKING PLAIN 1/4 IN, DRESSING XEROFORM 1 X 8, SPONGE GAUZE 4 X 4 16 PLY STRL PLASTIC TRAY LF,...       Subjective:      .    7/3/24, 6/20/24: Applying Puracol and bordered foam as directed.  Happy with wound healing.  No concerns today.     6/14/24: Continues to apply Dermagran as directed  however notes that the foam that he received in the mail was not bordered foam.  In lieu of this has been applying Band-Aids.  Does note some periwound maceration and irritation.     5/31/24: Applying Dermagran as directed and happy with wound healing.  Notes that he has difficulty keeping the Dermagran in place as the rolled gauze is slipping and moving.     5/24/24: Applying polymem as directed.  Happy with wound healing.  Notes that there is less drainage than prior on the dressing     5/9/24: Seen by covering wound care physician at last visit.  PolyMem continued.  Patient happy with wound healing.  No symptoms of infection.     4/11/24, 3/28/24: Applying PolyMem as directed.  Happy with wound healing.  Notes that wound looks smaller to him today.  No symptoms of infection.     3/21/24: applying hydrocolloid as directed this past week.  Does notice more pink in the wound bed however notes that he is having more exudate and does find this wound care product to be irritating to his skin.  Denies any symptoms of infection today including fever chills diaphoresis.     3/14/24: Applying collagen as directed.  Notes that collagen is not really absorbing into the wound bed and is adhered to the wound instead.  No symptoms of infection today.     3/7/24: Applying Betadine as directed.  Happy with wound healing.  No symptoms of infection.     2/27/24: Patient presents today after calling yesterday regarding drainage coming from the wound.  Today notes that that has calmed down some but he continues to have some yellow drainage.  No symptoms of infection.  No skin tightness directly above the wound site.     2/22/24, 2/15/24, 2/8/24, 2/1/24, 1/25/2, 1/18/24, 1/11/24, 1/3/24: Applying Betadine daily as directed.  No symptoms of infection.     12/20/23: Stephany - Johnathon is a pleasant 68-year-old male with a past medical history of type 2 diabetes mellitus most recent A1c 11.6% 3 weeks ago, diabetic  polyneuropathy, peripheral  arterial disease with history of re-vascularization, atrial fibrillation on chronic anticoagulation, hx of osteomyelitis requiring left sided BKA, continued opioid dependence here today for initial wound care consult.  Patient was referred by the orthopedic surgeon, Dr. Daryl Shay.  He had incision and drainage of the left knee prepatellar bursa on 11/29/23 to 4-day hospital course at [facility] at which time he was hospitalized for septic prepatellar bursitis.  Following this he had left knee stiffness and pain and was given a 10-day course of such cefadroxil.  He was seen for his suture removal postop visit On 12/14/2023 at Which Time It Was Noted That There Is an Eschar in the Superior Portion of the Incision Site.  He was told that he needs to have better glycemic control and to speak to endocrinology.  Also advised that he if he does not hear he may require further amputation.  Wound care referral was placed at that visit.  Denies any local or systemic symptoms of infection today including fever chills diaphoresis.     BL LEAD Feb 2023:  RIGHT LOWER LIMB:  Diffuse disease noted throughout the femoral-popliteal arteries without  significant focal stenosis.  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  Ankle/Brachial index: 1.12, within the normal disease category (Prior 1.2).  Metatarsal pressure was unable to be obtained due to non-compressible vessels.  Great toe pressure of 82 mmHg, within the healing range (Prior 46 mmHg).  PVR/ PPG tracings are dampened at the ankle and metatarsal.     LEFT LOWER LIMB:  Diffuse disease noted throughout the femoral-popliteal arteries without  significant focal stenosis.  Below knee amputation.     Compared to previous study on 12/6/2021 and 05/26/2021, there is no significant  change in the disease process.  Recommend repeat testing in 6 months as per protocol unless otherwise  indicated.             The following portions of the patient's history were  reviewed and updated as appropriate: allergies, current medications, past family history, past medical history, past social history, past surgical history, and problem list.    Review of Systems   Constitutional:  Negative for chills, diaphoresis and fever.   Skin:  Positive for wound.   All other systems reviewed and are negative.        Objective:       Wound 12/20/23 Diabetic Ulcer Knee Anterior;Left (Active)   Wound Image Images linked 07/03/24 1056   Wound Description Slough;Yellow;Pink 07/03/24 1056   Dominga-wound Assessment Scar Tissue;Maceration;Pink 07/03/24 1056   Wound Length (cm) 0.6 cm 07/03/24 1056   Wound Width (cm) 0.3 cm 07/03/24 1056   Wound Depth (cm) 0.1 cm 07/03/24 1056   Wound Surface Area (cm^2) 0.18 cm^2 07/03/24 1056   Wound Volume (cm^3) 0.018 cm^3 07/03/24 1056   Calculated Wound Volume (cm^3) 0.02 cm^3 07/03/24 1056   Drainage Amount Small 07/03/24 1056   Drainage Description Serosanguineous;Yellow 07/03/24 1056   Non-staged Wound Description Full thickness 07/03/24 1056   Treatments Cleansed 07/03/24 1056       /81   Pulse 84   Temp 97.8 °F (36.6 °C)   Resp 18     Physical Exam  Vitals reviewed.   Constitutional:       Appearance: Normal appearance.   HENT:      Head: Normocephalic and atraumatic.   Eyes:      Extraocular Movements: Extraocular movements intact.   Pulmonary:      Effort: Pulmonary effort is normal.   Musculoskeletal:      Cervical back: Neck supple.   Skin:     Comments: Left knee wound significantly improved today.  Almost fully epithelialized.  No signs of infection.   Neurological:      Mental Status: He is alert.   Psychiatric:         Mood and Affect: Mood normal.           Wound Instructions:  Orders Placed This Encounter   Procedures    Wound cleansing and dressings     Left knee wound:      Wash your hands with soap and water.  Remove old dressing, discard into plastic bag and place in trash.  Cleanse the wound with mild soap and water prior to applying a  "clean dressing. Do not use tissue or cotton balls. Do not scrub the wound. Pat dry using gauze.  Shower yes   Apply Dermagran to cover to the open wound.    Cover with bordered foam dressing  Change 3 times a week        Protein: Eat protein with each meal to promote healing.  Examples of protein are fish, meat, chicken, nuts, peanut butter, eggs, lentils, edamame or a protein shake.     Wound infection:  If you have signs of infection please call the wound center.  If the wound center is closed- please go to the Emergency department.  Some signs of infection:  fever, chills, increased redness, red streaks, increase in pain, increased drainage.  Drainage with an odor, Change in drainage color: white/milky/green/tan/yellow,  an increase in swelling, chest pain and/or shortness of breath.     Standing Status:   Future     Standing Expiration Date:   7/10/2024    Wound Procedure Treatment Diabetic Ulcer Anterior;Left Knee     This order was created via procedure documentation    Debridement     This order was created via procedure documentation        Diagnosis ICD-10-CM Associated Orders   1. Diabetic ulcer of left lower leg ([facility])  E11.622 Wound cleansing and dressings    L97.929 lidocaine (LMX) 4 % cream     Wound Procedure Treatment Diabetic Ulcer Anterior;Left Knee     Debridement      2. Poorly controlled type 2 diabetes mellitus ([facility])  E11.65           --  Ismael Prasad MD    \"This note has been constructed using a voice recognition system. Therefore there may be syntax, spelling, and/or grammatical errors. Occasional wrong word or \"sound alike\" substitutions may have occurred due to the inherent limitations of voice recognition software. Read the chart carefully and recognize, using context, where substitutions have occurred. Please call if you have any questions.\"     "

## 2024-07-03 NOTE — PATIENT INSTRUCTIONS
Orders Placed This Encounter   Procedures    Wound cleansing and dressings     Left knee wound:      Wash your hands with soap and water.  Remove old dressing, discard into plastic bag and place in trash.  Cleanse the wound with mild soap and water prior to applying a clean dressing. Do not use tissue or cotton balls. Do not scrub the wound. Pat dry using gauze.  Shower yes   Apply Dermagran to cover to the open wound.    Cover with bordered foam dressing  Change 3 times a week        Protein: Eat protein with each meal to promote healing.  Examples of protein are fish, meat, chicken, nuts, peanut butter, eggs, lentils, edamame or a protein shake.     Wound infection:  If you have signs of infection please call the wound center.  If the wound center is closed- please go to the Emergency department.  Some signs of infection:  fever, chills, increased redness, red streaks, increase in pain, increased drainage.  Drainage with an odor, Change in drainage color: white/milky/green/tan/yellow,  an increase in swelling, chest pain and/or shortness of breath.     Standing Status:   Future     Standing Expiration Date:   7/10/2024

## 2024-07-03 NOTE — PROGRESS NOTES
Wound Procedure Treatment Diabetic Ulcer Anterior;Left Knee    Performed by: Torie Baca LPN  Authorized by: Ismael Prasad MD    Associated wounds:   Wound 12/20/23 Diabetic Ulcer Knee Anterior;Left  Wound cleansed with:  NSS  Applied primary dressing:  Dermagran and Silicone bordered foam

## 2024-07-09 ENCOUNTER — TELEPHONE (OUTPATIENT)
Age: 69
End: 2024-07-09

## 2024-07-09 DIAGNOSIS — M79.89 PAIN AND SWELLING OF TOE OF LEFT FOOT: Primary | ICD-10-CM

## 2024-07-09 DIAGNOSIS — M79.675 PAIN AND SWELLING OF TOE OF LEFT FOOT: Primary | ICD-10-CM

## 2024-07-09 NOTE — TELEPHONE ENCOUNTER
Pt. Said he has not had his prosthesis leg on since Nov. And he tried it on today and it is hurting. He feels like something is there and wanted to know if the doctor wanted him to go for an Xray or does he think it hurts because he hasn't had it on since Nov.

## 2024-07-10 ENCOUNTER — OFFICE VISIT (OUTPATIENT)
Dept: WOUND CARE | Facility: HOSPITAL | Age: 69
End: 2024-07-10
Payer: COMMERCIAL

## 2024-07-10 VITALS
RESPIRATION RATE: 15 BRPM | DIASTOLIC BLOOD PRESSURE: 76 MMHG | HEART RATE: 88 BPM | TEMPERATURE: 97.4 F | SYSTOLIC BLOOD PRESSURE: 163 MMHG

## 2024-07-10 DIAGNOSIS — L97.929 DIABETIC ULCER OF LEFT LOWER LEG (HCC): Primary | ICD-10-CM

## 2024-07-10 DIAGNOSIS — Z98.890 PERIPHERAL ARTERIAL DISEASE WITH HISTORY OF REVASCULARIZATION  (HCC): ICD-10-CM

## 2024-07-10 DIAGNOSIS — E11.622 DIABETIC ULCER OF LEFT LOWER LEG (HCC): Primary | ICD-10-CM

## 2024-07-10 DIAGNOSIS — E11.65 POORLY CONTROLLED TYPE 2 DIABETES MELLITUS (HCC): ICD-10-CM

## 2024-07-10 DIAGNOSIS — I73.9 PERIPHERAL ARTERIAL DISEASE WITH HISTORY OF REVASCULARIZATION  (HCC): ICD-10-CM

## 2024-07-10 PROCEDURE — 99212 OFFICE O/P EST SF 10 MIN: CPT | Performed by: STUDENT IN AN ORGANIZED HEALTH CARE EDUCATION/TRAINING PROGRAM

## 2024-07-10 NOTE — PROGRESS NOTES
Patient ID: Kan Juan is a 68 y.o. male Date of Birth 1955     Chief Complaint  Chief Complaint   Patient presents with    Follow Up Wound Care Visit     LLE       Allergies  Shellfish-derived products - food allergy and Sulfamethoxazole-trimethoprim    Assessment:     Diagnoses and all orders for this visit:    Diabetic ulcer of left lower leg (HCC)  -     Wound cleansing and dressings Diabetic Ulcer Anterior;Left Knee; Future    Poorly controlled type 2 diabetes mellitus (HCC)  -     Wound cleansing and dressings Diabetic Ulcer Anterior;Left Knee; Future    Peripheral arterial disease with history of revascularization  (HCC)  -     Wound cleansing and dressings Diabetic Ulcer Anterior;Left Knee; Future            Plan:   It was a pleasure to see Kan Juan for wound care follow up today  Patient's wound is fully epithelialized today.  Advised to protect newly healed skin with moisturization monitor skin as he begins fitting for a new prosthetic.  Patient cleared for full weightbearing as well as for use of prosthetic device  All plans of care discussed with patient at bedside who verbalized understanding with treatment plan.    [REMOVED] Wound 12/20/23 Diabetic Ulcer Knee Anterior;Left (Removed)   Wound Image Images linked 07/10/24 1057   Wound Description Pink;Dry;Epithelialization 07/10/24 1057   Dominga-wound Assessment Scar Tissue 07/10/24 1057   Wound Length (cm) 0 cm 07/10/24 1057   Wound Width (cm) 0 cm 07/10/24 1057   Wound Depth (cm) 0 cm 07/10/24 1057   Wound Surface Area (cm^2) 0 cm^2 07/10/24 1057   Wound Volume (cm^3) 0 cm^3 07/10/24 1057   Calculated Wound Volume (cm^3) 0 cm^3 07/10/24 1057   Drainage Amount None 07/10/24 1057   Non-staged Wound Description Not applicable 07/10/24 1057   Dressing Status Intact (wife) 07/10/24 1057       [REMOVED] Wound 11/29/23 Knee Left (Removed)   Resolved Date: 12/20/23  Date First Assessed/Time First Assessed: 11/29/23 0012   Location: Knee  Wound  Location Orientation: Left  Incision's 1st Dressing: PACKING PLAIN 1/4 IN, DRESSING XEROFORM 1 X 8, SPONGE GAUZE 4 X 4 16 PLY STRL PLASTIC TRAY LF,...       [REMOVED] Wound 12/20/23 Diabetic Ulcer Knee Anterior;Left (Removed)   Resolved Date/Resolved Time: 07/10/24 1107  Date First Assessed/Time First Assessed: 12/20/23 1312   Primary Wound Type: Diabetic Ulcer  Location: Knee  Wound Location Orientation: Anterior;Left  Wound Description (Comments): CAO 4  Wound Outcome: ...       Subjective:      .    7/10/24: Applying Dermagran as directed.  Believes wound is healed today.    7/3/24, 6/20/24: Applying Puracol and bordered foam as directed.  Happy with wound healing.  No concerns today.     6/14/24: Continues to apply Dermagran as directed however notes that the foam that he received in the mail was not bordered foam.  In lieu of this has been applying Band-Aids.  Does note some periwound maceration and irritation.     5/31/24: Applying Dermagran as directed and happy with wound healing.  Notes that he has difficulty keeping the Dermagran in place as the rolled gauze is slipping and moving.     5/24/24: Applying polymem as directed.  Happy with wound healing.  Notes that there is less drainage than prior on the dressing     5/9/24: Seen by covering wound care physician at last visit.  PolyMem continued.  Patient happy with wound healing.  No symptoms of infection.     4/11/24, 3/28/24: Applying PolyMem as directed.  Happy with wound healing.  Notes that wound looks smaller to him today.  No symptoms of infection.     3/21/24: applying hydrocolloid as directed this past week.  Does notice more pink in the wound bed however notes that he is having more exudate and does find this wound care product to be irritating to his skin.  Denies any symptoms of infection today including fever chills diaphoresis.     3/14/24: Applying collagen as directed.  Notes that collagen is not really absorbing into the wound bed and is  adhered to the wound instead.  No symptoms of infection today.     3/7/24: Applying Betadine as directed.  Happy with wound healing.  No symptoms of infection.     2/27/24: Patient presents today after calling yesterday regarding drainage coming from the wound.  Today notes that that has calmed down some but he continues to have some yellow drainage.  No symptoms of infection.  No skin tightness directly above the wound site.     2/22/24, 2/15/24, 2/8/24, 2/1/24, 1/25/2, 1/18/24, 1/11/24, 1/3/24: Applying Betadine daily as directed.  No symptoms of infection.     12/20/23: Consult - Johnathon is a pleasant 68-year-old male with a past medical history of type 2 diabetes mellitus most recent A1c 11.6% 3 weeks ago, diabetic  polyneuropathy, peripheral arterial disease with history of re-vascularization, atrial fibrillation on chronic anticoagulation, hx of osteomyelitis requiring left sided BKA, continued opioid dependence here today for initial wound care consult.  Patient was referred by the orthopedic surgeon, Dr. Daryl Shay.  He had incision and drainage of the left knee prepatellar bursa on 11/29/23 to 4-day hospital course at [facility] at which time he was hospitalized for septic prepatellar bursitis.  Following this he had left knee stiffness and pain and was given a 10-day course of such cefadroxil.  He was seen for his suture removal postop visit On 12/14/2023 at Which Time It Was Noted That There Is an Eschar in the Superior Portion of the Incision Site.  He was told that he needs to have better glycemic control and to speak to endocrinology.  Also advised that he if he does not hear he may require further amputation.  Wound care referral was placed at that visit.  Denies any local or systemic symptoms of infection today including fever chills diaphoresis.     BL LEAD Feb 2023:  RIGHT LOWER LIMB:  Diffuse disease noted throughout the femoral-popliteal arteries without  significant focal  stenosis.  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  Ankle/Brachial index: 1.12, within the normal disease category (Prior 1.2).  Metatarsal pressure was unable to be obtained due to non-compressible vessels.  Great toe pressure of 82 mmHg, within the healing range (Prior 46 mmHg).  PVR/ PPG tracings are dampened at the ankle and metatarsal.     LEFT LOWER LIMB:  Diffuse disease noted throughout the femoral-popliteal arteries without  significant focal stenosis.  Below knee amputation.     Compared to previous study on 12/6/2021 and 05/26/2021, there is no significant  change in the disease process.  Recommend repeat testing in 6 months as per protocol unless otherwise  indicated.                   The following portions of the patient's history were reviewed and updated as appropriate: allergies, current medications, past family history, past medical history, past social history, past surgical history, and problem list.    Review of Systems   Constitutional:  Negative for chills, diaphoresis and fever.   Skin:  Negative for wound.   All other systems reviewed and are negative.        Objective:       [REMOVED] Wound 12/20/23 Diabetic Ulcer Knee Anterior;Left (Removed)   Wound Image Images linked 07/10/24 1057   Wound Description Pink;Dry;Epithelialization 07/10/24 1057   Dominga-wound Assessment Scar Tissue 07/10/24 1057   Wound Length (cm) 0 cm 07/10/24 1057   Wound Width (cm) 0 cm 07/10/24 1057   Wound Depth (cm) 0 cm 07/10/24 1057   Wound Surface Area (cm^2) 0 cm^2 07/10/24 1057   Wound Volume (cm^3) 0 cm^3 07/10/24 1057   Calculated Wound Volume (cm^3) 0 cm^3 07/10/24 1057   Drainage Amount None 07/10/24 1057   Non-staged Wound Description Not applicable 07/10/24 1057   Dressing Status Intact (wife) 07/10/24 1057       /76   Pulse 88   Temp (!) 97.4 °F (36.3 °C)   Resp 15     Physical Exam  Vitals reviewed.   Constitutional:       Appearance: Normal appearance.   HENT:      Head:  "Normocephalic and atraumatic.   Eyes:      Extraocular Movements: Extraocular movements intact.   Pulmonary:      Effort: Pulmonary effort is normal.   Musculoskeletal:      Cervical back: Neck supple.   Skin:     Comments: Anterior left knee wound is fully epithelialized today.  No open wound or exudate.   Neurological:      Mental Status: He is alert.   Psychiatric:         Mood and Affect: Mood normal.           Wound Instructions:  Orders Placed This Encounter   Procedures    Wound cleansing and dressings Diabetic Ulcer Anterior;Left Knee     Your left leg wound is healed.    Shower: Yes  Moisturize leg daily.  Skin is fragile.  A bordered foam gauze was applied today.  You can remove this in 2-3 days.    Schedule appt to have prosthesis fitted.    You are discharged from the Wound Center.  If you have any issues/concerns, please call the Wound Center.     Standing Status:   Future     Standing Expiration Date:   7/17/2024        Diagnosis ICD-10-CM Associated Orders   1. Diabetic ulcer of left lower leg ([facility])  E11.622 Wound cleansing and dressings Diabetic Ulcer Anterior;Left Knee    L97.929       2. Poorly controlled type 2 diabetes mellitus ([facility])  E11.65 Wound cleansing and dressings Diabetic Ulcer Anterior;Left Knee      3. Peripheral arterial disease with history of revascularization  ([facility])  I73.9 Wound cleansing and dressings Diabetic Ulcer Anterior;Left Knee    Z98.890           --  Ismael Prasad MD    \"This note has been constructed using a voice recognition system. Therefore there may be syntax, spelling, and/or grammatical errors. Occasional wrong word or \"sound alike\" substitutions may have occurred due to the inherent limitations of voice recognition software. Read the chart carefully and recognize, using context, where substitutions have occurred. Please call if you have any questions.\"     "

## 2024-07-10 NOTE — PATIENT INSTRUCTIONS
Orders Placed This Encounter   Procedures    Wound cleansing and dressings Diabetic Ulcer Anterior;Left Knee     Your left leg wound is healed.    Shower: Yes  Moisturize leg daily.  Skin is fragile.  A bordered foam gauze was applied today.  You can remove this in 2-3 days.    Schedule appt to have prosthesis fitted.    You are discharged from the Wound Center.  If you have any issues/concerns, please call the Wound Center.     Standing Status:   Future     Standing Expiration Date:   7/17/2024

## 2024-07-10 NOTE — LETTER
July 10, 2024     Johnathon Yessica  [address] [facility] [address]    Patient: Kan Juan   YOB: 1955   Date of Visit: 7/10/2024       Dear Dr. Juan:    Thank you for referring Kan Juan to me for evaluation. Below are my notes for this consultation.    If you have questions, please do not hesitate to call me. I look forward to following your patient along with you.         Sincerely,        Ismael Prasad MD        CC: No Recipients    Ismael Prasad MD  7/10/2024 11:11 AM  Signed  Patient ID: Kan Juan is a 68 y.o. male Date of Birth 1955     Chief Complaint  Chief Complaint   Patient presents with   • Follow Up Wound Care Visit     LLE       Allergies  Shellfish-derived products - food allergy and Sulfamethoxazole-trimethoprim    Assessment:     Diagnoses and all orders for this visit:    Diabetic ulcer of left lower leg (HCC)  -     Wound cleansing and dressings Diabetic Ulcer Anterior;Left Knee; Future    Poorly controlled type 2 diabetes mellitus (HCC)  -     Wound cleansing and dressings Diabetic Ulcer Anterior;Left Knee; Future    Peripheral arterial disease with history of revascularization  (HCC)  -     Wound cleansing and dressings Diabetic Ulcer Anterior;Left Knee; Future            Plan:   It was a pleasure to see Kan Juan for wound care follow up today  Patient's wound is fully epithelialized today.  Advised to protect newly healed skin with moisturization monitor skin as he begins fitting for a new prosthetic.  Patient cleared for full weightbearing as well as for use of prosthetic device  All plans of care discussed with patient at bedside who verbalized understanding with treatment plan.    [REMOVED] Wound 12/20/23 Diabetic Ulcer Knee Anterior;Left (Removed)   Wound Image Images linked 07/10/24 1057   Wound Description Pink;Dry;Epithelialization 07/10/24 1057   Dominga-wound Assessment Scar Tissue 07/10/24 1057   Wound Length (cm) 0 cm 07/10/24 1057   Wound Width  (cm) 0 cm 07/10/24 1057   Wound Depth (cm) 0 cm 07/10/24 1057   Wound Surface Area (cm^2) 0 cm^2 07/10/24 1057   Wound Volume (cm^3) 0 cm^3 07/10/24 1057   Calculated Wound Volume (cm^3) 0 cm^3 07/10/24 1057   Drainage Amount None 07/10/24 1057   Non-staged Wound Description Not applicable 07/10/24 1057   Dressing Status Intact (wife) 07/10/24 1057       [REMOVED] Wound 11/29/23 Knee Left (Removed)   Resolved Date: 12/20/23  Date First Assessed/Time First Assessed: 11/29/23 1452   Location: Knee  Wound Location Orientation: Left  Incision's 1st Dressing: PACKING PLAIN 1/4 IN, DRESSING XEROFORM 1 X 8, SPONGE GAUZE 4 X 4 16 PLY STRL PLASTIC TRAY LF,...       [REMOVED] Wound 12/20/23 Diabetic Ulcer Knee Anterior;Left (Removed)   Resolved Date/Resolved Time: 07/10/24 1107  Date First Assessed/Time First Assessed: 12/20/23 1312   Primary Wound Type: Diabetic Ulcer  Location: Knee  Wound Location Orientation: Anterior;Left  Wound Description (Comments): CAO 4  Wound Outcome: ...       Subjective:      .    7/10/24: Applying Dermagran as directed.  Believes wound is healed today.    7/3/24, 6/20/24: Applying Puracol and bordered foam as directed.  Happy with wound healing.  No concerns today.     6/14/24: Continues to apply Dermagran as directed however notes that the foam that he received in the mail was not bordered foam.  In lieu of this has been applying Band-Aids.  Does note some periwound maceration and irritation.     5/31/24: Applying Dermagran as directed and happy with wound healing.  Notes that he has difficulty keeping the Dermagran in place as the rolled gauze is slipping and moving.     5/24/24: Applying polymem as directed.  Happy with wound healing.  Notes that there is less drainage than prior on the dressing     5/9/24: Seen by covering wound care physician at last visit.  PolyMem continued.  Patient happy with wound healing.  No symptoms of infection.     4/11/24, 3/28/24: Applying PolyMem as  directed.  Happy with wound healing.  Notes that wound looks smaller to him today.  No symptoms of infection.     3/21/24: applying hydrocolloid as directed this past week.  Does notice more pink in the wound bed however notes that he is having more exudate and does find this wound care product to be irritating to his skin.  Denies any symptoms of infection today including fever chills diaphoresis.     3/14/24: Applying collagen as directed.  Notes that collagen is not really absorbing into the wound bed and is adhered to the wound instead.  No symptoms of infection today.     3/7/24: Applying Betadine as directed.  Happy with wound healing.  No symptoms of infection.     2/27/24: Patient presents today after calling yesterday regarding drainage coming from the wound.  Today notes that that has calmed down some but he continues to have some yellow drainage.  No symptoms of infection.  No skin tightness directly above the wound site.     2/22/24, 2/15/24, 2/8/24, 2/1/24, 1/25/2, 1/18/24, 1/11/24, 1/3/24: Applying Betadine daily as directed.  No symptoms of infection.     12/20/23: Consult - Johnathon is a pleasant 68-year-old male with a past medical history of type 2 diabetes mellitus most recent A1c 11.6% 3 weeks ago, diabetic  polyneuropathy, peripheral arterial disease with history of re-vascularization, atrial fibrillation on chronic anticoagulation, hx of osteomyelitis requiring left sided BKA, continued opioid dependence here today for initial wound care consult.  Patient was referred by the orthopedic surgeon, Dr. Daryl Shay.  He had incision and drainage of the left knee prepatellar bursa on 11/29/23 to 4-day hospital course at [facility] at which time he was hospitalized for septic prepatellar bursitis.  Following this he had left knee stiffness and pain and was given a 10-day course of such cefadroxil.  He was seen for his suture removal postop visit On 12/14/2023 at Which Time It Was Noted That There  Is an Eschar in the Superior Portion of the Incision Site.  He was told that he needs to have better glycemic control and to speak to endocrinology.  Also advised that he if he does not hear he may require further amputation.  Wound care referral was placed at that visit.  Denies any local or systemic symptoms of infection today including fever chills diaphoresis.     BL LEAD Feb 2023:  RIGHT LOWER LIMB:  Diffuse disease noted throughout the femoral-popliteal arteries without  significant focal stenosis.  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  Ankle/Brachial index: 1.12, within the normal disease category (Prior 1.2).  Metatarsal pressure was unable to be obtained due to non-compressible vessels.  Great toe pressure of 82 mmHg, within the healing range (Prior 46 mmHg).  PVR/ PPG tracings are dampened at the ankle and metatarsal.     LEFT LOWER LIMB:  Diffuse disease noted throughout the femoral-popliteal arteries without  significant focal stenosis.  Below knee amputation.     Compared to previous study on 12/6/2021 and 05/26/2021, there is no significant  change in the disease process.  Recommend repeat testing in 6 months as per protocol unless otherwise  indicated.                   The following portions of the patient's history were reviewed and updated as appropriate: allergies, current medications, past family history, past medical history, past social history, past surgical history, and problem list.    Review of Systems   Constitutional:  Negative for chills, diaphoresis and fever.   Skin:  Negative for wound.   All other systems reviewed and are negative.        Objective:       [REMOVED] Wound 12/20/23 Diabetic Ulcer Knee Anterior;Left (Removed)   Wound Image Images linked 07/10/24 1057   Wound Description Pink;Dry;Epithelialization 07/10/24 1057   Dominga-wound Assessment Scar Tissue 07/10/24 1057   Wound Length (cm) 0 cm 07/10/24 1057   Wound Width (cm) 0 cm 07/10/24 1057   Wound Depth  "(cm) 0 cm 07/10/24 1057   Wound Surface Area (cm^2) 0 cm^2 07/10/24 1057   Wound Volume (cm^3) 0 cm^3 07/10/24 1057   Calculated Wound Volume (cm^3) 0 cm^3 07/10/24 1057   Drainage Amount None 07/10/24 1057   Non-staged Wound Description Not applicable 07/10/24 1057   Dressing Status Intact (wife) 07/10/24 1057       /76   Pulse 88   Temp (!) 97.4 °F (36.3 °C)   Resp 15     Physical Exam  Vitals reviewed.   Constitutional:       Appearance: Normal appearance.   HENT:      Head: Normocephalic and atraumatic.   Eyes:      Extraocular Movements: Extraocular movements intact.   Pulmonary:      Effort: Pulmonary effort is normal.   Musculoskeletal:      Cervical back: Neck supple.   Skin:     Comments: Anterior left knee wound is fully epithelialized today.  No open wound or exudate.   Neurological:      Mental Status: He is alert.   Psychiatric:         Mood and Affect: Mood normal.           Wound Instructions:  Orders Placed This Encounter   Procedures   • Wound cleansing and dressings Diabetic Ulcer Anterior;Left Knee     Your left leg wound is healed.    Shower: Yes  Moisturize leg daily.  Skin is fragile.  A bordered foam gauze was applied today.  You can remove this in 2-3 days.    Schedule appt to have prosthesis fitted.    You are discharged from the Wound Center.  If you have any issues/concerns, please call the Wound Center.     Standing Status:   Future     Standing Expiration Date:   7/17/2024        Diagnosis ICD-10-CM Associated Orders   1. Diabetic ulcer of left lower leg (HCC)  E11.622 Wound cleansing and dressings Diabetic Ulcer Anterior;Left Knee    L97.929       2. Poorly controlled type 2 diabetes mellitus (HCC)  E11.65 Wound cleansing and dressings Diabetic Ulcer Anterior;Left Knee      3. Peripheral arterial disease with history of revascularization  ([facility])  I73.9 Wound cleansing and dressings Diabetic Ulcer Anterior;Left Knee    Z98.890           --  Ismael Prasad MD    \"This note has " "been constructed using a voice recognition system. Therefore there may be syntax, spelling, and/or grammatical errors. Occasional wrong word or \"sound alike\" substitutions may have occurred due to the inherent limitations of voice recognition software. Read the chart carefully and recognize, using context, where substitutions have occurred. Please call if you have any questions.\"     "

## 2024-07-12 ENCOUNTER — APPOINTMENT (OUTPATIENT)
Dept: RADIOLOGY | Facility: MEDICAL CENTER | Age: 69
End: 2024-07-12
Payer: COMMERCIAL

## 2024-07-12 DIAGNOSIS — M79.89 PAIN AND SWELLING OF TOE OF LEFT FOOT: ICD-10-CM

## 2024-07-12 DIAGNOSIS — M79.675 PAIN AND SWELLING OF TOE OF LEFT FOOT: ICD-10-CM

## 2024-07-12 PROCEDURE — 73590 X-RAY EXAM OF LOWER LEG: CPT

## 2024-07-23 ENCOUNTER — TELEPHONE (OUTPATIENT)
Dept: FAMILY MEDICINE CLINIC | Facility: CLINIC | Age: 69
End: 2024-07-23

## 2024-07-23 ENCOUNTER — TELEPHONE (OUTPATIENT)
Age: 69
End: 2024-07-23

## 2024-07-23 NOTE — TELEPHONE ENCOUNTER
Melanie from [facility] pharmacy called to initiate a prior authorization for tirzepatide (Mounjaro) 5 MG/0.5ML.   Would go through [facility], primary and secondary denied coverage. Pharmacist is calling [employer] as well. Claim number [id number]. Please advise.

## 2024-07-26 ENCOUNTER — TELEPHONE (OUTPATIENT)
Age: 69
End: 2024-07-26

## 2024-07-26 DIAGNOSIS — Z44.9: Primary | ICD-10-CM

## 2024-07-26 DIAGNOSIS — Z89.512 HX OF BKA, LEFT (HCC): ICD-10-CM

## 2024-07-26 NOTE — TELEPHONE ENCOUNTER
Patient called and stated that in order for him to receive his prosthetic leg from [facility], Duglas needs a code from his PCP Dr. Goyal.  Duglas told him that the doctor would know this code and to give it to the patient so he can receive his leg.  I aske patient if it was a diagnosis code or CPT code and he was not sure.    Please call patient with code.

## 2024-07-26 NOTE — PROGRESS NOTES
Spoke with patient over the phone about need for new prescription for prosthetic device.  I explained in some detail that while I can state that Johnathon is cleared for a new prosthetic fitting as well as full weightbearing due to healing of stump wound, I am not qualified or trained regarding prosthetic devices.  I cannot sign the prescription being requested by the prosthetic device company.  Patient is very upset and states that general surgery (who performed the BKA) cannot see him for several months.  Advised patient that I can put in referral to physiatry and have our office expedite the consult.  A Physiatrist trained in prosthetics and amputee care would be a good long-term fit for him for any future needs as well.  Patient unhappy however is agreeable to plan.  Referral placed for patient today,  and I will have  reach out to the office.

## 2024-07-26 NOTE — TELEPHONE ENCOUNTER
"Spoke with pt regarding \"codes\". Explained to pt that he will need to call [facility] and ask them to fax us something in writing. Pt agreed and will call back to follow up.  "

## 2024-07-26 NOTE — TELEPHONE ENCOUNTER
Shilpa from [facility] called and stated that Dr. Shanice Guevara has just placed a referral for PMR and is requesting the patient get scheduled ASAP.  Advised someone will call the patient to schedule.    Please assist,     Thank you    Shilpa [facility]  Dr. Shanice Guevara  [phone]

## 2024-07-26 NOTE — TELEPHONE ENCOUNTER
Called patient and left VM to schedule PMR Consult. Please offer 11/1/24 at 11, in [place] with Dr. Lopes.  Please place in this NP slot and schedule from referral.

## 2024-08-14 ENCOUNTER — OFFICE VISIT (OUTPATIENT)
Dept: FAMILY MEDICINE CLINIC | Facility: CLINIC | Age: 69
End: 2024-08-14
Payer: COMMERCIAL

## 2024-08-14 VITALS
HEART RATE: 87 BPM | HEIGHT: 64 IN | TEMPERATURE: 98.9 F | BODY MASS INDEX: 32.44 KG/M2 | RESPIRATION RATE: 16 BRPM | WEIGHT: 190 LBS | DIASTOLIC BLOOD PRESSURE: 60 MMHG | SYSTOLIC BLOOD PRESSURE: 130 MMHG | OXYGEN SATURATION: 96 %

## 2024-08-14 DIAGNOSIS — K21.9 GASTROESOPHAGEAL REFLUX DISEASE WITHOUT ESOPHAGITIS: ICD-10-CM

## 2024-08-14 DIAGNOSIS — I48.91 ATRIAL FIBRILLATION, UNSPECIFIED TYPE (HCC): ICD-10-CM

## 2024-08-14 DIAGNOSIS — R06.02 SOB (SHORTNESS OF BREATH): ICD-10-CM

## 2024-08-14 DIAGNOSIS — E11.51 TYPE 2 DIABETES MELLITUS WITH DIABETIC PERIPHERAL ANGIOPATHY WITHOUT GANGRENE, WITH LONG-TERM CURRENT USE OF INSULIN (HCC): Primary | ICD-10-CM

## 2024-08-14 DIAGNOSIS — E78.2 MIXED HYPERLIPIDEMIA: ICD-10-CM

## 2024-08-14 DIAGNOSIS — Z79.4 TYPE 2 DIABETES MELLITUS WITH DIABETIC PERIPHERAL ANGIOPATHY WITHOUT GANGRENE, WITH LONG-TERM CURRENT USE OF INSULIN (HCC): Primary | ICD-10-CM

## 2024-08-14 DIAGNOSIS — Z89.512 S/P BKA (BELOW KNEE AMPUTATION), LEFT (HCC): ICD-10-CM

## 2024-08-14 DIAGNOSIS — M25.512 CHRONIC LEFT SHOULDER PAIN: ICD-10-CM

## 2024-08-14 DIAGNOSIS — I10 ESSENTIAL HYPERTENSION: ICD-10-CM

## 2024-08-14 DIAGNOSIS — G89.29 CHRONIC BACK PAIN, UNSPECIFIED BACK LOCATION, UNSPECIFIED BACK PAIN LATERALITY: ICD-10-CM

## 2024-08-14 DIAGNOSIS — G47.00 INSOMNIA, UNSPECIFIED TYPE: ICD-10-CM

## 2024-08-14 DIAGNOSIS — L82.1 SEBORRHEIC KERATOSIS: ICD-10-CM

## 2024-08-14 DIAGNOSIS — G89.29 CHRONIC LEFT SHOULDER PAIN: ICD-10-CM

## 2024-08-14 DIAGNOSIS — M54.9 CHRONIC BACK PAIN, UNSPECIFIED BACK LOCATION, UNSPECIFIED BACK PAIN LATERALITY: ICD-10-CM

## 2024-08-14 LAB — SL AMB POCT HEMOGLOBIN AIC: 7.7 (ref ?–6.5)

## 2024-08-14 PROCEDURE — 99214 OFFICE O/P EST MOD 30 MIN: CPT | Performed by: FAMILY MEDICINE

## 2024-08-14 PROCEDURE — 83036 HEMOGLOBIN GLYCOSYLATED A1C: CPT | Performed by: FAMILY MEDICINE

## 2024-08-14 RX ORDER — ATORVASTATIN CALCIUM 40 MG/1
40 TABLET, FILM COATED ORAL
Qty: 90 TABLET | Refills: 1 | Status: SHIPPED | OUTPATIENT
Start: 2024-08-14

## 2024-08-14 RX ORDER — OXYCODONE AND ACETAMINOPHEN 5; 325 MG/1; MG/1
1 TABLET ORAL EVERY 6 HOURS PRN
Qty: 120 TABLET | Refills: 0 | Status: SHIPPED | OUTPATIENT
Start: 2024-08-14

## 2024-08-15 ENCOUNTER — TELEPHONE (OUTPATIENT)
Dept: ADMINISTRATIVE | Facility: OTHER | Age: 69
End: 2024-08-15

## 2024-08-15 NOTE — LETTER
Diabetic Eye Exam Form    Date Requested: 08/15/24  Patient: Kan Juan  Patient : 1955   Referring Provider: Macario Goyal MD      DIABETIC Eye Exam Date _______________________________      Type of Exam MUST be documented for Diabetic Eye Exams. Please CHECK ONE.     Retinal Exam       Dilated Retinal Exam       OCT       Optomap-Iris Exam      Fundus Photography       Left Eye - Please check Retinopathy or No Retinopathy        Exam did show retinopathy    Exam did not show retinopathy       Right Eye - Please check Retinopathy or No Retinopathy       Exam did show retinopathy    Exam did not show retinopathy       Comments __________________________________________________________    Practice Providing Exam ______________________________________________    Exam Performed By (print name) _______________________________________      Provider Signature ___________________________________________________      These reports are needed for  compliance.  Please fax this completed form and a copy of the Diabetic Eye Exam report to our office located at [address] [place] [address] as soon as possible via Fax [phone] attention Lanie: Phone [phone]  We thank you for your assistance in treating our mutual patient.

## 2024-08-15 NOTE — LETTER
2nd Request      Diabetic Eye Exam Form    Date Requested: 24  Patient: Kan Juan  Patient : 1955   Referring Provider: Macario Goyal MD      DIABETIC Eye Exam Date _______________________________      Type of Exam MUST be documented for Diabetic Eye Exams. Please CHECK ONE.     Retinal Exam       Dilated Retinal Exam       OCT       Optomap-Iris Exam      Fundus Photography       Left Eye - Please check Retinopathy or No Retinopathy        Exam did show retinopathy    Exam did not show retinopathy       Right Eye - Please check Retinopathy or No Retinopathy       Exam did show retinopathy    Exam did not show retinopathy       Comments __________________________________________________________    Practice Providing Exam ______________________________________________    Exam Performed By (print name) _______________________________________      Provider Signature ___________________________________________________      These reports are needed for  compliance.  Please fax this completed form and a copy of the Diabetic Eye Exam report to our office located at [address] [place] [address] as soon as possible via Fax [phone] attention Lanie: Phone [phone]  We thank you for your assistance in treating our mutual patient.

## 2024-08-15 NOTE — TELEPHONE ENCOUNTER
----- Message from Bethany CARRILLO sent at 8/14/2024 12:02 PM EDT -----  Regarding: DM eye  08/14/24 12:02 PM    Hello, our patient attached above has had Diabetic Eye Exam completed/performed. Please assist in updating the patient chart by making an External outreach to Dr Denana Page facility located in [facility]. The date of service is 2024.    Thank you,  Bethany Reno PG [facility]

## 2024-08-15 NOTE — LETTER
Diabetic Eye Exam Form    Date Requested: 08/15/24  Patient: Kan Juan  Patient : 1955   Referring Provider: Maacrio Goyal MD      DIABETIC Eye Exam Date _______________________________      Type of Exam MUST be documented for Diabetic Eye Exams. Please CHECK ONE.     Retinal Exam       Dilated Retinal Exam       OCT       Optomap-Iris Exam      Fundus Photography       Left Eye - Please check Retinopathy or No Retinopathy        Exam did show retinopathy    Exam did not show retinopathy       Right Eye - Please check Retinopathy or No Retinopathy       Exam did show retinopathy    Exam did not show retinopathy       Comments __________________________________________________________    Practice Providing Exam ______________________________________________    Exam Performed By (print name) _______________________________________      Provider Signature ___________________________________________________      These reports are needed for  compliance.  Please fax this completed form and a copy of the Diabetic Eye Exam report to our office located at [address] [place] [address] as soon as possible via Fax [phone] attention Lanie: Phone [phone]  We thank you for your assistance in treating our mutual patient.

## 2024-08-15 NOTE — TELEPHONE ENCOUNTER
Upon review of the In Basket request and the patient's chart, initial outreach has been made via fax to facility. Please see Contacts section for details.     Thank you  Lanie Moses MA

## 2024-08-15 NOTE — LETTER
Diabetic Eye Exam Form    Date Requested: 08/15/24  Patient: Kan Juan  Patient : 1955   Referring Provider: Macario Goyal MD      DIABETIC Eye Exam Date _______________________________      Type of Exam MUST be documented for Diabetic Eye Exams. Please CHECK ONE.     Retinal Exam       Dilated Retinal Exam       OCT       Optomap-Iris Exam      Fundus Photography       Left Eye - Please check Retinopathy or No Retinopathy        Exam did show retinopathy    Exam did not show retinopathy       Right Eye - Please check Retinopathy or No Retinopathy       Exam did show retinopathy    Exam did not show retinopathy       Comments __________________________________________________________    Practice Providing Exam ______________________________________________    Exam Performed By (print name) _______________________________________      Provider Signature ___________________________________________________      These reports are needed for  compliance.  Please fax this completed form and a copy of the Diabetic Eye Exam report to our office located at [address] [place] [address] as soon as possible via Fax [phone] attention Lanie: Phone [phone]  We thank you for your assistance in treating our mutual patient.

## 2024-08-20 ENCOUNTER — TELEPHONE (OUTPATIENT)
Age: 69
End: 2024-08-20

## 2024-08-20 NOTE — TELEPHONE ENCOUNTER
Pt called in stating that he got a call to set up an appointment with cardidiology. States that he was told that the referral was placed by PCP for SOB. Pt does not remember mentioning that to PCP at  on 8/14. Pt states that he does not have a hard time breathing. Please advise.

## 2024-08-21 NOTE — TELEPHONE ENCOUNTER
Patient calls again asking why a cardiology referral was placed.  He states he does not have SOB.      Please call the patient back asap with a response.

## 2024-08-21 NOTE — TELEPHONE ENCOUNTER
I have corrected this referral.  Patient did not want to see the cardiologist and I have deleted there from his note.

## 2024-08-26 ENCOUNTER — OFFICE VISIT (OUTPATIENT)
Dept: OBGYN CLINIC | Facility: MEDICAL CENTER | Age: 69
End: 2024-08-26
Payer: COMMERCIAL

## 2024-08-26 VITALS
HEIGHT: 64 IN | SYSTOLIC BLOOD PRESSURE: 150 MMHG | WEIGHT: 190 LBS | BODY MASS INDEX: 32.44 KG/M2 | HEART RATE: 77 BPM | DIASTOLIC BLOOD PRESSURE: 84 MMHG

## 2024-08-26 DIAGNOSIS — G89.29 CHRONIC LEFT SHOULDER PAIN: ICD-10-CM

## 2024-08-26 DIAGNOSIS — S46.012A TRAUMATIC TEAR OF LEFT ROTATOR CUFF, UNSPECIFIED TEAR EXTENT, INITIAL ENCOUNTER: Primary | ICD-10-CM

## 2024-08-26 DIAGNOSIS — M25.512 CHRONIC LEFT SHOULDER PAIN: ICD-10-CM

## 2024-08-26 DIAGNOSIS — I10 ESSENTIAL HYPERTENSION: ICD-10-CM

## 2024-08-26 DIAGNOSIS — E11.65 POORLY CONTROLLED TYPE 2 DIABETES MELLITUS (HCC): ICD-10-CM

## 2024-08-26 PROCEDURE — 99214 OFFICE O/P EST MOD 30 MIN: CPT | Performed by: ORTHOPAEDIC SURGERY

## 2024-08-26 NOTE — PROGRESS NOTES
[facility] [place]  [address]  [facility] [address]       Kan Juan  [id number]  1955    ORTHOPAEDIC SURGERY OUTPATIENT NOTE  8/26/2024      HISTORY:  68 y.o. male who presents to the office today for evaluation and treatment of left shoulder pain ongoing for approximately one year. He notes one year ago he fell onto the shoulder while lifting his father, with pain rising shortly after the fall. He describes a toothache type of pain anterior and superior aspect of his shoulder rated 4/10 on average, aggravated by sleeping on that side, overhead movement. He notes constant tingling in the small and ring finger that did originate after the fall, which could be secondary to prolonged use of crutches for septic prepatellar bursitis. He denies shoulder pain  He notes mechanical symptoms in the shoulder with movement. He takes Oxycontin for pain with minimal relief of shoulder pain. He notes history of Diabetes with most recent A1C at 7.7 on 8/14/2024.     Past Medical History:   Diagnosis Date    Arthritis     fingers    First degree AV block     Full dentures     Hypertension     PAD (peripheral artery disease) ([facility])     PVD (peripheral vascular disease) ([facility])     Type 2 diabetes mellitus with diabetic peripheral angiopathy without gangrene ([facility]) 5/18/2021    Per CMS ICD 10 guidelines--Per Physician    Wound, open     right foot- by the 5th toe       Past Surgical History:   Procedure Laterality Date    CHOLECYSTECTOMY      COLONOSCOPY      INCISION AND DRAINAGE OF WOUND Left 11/29/2023    Procedure: INCISION AND DRAINAGE (I&D) LEFT KNEE PRE-PATELLA BURSA;  Surgeon: Daryl Shay DO;  Location: [facility];  Service: Orthopedics    IR AORTAGRAM WITH RUN-OFF  1/28/2021    IR AORTAGRAM WITH RUN-OFF  4/13/2021    LEG AMPUTATION THROUGH LOWER TIBIA AND FIBULA Left 7/17/2021    Procedure: AMPUTATION BELOW KNEE (BKA);  Surgeon: Jose Mary MD;   Location:  MAIN OR;  Service: Vascular    ME BYP FEM-ANT TIBL PST TIBL PRONEAL ART/OTH DSTL Left 7/14/2021    Procedure: BYPASS femoral-peroneal artery bypass with  reverse GSV and balloon angioplasty peroneal artery;  Surgeon: Jose Mary MD;  Location:  MAIN OR;  Service: Vascular    ME DEBRIDEMENT BONE 1ST 20 SQ CM/< Right 12/18/2020    Procedure: DEBRIDMENT OF ULCER , REMOVAL OF DEVITALIZED SKIN, SOFT TISSUE, BONE AND APPLICATION OF INTEGRA GRAFT RIGHT FOOT.;  Surgeon: Daquan Ellis DPM;  Location: [facility];  Service: Podiatry    REPLANTATION THUMB Right     right tip reconnected    SKIN GRAFT      right thumb    TOE AMPUTATION      left foot all 5 toes removed    TOE AMPUTATION Right 2/2/2021    Procedure: Right partial 5th ray amputation;  Surgeon: Gabriel Garcia DPM;  Location:  MAIN OR;  Service: Podiatry    TOE OSTEOTOMY Right 6/26/2020    Procedure: exostosis of right big toe;  Surgeon: Trey Shirley DPM;  Location: [facility];  Service: Podiatry    TRIGGER FINGER RELEASE      bilateral hands    VEIN LIGATION      right       Social History     Socioeconomic History    Marital status: /Civil Union     Spouse name: Angeles    Number of children: 1    Years of education: 12    Highest education level: High school graduate   Occupational History    Occupation: [employer]   Tobacco Use    Smoking status: Never    Smokeless tobacco: Never   Vaping Use    Vaping status: Never Used   Substance and Sexual Activity    Alcohol use: Not Currently     Comment: occasional    Drug use: Never    Sexual activity: Yes     Partners: Female   Other Topics Concern    Not on file   Social History Narrative         Social Determinants of Health     Financial Resource Strain: Low Risk  (9/15/2023)    Overall Financial Resource Strain (CARDIA)     Difficulty of Paying Living Expenses: Not hard at all   Food Insecurity: Not on file   Transportation Needs: No Transportation Needs (9/15/2023)    PRAPARE - Transportation      Lack of Transportation (Medical): No     Lack of Transportation (Non-Medical): No   Physical Activity: Not on file   Stress: Not on file   Social Connections: Not on file   Intimate Partner Violence: Not on file   Housing Stability: Not on file       Family History   Problem Relation Age of Onset    Arthritis Mother     Pancreatic cancer Mother     Cancer Father         colon    Alzheimer's disease Father         Patient's Medications   New Prescriptions    No medications on file   Previous Medications    ACCU-CHEK SOFTCLIX LANCETS LANCETS    Use as instructed qid Dx E11.9    ALCOHOL SWABS (B-D SINGLE USE SWABS REGULAR) PADS    Use 4 (four) times a day Dx E11.9    ATORVASTATIN (LIPITOR) 40 MG TABLET    Take 1 tablet (40 mg total) by mouth daily at bedtime    BETAMETHASONE DIPROPIONATE (DIPROSONE) 0.05 % CREAM    Apply topically 2 (two) times a day    BLOOD GLUCOSE CALIBRATION (ACCU-CHEK GUIDE CONTROL) LIQD    Test daily as needed (abnormal sugar reading)    BLOOD GLUCOSE MONITORING SUPPL (ACCU-CHEK GUIDE) W/DEVICE KIT    Use 4 (four) times a day    BLOOD GLUCOSE MONITORING SUPPL (ACCU-CHEK GUIDE) W/DEVICE KIT    Use 4 (four) times a day Dx E11.9    CLOPIDOGREL (PLAVIX) 75 MG TABLET    Take 1 tablet (75 mg total) by mouth daily    CYCLOBENZAPRINE (FLEXERIL) 5 MG TABLET    Take 1 tablet (5 mg total) by mouth 3 (three) times a day as needed for muscle spasms for up to 10 days    GLUCOSE BLOOD (ACCU-CHEK GUIDE) TEST STRIP    CHECK BLOOD GLUCOSE FOUR TIMES DAILY AS DIRECTED    INSULIN GLARGINE SOLOSTAR (BASAGLAR KWIKPEN) 100 UNIT/ML SOPN    Inject 0.75 mL (75 Units total) under the skin daily at bedtime    INSULIN LISPRO (HUMALOG KWIKPEN) 100 UNITS/ML INJECTION PEN    Inject 25 Units under the skin 3 (three) times a day with meals    INSULIN PEN NEEDLE ( ULTICARE MINI PEN NEEDLES) 31G X 5 MM MISC    Use 4 (four) times a day    LISINOPRIL (ZESTRIL) 10 MG TABLET    Take 1 tablet (10 mg total) by mouth daily     "OXYCODONE-ACETAMINOPHEN (PERCOCET) 5-325 MG PER TABLET    Take 1 tablet by mouth every 6 (six) hours as needed for moderate pain Max Daily Amount: 4 tablets    PANTOPRAZOLE (PROTONIX) 40 MG TABLET    Take 1 tablet (40 mg total) by mouth daily    PIOGLITAZONE (ACTOS) 45 MG TABLET    Take 1 tablet (45 mg total) by mouth daily    RIVAROXABAN (XARELTO) 2.5 MG TABLET    Take 1 tablet (2.5 mg total) by mouth daily with breakfast Last dose 6/21/20    TIRZEPATIDE (MOUNJARO) 2.5 MG/0.5ML    Inject 0.5 mL (2.5 mg total) under the skin every 7 days    TIRZEPATIDE (MOUNJARO) 5 MG/0.5ML    Inject 0.5 mL (5 mg total) under the skin every 7 days    ULTICARE INSULIN SYRINGE 31G X 5/16\" 1 ML MISC    Inject under the skin as needed (for injection)   Modified Medications    No medications on file   Discontinued Medications    No medications on file       Allergies   Allergen Reactions    Shellfish-Derived Products - Food Allergy Anaphylaxis     Throat closes    Sulfamethoxazole-Trimethoprim Rash        REVIEW OF SYSTEMS:  Constitutional: Negative.    HEENT: Negative.    Respiratory: Negative.    Skin: Negative.    Neurological: Negative.    Psychiatric/Behavioral: Negative.  Musculoskeletal: Negative except for that mentioned in the HPI.    PHYSICAL EXAM:    [unfilled]    MUSCULOSKELETAL EXAM:  LEFT SHOULDER:    Appearance: skin intact    Forward flexion:   180 degrees   Abduction:  180 degrees   External rotation at 90 degrees abduction:   90 degrees   Internal rotation at 90 degrees abduction:  90 degrees   External rotation at 0 degrees:   70 degrees   Internal rotation: L1     STRENGTH:  Forward flexion:  4+/5   Abduction:  4+/5   External rotation:  4/5   Internal rotation:  4/5        Speed test: Positive  Yergason's: Negative   Tender to palpation ACJ (acromioclavicular joint): Positive   Tender to palpation LHB (long head of biceps): Positive  Tender to palpation greater tuberosity: Positive  Riojas test: Negative  [place] " test: Positive  Hornblower's: Negative  Lift off: Negative  Belly press: Negative  Bear hug: Negative  External lag sign: Negative  Cross-body adduction: Negative  Sulcus sign: Negative  Prashant's test: Negative  Drop arm test: negative    Radial/median/ulnar nerve intact    <2 sec cap refill        IMAGING:  Images were reviewed in PACS by myself and demonstrate moderate degenerative changes of glenohumeral and AC joint with osteophyte formation, sclerotic changes, loss of joint space.     ASSESSMENT AND PLAN:  68 y.o. male with suspected left shoulder rotator cuff tear sustained one year ago.  Due to history of trauma with persistent pain, MRI of left shoulder was ordered today to further evaluate for rotator cuff tear.  We will plan to see him back after his MRI to review the results and delineate further treatment plan at that time.    PROCEDURES PERFORMED:  Procedures       Scribe Attestation      I,:  Jade Machado am acting as a scribe while in the presence of the attending physician.:       I,:  Yolanda Bonilla DO personally performed the services described in this documentation    as scribed in my presence.:

## 2024-08-27 RX ORDER — LISINOPRIL 10 MG/1
10 TABLET ORAL DAILY
Qty: 90 TABLET | Refills: 1 | Status: SHIPPED | OUTPATIENT
Start: 2024-08-27

## 2024-08-27 NOTE — TELEPHONE ENCOUNTER
As a final attempt, a third outreach has been made via telephone call to facility. Please see Contacts section for details. This encounter will be closed and completed by end of day. Should we receive the requested information because of previous outreach attempts, the requested patient's chart will be updated appropriately.     Thank you  Lanie Moses MA

## 2024-09-07 DIAGNOSIS — E11.65 POORLY CONTROLLED TYPE 2 DIABETES MELLITUS (HCC): ICD-10-CM

## 2024-09-07 RX ORDER — INSULIN LISPRO 100 [IU]/ML
INJECTION, SOLUTION INTRAVENOUS; SUBCUTANEOUS
Qty: 15 ML | Refills: 0 | Status: SHIPPED | OUTPATIENT
Start: 2024-09-07

## 2024-09-07 RX ORDER — INSULIN GLARGINE 100 [IU]/ML
INJECTION, SOLUTION SUBCUTANEOUS
Qty: 15 ML | Refills: 0 | Status: SHIPPED | OUTPATIENT
Start: 2024-09-07

## 2024-09-23 DIAGNOSIS — K21.9 GASTROESOPHAGEAL REFLUX DISEASE WITHOUT ESOPHAGITIS: ICD-10-CM

## 2024-09-23 DIAGNOSIS — G89.29 CHRONIC BACK PAIN, UNSPECIFIED BACK LOCATION, UNSPECIFIED BACK PAIN LATERALITY: ICD-10-CM

## 2024-09-23 DIAGNOSIS — M54.9 CHRONIC BACK PAIN, UNSPECIFIED BACK LOCATION, UNSPECIFIED BACK PAIN LATERALITY: ICD-10-CM

## 2024-09-23 DIAGNOSIS — E11.65 POORLY CONTROLLED TYPE 2 DIABETES MELLITUS (HCC): ICD-10-CM

## 2024-09-23 RX ORDER — PANTOPRAZOLE SODIUM 40 MG/1
40 TABLET, DELAYED RELEASE ORAL DAILY
Qty: 90 TABLET | Refills: 1 | Status: SHIPPED | OUTPATIENT
Start: 2024-09-23

## 2024-09-23 RX ORDER — OXYCODONE AND ACETAMINOPHEN 5; 325 MG/1; MG/1
1 TABLET ORAL EVERY 6 HOURS PRN
Qty: 120 TABLET | Refills: 0 | Status: SHIPPED | OUTPATIENT
Start: 2024-09-23

## 2024-09-23 RX ORDER — INSULIN LISPRO 100 [IU]/ML
25 INJECTION, SOLUTION INTRAVENOUS; SUBCUTANEOUS
Qty: 30 ML | Refills: 4 | Status: SHIPPED | OUTPATIENT
Start: 2024-09-23

## 2024-09-23 NOTE — TELEPHONE ENCOUNTER
Reason for call:   [x] Refill   [] Prior Auth  [] Other:     Office:   [x] PCP/Provider -   [] Specialty/Provider -     Medication: pantoprazole (PROTONIX) 40 mg tablet     Dose/Frequency: Take 1 tablet (40 mg total) by mouth daily,     Quantity: 90    Pharmacy: [facility] [address] [place] [address] [employer]      Does the patient have enough for 3 days?   [] Yes   [x] No - Send as HP to POD    Reason for call:   [x] Refill   [] Prior Auth  [] Other:     Office:   [x] PCP/Provider -   [] Specialty/Provider -     Medication:  oxyCODONE-acetaminophen (Percocet) 5-325 mg per tablet    Dose/Frequency:  Take 1 tablet by mouth every 6 (six) hours as needed for moderate pain     Quantity: 120    Pharmacy: [facility] [address] [place] [address] [employer]      Does the patient have enough for 3 days?   [] Yes   [x] No - Send as HP to POD    Reason for call:   [x] Refill   [] Prior Auth  [] Other:     Office:   [x] PCP/Provider -   [] Specialty/Provider -     Medication: HumaLOG KwikPen 100 units/mL injection pen     Dose/Frequency:  Inject 25 Units under the skin 3 (three) times a day with meals     Quantity: 15    Pharmacy: [facility] [address] [place] [address] [employer]      Does the patient have enough for 3 days?   [] Yes   [x] No - Send as HP to POD

## 2024-09-28 ENCOUNTER — HOSPITAL ENCOUNTER (OUTPATIENT)
Dept: MRI IMAGING | Facility: HOSPITAL | Age: 69
Discharge: HOME | End: 2024-09-28
Attending: ORTHOPAEDIC SURGERY
Payer: COMMERCIAL

## 2024-09-28 DIAGNOSIS — S46.012A TRAUMATIC TEAR OF LEFT ROTATOR CUFF, UNSPECIFIED TEAR EXTENT, INITIAL ENCOUNTER: ICD-10-CM

## 2024-09-28 DIAGNOSIS — G89.29 CHRONIC LEFT SHOULDER PAIN: ICD-10-CM

## 2024-09-28 DIAGNOSIS — M25.512 CHRONIC LEFT SHOULDER PAIN: ICD-10-CM

## 2024-09-28 PROCEDURE — 73221 MRI JOINT UPR EXTREM W/O DYE: CPT

## 2024-10-03 ENCOUNTER — OFFICE VISIT (OUTPATIENT)
Dept: OBGYN CLINIC | Facility: CLINIC | Age: 69
End: 2024-10-03
Payer: COMMERCIAL

## 2024-10-03 VITALS
DIASTOLIC BLOOD PRESSURE: 76 MMHG | HEART RATE: 70 BPM | WEIGHT: 190 LBS | HEIGHT: 64 IN | BODY MASS INDEX: 32.44 KG/M2 | SYSTOLIC BLOOD PRESSURE: 146 MMHG

## 2024-10-03 DIAGNOSIS — E13.69 OTHER SPECIFIED DIABETES MELLITUS WITH OTHER SPECIFIED COMPLICATION, UNSPECIFIED WHETHER LONG TERM INSULIN USE (HCC): ICD-10-CM

## 2024-10-03 DIAGNOSIS — S46.012A TRAUMATIC TEAR OF LEFT ROTATOR CUFF, UNSPECIFIED TEAR EXTENT, INITIAL ENCOUNTER: Primary | ICD-10-CM

## 2024-10-03 DIAGNOSIS — S46.112A RUPTURE OF LEFT LONG HEAD BICEPS TENDON, INITIAL ENCOUNTER: ICD-10-CM

## 2024-10-03 DIAGNOSIS — E11.65 POORLY CONTROLLED TYPE 2 DIABETES MELLITUS (HCC): ICD-10-CM

## 2024-10-03 DIAGNOSIS — M75.102 TEAR OF LEFT SUPRASPINATUS TENDON: ICD-10-CM

## 2024-10-03 PROCEDURE — 99214 OFFICE O/P EST MOD 30 MIN: CPT | Performed by: ORTHOPAEDIC SURGERY

## 2024-10-03 RX ORDER — CHLORHEXIDINE GLUCONATE 40 MG/ML
SOLUTION TOPICAL DAILY PRN
OUTPATIENT
Start: 2024-10-03

## 2024-10-03 RX ORDER — INSULIN GLARGINE 100 [IU]/ML
INJECTION, SOLUTION SUBCUTANEOUS
Qty: 15 ML | Refills: 0 | Status: SHIPPED | OUTPATIENT
Start: 2024-10-03

## 2024-10-03 RX ORDER — CHLORHEXIDINE GLUCONATE ORAL RINSE 1.2 MG/ML
15 SOLUTION DENTAL ONCE
OUTPATIENT
Start: 2024-10-03 | End: 2024-10-03

## 2024-10-03 NOTE — PROGRESS NOTES
ORTHO CARE SPCLST [facility] [address] [address] [phone]       Kan Juan  [id number]  1955    ORTHOPAEDIC SURGERY OUTPATIENT NOTE  10/3/2024      HISTORY:  68 y.o. male who presents to the office today for follow up of left shoulder pain and limited ROM of the left shoulder after fall onto this joint one year prior. Pain, locations and mobility have not changed since prior evaluation. Minor improvement in symptoms following utilization of oxycodone and no improvement with utilization of topical therapies.    Patient has undergone MRI imaging of the affected shoulder and here for review of MRI images.    Past Medical History:   Diagnosis Date    Arthritis     fingers    First degree AV block     Full dentures     Hypertension     PAD (peripheral artery disease) ([facility])     PVD (peripheral vascular disease) ([facility])     Type 2 diabetes mellitus with diabetic peripheral angiopathy without gangrene ([facility]) 5/18/2021    Per CMS ICD 10 guidelines--Per Physician    Wound, open     right foot- by the 5th toe       Past Surgical History:   Procedure Laterality Date    CHOLECYSTECTOMY      COLONOSCOPY      INCISION AND DRAINAGE OF WOUND Left 11/29/2023    Procedure: INCISION AND DRAINAGE (I&D) LEFT KNEE PRE-PATELLA BURSA;  Surgeon: Daryl Shay DO;  Location: [facility];  Service: Orthopedics    IR AORTAGRAM WITH RUN-OFF  1/28/2021    IR AORTAGRAM WITH RUN-OFF  4/13/2021    LEG AMPUTATION THROUGH LOWER TIBIA AND FIBULA Left 7/17/2021    Procedure: AMPUTATION BELOW KNEE (BKA);  Surgeon: Jose Mary MD;  Location: [facility];  Service: Vascular    WI BYP FEM-ANT TIBL PST TIBL PRONEAL ART/OTH DSTL Left 7/14/2021    Procedure: BYPASS femoral-peroneal artery bypass with  reverse GSV and balloon angioplasty peroneal artery;  Surgeon: Jose Mary MD;  Location: [facility];  Service: Vascular    WI DEBRIDEMENT BONE 1ST 20 SQ CM/< Right  12/18/2020    Procedure: DEBRIDMENT OF ULCER , REMOVAL OF DEVITALIZED SKIN, SOFT TISSUE, BONE AND APPLICATION OF INTEGRA GRAFT RIGHT FOOT.;  Surgeon: Daquan Ellis DPM;  Location: [facility];  Service: Podiatry    REPLANTATION THUMB Right     right tip reconnected    SKIN GRAFT      right thumb    TOE AMPUTATION      left foot all 5 toes removed    TOE AMPUTATION Right 2/2/2021    Procedure: Right partial 5th ray amputation;  Surgeon: Gabriel Garcia DPM;  Location:  MAIN OR;  Service: Podiatry    TOE OSTEOTOMY Right 6/26/2020    Procedure: exostosis of right big toe;  Surgeon: Trey Shirley DPM;  Location: [facility];  Service: Podiatry    TRIGGER FINGER RELEASE      bilateral hands    VEIN LIGATION      right       Social History     Socioeconomic History    Marital status: /Civil Union     Spouse name: Angeles    Number of children: 1    Years of education: 12    Highest education level: High school graduate   Occupational History    Occupation: [email or web address]   Tobacco Use    Smoking status: Never    Smokeless tobacco: Never   Vaping Use    Vaping status: Never Used   Substance and Sexual Activity    Alcohol use: Not Currently     Comment: occasional    Drug use: Never    Sexual activity: Yes     Partners: Female   Other Topics Concern    Not on file   Social History Narrative         Social Determinants of Health     Financial Resource Strain: Low Risk  (9/15/2023)    Overall Financial Resource Strain (CARDIA)     Difficulty of Paying Living Expenses: Not hard at all   Food Insecurity: Not on file   Transportation Needs: No Transportation Needs (9/15/2023)    PRAPARE - Transportation     Lack of Transportation (Medical): No     Lack of Transportation (Non-Medical): No   Physical Activity: Not on file   Stress: Not on file   Social Connections: Not on file   Intimate Partner Violence: Not on file   Housing Stability: Not on file       Family History   Problem Relation Age of Onset    Arthritis Mother     Pancreatic cancer  Mother     Cancer Father         colon    Alzheimer's disease Father         Patient's Medications   New Prescriptions    No medications on file   Previous Medications    ACCU-CHEK SOFTCLIX LANCETS LANCETS    Use as instructed qid Dx E11.9    ALCOHOL SWABS (B-D SINGLE USE SWABS REGULAR) PADS    Use 4 (four) times a day Dx E11.9    ATORVASTATIN (LIPITOR) 40 MG TABLET    Take 1 tablet (40 mg total) by mouth daily at bedtime    BETAMETHASONE DIPROPIONATE (DIPROSONE) 0.05 % CREAM    Apply topically 2 (two) times a day    BLOOD GLUCOSE CALIBRATION (ACCU-CHEK GUIDE CONTROL) LIQD    Test daily as needed (abnormal sugar reading)    BLOOD GLUCOSE MONITORING SUPPL (ACCU-CHEK GUIDE) W/DEVICE KIT    Use 4 (four) times a day    BLOOD GLUCOSE MONITORING SUPPL (ACCU-CHEK GUIDE) W/DEVICE KIT    Use 4 (four) times a day Dx E11.9    CLOPIDOGREL (PLAVIX) 75 MG TABLET    Take 1 tablet (75 mg total) by mouth daily    CYCLOBENZAPRINE (FLEXERIL) 5 MG TABLET    Take 1 tablet (5 mg total) by mouth 3 (three) times a day as needed for muscle spasms for up to 10 days    GLUCOSE BLOOD (ACCU-CHEK GUIDE) TEST STRIP    CHECK BLOOD GLUCOSE FOUR TIMES DAILY AS DIRECTED    INSULIN LISPRO (HUMALOG KWIKPEN) 100 UNITS/ML INJECTION PEN    Inject 25 Units under the skin 3 (three) times a day with meals    INSULIN PEN NEEDLE ( ULTICARE MINI PEN NEEDLES) 31G X 5 MM MISC    Use 4 (four) times a day    LANTUS SOLOSTAR 100 UNITS/ML SOPN    Inject 0.5 mL (50 Units total) under the skin daily at bedtime    LISINOPRIL (ZESTRIL) 10 MG TABLET    TAKE ONE TABLET BY MOUTH ONCE DAILY    OXYCODONE-ACETAMINOPHEN (PERCOCET) 5-325 MG PER TABLET    Take 1 tablet by mouth every 6 (six) hours as needed for moderate pain Max Daily Amount: 4 tablets    PANTOPRAZOLE (PROTONIX) 40 MG TABLET    Take 1 tablet (40 mg total) by mouth daily    PIOGLITAZONE (ACTOS) 45 MG TABLET    Take 1 tablet (45 mg total) by mouth daily    RIVAROXABAN (XARELTO) 2.5 MG TABLET    Take 1 tablet (2.5  "mg total) by mouth daily with breakfast Last dose 6/21/20    TIRZEPATIDE (MOUNJARO) 2.5 MG/0.5ML    Inject 0.5 mL (2.5 mg total) under the skin every 7 days    TIRZEPATIDE (MOUNJARO) 5 MG/0.5ML    Inject 0.5 mL (5 mg total) under the skin every 7 days    ULTICARE INSULIN SYRINGE 31G X 5/16\" 1 ML MISC    Inject under the skin as needed (for injection)   Modified Medications    No medications on file   Discontinued Medications    No medications on file       Allergies   Allergen Reactions    Shellfish-Derived Products - Food Allergy Anaphylaxis     Throat closes    Sulfamethoxazole-Trimethoprim Rash        REVIEW OF SYSTEMS:  Constitutional: Negative.    HEENT: Negative.    Respiratory: Negative.    Skin: Negative.    Neurological: Negative.    Psychiatric/Behavioral: Negative.  Musculoskeletal: Negative except for that mentioned in the HPI.    PHYSICAL EXAM:      MUSCULOSKELETAL EXAM:  Left Shoulder:  LEFT SHOULDER:    Appearance: Symmetrical with no gross deformity or overlying skin changes    Forward flexion:   120 degrees   Abduction:  180 degrees   External rotation at 90 degrees abduction:   30 degrees   Internal rotation at 90 degrees abduction:  50 degrees   External rotation at 0 degrees:   50 degrees   Internal rotation: Back pocket     STRENGTH:  Forward flexion:  4/5   Abduction:  4/5   External rotation:  4/5   Internal rotation:  4/5        Speed test: +  Yergason's: Negative   Tender to palpation ACJ (acromioclavicular joint): Negative   Tender to palpation LHB (long head of biceps): Negative  Riojas test: Negative  Lewiston test: Negative  Hornblower's: Negative  Lift off: +  Belly press: +  Bear hug: Negative  External lag sign: Negative  Cross-body adduction: +  Sulcus sign: Negative  Prashant's test: Negative  Drop arm test: negative    Radial/median/ulnar nerve intact    <2 sec cap refill    IMAGING:  Images were reviewed in PACS by myself and demonstrate findings consistent with radiology read that is " notable for :     Complete supraspinatus tendon tear retracted to the level of the humeral head apex. No muscle atrophy.     2. Mild infraspinatus and subscapularis tendinosis without tear.     3. Poor visualization of the proximal long head of biceps, likely due to severe tendinosis or high-grade tear.     4. Mild glenohumeral and moderate acromioclavicular osteoarthritis.    ASSESSMENT AND PLAN:  68 y.o. male with complete rotator cuff tear with requirement of surgical repair of the tendon/rotator cuff.    Will plan for  Repeat of reoperative labs and A1C, most recent A1C under 8 but will recheck to make sure patient is appropriate for surgical intervention  Medical clearance and imaging and ECG prior to procedure  Schedule patient for surgical intervention in future pending pre-operative evaluation and studies.    The patient understands the risks and benefits of the procedure with risks including pain, stiffness, infection, neurovascular injury, recurrence of symptoms, failure of surgical procedure, inadvertent intraoperative complications, blood loss, blood clots, allergic reaction to anesthesia, stroke, heart attack, all up to and including to death. The patient understood and did consent for surgery today.         PROCEDURES PERFORMED:  Procedures  No Procedures performed today    Scribe Attestation      I,:  Brant Friend MD am acting as a scribe while in the presence of the attending physician.:       I,:  Yolanda Bonilla DO personally performed the services described in this documentation    as scribed in my presence.:

## 2024-10-09 DIAGNOSIS — Z79.4 TYPE 2 DIABETES MELLITUS WITH DIABETIC PERIPHERAL ANGIOPATHY AND GANGRENE, WITH LONG-TERM CURRENT USE OF INSULIN (HCC): ICD-10-CM

## 2024-10-09 DIAGNOSIS — E11.52 TYPE 2 DIABETES MELLITUS WITH DIABETIC PERIPHERAL ANGIOPATHY AND GANGRENE, WITH LONG-TERM CURRENT USE OF INSULIN (HCC): ICD-10-CM

## 2024-10-09 RX ORDER — BLOOD SUGAR DIAGNOSTIC
STRIP MISCELLANEOUS
Qty: 400 STRIP | Refills: 1 | Status: SHIPPED | OUTPATIENT
Start: 2024-10-09

## 2024-10-21 ENCOUNTER — CONSULT (OUTPATIENT)
Dept: FAMILY MEDICINE CLINIC | Facility: CLINIC | Age: 69
End: 2024-10-21
Payer: COMMERCIAL

## 2024-10-21 VITALS
HEART RATE: 89 BPM | WEIGHT: 193 LBS | OXYGEN SATURATION: 94 % | TEMPERATURE: 98.1 F | DIASTOLIC BLOOD PRESSURE: 76 MMHG | SYSTOLIC BLOOD PRESSURE: 142 MMHG | BODY MASS INDEX: 32.95 KG/M2 | HEIGHT: 64 IN

## 2024-10-21 DIAGNOSIS — E11.65 POORLY CONTROLLED TYPE 2 DIABETES MELLITUS (HCC): ICD-10-CM

## 2024-10-21 DIAGNOSIS — Z01.818 PREOPERATIVE CLEARANCE: Primary | ICD-10-CM

## 2024-10-21 DIAGNOSIS — R94.31 ABNORMAL EKG: ICD-10-CM

## 2024-10-21 DIAGNOSIS — S46.012A TRAUMATIC TEAR OF LEFT ROTATOR CUFF, UNSPECIFIED TEAR EXTENT, INITIAL ENCOUNTER: ICD-10-CM

## 2024-10-21 LAB — SL AMB POCT HEMOGLOBIN AIC: 9 (ref ?–6.5)

## 2024-10-21 PROCEDURE — 93000 ELECTROCARDIOGRAM COMPLETE: CPT | Performed by: NURSE PRACTITIONER

## 2024-10-21 PROCEDURE — 83036 HEMOGLOBIN GLYCOSYLATED A1C: CPT | Performed by: NURSE PRACTITIONER

## 2024-10-21 PROCEDURE — 99213 OFFICE O/P EST LOW 20 MIN: CPT | Performed by: NURSE PRACTITIONER

## 2024-10-21 NOTE — PROGRESS NOTES
Assessment/Plan:      Diagnoses and all orders for this visit:    Preoperative clearance  -     POCT ECG    Traumatic tear of left rotator cuff, unspecified tear extent, initial encounter  -     Ambulatory referral to surgical optimization    Poorly controlled type 2 diabetes mellitus (HCC)  -     POCT hemoglobin A1c        Physical assessment remained stable.  However EKG was found to have an abnormality will refer to cardiology for clearance.  Also poorly controlled diabetes A1c performed in the office was 9.0.  This level of A1c makes it difficult to give medical clearance for the upcoming procedure.  Will await cardiac clearance.  Subjective:     Patient ID: Kan Juan is a 68 y.o. male.    HPI    Review of Systems      Objective:     Physical Exam

## 2024-10-22 DIAGNOSIS — E11.51 TYPE 2 DIABETES MELLITUS WITH DIABETIC PERIPHERAL ANGIOPATHY WITHOUT GANGRENE, WITH LONG-TERM CURRENT USE OF INSULIN (HCC): ICD-10-CM

## 2024-10-22 DIAGNOSIS — G89.29 CHRONIC BACK PAIN, UNSPECIFIED BACK LOCATION, UNSPECIFIED BACK PAIN LATERALITY: ICD-10-CM

## 2024-10-22 DIAGNOSIS — M54.9 CHRONIC BACK PAIN, UNSPECIFIED BACK LOCATION, UNSPECIFIED BACK PAIN LATERALITY: ICD-10-CM

## 2024-10-22 DIAGNOSIS — Z79.4 TYPE 2 DIABETES MELLITUS WITH DIABETIC PERIPHERAL ANGIOPATHY WITHOUT GANGRENE, WITH LONG-TERM CURRENT USE OF INSULIN (HCC): ICD-10-CM

## 2024-10-22 NOTE — TELEPHONE ENCOUNTER
How are you tolerating the medication?   [] Nausea  [] Vomiting  [] Diarrhea  [] Asymptomatic  [x] Other: Indigestion     Last visit weight: 193    Current weight: 193    Date of last injection: 10/21    How many injections do you have left: None    Would you like an increase in your dose?  [x] Yes   [] No    Reason for call:   [x] Refill   [] Prior Auth  [] Other:     Office:   [x] PCP/Provider -   [] Specialty/Provider -     Medication: tirzepatide (Mounjaro) 5 MG/0.5ML     Dose/Frequency: Inject 0.5 mL (5 mg total) under the skin every 7 days     Quantity: 2 ml    Pharmacy: [facility]     Does the patient have enough for 3 days?   [x] Yes   [] No - Send as HP to POD    Reason for call:   [x] Refill   [] Prior Auth  [] Other:     Office:   [x] PCP/Provider -   [] Specialty/Provider -     Medication:  oxyCODONE-acetaminophen (Percocet) 5-325 mg per tablet    Dose/Frequency: Take 1 tablet by mouth every 6 (six) hours as needed for moderate pain     Quantity: 120    Pharmacy: [facility]     Does the patient have enough for 3 days?   [x] Yes   [] No - Send as HP to POD

## 2024-10-23 RX ORDER — OXYCODONE AND ACETAMINOPHEN 5; 325 MG/1; MG/1
1 TABLET ORAL EVERY 6 HOURS PRN
Qty: 120 TABLET | Refills: 0 | Status: SHIPPED | OUTPATIENT
Start: 2024-10-23

## 2024-10-28 ENCOUNTER — TELEPHONE (OUTPATIENT)
Dept: OBGYN CLINIC | Facility: CLINIC | Age: 69
End: 2024-10-28

## 2024-10-28 ENCOUNTER — APPOINTMENT (OUTPATIENT)
Dept: LAB | Facility: MEDICAL CENTER | Age: 69
End: 2024-10-28
Payer: COMMERCIAL

## 2024-10-28 ENCOUNTER — TELEPHONE (OUTPATIENT)
Age: 69
End: 2024-10-28

## 2024-10-28 ENCOUNTER — TELEPHONE (OUTPATIENT)
Dept: CARDIOLOGY CLINIC | Facility: CLINIC | Age: 69
End: 2024-10-28

## 2024-10-28 ENCOUNTER — APPOINTMENT (OUTPATIENT)
Dept: RADIOLOGY | Facility: MEDICAL CENTER | Age: 69
End: 2024-10-28
Payer: COMMERCIAL

## 2024-10-28 DIAGNOSIS — E13.69 OTHER SPECIFIED DIABETES MELLITUS WITH OTHER SPECIFIED COMPLICATION, UNSPECIFIED WHETHER LONG TERM INSULIN USE (HCC): ICD-10-CM

## 2024-10-28 DIAGNOSIS — S46.012A TRAUMATIC TEAR OF LEFT ROTATOR CUFF, UNSPECIFIED TEAR EXTENT, INITIAL ENCOUNTER: ICD-10-CM

## 2024-10-28 LAB
ALBUMIN SERPL BCG-MCNC: 4.1 G/DL (ref 3.5–5)
ALP SERPL-CCNC: 63 U/L (ref 34–104)
ALT SERPL W P-5'-P-CCNC: 27 U/L (ref 7–52)
ANION GAP SERPL CALCULATED.3IONS-SCNC: 11 MMOL/L (ref 4–13)
AST SERPL W P-5'-P-CCNC: 21 U/L (ref 13–39)
BILIRUB SERPL-MCNC: 0.64 MG/DL (ref 0.2–1)
BUN SERPL-MCNC: 14 MG/DL (ref 5–25)
CALCIUM SERPL-MCNC: 9 MG/DL (ref 8.4–10.2)
CHLORIDE SERPL-SCNC: 102 MMOL/L (ref 96–108)
CO2 SERPL-SCNC: 27 MMOL/L (ref 21–32)
CREAT SERPL-MCNC: 0.97 MG/DL (ref 0.6–1.3)
ERYTHROCYTE [DISTWIDTH] IN BLOOD BY AUTOMATED COUNT: 12 % (ref 11.6–15.1)
EST. AVERAGE GLUCOSE BLD GHB EST-MCNC: 206 MG/DL
GFR SERPL CREATININE-BSD FRML MDRD: 78 ML/MIN/1.73SQ M
GLUCOSE P FAST SERPL-MCNC: 240 MG/DL (ref 65–99)
HBA1C MFR BLD: 8.8 %
HCT VFR BLD AUTO: 49.3 % (ref 36.5–49.3)
HGB BLD-MCNC: 16.4 G/DL (ref 12–17)
INR PPP: 0.97 (ref 0.85–1.19)
MCH RBC QN AUTO: 32.2 PG (ref 26.8–34.3)
MCHC RBC AUTO-ENTMCNC: 33.3 G/DL (ref 31.4–37.4)
MCV RBC AUTO: 97 FL (ref 82–98)
PLATELET # BLD AUTO: 179 THOUSANDS/UL (ref 149–390)
PMV BLD AUTO: 10.7 FL (ref 8.9–12.7)
POTASSIUM SERPL-SCNC: 3.9 MMOL/L (ref 3.5–5.3)
PROT SERPL-MCNC: 7.4 G/DL (ref 6.4–8.4)
PROTHROMBIN TIME: 13.2 SECONDS (ref 12.3–15)
RBC # BLD AUTO: 5.1 MILLION/UL (ref 3.88–5.62)
SODIUM SERPL-SCNC: 140 MMOL/L (ref 135–147)
WBC # BLD AUTO: 7.11 THOUSAND/UL (ref 4.31–10.16)

## 2024-10-28 PROCEDURE — 36415 COLL VENOUS BLD VENIPUNCTURE: CPT

## 2024-10-28 PROCEDURE — 85027 COMPLETE CBC AUTOMATED: CPT

## 2024-10-28 PROCEDURE — 71046 X-RAY EXAM CHEST 2 VIEWS: CPT

## 2024-10-28 PROCEDURE — 3052F HG A1C>EQUAL 8.0%<EQUAL 9.0%: CPT | Performed by: NURSE PRACTITIONER

## 2024-10-28 PROCEDURE — 85610 PROTHROMBIN TIME: CPT

## 2024-10-28 PROCEDURE — 83036 HEMOGLOBIN GLYCOSYLATED A1C: CPT

## 2024-10-28 PROCEDURE — 80053 COMPREHEN METABOLIC PANEL: CPT

## 2024-10-28 NOTE — TELEPHONE ENCOUNTER
"Hello,    The following message was sent via e-mail to the leadership team:     Please advise if you can help facilitate the following overbook request:    Patient Name: Johnathon Juan     Patient MRN: [id number]    Call back #: [phone]    Insurance: [employer]     Department:Cardiology    Speciality: General Cardiology    Reason for overbook request: CARDIAC CLEARANCE PRIOR TO PROCEDURE (14-30 DAYS PRIOR TO PROCEDURE)    Comments (Write \"N/a\" if no comments): Patient has procedure on 11/8/24 needs CC for REPAIR ROTATOR CUFF ARTHROSCOPIC, Bicep tenodesis and all neccesary procedures [889] nothing available until nov 7th with Dr. Sauceda @4:20pm, this appt is too late and cutting too close they will have to reschedule since OR team leave prior to patient getting clearance on this day. Would need something before 11/6/24 due to his abnormal EKG on 10/3/24    Requested doctor and location: [facility] [place], [place], [place]     Date of current appointment: 11/7/24 Jose David with Dr. Sauceda      Thank you.        "

## 2024-10-28 NOTE — TELEPHONE ENCOUNTER
Pily from [facility] is calling in regards to Johnathon's blood thinner medications.  Johnathon had his pre-op clearance on 10/21.  She needs documentation for when he should stop his blood thinner medications prior to surgery.     She will need direction for both his   Plavix and Xarelto medications.     Pily states that chart documentation is acceptable

## 2024-10-28 NOTE — TELEPHONE ENCOUNTER
Spoke with cardiology they are working on getting him a sooner apt and will be calling him I did tell him since he is nervous about the timing that if he doesn't hear anything back by Wednesday he can call and I will try to call cardiology again.

## 2024-10-28 NOTE — TELEPHONE ENCOUNTER
Spoke with Johnathon and I am currently on with cardiology trying to get him in sooner will call Johnathon back and update him

## 2024-10-28 NOTE — TELEPHONE ENCOUNTER
Patient called wanting to speak with provider.  He is scheduled for surgery and they told him he needs to see a cardiologist.    He stated no one told him anything about this at his appt.  He wants to know what is going on, he is not going to cardiology.  He does not have time to re-schedule his ortho surgery.

## 2024-10-28 NOTE — TELEPHONE ENCOUNTER
S/w pt to remind him of needing the CXR for his upcoming surgery.  I also facilitated an appt for his CC as requested by his PCP.  He is tentatively scheduled for 11/7 @ 4:20 PM, but the  will reach out to patient within the next 24-48 hours to provide him with a sooner appt.  Pt verbalized understanding.

## 2024-10-30 ENCOUNTER — TELEPHONE (OUTPATIENT)
Dept: OBGYN CLINIC | Facility: CLINIC | Age: 69
End: 2024-10-30

## 2024-10-30 ENCOUNTER — TELEPHONE (OUTPATIENT)
Age: 69
End: 2024-10-30

## 2024-10-30 DIAGNOSIS — E11.52 TYPE 2 DIABETES MELLITUS WITH DIABETIC PERIPHERAL ANGIOPATHY AND GANGRENE, WITH LONG-TERM CURRENT USE OF INSULIN (HCC): ICD-10-CM

## 2024-10-30 DIAGNOSIS — Z79.4 TYPE 2 DIABETES MELLITUS WITH DIABETIC PERIPHERAL ANGIOPATHY AND GANGRENE, WITH LONG-TERM CURRENT USE OF INSULIN (HCC): ICD-10-CM

## 2024-10-30 NOTE — TELEPHONE ENCOUNTER
Patient called that the dosage of tirzepatide (Mounjaro) 5 MG/0.5ML was given his nausea, and he had stop taking it about a month. Now his surgery was postponed due to his sugar went back up to 9. He had minimal nausea on tirzepatide (Mounjaro) 2.5 MG/0.5ML. He is not sure where he would have to restart at. Please look into this and advise. Send script of the dosage decided, [id number] - [place] - [address]  P: [phone]  F: [phone]

## 2024-10-30 NOTE — TELEPHONE ENCOUNTER
S/w pt to inform him that surgery must be postponed due to his A1C of 9.  It must be below 8 before surgery can proceed.  Pt very upset but will contact his PCP to help with lowering his A1C and will call back to reschedule surgery once his goal is met.  Pt will keep his cardiology and testing appts as already scheduled.

## 2024-10-31 ENCOUNTER — TELEPHONE (OUTPATIENT)
Dept: OBGYN CLINIC | Facility: HOSPITAL | Age: 69
End: 2024-10-31

## 2024-10-31 NOTE — TELEPHONE ENCOUNTER
Irene     Pt is requesting a call back from Dr. Bonilla or [place] . A1C is at 9. States its never below 9. At one point when it was below 8 he was taking mounjaro and felt very sick.   As of now, he is going back on the mounjaro to lower A1C. However, would like to discuss as he is very hesitant to restart the medication but eager to have the surgery.    Please advise       Phone:   [phone]

## 2024-11-01 NOTE — TELEPHONE ENCOUNTER
Caller: Patient    Doctor: Irene    Reason for call: Patient returning phone call. Please call back to advise patient    Call back#: [phone]

## 2024-11-01 NOTE — TELEPHONE ENCOUNTER
I called and spoke with the patient. Rec he see his PCP to for sugar management. He needs to have his A1C less than 8 to proceed with surgery.

## 2024-11-22 DIAGNOSIS — M54.9 CHRONIC BACK PAIN, UNSPECIFIED BACK LOCATION, UNSPECIFIED BACK PAIN LATERALITY: ICD-10-CM

## 2024-11-22 DIAGNOSIS — E11.65 POORLY CONTROLLED TYPE 2 DIABETES MELLITUS (HCC): ICD-10-CM

## 2024-11-22 DIAGNOSIS — G89.29 CHRONIC BACK PAIN, UNSPECIFIED BACK LOCATION, UNSPECIFIED BACK PAIN LATERALITY: ICD-10-CM

## 2024-11-22 RX ORDER — OXYCODONE AND ACETAMINOPHEN 5; 325 MG/1; MG/1
1 TABLET ORAL EVERY 6 HOURS PRN
Qty: 120 TABLET | Refills: 0 | Status: SHIPPED | OUTPATIENT
Start: 2024-11-22

## 2024-11-22 RX ORDER — INSULIN LISPRO 100 [IU]/ML
25 INJECTION, SOLUTION INTRAVENOUS; SUBCUTANEOUS
Qty: 30 ML | Refills: 3 | Status: SHIPPED | OUTPATIENT
Start: 2024-11-22

## 2024-11-22 NOTE — TELEPHONE ENCOUNTER
Reason for call:   [x] Refill   [] Prior Auth  [] Other:     Office:   [x] PCP/Provider -   [] Specialty/Provider -     Medication: Oxycodone=Acetaminophen    Dose/Frequency: 5-325 mg    Quantity: 120 tablet    Pharmacy: [facility] [place] [facility] [id number]     Does the patient have enough for 3 days?   [] Yes   [x] No - Send as HP to POD      Reason for call:   [x] Refill   [] Prior Auth  [] Other:     Office:   [x] PCP/Provider -   [] Specialty/Provider -     Medication: Insulin Humalog    Dose/Frequency: 100 units    Quantity: 30 mL    Pharmacy: [facility] [place] [facility]     Does the patient have enough for 3 days?   [] Yes   [x] No - Send as HP to POD

## 2024-11-27 NOTE — PROGRESS NOTES
Patient ID: Cady Hussein is a 76 y o  male Date of Birth 1955     Chief Complaint  Chief Complaint   Patient presents with    Follow Up Wound Care Visit     B/L foot wounds       Allergies  Shellfish-derived products - food allergy and Sulfamethoxazole-trimethoprim    Assessment:    No problem-specific Assessment & Plan notes found for this encounter  Diagnoses and all orders for this visit:    Surgical wound, non healing, initial encounter  -     lidocaine (XYLOCAINE) 4 % topical solution 5 mL  -     Wound cleansing and dressings; Future  -     Debridement    Ulcer of left foot, unspecified ulcer stage (HCC)  -     lidocaine (XYLOCAINE) 4 % topical solution 5 mL  -     Wound cleansing and dressings; Future  -     Debridement    Heel ulceration, left, with unspecified severity ([facility])  -     lidocaine (XYLOCAINE) 4 % topical solution 5 mL  -     Wound cleansing and dressings; Future    Right foot ulcer, with fat layer exposed ([facility] )  -     lidocaine (XYLOCAINE) 4 % topical solution 5 mL  -     Wound cleansing and dressings; Future    Diabetic polyneuropathy associated with type 2 diabetes mellitus ([facility] )  -     Debridement    Peripheral vascular disease, unspecified ([facility] )          Debridement   Wound 03/11/21 Surgical Foot Right;Lateral    Universal Protocol:  Consent: Verbal consent obtained  Risks and benefits: risks, benefits and alternatives were discussed  Consent given by: patient  Time out: Immediately prior to procedure a "time out" was called to verify the correct patient, procedure, equipment, support staff and site/side marked as required    Timeout called at: 5/6/2021 10:48 AM   Patient understanding: patient states understanding of the procedure being performed  Patient identity confirmed: verbally with patient      Performed by: physician  Debridement type: surgical  Level of debridement: subcutaneous tissue  Pain control: lidocaine 4%  Post-debridement measurements  Length (cm): 1 5  Width (cm): 0 8  Depth (cm): 0 2  Percent debrided: 100%  Surface Area (cm^2): 1 2  Area debrided (cm^2): 1 2  Volume (cm^3): 0 24  Tissue and other material debrided: dermis, epidermis and subcutaneous tissue  Devitalized tissue debrided: fibrin, slough and eschar  Instrument(s) utilized: blade  Bleeding: medium  Hemostasis obtained with: pressure  Procedural pain (0-10): 0  Post-procedural pain: 0   Response to treatment: procedure was tolerated well            Plan:  1  Continue serial debridement  Continue collagen dressing to right foot  Dermagren to left TMA wounds  Betadine to left heel for now  2  Reviewed vascular note  Plan OR debridement with possible wound VAC on left heel after right foot heals  3  Discussed proper off-loading  Surgical shoes on both feet  4  RA in 1 week  Wound 03/11/21 Surgical Foot Right;Lateral (Active)   Wound Image Images linked 05/06/21 1043   Wound Description Eschar;Granulation tissue;Pink;Yellow 05/06/21 1009   Dominga-wound Assessment Callus;Dry 05/06/21 1009   Wound Length (cm) 1 3 cm 05/06/21 1009   Wound Width (cm) 0 8 cm 05/06/21 1009   Wound Depth (cm) 0 1 cm 05/06/21 1009   Wound Surface Area (cm^2) 1 04 cm^2 05/06/21 1009   Wound Volume (cm^3) 0 1 cm^3 05/06/21 1009   Calculated Wound Volume (cm^3) 0 1 cm^3 05/06/21 1009   Drainage Amount Moderate 05/06/21 1009   Drainage Description Serosanguineous 05/06/21 1009   Non-staged Wound Description Full thickness 05/06/21 1009       Wound 03/11/21 Foot Left; Other (Comment); Posterior (Active)   Wound Image Images linked 05/06/21 1010   Wound Description Pink;Slough 05/06/21 1010   Dominga-wound Assessment Callus 05/06/21 1010   Wound Length (cm) 0 2 cm 05/06/21 1010   Wound Width (cm) 0 2 cm 05/06/21 1010   Wound Depth (cm) 0 1 cm 05/06/21 1010   Wound Surface Area (cm^2) 0 04 cm^2 05/06/21 1010   Wound Volume (cm^3) 0 cm^3 05/06/21 1010   Calculated Wound Volume (cm^3) 0 cm^3 05/06/21 1010   Change in Wound Size % 100 05/06/21 1010       Wound 03/11/21 Heel Left (Active)   Wound Image Images linked 05/06/21 1010   Wound Description Eschar;Epithelialization 05/06/21 1010   Wound Length (cm) 2 5 cm 05/06/21 1010   Wound Width (cm) 1 5 cm 05/06/21 1010   Wound Depth (cm) 0 cm 05/06/21 1010   Wound Surface Area (cm^2) 3 75 cm^2 05/06/21 1010   Wound Volume (cm^3) 0 cm^3 05/06/21 1010   Calculated Wound Volume (cm^3) 0 cm^3 05/06/21 1010   Drainage Amount None 05/06/21 1010       Wound 04/08/21 Foot Anterior; Left (Active)   Wound Image Images linked 05/06/21 1009   Wound Description Granulation tissue 05/06/21 1009   Dominga-wound Assessment Dry; Intact 05/06/21 1009   Wound Length (cm) 0 3 cm 05/06/21 1009   Wound Width (cm) 0 2 cm 05/06/21 1009   Wound Depth (cm) 0 1 cm 05/06/21 1009   Wound Surface Area (cm^2) 0 06 cm^2 05/06/21 1009   Wound Volume (cm^3) 0 01 cm^3 05/06/21 1009   Calculated Wound Volume (cm^3) 0 01 cm^3 05/06/21 1009   Change in Wound Size % 0 05/06/21 1009   Drainage Amount Small 05/06/21 1009   Drainage Description Serous 05/06/21 1009   Non-staged Wound Description Full thickness 05/06/21 1009       Wound 03/11/21 Surgical Foot Right;Lateral (Active)   Date First Assessed/Time First Assessed: 03/11/21 1057   Primary Wound Type: Surgical  Location: Foot  Wound Location Orientation: Right;Lateral       Wound 03/11/21 Foot Left; Other (Comment); Posterior (Active)   Date First Assessed: 03/11/21   Location: Foot  Wound Location Orientation: (c) Left; Other (Comment); Posterior       Wound 03/11/21 Heel Left (Active)   Date First Assessed/Time First Assessed: 03/11/21 1100   Location: Heel  Wound Location Orientation: Left       Wound 04/08/21 Foot Anterior; Left (Active)   Date First Assessed/Time First Assessed: 04/08/21 1046   Location: Foot  Wound Location Orientation: Anterior; Left       [REMOVED] Wound 07/24/20 Diabetic Ulcer Foot Right;Lateral (Removed)   Resolved Date: 03/11/21  Date First Assessed: 07/24/20 Pre-Existing Wound: Yes  Primary Wound Type: Diabetic Ulcer  Location: Foot  Wound Location Orientation: Right;Lateral  Wound Description (Comments): Cristina 1       [REMOVED] Wound 12/18/20 Foot Right (Removed)   Resolved Date: 03/11/21  Date First Assessed/Time First Assessed: 12/18/20 1146   Location: Foot  Wound Location Orientation: Right  Incision's 1st Dressing: DRESSING OIL EMULSION 3 X 3 IN (x1), SPONGE GAUZE 4 X 8 12 PLY STRL LF (x1), BANDAGE WILLARD 3   [REMOVED] Wound 01/28/21 Catheter entry/exit site Groin Left (Removed)   Resolved Date/Resolved Time: 03/25/21 1011  Date First Assessed/Time First Assessed: 01/28/21 0909   Traumatic Wound Type: Catheter entry/exit site  Location: Groin  Wound Location Orientation: Left  Wound Description (Comments): angiogram access punc    [REMOVED] Wound 01/29/21 Diabetic Ulcer Foot Lateral;Right (Removed)   Resolved Date: 03/11/21  Date First Assessed/Time First Assessed: 01/29/21 0820   Pre-Existing Wound: Yes  Primary Wound Type: Diabetic Ulcer  Location: Foot  Wound Location Orientation: Lateral;Right       [REMOVED] Wound 02/02/21 Foot Right (Removed)   Resolved Date: 03/11/21  Date First Assessed/Time First Assessed: 02/02/21 1734   Location: Foot  Wound Location Orientation: Right  Incision's 1st Dressing: DRESSING OIL EMULSION 3 X 3 IN (x1), SPONGE GAUZE 4 X 4 16 PLY STRL PLASTIC TRAY LF (x1), JOSI    [REMOVED] Wound 04/13/21 Catheter entry/exit site Groin Right (Removed)   Resolved Date/Resolved Time: 04/22/21 1025  Date First Assessed/Time First Assessed: 04/13/21 0843   Traumatic Wound Type: Catheter entry/exit site  Location: Groin  Wound Location Orientation: Right  Wound Description (Comments): angiogram access        Subjective:        HPI  Andriette Presume presents for wound care bilateral feet  Right foot pain is minimal   Right foot ulcer is healing well  No increased pain left foot  No redness  No new complaint         The following portions of the patient's history were reviewed and updated as appropriate: allergies, current medications, past family history, past medical history, past social history, past surgical history and problem list     PAST MEDICAL HISTORY:  Past Medical History:   Diagnosis Date    Arthritis     fingers    [facility]     First degree AV block     Full dentures     PAD [facility]peripheral artery disease) [facility] )     PVD (peripheral vascular disease) ([place] [id number] )     Wound, open     right foot- by the 5th toe       PAST SURGICAL HISTORY:  Past Surgical History:   Procedure Laterality Date    CHOLECYSTECTOMY      COLONOSCOPY      IR AORTAGRAM WITH RUN-OFF  1/28/2021    IR AORTAGRAM WITH RUN-OFF  4/13/2021    PA DEBRIDEMENT, SKIN, SUB-Q TISSUE,MUSCLE,BONE,=<20 SQ CM Right 12/18/2020    Procedure: DEBRIDMENT OF ULCER , REMOVAL OF DEVITALIZED SKIN, SOFT TISSUE, BONE AND APPLICATION OF INTEGRA GRAFT RIGHT FOOT ;  Surgeon: Erlin Rinaldi DPM;  Location: [address] [place] [address];  Service: Podiatry    REPLANTATION THUMB Right     right tip reconnected    SKIN GRAFT      right thumb    TOE AMPUTATION      left foot all 5 toes removed    TOE AMPUTATION Right 2/2/2021    Procedure: Right partial 5th ray amputation;  Surgeon: Lori Valencia DPM;  Location:  MAIN OR;  Service: Podiatry    TOE OSTEOTOMY Right 6/26/2020    Procedure: exostosis of right big toe;  Surgeon: Bradly Cockayne, DPM;  Location: [place] MAIN OR;  Service: Podiatry    TRIGGER FINGER RELEASE      bilateral hands    VEIN LIGATION      right        ALLERGIES:  Shellfish-derived products - food allergy and Sulfamethoxazole-trimethoprim    MEDICATIONS:  Current Outpatient Medications   Medication Sig Dispense Refill    atorvastatin (LIPITOR) 40 mg tablet Take 1 tablet (40 mg total) by mouth daily at bedtime 90 tablet 1    Blood Glucose Monitoring Suppl (OneTouch Verio) w/Device KIT by Does not apply route 2 (two) times a day Dx E11 9 1 kit 1    clopidogrel (PLAVIX) 75 mg tablet Take 1 tablet (75 mg total) by mouth daily 90 tablet 0    gabapentin (NEURONTIN) 300 mg capsule Take 1 capsule (300 mg total) by mouth 3 (three) times a day 270 capsule 3    glucose blood (OneTouch Verio) test strip Use as instructed qid Dx E11 9 400 each 3    halobetasol (ULTRAVATE) 0 05 % cream Apply topically 2 (two) times a day 50 g 5    HYDROcodone-acetaminophen (NORCO) 5-325 mg per tablet Take 1 tablet by mouth every 6 (six) hours as needed for painMax Daily Amount: 4 tablets (Patient not taking: Reported on 3/11/2021) 20 tablet 0    insulin NPH (HumuLIN N,NovoLIN N) 100 Units/mL subcutaneous injection Inject 12 Units under the skin 2 (two) times a day 7 2 mL 0    insulin regular (HumuLIN R,NovoLIN R) 100 units/mL injection 4 units with lunch, 8 units with dinner 10 mL 0    Lancets (OneTouch Delica Plus [id number]) MISC 1 Units by Does not apply route 2 (two) times a day Dx E11 9 100 each 2    lisinopril (ZESTRIL) 10 mg tablet Take 1 tablet (10 mg total) by mouth daily 90 tablet 1    Nitro-Bid 2 % ointment       oxyCODONE-acetaminophen (PERCOCET) 5-325 mg per tablet Take 1 tablet by mouth every 6 (six) hours as needed for moderate painMax Daily Amount: 4 tablets (Patient not taking: Reported on 4/27/2021) 120 tablet 0    pantoprazole (PROTONIX) 40 mg tablet Take 1 tablet (40 mg total) by mouth daily 90 tablet 1    rivaroxaban (Xarelto) 2 5 mg tablet Take 1 tablet (2 5 mg total) by mouth daily with breakfast Last dose 6/21/20 30 tablet 2    sodium hypochlorite (DAKIN'S QUARTER-STRENGTH) Irrigate with 1 application as directed daily Apply with dry dressing  473 mL 0    UltiCare Insulin Syringe 31G X 5/16" 1 ML MISC        No current facility-administered medications for this visit          SOCIAL HISTORY:  Social History     Socioeconomic History    Marital status: /Civil Union     Spouse name: Not on file    Number of children: Not on file    Years of education: Not on file    Highest education level: Not on file   Occupational History    Occupation: [employer] resource strain: Not on file    Food insecurity     Worry: Not on file     Inability: Not on file   [employer] needs     Medical: Not on file     Non-medical: Not on file   Tobacco Use    Smoking status: Never Smoker    Smokeless tobacco: Never Used   Substance and Sexual Activity    Alcohol use: Yes     Frequency: Monthly or less     Drinks per session: 1 or 2     Binge frequency: Never     Comment: occasional    Drug use: Never    Sexual activity: Not on file   Lifestyle    Physical activity     Days per week: Not on file     Minutes per session: Not on file    Stress: Not on file   Relationships    Social connections     Talks on phone: Not on file     Gets together: Not on file     Attends Sikh service: Not on file     Active member of club or organization: Not on file     Attends meetings of clubs or organizations: Not on file     Relationship status: Not on file    Intimate partner violence     Fear of current or ex partner: Not on file     Emotionally abused: Not on file     Physically abused: Not on file     Forced sexual activity: Not on file   Other Topics Concern    Not on file   Social History Narrative    · Most recent tobacco use screenin2019      · Do you currently or have you served in [employer]:   No      · Were you activated, into active duty, as a member of the [employer] or as a Reservist:   No      · Diet:   Regular      · Alcohol intake:   Occasional      · Caffeine intake:   Occasional      · Seat belts used routinely:   Yes      · Smoke alarm in home:   Yes       Review of Systems   Constitutional: Negative for chills and fever  HENT: Negative for sore throat  Respiratory: Negative for cough and shortness of breath  Cardiovascular: Negative for chest pain  Gastrointestinal: Negative for diarrhea, nausea and vomiting     Skin: Positive for wound  Objective:       Wound 03/11/21 Surgical Foot Right;Lateral (Active)   Wound Image Images linked 05/06/21 1043   Wound Description Eschar;Granulation tissue;Pink;Yellow 05/06/21 1009   Dominga-wound Assessment Callus;Dry 05/06/21 1009   Wound Length (cm) 1 3 cm 05/06/21 1009   Wound Width (cm) 0 8 cm 05/06/21 1009   Wound Depth (cm) 0 1 cm 05/06/21 1009   Wound Surface Area (cm^2) 1 04 cm^2 05/06/21 1009   Wound Volume (cm^3) 0 1 cm^3 05/06/21 1009   Calculated Wound Volume (cm^3) 0 1 cm^3 05/06/21 1009   Drainage Amount Moderate 05/06/21 1009   Drainage Description Serosanguineous 05/06/21 1009   Non-staged Wound Description Full thickness 05/06/21 1009       Wound 03/11/21 Foot Left; Other (Comment); Posterior (Active)   Wound Image Images linked 05/06/21 1010   Wound Description Pink;Slough 05/06/21 1010   Dominga-wound Assessment Callus 05/06/21 1010   Wound Length (cm) 0 2 cm 05/06/21 1010   Wound Width (cm) 0 2 cm 05/06/21 1010   Wound Depth (cm) 0 1 cm 05/06/21 1010   Wound Surface Area (cm^2) 0 04 cm^2 05/06/21 1010   Wound Volume (cm^3) 0 cm^3 05/06/21 1010   Calculated Wound Volume (cm^3) 0 cm^3 05/06/21 1010   Change in Wound Size % 100 05/06/21 1010       Wound 03/11/21 Heel Left (Active)   Wound Image Images linked 05/06/21 1010   Wound Description Eschar;Epithelialization 05/06/21 1010   Wound Length (cm) 2 5 cm 05/06/21 1010   Wound Width (cm) 1 5 cm 05/06/21 1010   Wound Depth (cm) 0 cm 05/06/21 1010   Wound Surface Area (cm^2) 3 75 cm^2 05/06/21 1010   Wound Volume (cm^3) 0 cm^3 05/06/21 1010   Calculated Wound Volume (cm^3) 0 cm^3 05/06/21 1010   Drainage Amount None 05/06/21 1010       Wound 04/08/21 Foot Anterior; Left (Active)   Wound Image Images linked 05/06/21 1009   Wound Description Granulation tissue 05/06/21 1009   Dominga-wound Assessment Dry; Intact 05/06/21 1009   Wound Length (cm) 0 3 cm 05/06/21 1009   Wound Width (cm) 0 2 cm 05/06/21 1009   Wound Depth (cm) 0 1 cm 05/06/21 1009   Wound Surface Area (cm^2) 0 06 cm^2 05/06/21 1009   Wound Volume (cm^3) 0 01 cm^3 05/06/21 1009   Calculated Wound Volume (cm^3) 0 01 cm^3 05/06/21 1009   Change in Wound Size % 0 05/06/21 1009   Drainage Amount Small 05/06/21 1009   Drainage Description Serous 05/06/21 1009   Non-staged Wound Description Full thickness 05/06/21 1009       /72   Pulse 72   Temp (!) 97 1 °F (36 2 °C)   Resp 16     Physical Exam  Vitals signs and nursing note reviewed  Constitutional:       General: He is not in acute distress  Appearance: Normal appearance  He is not toxic-appearing  Cardiovascular:      Rate and Rhythm: Normal rate and regular rhythm  Pulses:           Dorsalis pedis pulses are 0 on the right side and 0 on the left side  Posterior tibial pulses are 0 on the right side and 0 on the left side  Comments: No ischemia  Pulmonary:      Effort: Pulmonary effort is normal  No respiratory distress  Musculoskeletal:         General: No signs of injury  Comments: TMA left foot  Right 5th ray amputation  Skin:     General: Skin is warm and dry  Coloration: Skin is not cyanotic or mottled  Findings: Wound present  No abscess or erythema  Rash is not purpuric  Nails: There is no clubbing  Comments: Dry wounds X 3 with eschar left heel with no drainage  Stable wounds left TMA site  Mild keratosis noted  Wound presents right foot laterally  Wound looks clean with increased granular tissues  Distal eschar is loose and no wound underneath  No deep probing or infection bilaterally  No cellulitis  See wound assessments  Neurological:      General: No focal deficit present  Mental Status: He is alert and oriented to person, place, and time  Cranial Nerves: No cranial nerve deficit  Motor: No weakness  Psychiatric:         Mood and Affect: Mood normal          Behavior: Behavior normal          Thought Content:  Thought content normal          Judgment: Judgment normal            Wound Instructions:  Orders Placed This Encounter   Procedures    Wound cleansing and dressings     Wash your hands with soap and water  Remove old dressing, discard into plastic bag and place in trash  Cleanse the wound with normal saline prior to applying a clean dressing  Do not use tissue or cotton balls  Do not scrub the wound  Pat dry using gauze  Shower yes, with protection  Right foot wound:  Do not get wet with shower water, may use cast covers or sponge bathe  Apply Puracol AG to the wound  Cover with gauze and secure gently with rolled gauze and tape  Change every other day and as needed if soiled     Left foot wounds:  Do not get wet with shower water, may use cast covers or sponge bathe  Apply Dermagran gauze  Cover with gauze and secure gently with rolled gauze and tape  Change dressing every other day and as needed if soiled       Left Heel wound  Do not get wet with shower water, may use cast covers or sponge bathe  Paint wound with Betadine  Cover with gauze (keep padded) and secure gently with rolled gauze and tape  Change dressing every other day and as needed if soiled          Treatments above were completed today at the [facility]                            Ensure adequate nutrition, 3 meals per day that include protein     Standing Status:   Future     Standing Expiration Date:   5/6/2022    Debridement     This order was created via procedure documentation    Debridement     This order was created via procedure documentation        Diagnosis ICD-10-CM Associated Orders   1  Surgical wound, non healing, initial encounter  T81 [id number] lidocaine (XYLOCAINE) 4 % topical solution 5 mL     Wound cleansing and dressings     Debridement   2  Ulcer of left foot, unspecified ulcer stage ([facility])  L97 529 lidocaine (XYLOCAINE) 4 % topical solution 5 mL     Wound cleansing and dressings     Debridement   3   Heel ulceration, left, with unspecified severity ([facility])  L97 429 lidocaine (XYLOCAINE) 4 % topical solution 5 mL     Wound cleansing and dressings   4  Right foot ulcer, with fat layer exposed ([facility] )  L97 512 lidocaine (XYLOCAINE) 4 % topical solution 5 mL     Wound cleansing and dressings   5  Diabetic polyneuropathy associated with type 2 diabetes mellitus ([facility])  E11 42 Debridement   6   Peripheral vascular disease, unspecified ([facility] )  I73 9 Calm

## 2024-11-30 DIAGNOSIS — I48.91 ATRIAL FIBRILLATION, UNSPECIFIED TYPE (HCC): ICD-10-CM

## 2024-12-02 RX ORDER — RIVAROXABAN 2.5 MG/1
TABLET, FILM COATED ORAL
Qty: 30 TABLET | Refills: 5 | Status: SHIPPED | OUTPATIENT
Start: 2024-12-02

## 2024-12-12 ENCOUNTER — TELEPHONE (OUTPATIENT)
Age: 69
End: 2024-12-12

## 2024-12-13 NOTE — TELEPHONE ENCOUNTER
Called and spoke with Pt. Relayed Benedict Menchaca's message verbatim. Pt. Understood. He thanked me for the phone call.

## 2024-12-16 ENCOUNTER — OFFICE VISIT (OUTPATIENT)
Dept: FAMILY MEDICINE CLINIC | Facility: CLINIC | Age: 69
End: 2024-12-16
Payer: COMMERCIAL

## 2024-12-16 VITALS
HEART RATE: 86 BPM | DIASTOLIC BLOOD PRESSURE: 76 MMHG | SYSTOLIC BLOOD PRESSURE: 126 MMHG | OXYGEN SATURATION: 95 % | WEIGHT: 193 LBS | BODY MASS INDEX: 32.95 KG/M2 | HEIGHT: 64 IN | TEMPERATURE: 100.6 F

## 2024-12-16 DIAGNOSIS — K21.9 GASTROESOPHAGEAL REFLUX DISEASE WITHOUT ESOPHAGITIS: ICD-10-CM

## 2024-12-16 DIAGNOSIS — Z89.512 S/P BKA (BELOW KNEE AMPUTATION), LEFT (HCC): ICD-10-CM

## 2024-12-16 DIAGNOSIS — F11.20 CONTINUOUS OPIOID DEPENDENCE (HCC): ICD-10-CM

## 2024-12-16 DIAGNOSIS — E11.9 TYPE 2 DIABETES MELLITUS WITHOUT COMPLICATION, WITHOUT LONG-TERM CURRENT USE OF INSULIN (HCC): ICD-10-CM

## 2024-12-16 DIAGNOSIS — I10 ESSENTIAL HYPERTENSION: ICD-10-CM

## 2024-12-16 DIAGNOSIS — E11.51 TYPE 2 DIABETES MELLITUS WITH DIABETIC PERIPHERAL ANGIOPATHY WITHOUT GANGRENE, WITH LONG-TERM CURRENT USE OF INSULIN (HCC): ICD-10-CM

## 2024-12-16 DIAGNOSIS — E78.2 MIXED HYPERLIPIDEMIA: ICD-10-CM

## 2024-12-16 DIAGNOSIS — I48.91 ATRIAL FIBRILLATION, UNSPECIFIED TYPE (HCC): ICD-10-CM

## 2024-12-16 DIAGNOSIS — E13.69 OTHER SPECIFIED DIABETES MELLITUS WITH OTHER SPECIFIED COMPLICATION, UNSPECIFIED WHETHER LONG TERM INSULIN USE (HCC): Primary | ICD-10-CM

## 2024-12-16 DIAGNOSIS — Z00.00 WELL ADULT EXAM: ICD-10-CM

## 2024-12-16 DIAGNOSIS — G47.00 INSOMNIA, UNSPECIFIED TYPE: ICD-10-CM

## 2024-12-16 DIAGNOSIS — Z79.4 TYPE 2 DIABETES MELLITUS WITH DIABETIC PERIPHERAL ANGIOPATHY WITHOUT GANGRENE, WITH LONG-TERM CURRENT USE OF INSULIN (HCC): ICD-10-CM

## 2024-12-16 PROCEDURE — 99214 OFFICE O/P EST MOD 30 MIN: CPT | Performed by: FAMILY MEDICINE

## 2024-12-16 PROCEDURE — G0439 PPPS, SUBSEQ VISIT: HCPCS | Performed by: FAMILY MEDICINE

## 2024-12-16 RX ORDER — TIRZEPATIDE 5 MG/.5ML
5 INJECTION, SOLUTION SUBCUTANEOUS WEEKLY
Qty: 2 ML | Refills: 0 | Status: SHIPPED | OUTPATIENT
Start: 2024-12-16

## 2024-12-16 NOTE — PROGRESS NOTES
Name: Kan Juan      : 1955      MRN: [id number]  Encounter Provider: Macario Goyal MD  Encounter Date: 2024   Encounter department: [facility]  :  Assessment & Plan  Well adult exam         Other specified diabetes mellitus with other specified complication, unspecified whether long term insulin use (HCC)    Lab Results   Component Value Date    HGBA1C 8.7 (H) 2024     Orders:  •  Albumin / creatinine urine ratio; Future    Type 2 diabetes mellitus without complication, without long-term current use of insulin (HCC)    Lab Results   Component Value Date    HGBA1C 8.7 (H) 2024     Orders:  •  Tirzepatide (Mounjaro) 5 MG/0.5ML SOAJ; Inject 5 mg under the skin once a week  •  Hemoglobin A1C; Future  •  Comprehensive metabolic panel; Future    Atrial fibrillation, unspecified type (HCC)  Continue with anticogulation and rate control of heart rate. Concerns about condition were addressed today.        Continuous opioid dependence (HCC)  Patient is stable  and will continue present plan of care and reassess at next routine visit. All questions about this problem from patient were answered today.        Essential hypertension  Patient is stable with current anti-hypertensive medicine and continue to follow a low sodium diet and take current medication. All questions about this condition were answered today.        Gastroesophageal reflux disease without esophagitis  Patient to continue with present therapy and decrease caffeine, avoid ETOH and smoking to decrease acid production. Pt should also cease eating prior to bedtime and avoid excessive fluid intake prior to sleep. May use antacids as needed for breakthrough GERD. All pateint questions answered today about this condition.        Insomnia, unspecified type  Discussed with patient use of sedative  medications and good  sleep hygiene to maximize treatment for this problem. Pt  to use sedatives only prior to  sleep and cautioned them about usage at any other time. All patient questions about this problem answered today.        Mixed hyperlipidemia  Patient  is stable with current medication and we discussed a low fat low cholesterol diet. Weight loss also discussed for this will help lower cholesterol also. Recheck lipids in 6 months.        Type 2 diabetes mellitus with diabetic peripheral angiopathy without gangrene, with long-term current use of insulin (HCC)  Pt needs better glucose control  Lab Results   Component Value Date    HGBA1C 8.7 (H) 11/22/2024          S/P BKA (below knee amputation), left (HCC)  Patient is stable  and will continue present plan of care and reassess at next routine visit. All questions about this problem from patient were answered today.              Falls Plan of Care: balance, strength, and gait training instructions were provided. Home safety education provided.     History of Present Illness     HPI  Review of Systems   Constitutional:  Negative for activity change, appetite change, chills, fatigue, fever and unexpected weight change.   HENT:  Negative for congestion, ear pain, hearing loss, mouth sores, postnasal drip, sinus pressure, sinus pain, sneezing and sore throat.    Respiratory:  Negative for apnea, cough, shortness of breath and wheezing.    Cardiovascular:  Negative for chest pain, palpitations and leg swelling.   Gastrointestinal:  Negative for abdominal pain, constipation, diarrhea, nausea and vomiting.   Endocrine: Negative for cold intolerance and heat intolerance.   Genitourinary:  Negative for dysuria, frequency and hematuria.   Musculoskeletal:  Negative for arthralgias, back pain, gait problem, joint swelling and neck pain.   Skin:  Negative for rash.   Neurological:  Negative for dizziness, weakness and numbness.   Hematological:  Does not bruise/bleed easily.   Psychiatric/Behavioral:  Negative for agitation, behavioral problems, confusion, hallucinations and  "sleep disturbance. The patient is not nervous/anxious.        Objective   /76 (BP Location: Left arm, Patient Position: Sitting, Cuff Size: Standard)   Pulse 86   Temp (!) 100.6 °F (38.1 °C) (Skin)   Ht 5' 4\" (1.626 m)   Wt 87.5 kg (193 lb)   SpO2 95%   BMI 33.13 kg/m²      Physical Exam  Constitutional:       Appearance: He is well-developed.   HENT:      Head: Normocephalic and atraumatic.      Right Ear: External ear normal.      Left Ear: External ear normal.      Nose: Nose normal.      Mouth/Throat:      Pharynx: Oropharynx is clear. No oropharyngeal exudate.   Eyes:      General: No scleral icterus.     Conjunctiva/sclera: Conjunctivae normal.      Pupils: Pupils are equal, round, and reactive to light.   Cardiovascular:      Rate and Rhythm: Normal rate and regular rhythm.      Heart sounds: Normal heart sounds.   Pulmonary:      Effort: Pulmonary effort is normal.      Breath sounds: Normal breath sounds. No wheezing or rales.   Abdominal:      General: Bowel sounds are normal.      Palpations: Abdomen is soft.      Tenderness: There is no abdominal tenderness. There is no guarding.   Musculoskeletal:         General: Normal range of motion.      Cervical back: Normal range of motion and neck supple.   Skin:     General: Skin is warm and dry.      Findings: No erythema or rash.   Neurological:      Mental Status: He is alert and oriented to person, place, and time. Mental status is at baseline.   Psychiatric:         Mood and Affect: Mood normal.         Behavior: Behavior normal.         Thought Content: Thought content normal.         Judgment: Judgment normal.         "

## 2024-12-16 NOTE — ASSESSMENT & PLAN NOTE
Patient is stable with current anti-hypertensive medicine and continue to follow a low sodium diet and take current medication. All questions about this condition were answered today.

## 2024-12-16 NOTE — ASSESSMENT & PLAN NOTE
Pt needs better glucose control  Lab Results   Component Value Date    HGBA1C 8.7 (H) 11/22/2024         present and normal in 4 extremities

## 2024-12-16 NOTE — ASSESSMENT & PLAN NOTE
Continue with anticogulation and rate control of heart rate. Concerns about condition were addressed today.

## 2024-12-16 NOTE — PROGRESS NOTES
Name: Kan Juan      : 1955      MRN: [id number]  Encounter Provider: Macario Goyal MD  Encounter Date: 2024   Encounter department: [facility]    Assessment & Plan  Well adult exam         Other specified diabetes mellitus with other specified complication, unspecified whether long term insulin use (HCC)    Lab Results   Component Value Date    HGBA1C 8.7 (H) 2024     Orders:  •  Albumin / creatinine urine ratio; Future    Type 2 diabetes mellitus without complication, without long-term current use of insulin (HCC)    Lab Results   Component Value Date    HGBA1C 8.7 (H) 2024     Orders:  •  Tirzepatide (Mounjaro) 5 MG/0.5ML SOAJ; Inject 5 mg under the skin once a week  •  Hemoglobin A1C; Future  •  Comprehensive metabolic panel; Future    Atrial fibrillation, unspecified type (HCC)         Continuous opioid dependence (HCC)         Essential hypertension         Gastroesophageal reflux disease without esophagitis         Insomnia, unspecified type         Mixed hyperlipidemia         Type 2 diabetes mellitus with diabetic peripheral angiopathy without gangrene, with long-term current use of insulin (HCC)    Lab Results   Component Value Date    HGBA1C 8.7 (H) 2024          S/P BKA (below knee amputation), left (HCC)            Preventive health issues were discussed with patient, and age appropriate screening tests were ordered as noted in patient's After Visit Summary. Personalized health advice and appropriate referrals for health education or preventive services given if needed, as noted in patient's After Visit Summary.    History of Present Illness     69-year-old male here today for checkup on multimedical problems patient with type 2 diabetes history of prosthesis on the left lower extremity with BKA.  Patient also with hypertension vascular disease as well as GERD.  Patient is trying to see about getting his left rotator cuff repaired  however he is to see about getting his A1c down to a reasonable level for surgery his last A1c was 8.7 and is not he is not a surgical candidate to we get that down patient is currently on insulin he is on preprandial as well as Lantus.  He is also taking Mounjaro he was off that for a while and his A1c went back up he did have good control of his diabetes previously however did go back up he is now back at his Mounjaro he was getting 2.5 we will going to raise up to the 5 mg dose.  His next A1c is due in February will see about increasing that to get better control of his diabetes when he get his A1c down so we can get his surgery done in February.  Patient will call when he needs refills I will give him a written prescription for the higher dose of Mounjaro.       Patient Care Team:  Macario Goyal MD as PCP - General (Family Medicine)  AHSAN Garcia as PCP - Wound (Wound Care)  Deanna Hammer RD as Diabetes Educator (Diabetes Services)    Review of Systems   Constitutional:  Negative for activity change, appetite change, chills, fatigue, fever and unexpected weight change.   HENT:  Negative for congestion, ear pain, hearing loss, mouth sores, postnasal drip, sinus pressure, sinus pain, sneezing and sore throat.    Respiratory:  Negative for apnea, cough, shortness of breath and wheezing.    Cardiovascular:  Negative for chest pain, palpitations and leg swelling.   Gastrointestinal:  Negative for abdominal pain, constipation, diarrhea, nausea and vomiting.   Endocrine: Negative for cold intolerance and heat intolerance.   Genitourinary:  Negative for dysuria, frequency and hematuria.   Musculoskeletal:  Negative for arthralgias, back pain, gait problem, joint swelling and neck pain.   Skin:  Negative for rash.   Neurological:  Negative for dizziness, weakness and numbness.   Hematological:  Does not bruise/bleed easily.   Psychiatric/Behavioral:  Negative for agitation, behavioral problems, confusion,  hallucinations and sleep disturbance. The patient is not nervous/anxious.      Medical History Reviewed by provider this encounter:  Tobacco  Allergies  Meds  Problems  Med Hx  Surg Hx  Fam Hx       Annual Wellness Visit Questionnaire   Kan is here for his Subsequent Wellness visit.     Health Risk Assessment:   Patient rates overall health as good. Patient feels that their physical health rating is slightly worse. Patient is satisfied with their life. Eyesight was rated as same. Hearing was rated as same. Patient feels that their emotional and mental health rating is same. Patients states they are often angry. Patient states they are sometimes unusually tired/fatigued. Pain experienced in the last 7 days has been a lot. Patient's pain rating has been 8/10. Patient states that he has experienced no weight loss or gain in last 6 months.     Depression Screening:   PHQ-2 Score: 1      Fall Risk Screening:   In the past year, patient has experienced: history of falling in past year    Injured during fall?: No    Feels unsteady when standing or walking?: No    Worried about falling?: No      Home Safety:  Patient does not have trouble with stairs inside or outside of their home. Patient has working smoke alarms and has working carbon monoxide detector. Home safety hazards include: none.     Nutrition:   Current diet is Diabetic and Low Carb.     Medications:   Patient is not currently taking any over-the-counter supplements. Patient is able to manage medications.     Activities of Daily Living (ADLs)/Instrumental Activities of Daily Living (IADLs):   Walk and transfer into and out of bed and chair?: Yes  Dress and groom yourself?: Yes    Bathe or shower yourself?: Yes    Feed yourself? Yes  Do your laundry/housekeeping?: Yes  Manage your money, pay your bills and track your expenses?: Yes  Make your own meals?: Yes    Do your own shopping?: Yes    Previous Hospitalizations:   Any hospitalizations or ED visits  within the last 12 months?: No      Advance Care Planning:   Living will: No    Durable POA for healthcare: No      PREVENTIVE SCREENINGS      Cardiovascular Screening:    General: Screening Not Indicated and History Lipid Disorder      Diabetes Screening:     General: Screening Not Indicated and History Diabetes      Colorectal Cancer Screening:     General: Screening Current      Abdominal Aortic Aneurysm (AAA) Screening:    Risk factors include: age between 65-76 yo        Lung Cancer Screening:     General: Screening Not Indicated      Hepatitis C Screening:    General: Screening Current    Screening, Brief Intervention, and Referral to Treatment (SBIRT)    Screening      AUDIT-C Screenin) How often did you have a drink containing alcohol in the past year? never  2) How many drinks did you have on a typical day when you were drinking in the past year? 0  3) How often did you have 6 or more drinks on one occasion in the past year? never    AUDIT-C Score: 0  Interpretation: Score 0-3 (male): Negative screen for alcohol misuse    Single Item Drug Screening:  How often have you used an illegal drug (including marijuana) or a prescription medication for non-medical reasons in the past year? never    Single Item Drug Screen Score: 0  Interpretation: Negative screen for possible drug use disorder    Review of Current Opioid Use    Opioid Risk Tool (ORT) Interpretation: Complete Opioid Risk Tool (ORT)    Social Drivers of Health     Financial Resource Strain: Low Risk  (9/15/2023)    Overall Financial Resource Strain (CARDIA)    • Difficulty of Paying Living Expenses: Not hard at all   Food Insecurity: No Food Insecurity (2024)    Hunger Vital Sign    • Worried About Running Out of Food in the Last Year: Never true    • Ran Out of Food in the Last Year: Never true   Transportation Needs: No Transportation Needs (2024)    PRAPARE - Transportation    • Lack of Transportation (Medical): No    • Lack of  "Transportation (Non-Medical): No   Housing Stability: Low Risk  (12/16/2024)    Housing Stability Vital Sign    • Unable to Pay for Housing in the Last Year: No    • Number of Times Moved in the Last Year: 1    • Homeless in the Last Year: No   Utilities: Not At Risk (12/16/2024)    [facility] Utilities    • Threatened with loss of utilities: No     No results found.    Objective   /76 (BP Location: Left arm, Patient Position: Sitting, Cuff Size: Standard)   Pulse 86   Temp (!) 100.6 °F (38.1 °C) (Skin)   Ht 5' 4\" (1.626 m)   Wt 87.5 kg (193 lb)   SpO2 95%   BMI 33.13 kg/m²     Physical Exam  Constitutional:       Appearance: He is well-developed.   HENT:      Head: Normocephalic and atraumatic.      Right Ear: External ear normal.      Left Ear: External ear normal.      Nose: Nose normal.      Mouth/Throat:      Pharynx: Oropharynx is clear. No oropharyngeal exudate.   Eyes:      General: No scleral icterus.     Conjunctiva/sclera: Conjunctivae normal.      Pupils: Pupils are equal, round, and reactive to light.   Cardiovascular:      Rate and Rhythm: Normal rate and regular rhythm.      Heart sounds: Normal heart sounds.   Pulmonary:      Effort: Pulmonary effort is normal.      Breath sounds: Normal breath sounds. No wheezing or rales.   Abdominal:      General: Bowel sounds are normal.      Palpations: Abdomen is soft.      Tenderness: There is no abdominal tenderness. There is no guarding.   Musculoskeletal:         General: Normal range of motion.      Cervical back: Normal range of motion and neck supple.   Skin:     General: Skin is warm and dry.      Findings: No erythema or rash.   Neurological:      Mental Status: He is alert and oriented to person, place, and time. Mental status is at baseline.   Psychiatric:         Mood and Affect: Mood normal.         Behavior: Behavior normal.         Thought Content: Thought content normal.         Judgment: Judgment normal.         "

## 2024-12-16 NOTE — ASSESSMENT & PLAN NOTE
Patient to continue with present therapy and decrease caffeine, avoid ETOH and smoking to decrease acid production. Pt should also cease eating prior to bedtime and avoid excessive fluid intake prior to sleep. May use antacids as needed for breakthrough GERD. All pateint questions answered today about this condition.

## 2024-12-16 NOTE — ASSESSMENT & PLAN NOTE
Discussed with patient use of sedative  medications and good  sleep hygiene to maximize treatment for this problem. Pt  to use sedatives only prior to sleep and cautioned them about usage at any other time. All patient questions about this problem answered today.

## 2024-12-20 DIAGNOSIS — M54.9 CHRONIC BACK PAIN, UNSPECIFIED BACK LOCATION, UNSPECIFIED BACK PAIN LATERALITY: ICD-10-CM

## 2024-12-20 DIAGNOSIS — G89.29 CHRONIC BACK PAIN, UNSPECIFIED BACK LOCATION, UNSPECIFIED BACK PAIN LATERALITY: ICD-10-CM

## 2024-12-20 NOTE — TELEPHONE ENCOUNTER
Reason for call:   [x] Refill   [] Prior Auth  [] Other:     Office:   [x] PCP/Provider -   [] Specialty/Provider -     Medication:   Oxycodone-acetaminophen 5-325 mg- take 1 tablet by mouth every 6 hours as needed      Pharmacy: [facility] [place]    Does the patient have enough for 3 days?   [x] Yes   [] No - Send as HP to POD

## 2024-12-21 RX ORDER — OXYCODONE AND ACETAMINOPHEN 5; 325 MG/1; MG/1
1 TABLET ORAL EVERY 6 HOURS PRN
Qty: 120 TABLET | Refills: 0 | Status: SHIPPED | OUTPATIENT
Start: 2024-12-21

## 2024-12-23 DIAGNOSIS — E11.52 TYPE 2 DIABETES MELLITUS WITH DIABETIC PERIPHERAL ANGIOPATHY AND GANGRENE, WITH LONG-TERM CURRENT USE OF INSULIN (HCC): ICD-10-CM

## 2024-12-23 DIAGNOSIS — Z79.4 TYPE 2 DIABETES MELLITUS WITH DIABETIC PERIPHERAL ANGIOPATHY AND GANGRENE, WITH LONG-TERM CURRENT USE OF INSULIN (HCC): ICD-10-CM

## 2024-12-24 RX ORDER — LANCETS
EACH MISCELLANEOUS
Qty: 400 EACH | Refills: 1 | Status: SHIPPED | OUTPATIENT
Start: 2024-12-24

## 2024-12-24 RX ORDER — ISOPROPYL ALCOHOL 70 ML/100ML
SWAB TOPICAL
Qty: 400 EACH | Refills: 1 | Status: SHIPPED | OUTPATIENT
Start: 2024-12-24

## 2025-01-04 ENCOUNTER — APPOINTMENT (OUTPATIENT)
Dept: LAB | Facility: MEDICAL CENTER | Age: 70
End: 2025-01-04
Payer: COMMERCIAL

## 2025-01-04 DIAGNOSIS — Z79.4 TYPE 2 DIABETES MELLITUS WITH DIABETIC PERIPHERAL ANGIOPATHY WITHOUT GANGRENE, WITH LONG-TERM CURRENT USE OF INSULIN (HCC): ICD-10-CM

## 2025-01-04 DIAGNOSIS — E11.9 TYPE 2 DIABETES MELLITUS WITHOUT COMPLICATION, WITHOUT LONG-TERM CURRENT USE OF INSULIN (HCC): ICD-10-CM

## 2025-01-04 DIAGNOSIS — E11.51 TYPE 2 DIABETES MELLITUS WITH DIABETIC PERIPHERAL ANGIOPATHY WITHOUT GANGRENE, WITH LONG-TERM CURRENT USE OF INSULIN (HCC): ICD-10-CM

## 2025-01-04 DIAGNOSIS — E13.69 OTHER SPECIFIED DIABETES MELLITUS WITH OTHER SPECIFIED COMPLICATION, UNSPECIFIED WHETHER LONG TERM INSULIN USE (HCC): ICD-10-CM

## 2025-01-04 LAB
ALBUMIN SERPL BCG-MCNC: 4.4 G/DL (ref 3.5–5)
ALP SERPL-CCNC: 70 U/L (ref 34–104)
ALT SERPL W P-5'-P-CCNC: 80 U/L (ref 7–52)
ANION GAP SERPL CALCULATED.3IONS-SCNC: 8 MMOL/L (ref 4–13)
AST SERPL W P-5'-P-CCNC: 39 U/L (ref 13–39)
BILIRUB SERPL-MCNC: 0.5 MG/DL (ref 0.2–1)
BUN SERPL-MCNC: 19 MG/DL (ref 5–25)
CALCIUM SERPL-MCNC: 9.8 MG/DL (ref 8.4–10.2)
CHLORIDE SERPL-SCNC: 101 MMOL/L (ref 96–108)
CO2 SERPL-SCNC: 29 MMOL/L (ref 21–32)
CREAT SERPL-MCNC: 0.86 MG/DL (ref 0.6–1.3)
CREAT UR-MCNC: 103.9 MG/DL
EST. AVERAGE GLUCOSE BLD GHB EST-MCNC: 174 MG/DL
GFR SERPL CREATININE-BSD FRML MDRD: 87 ML/MIN/1.73SQ M
GLUCOSE P FAST SERPL-MCNC: 183 MG/DL (ref 65–99)
HBA1C MFR BLD: 7.7 %
MICROALBUMIN UR-MCNC: 42.2 MG/L
MICROALBUMIN/CREAT 24H UR: 41 MG/G CREATININE (ref 0–30)
POTASSIUM SERPL-SCNC: 4.1 MMOL/L (ref 3.5–5.3)
PROT SERPL-MCNC: 7.7 G/DL (ref 6.4–8.4)
SODIUM SERPL-SCNC: 138 MMOL/L (ref 135–147)

## 2025-01-04 PROCEDURE — 36415 COLL VENOUS BLD VENIPUNCTURE: CPT

## 2025-01-04 PROCEDURE — 83036 HEMOGLOBIN GLYCOSYLATED A1C: CPT

## 2025-01-04 PROCEDURE — 82043 UR ALBUMIN QUANTITATIVE: CPT

## 2025-01-04 PROCEDURE — 80053 COMPREHEN METABOLIC PANEL: CPT

## 2025-01-04 PROCEDURE — 82570 ASSAY OF URINE CREATININE: CPT

## 2025-01-08 ENCOUNTER — TELEPHONE (OUTPATIENT)
Age: 70
End: 2025-01-08

## 2025-01-08 DIAGNOSIS — E11.65 POORLY CONTROLLED TYPE 2 DIABETES MELLITUS (HCC): ICD-10-CM

## 2025-01-08 RX ORDER — INSULIN GLARGINE 100 [IU]/ML
INJECTION, SOLUTION SUBCUTANEOUS
Qty: 15 ML | Refills: 5 | Status: SHIPPED | OUTPATIENT
Start: 2025-01-08

## 2025-01-08 NOTE — TELEPHONE ENCOUNTER
Call pt. Need to know what are the name of the socks I need to order for his prosthesis? How many pair does he need?

## 2025-01-08 NOTE — TELEPHONE ENCOUNTER
Patient is requesting for an order to be sent to get the socks covered he uses for his prosthetics. He uses the sock between the rubber piece and the prosthetic    [facility]   [address]  [place] [address]   [place] Phone: [phone]  [place] Fax: [phone]

## 2025-01-17 DIAGNOSIS — E11.65 POORLY CONTROLLED TYPE 2 DIABETES MELLITUS (HCC): ICD-10-CM

## 2025-01-17 RX ORDER — FLURBIPROFEN SODIUM 0.3 MG/ML
SOLUTION/ DROPS OPHTHALMIC
Qty: 400 EACH | Refills: 1 | Status: SHIPPED | OUTPATIENT
Start: 2025-01-17

## 2025-01-21 DIAGNOSIS — G89.29 CHRONIC BACK PAIN, UNSPECIFIED BACK LOCATION, UNSPECIFIED BACK PAIN LATERALITY: ICD-10-CM

## 2025-01-21 DIAGNOSIS — M54.9 CHRONIC BACK PAIN, UNSPECIFIED BACK LOCATION, UNSPECIFIED BACK PAIN LATERALITY: ICD-10-CM

## 2025-01-21 RX ORDER — OXYCODONE AND ACETAMINOPHEN 5; 325 MG/1; MG/1
1 TABLET ORAL EVERY 6 HOURS PRN
Qty: 120 TABLET | Refills: 0 | Status: SHIPPED | OUTPATIENT
Start: 2025-01-21

## 2025-01-21 NOTE — TELEPHONE ENCOUNTER
Reason for call:   [x] Refill   [] Prior Auth  [] Other:     Office:   [x] PCP/Provider - Macario Goyal MD   [] Specialty/Provider -     Medication: oxyCODONE-acetaminophen (Percocet) 5-325 mg per tablet / Take 1 tablet by mouth every 6 (six) hours as needed     Pharmacy: [id number] - [place]  [address]     Does the patient have enough for 3 days?   [] Yes   [x] No - Send as HP to POD

## 2025-01-24 ENCOUNTER — TELEPHONE (OUTPATIENT)
Dept: FAMILY MEDICINE CLINIC | Facility: CLINIC | Age: 70
End: 2025-01-24

## 2025-01-24 NOTE — TELEPHONE ENCOUNTER
Received request from [employer] for prior authorization for Oxycodone acetaminophen 5-325 mg tablets.  Key:[id number]

## 2025-01-28 ENCOUNTER — TELEPHONE (OUTPATIENT)
Dept: FAMILY MEDICINE CLINIC | Facility: CLINIC | Age: 70
End: 2025-01-28

## 2025-01-31 NOTE — TELEPHONE ENCOUNTER
PA for oxycodone acetaminophen 5-325 mg SUBMITTED to [facility]    via    []CMM-KEY:   []Surescripts-Case ID #   []Availity-Auth ID # NDC #   [x]Faxed to plan [facility] [phone]  []Other website   []Phone call Case ID #     [x]PA sent as URGENT    All office notes, labs and other pertaining documents and studies sent. Clinical questions answered. Awaiting determination from insurance company.     Turnaround time for your insurance to make a decision on your Prior Authorization can take 7-21 business days.

## 2025-02-03 DIAGNOSIS — E78.2 MIXED HYPERLIPIDEMIA: ICD-10-CM

## 2025-02-04 RX ORDER — ATORVASTATIN CALCIUM 40 MG/1
40 TABLET, FILM COATED ORAL
Qty: 90 TABLET | Refills: 1 | Status: SHIPPED | OUTPATIENT
Start: 2025-02-04

## 2025-02-04 NOTE — TELEPHONE ENCOUNTER
[place] for oxycodone acetaminophne 5-325 mg APPROVED     Date(s) approved 1/31/25-1/31/26        Patient advised by          []Toolmeethart Message  []Phone call   [x]LMOM  []L/M to call office as no active Communication consent on file  []Unable to leave detailed message as VM not approved on Communication consent       Pharmacy advised by    [x]Fax  []Phone call    Approval letter scanned into Media No

## 2025-02-18 ENCOUNTER — TELEPHONE (OUTPATIENT)
Age: 70
End: 2025-02-18

## 2025-02-18 DIAGNOSIS — M54.9 CHRONIC BACK PAIN, UNSPECIFIED BACK LOCATION, UNSPECIFIED BACK PAIN LATERALITY: ICD-10-CM

## 2025-02-18 DIAGNOSIS — G89.29 CHRONIC BACK PAIN, UNSPECIFIED BACK LOCATION, UNSPECIFIED BACK PAIN LATERALITY: ICD-10-CM

## 2025-02-18 NOTE — TELEPHONE ENCOUNTER
Patient stated he was at the foot doctor for his prosthetic and called the office to request new script for shoes and inserts to be sent to [facility].  Did not see any request, please review-

## 2025-02-18 NOTE — TELEPHONE ENCOUNTER
Patient is requesting   Requested Prescriptions     Pending Prescriptions Disp Refills    oxyCODONE-acetaminophen (Percocet) 5-325 mg per tablet 120 tablet 0     Sig: Take 1 tablet by mouth every 6 (six) hours as needed for moderate pain Max Daily Amount: 4 tablets    To the [id number]

## 2025-02-18 NOTE — TELEPHONE ENCOUNTER
[facility] socks.  Now he needs one for a prosthetic shoe [facility].    Their fax # is [phone]

## 2025-02-19 RX ORDER — OXYCODONE AND ACETAMINOPHEN 5; 325 MG/1; MG/1
1 TABLET ORAL EVERY 6 HOURS PRN
Qty: 120 TABLET | Refills: 0 | Status: SHIPPED | OUTPATIENT
Start: 2025-02-19

## 2025-03-15 NOTE — ASSESSMENT & PLAN NOTE
Patient is stable with current meds and discussed a low carb diet. Pt  recommended to see eye doctor and foot doctor for routine screening as per protocol. Recheck A1C  and Cr in 3 months. Patient questions answered today about this condtion.   Lab Results   Component Value Date    HGBA1C 7.7 (H) 01/04/2025     Orders:  •  Hemoglobin A1C; Future  •  Comprehensive metabolic panel; Future

## 2025-03-15 NOTE — ASSESSMENT & PLAN NOTE
Patient is stable  and will continue present plan of care and reassess at next routine visit. All questions about this problem from patient were answered today.   Lab Results   Component Value Date    HGBA1C 7.7 (H) 01/04/2025

## 2025-03-15 NOTE — PROGRESS NOTES
Name: Kan Juan      : 1955      MRN: [id number]  Encounter Provider: Macario Goyal MD  Encounter Date: 3/17/2025   Encounter department: [facility]  :  Assessment & Plan  Atrial fibrillation, unspecified type (HCC)  Continue with anticogulation and rate control of heart rate. Concerns about condition were addressed today.        Diabetic polyneuropathy associated with type 2 diabetes mellitus (HCC)  Patient is stable  and will continue present plan of care and reassess at next routine visit. All questions about this problem from patient were answered today.   Lab Results   Component Value Date    HGBA1C 7.7 (H) 2025          Essential hypertension  Patient is stable with current anti-hypertensive medicine and continue to follow a low sodium diet and take current medication. All questions about this condition were answered today.        Gastroesophageal reflux disease without esophagitis  Patient to continue with present therapy and decrease caffeine, avoid ETOH and smoking to decrease acid production. Pt should also cease eating prior to bedtime and avoid excessive fluid intake prior to sleep. May use antacids as needed for breakthrough GERD. All pateint questions answered today about this condition.        Insomnia, unspecified type  Discussed with patient use of sedative  medications and good  sleep hygiene to maximize treatment for this problem. Pt  to use sedatives only prior to sleep and cautioned them about usage at any other time. All patient questions about this problem answered today.        Mixed hyperlipidemia  Patient  is stable with current medication and we discussed a low fat low cholesterol diet. Weight loss also discussed for this will help lower cholesterol also. Recheck lipids in 6 months.        PVD (peripheral vascular disease) (HCC)  Patient is stable  and will continue present plan of care and reassess at next routine visit. All questions about  this problem from patient were answered today.        S/P BKA (below knee amputation), left (HCC)  Patient is stable  and will continue present plan of care and reassess at next routine visit. All questions about this problem from patient were answered today.        Type 2 diabetes mellitus with diabetic peripheral angiopathy without gangrene, with long-term current use of insulin (HCC)  Patient is stable with current meds and discussed a low carb diet. Pt  recommended to see eye doctor and foot doctor for routine screening as per protocol. Recheck A1C  and Cr in 3 months. Patient questions answered today about this condtion.   Lab Results   Component Value Date    HGBA1C 7.7 (H) 01/04/2025     Orders:  •  Hemoglobin A1C; Future  •  Comprehensive metabolic panel; Future    Type 2 diabetes mellitus with diabetic peripheral angiopathy and gangrene, with long-term current use of insulin ([facility])    Lab Results   Component Value Date    HGBA1C 7.7 (H) 01/04/2025     Orders:  •  Tirzepatide 2.5 MG/0.5ML SOAJ; Inject 2.5 mg under the skin once a week    Diabetic ulcer of left lower leg (HCC)    Lab Results   Component Value Date    HGBA1C 7.7 (H) 01/04/2025          Embolism and thrombosis of arteries of the lower extremities ([facility])         Continuous opioid dependence ([facility])         Chronic back pain, unspecified back location, unspecified back pain laterality    Orders:  •  oxyCODONE-acetaminophen (Percocet) 5-325 mg per tablet; Take 1 tablet by mouth every 6 (six) hours as needed for moderate pain Max Daily Amount: 4 tablets      Patient's shoes and socks removed.    Right Foot/Ankle   Right Foot Inspection  Skin Exam: skin normal and dry skin. Skin not intact, no warmth, no callus, no erythema, no maceration, no abnormal color, no pre-ulcer, no ulcer and no callus.     Toe Exam: right toe deformity. No swelling, no tenderness and erythema    Sensory   Vibration: intact  Proprioception: intact  Monofilament testing:  intact    Vascular  Capillary refills: elevated  The right DP pulse is 2+. The right PT pulse is 2+.     Left Foot/Ankle  Left Foot Inspection  Amputation: amputation left foot     Assign Risk Category  Deformity present  No loss of protective sensation  No weak pulses  Risk: 0           Falls Plan of Care: balance, strength, and gait training instructions were provided. Home safety education provided.     History of Present Illness   Male here today for checkup on multimedical problems patient with progressive    Peripheral vascular disease coronary artery disease hypertension type 2 diabetes chronic pain also below-knee amputation on the left side and well-fitting prosthesis.  Patient is due for his foot exam today and is due to get new diabetic shoes which we will order for him today I spoke with the people at the [facility] and there were waiting for paperwork to fill out along with a prescription for this.  Patient recently was looking to be getting surgery for his shoulder however he need to have preop clearance and was seen by cardiology who required him to get a catheterization which showed he is due to 2 stents need to be placed before they can get him for his surgery.  Patient's shoulder surgery has now been placed into the future at an unknown time until his stents have epithelialized.  Needs refills on his Mounjaro he wants to go to the [facility] some heartburn with that and I will refill that for him we will see him back in approximately 3 months for his A1c is less than was fairly well-controlled at 7.7.      Review of Systems   Constitutional:  Negative for activity change, appetite change, chills, fatigue, fever and unexpected weight change.   HENT:  Negative for congestion, ear pain, hearing loss, mouth sores, postnasal drip, sinus pressure, sinus pain, sneezing and sore throat.    Respiratory:  Negative for apnea, cough, shortness of breath and wheezing.    Cardiovascular:  Negative for chest pain,  "palpitations and leg swelling.   Gastrointestinal:  Negative for abdominal pain, constipation, diarrhea, nausea and vomiting.   Endocrine: Negative for cold intolerance and heat intolerance.   Genitourinary:  Negative for dysuria, frequency and hematuria.   Musculoskeletal:  Negative for arthralgias, back pain, gait problem, joint swelling and neck pain.   Skin:  Negative for rash.   Neurological:  Negative for dizziness, weakness and numbness.   Hematological:  Does not bruise/bleed easily.   Psychiatric/Behavioral:  Negative for agitation, behavioral problems, confusion, hallucinations and sleep disturbance. The patient is not nervous/anxious.        Objective   /72 (BP Location: Left arm, Patient Position: Sitting, Cuff Size: Large)   Pulse 87   Temp 98.8 °F (37.1 °C) (Temporal)   Resp 18   Ht 5' 4\" (1.626 m)   Wt 87.1 kg (192 lb)   SpO2 97%   BMI 32.96 kg/m²      Physical Exam  Constitutional:       Appearance: He is well-developed.   HENT:      Head: Normocephalic and atraumatic.      Right Ear: External ear normal.      Left Ear: External ear normal.      Nose: Nose normal.      Mouth/Throat:      Pharynx: Oropharynx is clear. No oropharyngeal exudate.   Eyes:      General: No scleral icterus.     Conjunctiva/sclera: Conjunctivae normal.      Pupils: Pupils are equal, round, and reactive to light.   Cardiovascular:      Rate and Rhythm: Normal rate and regular rhythm.      Pulses: no weak pulses.           Dorsalis pedis pulses are 2+ on the right side.        Posterior tibial pulses are 2+ on the right side.      Heart sounds: Normal heart sounds.   Pulmonary:      Effort: Pulmonary effort is normal.      Breath sounds: Normal breath sounds. No wheezing or rales.   Abdominal:      General: Bowel sounds are normal.      Palpations: Abdomen is soft.      Tenderness: There is no abdominal tenderness. There is no guarding.   Musculoskeletal:         General: Normal range of motion.      Cervical " back: Normal range of motion and neck supple.   Feet:      Right foot:      Skin integrity: Dry skin present. No ulcer, skin breakdown, erythema, warmth or callus.      Left foot: amputated  Skin:     General: Skin is warm and dry.      Findings: No erythema or rash.   Neurological:      Mental Status: He is alert and oriented to person, place, and time. Mental status is at baseline.      Gait: Gait abnormal.   Psychiatric:         Mood and Affect: Mood normal.         Behavior: Behavior normal.         Thought Content: Thought content normal.         Judgment: Judgment normal.       Administrative Statements   I have spent a total time of 15 minutes in caring for this patient on the day of the visit/encounter including Prognosis.

## 2025-03-17 ENCOUNTER — OFFICE VISIT (OUTPATIENT)
Dept: FAMILY MEDICINE CLINIC | Facility: CLINIC | Age: 70
End: 2025-03-17
Payer: COMMERCIAL

## 2025-03-17 VITALS
HEART RATE: 87 BPM | RESPIRATION RATE: 18 BRPM | BODY MASS INDEX: 32.78 KG/M2 | HEIGHT: 64 IN | OXYGEN SATURATION: 97 % | DIASTOLIC BLOOD PRESSURE: 72 MMHG | TEMPERATURE: 98.8 F | SYSTOLIC BLOOD PRESSURE: 110 MMHG | WEIGHT: 192 LBS

## 2025-03-17 DIAGNOSIS — E11.52 TYPE 2 DIABETES MELLITUS WITH DIABETIC PERIPHERAL ANGIOPATHY AND GANGRENE, WITH LONG-TERM CURRENT USE OF INSULIN (HCC): ICD-10-CM

## 2025-03-17 DIAGNOSIS — G89.29 CHRONIC BACK PAIN, UNSPECIFIED BACK LOCATION, UNSPECIFIED BACK PAIN LATERALITY: ICD-10-CM

## 2025-03-17 DIAGNOSIS — E11.42 DIABETIC POLYNEUROPATHY ASSOCIATED WITH TYPE 2 DIABETES MELLITUS (HCC): ICD-10-CM

## 2025-03-17 DIAGNOSIS — E11.51 TYPE 2 DIABETES MELLITUS WITH DIABETIC PERIPHERAL ANGIOPATHY WITHOUT GANGRENE, WITH LONG-TERM CURRENT USE OF INSULIN (HCC): ICD-10-CM

## 2025-03-17 DIAGNOSIS — E11.622 DIABETIC ULCER OF LEFT LOWER LEG (HCC): ICD-10-CM

## 2025-03-17 DIAGNOSIS — I74.3 EMBOLISM AND THROMBOSIS OF ARTERIES OF THE LOWER EXTREMITIES (HCC): ICD-10-CM

## 2025-03-17 DIAGNOSIS — Z79.4 TYPE 2 DIABETES MELLITUS WITH DIABETIC PERIPHERAL ANGIOPATHY AND GANGRENE, WITH LONG-TERM CURRENT USE OF INSULIN (HCC): ICD-10-CM

## 2025-03-17 DIAGNOSIS — Z89.512 S/P BKA (BELOW KNEE AMPUTATION), LEFT (HCC): ICD-10-CM

## 2025-03-17 DIAGNOSIS — K21.9 GASTROESOPHAGEAL REFLUX DISEASE WITHOUT ESOPHAGITIS: ICD-10-CM

## 2025-03-17 DIAGNOSIS — M54.9 CHRONIC BACK PAIN, UNSPECIFIED BACK LOCATION, UNSPECIFIED BACK PAIN LATERALITY: ICD-10-CM

## 2025-03-17 DIAGNOSIS — Z79.4 TYPE 2 DIABETES MELLITUS WITH DIABETIC PERIPHERAL ANGIOPATHY WITHOUT GANGRENE, WITH LONG-TERM CURRENT USE OF INSULIN (HCC): ICD-10-CM

## 2025-03-17 DIAGNOSIS — G47.00 INSOMNIA, UNSPECIFIED TYPE: ICD-10-CM

## 2025-03-17 DIAGNOSIS — F11.20 CONTINUOUS OPIOID DEPENDENCE (HCC): ICD-10-CM

## 2025-03-17 DIAGNOSIS — L97.929 DIABETIC ULCER OF LEFT LOWER LEG (HCC): ICD-10-CM

## 2025-03-17 DIAGNOSIS — I73.9 PVD (PERIPHERAL VASCULAR DISEASE) (HCC): ICD-10-CM

## 2025-03-17 DIAGNOSIS — I10 ESSENTIAL HYPERTENSION: ICD-10-CM

## 2025-03-17 DIAGNOSIS — E78.2 MIXED HYPERLIPIDEMIA: ICD-10-CM

## 2025-03-17 DIAGNOSIS — I48.91 ATRIAL FIBRILLATION, UNSPECIFIED TYPE (HCC): Primary | ICD-10-CM

## 2025-03-17 PROCEDURE — 99214 OFFICE O/P EST MOD 30 MIN: CPT | Performed by: FAMILY MEDICINE

## 2025-03-17 PROCEDURE — G2211 COMPLEX E/M VISIT ADD ON: HCPCS | Performed by: FAMILY MEDICINE

## 2025-03-17 RX ORDER — OXYCODONE AND ACETAMINOPHEN 5; 325 MG/1; MG/1
1 TABLET ORAL EVERY 6 HOURS PRN
Qty: 120 TABLET | Refills: 0 | Status: SHIPPED | OUTPATIENT
Start: 2025-03-17 | End: 2025-03-27 | Stop reason: SDUPTHER

## 2025-03-17 RX ORDER — METOPROLOL TARTRATE 25 MG/1
25 TABLET, FILM COATED ORAL 2 TIMES DAILY
COMMUNITY
Start: 2025-02-14 | End: 2026-02-14

## 2025-03-17 RX ORDER — NITROGLYCERIN 0.4 MG/1
1 TABLET SUBLINGUAL
COMMUNITY
Start: 2024-11-21 | End: 2025-11-21

## 2025-03-17 RX ORDER — ASPIRIN 81 MG/1
81 TABLET, CHEWABLE ORAL DAILY
COMMUNITY
Start: 2025-02-13 | End: 2026-02-13

## 2025-03-17 RX ORDER — ISOSORBIDE MONONITRATE 30 MG/1
30 TABLET, EXTENDED RELEASE ORAL DAILY
COMMUNITY
Start: 2025-02-13 | End: 2026-02-13

## 2025-03-25 ENCOUNTER — TELEPHONE (OUTPATIENT)
Age: 70
End: 2025-03-25

## 2025-03-25 NOTE — TELEPHONE ENCOUNTER
Referral [facility]   Patient needs prosthetic supplies.  Appointment scheduled for 5/8/25 at 1:45 (VV)  Waitlisted high priority.

## 2025-03-26 ENCOUNTER — TELEMEDICINE (OUTPATIENT)
Age: 70
End: 2025-03-26
Payer: COMMERCIAL

## 2025-03-26 ENCOUNTER — TELEPHONE (OUTPATIENT)
Age: 70
End: 2025-03-26

## 2025-03-26 DIAGNOSIS — Z89.512 S/P BKA (BELOW KNEE AMPUTATION), LEFT (HCC): Primary | ICD-10-CM

## 2025-03-26 PROCEDURE — 99205 OFFICE O/P NEW HI 60 MIN: CPT | Performed by: INTERNAL MEDICINE

## 2025-03-26 NOTE — TELEPHONE ENCOUNTER
Spoke with [facility] if they had a patient that needed an urgent appointment  Scheduled patient for virtual visit with Dr Rodriguez today at 1:45pm Patient is at [facility] now and will stay there until the virtual visit.

## 2025-03-26 NOTE — ASSESSMENT & PLAN NOTE
Patient currently in K3 pin lock prosthetic. His socket is ill fitting due to change in residual limb size and shape due to weight changes. Currently using 10ply + socks. Has bruising at the distal tibia as well as the proximal fibula. Patient requiring a new socket and prosthetic liners as well as socks.  Prescription provided to [employer]   Projected K level: K3  Changes were discussed with prosthetic company   Patient to follow up in 6 mths or soon as needed   Orders:    Prosthetic    Prosthetic

## 2025-03-26 NOTE — PATIENT INSTRUCTIONS
Patient currently in K3 pin lock prosthetic. His socket is ill fitting due to change in residual limb size. Currently using 10ply + socks. Has bruising at the distal tibia as well as the proximal fibula. Patient requiring a new socket and prosthetic liners as well as socks.  Prescription provided to [employer] [facility]   Changes were discussed with prosthetic company
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
## 2025-03-27 DIAGNOSIS — M54.9 CHRONIC BACK PAIN, UNSPECIFIED BACK LOCATION, UNSPECIFIED BACK PAIN LATERALITY: ICD-10-CM

## 2025-03-27 DIAGNOSIS — G89.29 CHRONIC BACK PAIN, UNSPECIFIED BACK LOCATION, UNSPECIFIED BACK PAIN LATERALITY: ICD-10-CM

## 2025-03-27 RX ORDER — OXYCODONE AND ACETAMINOPHEN 5; 325 MG/1; MG/1
1 TABLET ORAL EVERY 6 HOURS PRN
Qty: 120 TABLET | Refills: 0 | Status: SHIPPED | OUTPATIENT
Start: 2025-03-27

## 2025-04-04 ENCOUNTER — TELEPHONE (OUTPATIENT)
Age: 70
End: 2025-04-04

## 2025-04-04 NOTE — ASSESSMENT & PLAN NOTE
Chief Complaint   Patient presents with    Blood Draw     Labs drawn with piv start.  VS taken.     Labs drawn with PIV start by rn.  Pt tolerated well.  VS taken.      Trina Hdz RN     Continue home regimen Xarelto

## 2025-04-17 ENCOUNTER — OFFICE VISIT (OUTPATIENT)
Dept: OBGYN CLINIC | Facility: CLINIC | Age: 70
End: 2025-04-17
Payer: COMMERCIAL

## 2025-04-17 VITALS — HEIGHT: 64 IN | BODY MASS INDEX: 33.89 KG/M2 | WEIGHT: 198.5 LBS

## 2025-04-17 DIAGNOSIS — S46.012A TRAUMATIC TEAR OF LEFT ROTATOR CUFF, UNSPECIFIED TEAR EXTENT, INITIAL ENCOUNTER: Primary | ICD-10-CM

## 2025-04-17 DIAGNOSIS — M25.512 CHRONIC LEFT SHOULDER PAIN: ICD-10-CM

## 2025-04-17 DIAGNOSIS — G89.29 CHRONIC LEFT SHOULDER PAIN: ICD-10-CM

## 2025-04-17 DIAGNOSIS — E11.9 TYPE 2 DIABETES MELLITUS WITHOUT COMPLICATION, UNSPECIFIED WHETHER LONG TERM INSULIN USE (HCC): ICD-10-CM

## 2025-04-17 PROCEDURE — 20610 DRAIN/INJ JOINT/BURSA W/O US: CPT | Performed by: ORTHOPAEDIC SURGERY

## 2025-04-17 PROCEDURE — 99214 OFFICE O/P EST MOD 30 MIN: CPT | Performed by: ORTHOPAEDIC SURGERY

## 2025-04-17 RX ORDER — TRIAMCINOLONE ACETONIDE 40 MG/ML
20 INJECTION, SUSPENSION INTRA-ARTICULAR; INTRAMUSCULAR
Status: COMPLETED | OUTPATIENT
Start: 2025-04-17 | End: 2025-04-17

## 2025-04-17 RX ORDER — LIDOCAINE HYDROCHLORIDE 10 MG/ML
4 INJECTION, SOLUTION INFILTRATION; PERINEURAL
Status: COMPLETED | OUTPATIENT
Start: 2025-04-17 | End: 2025-04-17

## 2025-04-17 RX ADMIN — TRIAMCINOLONE ACETONIDE 20 MG: 40 INJECTION, SUSPENSION INTRA-ARTICULAR; INTRAMUSCULAR at 10:45

## 2025-04-17 RX ADMIN — LIDOCAINE HYDROCHLORIDE 4 ML: 10 INJECTION, SOLUTION INFILTRATION; PERINEURAL at 10:45

## 2025-04-17 NOTE — PROGRESS NOTES
ORTHO CARE [facility] [address] [address] [phone]       Kan Juan  [id number]  1955    ORTHOPAEDIC SURGERY OUTPATIENT NOTE  4/17/2025      HISTORY:  69 y.o. male  ***      Past Medical History:   Diagnosis Date    Arthritis     fingers    Atrial fibrillation (HCC)     Diabetes mellitus (HCC)     First degree AV block     Full dentures     GERD (gastroesophageal reflux disease)     Hyperlipidemia     Hypertension     PAD (peripheral artery disease) ([facility])     PVD (peripheral vascular disease) ([facility])     Type 2 diabetes mellitus with diabetic peripheral angiopathy without gangrene ([facility]) 05/18/2021    Per CMS ICD 10 guidelines--Per Physician    Wound, open     right foot- by the 5th toe       Past Surgical History:   Procedure Laterality Date    CHOLECYSTECTOMY      COLONOSCOPY      INCISION AND DRAINAGE OF WOUND Left 11/29/2023    Procedure: INCISION AND DRAINAGE (I&D) LEFT KNEE PRE-PATELLA BURSA;  Surgeon: Daryl Shay DO;  Location: [facility];  Service: Orthopedics    IR AORTAGRAM WITH RUN-OFF  1/28/2021    IR AORTAGRAM WITH RUN-OFF  4/13/2021    LEG AMPUTATION THROUGH LOWER TIBIA AND FIBULA Left 7/17/2021    Procedure: AMPUTATION BELOW KNEE (BKA);  Surgeon: Jose Mary MD;  [facility]: [facility];  Service: Vascular    NC BYP FEM-ANT TIBL PST TIBL PRONEAL ART/OTH DSTL Left 7/14/2021    Procedure: BYPASS femoral-peroneal artery bypass with  reverse GSV and balloon angioplasty peroneal artery;  Surgeon: Jose Mary MD;  [facility]: [facility];  Service: Vascular    NC DEBRIDEMENT BONE 1ST 20 SQ CM/< Right 12/18/2020    Procedure: DEBRIDMENT OF ULCER , REMOVAL OF DEVITALIZED SKIN, SOFT TISSUE, BONE AND APPLICATION OF INTEGRA GRAFT RIGHT FOOT.;  Surgeon: Daquan Ellis DPM;  Location: [facility];  Service: Podiatry    REPLANTATION THUMB Right     right tip reconnected    SKIN GRAFT      right thumb    TOE AMPUTATION       left foot all 5 toes removed    TOE AMPUTATION Right 2/2/2021    Procedure: Right partial 5th ray amputation;  Surgeon: Gabriel Garcia DPM;  Location:  MAIN OR;  Service: Podiatry    TOE OSTEOTOMY Right 6/26/2020    Procedure: exostosis of right big toe;  Surgeon: Trey Shirley DPM;  [facility]: [facility];  Service: Podiatry    TRIGGER FINGER RELEASE      bilateral hands    VEIN LIGATION      right       Social History     Socioeconomic History    Marital status: /Civil Union     Spouse name: Angeles    Number of children: 1    Years of education: 12    Highest education level: High school graduate   Occupational History    Occupation: [employer]   Tobacco Use    Smoking status: Never     Passive exposure: Never    Smokeless tobacco: Never   Vaping Use    Vaping status: Never Used   Substance and Sexual Activity    Alcohol use: Not Currently     Comment: occasional    Drug use: Never    Sexual activity: Yes     Partners: Female   Other Topics Concern    Not on file   Social History Narrative         Social Drivers of Health     Financial Resource Strain: Not At Risk (2/11/2025)    Received from [facility]    Financial Insecurity     In the last 12 months did you skip medications to save money?: No     In the last 12 months was there a time when you needed to see a doctor but could not because of cost?: No   Food Insecurity: No Food Insecurity (2/11/2025)    Received from [facility]    Food Insecurity     In the last 12 months did you ever eat less than you felt you should because there wasn't enough money for food?: No   Transportation Needs: No Transportation Needs (2/11/2025)    Received from [facility]    Transportation Needs     In the last 12 months have you ever had to go without healthcare because you didn't have a way to get there?: No   Physical Activity: Not on file   Stress: Not on file   Social Connections: Socially Integrated (2/11/2025)    Received  from [facility]    Social Connection     Do you often feel lonely?: No   Intimate Partner Violence: Not on file   Housing Stability: Not At Risk (2/11/2025)    Received from [facility]    Housing Stability     Are you worried that in the next 2 months you may not have stable housing?: No       Family History   Problem Relation Age of Onset    Arthritis Mother     Pancreatic cancer Mother     Cancer Father         colon    Alzheimer's disease Father         Patient's Medications   New Prescriptions    No medications on file   Previous Medications    ACCU-CHEK SOFTCLIX LANCETS LANCETS    CHECK BLOOD GLUCOSE FOUR TIMES DAILY AS DIRECTED    ALCOHOL SWABS (DROPSAFE ALCOHOL PREP) 70 % PADS    USE AS DIRECTED FOUR TIMES DAILY    ASPIRIN 81 MG CHEWABLE TABLET    Chew 81 mg daily    ATORVASTATIN (LIPITOR) 40 MG TABLET    Take 1 tablet (40 mg total) by mouth daily at bedtime    BETAMETHASONE DIPROPIONATE (DIPROSONE) 0.05 % CREAM    Apply topically 2 (two) times a day    BLOOD GLUCOSE CALIBRATION (ACCU-CHEK GUIDE CONTROL) LIQD    Test daily as needed (abnormal sugar reading)    BLOOD GLUCOSE MONITORING SUPPL (ACCU-CHEK GUIDE) W/DEVICE KIT    Use 4 (four) times a day    BLOOD GLUCOSE MONITORING SUPPL (ACCU-CHEK GUIDE) W/DEVICE KIT    Use 4 (four) times a day Dx E11.9    CLOPIDOGREL (PLAVIX) 75 MG TABLET    Take 1 tablet (75 mg total) by mouth daily    CYCLOBENZAPRINE (FLEXERIL) 5 MG TABLET    Take 1 tablet (5 mg total) by mouth 3 (three) times a day as needed for muscle spasms for up to 10 days    GLUCOSE BLOOD (ACCU-CHEK GUIDE) TEST STRIP    CHECK BLOOD GLUCOSE FOUR TIMES DAILY AS DIRECTED    INSULIN GLARGINE SOLOSTAR (LANTUS SOLOSTAR) 100 UNIT/ML SOPN    Inject 50 Units total under the skin daily at bedtime    INSULIN LISPRO (HUMALOG KWIKPEN) 100 UNITS/ML INJECTION PEN    Inject 25 Units under the skin 3 (three) times a day with meals    INSULIN PEN NEEDLE (B-D UF III MINI PEN NEEDLES) 31G  "X 5 MM MISC    Use 4 (four) times a day    ISOSORBIDE MONONITRATE (IMDUR) 30 MG 24 HR TABLET    Take 30 mg by mouth daily    LISINOPRIL (ZESTRIL) 10 MG TABLET    TAKE ONE TABLET BY MOUTH ONCE DAILY    METOPROLOL TARTRATE (LOPRESSOR) 25 MG TABLET    Take 25 mg by mouth 2 (two) times a day    NITROGLYCERIN (NITROSTAT) 0.4 MG SL TABLET    Place 1 tablet under the tongue every 5 (five) minutes as needed    OXYCODONE-ACETAMINOPHEN (PERCOCET) 5-325 MG PER TABLET    Take 1 tablet by mouth every 6 (six) hours as needed for moderate pain Max Daily Amount: 4 tablets    PANTOPRAZOLE (PROTONIX) 40 MG TABLET    Take 1 tablet (40 mg total) by mouth daily    PIOGLITAZONE (ACTOS) 45 MG TABLET    Take 1 tablet (45 mg total) by mouth daily    RIVAROXABAN (XARELTO) 2.5 MG TABLET    Take 1 tablet (2.5 mg total) by mouth daily with breakfast    TIRZEPATIDE 2.5 MG/0.5ML SOAJ    Inject 2.5 mg under the skin once a week    ULTICARE INSULIN SYRINGE 31G X 5/16\" 1 ML MISC    Inject under the skin as needed (for injection)   Modified Medications    No medications on file   Discontinued Medications    No medications on file       Allergies   Allergen Reactions    Shellfish-Derived Products - Food Allergy Anaphylaxis     Throat closes    Fentanyl Nausea Only     Immediate response of nausea after 25mg IV fentanyl    Sulfamethoxazole-Trimethoprim Rash        There were no vitals taken for this visit.     REVIEW OF SYSTEMS:  Constitutional: Negative.    HEENT: Negative.    Respiratory: Negative.    Skin: Negative.    Neurological: Negative.    Psychiatric/Behavioral: Negative.  Musculoskeletal: Negative except for that mentioned in the HPI.    PHYSICAL EXAM:     LEFT SHOULDER:     NVI axillary/medial/radial/ulnar and sensory intact     Forward flexion:   180 degrees   Abduction:  180 degrees   External rotation at 90 degrees abduction:   90 degrees   Internal rotation at 90 degrees abduction:  90 degrees   External rotation at 0 degrees:   70 " degrees   Internal rotation: T7     STRENGTH:  Forward flexion:  5/5   Abduction:  5/5   External rotation:  5/5   Internal rotation:  5/5        Speed test: Negative  Yergason's: Negative   Tender to palpation ACJ (acromioclavicular joint): Negative   Tender to palpation LHB (long head of biceps): Negative  Riojas test: Negative  Freeborn test: Negative  Hornblower's: Negative  Lift off: Negative  Belly press: Negative  Bear hug: Negative  External lag sign: Negative  Cross-body adduction: Negative  Sulcus sign: Negative  Prashant's test: Negative  Drop arm test: negative     RIGHT SHOULDER:     NVI axillary/medial/radial/ulnar and sensory intact     Forward flexion:   180 degrees   Abduction:  180 degrees   External rotation at 90 degrees abduction:  90 degrees   Internal rotation at 90 degrees abduction:   90 degrees   External rotation at 0 degrees:  70 degrees   Internal rotation: T7      STRENGTH:  Forward flexion:  5/5   Abduction:  5/5   External rotation:  5/5   Internal rotation:  5/5     Speed test: Negative  Yergason's: Negative   Tender to palpation ACJ: Negative   Tender to palpation LHB: Negative  Riojas test: Negative  Freeborn's: Negative  Hornblower's: Negative  Lift off: Negative  Belly press: Negative  Bear hug: Negative  External lag sign: Negative  Cross-body adduction: Negative  Sulcus sign: Negative  Prashant's test: Negative  Drop arm test: Negative     Reflexes:  Brachioradialis:  Symmetric bilaterally  Triceps: Symmetric bilaterally  Biceps: Symmetric bilaterally  Patella tendon: Symmetric bilaterally  Achilles tendon: Symmetric bilaterally  Babinski's: Negative  Yenny sign: Negative     No scapular winging or dyskinesis     Capillary refill brisk   Full ROM of elbows bilaterally      Skin:  No ecchymosis/abrasion/erythema/warmth     Cervical spine:   Full rotation, side bending, flexion and extension without radiating pain  Spurling's: Negative    IMAGING:  ***    {There are no diagnoses  linked to this encounter. (Refresh or delete this SmartLink)}      ASSESSMENT AND PLAN: 69 y.o. male  ***

## 2025-04-17 NOTE — PROGRESS NOTES
[facility] [address] [address] [phone]       Kan Juan  [id number]  1955    ORTHOPAEDIC SURGERY OUTPATIENT NOTE  4/17/2025    1. Traumatic tear of left rotator cuff, unspecified tear extent, initial encounter  -     Large joint arthrocentesis: L subacromial bursa  2. Type 2 diabetes mellitus without complication, unspecified whether long term insulin use (HCC)  -     Large joint arthrocentesis: L subacromial bursa  3. Chronic left shoulder pain  -     Large joint arthrocentesis: L subacromial bursa    ASSESSMENT AND PLAN:  69 y.o. male with complete supraspinatus tendon tear.  Nonoperative measures of treatment are indicated at this time given his recent cardiac catheterization (February 11, 2025) and diabetes.  His recent A1c was 7.7.  I did note that this is still elevated.  I did explain that this is a complicated issue given that I do not think rotator cuff does have a significant tear.  However, as per his cardiologist he is unable to really pursue surgical intervention probably for 6 months to 1 year potentially.  In regards to the pain we did discuss risks and benefits of steroid injection.  I did note that this will elevate his blood sugar and thus elevated his A1c potentially.  I did note that he would also be contraindicated for surgical intervention should he be able to go earlier for at least 3 months due to increased risk of postoperative infection.  I did also note that the injection can weaken the tendon complicating potential repair in the future if he is cleared from cardiology.  He verbalized understanding of the risks and consented to a left shoulder subacromial steroid injection.  He tolerated the injection well without complication.  Postinjection instructions were provided.  If he fails have symptomatic relief with steroid injection.  We will consider referral to pain management.   Otherwise, I will see him back on an as-needed basis.          HISTORY:  69 y.o. male presented to the office for follow-up valuation of his left shoulder.  He does have a known history of a traumatic rotator cuff tear.  He has had multiple issues with regards to his health recently.  He did have recent cardiac catheterization with [facility] on February 11, 2025.  He states that he is unable to undergo surgical intervention for his shoulder for 6 to 12 months.  He is currently on aspirin and Xarelto for anticoagulation.  He is also a diabetic with his most recent A1c of 7.7.  This as as of 1/4/2025.  He states that his pain is severe and unrelenting and notes that he has significant difficulty with reaching overhead.  He notes that he is taking oxycodone to help with his pain with regards to his shoulder as well as the phantom pain of his left lower leg secondary to below the knee amputation.  He notes that this does not help provide him with symptomatic relief.  Pain is typically better while at rest.  Today he denies any distal paresthesias of the left upper extremity.  He does proceed to discuss potential steroid injection of the shoulder today.    Past Medical History:   Diagnosis Date    Arthritis     fingers    Atrial fibrillation (HCC)     Diabetes mellitus ([facility])     First degree AV block     Full dentures     GERD (gastroesophageal reflux disease)     Hyperlipidemia     Hypertension     PAD (peripheral artery disease) ([facility])     PVD (peripheral vascular disease) ([facility])     Type 2 diabetes mellitus with diabetic peripheral angiopathy without gangrene ([facility]) 05/18/2021    Per CMS ICD 10 guidelines--Per Physician    Wound, open     right foot- by the 5th toe       Past Surgical History:   Procedure Laterality Date    CHOLECYSTECTOMY      COLONOSCOPY      INCISION AND DRAINAGE OF WOUND Left 11/29/2023    Procedure: INCISION AND DRAINAGE (I&D) LEFT KNEE PRE-PATELLA BURSA;  Surgeon: Daryl Shay DO;   [facility];  Service: Orthopedics    IR AORTAGRAM WITH RUN-OFF  1/28/2021    IR AORTAGRAM WITH RUN-OFF  4/13/2021    LEG AMPUTATION THROUGH LOWER TIBIA AND FIBULA Left 7/17/2021    Procedure: AMPUTATION BELOW KNEE (BKA);  Surgeon: Jose Mary MD;  [facility];  Service: Vascular    NM BYP FEM-ANT TIBL PST TIBL PRONEAL ART/OTH DSTL Left 7/14/2021    Procedure: BYPASS femoral-peroneal artery bypass with  reverse GSV and balloon angioplasty peroneal artery;  Surgeon: Jose Mary MD;  [facility];  Service: Vascular    NM DEBRIDEMENT BONE 1ST 20 SQ CM/< Right 12/18/2020    Procedure: DEBRIDMENT OF ULCER , REMOVAL OF DEVITALIZED SKIN, SOFT TISSUE, BONE AND APPLICATION OF INTEGRA GRAFT RIGHT FOOT.;  Surgeon: Daquan Ellis DPM;  [facility];  Service: Podiatry    REPLANTATION THUMB Right     right tip reconnected    SKIN GRAFT      right thumb    TOE AMPUTATION      left foot all 5 toes removed    TOE AMPUTATION Right 2/2/2021    Procedure: Right partial 5th ray amputation;  Surgeon: Gabriel Garcia DPM;  [facility];  Service: Podiatry    TOE OSTEOTOMY Right 6/26/2020    Procedure: exostosis of right big toe;  Surgeon: Trey Shirley DPM;  [facility];  Service: Podiatry    TRIGGER FINGER RELEASE      bilateral hands    VEIN LIGATION      right       Social History     Socioeconomic History    Marital status: /Civil Union     Spouse name: Angeles    Number of children: 1    Years of education: 12    Highest education level: High school graduate   Occupational History    Occupation: [id number]   Tobacco Use    Smoking status: Never     Passive exposure: Never    Smokeless tobacco: Never   Vaping Use    Vaping status: Never Used   Substance and Sexual Activity    Alcohol use: Not Currently     Comment: occasional    Drug use: Never    Sexual activity: Yes     Partners: Female   Other Topics Concern    Not on file   Social History Narrative         Social Drivers of  Health     Financial Resource Strain: Not At Risk (2/11/2025)    Received from [facility]    Financial Insecurity     In the last 12 months did you skip medications to save money?: No     In the last 12 months was there a time when you needed to see a doctor but could not because of cost?: No   Food Insecurity: No Food Insecurity (2/11/2025)    Received from [facility]    Food Insecurity     In the last 12 months did you ever eat less than you felt you should because there wasn't enough money for food?: No   Transportation Needs: No Transportation Needs (2/11/2025)    Received from [facility]    Transportation Needs     In the last 12 months have you ever had to go without healthcare because you didn't have a way to get there?: No   Physical Activity: Not on file   Stress: Not on file   Social Connections: Socially Integrated (2/11/2025)    Received from [facility]    Social Connection     Do you often feel lonely?: No   Intimate Partner Violence: Not on file   Housing Stability: Not At Risk (2/11/2025)    Received from [facility]    Housing Stability     Are you worried that in the next 2 months you may not have stable housing?: No       Family History   Problem Relation Age of Onset    Arthritis Mother     Pancreatic cancer Mother     Cancer Father         colon    Alzheimer's disease Father         Patient's Medications   New Prescriptions    No medications on file   Previous Medications    ACCU-CHEK SOFTCLIX LANCETS LANCETS    CHECK BLOOD GLUCOSE FOUR TIMES DAILY AS DIRECTED    ALCOHOL SWABS (DROPSAFE ALCOHOL PREP) 70 % PADS    USE AS DIRECTED FOUR TIMES DAILY    ASPIRIN 81 MG CHEWABLE TABLET    Chew 81 mg daily    ATORVASTATIN (LIPITOR) 40 MG TABLET    Take 1 tablet (40 mg total) by mouth daily at bedtime    BETAMETHASONE DIPROPIONATE (DIPROSONE) 0.05 % CREAM    Apply topically 2 (two) times a day    BLOOD GLUCOSE  "CALIBRATION (ACCU-CHEK GUIDE CONTROL) LIQD    Test daily as needed (abnormal sugar reading)    BLOOD GLUCOSE MONITORING SUPPL (ACCU-CHEK GUIDE) W/DEVICE KIT    Use 4 (four) times a day    BLOOD GLUCOSE MONITORING SUPPL (ACCU-CHEK GUIDE) W/DEVICE KIT    Use 4 (four) times a day Dx E11.9    CLOPIDOGREL (PLAVIX) 75 MG TABLET    Take 1 tablet (75 mg total) by mouth daily    CYCLOBENZAPRINE (FLEXERIL) 5 MG TABLET    Take 1 tablet (5 mg total) by mouth 3 (three) times a day as needed for muscle spasms for up to 10 days    GLUCOSE BLOOD (ACCU-CHEK GUIDE) TEST STRIP    CHECK BLOOD GLUCOSE FOUR TIMES DAILY AS DIRECTED    INSULIN GLARGINE SOLOSTAR (LANTUS SOLOSTAR) 100 UNIT/ML SOPN    Inject 50 Units total under the skin daily at bedtime    INSULIN LISPRO (HUMALOG KWIKPEN) 100 UNITS/ML INJECTION PEN    Inject 25 Units under the skin 3 (three) times a day with meals    INSULIN PEN NEEDLE (B-D UF III MINI PEN NEEDLES) 31G X 5 MM MISC    Use 4 (four) times a day    ISOSORBIDE MONONITRATE (IMDUR) 30 MG 24 HR TABLET    Take 30 mg by mouth daily    LISINOPRIL (ZESTRIL) 10 MG TABLET    TAKE ONE TABLET BY MOUTH ONCE DAILY    METOPROLOL TARTRATE (LOPRESSOR) 25 MG TABLET    Take 25 mg by mouth 2 (two) times a day    NITROGLYCERIN (NITROSTAT) 0.4 MG SL TABLET    Place 1 tablet under the tongue every 5 (five) minutes as needed    OXYCODONE-ACETAMINOPHEN (PERCOCET) 5-325 MG PER TABLET    Take 1 tablet by mouth every 6 (six) hours as needed for moderate pain Max Daily Amount: 4 tablets    PANTOPRAZOLE (PROTONIX) 40 MG TABLET    Take 1 tablet (40 mg total) by mouth daily    PIOGLITAZONE (ACTOS) 45 MG TABLET    Take 1 tablet (45 mg total) by mouth daily    RIVAROXABAN (XARELTO) 2.5 MG TABLET    Take 1 tablet (2.5 mg total) by mouth daily with breakfast    TIRZEPATIDE 2.5 MG/0.5ML SOAJ    Inject 2.5 mg under the skin once a week    ULTICARE INSULIN SYRINGE 31G X 5/16\" 1 ML MISC    Inject under the skin as needed (for injection)   Modified " "Medications    No medications on file   Discontinued Medications    No medications on file       Allergies   Allergen Reactions    Shellfish-Derived Products - Food Allergy Anaphylaxis     Throat closes    Fentanyl Nausea Only     Immediate response of nausea after 25mg IV fentanyl    Sulfamethoxazole-Trimethoprim Rash        Ht 5' 4\" (1.626 m)   Wt 90 kg (198 lb 8 oz)   BMI 34.07 kg/m²      REVIEW OF SYSTEMS:  Constitutional: Negative.    HEENT: Negative.    Respiratory: Negative.    Skin: Negative.    Neurological: Negative.    Psychiatric/Behavioral: Negative.  Musculoskeletal: Negative except for that mentioned in the HPI.    PHYSICAL EXAM:      Left Shoulder:     Appearance: Symmetrical with no gross deformity or overlying skin changes     Forward flexion:   120 degrees   Abduction:  180 degrees   External rotation at 90 degrees abduction:   30 degrees   Internal rotation at 90 degrees abduction:  50 degrees   External rotation at 0 degrees:   50 degrees   Internal rotation: Back pocket     STRENGTH:  Forward flexion:  4/5   Abduction:  4/5   External rotation:  4/5   Internal rotation:  4/5     Speed test: +  Yergason's: Negative   Tender to palpation ACJ (acromioclavicular joint): Negative   Tender to palpation LHB (long head of biceps): Negative  Riojas test: Negative  Valencia test: Negative  Hornblower's: Negative  Lift off: +  Belly press: +  Bear hug: Negative  External lag sign: Negative  Cross-body adduction: +  Sulcus sign: Negative  Prashant's test: Negative  Drop arm test: negative     Radial/median/ulnar nerve intact     <2 sec cap refill    IMAGING: Images were reviewed in PACS by myself and demonstrate findings consistent with radiology read that is notable for: MRI left shoulder 9/28/2024    Complete supraspinatus tendon tear with retraction to the level of the humeral head apex.  No significant muscle atrophy  Mild infraspinatus and subscapularis tendinosis without obvious tear  Poor " visualization of biceps long head tendon approximately concerning for high-grade partial tear versus severe tendinosis.  Mild humeral joint and moderate acromioclavicular joint osteoarthritis.    Large joint arthrocentesis: L subacromial bursa  [place] Protocol:  procedure performed by consultantConsent: Verbal consent obtained.  Risks and benefits: risks, benefits and alternatives were discussed  Consent given by: patient  Timeout called at: 4/17/2025 11:20 AM.  Patient understanding: patient states understanding of the procedure being performed  Site marked: the operative site was marked  Radiology Images displayed and confirmed. If images not available, report reviewed: imaging studies available  Patient identity confirmed: verbally with patient  Supporting Documentation  Indications: pain   Procedure Details  Location: shoulder - L subacromial bursa  Needle size: 22 G  Approach: posterolateral  Medications administered: 4 mL lidocaine 1 %; 20 mg triamcinolone acetonide 40 mg/mL    Patient tolerance: patient tolerated the procedure well with no immediate complications  Dressing:  Sterile dressing applied            Scribe Attestation      I,:  Dar Esquivel am acting as a scribe while in the presence of the attending physician.:       I,:  Yolanda Bonilla DO personally performed the services described in this documentation    as scribed in my presence.:

## 2025-04-22 DIAGNOSIS — G89.29 CHRONIC BACK PAIN, UNSPECIFIED BACK LOCATION, UNSPECIFIED BACK PAIN LATERALITY: ICD-10-CM

## 2025-04-22 DIAGNOSIS — M54.9 CHRONIC BACK PAIN, UNSPECIFIED BACK LOCATION, UNSPECIFIED BACK PAIN LATERALITY: ICD-10-CM

## 2025-04-22 RX ORDER — OXYCODONE AND ACETAMINOPHEN 5; 325 MG/1; MG/1
1 TABLET ORAL EVERY 6 HOURS PRN
Qty: 120 TABLET | Refills: 0 | Status: SHIPPED | OUTPATIENT
Start: 2025-04-22

## 2025-04-22 NOTE — TELEPHONE ENCOUNTER
Reason for call:   [x] Refill   [] Prior Auth  [] Other:     Office:   [x] PCP/Provider - Macario Goyal   [] Specialty/Provider -     Medication: oxyCODONE-acetaminophen (Percocet) 5-325 mg per tablet     Dose/Frequency: Take 1 tablet by mouth every 6 (six) hours as needed for moderate pain Max Daily Amount: 4 tablets     Quantity: 120    Pharmacy: [id number] - [facility] [place]     Local Pharmacy   Does the patient have enough for 3 days?   [] Yes   [x] No - Send as HP to POD

## 2025-04-30 ENCOUNTER — TELEPHONE (OUTPATIENT)
Age: 70
End: 2025-04-30

## 2025-05-07 ENCOUNTER — APPOINTMENT (OUTPATIENT)
Dept: LAB | Facility: MEDICAL CENTER | Age: 70
End: 2025-05-07
Payer: COMMERCIAL

## 2025-05-07 DIAGNOSIS — E11.51 TYPE 2 DIABETES MELLITUS WITH DIABETIC PERIPHERAL ANGIOPATHY WITHOUT GANGRENE, WITH LONG-TERM CURRENT USE OF INSULIN (HCC): ICD-10-CM

## 2025-05-07 DIAGNOSIS — Z79.4 TYPE 2 DIABETES MELLITUS WITH DIABETIC PERIPHERAL ANGIOPATHY WITHOUT GANGRENE, WITH LONG-TERM CURRENT USE OF INSULIN (HCC): ICD-10-CM

## 2025-05-07 LAB
ALBUMIN SERPL BCG-MCNC: 4.1 G/DL (ref 3.5–5)
ALP SERPL-CCNC: 75 U/L (ref 34–104)
ALT SERPL W P-5'-P-CCNC: 34 U/L (ref 7–52)
ANION GAP SERPL CALCULATED.3IONS-SCNC: 8 MMOL/L (ref 4–13)
AST SERPL W P-5'-P-CCNC: 18 U/L (ref 13–39)
BILIRUB SERPL-MCNC: 0.43 MG/DL (ref 0.2–1)
BUN SERPL-MCNC: 20 MG/DL (ref 5–25)
CALCIUM SERPL-MCNC: 9.2 MG/DL (ref 8.4–10.2)
CHLORIDE SERPL-SCNC: 99 MMOL/L (ref 96–108)
CO2 SERPL-SCNC: 30 MMOL/L (ref 21–32)
CREAT SERPL-MCNC: 0.94 MG/DL (ref 0.6–1.3)
EST. AVERAGE GLUCOSE BLD GHB EST-MCNC: 220 MG/DL
GFR SERPL CREATININE-BSD FRML MDRD: 81 ML/MIN/1.73SQ M
GLUCOSE P FAST SERPL-MCNC: 280 MG/DL (ref 65–99)
HBA1C MFR BLD: 9.3 %
POTASSIUM SERPL-SCNC: 4.1 MMOL/L (ref 3.5–5.3)
PROT SERPL-MCNC: 7 G/DL (ref 6.4–8.4)
SODIUM SERPL-SCNC: 137 MMOL/L (ref 135–147)

## 2025-05-07 PROCEDURE — 36415 COLL VENOUS BLD VENIPUNCTURE: CPT

## 2025-05-07 PROCEDURE — 83036 HEMOGLOBIN GLYCOSYLATED A1C: CPT

## 2025-05-07 PROCEDURE — 80053 COMPREHEN METABOLIC PANEL: CPT

## 2025-05-14 DIAGNOSIS — L30.9 DERMATITIS: Primary | ICD-10-CM

## 2025-05-21 ENCOUNTER — TELEPHONE (OUTPATIENT)
Age: 70
End: 2025-05-21

## 2025-05-21 DIAGNOSIS — M54.9 CHRONIC BACK PAIN, UNSPECIFIED BACK LOCATION, UNSPECIFIED BACK PAIN LATERALITY: ICD-10-CM

## 2025-05-21 DIAGNOSIS — G89.29 CHRONIC BACK PAIN, UNSPECIFIED BACK LOCATION, UNSPECIFIED BACK PAIN LATERALITY: ICD-10-CM

## 2025-05-21 RX ORDER — OXYCODONE AND ACETAMINOPHEN 5; 325 MG/1; MG/1
1 TABLET ORAL EVERY 6 HOURS PRN
Qty: 120 TABLET | Refills: 0 | Status: SHIPPED | OUTPATIENT
Start: 2025-05-21

## 2025-05-21 NOTE — TELEPHONE ENCOUNTER
Patient called for results of blood work 05/07/25 and for refill oxyCODONE-acetaminophen (Percocet) 5-325 mg per tablet  [facility]

## 2025-05-22 NOTE — TELEPHONE ENCOUNTER
Call pt. I have refill his medicine and his A1C has increased to 9.3. We will have to discuss better sugar control at his upcoming OV.

## 2025-06-16 ENCOUNTER — RA CDI HCC (OUTPATIENT)
Dept: OTHER | Facility: HOSPITAL | Age: 70
End: 2025-06-16

## 2025-06-16 NOTE — PROGRESS NOTES
HCC coding opportunities          Chart Reviewed number of suggestions sent to Provider: 2  Z89.421  E11.65     Patients Insurance     Medicare Insurance: [employer]

## 2025-06-25 DIAGNOSIS — M54.9 CHRONIC BACK PAIN, UNSPECIFIED BACK LOCATION, UNSPECIFIED BACK PAIN LATERALITY: ICD-10-CM

## 2025-06-25 DIAGNOSIS — G89.29 CHRONIC BACK PAIN, UNSPECIFIED BACK LOCATION, UNSPECIFIED BACK PAIN LATERALITY: ICD-10-CM

## 2025-06-25 NOTE — TELEPHONE ENCOUNTER
Reason for call:   [x] Refill   [] Prior Auth  [] Other:     Office:   [x] PCP/Provider - Macario Goyal MD   [] Specialty/Provider -     Medication: oxyCODONE-acetaminophen (Percocet) 5-325 mg per tablet     Dose/Frequency: 1 tablet, Oral, Every 6 hours PRN     Quantity: 120    Pharmacy: [id number] - [place] - [id number] [facility]   Does the patient have enough for 3 days?   [] Yes   [x] No - Send as HP to POD    Mail Away Pharmacy   Does the patient have enough for 10 days?   [] Yes   [] No - Send as HP to POD

## 2025-06-26 RX ORDER — OXYCODONE AND ACETAMINOPHEN 5; 325 MG/1; MG/1
1 TABLET ORAL EVERY 6 HOURS PRN
Qty: 120 TABLET | Refills: 0 | Status: SHIPPED | OUTPATIENT
Start: 2025-06-26

## 2025-07-17 ENCOUNTER — OFFICE VISIT (OUTPATIENT)
Dept: OBGYN CLINIC | Facility: CLINIC | Age: 70
End: 2025-07-17
Payer: COMMERCIAL

## 2025-07-17 VITALS — HEIGHT: 64 IN | WEIGHT: 183 LBS | BODY MASS INDEX: 31.24 KG/M2

## 2025-07-17 DIAGNOSIS — M75.102 TEAR OF LEFT SUPRASPINATUS TENDON: Primary | ICD-10-CM

## 2025-07-17 DIAGNOSIS — E11.9 TYPE 2 DIABETES MELLITUS WITHOUT COMPLICATION, UNSPECIFIED WHETHER LONG TERM INSULIN USE (HCC): ICD-10-CM

## 2025-07-17 DIAGNOSIS — G89.29 CHRONIC LEFT SHOULDER PAIN: ICD-10-CM

## 2025-07-17 DIAGNOSIS — M25.512 CHRONIC LEFT SHOULDER PAIN: ICD-10-CM

## 2025-07-17 PROCEDURE — 99214 OFFICE O/P EST MOD 30 MIN: CPT | Performed by: ORTHOPAEDIC SURGERY

## 2025-07-17 PROCEDURE — 20610 DRAIN/INJ JOINT/BURSA W/O US: CPT | Performed by: ORTHOPAEDIC SURGERY

## 2025-07-17 RX ORDER — LIDOCAINE HYDROCHLORIDE 10 MG/ML
4 INJECTION, SOLUTION INFILTRATION; PERINEURAL
Status: COMPLETED | OUTPATIENT
Start: 2025-07-17 | End: 2025-07-17

## 2025-07-17 RX ORDER — TRIAMCINOLONE ACETONIDE 40 MG/ML
20 INJECTION, SUSPENSION INTRA-ARTICULAR; INTRAMUSCULAR
Status: COMPLETED | OUTPATIENT
Start: 2025-07-17 | End: 2025-07-17

## 2025-07-17 RX ADMIN — LIDOCAINE HYDROCHLORIDE 4 ML: 10 INJECTION, SOLUTION INFILTRATION; PERINEURAL at 09:15

## 2025-07-17 RX ADMIN — TRIAMCINOLONE ACETONIDE 20 MG: 40 INJECTION, SUSPENSION INTRA-ARTICULAR; INTRAMUSCULAR at 09:15

## 2025-07-17 NOTE — PROGRESS NOTES
[facility] [address] [id number] [phone]       Kan Juan  [id number]  1955    ORTHOPAEDIC SURGERY OUTPATIENT NOTE  7/17/2025    :  Assessment & Plan  Tear of left supraspinatus tendon  Chronic left shoulder pain    Patient has a symptomatic traumatic left supraspinatus tear. His pathology is indicated for surgical repair, however he is not cleared from a cardiac perspective and his most recent A1c is 9.3. We did discuss that he could receive a CSI to the left SA bursa today in the office, but that this will likely cause elevated glucose levels in the coming days. Please see procedure details below. Tolerated the injection well. Post injection instructions provided.    We did discuss that he requires cardiac clearance for surgery and also for his A1c to be lower than 7 to pursue RTC repair. He should return in 3 months to discuss his progress in these pursuits.    Orders:  •  Large joint arthrocentesis: L subacromial bursa    Type 2 diabetes mellitus without complication, unspecified whether long term insulin use (HCC)    Lab Results   Component Value Date    HGBA1C 9.3 (H) 05/07/2025                HISTORY:  69 y.o. male with history of left traumatic supraspinatus tear. He reports his last subacromial CSI provided ~6 weeks of relief. He would like to repeat this today. Additionally, he notes that his diabetic medications have been modified such that he is hopeful his A1c will be lower at his next check. He has an appointment with his cardiologist next week where he will discuss the possibility of clearance to pursue arthroscopic RTC repair.    Past Medical History[1]    Past Surgical History[1]    Social History     Socioeconomic History   • Marital status: /Civil Union     Spouse name: Angeles   • Number of children: 1   • Years of education: 12   • Highest education level: High school graduate    Occupational History   • Occupation: [employer]   Tobacco Use   • Smoking status: Never     Passive exposure: Never   • Smokeless tobacco: Never   Vaping Use   • Vaping status: Never Used   Substance and Sexual Activity   • Alcohol use: Not Currently     Comment: occasional   • Drug use: Never   • Sexual activity: Yes     Partners: Female   Other Topics Concern   • Not on file   Social History Narrative         Social Drivers of Health     Financial Resource Strain: Not At Risk (2/11/2025)    Received from [facility]    Financial Insecurity    • In the last 12 months did you skip medications to save money?: No    • In the last 12 months was there a time when you needed to see a doctor but could not because of cost?: No   Food Insecurity: No Food Insecurity (2/11/2025)    Received from [facility]    Food Insecurity    • In the last 12 months did you ever eat less than you felt you should because there wasn't enough money for food?: No   Transportation Needs: No Transportation Needs (2/11/2025)    Received from [facility]    Transportation Needs    • In the last 12 months have you ever had to go without healthcare because you didn't have a way to get there?: No   Physical Activity: Not on file   Stress: Not on file   Social Connections: Socially Integrated (2/11/2025)    Received from [facility]    Social Connection    • Do you often feel lonely?: No   Intimate Partner Violence: Not on file   Housing Stability: Not At Risk (2/11/2025)    Received from [facility]    Housing Stability    • Are you worried that in the next 2 months you may not have stable housing?: No       Family History[1]     Patient's Medications   New Prescriptions    No medications on file   Previous Medications    ACCU-CHEK SOFTCLIX LANCETS LANCETS    CHECK BLOOD GLUCOSE FOUR TIMES DAILY AS DIRECTED    ALCOHOL SWABS (DROPSAFE ALCOHOL PREP) 70 % PADS    USE AS  "DIRECTED FOUR TIMES DAILY    ASPIRIN 81 MG CHEWABLE TABLET    Chew 81 mg in the morning.    ATORVASTATIN (LIPITOR) 40 MG TABLET    Take 1 tablet (40 mg total) by mouth daily at bedtime    BLOOD GLUCOSE CALIBRATION (ACCU-CHEK GUIDE CONTROL) LIQD    Test daily as needed (abnormal sugar reading)    BLOOD GLUCOSE MONITORING SUPPL (ACCU-CHEK GUIDE) W/DEVICE KIT    Use 4 (four) times a day    CLOPIDOGREL (PLAVIX) 75 MG TABLET    Take 1 tablet (75 mg total) by mouth daily    GLUCOSE BLOOD (ACCU-CHEK GUIDE) TEST STRIP    CHECK BLOOD GLUCOSE FOUR TIMES DAILY AS DIRECTED    HALOBETASOL PROP-TAZAROTENE 0.01-0.045 % LOTN    Apply 1 Application topically daily    INSULIN GLARGINE SOLOSTAR (LANTUS SOLOSTAR) 100 UNIT/ML SOPN    Inject 50 Units total under the skin daily at bedtime    INSULIN LISPRO (HUMALOG KWIKPEN) 100 UNITS/ML INJECTION PEN    Inject 25 Units under the skin 3 (three) times a day with meals    INSULIN PEN NEEDLE (B-D UF III MINI PEN NEEDLES) 31G X 5 MM MISC    Use 4 (four) times a day    ISOSORBIDE MONONITRATE (IMDUR) 30 MG 24 HR TABLET    Take 30 mg by mouth in the morning.    LISINOPRIL (ZESTRIL) 10 MG TABLET    TAKE ONE TABLET BY MOUTH ONCE DAILY    METOPROLOL TARTRATE (LOPRESSOR) 25 MG TABLET    Take 25 mg by mouth in the morning and 25 mg in the evening.    NITROGLYCERIN (NITROSTAT) 0.4 MG SL TABLET    Place 1 tablet under the tongue every 5 (five) minutes as needed    OXYCODONE-ACETAMINOPHEN (PERCOCET) 5-325 MG PER TABLET    Take 1 tablet by mouth every 6 (six) hours as needed for moderate pain Max Daily Amount: 4 tablets    PANTOPRAZOLE (PROTONIX) 40 MG TABLET    Take 1 tablet (40 mg total) by mouth daily    PIOGLITAZONE (ACTOS) 45 MG TABLET    Take 1 tablet (45 mg total) by mouth daily    RIVAROXABAN (XARELTO) 2.5 MG TABLET    Take 1 tablet (2.5 mg total) by mouth daily with breakfast    TIRZEPATIDE 2.5 MG/0.5ML SOAJ    Inject 2.5 mg under the skin once a week    ULTICARE INSULIN SYRINGE 31G X 5/16\" 1 ML " "MISC    Inject under the skin as needed (for injection)   Modified Medications    No medications on file   Discontinued Medications    No medications on file       Allergies[1]     Ht 5' 4\" (1.626 m)   Wt 83 kg (183 lb)   BMI 31.41 kg/m²      REVIEW OF SYSTEMS:  Constitutional: Negative.    HEENT: Negative.    Respiratory: Negative.    Skin: Negative.    Neurological: Negative.    Psychiatric/Behavioral: Negative.  Musculoskeletal: Negative except for that mentioned in the HPI.    Gen: No acute distress, appears comfortable at rest  HEENT: Eyes clear, moist mucus membranes, hearing intact  Respiratory: No audible wheezing or stridor  Cardiovascular: Well Perfused peripherally, 2+ distal pulse  Abdomen: nondistended, no peritoneal signs     PHYSICAL EXAM:    LEFT SHOULDER:    Appearance: normal     Forward flexion:   180 degrees   Abduction:  180 degrees   External rotation at 90 degrees abduction:   90 degrees   Internal rotation at 90 degrees abduction:  90 degrees   External rotation at 0 degrees:   70 degrees   Internal rotation: T7     STRENGTH:  Forward flexion:  4/5   Abduction:  5/5   External rotation:  5/5   Internal rotation:  5/5  Radial/median/ulnar nerve intact    <2 sec cap refill        IMAGING:      No new imaging.    Large joint arthrocentesis: L subacromial bursa    Performed by: Yolanda Bonilla DO  Authorized by: Yolanda Bonilla DO    Universal Protocol:  Consent: Verbal consent obtained  Risks and benefits: risks, benefits and alternatives were discussed  Consent given by: patient  Patient understanding: patient states understanding of the procedure being performed  Test results: test results available and properly labeled  Patient identity confirmed: verbally with patient  Supporting Documentation  Indications: pain     Is this a Visco injection? NoProcedure Details  Location: shoulder - L subacromial bursa  Preparation: Patient was prepped and draped in the usual sterile fashion  Needle size: 22 " G  Ultrasound guidance: no  Approach: posterior  Medications administered: 4 mL lidocaine 1 %; 20 mg triamcinolone acetonide 40 mg/mL    Patient tolerance: patient tolerated the procedure well with no immediate complications  Dressing:  Sterile dressing applied                     [1]  Past Medical History:  Diagnosis Date   • Arthritis     fingers   • Atrial fibrillation (HCC)    • Diabetes mellitus (HCC)    • First degree AV block    • Full dentures    • GERD (gastroesophageal reflux disease)    • Hyperlipidemia    • Hypertension    • PAD (peripheral artery disease) (HCC)    • PVD (peripheral vascular disease) ([facility])    • Type 2 diabetes mellitus with diabetic peripheral angiopathy without gangrene ([facility]) 05/18/2021    Per CMS ICD 10 guidelines--Per Physician   • Wound, open     right foot- by the 5th toe   [1]  Past Surgical History:  Procedure Laterality Date   • CHOLECYSTECTOMY     • COLONOSCOPY     • INCISION AND DRAINAGE OF WOUND Left 11/29/2023    Procedure: INCISION AND DRAINAGE (I&D) LEFT KNEE PRE-PATELLA BURSA;  Surgeon: Daryl Shay DO;  Location: [facility];  Service: Orthopedics   • IR AORTAGRAM WITH RUN-OFF  1/28/2021   • IR AORTAGRAM WITH RUN-OFF  4/13/2021   • LEG AMPUTATION THROUGH LOWER TIBIA AND FIBULA Left 7/17/2021    Procedure: AMPUTATION BELOW KNEE (BKA);  Surgeon: Jose Mary MD;  [facility];  Service: Vascular   • WI BYP FEM-ANT TIBL PST TIBL PRONEAL ART/OTH DSTL Left 7/14/2021    Procedure: BYPASS femoral-peroneal artery bypass with  reverse GSV and balloon angioplasty peroneal artery;  Surgeon: Jose Mary MD;  [facility];  Service: Vascular   • WI DEBRIDEMENT BONE 1ST 20 SQ CM/< Right 12/18/2020    Procedure: DEBRIDMENT OF ULCER , REMOVAL OF DEVITALIZED SKIN, SOFT TISSUE, BONE AND APPLICATION OF INTEGRA GRAFT RIGHT FOOT.;  Surgeon: Daquan Ellis DPM;  Location: [facility];  Service: Podiatry   • REPLANTATION THUMB Right     right tip reconnected   • SKIN GRAFT       right thumb   • TOE AMPUTATION      left foot all 5 toes removed   • TOE AMPUTATION Right 2/2/2021    Procedure: Right partial 5th ray amputation;  Surgeon: Gabriel Garcia DPM;  Location:  MAIN OR;  Service: Podiatry   • TOE OSTEOTOMY Right 6/26/2020    Procedure: exostosis of right big toe;  Surgeon: Trey Shirley DPM;  Location: [facility];  Service: Podiatry   • TRIGGER FINGER RELEASE      bilateral hands   • VEIN LIGATION      right   [1]  Family History  Problem Relation Name Age of Onset   • Arthritis Mother     • Pancreatic cancer Mother     • Cancer Father          colon   • Alzheimer's disease Father     [1]  Allergies  Allergen Reactions   • Shellfish-Derived Products - Food Allergy Anaphylaxis     Throat closes   • Fentanyl Nausea Only     Immediate response of nausea after 25mg IV fentanyl   • Sulfamethoxazole-Trimethoprim Rash

## 2025-07-31 DIAGNOSIS — L30.9 DERMATITIS: ICD-10-CM

## 2025-07-31 DIAGNOSIS — M54.9 CHRONIC BACK PAIN, UNSPECIFIED BACK LOCATION, UNSPECIFIED BACK PAIN LATERALITY: ICD-10-CM

## 2025-07-31 DIAGNOSIS — G89.29 CHRONIC BACK PAIN, UNSPECIFIED BACK LOCATION, UNSPECIFIED BACK PAIN LATERALITY: ICD-10-CM

## 2025-07-31 RX ORDER — OXYCODONE AND ACETAMINOPHEN 5; 325 MG/1; MG/1
1 TABLET ORAL EVERY 6 HOURS PRN
Qty: 120 TABLET | Refills: 0 | OUTPATIENT
Start: 2025-07-31

## 2025-08-06 ENCOUNTER — VBI (OUTPATIENT)
Dept: ADMINISTRATIVE | Facility: OTHER | Age: 70
End: 2025-08-06

## 2025-08-08 ENCOUNTER — TELEPHONE (OUTPATIENT)
Dept: FAMILY MEDICINE CLINIC | Facility: CLINIC | Age: 70
End: 2025-08-08

## 2025-08-12 ENCOUNTER — DOCUMENTATION (OUTPATIENT)
Dept: ADMINISTRATIVE | Facility: OTHER | Age: 70
End: 2025-08-12

## 2025-08-21 DIAGNOSIS — E11.65 POORLY CONTROLLED TYPE 2 DIABETES MELLITUS (HCC): ICD-10-CM

## 2025-08-22 RX ORDER — INSULIN LISPRO 100 [IU]/ML
25 INJECTION, SOLUTION INTRAVENOUS; SUBCUTANEOUS
Qty: 30 ML | Refills: 0 | OUTPATIENT
Start: 2025-08-22

## (undated) DEVICE — CURITY PLAIN PACKING STRIP: Brand: CURITY

## (undated) DEVICE — 3M™ STERI-STRIP™ REINFORCED ADHESIVE SKIN CLOSURES, R1547, 1/2 IN X 4 IN (12 MM X 100 MM), 6 STRIPS/ENVELOPE: Brand: 3M™ STERI-STRIP™

## (undated) DEVICE — STRETCH BANDAGE: Brand: CURITY

## (undated) DEVICE — SUT PROLENE 7-0 BV175-6 24 IN M8737

## (undated) DEVICE — SUT PROLENE 5-0 BV-1 24 IN 9702H

## (undated) DEVICE — PACK GENERAL LF

## (undated) DEVICE — SUT VICRYL 0 CT-1 27 IN J260H

## (undated) DEVICE — LABEL STERILE (RSC) (2-SENSOR CAINE 2-LIDOCAINE 2-HEPARIN)

## (undated) DEVICE — INTENDED FOR TISSUE SEPARATION, AND OTHER PROCEDURES THAT REQUIRE A SHARP SURGICAL BLADE TO PUNCTURE OR CUT.: Brand: BARD-PARKER SAFETY BLADES SIZE 15, STERILE

## (undated) DEVICE — SPONGE STICK WITH PVP-I: Brand: KENDALL

## (undated) DEVICE — GUIDEWIRE VASC .018 150CM 8CM TAP V-18

## (undated) DEVICE — INTENDED FOR TISSUE SEPARATION, AND OTHER PROCEDURES THAT REQUIRE A SHARP SURGICAL BLADE TO PUNCTURE OR CUT.: Brand: BARD-PARKER ® CARBON RIB-BACK BLADES

## (undated) DEVICE — HEMOSTATIC MATRIX SURGIFLO 8ML W/THROMBIN

## (undated) DEVICE — 40601 PROLONGED POSITIONING SYSTEM: Brand: 40601 PROLONGED POSITIONING SYSTEM

## (undated) DEVICE — CHLORAPREP HI-LITE 26ML ORANGE

## (undated) DEVICE — SPONGE GAUZE 4 X 8 12 PLY STRL LF

## (undated) DEVICE — GLOVE SRG BIOGEL 7.5

## (undated) DEVICE — KERLIX BANDAGE ROLL: Brand: KERLIX

## (undated) DEVICE — GAUZE SPONGES,16 PLY: Brand: CURITY

## (undated) DEVICE — GUIDEWIRE THRUWAY 0.014 IN 130CM LONG STR

## (undated) DEVICE — SYRINGE 10ML LL

## (undated) DEVICE — PROXIMATE PLUS MD MULTI-DIRECTIONAL RELEASE SKIN STAPLERS CONTAINS 35 STAINLESS STEEL STAPLES APPROXIMATE CLOSED DIMENSIONS: 6.9MM X 3.9MM WIDE: Brand: PROXIMATE

## (undated) DEVICE — BONE WAX WHITE: Brand: BONE WAX WHITE

## (undated) DEVICE — DRAPE INTESTINAL ISOLATION BAG

## (undated) DEVICE — ABDOMINAL PAD: Brand: DERMACEA

## (undated) DEVICE — 3000CC GUARDIAN II: Brand: GUARDIAN

## (undated) DEVICE — UNIVERSAL MAJOR EXTREMITY,KIT: Brand: CARDINAL HEALTH

## (undated) DEVICE — GLOVE INDICATOR PI UNDERGLOVE SZ 8 BLUE

## (undated) DEVICE — SUT MONOCRYL 3-0 SH 27 IN Y416H

## (undated) DEVICE — IV CATH INTROCAN 18G X 1 1/4 SAFETY

## (undated) DEVICE — SUT SILK 4-0 18 IN A183H

## (undated) DEVICE — SUT SILK 2-0 SH CR/8 18 IN C012D

## (undated) DEVICE — SUT CHROMIC 4-0 SH-1 27 IN G181H

## (undated) DEVICE — TRAY FOLEY 16FR URIMETER SURESTEP

## (undated) DEVICE — SUT ETHILON 4-0 PS-2 18 IN 1667G

## (undated) DEVICE — BULB SYRINGE,IRRIGATION WITH PROTECTIVE CAP: Brand: DOVER

## (undated) DEVICE — SUT ETHILON 3-0 FSLX 30 IN 1673H

## (undated) DEVICE — ACE WRAP 3 IN VELCRO LATEX FREE

## (undated) DEVICE — SYRINGE 10ML LL CONTROL TOP

## (undated) DEVICE — STERILE BETHLEHEM FEM POP PACK: Brand: CARDINAL HEALTH

## (undated) DEVICE — SUT PROLENE 2-0 CT-2 30 IN 8411H

## (undated) DEVICE — IMMOBILIZER KNEE UNIVERSAL 19 IN

## (undated) DEVICE — MICROPUNCTURE 401

## (undated) DEVICE — ASTOUND STANDARD SURGICAL GOWN, XL: Brand: CONVERTORS

## (undated) DEVICE — PENCIL ELECTROSURG E-Z CLEAN -0035H

## (undated) DEVICE — SKN PRP WNG SPNGE PVP SCRB STR: Brand: MEDLINE INDUSTRIES, INC.

## (undated) DEVICE — ACE WRAP 6 IN UNSTERILE

## (undated) DEVICE — HALF SHEET: Brand: CONVERTORS

## (undated) DEVICE — Device: Brand: MEDEX

## (undated) DEVICE — SPONGE PVP SCRUB WING STERILE

## (undated) DEVICE — GLOVE INDICATOR PI UNDERGLOVE SZ 7.5 BLUE

## (undated) DEVICE — NEEDLE 18 G X 1 1/2

## (undated) DEVICE — LIGACLIP MCA MULTIPLE CLIP APPLIERS, 20 SMALL CLIPS: Brand: LIGACLIP

## (undated) DEVICE — PAD GROUNDING ADULT

## (undated) DEVICE — TIBURON EXTREMITY SHEET: Brand: CONVERTORS

## (undated) DEVICE — PLUMEPEN PRO 10FT

## (undated) DEVICE — 2108 SERIES SAGITTAL BLADE (18.6 X 0.64 X 61.1MM)

## (undated) DEVICE — ACE WRAP 3 IN UNSTERILE

## (undated) DEVICE — GLOVE SRG BIOGEL ECLIPSE 8

## (undated) DEVICE — SURGICEL FIBRILLAR 1 X 2

## (undated) DEVICE — IMPERVIOUS STOCKINETTE: Brand: DEROYAL

## (undated) DEVICE — INTENDED FOR TISSUE SEPARATION, AND OTHER PROCEDURES THAT REQUIRE A SHARP SURGICAL BLADE TO PUNCTURE OR CUT.: Brand: BARD-PARKER SAFETY BLADES SIZE 10, STERILE

## (undated) DEVICE — SPONGE LAP 18 X 18 IN

## (undated) DEVICE — DRESSING MEPILEX AG BORDER 4 X 12 IN

## (undated) DEVICE — 3M™ IOBAN™ 2 ANTIMICROBIAL INCISE DRAPE 6640EZ: Brand: IOBAN™ 2

## (undated) DEVICE — BASIC DOUBLE BASIN 2-LF: Brand: MEDLINE INDUSTRIES, INC.

## (undated) DEVICE — VESSEL LOOPS X-RAY DETECTABLE: Brand: DEROYAL

## (undated) DEVICE — SUT SILK 2-0 18 IN A185H

## (undated) DEVICE — BAG DECANTER

## (undated) DEVICE — GLOVE SRG BIOGEL ECLIPSE 7

## (undated) DEVICE — PRESTO™ INFLATION DEVICE: Brand: PRESTO

## (undated) DEVICE — SUT CHROMIC 3-0 SH 27 IN G122H

## (undated) DEVICE — STOPCOCK 3-WAY

## (undated) DEVICE — 1/2 FORCE SURGICAL SPRING CLIP: Brand: STEALTH® SPRING CLIP

## (undated) DEVICE — OCCLUSIVE GAUZE STRIP,3% BISMUTH TRIBROMOPHENATE IN PETROLATUM BLEND: Brand: XEROFORM

## (undated) DEVICE — PETRI DISH STERILE

## (undated) DEVICE — SUT PROLENE 4-0 RB-1/RB-1 36 IN 8557H

## (undated) DEVICE — ACE WRAP 4 IN UNSTERILE

## (undated) DEVICE — CURITY NON-ADHERENT STRIPS: Brand: CURITY

## (undated) DEVICE — SURGICEL 4 X 8

## (undated) DEVICE — SUT MONOCRYL 2-0 CT-1 27 IN Y339H

## (undated) DEVICE — NEEDLE 25G X 1 1/2

## (undated) DEVICE — SUT PROLENE 6-0 BV130 30 IN 8709H

## (undated) DEVICE — SUT SILK 3-0 18 IN A184H

## (undated) DEVICE — BARD® PARSONNET™ VASCULAR PROBE 1.5 MM X 45 CM: Brand: BARD® PARSONNET

## (undated) DEVICE — BLADE SAGITTAL 25.6 X 9.5MM

## (undated) DEVICE — SUT ETHILON 3-0 PS-1 18 IN 1663G

## (undated) DEVICE — SUT SILK 0 SH 30 IN K834H

## (undated) DEVICE — STOCKINETTE,IMPERVIOUS,12X48,STERILE: Brand: MEDLINE

## (undated) DEVICE — THE SIMPULSE SOLO SYSTEM WITH ULTREX RETRACTABLE SPLASH SHIELD TIP: Brand: SIMPULSE SOLO

## (undated) DEVICE — SUT MONOCRYL 4-0 PS-2 18 IN Y496G

## (undated) DEVICE — LIGACLIP MCA MULTIPLE CLIP APPLIERS, 20 MEDIUM CLIPS: Brand: LIGACLIP

## (undated) DEVICE — TUBING SUCTION 5MM X 12 FT

## (undated) DEVICE — TIBURON SPLIT SHEET: Brand: CONVERTORS

## (undated) DEVICE — SUT PROLENE 7-0 BV-1/BV-1 24 IN 8702H

## (undated) DEVICE — PADDING CAST 4 IN  COTTON STRL

## (undated) DEVICE — DRAPE C-ARM X-RAY

## (undated) DEVICE — SUT PROLENE 0 CT-1 30 IN 8424H

## (undated) DEVICE — DRAPE SHEET THREE QUARTER

## (undated) DEVICE — Device

## (undated) DEVICE — ADHESIVE SKIN HIGH VISCOSITY EXOFIN 1ML

## (undated) DEVICE — FABRIC REINFORCED, SURGICAL GOWN, XL: Brand: CONVERTORS

## (undated) DEVICE — SURGIFOAM 8.5 X 12.5

## (undated) DEVICE — SUPER SPONGES,MEDIUM: Brand: KERLIX

## (undated) DEVICE — 1200CC GUARDIAN II: Brand: GUARDIAN

## (undated) DEVICE — SUT ETHILON 3-0 FSL 30 IN 1671H

## (undated) DEVICE — MEDI-VAC YANK SUCT HNDL W/TPRD BULBOUS TIP: Brand: CARDINAL HEALTH

## (undated) DEVICE — BETADINE SURGICAL SCRUB 32OZ

## (undated) DEVICE — SPONGE LAP 18 X 18 IN STRL RFD

## (undated) DEVICE — BETHLEHEM UNIVERSAL  MIONR EXT: Brand: CARDINAL HEALTH

## (undated) DEVICE — PROXIMATE SKIN STAPLERS (35 WIDE) CONTAINS 35 STAINLESS STEEL STAPLES (FIXED HEAD): Brand: PROXIMATE